# Patient Record
Sex: MALE | Race: WHITE | NOT HISPANIC OR LATINO | Employment: OTHER | URBAN - METROPOLITAN AREA
[De-identification: names, ages, dates, MRNs, and addresses within clinical notes are randomized per-mention and may not be internally consistent; named-entity substitution may affect disease eponyms.]

---

## 2017-01-02 ENCOUNTER — OFFICE VISIT (OUTPATIENT)
Dept: RETAIL CLINIC | Facility: CLINIC | Age: 72
End: 2017-01-02

## 2017-01-02 VITALS
BODY MASS INDEX: 41.75 KG/M2 | OXYGEN SATURATION: 96 % | TEMPERATURE: 98 F | HEART RATE: 70 BPM | DIASTOLIC BLOOD PRESSURE: 89 MMHG | SYSTOLIC BLOOD PRESSURE: 135 MMHG | WEIGHT: 315 LBS | HEIGHT: 73 IN

## 2017-01-02 DIAGNOSIS — J06.9 ACUTE URI: Primary | ICD-10-CM

## 2017-01-02 DIAGNOSIS — J40 BRONCHITIS: ICD-10-CM

## 2017-01-02 PROCEDURE — 99213 OFFICE O/P EST LOW 20 MIN: CPT | Performed by: NURSE PRACTITIONER

## 2017-01-02 RX ORDER — DOXYCYCLINE 50 MG/1
100 CAPSULE ORAL 2 TIMES DAILY
Qty: 28 CAPSULE | Refills: 0 | Status: SHIPPED | OUTPATIENT
Start: 2017-01-02 | End: 2017-01-09

## 2017-01-02 RX ORDER — ALBUTEROL SULFATE 90 UG/1
2 AEROSOL, METERED RESPIRATORY (INHALATION) EVERY 4 HOURS PRN
Qty: 1 INHALER | Refills: 0 | Status: SHIPPED | OUTPATIENT
Start: 2017-01-02 | End: 2019-10-29 | Stop reason: SDUPTHER

## 2017-01-02 RX ORDER — BROMPHENIRAMINE MALEATE, PSEUDOEPHEDRINE HYDROCHLORIDE, AND DEXTROMETHORPHAN HYDROBROMIDE 2; 30; 10 MG/5ML; MG/5ML; MG/5ML
5 SYRUP ORAL 4 TIMES DAILY PRN
Qty: 118 ML | Refills: 0 | Status: SHIPPED | OUTPATIENT
Start: 2017-01-02 | End: 2017-01-09

## 2017-01-02 NOTE — PROGRESS NOTES
Subjective   Luis Perez Jr. is a 71 y.o. male.     Cough   This is a chronic problem. The current episode started more than 1 month ago. The problem has been gradually worsening. The problem occurs every few minutes. The cough is productive of purulent sputum. Associated symptoms include ear congestion, a fever, nasal congestion, postnasal drip, rhinorrhea and wheezing. Pertinent negatives include no ear pain, headaches or sore throat. The symptoms are aggravated by lying down. He has tried ipratropium inhaler, a beta-agonist inhaler and OTC cough suppressant for the symptoms. The treatment provided mild relief. His past medical history is significant for bronchitis.     Patient recently treated on 12/15/2016 in MD's office asthmatic bronchitis. He was given Depo-medrol, and prescribed Zithromax. He reports that he has not improved with these treatments.    The following portions of the patient's history were reviewed and updated as appropriate: allergies, current medications, past family history, past medical history, past social history, past surgical history and problem list.    Review of Systems   Constitutional: Positive for fever.   HENT: Positive for congestion, postnasal drip and rhinorrhea. Negative for ear pain, sore throat and tinnitus. Facial swelling: facial pain with bending.    Respiratory: Positive for cough and wheezing.    Allergic/Immunologic: Negative.    Neurological: Negative for headaches.   Hematological: Negative.    Psychiatric/Behavioral: Negative.        Objective   Physical Exam   Constitutional: He is oriented to person, place, and time. He appears well-developed and well-nourished.   HENT:   Head: Normocephalic and atraumatic.   Eyes: Pupils are equal, round, and reactive to light.   Neck: Normal range of motion.   Cardiovascular: Normal rate, regular rhythm and normal heart sounds.    Pulmonary/Chest: Effort normal. He has wheezes in the right upper field and the left upper field.  He has rhonchi in the right upper field and the left upper field.   Musculoskeletal: Normal range of motion.   Neurological: He is alert and oriented to person, place, and time.   Skin: Skin is warm, dry and intact.   Psychiatric: He has a normal mood and affect. His behavior is normal. Judgment and thought content normal.     Vitals:    01/02/17 1025   BP: 135/89   Pulse: 70   Temp: 98 °F (36.7 °C)   SpO2: 96%       Chapin/Nichol Smith was seen today for cough and nasal congestion.    Diagnoses and all orders for this visit:    Acute URI    Bronchitis    Other orders  -     doxycycline (MONODOX) 50 MG capsule; Take 2 capsules by mouth 2 (Two) Times a Day for 7 days.  -     brompheniramine-pseudoephedrine-DM 30-2-10 MG/5ML syrup; Take 5 mL by mouth 4 (Four) Times a Day As Needed for allergies for up to 7 days.  -     albuterol (PROVENTIL HFA;VENTOLIN HFA) 108 (90 BASE) MCG/ACT inhaler; Inhale 2 puffs Every 4 (Four) Hours As Needed for wheezing.       Home care instructions discussed, and patient verbalized understanding. Patient instructed to follow up with PCP and/or ED for worsening or non-resolving symptoms.    ANGELIA Roger

## 2017-01-02 NOTE — MR AVS SNAPSHOT
Luis Perez JrWinifred   1/2/2017 10:15 AM   Office Visit    Dept Phone:  [phone]   Encounter #:  [id number]    Provider:  NUNU MICHAEL   Department:  [facility]                Your Full Care Plan              Today's Medication Changes          These changes are accurate as of: 1/2/17 10:40 AM.  If you have any questions, ask your nurse or doctor.               New Medication(s)Ordered:     albuterol 108 (90 BASE) MCG/ACT inhaler   Commonly known as:  PROVENTIL HFA;VENTOLIN HFA   Inhale 2 puffs Every 4 (Four) Hours As Needed for wheezing.       brompheniramine-pseudoephedrine-DM 30-2-10 MG/5ML syrup   Take 5 mL by mouth 4 (Four) Times a Day As Needed for allergies for up to 7 days.       doxycycline 50 MG capsule   Commonly known as:  MONODOX   Take 2 capsules by mouth 2 (Two) Times a Day for 7 days.         Stop taking medication(s)listed here:     azithromycin 250 MG tablet   Commonly known as:  ZITHROMAX                Where to Get Your Medications      You can get these medications from any pharmacy     Bring a paper prescription for each of these medications     albuterol 108 (90 BASE) MCG/ACT inhaler    brompheniramine-pseudoephedrine-DM 30-2-10 MG/5ML syrup    doxycycline 50 MG capsule                  Your Updated Medication List          This list is accurate as of: 1/2/17 10:40 AM.  Always use your most recent med list.                ADVAIR DISKUS 500-50 MCG/DOSE DISKUS   Generic drug:  fluticasone-salmeterol       albuterol 108 (90 BASE) MCG/ACT inhaler   Commonly known as:  PROVENTIL HFA;VENTOLIN HFA   Inhale 2 puffs Every 4 (Four) Hours As Needed for wheezing.       amLODIPine 10 MG tablet   Commonly known as:  NORVASC   Take 1 tablet by mouth Daily.       brompheniramine-pseudoephedrine-DM 30-2-10 MG/5ML syrup   Take 5 mL by mouth 4 (Four) Times a Day As Needed for allergies for up to 7 days.       doxycycline 50 MG capsule   Commonly known as:  MONODOX      Take 2 capsules by mouth 2 (Two) Times a Day for 7 days.       hydrochlorothiazide 50 MG tablet   Commonly known as:  HYDRODIURIL   Take 1 tablet by mouth Daily.       montelukast 10 MG tablet   Commonly known as:  SINGULAIR               Instructions    Acute Bronchitis  Bronchitis is inflammation of the airways that extend from the windpipe into the lungs (bronchi). The inflammation often causes mucus to develop. This leads to a cough, which is the most common symptom of bronchitis.   In acute bronchitis, the condition usually develops suddenly and goes away over time, usually in a couple weeks. Smoking, allergies, and asthma can make bronchitis worse. Repeated episodes of bronchitis may cause further lung problems.   CAUSES  Acute bronchitis is most often caused by the same virus that causes a cold. The virus can spread from person to person (contagious) through coughing, sneezing, and touching contaminated objects.  SIGNS AND SYMPTOMS   · Cough.    · Fever.    · Coughing up mucus.    · Body aches.    · Chest congestion.    · Chills.    · Shortness of breath.    · Sore throat.    DIAGNOSIS   Acute bronchitis is usually diagnosed through a physical exam. Your health care provider will also ask you questions about your medical history. Tests, such as chest X-rays, are sometimes done to rule out other conditions.   TREATMENT   Acute bronchitis usually goes away in a couple weeks. Oftentimes, no medical treatment is necessary. Medicines are sometimes given for relief of fever or cough. Antibiotic medicines are usually not needed but may be prescribed in certain situations. In some cases, an inhaler may be recommended to help reduce shortness of breath and control the cough. A cool mist vaporizer may also be used to help thin bronchial secretions and make it easier to clear the chest.   HOME CARE INSTRUCTIONS  · Get plenty of rest.    · Drink enough fluids to keep your urine clear or pale yellow (unless you have a  medical condition that requires fluid restriction). Increasing fluids may help thin your respiratory secretions (sputum) and reduce chest congestion, and it will prevent dehydration.    · Take medicines only as directed by your health care provider.  · If you were prescribed an antibiotic medicine, finish it all even if you start to feel better.  · Avoid smoking and secondhand smoke. Exposure to cigarette smoke or irritating chemicals will make bronchitis worse. If you are a smoker, consider using nicotine gum or skin patches to help control withdrawal symptoms. Quitting smoking will help your lungs heal faster.    · Reduce the chances of another bout of acute bronchitis by washing your hands frequently, avoiding people with cold symptoms, and trying not to touch your hands to your mouth, nose, or eyes.    · Keep all follow-up visits as directed by your health care provider.    SEEK MEDICAL CARE IF:  Your symptoms do not improve after 1 week of treatment.   SEEK IMMEDIATE MEDICAL CARE IF:  · You develop an increased fever or chills.    · You have chest pain.    · You have severe shortness of breath.  · You have bloody sputum.    · You develop dehydration.  · You faint or repeatedly feel like you are going to pass out.  · You develop repeated vomiting.  · You develop a severe headache.  MAKE SURE YOU:   · Understand these instructions.  · Will watch your condition.  · Will get help right away if you are not doing well or get worse.     This information is not intended to replace advice given to you by your health care provider. Make sure you discuss any questions you have with your health care provider.     Document Released: 01/25/2006 Document Revised: 01/08/2016 Document Reviewed: 06/10/2014  [employer] Interactive Patient Education ©2016 [employer]  Upper Respiratory Infection, Adult  Most upper respiratory infections (URIs) are a viral infection of the air passages leading to the lungs. A URI affects the nose,  "throat, and upper air passages. The most common type of URI is nasopharyngitis and is typically referred to as \"the common cold.\"  URIs run their course and usually go away on their own. Most of the time, a URI does not require medical attention, but sometimes a bacterial infection in the upper airways can follow a viral infection. This is called a secondary infection. Sinus and middle ear infections are common types of secondary upper respiratory infections.  Bacterial pneumonia can also complicate a URI. A URI can worsen asthma and chronic obstructive pulmonary disease (COPD). Sometimes, these complications can require emergency medical care and may be life threatening.   CAUSES  Almost all URIs are caused by viruses. A virus is a type of germ and can spread from one person to another.   RISKS FACTORS  You may be at risk for a URI if:   · You smoke.    · You have chronic heart or lung disease.  · You have a weakened defense (immune) system.    · You are very young or very old.    · You have nasal allergies or asthma.  · You work in crowded or poorly ventilated areas.  · You work in health care facilities or schools.  SIGNS AND SYMPTOMS   Symptoms typically develop 2-3 days after you come in contact with a cold virus. Most viral URIs last 7-10 days. However, viral URIs from the influenza virus (flu virus) can last 14-18 days and are typically more severe. Symptoms may include:   · Runny or stuffy (congested) nose.    · Sneezing.    · Cough.    · Sore throat.    · Headache.    · Fatigue.    · Fever.    · Loss of appetite.    · Pain in your forehead, behind your eyes, and over your cheekbones (sinus pain).  · Muscle aches.    DIAGNOSIS   Your health care provider may diagnose a URI by:  · Physical exam.  · Tests to check that your symptoms are not due to another condition such as:  ¨ Strep throat.  ¨ Sinusitis.  ¨ Pneumonia.  ¨ Asthma.  TREATMENT   A URI goes away on its own with time. It cannot be cured with " medicines, but medicines may be prescribed or recommended to relieve symptoms. Medicines may help:  · Reduce your fever.  · Reduce your cough.  · Relieve nasal congestion.  HOME CARE INSTRUCTIONS   · Take medicines only as directed by your health care provider.    · Gargle warm saltwater or take cough drops to comfort your throat as directed by your health care provider.  · Use a warm mist humidifier or inhale steam from a shower to increase air moisture. This may make it easier to breathe.  · Drink enough fluid to keep your urine clear or pale yellow.    · Eat soups and other clear broths and maintain good nutrition.    · Rest as needed.    · Return to work when your temperature has returned to normal or as your health care provider advises. You may need to stay home longer to avoid infecting others. You can also use a face mask and careful hand washing to prevent spread of the virus.  · Increase the usage of your inhaler if you have asthma.    · Do not use any tobacco products, including cigarettes, chewing tobacco, or electronic cigarettes. If you need help quitting, ask your health care provider.  PREVENTION   The best way to protect yourself from getting a cold is to practice good hygiene.   · Avoid oral or hand contact with people with cold symptoms.    · Wash your hands often if contact occurs.    There is no clear evidence that vitamin C, vitamin E, echinacea, or exercise reduces the chance of developing a cold. However, it is always recommended to get plenty of rest, exercise, and practice good nutrition.   SEEK MEDICAL CARE IF:   · You are getting worse rather than better.    · Your symptoms are not controlled by medicine.    · You have chills.  · You have worsening shortness of breath.  · You have brown or red mucus.  · You have yellow or brown nasal discharge.  · You have pain in your face, especially when you bend forward.  · You have a fever.  · You have swollen neck glands.  · You have pain while  swallowing.  · You have white areas in the back of your throat.  SEEK IMMEDIATE MEDICAL CARE IF:   · You have severe or persistent:    Headache.    Ear pain.    Sinus pain.    Chest pain.  · You have chronic lung disease and any of the following:    Wheezing.    Prolonged cough.    Coughing up blood.    A change in your usual mucus.  · You have a stiff neck.  · You have changes in your:    Vision.    Hearing.    Thinking.    Mood.  MAKE SURE YOU:   · Understand these instructions.  · Will watch your condition.  · Will get help right away if you are not doing well or get worse.     This information is not intended to replace advice given to you by your health care provider. Make sure you discuss any questions you have with your health care provider.     Document Released: 2002 Document Revised: 2016 Document Reviewed: 2015  [employer] Interactive Patient Education © [employer]       Patient Instructions History      Upcoming Appointments     Visit Type Date Time Department    OFFICE VISIT 2017 10:15 AM [facility]      [employer] Signup     [employer] allows you to send messages to your doctor, view your test results, renew your prescriptions, schedule appointments, and more. To sign up, go to [email or web address] and click on the Sign Up Now link in the New User? box. Enter your [employer] Activation Code exactly as it appears below along with the last four digits of your Social Security Number and your Date of Birth () to complete the sign-up process. If you do not sign up before the expiration date, you must request a new code.    [employer] Activation Code: [email or web address]  Expires: 2017 10:40 AM    If you have questions, you can email [email or web address] or call [phone] to talk to our [employer] staff. Remember, [employer] is NOT to be used for urgent needs. For medical emergencies, dial 911.               Other Info from Your Visit           Allergies  "    No Known Allergies      Vital Signs     Pulse Temperature Height Weight Oxygen Saturation Body Mass Index    70 98 °F (36.7 °C) 73\" (185.4 cm) 334 lb (152 kg) 96% 44.07 kg/m2    Smoking Status                   Former Smoker             "

## 2017-01-02 NOTE — PATIENT INSTRUCTIONS
Acute Bronchitis  Bronchitis is inflammation of the airways that extend from the windpipe into the lungs (bronchi). The inflammation often causes mucus to develop. This leads to a cough, which is the most common symptom of bronchitis.   In acute bronchitis, the condition usually develops suddenly and goes away over time, usually in a couple weeks. Smoking, allergies, and asthma can make bronchitis worse. Repeated episodes of bronchitis may cause further lung problems.   CAUSES  Acute bronchitis is most often caused by the same virus that causes a cold. The virus can spread from person to person (contagious) through coughing, sneezing, and touching contaminated objects.  SIGNS AND SYMPTOMS   · Cough.    · Fever.    · Coughing up mucus.    · Body aches.    · Chest congestion.    · Chills.    · Shortness of breath.    · Sore throat.    DIAGNOSIS   Acute bronchitis is usually diagnosed through a physical exam. Your health care provider will also ask you questions about your medical history. Tests, such as chest X-rays, are sometimes done to rule out other conditions.   TREATMENT   Acute bronchitis usually goes away in a couple weeks. Oftentimes, no medical treatment is necessary. Medicines are sometimes given for relief of fever or cough. Antibiotic medicines are usually not needed but may be prescribed in certain situations. In some cases, an inhaler may be recommended to help reduce shortness of breath and control the cough. A cool mist vaporizer may also be used to help thin bronchial secretions and make it easier to clear the chest.   HOME CARE INSTRUCTIONS  · Get plenty of rest.    · Drink enough fluids to keep your urine clear or pale yellow (unless you have a medical condition that requires fluid restriction). Increasing fluids may help thin your respiratory secretions (sputum) and reduce chest congestion, and it will prevent dehydration.    · Take medicines only as directed by your health care provider.  · If  "you were prescribed an antibiotic medicine, finish it all even if you start to feel better.  · Avoid smoking and secondhand smoke. Exposure to cigarette smoke or irritating chemicals will make bronchitis worse. If you are a smoker, consider using nicotine gum or skin patches to help control withdrawal symptoms. Quitting smoking will help your lungs heal faster.    · Reduce the chances of another bout of acute bronchitis by washing your hands frequently, avoiding people with cold symptoms, and trying not to touch your hands to your mouth, nose, or eyes.    · Keep all follow-up visits as directed by your health care provider.    SEEK MEDICAL CARE IF:  Your symptoms do not improve after 1 week of treatment.   SEEK IMMEDIATE MEDICAL CARE IF:  · You develop an increased fever or chills.    · You have chest pain.    · You have severe shortness of breath.  · You have bloody sputum.    · You develop dehydration.  · You faint or repeatedly feel like you are going to pass out.  · You develop repeated vomiting.  · You develop a severe headache.  MAKE SURE YOU:   · Understand these instructions.  · Will watch your condition.  · Will get help right away if you are not doing well or get worse.     This information is not intended to replace advice given to you by your health care provider. Make sure you discuss any questions you have with your health care provider.     Document Released: 01/25/2006 Document Revised: 01/08/2016 Document Reviewed: 06/10/2014  [employer] Interactive Patient Education ©2016 [employer]  Upper Respiratory Infection, Adult  Most upper respiratory infections (URIs) are a viral infection of the air passages leading to the lungs. A URI affects the nose, throat, and upper air passages. The most common type of URI is nasopharyngitis and is typically referred to as \"the common cold.\"  URIs run their course and usually go away on their own. Most of the time, a URI does not require medical attention, but " sometimes a bacterial infection in the upper airways can follow a viral infection. This is called a secondary infection. Sinus and middle ear infections are common types of secondary upper respiratory infections.  Bacterial pneumonia can also complicate a URI. A URI can worsen asthma and chronic obstructive pulmonary disease (COPD). Sometimes, these complications can require emergency medical care and may be life threatening.   CAUSES  Almost all URIs are caused by viruses. A virus is a type of germ and can spread from one person to another.   RISKS FACTORS  You may be at risk for a URI if:   · You smoke.    · You have chronic heart or lung disease.  · You have a weakened defense (immune) system.    · You are very young or very old.    · You have nasal allergies or asthma.  · You work in crowded or poorly ventilated areas.  · You work in health care facilities or schools.  SIGNS AND SYMPTOMS   Symptoms typically develop 2-3 days after you come in contact with a cold virus. Most viral URIs last 7-10 days. However, viral URIs from the influenza virus (flu virus) can last 14-18 days and are typically more severe. Symptoms may include:   · Runny or stuffy (congested) nose.    · Sneezing.    · Cough.    · Sore throat.    · Headache.    · Fatigue.    · Fever.    · Loss of appetite.    · Pain in your forehead, behind your eyes, and over your cheekbones (sinus pain).  · Muscle aches.    DIAGNOSIS   Your health care provider may diagnose a URI by:  · Physical exam.  · Tests to check that your symptoms are not due to another condition such as:  ¨ Strep throat.  ¨ Sinusitis.  ¨ Pneumonia.  ¨ Asthma.  TREATMENT   A URI goes away on its own with time. It cannot be cured with medicines, but medicines may be prescribed or recommended to relieve symptoms. Medicines may help:  · Reduce your fever.  · Reduce your cough.  · Relieve nasal congestion.  HOME CARE INSTRUCTIONS   · Take medicines only as directed by your health care  provider.    · Gargle warm saltwater or take cough drops to comfort your throat as directed by your health care provider.  · Use a warm mist humidifier or inhale steam from a shower to increase air moisture. This may make it easier to breathe.  · Drink enough fluid to keep your urine clear or pale yellow.    · Eat soups and other clear broths and maintain good nutrition.    · Rest as needed.    · Return to work when your temperature has returned to normal or as your health care provider advises. You may need to stay home longer to avoid infecting others. You can also use a face mask and careful hand washing to prevent spread of the virus.  · Increase the usage of your inhaler if you have asthma.    · Do not use any tobacco products, including cigarettes, chewing tobacco, or electronic cigarettes. If you need help quitting, ask your health care provider.  PREVENTION   The best way to protect yourself from getting a cold is to practice good hygiene.   · Avoid oral or hand contact with people with cold symptoms.    · Wash your hands often if contact occurs.    There is no clear evidence that vitamin C, vitamin E, echinacea, or exercise reduces the chance of developing a cold. However, it is always recommended to get plenty of rest, exercise, and practice good nutrition.   SEEK MEDICAL CARE IF:   · You are getting worse rather than better.    · Your symptoms are not controlled by medicine.    · You have chills.  · You have worsening shortness of breath.  · You have brown or red mucus.  · You have yellow or brown nasal discharge.  · You have pain in your face, especially when you bend forward.  · You have a fever.  · You have swollen neck glands.  · You have pain while swallowing.  · You have white areas in the back of your throat.  SEEK IMMEDIATE MEDICAL CARE IF:   · You have severe or persistent:    Headache.    Ear pain.    Sinus pain.    Chest pain.  · You have chronic lung disease and any of the following:     Wheezing.    Prolonged cough.    Coughing up blood.    A change in your usual mucus.  · You have a stiff neck.  · You have changes in your:    Vision.    Hearing.    Thinking.    Mood.  MAKE SURE YOU:   · Understand these instructions.  · Will watch your condition.  · Will get help right away if you are not doing well or get worse.     This information is not intended to replace advice given to you by your health care provider. Make sure you discuss any questions you have with your health care provider.     Document Released: 06/13/2002 Document Revised: 05/03/2016 Document Reviewed: 03/25/2015  [employer] Interactive Patient Education ©2016 [employer]

## 2017-02-07 ENCOUNTER — OFFICE VISIT (OUTPATIENT)
Dept: RETAIL CLINIC | Facility: CLINIC | Age: 72
End: 2017-02-07

## 2017-02-07 VITALS
WEIGHT: 315 LBS | OXYGEN SATURATION: 98 % | DIASTOLIC BLOOD PRESSURE: 62 MMHG | HEART RATE: 89 BPM | RESPIRATION RATE: 20 BRPM | TEMPERATURE: 98.5 F | SYSTOLIC BLOOD PRESSURE: 128 MMHG | BODY MASS INDEX: 41.75 KG/M2 | HEIGHT: 73 IN

## 2017-02-07 DIAGNOSIS — R05.9 COUGH: Primary | ICD-10-CM

## 2017-02-07 NOTE — PROGRESS NOTES
Patient presents for ongoing cough and chest congestion.   has been seen multiple times recently by Dr. Ye  and here a couple of weeks ago for same symptoms.   has been on several different antibiotics and symptoms remain.  States having productive cough, wheezing, shortness of breath at times and chest congestion. States that chest feels like it has a lot of stuff that won't come out.  Denies chest pain, difficulty breathing, fever or other complaints.   has finished all treatments prescribed and taking OTC medicines with  little relief.  Instructed patient to go to PCP or go to urgent care or ER from here for further evaluation and treatment. Patient verbalized understanding.  Patient stable and vitals stable.    ANGELIA Johnson

## 2017-02-08 ENCOUNTER — OFFICE VISIT (OUTPATIENT)
Dept: FAMILY MEDICINE CLINIC | Facility: CLINIC | Age: 72
End: 2017-02-08

## 2017-02-08 VITALS
DIASTOLIC BLOOD PRESSURE: 80 MMHG | RESPIRATION RATE: 16 BRPM | BODY MASS INDEX: 44.33 KG/M2 | HEART RATE: 76 BPM | SYSTOLIC BLOOD PRESSURE: 130 MMHG | WEIGHT: 315 LBS | TEMPERATURE: 98.1 F

## 2017-02-08 DIAGNOSIS — J45.20 ASTHMATIC BRONCHITIS, MILD INTERMITTENT, UNCOMPLICATED: Primary | ICD-10-CM

## 2017-02-08 PROCEDURE — 99213 OFFICE O/P EST LOW 20 MIN: CPT | Performed by: FAMILY MEDICINE

## 2017-02-08 RX ORDER — PROMETHAZINE HYDROCHLORIDE AND CODEINE PHOSPHATE 6.25; 1 MG/5ML; MG/5ML
5 SYRUP ORAL EVERY 6 HOURS PRN
Qty: 118 ML | Refills: 0 | Status: SHIPPED | OUTPATIENT
Start: 2017-02-08 | End: 2017-09-11

## 2017-02-08 RX ORDER — AMOXICILLIN AND CLAVULANATE POTASSIUM 875; 125 MG/1; MG/1
1 TABLET, FILM COATED ORAL 2 TIMES DAILY
Qty: 20 TABLET | Refills: 0 | Status: SHIPPED | OUTPATIENT
Start: 2017-02-08 | End: 2017-02-18

## 2017-02-08 NOTE — PROGRESS NOTES
Subjective   Luis Perez Jr is a 71 y.o. male.     History of Present Illness   Return of chest congestion, lingering past three weeks, improves taking antibiotic.  Night cough and sleeping poor. Wheeze.  No fever.  Appetite slow. No GI upset. Using Advair.  The following portions of the patient's history were reviewed and updated as appropriate: allergies, current medications, past family history, past medical history, past social history, past surgical history and problem list.    Review of Systems   Constitutional: Negative.    Eyes: Negative.    Respiratory: Positive for cough, shortness of breath and wheezing.    Cardiovascular: Negative.    Musculoskeletal: Negative.    Skin: Negative.        Objective   Physical Exam   Constitutional: He appears well-developed.   HENT:   Mouth/Throat: Oropharynx is clear and moist.   Neck: Neck supple.   Pulmonary/Chest: He has wheezes in the right middle field and the left middle field.   Lymphadenopathy:     He has no cervical adenopathy.   Vitals reviewed.      Assessment/Plan   Luis was seen today for uri.    Diagnoses and all orders for this visit:    Asthmatic bronchitis, mild intermittent, uncomplicated  -     amoxicillin-clavulanate (AUGMENTIN) 875-125 MG per tablet; Take 1 tablet by mouth 2 (Two) Times a Day for 10 days. Take one twice a day with food  -     promethazine-codeine (PHENERGAN with CODEINE) 6.25-10 MG/5ML syrup; Take 5 mL by mouth Every 6 (Six) Hours As Needed for cough.        To see pulmonary medicine  Sample of Anoro q day use to replace Advir next ten days.

## 2017-09-11 ENCOUNTER — OFFICE VISIT (OUTPATIENT)
Dept: FAMILY MEDICINE CLINIC | Facility: CLINIC | Age: 72
End: 2017-09-11

## 2017-09-11 VITALS
SYSTOLIC BLOOD PRESSURE: 130 MMHG | WEIGHT: 315 LBS | DIASTOLIC BLOOD PRESSURE: 80 MMHG | TEMPERATURE: 98.5 F | HEART RATE: 80 BPM | RESPIRATION RATE: 16 BRPM | BODY MASS INDEX: 45.12 KG/M2

## 2017-09-11 DIAGNOSIS — J45.20 ASTHMATIC BRONCHITIS, MILD INTERMITTENT, UNCOMPLICATED: Primary | ICD-10-CM

## 2017-09-11 PROCEDURE — 99213 OFFICE O/P EST LOW 20 MIN: CPT | Performed by: FAMILY MEDICINE

## 2017-09-11 RX ORDER — DOXYCYCLINE 100 MG/1
100 CAPSULE ORAL 2 TIMES DAILY
Qty: 20 CAPSULE | Refills: 0 | Status: SHIPPED | OUTPATIENT
Start: 2017-09-11 | End: 2018-02-12

## 2017-09-11 NOTE — PROGRESS NOTES
Subjective   Luis Perez Jr is a 71 y.o. male.     History of Present Illness   Two months cough, thick sputum.  No fever. No chest pain. No more cats in house and that has helped with the cough. No GI upset. Trouble sleeping wakes with cough. Uses Advair only. no difficulty walking.   The following portions of the patient's history were reviewed and updated as appropriate: allergies, current medications, past family history, past medical history, past social history, past surgical history and problem list.    Review of Systems   Constitutional: Negative.    HENT: Positive for congestion. Negative for ear pain and sinus pressure.    Eyes: Negative.    Respiratory: Positive for cough, shortness of breath and wheezing.    Cardiovascular: Negative.    Gastrointestinal: Negative.    Musculoskeletal: Negative.    Neurological: Positive for headaches.       Objective   Physical Exam   Constitutional: He appears well-developed and well-nourished. No distress.   Neck: Neck supple.   Cardiovascular: Regular rhythm and normal heart sounds.    Pulmonary/Chest: He has no wheezes.   Long inspiratory phase.   Musculoskeletal: He exhibits no edema.   Vitals reviewed.      Assessment/Plan   Luis was seen today for uri.    Diagnoses and all orders for this visit:    Asthmatic bronchitis, mild intermittent, uncomplicated  -     doxycycline (MONODOX) 100 MG capsule; Take 1 capsule by mouth 2 (Two) Times a Day.  -     Umeclidinium-Vilanterol (ANORO ELLIPTA) 62.5-25 MCG/INH aerosol powder ; Inhale 1 inhaler Daily.

## 2017-12-11 DIAGNOSIS — J45.30 MILD PERSISTENT ASTHMA WITHOUT COMPLICATION: ICD-10-CM

## 2017-12-11 DIAGNOSIS — I10 ESSENTIAL HYPERTENSION: ICD-10-CM

## 2017-12-11 RX ORDER — HYDROCHLOROTHIAZIDE 50 MG/1
TABLET ORAL
Qty: 90 TABLET | Refills: 2 | Status: SHIPPED | OUTPATIENT
Start: 2017-12-11 | End: 2018-11-29 | Stop reason: SDUPTHER

## 2017-12-11 RX ORDER — AMLODIPINE BESYLATE 10 MG/1
TABLET ORAL
Qty: 90 TABLET | Refills: 2 | Status: SHIPPED | OUTPATIENT
Start: 2017-12-11 | End: 2018-10-02 | Stop reason: SDUPTHER

## 2018-02-12 ENCOUNTER — OFFICE VISIT (OUTPATIENT)
Dept: FAMILY MEDICINE CLINIC | Facility: CLINIC | Age: 73
End: 2018-02-12

## 2018-02-12 VITALS
HEART RATE: 74 BPM | TEMPERATURE: 98.6 F | SYSTOLIC BLOOD PRESSURE: 142 MMHG | DIASTOLIC BLOOD PRESSURE: 86 MMHG | WEIGHT: 315 LBS | RESPIRATION RATE: 16 BRPM | BODY MASS INDEX: 44.46 KG/M2 | OXYGEN SATURATION: 95 %

## 2018-02-12 DIAGNOSIS — J06.9 ACUTE URI: Primary | ICD-10-CM

## 2018-02-12 PROCEDURE — 99213 OFFICE O/P EST LOW 20 MIN: CPT | Performed by: FAMILY MEDICINE

## 2018-02-12 RX ORDER — AMOXICILLIN AND CLAVULANATE POTASSIUM 875; 125 MG/1; MG/1
1 TABLET, FILM COATED ORAL EVERY 12 HOURS SCHEDULED
Qty: 20 TABLET | Refills: 0 | Status: SHIPPED | OUTPATIENT
Start: 2018-02-12 | End: 2018-02-22

## 2018-02-12 RX ORDER — PROMETHAZINE HYDROCHLORIDE AND CODEINE PHOSPHATE 6.25; 1 MG/5ML; MG/5ML
5 SYRUP ORAL EVERY 6 HOURS PRN
Qty: 118 ML | Refills: 0 | Status: SHIPPED | OUTPATIENT
Start: 2018-02-12 | End: 2018-08-09

## 2018-02-12 NOTE — PROGRESS NOTES
Subjective   Luis Perez Jr is a 72 y.o. male.     History of Present Illness   Cough waking from sleep with left upper chest discomfort, sweaty and feverish.  Some wheezing.  Took Delsym.  Loose stools. No sputum.   The following portions of the patient's history were reviewed and updated as appropriate: allergies, current medications, past family history, past medical history, past social history, past surgical history and problem list.    Review of Systems   HENT: Positive for congestion. Negative for sinus pressure and sore throat.    Eyes: Negative.    Respiratory: Positive for cough, shortness of breath and wheezing.    Cardiovascular: Negative.    Gastrointestinal: Positive for nausea.   Neurological: Positive for weakness and light-headedness. Negative for headaches.       Objective   Physical Exam   Constitutional: He appears well-developed and well-nourished.   HENT:   Right Ear: External ear normal.   Left Ear: External ear normal.   Mouth/Throat: Oropharynx is clear and moist.   Mild head congestion   Neck: Neck supple.   Pulmonary/Chest: He has wheezes in the left middle field.   Lymphadenopathy:     He has no cervical adenopathy.   Vitals reviewed.      Assessment/Plan   Luis was seen today for uri.    Diagnoses and all orders for this visit:    Acute URI  -     promethazine-codeine (PHENERGAN with CODEINE) 6.25-10 MG/5ML syrup; Take 5 mL by mouth Every 6 (Six) Hours As Needed for Cough.  -     amoxicillin-clavulanate (AUGMENTIN) 875-125 MG per tablet; Take 1 tablet by mouth Every 12 (Twelve) Hours for 10 days.

## 2018-05-30 ENCOUNTER — TRANSCRIBE ORDERS (OUTPATIENT)
Dept: LAB | Facility: HOSPITAL | Age: 73
End: 2018-05-30

## 2018-05-30 ENCOUNTER — LAB (OUTPATIENT)
Dept: LAB | Facility: HOSPITAL | Age: 73
End: 2018-05-30

## 2018-05-30 DIAGNOSIS — IMO0001 POSTOPERATIVE INTRA-ABDOMINAL ABSCESS, SEQUELA: ICD-10-CM

## 2018-05-30 DIAGNOSIS — IMO0001 POSTOPERATIVE INTRA-ABDOMINAL ABSCESS, SEQUELA: Primary | ICD-10-CM

## 2018-05-30 LAB
ALBUMIN SERPL-MCNC: 4.8 G/DL (ref 3.2–4.8)
ALBUMIN/GLOB SERPL: 1.6 G/DL (ref 1.5–2.5)
ALP SERPL-CCNC: 71 U/L (ref 25–100)
ALT SERPL W P-5'-P-CCNC: 23 U/L (ref 7–40)
ANION GAP SERPL CALCULATED.3IONS-SCNC: 9 MMOL/L (ref 3–11)
AST SERPL-CCNC: 20 U/L (ref 0–33)
BASOPHILS # BLD AUTO: 0.04 10*3/MM3 (ref 0–0.2)
BASOPHILS NFR BLD AUTO: 0.5 % (ref 0–1)
BILIRUB SERPL-MCNC: 0.5 MG/DL (ref 0.3–1.2)
BUN BLD-MCNC: 16 MG/DL (ref 9–23)
BUN/CREAT SERPL: 16 (ref 7–25)
CALCIUM SPEC-SCNC: 9.7 MG/DL (ref 8.7–10.4)
CHLORIDE SERPL-SCNC: 101 MMOL/L (ref 99–109)
CK SERPL-CCNC: 80 U/L (ref 26–174)
CO2 SERPL-SCNC: 32 MMOL/L (ref 20–31)
CREAT BLD-MCNC: 1 MG/DL (ref 0.6–1.3)
CRP SERPL-MCNC: 0.06 MG/DL (ref 0–1)
DEPRECATED RDW RBC AUTO: 44.1 FL (ref 37–54)
EOSINOPHIL # BLD AUTO: 0.16 10*3/MM3 (ref 0–0.3)
EOSINOPHIL NFR BLD AUTO: 1.9 % (ref 0–3)
ERYTHROCYTE [DISTWIDTH] IN BLOOD BY AUTOMATED COUNT: 14 % (ref 11.3–14.5)
ERYTHROCYTE [SEDIMENTATION RATE] IN BLOOD: 18 MM/HR (ref 0–20)
GFR SERPL CREATININE-BSD FRML MDRD: 73 ML/MIN/1.73
GLOBULIN UR ELPH-MCNC: 3 GM/DL
GLUCOSE BLD-MCNC: 94 MG/DL (ref 70–100)
HCT VFR BLD AUTO: 49.2 % (ref 38.9–50.9)
HGB BLD-MCNC: 16.7 G/DL (ref 13.1–17.5)
IMM GRANULOCYTES # BLD: 0.06 10*3/MM3 (ref 0–0.03)
IMM GRANULOCYTES NFR BLD: 0.7 % (ref 0–0.6)
LYMPHOCYTES # BLD AUTO: 2.97 10*3/MM3 (ref 0.6–4.8)
LYMPHOCYTES NFR BLD AUTO: 35.1 % (ref 24–44)
MCH RBC QN AUTO: 29.2 PG (ref 27–31)
MCHC RBC AUTO-ENTMCNC: 33.9 G/DL (ref 32–36)
MCV RBC AUTO: 86 FL (ref 80–99)
MONOCYTES # BLD AUTO: 0.84 10*3/MM3 (ref 0–1)
MONOCYTES NFR BLD AUTO: 9.9 % (ref 0–12)
NEUTROPHILS # BLD AUTO: 4.4 10*3/MM3 (ref 1.5–8.3)
NEUTROPHILS NFR BLD AUTO: 51.9 % (ref 41–71)
PLATELET # BLD AUTO: 222 10*3/MM3 (ref 150–450)
PMV BLD AUTO: 10.2 FL (ref 6–12)
POTASSIUM BLD-SCNC: 3.9 MMOL/L (ref 3.5–5.5)
PROT SERPL-MCNC: 7.8 G/DL (ref 5.7–8.2)
RBC # BLD AUTO: 5.72 10*6/MM3 (ref 4.2–5.76)
SODIUM BLD-SCNC: 142 MMOL/L (ref 132–146)
WBC NRBC COR # BLD: 8.47 10*3/MM3 (ref 3.5–10.8)

## 2018-05-30 PROCEDURE — 86140 C-REACTIVE PROTEIN: CPT

## 2018-05-30 PROCEDURE — 36415 COLL VENOUS BLD VENIPUNCTURE: CPT

## 2018-05-30 PROCEDURE — 82550 ASSAY OF CK (CPK): CPT

## 2018-05-30 PROCEDURE — 80053 COMPREHEN METABOLIC PANEL: CPT | Performed by: INTERNAL MEDICINE

## 2018-05-30 PROCEDURE — 85025 COMPLETE CBC W/AUTO DIFF WBC: CPT

## 2018-08-09 ENCOUNTER — OFFICE VISIT (OUTPATIENT)
Dept: FAMILY MEDICINE CLINIC | Facility: CLINIC | Age: 73
End: 2018-08-09

## 2018-08-09 VITALS
SYSTOLIC BLOOD PRESSURE: 140 MMHG | OXYGEN SATURATION: 98 % | HEART RATE: 78 BPM | DIASTOLIC BLOOD PRESSURE: 90 MMHG | HEIGHT: 73 IN | RESPIRATION RATE: 16 BRPM | TEMPERATURE: 97.9 F | WEIGHT: 315 LBS | BODY MASS INDEX: 41.75 KG/M2

## 2018-08-09 DIAGNOSIS — J06.9 ACUTE URI: Primary | ICD-10-CM

## 2018-08-09 PROCEDURE — 99213 OFFICE O/P EST LOW 20 MIN: CPT | Performed by: FAMILY MEDICINE

## 2018-08-09 RX ORDER — AZITHROMYCIN 250 MG/1
TABLET, FILM COATED ORAL
Qty: 6 TABLET | Refills: 0 | Status: SHIPPED | OUTPATIENT
Start: 2018-08-09 | End: 2018-08-23

## 2018-08-09 RX ORDER — DEXTROMETHORPHAN HYDROBROMIDE AND PROMETHAZINE HYDROCHLORIDE 15; 6.25 MG/5ML; MG/5ML
5 SYRUP ORAL 4 TIMES DAILY PRN
Qty: 118 ML | Refills: 0 | Status: SHIPPED | OUTPATIENT
Start: 2018-08-09 | End: 2018-08-23

## 2018-08-09 NOTE — PROGRESS NOTES
Subjective   Luis Perez Jr is a 72 y.o. male.     History of Present Illness   Cough phlem, no fever or chills.  Worse mornings.  Night congestion and sleeps in recliner.  Some wheezing.  Winded feeling often.  Took no medication.   Skin cancer right chest complicated by MRSA infection treated with IV antibiotics May.   The following portions of the patient's history were reviewed and updated as appropriate: allergies, current medications, past family history, past medical history, past social history, past surgical history and problem list.    Review of Systems   Constitutional: Negative for chills and fever.   HENT: Positive for congestion. Negative for sinus pain and sore throat.    Respiratory: Positive for cough and wheezing.        Objective   Physical Exam   Constitutional: He appears well-developed and well-nourished.   HENT:   Mouth/Throat: Oropharynx is clear and moist.   Neck: Neck supple.   Pulmonary/Chest: He has no wheezes.   Long inspiratory phase   Vitals reviewed.      Assessment/Plan   Luis was seen today for uri.    Diagnoses and all orders for this visit:    Acute URI  -     azithromycin (ZITHROMAX) 250 MG tablet; Take 2 tablets the first day, then 1 tablet daily on days 2 through 5.  -     promethazine-dextromethorphan (PROMETHAZINE-DM) 6.25-15 MG/5ML syrup; Take 5 mL by mouth 4 (Four) Times a Day As Needed for Cough.

## 2018-08-23 ENCOUNTER — OFFICE VISIT (OUTPATIENT)
Dept: FAMILY MEDICINE CLINIC | Facility: CLINIC | Age: 73
End: 2018-08-23

## 2018-08-23 VITALS
SYSTOLIC BLOOD PRESSURE: 130 MMHG | TEMPERATURE: 98.4 F | HEART RATE: 69 BPM | BODY MASS INDEX: 41.75 KG/M2 | WEIGHT: 315 LBS | DIASTOLIC BLOOD PRESSURE: 80 MMHG | OXYGEN SATURATION: 97 % | HEIGHT: 73 IN | RESPIRATION RATE: 16 BRPM

## 2018-08-23 DIAGNOSIS — J45.20 MILD INTERMITTENT ASTHMA WITHOUT COMPLICATION: Chronic | ICD-10-CM

## 2018-08-23 DIAGNOSIS — Z00.00 MEDICARE ANNUAL WELLNESS VISIT, SUBSEQUENT: Primary | ICD-10-CM

## 2018-08-23 DIAGNOSIS — I10 ESSENTIAL HYPERTENSION: ICD-10-CM

## 2018-08-23 LAB
ALBUMIN SERPL-MCNC: 4.72 G/DL (ref 3.2–4.8)
ALBUMIN/GLOB SERPL: 1.7 G/DL (ref 1.5–2.5)
ALP SERPL-CCNC: 62 U/L (ref 25–100)
ALT SERPL W P-5'-P-CCNC: 24 U/L (ref 7–40)
ANION GAP SERPL CALCULATED.3IONS-SCNC: 12 MMOL/L (ref 3–11)
ARTICHOKE IGE QN: 73 MG/DL (ref 0–130)
AST SERPL-CCNC: 26 U/L (ref 0–33)
BASOPHILS # BLD AUTO: 0.05 10*3/MM3 (ref 0–0.2)
BASOPHILS NFR BLD AUTO: 0.6 % (ref 0–1)
BILIRUB SERPL-MCNC: 0.6 MG/DL (ref 0.3–1.2)
BUN BLD-MCNC: 15 MG/DL (ref 9–23)
BUN/CREAT SERPL: 17 (ref 7–25)
CALCIUM SPEC-SCNC: 9.5 MG/DL (ref 8.7–10.4)
CHLORIDE SERPL-SCNC: 102 MMOL/L (ref 99–109)
CHOLEST SERPL-MCNC: 149 MG/DL (ref 0–200)
CO2 SERPL-SCNC: 27 MMOL/L (ref 20–31)
CREAT BLD-MCNC: 0.88 MG/DL (ref 0.6–1.3)
DEPRECATED RDW RBC AUTO: 46.9 FL (ref 37–54)
EOSINOPHIL # BLD AUTO: 0.18 10*3/MM3 (ref 0–0.3)
EOSINOPHIL NFR BLD AUTO: 2.2 % (ref 0–3)
ERYTHROCYTE [DISTWIDTH] IN BLOOD BY AUTOMATED COUNT: 14.8 % (ref 11.3–14.5)
GFR SERPL CREATININE-BSD FRML MDRD: 85 ML/MIN/1.73
GLOBULIN UR ELPH-MCNC: 2.8 GM/DL
GLUCOSE BLD-MCNC: 109 MG/DL (ref 70–100)
HCT VFR BLD AUTO: 48.6 % (ref 38.9–50.9)
HCV AB SER DONR QL: NORMAL
HDLC SERPL-MCNC: 32 MG/DL (ref 40–60)
HGB BLD-MCNC: 16.3 G/DL (ref 13.1–17.5)
IMM GRANULOCYTES # BLD: 0.05 10*3/MM3 (ref 0–0.03)
IMM GRANULOCYTES NFR BLD: 0.6 % (ref 0–0.6)
LYMPHOCYTES # BLD AUTO: 2.74 10*3/MM3 (ref 0.6–4.8)
LYMPHOCYTES NFR BLD AUTO: 33.8 % (ref 24–44)
MCH RBC QN AUTO: 29.3 PG (ref 27–31)
MCHC RBC AUTO-ENTMCNC: 33.5 G/DL (ref 32–36)
MCV RBC AUTO: 87.4 FL (ref 80–99)
MONOCYTES # BLD AUTO: 0.8 10*3/MM3 (ref 0–1)
MONOCYTES NFR BLD AUTO: 9.9 % (ref 0–12)
NEUTROPHILS # BLD AUTO: 4.33 10*3/MM3 (ref 1.5–8.3)
NEUTROPHILS NFR BLD AUTO: 53.5 % (ref 41–71)
PLATELET # BLD AUTO: 206 10*3/MM3 (ref 150–450)
PMV BLD AUTO: 11.8 FL (ref 6–12)
POTASSIUM BLD-SCNC: 3.7 MMOL/L (ref 3.5–5.5)
PROT SERPL-MCNC: 7.5 G/DL (ref 5.7–8.2)
RBC # BLD AUTO: 5.56 10*6/MM3 (ref 4.2–5.76)
SODIUM BLD-SCNC: 141 MMOL/L (ref 132–146)
TRIGL SERPL-MCNC: 351 MG/DL (ref 0–150)
WBC NRBC COR # BLD: 8.1 10*3/MM3 (ref 3.5–10.8)

## 2018-08-23 PROCEDURE — G0438 PPPS, INITIAL VISIT: HCPCS | Performed by: FAMILY MEDICINE

## 2018-08-23 PROCEDURE — 80061 LIPID PANEL: CPT | Performed by: FAMILY MEDICINE

## 2018-08-23 PROCEDURE — 80053 COMPREHEN METABOLIC PANEL: CPT | Performed by: FAMILY MEDICINE

## 2018-08-23 PROCEDURE — 85025 COMPLETE CBC W/AUTO DIFF WBC: CPT | Performed by: FAMILY MEDICINE

## 2018-08-23 PROCEDURE — 86803 HEPATITIS C AB TEST: CPT | Performed by: FAMILY MEDICINE

## 2018-08-23 PROCEDURE — 36415 COLL VENOUS BLD VENIPUNCTURE: CPT | Performed by: FAMILY MEDICINE

## 2018-08-23 NOTE — PROGRESS NOTES
QUICK REFERENCE INFORMATION:  The ABCs of the Annual Wellness Visit    Subsequent Medicare Wellness Visit    HEALTH RISK ASSESSMENT    1945    Recent Hospitalizations:  No hospitalization(s) within the last year..        Current Medical Providers:  Patient Care Team:  Mohan Ye MD as PCP - General  Mohan Ye MD as PCP - Claims Attributed  Dr Waite, [facility]  Pulmonary medicine      Smoking Status:  History   Smoking Status   • Never Smoker   Smokeless Tobacco   • Current User       Alcohol Consumption:  History   Alcohol Use No       Depression Screen:   PHQ-2/PHQ-9 Depression Screening 8/23/2018   Little interest or pleasure in doing things 0   Feeling down, depressed, or hopeless 0   Total Score 0       Health Habits and Functional and Cognitive Screening:  No flowsheet data found.        Does the patient have evidence of cognitive impairment? No    Aspirin use counseling: Does not need ASA (and currently is not on it)      Recent Lab Results:  CMP:  Lab Results   Component Value Date    BUN 16 05/30/2018    CREATININE 1.00 05/30/2018    EGFRIFNONA 73 05/30/2018    BCR 16.0 05/30/2018     05/30/2018    K 3.9 05/30/2018    CO2 32.0 (H) 05/30/2018    CALCIUM 9.7 05/30/2018    ALBUMIN 4.80 05/30/2018    BILITOT 0.5 05/30/2018    ALKPHOS 71 05/30/2018    AST 20 05/30/2018    ALT 23 05/30/2018     Lipid Panel:  Lab Results   Component Value Date    TRIG 354 (H) 08/13/2015    HDL 32 (L) 08/13/2015     HbA1c:  Lab Results   Component Value Date    HGBA1C 5.9 08/13/2015       Visual Acuity:  No exam data present    Age-appropriate Screening Schedule:  Refer to the list below for future screening recommendations based on patient's age, sex and/or medical conditions. Orders for these recommended tests are listed in the plan section. The patient has been provided with a written plan.    Health Maintenance   Topic Date Due   • TDAP/TD VACCINES (1 - Tdap) 09/20/1964   •  ZOSTER VACCINE (1 of 2) 09/20/1995   • PNEUMOCOCCAL VACCINES (65+ LOW/MEDIUM RISK) (2 of 2 - PPSV23) 01/15/2016   • INFLUENZA VACCINE  08/01/2018   • COLONOSCOPY  06/27/2026        Subjective   History of Present Illness    Luis Perez Jr is a 72 y.o. male who presents for an Subsequent Wellness Visit.    The following portions of the patient's history were reviewed and updated as appropriate: allergies, current medications, past family history, past medical history, past social history, past surgical history and problem list.    Outpatient Medications Prior to Visit   Medication Sig Dispense Refill   • ADVAIR DISKUS 500-50 MCG/DOSE DISKUS      • albuterol (PROVENTIL HFA;VENTOLIN HFA) 108 (90 BASE) MCG/ACT inhaler Inhale 2 puffs Every 4 (Four) Hours As Needed for wheezing. 1 inhaler 0   • amLODIPine (NORVASC) 10 MG tablet TAKE ONE TABLET BY MOUTH DAILY 90 tablet 2   • hydrochlorothiazide (HYDRODIURIL) 50 MG tablet TAKE ONE TABLET BY MOUTH DAILY 90 tablet 2   • INCRUSE ELLIPTA 62.5 MCG/INH aerosol powder       • montelukast (SINGULAIR) 10 MG tablet      • azithromycin (ZITHROMAX) 250 MG tablet Take 2 tablets the first day, then 1 tablet daily on days 2 through 5. 6 tablet 0   • promethazine-dextromethorphan (PROMETHAZINE-DM) 6.25-15 MG/5ML syrup Take 5 mL by mouth 4 (Four) Times a Day As Needed for Cough. 118 mL 0     No facility-administered medications prior to visit.        Patient Active Problem List   Diagnosis   • Hypertension   • Mild intermittent asthma without complication       Advance Care Planning:  has an advance directive - a copy HAS NOT been provided    Identification of Risk Factors:  Risk factors include: weight .    Review of Systems   Constitutional: Negative.  Negative for unexpected weight change.   HENT: Negative.    Eyes: Negative.    Respiratory: Positive for shortness of breath and wheezing.    Cardiovascular: Negative for chest pain.   Gastrointestinal: Negative.    Genitourinary:  "Negative.    Musculoskeletal: Negative.    Neurological: Negative.    Psychiatric/Behavioral: Negative.        Compared to one year ago, the patient feels his physical health is the same.  Compared to one year ago, the patient feels his mental health is the same.    Objective     Physical Exam   Constitutional: He appears well-developed and well-nourished.   HENT:   Right Ear: External ear normal.   Left Ear: External ear normal.   Mouth/Throat: Oropharynx is clear and moist.   Eyes: EOM are normal.   Neck: Neck supple.   Cardiovascular: Normal heart sounds.    Pulmonary/Chest: He has wheezes.   Abdominal: Soft. Bowel sounds are normal. He exhibits no distension, no abdominal bruit and no mass.   Musculoskeletal: Normal range of motion.   Lymphadenopathy:     He has no cervical adenopathy.   Neurological: He is alert.   Skin:   Chronic skin changes both lower legs with discoloration and thick skin.    Psychiatric: He has a normal mood and affect.   Vitals reviewed.      Vitals:    08/23/18 0823   BP: 130/80   Pulse: 69   Resp: 16   Temp: 98.4 °F (36.9 °C)   SpO2: 97%   Weight: (!) 153 kg (337 lb)   Height: 185.4 cm (73\")       Patient's Body mass index is 44.46 kg/m². BMI is above normal parameters. Recommendations include: exercise counseling.      Assessment/Plan   Patient Self-Management and Personalized Health Advice  The patient has been provided with information about: exercise and weight management and preventive services including:   · Fall Risk assessment done.    Visit Diagnoses:    ICD-10-CM ICD-9-CM   1. Medicare annual wellness visit, subsequent Z00.00 V70.0   2. Mild intermittent asthma without complication J45.20 493.90   3. Essential hypertension I10 401.9       Orders Placed This Encounter   Procedures   • Comprehensive Metabolic Panel   • Lipid Panel   • Hepatitis C Antibody   • CBC & Differential     Order Specific Question:   Manual Differential     Answer:   No       Outpatient Encounter " Prescriptions as of 8/23/2018   Medication Sig Dispense Refill   • ADVAIR DISKUS 500-50 MCG/DOSE DISKUS      • albuterol (PROVENTIL HFA;VENTOLIN HFA) 108 (90 BASE) MCG/ACT inhaler Inhale 2 puffs Every 4 (Four) Hours As Needed for wheezing. 1 inhaler 0   • amLODIPine (NORVASC) 10 MG tablet TAKE ONE TABLET BY MOUTH DAILY 90 tablet 2   • hydrochlorothiazide (HYDRODIURIL) 50 MG tablet TAKE ONE TABLET BY MOUTH DAILY 90 tablet 2   • INCRUSE ELLIPTA 62.5 MCG/INH aerosol powder       • montelukast (SINGULAIR) 10 MG tablet      • [DISCONTINUED] azithromycin (ZITHROMAX) 250 MG tablet Take 2 tablets the first day, then 1 tablet daily on days 2 through 5. 6 tablet 0   • [DISCONTINUED] promethazine-dextromethorphan (PROMETHAZINE-DM) 6.25-15 MG/5ML syrup Take 5 mL by mouth 4 (Four) Times a Day As Needed for Cough. 118 mL 0     No facility-administered encounter medications on file as of 8/23/2018.        Reviewed use of high risk medication in the elderly: yes  Reviewed for potential of harmful drug interactions in the elderly: yes    Follow Up:  Return for Annual.     An After Visit Summary and PPPS with all of these plans were given to the patient.

## 2018-10-02 DIAGNOSIS — J45.30 MILD PERSISTENT ASTHMA WITHOUT COMPLICATION: ICD-10-CM

## 2018-10-02 RX ORDER — AMLODIPINE BESYLATE 10 MG/1
10 TABLET ORAL DAILY
Qty: 90 TABLET | Refills: 2 | Status: SHIPPED | OUTPATIENT
Start: 2018-10-02 | End: 2019-07-11 | Stop reason: SDUPTHER

## 2018-10-15 ENCOUNTER — OFFICE VISIT (OUTPATIENT)
Dept: FAMILY MEDICINE CLINIC | Facility: CLINIC | Age: 73
End: 2018-10-15

## 2018-10-15 VITALS
RESPIRATION RATE: 16 BRPM | DIASTOLIC BLOOD PRESSURE: 76 MMHG | OXYGEN SATURATION: 95 % | HEART RATE: 73 BPM | TEMPERATURE: 98.8 F | SYSTOLIC BLOOD PRESSURE: 138 MMHG | WEIGHT: 315 LBS | BODY MASS INDEX: 44.2 KG/M2

## 2018-10-15 DIAGNOSIS — M72.2 PLANTAR FASCIITIS: Primary | ICD-10-CM

## 2018-10-15 PROCEDURE — 99213 OFFICE O/P EST LOW 20 MIN: CPT | Performed by: FAMILY MEDICINE

## 2018-10-15 NOTE — PROGRESS NOTES
Subjective   Luis Perez Jr is a 73 y.o. male.     History of Present Illness   [place] weeks left heel pain worse after travel to [place] with lots of walking.  Pain early morning or after sitting. No skin blemishes.  No swelling. Not wearing compression hose.  The following portions of the patient's history were reviewed and updated as appropriate: allergies, current medications, past family history, past medical history, past social history, past surgical history and problem list.    Review of Systems   Constitutional: Negative for activity change and unexpected weight change.   Respiratory: Negative.    Cardiovascular: Negative for chest pain.   Musculoskeletal: Positive for gait problem. Negative for back pain.       Objective   Physical Exam   Constitutional: He appears well-developed and well-nourished.   Pulmonary/Chest: Effort normal.   Musculoskeletal:   Tender left heel on plantar surface. No skin sore.   Vitals reviewed.      Assessment/Plan   Luis was seen today for foot pain.    Diagnoses and all orders for this visit:    Plantar fasciitis        Recommend stretching and ice massage twice a day.   Medications not shown to be very helpful.

## 2018-11-29 DIAGNOSIS — I10 ESSENTIAL HYPERTENSION: ICD-10-CM

## 2018-11-29 RX ORDER — HYDROCHLOROTHIAZIDE 50 MG/1
50 TABLET ORAL DAILY
Qty: 90 TABLET | Refills: 2 | Status: SHIPPED | OUTPATIENT
Start: 2018-11-29 | End: 2019-12-11 | Stop reason: SDUPTHER

## 2019-01-31 ENCOUNTER — OFFICE VISIT (OUTPATIENT)
Dept: FAMILY MEDICINE CLINIC | Facility: CLINIC | Age: 74
End: 2019-01-31

## 2019-01-31 VITALS
BODY MASS INDEX: 41.75 KG/M2 | DIASTOLIC BLOOD PRESSURE: 98 MMHG | RESPIRATION RATE: 16 BRPM | OXYGEN SATURATION: 94 % | WEIGHT: 315 LBS | TEMPERATURE: 97.4 F | SYSTOLIC BLOOD PRESSURE: 140 MMHG | HEIGHT: 73 IN | HEART RATE: 42 BPM

## 2019-01-31 DIAGNOSIS — Z23 NEED FOR VACCINATION AGAINST STREPTOCOCCUS PNEUMONIAE: ICD-10-CM

## 2019-01-31 DIAGNOSIS — M25.561 ACUTE PAIN OF RIGHT KNEE: Primary | ICD-10-CM

## 2019-01-31 DIAGNOSIS — E66.01 MORBIDLY OBESE (HCC): ICD-10-CM

## 2019-01-31 PROBLEM — R60.0 EDEMA LEG: Status: ACTIVE | Noted: 2019-01-31

## 2019-01-31 PROBLEM — L03.119 CELLULITIS OF LOWER EXTREMITY: Status: ACTIVE | Noted: 2019-01-31

## 2019-01-31 PROBLEM — E29.9 DISORDER OF ENDOCRINE TESTIS: Status: ACTIVE | Noted: 2019-01-31

## 2019-01-31 PROCEDURE — 90732 PPSV23 VACC 2 YRS+ SUBQ/IM: CPT | Performed by: NURSE PRACTITIONER

## 2019-01-31 PROCEDURE — G0009 ADMIN PNEUMOCOCCAL VACCINE: HCPCS | Performed by: NURSE PRACTITIONER

## 2019-01-31 PROCEDURE — 99213 OFFICE O/P EST LOW 20 MIN: CPT | Performed by: NURSE PRACTITIONER

## 2019-01-31 RX ORDER — IBUPROFEN 800 MG/1
800 TABLET ORAL EVERY 8 HOURS PRN
Qty: 90 TABLET | Refills: 1 | Status: SHIPPED | OUTPATIENT
Start: 2019-01-31 | End: 2019-05-29 | Stop reason: SDUPTHER

## 2019-01-31 RX ORDER — ZOSTER VACCINE RECOMBINANT, ADJUVANTED 50 MCG/0.5
KIT INTRAMUSCULAR
Refills: 0 | COMMUNITY
Start: 2018-11-20 | End: 2019-03-05

## 2019-02-05 ENCOUNTER — HOSPITAL ENCOUNTER (OUTPATIENT)
Dept: GENERAL RADIOLOGY | Facility: HOSPITAL | Age: 74
Discharge: HOME | End: 2019-02-05
Admitting: NURSE PRACTITIONER

## 2019-02-05 DIAGNOSIS — M25.561 ACUTE PAIN OF RIGHT KNEE: ICD-10-CM

## 2019-02-05 PROCEDURE — 73560 X-RAY EXAM OF KNEE 1 OR 2: CPT

## 2019-03-05 ENCOUNTER — OFFICE VISIT (OUTPATIENT)
Dept: FAMILY MEDICINE CLINIC | Facility: CLINIC | Age: 74
End: 2019-03-05

## 2019-03-05 VITALS
HEART RATE: 80 BPM | SYSTOLIC BLOOD PRESSURE: 140 MMHG | BODY MASS INDEX: 41.75 KG/M2 | TEMPERATURE: 98.5 F | DIASTOLIC BLOOD PRESSURE: 94 MMHG | WEIGHT: 315 LBS | HEIGHT: 73 IN

## 2019-03-05 DIAGNOSIS — R53.83 FATIGUE, UNSPECIFIED TYPE: Primary | ICD-10-CM

## 2019-03-05 DIAGNOSIS — Z13.220 LIPID SCREENING: ICD-10-CM

## 2019-03-05 DIAGNOSIS — R73.09 ELEVATED GLUCOSE: ICD-10-CM

## 2019-03-05 DIAGNOSIS — R79.1 ABNORMAL COAGULATION PROFILE: ICD-10-CM

## 2019-03-05 DIAGNOSIS — M25.561 CHRONIC PAIN OF RIGHT KNEE: ICD-10-CM

## 2019-03-05 DIAGNOSIS — G89.29 CHRONIC PAIN OF RIGHT KNEE: ICD-10-CM

## 2019-03-05 LAB
ALBUMIN SERPL-MCNC: 4.85 G/DL (ref 3.2–4.8)
ALBUMIN/GLOB SERPL: 1.9 G/DL (ref 1.5–2.5)
ALP SERPL-CCNC: 69 U/L (ref 25–100)
ALT SERPL W P-5'-P-CCNC: 28 U/L (ref 7–40)
ANION GAP SERPL CALCULATED.3IONS-SCNC: 9 MMOL/L (ref 3–11)
APTT PPP: 35.2 SECONDS (ref 24–37)
AST SERPL-CCNC: 22 U/L (ref 0–33)
BASOPHILS # BLD AUTO: 0.05 10*3/MM3 (ref 0–0.2)
BASOPHILS NFR BLD AUTO: 0.6 % (ref 0–1)
BILIRUB SERPL-MCNC: 0.6 MG/DL (ref 0.3–1.2)
BUN BLD-MCNC: 17 MG/DL (ref 9–23)
BUN/CREAT SERPL: 14.9 (ref 7–25)
CALCIUM SPEC-SCNC: 9.7 MG/DL (ref 8.7–10.4)
CHLORIDE SERPL-SCNC: 101 MMOL/L (ref 99–109)
CHOLEST SERPL-MCNC: 150 MG/DL (ref 0–200)
CO2 SERPL-SCNC: 30 MMOL/L (ref 20–31)
CREAT BLD-MCNC: 1.14 MG/DL (ref 0.6–1.3)
DEPRECATED RDW RBC AUTO: 47.2 FL (ref 37–54)
EOSINOPHIL # BLD AUTO: 0.16 10*3/MM3 (ref 0–0.3)
EOSINOPHIL NFR BLD AUTO: 2 % (ref 0–3)
ERYTHROCYTE [DISTWIDTH] IN BLOOD BY AUTOMATED COUNT: 14.5 % (ref 11.3–14.5)
FOLATE SERPL-MCNC: 8.18 NG/ML (ref 3.2–20)
GFR SERPL CREATININE-BSD FRML MDRD: 63 ML/MIN/1.73
GLOBULIN UR ELPH-MCNC: 2.6 GM/DL
GLUCOSE BLD-MCNC: 111 MG/DL (ref 70–100)
HBA1C MFR BLD: 5.9 % (ref 4.8–5.6)
HCT VFR BLD AUTO: 50.9 % (ref 38.9–50.9)
HDLC SERPL QL: 4.55
HDLC SERPL-MCNC: 33 MG/DL (ref 40–60)
HGB BLD-MCNC: 16.7 G/DL (ref 13.1–17.5)
IMM GRANULOCYTES # BLD AUTO: 0.02 10*3/MM3 (ref 0–0.05)
IMM GRANULOCYTES NFR BLD AUTO: 0.3 % (ref 0–0.6)
INR PPP: 1.13 (ref 0.85–1.16)
LDLC SERPL CALC-MCNC: 55 MG/DL (ref 0–100)
LYMPHOCYTES # BLD AUTO: 2.81 10*3/MM3 (ref 0.6–4.8)
LYMPHOCYTES NFR BLD AUTO: 35.8 % (ref 24–44)
MCH RBC QN AUTO: 29.3 PG (ref 27–31)
MCHC RBC AUTO-ENTMCNC: 32.8 G/DL (ref 32–36)
MCV RBC AUTO: 89.3 FL (ref 80–99)
MONOCYTES # BLD AUTO: 0.9 10*3/MM3 (ref 0–1)
MONOCYTES NFR BLD AUTO: 11.5 % (ref 0–12)
NEUTROPHILS # BLD AUTO: 3.94 10*3/MM3 (ref 1.5–8.3)
NEUTROPHILS NFR BLD AUTO: 50.1 % (ref 41–71)
PLATELET # BLD AUTO: 221 10*3/MM3 (ref 150–450)
PMV BLD AUTO: 11.5 FL (ref 6–12)
POTASSIUM BLD-SCNC: 4.2 MMOL/L (ref 3.5–5.5)
PROT SERPL-MCNC: 7.4 G/DL (ref 5.7–8.2)
PROTHROMBIN TIME: 13.9 SECONDS (ref 11.2–14.3)
RBC # BLD AUTO: 5.7 10*6/MM3 (ref 4.2–5.76)
SODIUM BLD-SCNC: 140 MMOL/L (ref 132–146)
TRIGL SERPL-MCNC: 310 MG/DL (ref 0–150)
TSH SERPL DL<=0.05 MIU/L-ACNC: 1.59 MIU/ML (ref 0.35–5.35)
VIT B12 BLD-MCNC: 338 PG/ML (ref 211–911)
VLDLC SERPL-MCNC: 62 MG/DL
WBC NRBC COR # BLD: 7.86 10*3/MM3 (ref 3.5–10.8)

## 2019-03-05 PROCEDURE — 80053 COMPREHEN METABOLIC PANEL: CPT | Performed by: NURSE PRACTITIONER

## 2019-03-05 PROCEDURE — 85025 COMPLETE CBC W/AUTO DIFF WBC: CPT | Performed by: NURSE PRACTITIONER

## 2019-03-05 PROCEDURE — 84443 ASSAY THYROID STIM HORMONE: CPT | Performed by: NURSE PRACTITIONER

## 2019-03-05 PROCEDURE — 85610 PROTHROMBIN TIME: CPT | Performed by: NURSE PRACTITIONER

## 2019-03-05 PROCEDURE — 82652 VIT D 1 25-DIHYDROXY: CPT | Performed by: NURSE PRACTITIONER

## 2019-03-05 PROCEDURE — 85730 THROMBOPLASTIN TIME PARTIAL: CPT | Performed by: NURSE PRACTITIONER

## 2019-03-05 PROCEDURE — 99213 OFFICE O/P EST LOW 20 MIN: CPT | Performed by: NURSE PRACTITIONER

## 2019-03-05 PROCEDURE — 82607 VITAMIN B-12: CPT | Performed by: NURSE PRACTITIONER

## 2019-03-05 PROCEDURE — 82746 ASSAY OF FOLIC ACID SERUM: CPT | Performed by: NURSE PRACTITIONER

## 2019-03-05 PROCEDURE — 83036 HEMOGLOBIN GLYCOSYLATED A1C: CPT | Performed by: NURSE PRACTITIONER

## 2019-03-05 PROCEDURE — 80061 LIPID PANEL: CPT | Performed by: NURSE PRACTITIONER

## 2019-03-05 RX ORDER — SULFAMETHOXAZOLE AND TRIMETHOPRIM 800; 160 MG/1; MG/1
TABLET ORAL
COMMUNITY
Start: 2019-02-21 | End: 2019-06-19

## 2019-03-05 NOTE — PROGRESS NOTES
Luis Perez Jr is a 73 y.o. male who presents for right knee pain and easy bleeding and bruising..    Chief Complaint   Patient presents with   • Bleeding/Bruising     easily bruising        Patient states that recently he has noticed that if anything touches his skin he bruises. He has also had problems when he scratches or cuts himself with getting the bleeding to stop. He recently has had several melanomas removed and he bled heavily and for much longer than normal. He is concerned about this. He would also like to discuss his options regarding his right knee pain. The pain is intermittent. He has days when he can't hardly walk on it and others when it is not as bad.           Past Medical History:   Diagnosis Date   • Asthma    • Bronchitis, chronic (CMS/HCC)    • Hypertension    • Nephrolithiasis        Past Surgical History:   Procedure Laterality Date   • NASAL SEPTUM SURGERY      Deviation Repair   • TONSILLECTOMY     • VASECTOMY         Family History   Problem Relation Age of Onset   • Cancer Mother    • Alzheimer's disease Father        Social History     Socioeconomic History   • Marital status:      Spouse name: Not on file   • Number of children: Not on file   • Years of education: Not on file   • Highest education level: Not on file   Social Needs   • Financial resource strain: Not on file   • Food insecurity - worry: Not on file   • Food insecurity - inability: Not on file   • Transportation needs - medical: Not on file   • Transportation needs - non-medical: Not on file   Occupational History   • Not on file   Tobacco Use   • Smoking status: Never Smoker   • Smokeless tobacco: Current User   Substance and Sexual Activity   • Alcohol use: No   • Drug use: No   • Sexual activity: No   Other Topics Concern   • Not on file   Social History Narrative   • Not on file       No Known Allergies    ROS    Review of Systems   Respiratory: Positive for shortness of breath.    Skin: Positive for color  "change, skin lesions and bruise.   Hematological: Bruises/bleeds easily.   All other systems reviewed and are negative.      Vitals:    03/05/19 0936   BP: 140/94   Pulse: 80   Temp: 98.5 °F (36.9 °C)   Weight: (!) 153 kg (337 lb)   Height: 185.4 cm (73\")         Current Outpatient Medications:   •  ADVAIR DISKUS 500-50 MCG/DOSE DISKUS, , Disp: , Rfl:   •  albuterol (PROVENTIL HFA;VENTOLIN HFA) 108 (90 BASE) MCG/ACT inhaler, Inhale 2 puffs Every 4 (Four) Hours As Needed for wheezing., Disp: 1 inhaler, Rfl: 0  •  amLODIPine (NORVASC) 10 MG tablet, Take 1 tablet by mouth Daily., Disp: 90 tablet, Rfl: 2  •  hydrochlorothiazide (HYDRODIURIL) 50 MG tablet, Take 1 tablet by mouth Daily., Disp: 90 tablet, Rfl: 2  •  ibuprofen (ADVIL,MOTRIN) 800 MG tablet, Take 1 tablet by mouth Every 8 (Eight) Hours As Needed for Mild Pain  or Moderate Pain ., Disp: 90 tablet, Rfl: 1  •  montelukast (SINGULAIR) 10 MG tablet, , Disp: , Rfl:   •  INCRUSE ELLIPTA 62.5 MCG/INH aerosol powder , , Disp: , Rfl:   •  mupirocin (BACTROBAN) 2 % ointment, , Disp: , Rfl:   •  sulfamethoxazole-trimethoprim (BACTRIM DS,SEPTRA DS) 800-160 MG per tablet, , Disp: , Rfl:     PE    Physical Exam   Constitutional: He appears well-developed. He is morbidly obese.  Neurological: He is alert.   Nursing note and vitals reviewed.       A/P    Luis was seen today for bleeding/bruising.    Diagnoses and all orders for this visit:    Fatigue, unspecified type  -     CBC Auto Differential  -     Comprehensive Metabolic Panel  -     TSH  -     Vitamin D 1,25 Dihydroxy  -     Vitamin B12 & Folate    Lipid screening  -     Lipid Panel With / Chol / HDL Ratio    Elevated glucose  -     Hemoglobin A1c    Chronic pain of right knee  -     Ambulatory Referral to Orthopedic Surgery    Abnormal coagulation profile  -     Protime-INR  -     APTT    Other orders  -     Cancel: PT & PTT ([facility])    Will call patient with lab results.    Plan of care reviewed with patient at the " conclusion of today's visit. Education was provided regarding diagnosis, management and any prescribed or recommended OTC medications.  Patient verbalizes understanding of and agreement with management plan.    Return in about 3 months (around 6/5/2019) for Recheck.     Viviane Colon, APRN

## 2019-03-07 LAB — 1,25(OH)2D3 SERPL-MCNC: 49.1 PG/ML (ref 19.9–79.3)

## 2019-04-03 ENCOUNTER — OFFICE VISIT (OUTPATIENT)
Dept: ORTHOPEDIC SURGERY | Facility: CLINIC | Age: 74
End: 2019-04-03

## 2019-04-03 VITALS — HEIGHT: 73 IN | WEIGHT: 315 LBS | OXYGEN SATURATION: 98 % | BODY MASS INDEX: 41.75 KG/M2 | HEART RATE: 76 BPM

## 2019-04-03 DIAGNOSIS — M17.11 PRIMARY OSTEOARTHRITIS OF RIGHT KNEE: Primary | ICD-10-CM

## 2019-04-03 PROCEDURE — 20610 DRAIN/INJ JOINT/BURSA W/O US: CPT | Performed by: ORTHOPAEDIC SURGERY

## 2019-04-03 PROCEDURE — 99203 OFFICE O/P NEW LOW 30 MIN: CPT | Performed by: ORTHOPAEDIC SURGERY

## 2019-04-03 RX ORDER — TRIAMCINOLONE ACETONIDE 40 MG/ML
40 INJECTION, SUSPENSION INTRA-ARTICULAR; INTRAMUSCULAR
Status: COMPLETED | OUTPATIENT
Start: 2019-04-03 | End: 2019-04-03

## 2019-04-03 RX ORDER — ROPIVACAINE HYDROCHLORIDE 5 MG/ML
4 INJECTION, SOLUTION EPIDURAL; INFILTRATION; PERINEURAL
Status: COMPLETED | OUTPATIENT
Start: 2019-04-03 | End: 2019-04-03

## 2019-04-03 RX ADMIN — ROPIVACAINE HYDROCHLORIDE 4 ML: 5 INJECTION, SOLUTION EPIDURAL; INFILTRATION; PERINEURAL at 09:12

## 2019-04-03 RX ADMIN — TRIAMCINOLONE ACETONIDE 40 MG: 40 INJECTION, SUSPENSION INTRA-ARTICULAR; INTRAMUSCULAR at 09:12

## 2019-04-03 NOTE — PROGRESS NOTES
Procedure   Large Joint Arthrocentesis: R knee  Date/Time: 4/3/2019 9:12 AM  Consent given by: patient  Site marked: site marked  Timeout: Immediately prior to procedure a time out was called to verify the correct patient, procedure, equipment, support staff and site/side marked as required   Supporting Documentation  Indications: pain   Procedure Details  Location: knee - R knee  Preparation: Patient was prepped and draped in the usual sterile fashion  Needle size: 22 G  Approach: anterolateral  Medications administered: 4 mL ropivacaine 0.5 %; 40 mg triamcinolone acetonide 40 MG/ML  Patient tolerance: patient tolerated the procedure well with no immediate complications

## 2019-04-03 NOTE — PROGRESS NOTES
[facility] [place] [facility] Note    Subjective     Chief Complaint   Patient presents with   • Right Knee - Pain        HPI    Luis Perez Jr is a 73 y.o. male.  He presents today for evaluation of right knee pain.  Pain is located primarily on the medial aspect of his knee.  Is been present for 5-6 months, following a particular injury.  The pain is worse with walking, standing and climbing stairs.  He is tried ibuprofen with minimal relief.  Pain is aching and stabbing.      Patient Active Problem List   Diagnosis   • Hypertension   • Mild intermittent asthma without complication   • Disorder of calcium metabolism   • Disorder of endocrine testis   • Moderate persistent asthma   • Cellulitis of lower extremity   • Morbidly obese (CMS/HCC)     Past Medical History:   Diagnosis Date   • Asthma    • Bronchitis, chronic (CMS/HCC)    • Hypertension    • Nephrolithiasis       Past Surgical History:   Procedure Laterality Date   • NASAL SEPTUM SURGERY      Deviation Repair   • SKIN CANCER EXCISION     • TONSILLECTOMY     • VASECTOMY        Family History   Problem Relation Age of Onset   • Cancer Mother    • Alzheimer's disease Father      Social History     Socioeconomic History   • Marital status:      Spouse name: Not on file   • Number of children: Not on file   • Years of education: Not on file   • Highest education level: Not on file   Tobacco Use   • Smoking status: Never Smoker   • Smokeless tobacco: Current User   Substance and Sexual Activity   • Alcohol use: No   • Drug use: No   • Sexual activity: No      Current Outpatient Medications on File Prior to Visit   Medication Sig Dispense Refill   • ADVAIR DISKUS 500-50 MCG/DOSE DISKUS      • albuterol (PROVENTIL HFA;VENTOLIN HFA) 108 (90 BASE) MCG/ACT inhaler Inhale 2 puffs Every 4 (Four) Hours As Needed for wheezing. 1 inhaler 0   • amLODIPine (NORVASC) 10 MG tablet Take 1 tablet by mouth Daily. 90 tablet 2   • hydrochlorothiazide (HYDRODIURIL)  "50 MG tablet Take 1 tablet by mouth Daily. 90 tablet 2   • ibuprofen (ADVIL,MOTRIN) 800 MG tablet Take 1 tablet by mouth Every 8 (Eight) Hours As Needed for Mild Pain  or Moderate Pain . 90 tablet 1   • INCRUSE ELLIPTA 62.5 MCG/INH aerosol powder       • montelukast (SINGULAIR) 10 MG tablet      • mupirocin (BACTROBAN) 2 % ointment      • sulfamethoxazole-trimethoprim (BACTRIM DS,SEPTRA DS) 800-160 MG per tablet        No current facility-administered medications on file prior to visit.       No Known Allergies     Review of Systems   Constitutional: Positive for activity change and fatigue.   HENT: Positive for dental problem.    Eyes: Negative.    Respiratory: Positive for shortness of breath.    Cardiovascular: Positive for leg swelling.   Gastrointestinal: Negative.    Endocrine: Negative.    Genitourinary: Negative.    Musculoskeletal: Positive for back pain.        Right knee pain     Skin: Negative.    Allergic/Immunologic: Negative.    Neurological: Negative.    Hematological: Bruises/bleeds easily.   Psychiatric/Behavioral: Negative.         Objective      Physical Exam  Pulse 76   Ht 185.4 cm (72.99\")   Wt (!) 151 kg (332 lb 7.3 oz)   SpO2 98%   BMI 43.87 kg/m²     Body mass index is 43.87 kg/m².    General:   Mental Status:  Alert   Appearance: Cooperative, in no acute distress   Build and Nutrition: Obese male   Orientation: Alert and oriented to person, place and time   Posture: Normal   Gait: Normal    Integument:   Right knee: No skin lesions, no rash, no ecchymosis    Neurologic:   Sensation:    Right foot: Intact to light touch on the dorsal and plantar aspect   Motor:  Right lower extremity: 5/5 quadriceps, hamstrings, ankle dorsiflexors, and ankle plantar flexors    Vascular:   Right lower extremity: 2+ dorsalis pedis pulse, prompt capillary refill    Lower Extremities:   Right Knee:    Tenderness:  Medial joint line tenderness    Effusion:  None    Swelling:  None    Crepitus: "  None    Atrophy:  None    Range of motion:  Extension: 0°       Flexion: 115°  Instability:  No varus laxity, no valgus laxity, negative anterior drawer  Deformities:  None      Imaging/Studies  EXAMINATION: XR KNEE 1 OR 2 VW RIGHT- 02/05/2019      INDICATION: M25.561-Pain in right knee      COMPARISON: NONE     FINDINGS: Benign-appearing 1 cm ossification is seen lateral of the  right femoral condyle, clearly not acute trauma. Bony structures appear  anatomic and intact. There is relatively mild tricompartmental DJD. No  effusion is identified.     IMPRESSION:  Tricompartmental knee joint DJD. No evidence of acute bony  trauma.     D:  02/05/2019  E:  02/06/2019     This report was finalized on 2/6/2019 10:11 PM by DR. Basil Su MD.      Assessment and Nichol Smith was seen today for pain.    Diagnoses and all orders for this visit:    Primary osteoarthritis of right knee  -     Large Joint Arthrocentesis: R knee  -     ropivacaine (NAROPIN) 0.5 % injection 4 mL  -     triamcinolone acetonide (KENALOG-40) injection 40 mg        1. Primary osteoarthritis of right knee        I reviewed my findings with the patient today.  He has right knee arthritis, and is a candidate for an intra-articular injection.  He would like to proceed.  Long-term, we may be looking at knee replacement surgery if his pain worsens, but we did discuss his body mass index, and that it would need to be below 40.  I will see him back in 6 weeks, but sooner for any problems.    I would like to get standing views on his knee on his next visit.    Of note, he had 75% relief just a few minutes following the injection.    At the end of our visit, he was discussing his left hip, which if it is still bothering him on his next visit, we may need to further evaluate.  He would prefer observation for now.    Return in about 6 weeks (around 5/15/2019).      Medical Decision Making  Management Options : prescription/IM medicine  Data/Risk: radiology  tests and independent visualization of imaging, lab tests, or EMG/NCV      Judd Wood MD  04/03/19  9:21 AM

## 2019-05-15 ENCOUNTER — OFFICE VISIT (OUTPATIENT)
Dept: ORTHOPEDIC SURGERY | Facility: CLINIC | Age: 74
End: 2019-05-15

## 2019-05-15 VITALS — HEART RATE: 127 BPM | BODY MASS INDEX: 41.75 KG/M2 | HEIGHT: 73 IN | WEIGHT: 315 LBS | OXYGEN SATURATION: 98 %

## 2019-05-15 DIAGNOSIS — M17.11 PRIMARY OSTEOARTHRITIS OF RIGHT KNEE: Primary | ICD-10-CM

## 2019-05-15 PROCEDURE — 99212 OFFICE O/P EST SF 10 MIN: CPT | Performed by: ORTHOPAEDIC SURGERY

## 2019-05-15 NOTE — PROGRESS NOTES
[facility] [place] [facility] Note    Subjective     Chief Complaint   Patient presents with   • Right Knee - Follow-up     6 week        HPI    Luis Perez Jr is a 73 y.o. male.  He follows up today for his right knee.  Knee responded well to the intra-articular injection, and it is less pain today.  He is walking better on it.      Patient Active Problem List   Diagnosis   • Hypertension   • Mild intermittent asthma without complication   • Disorder of calcium metabolism   • Disorder of endocrine testis   • Moderate persistent asthma   • Cellulitis of lower extremity   • Morbidly obese (CMS/HCC)     Past Medical History:   Diagnosis Date   • Asthma    • Bronchitis, chronic (CMS/HCC)    • Hypertension    • Nephrolithiasis       Past Surgical History:   Procedure Laterality Date   • NASAL SEPTUM SURGERY      Deviation Repair   • SKIN CANCER EXCISION     • TONSILLECTOMY     • VASECTOMY        Family History   Problem Relation Age of Onset   • Cancer Mother    • Alzheimer's disease Father      Social History     Socioeconomic History   • Marital status:      Spouse name: Not on file   • Number of children: Not on file   • Years of education: Not on file   • Highest education level: Not on file   Tobacco Use   • Smoking status: Never Smoker   • Smokeless tobacco: Current User   Substance and Sexual Activity   • Alcohol use: No   • Drug use: No   • Sexual activity: No      Current Outpatient Medications on File Prior to Visit   Medication Sig Dispense Refill   • ADVAIR DISKUS 500-50 MCG/DOSE DISKUS      • albuterol (PROVENTIL HFA;VENTOLIN HFA) 108 (90 BASE) MCG/ACT inhaler Inhale 2 puffs Every 4 (Four) Hours As Needed for wheezing. 1 inhaler 0   • amLODIPine (NORVASC) 10 MG tablet Take 1 tablet by mouth Daily. 90 tablet 2   • hydrochlorothiazide (HYDRODIURIL) 50 MG tablet Take 1 tablet by mouth Daily. 90 tablet 2   • ibuprofen (ADVIL,MOTRIN) 800 MG tablet Take 1 tablet by mouth Every 8 (Eight) Hours As  Needed for Mild Pain  or Moderate Pain . 90 tablet 1   • INCRUSE ELLIPTA 62.5 MCG/INH aerosol powder       • montelukast (SINGULAIR) 10 MG tablet      • mupirocin (BACTROBAN) 2 % ointment      • sulfamethoxazole-trimethoprim (BACTRIM DS,SEPTRA DS) 800-160 MG per tablet        No current facility-administered medications on file prior to visit.       No Known Allergies     Review of Systems   Constitutional: Negative.  Negative for activity change, appetite change, chills, diaphoresis, fatigue, fever and unexpected weight change.   HENT: Negative.  Negative for congestion, dental problem, drooling, ear discharge, ear pain, facial swelling, hearing loss, mouth sores, nosebleeds, postnasal drip, rhinorrhea, sinus pressure, sneezing, sore throat, tinnitus, trouble swallowing and voice change.    Eyes: Negative.  Negative for photophobia, pain, discharge, redness, itching and visual disturbance.   Respiratory: Positive for shortness of breath. Negative for apnea, cough, choking, chest tightness, wheezing and stridor.    Cardiovascular: Negative.  Negative for chest pain, palpitations and leg swelling.   Gastrointestinal: Negative.  Negative for abdominal distention, abdominal pain, anal bleeding, blood in stool, constipation, diarrhea, nausea, rectal pain and vomiting.   Endocrine: Negative.  Negative for cold intolerance, heat intolerance, polydipsia, polyphagia and polyuria.   Genitourinary: Negative.  Negative for decreased urine volume, difficulty urinating, dysuria, enuresis, flank pain, frequency, genital sores, hematuria and urgency.   Musculoskeletal: Positive for arthralgias. Negative for back pain, gait problem, joint swelling, myalgias, neck pain and neck stiffness.   Skin: Negative.  Negative for color change, pallor, rash and wound.   Allergic/Immunologic: Negative.  Negative for environmental allergies, food allergies and immunocompromised state.   Neurological: Negative.  Negative for dizziness, tremors,  "seizures, syncope, facial asymmetry, speech difficulty, weakness, light-headedness, numbness and headaches.   Hematological: Negative for adenopathy. Bruises/bleeds easily.   Psychiatric/Behavioral: Negative.  Negative for agitation, behavioral problems, confusion, decreased concentration, dysphoric mood, hallucinations, self-injury, sleep disturbance and suicidal ideas. The patient is not nervous/anxious and is not hyperactive.         Objective      Physical Exam  Pulse (!) 127   Ht 185.4 cm (72.99\")   Wt (!) 152 kg (336 lb 3.2 oz)   SpO2 98%   BMI 44.37 kg/m²     Body mass index is 44.37 kg/m².    General:   Mental Status:  Alert   Appearance: Cooperative, in no acute distress   Build and Nutrition: Obese male   Orientation: Alert and oriented to person, place and time   Posture: Normal   Gait: Normal    Integument:   Right knee: No skin lesions, no rash, no ecchymosis    Lower Extremities:   Right Knee:    Tenderness:  Mild medial joint line tenderness    Effusion:  None    Swelling: None    Crepitus:  Positive    Range of motion:  Extension: 0°       Flexion: 120°  Instability:  No varus laxity, no valgus laxity, negative anterior drawer  Deformities:  None      Imaging/Studies  Imaging Results (last 24 hours)     Procedure Component Value Units Date/Time    XR Knee 4+ View Right [[id number]] Resulted:  05/15/19 1031     Updated:  05/15/19 1032    Narrative:       Right Knee Radiographs  Indication: right knee pain  Views: Standing AP's and skiers of both knees, with lateral and sunrise   views of the right knee    Comparison: Nonweightbearing films from 2/5/2019    Findings:   Near bone-on-bone contact medial compartment, with mild varus alignment,   tricompartmental degeneration, with no acute bony abnormalities.    Impression: Right knee osteoarthritis            Assessment and Plan     Luis was seen today for follow-up.    Diagnoses and all orders for this visit:    Primary osteoarthritis of right " knee  -     XR Knee 4+ View Right        1. Primary osteoarthritis of right knee        I reviewed my findings with patient today.  His knee did respond to the articular injection, and at this point we will continue with conservative treatment.  I will see him back in 2 months, at which point a repeat injection may be considered if appropriate.  I will see him back sooner for any problems.    Return in about 2 months (around 7/15/2019).      Medical Decision Making  Management Options : prescription/IM medicine  Data/Risk: radiology tests and independent visualization of imaging, lab tests, or EMG/NCV      Judd Wood MD  05/15/19  10:32 AM

## 2019-05-29 DIAGNOSIS — M25.561 ACUTE PAIN OF RIGHT KNEE: ICD-10-CM

## 2019-05-29 RX ORDER — IBUPROFEN 800 MG/1
TABLET ORAL
Qty: 90 TABLET | Refills: 0 | Status: SHIPPED | OUTPATIENT
Start: 2019-05-29 | End: 2019-07-05

## 2019-06-19 ENCOUNTER — OFFICE VISIT (OUTPATIENT)
Dept: FAMILY MEDICINE CLINIC | Facility: CLINIC | Age: 74
End: 2019-06-19

## 2019-06-19 VITALS
SYSTOLIC BLOOD PRESSURE: 152 MMHG | DIASTOLIC BLOOD PRESSURE: 90 MMHG | HEIGHT: 73 IN | BODY MASS INDEX: 41.75 KG/M2 | WEIGHT: 315 LBS | OXYGEN SATURATION: 96 % | RESPIRATION RATE: 16 BRPM | HEART RATE: 52 BPM

## 2019-06-19 DIAGNOSIS — R53.83 FATIGUE, UNSPECIFIED TYPE: ICD-10-CM

## 2019-06-19 DIAGNOSIS — Z00.00 HEALTHCARE MAINTENANCE: ICD-10-CM

## 2019-06-19 DIAGNOSIS — R73.03 PRE-DIABETES: ICD-10-CM

## 2019-06-19 DIAGNOSIS — R79.1 ABNORMAL COAGULATION PROFILE: ICD-10-CM

## 2019-06-19 DIAGNOSIS — R60.0 BILATERAL LEG EDEMA: ICD-10-CM

## 2019-06-19 DIAGNOSIS — M79.604 BILATERAL LOWER EXTREMITY PAIN: ICD-10-CM

## 2019-06-19 DIAGNOSIS — M79.605 BILATERAL LOWER EXTREMITY PAIN: ICD-10-CM

## 2019-06-19 DIAGNOSIS — Z12.5 PROSTATE CANCER SCREENING: ICD-10-CM

## 2019-06-19 DIAGNOSIS — Z13.220 LIPID SCREENING: ICD-10-CM

## 2019-06-19 DIAGNOSIS — L03.115 CELLULITIS OF RIGHT LOWER EXTREMITY: ICD-10-CM

## 2019-06-19 DIAGNOSIS — R73.09 ELEVATED GLUCOSE: Primary | ICD-10-CM

## 2019-06-19 LAB
ALBUMIN SERPL-MCNC: 4.7 G/DL (ref 3.5–5.2)
ALBUMIN/GLOB SERPL: 1.6 G/DL
ALP SERPL-CCNC: 61 U/L (ref 39–117)
ALT SERPL W P-5'-P-CCNC: 19 U/L (ref 1–41)
ANION GAP SERPL CALCULATED.3IONS-SCNC: 14.2 MMOL/L
APTT PPP: 34.4 SECONDS (ref 24–37)
AST SERPL-CCNC: 17 U/L (ref 1–40)
BASOPHILS # BLD AUTO: 0.07 10*3/MM3 (ref 0–0.2)
BASOPHILS NFR BLD AUTO: 1.1 % (ref 0–1.5)
BILIRUB BLD-MCNC: NEGATIVE MG/DL
BILIRUB SERPL-MCNC: 0.4 MG/DL (ref 0.2–1.2)
BUN BLD-MCNC: 19 MG/DL (ref 8–23)
BUN/CREAT SERPL: 23.8 (ref 7–25)
CALCIUM SPEC-SCNC: 9.7 MG/DL (ref 8.6–10.5)
CHLORIDE SERPL-SCNC: 97 MMOL/L (ref 98–107)
CLARITY, POC: CLEAR
CO2 SERPL-SCNC: 26.8 MMOL/L (ref 22–29)
COLOR UR: YELLOW
CREAT BLD-MCNC: 0.8 MG/DL (ref 0.76–1.27)
DEPRECATED RDW RBC AUTO: 46.8 FL (ref 37–54)
EOSINOPHIL # BLD AUTO: 0.16 10*3/MM3 (ref 0–0.4)
EOSINOPHIL NFR BLD AUTO: 2.6 % (ref 0.3–6.2)
ERYTHROCYTE [DISTWIDTH] IN BLOOD BY AUTOMATED COUNT: 14.1 % (ref 12.3–15.4)
GFR SERPL CREATININE-BSD FRML MDRD: 95 ML/MIN/1.73
GLOBULIN UR ELPH-MCNC: 2.9 GM/DL
GLUCOSE BLD-MCNC: 109 MG/DL (ref 65–99)
GLUCOSE UR STRIP-MCNC: NEGATIVE MG/DL
HBA1C MFR BLD: 6 %
HCT VFR BLD AUTO: 53.6 % (ref 37.5–51)
HGB BLD-MCNC: 17.2 G/DL (ref 13–17.7)
IMM GRANULOCYTES # BLD AUTO: 0.02 10*3/MM3 (ref 0–0.05)
IMM GRANULOCYTES NFR BLD AUTO: 0.3 % (ref 0–0.5)
INR PPP: 1.1 (ref 0.9–1.1)
KETONES UR QL: ABNORMAL
LEUKOCYTE EST, POC: NEGATIVE
LYMPHOCYTES # BLD AUTO: 2.41 10*3/MM3 (ref 0.7–3.1)
LYMPHOCYTES NFR BLD AUTO: 38.7 % (ref 19.6–45.3)
Lab: NORMAL
MCH RBC QN AUTO: 28.9 PG (ref 26.6–33)
MCHC RBC AUTO-ENTMCNC: 32.1 G/DL (ref 31.5–35.7)
MCV RBC AUTO: 90.1 FL (ref 79–97)
MONOCYTES # BLD AUTO: 0.76 10*3/MM3 (ref 0.1–0.9)
MONOCYTES NFR BLD AUTO: 12.2 % (ref 5–12)
NEUTROPHILS # BLD AUTO: 2.81 10*3/MM3 (ref 1.7–7)
NEUTROPHILS NFR BLD AUTO: 45.1 % (ref 42.7–76)
NITRITE UR-MCNC: NEGATIVE MG/ML
NRBC BLD AUTO-RTO: 0 /100 WBC (ref 0–0.2)
PH UR: 6 [PH] (ref 5–8)
PLATELET # BLD AUTO: 200 10*3/MM3 (ref 140–450)
PMV BLD AUTO: 11.8 FL (ref 6–12)
POTASSIUM BLD-SCNC: 3.9 MMOL/L (ref 3.5–5.2)
PROT SERPL-MCNC: 7.6 G/DL (ref 6–8.5)
PROT UR STRIP-MCNC: NEGATIVE MG/DL
PSA SERPL-MCNC: 2.61 NG/ML (ref 0–4)
RBC # BLD AUTO: 5.95 10*6/MM3 (ref 4.14–5.8)
RBC # UR STRIP: NEGATIVE /UL
SODIUM BLD-SCNC: 138 MMOL/L (ref 136–145)
SP GR UR: 1.02 (ref 1–1.03)
TESTOST SERPL-MCNC: 458 NG/DL (ref 193–740)
UROBILINOGEN UR QL: NORMAL
WBC NRBC COR # BLD: 6.23 10*3/MM3 (ref 3.4–10.8)

## 2019-06-19 PROCEDURE — 81003 URINALYSIS AUTO W/O SCOPE: CPT | Performed by: NURSE PRACTITIONER

## 2019-06-19 PROCEDURE — 96372 THER/PROPH/DIAG INJ SC/IM: CPT | Performed by: NURSE PRACTITIONER

## 2019-06-19 PROCEDURE — G0103 PSA SCREENING: HCPCS | Performed by: NURSE PRACTITIONER

## 2019-06-19 PROCEDURE — 99214 OFFICE O/P EST MOD 30 MIN: CPT | Performed by: NURSE PRACTITIONER

## 2019-06-19 PROCEDURE — 84403 ASSAY OF TOTAL TESTOSTERONE: CPT | Performed by: NURSE PRACTITIONER

## 2019-06-19 PROCEDURE — 85730 THROMBOPLASTIN TIME PARTIAL: CPT | Performed by: NURSE PRACTITIONER

## 2019-06-19 PROCEDURE — 83036 HEMOGLOBIN GLYCOSYLATED A1C: CPT | Performed by: NURSE PRACTITIONER

## 2019-06-19 PROCEDURE — 80053 COMPREHEN METABOLIC PANEL: CPT | Performed by: NURSE PRACTITIONER

## 2019-06-19 PROCEDURE — 36415 COLL VENOUS BLD VENIPUNCTURE: CPT | Performed by: NURSE PRACTITIONER

## 2019-06-19 PROCEDURE — 85025 COMPLETE CBC W/AUTO DIFF WBC: CPT | Performed by: NURSE PRACTITIONER

## 2019-06-19 PROCEDURE — 85610 PROTHROMBIN TIME: CPT | Performed by: NURSE PRACTITIONER

## 2019-06-19 RX ORDER — CEFTRIAXONE 1 G/1
1 INJECTION, POWDER, FOR SOLUTION INTRAMUSCULAR; INTRAVENOUS ONCE
Status: COMPLETED | OUTPATIENT
Start: 2019-06-19 | End: 2019-06-19

## 2019-06-19 RX ORDER — IBUPROFEN 800 MG/1
TABLET ORAL
COMMUNITY
End: 2019-06-19 | Stop reason: SDUPTHER

## 2019-06-19 RX ORDER — HYDROCODONE BITARTRATE AND ACETAMINOPHEN 7.5; 325 MG/1; MG/1
TABLET ORAL
COMMUNITY
End: 2019-07-01

## 2019-06-19 RX ORDER — ALBUTEROL SULFATE 90 UG/1
AEROSOL, METERED RESPIRATORY (INHALATION)
COMMUNITY
End: 2019-07-01 | Stop reason: SDUPTHER

## 2019-06-19 RX ADMIN — CEFTRIAXONE 1 G: 1 INJECTION, POWDER, FOR SOLUTION INTRAMUSCULAR; INTRAVENOUS at 09:16

## 2019-06-19 NOTE — PROGRESS NOTES
"Luis Perez Jr is a 73 y.o. male who presents for follow up of bilateral lower extremity pain.    Chief Complaint   Patient presents with   • Hyperglycemia     3 month f/u    • Knee Pain   • Abnormal Coagulation   • Fatigue       Patient states that over the past 3-4 months his lower extremities below the knee have been swelling (right greater than left) and red. The past week the right one has been tender and hot to the touch. He denies any fever or injury to the leg. He did have a couple of lesions removed from dermatology a couple of months ago. Patient did have bilateral GAGANDEEP performed one year ago which was normal.           No Known Allergies    Health Maintenance   Topic Date Due   • TDAP/TD VACCINES (1 - Tdap) 09/20/1964   • AAA SCREEN (ONE-TIME)  11/03/2016   • INFLUENZA VACCINE  08/01/2019   • MEDICARE ANNUAL WELLNESS  08/23/2019   • COLONOSCOPY  06/27/2026   • HEPATITIS C SCREENING  Completed   • PNEUMOCOCCAL VACCINES (65+ LOW/MEDIUM RISK)  Completed   • ZOSTER VACCINE  Discontinued        ROS    Review of Systems   Constitutional: Positive for fatigue.   Respiratory: Positive for shortness of breath.    Cardiovascular: Positive for leg swelling.   Musculoskeletal: Positive for arthralgias, gait problem, joint swelling and myalgias.   Skin: Positive for color change.   All other systems reviewed and are negative.      Vitals:    06/19/19 0756   BP: 152/90   Pulse: 52   Resp: 16   SpO2: 96%   Weight: (!) 154 kg (340 lb)   Height: 185.4 cm (72.99\")         Current Outpatient Medications:   •  ADVAIR DISKUS 500-50 MCG/DOSE DISKUS, , Disp: , Rfl:   •  albuterol (PROVENTIL HFA;VENTOLIN HFA) 108 (90 BASE) MCG/ACT inhaler, Inhale 2 puffs Every 4 (Four) Hours As Needed for wheezing., Disp: 1 inhaler, Rfl: 0  •  albuterol sulfate HFA (PROAIR HFA) 108 (90 Base) MCG/ACT inhaler, ProAir HFA 90 mcg/actuation aerosol inhaler  Inhale 2 puffs as needed by inhalation route., Disp: , Rfl:   •  amLODIPine (NORVASC) 10 MG " tablet, Take 1 tablet by mouth Daily., Disp: 90 tablet, Rfl: 2  •  hydrochlorothiazide (HYDRODIURIL) 50 MG tablet, Take 1 tablet by mouth Daily., Disp: 90 tablet, Rfl: 2  •  HYDROcodone-acetaminophen (NORCO) 7.5-325 MG per tablet, hydrocodone 7.5 mg-acetaminophen 325 mg tablet, Disp: , Rfl:   •   MG tablet, TAKE ONE TABLET BY MOUTH EVERY 8 (EIGHT) HOURS AS NEEDED FOR MILD OR MODERATE PAIN, Disp: 90 tablet, Rfl: 0  •  INCRUSE ELLIPTA 62.5 MCG/INH aerosol powder , , Disp: , Rfl:   •  montelukast (SINGULAIR) 10 MG tablet, , Disp: , Rfl:   •  Multiple Vitamins-Minerals (VITEYES AREDS FORMULA PO), Take  by mouth 2 (Two) Times a Day., Disp: , Rfl:   •  mupirocin (BACTROBAN) 2 % ointment, , Disp: , Rfl:   No current facility-administered medications for this visit.     PE    Physical Exam   Constitutional: He is oriented to person, place, and time. He appears well-developed. He is morbidly obese.  HENT:   Head: Normocephalic and atraumatic.   Cardiovascular: Normal rate, regular rhythm, normal heart sounds and intact distal pulses. Exam reveals no gallop and no friction rub.   No murmur heard.  Pulmonary/Chest: Effort normal and breath sounds normal. No stridor. No respiratory distress. He has no wheezes. He has no rales.   Neurological: He is alert and oriented to person, place, and time.   Skin: Skin is warm and dry. Capillary refill takes more than 3 seconds.   Psychiatric: He has a normal mood and affect. His behavior is normal. Judgment and thought content normal.   Nursing note and vitals reviewed.       A/P    Problem List Items Addressed This Visit        Other    Cellulitis of lower extremity    Relevant Medications    cefTRIAXone (ROCEPHIN) injection 1 g (Completed)      Other Visit Diagnoses     Elevated glucose    -  Primary    Relevant Orders    POC Glycosylated Hemoglobin (Hb A1C) (Completed)    Abnormal coagulation profile        Relevant Orders    POCT INR (Completed)    Duplex Venous Lower Extremity  - Bilateral CAR    CBC Auto Differential (Completed)    aPTT (Completed)    Bilateral leg edema        Relevant Orders    Duplex Venous Lower Extremity - Bilateral CAR    Bilateral lower extremity pain        Relevant Orders    Duplex Venous Lower Extremity - Bilateral CAR    Pre-diabetes        Relevant Orders    CBC Auto Differential (Completed)    Comprehensive Metabolic Panel    POC Urinalysis Dipstick, Automated (Completed)    PSA Screen    Fatigue, unspecified type        Relevant Orders    CBC Auto Differential (Completed)    Comprehensive Metabolic Panel    POC Urinalysis Dipstick, Automated (Completed)    PSA Screen    Testosterone    Healthcare maintenance        Relevant Orders    CBC Auto Differential (Completed)    Comprehensive Metabolic Panel    POC Urinalysis Dipstick, Automated (Completed)    PSA Screen    Lipid screening        Prostate cancer screening        Relevant Orders    PSA Screen            Plan of care reviewed with patient at the conclusion of today's visit. Education was provided regarding diagnosis, management and any prescribed or recommended OTC medications.  Patient verbalizes understanding of and agreement with management plan.    Return in about 1 week (around 6/26/2019).     Viviane Colon APRN

## 2019-06-26 ENCOUNTER — HOSPITAL ENCOUNTER (OUTPATIENT)
Dept: CARDIOLOGY | Facility: HOSPITAL | Age: 74
Discharge: HOME | End: 2019-06-26
Admitting: NURSE PRACTITIONER

## 2019-06-26 VITALS — WEIGHT: 315 LBS | HEIGHT: 72 IN | BODY MASS INDEX: 42.66 KG/M2

## 2019-06-26 LAB
BH CV LOWER VASCULAR LEFT COMMON FEMORAL AUGMENT: NORMAL
BH CV LOWER VASCULAR LEFT COMMON FEMORAL COMPRESS: NORMAL
BH CV LOWER VASCULAR LEFT COMMON FEMORAL PHASIC: NORMAL
BH CV LOWER VASCULAR LEFT COMMON FEMORAL SPONT: NORMAL
BH CV LOWER VASCULAR LEFT DISTAL FEMORAL AUGMENT: NORMAL
BH CV LOWER VASCULAR LEFT DISTAL FEMORAL COMPRESS: NORMAL
BH CV LOWER VASCULAR LEFT DISTAL FEMORAL PHASIC: NORMAL
BH CV LOWER VASCULAR LEFT DISTAL FEMORAL SPONT: NORMAL
BH CV LOWER VASCULAR LEFT GASTRONEMIUS COMPRESS: NORMAL
BH CV LOWER VASCULAR LEFT GREATER SAPH AK COMPRESS: NORMAL
BH CV LOWER VASCULAR LEFT GREATER SAPH BK COMPRESS: NORMAL
BH CV LOWER VASCULAR LEFT LESSER SAPH COMPRESS: NORMAL
BH CV LOWER VASCULAR LEFT MID FEMORAL AUGMENT: NORMAL
BH CV LOWER VASCULAR LEFT MID FEMORAL COMPRESS: NORMAL
BH CV LOWER VASCULAR LEFT MID FEMORAL PHASIC: NORMAL
BH CV LOWER VASCULAR LEFT MID FEMORAL SPONT: NORMAL
BH CV LOWER VASCULAR LEFT PERONEAL COMPRESS: NORMAL
BH CV LOWER VASCULAR LEFT POPLITEAL AUGMENT: NORMAL
BH CV LOWER VASCULAR LEFT POPLITEAL COMPRESS: NORMAL
BH CV LOWER VASCULAR LEFT POPLITEAL PHASIC: NORMAL
BH CV LOWER VASCULAR LEFT POPLITEAL SPONT: NORMAL
BH CV LOWER VASCULAR LEFT POSTERIOR TIBIAL COMPRESS: NORMAL
BH CV LOWER VASCULAR LEFT PROFUNDA FEMORAL AUGMENT: NORMAL
BH CV LOWER VASCULAR LEFT PROFUNDA FEMORAL COMPRESS: NORMAL
BH CV LOWER VASCULAR LEFT PROFUNDA FEMORAL PHASIC: NORMAL
BH CV LOWER VASCULAR LEFT PROFUNDA FEMORAL SPONT: NORMAL
BH CV LOWER VASCULAR LEFT PROXIMAL FEMORAL AUGMENT: NORMAL
BH CV LOWER VASCULAR LEFT PROXIMAL FEMORAL COMPRESS: NORMAL
BH CV LOWER VASCULAR LEFT PROXIMAL FEMORAL PHASIC: NORMAL
BH CV LOWER VASCULAR LEFT PROXIMAL FEMORAL SPONT: NORMAL
BH CV LOWER VASCULAR LEFT SAPHENOFEMORAL JUNCTION AUGMENT: NORMAL
BH CV LOWER VASCULAR LEFT SAPHENOFEMORAL JUNCTION COMPRESS: NORMAL
BH CV LOWER VASCULAR LEFT SAPHENOFEMORAL JUNCTION PHASIC: NORMAL
BH CV LOWER VASCULAR LEFT SAPHENOFEMORAL JUNCTION SPONT: NORMAL
BH CV LOWER VASCULAR RIGHT COMMON FEMORAL AUGMENT: NORMAL
BH CV LOWER VASCULAR RIGHT COMMON FEMORAL COMPRESS: NORMAL
BH CV LOWER VASCULAR RIGHT COMMON FEMORAL PHASIC: NORMAL
BH CV LOWER VASCULAR RIGHT COMMON FEMORAL SPONT: NORMAL
BH CV LOWER VASCULAR RIGHT DISTAL FEMORAL AUGMENT: NORMAL
BH CV LOWER VASCULAR RIGHT DISTAL FEMORAL COMPRESS: NORMAL
BH CV LOWER VASCULAR RIGHT DISTAL FEMORAL PHASIC: NORMAL
BH CV LOWER VASCULAR RIGHT DISTAL FEMORAL SPONT: NORMAL
BH CV LOWER VASCULAR RIGHT GASTRONEMIUS COMPRESS: NORMAL
BH CV LOWER VASCULAR RIGHT GREATER SAPH AK COMPRESS: NORMAL
BH CV LOWER VASCULAR RIGHT GREATER SAPH BK COMPRESS: NORMAL
BH CV LOWER VASCULAR RIGHT LESSER SAPH COMPRESS: NORMAL
BH CV LOWER VASCULAR RIGHT MID FEMORAL AUGMENT: NORMAL
BH CV LOWER VASCULAR RIGHT MID FEMORAL COMPRESS: NORMAL
BH CV LOWER VASCULAR RIGHT MID FEMORAL PHASIC: NORMAL
BH CV LOWER VASCULAR RIGHT MID FEMORAL SPONT: NORMAL
BH CV LOWER VASCULAR RIGHT PERONEAL COMPRESS: NORMAL
BH CV LOWER VASCULAR RIGHT POPLITEAL AUGMENT: NORMAL
BH CV LOWER VASCULAR RIGHT POPLITEAL COMPRESS: NORMAL
BH CV LOWER VASCULAR RIGHT POPLITEAL PHASIC: NORMAL
BH CV LOWER VASCULAR RIGHT POPLITEAL SPONT: NORMAL
BH CV LOWER VASCULAR RIGHT POSTERIOR TIBIAL COMPRESS: NORMAL
BH CV LOWER VASCULAR RIGHT PROFUNDA FEMORAL AUGMENT: NORMAL
BH CV LOWER VASCULAR RIGHT PROFUNDA FEMORAL COMPRESS: NORMAL
BH CV LOWER VASCULAR RIGHT PROFUNDA FEMORAL PHASIC: NORMAL
BH CV LOWER VASCULAR RIGHT PROFUNDA FEMORAL SPONT: NORMAL
BH CV LOWER VASCULAR RIGHT PROXIMAL FEMORAL AUGMENT: NORMAL
BH CV LOWER VASCULAR RIGHT PROXIMAL FEMORAL COMPRESS: NORMAL
BH CV LOWER VASCULAR RIGHT PROXIMAL FEMORAL PHASIC: NORMAL
BH CV LOWER VASCULAR RIGHT PROXIMAL FEMORAL SPONT: NORMAL
BH CV LOWER VASCULAR RIGHT SAPHENOFEMORAL JUNCTION COMPRESS: NORMAL
BH CV LOWER VASCULAR RIGHT SAPHENOFEMORAL JUNCTION SPONT: NORMAL

## 2019-06-26 PROCEDURE — 93970 EXTREMITY STUDY: CPT

## 2019-06-26 PROCEDURE — 93970 EXTREMITY STUDY: CPT | Performed by: INTERNAL MEDICINE

## 2019-07-01 ENCOUNTER — HOSPITAL ENCOUNTER (OUTPATIENT)
Dept: CARDIOLOGY | Facility: HOSPITAL | Age: 74
Discharge: HOME | End: 2019-07-01
Admitting: NURSE PRACTITIONER

## 2019-07-01 ENCOUNTER — HOSPITAL ENCOUNTER (OUTPATIENT)
Dept: CARDIOLOGY | Facility: HOSPITAL | Age: 74
Discharge: HOME | End: 2019-07-01

## 2019-07-01 ENCOUNTER — OFFICE VISIT (OUTPATIENT)
Dept: CARDIOLOGY | Facility: HOSPITAL | Age: 74
End: 2019-07-01

## 2019-07-01 VITALS
TEMPERATURE: 97.7 F | SYSTOLIC BLOOD PRESSURE: 146 MMHG | RESPIRATION RATE: 16 BRPM | HEIGHT: 72 IN | HEART RATE: 94 BPM | WEIGHT: 315 LBS | OXYGEN SATURATION: 96 % | DIASTOLIC BLOOD PRESSURE: 89 MMHG | BODY MASS INDEX: 42.66 KG/M2

## 2019-07-01 VITALS — WEIGHT: 315 LBS | HEIGHT: 72 IN | BODY MASS INDEX: 42.66 KG/M2

## 2019-07-01 DIAGNOSIS — I10 ESSENTIAL HYPERTENSION: ICD-10-CM

## 2019-07-01 DIAGNOSIS — R06.83 SNORING: ICD-10-CM

## 2019-07-01 DIAGNOSIS — I48.0 PAROXYSMAL ATRIAL FIBRILLATION (HCC): ICD-10-CM

## 2019-07-01 DIAGNOSIS — I48.0 PAROXYSMAL ATRIAL FIBRILLATION (HCC): Primary | ICD-10-CM

## 2019-07-01 LAB
BH CV ECHO MEAS - AO MAX PG (FULL): 2.7 MMHG
BH CV ECHO MEAS - AO MAX PG: 6.1 MMHG
BH CV ECHO MEAS - AO MEAN PG (FULL): 1.4 MMHG
BH CV ECHO MEAS - AO MEAN PG: 3.1 MMHG
BH CV ECHO MEAS - AO V2 MAX: 123.7 CM/SEC
BH CV ECHO MEAS - AO V2 MEAN: 82.6 CM/SEC
BH CV ECHO MEAS - AO V2 VTI: 22 CM
BH CV ECHO MEAS - AVA(I,A): 2.8 CM^2
BH CV ECHO MEAS - AVA(I,D): 2.8 CM^2
BH CV ECHO MEAS - AVA(V,A): 2.7 CM^2
BH CV ECHO MEAS - AVA(V,D): 2.7 CM^2
BH CV ECHO MEAS - BSA(HAYCOCK): 2.8 M^2
BH CV ECHO MEAS - BSA: 2.6 M^2
BH CV ECHO MEAS - BZI_BMI: 48.1 KILOGRAMS/M^2
BH CV ECHO MEAS - BZI_METRIC_HEIGHT: 177.8 CM
BH CV ECHO MEAS - BZI_METRIC_WEIGHT: 152 KG
BH CV ECHO MEAS - EDV(CUBED): 176 ML
BH CV ECHO MEAS - EDV(MOD-SP2): 85 ML
BH CV ECHO MEAS - EDV(MOD-SP4): 96 ML
BH CV ECHO MEAS - EDV(TEICH): 153.9 ML
BH CV ECHO MEAS - EF(CUBED): 65.5 %
BH CV ECHO MEAS - EF(MOD-BP): 64 %
BH CV ECHO MEAS - EF(MOD-SP2): 63.5 %
BH CV ECHO MEAS - EF(MOD-SP4): 62.5 %
BH CV ECHO MEAS - EF(TEICH): 56.4 %
BH CV ECHO MEAS - ESV(CUBED): 60.7 ML
BH CV ECHO MEAS - ESV(MOD-SP2): 31 ML
BH CV ECHO MEAS - ESV(MOD-SP4): 36 ML
BH CV ECHO MEAS - ESV(TEICH): 67.1 ML
BH CV ECHO MEAS - FS: 29.9 %
BH CV ECHO MEAS - IVS/LVPW: 1.1
BH CV ECHO MEAS - IVSD: 1.2 CM
BH CV ECHO MEAS - LA DIMENSION: 5 CM
BH CV ECHO MEAS - LAD MAJOR: 6.6 CM
BH CV ECHO MEAS - LAT PEAK E' VEL: 10.7 CM/SEC
BH CV ECHO MEAS - LATERAL E/E' RATIO: 11.8
BH CV ECHO MEAS - LV DIASTOLIC VOL/BSA (35-75): 36.9 ML/M^2
BH CV ECHO MEAS - LV MASS(C)D: 267.2 GRAMS
BH CV ECHO MEAS - LV MASS(C)DI: 102.8 GRAMS/M^2
BH CV ECHO MEAS - LV MAX PG: 3.5 MMHG
BH CV ECHO MEAS - LV MEAN PG: 1.7 MMHG
BH CV ECHO MEAS - LV SYSTOLIC VOL/BSA (12-30): 13.9 ML/M^2
BH CV ECHO MEAS - LV V1 MAX: 92.9 CM/SEC
BH CV ECHO MEAS - LV V1 MEAN: 61 CM/SEC
BH CV ECHO MEAS - LV V1 VTI: 16.9 CM
BH CV ECHO MEAS - LVIDD: 5.6 CM
BH CV ECHO MEAS - LVIDS: 3.9 CM
BH CV ECHO MEAS - LVLD AP2: 8.3 CM
BH CV ECHO MEAS - LVLD AP4: 8.8 CM
BH CV ECHO MEAS - LVLS AP2: 7.3 CM
BH CV ECHO MEAS - LVLS AP4: 7.6 CM
BH CV ECHO MEAS - LVOT AREA (M): 3.8 CM^2
BH CV ECHO MEAS - LVOT AREA: 3.6 CM^2
BH CV ECHO MEAS - LVOT DIAM: 2.2 CM
BH CV ECHO MEAS - LVPWD: 1.2 CM
BH CV ECHO MEAS - MED PEAK E' VEL: 10.3 CM/SEC
BH CV ECHO MEAS - MEDIAL E/E' RATIO: 12.2
BH CV ECHO MEAS - MV DEC SLOPE: 625 CM/SEC^2
BH CV ECHO MEAS - MV DEC TIME: 0.19 SEC
BH CV ECHO MEAS - MV E MAX VEL: 127.2 CM/SEC
BH CV ECHO MEAS - PA ACC SLOPE: 640.3 CM/SEC^2
BH CV ECHO MEAS - PA ACC TIME: 0.12 SEC
BH CV ECHO MEAS - PA PR(ACCEL): 23.1 MMHG
BH CV ECHO MEAS - PULM DIAS VEL: 52.4 CM/SEC
BH CV ECHO MEAS - PULM S/D: 0.87
BH CV ECHO MEAS - PULM SYS VEL: 45.5 CM/SEC
BH CV ECHO MEAS - RAP SYSTOLE: 8 MMHG
BH CV ECHO MEAS - RVSP: 35 MMHG
BH CV ECHO MEAS - SI(CUBED): 44.4 ML/M^2
BH CV ECHO MEAS - SI(LVOT): 23.7 ML/M^2
BH CV ECHO MEAS - SI(MOD-SP2): 20.8 ML/M^2
BH CV ECHO MEAS - SI(MOD-SP4): 23.1 ML/M^2
BH CV ECHO MEAS - SI(TEICH): 33.4 ML/M^2
BH CV ECHO MEAS - SV(CUBED): 115.3 ML
BH CV ECHO MEAS - SV(LVOT): 61.6 ML
BH CV ECHO MEAS - SV(MOD-SP2): 54 ML
BH CV ECHO MEAS - SV(MOD-SP4): 60 ML
BH CV ECHO MEAS - SV(TEICH): 86.8 ML
BH CV ECHO MEAS - TAPSE (>1.6): 2.2 CM2
BH CV ECHO MEAS - TR MAX PG: 27 MMHG
BH CV ECHO MEAS - TR MAX VEL: 260.6 CM/SEC
BH CV ECHO MEASUREMENTS AVERAGE E/E' RATIO: 12.11
BH CV VAS BP LEFT ARM: NORMAL MMHG
BH CV XLRA - RV BASE: 4.3 CM
BH CV XLRA - RV LENGTH: 8.4 CM
BH CV XLRA - RV MID: 3.6 CM
BH CV XLRA - TDI S': 15 CM/SEC
LEFT ATRIUM VOLUME INDEX: 18.9 ML/M^2
LEFT ATRIUM VOLUME: 49 ML
LV EF 2D ECHO EST: 65 %
MAXIMAL PREDICTED HEART RATE: 147 BPM
STRESS TARGET HR: 125 BPM

## 2019-07-01 PROCEDURE — 99214 OFFICE O/P EST MOD 30 MIN: CPT | Performed by: NURSE PRACTITIONER

## 2019-07-01 PROCEDURE — 93306 TTE W/DOPPLER COMPLETE: CPT | Performed by: INTERNAL MEDICINE

## 2019-07-01 PROCEDURE — 93306 TTE W/DOPPLER COMPLETE: CPT

## 2019-07-01 PROCEDURE — 93010 ELECTROCARDIOGRAM REPORT: CPT | Performed by: INTERNAL MEDICINE

## 2019-07-01 PROCEDURE — 93005 ELECTROCARDIOGRAM TRACING: CPT | Performed by: NURSE PRACTITIONER

## 2019-07-01 RX ORDER — ASPIRIN 81 MG/1
81 TABLET ORAL DAILY
COMMUNITY
End: 2019-07-01

## 2019-07-01 NOTE — PROGRESS NOTES
Encounter Date:07/01/2019      Patient ID: Luis Perez is a 73 y.o. male.        Subjective:     Chief Complaint: Atrial Fibrillation     History of Present Illness presents the office today at the request of Dr. Murrieta for ongoing evaluation of his newly diagnosed atrial fibrillation.  Patient underwent a colonoscopy per Dr. Torres on Friday, 6/28/2019 and went into atrial fibrillation during the colonoscopy.  Heart rate at that time was 111.  Patient reports he does not feel palpitations or tachycardia.  He however reports ongoing fatigue, intermittent lightheadedness and dyspnea with exertion.  He has a history of hypertension and he takes amlodipine daily.  His wife reports snoring although she denies any apneic events.  He denies chest pain, pedal edema, near syncope, syncope, orthopnea.  He reports he was started on aspirin a few weeks ago by his primary care provider but unclear why aspirin was started.  He denies any cardiac events including PCI or MI.    Patient Active Problem List   Diagnosis   • Hypertension   • Mild intermittent asthma without complication   • Disorder of calcium metabolism   • Disorder of endocrine testis   • Moderate persistent asthma   • Cellulitis of lower extremity   • Morbidly obese (CMS/HCC)       Past Surgical History:   Procedure Laterality Date   • NASAL SEPTUM SURGERY      Deviation Repair   • SKIN CANCER EXCISION     • TONSILLECTOMY     • VASECTOMY         No Known Allergies      Current Outpatient Medications:   •  ADVAIR DISKUS 500-50 MCG/DOSE DISKUS, Inhale 1 puff 2 (Two) Times a Day., Disp: , Rfl:   •  albuterol (PROVENTIL HFA;VENTOLIN HFA) 108 (90 BASE) MCG/ACT inhaler, Inhale 2 puffs Every 4 (Four) Hours As Needed for wheezing., Disp: 1 inhaler, Rfl: 0  •  amLODIPine (NORVASC) 10 MG tablet, Take 1 tablet by mouth Daily., Disp: 90 tablet, Rfl: 2  •  hydrochlorothiazide (HYDRODIURIL) 50 MG tablet, Take 1 tablet by mouth Daily., Disp: 90 tablet, Rfl: 2  •   MG  tablet, TAKE ONE TABLET BY MOUTH EVERY 8 (EIGHT) HOURS AS NEEDED FOR MILD OR MODERATE PAIN, Disp: 90 tablet, Rfl: 0  •  montelukast (SINGULAIR) 10 MG tablet, Take 10 mg by mouth Daily., Disp: , Rfl:   •  Multiple Vitamins-Minerals (VITEYES AREDS FORMULA PO), Take 1 tablet by mouth 2 (Two) Times a Day., Disp: , Rfl:   •  ASA 81 mg daily     The following portions of the chart were reviewed today and updated as appropriate: Allergies, current medications, past family history, social history, past medical history.     Review of Systems   Constitution: Positive for malaise/fatigue. Negative for chills, decreased appetite, diaphoresis, fever, weakness, night sweats, weight gain and weight loss.   HENT: Negative for congestion, hearing loss, hoarse voice and nosebleeds.    Eyes: Negative for blurred vision, visual disturbance and visual halos.   Cardiovascular: Positive for dyspnea on exertion. Negative for chest pain, claudication, cyanosis, irregular heartbeat, leg swelling, near-syncope, orthopnea, palpitations, paroxysmal nocturnal dyspnea and syncope.   Respiratory: Negative for cough, hemoptysis, shortness of breath, sleep disturbances due to breathing, snoring, sputum production and wheezing.    Hematologic/Lymphatic: Negative for bleeding problem. Does not bruise/bleed easily.   Skin: Negative for dry skin, itching and rash.   Musculoskeletal: Negative for arthritis, falls, joint pain, joint swelling and myalgias.   Gastrointestinal: Negative for bloating, abdominal pain, constipation, diarrhea, flatus, heartburn, hematemesis, hematochezia, melena, nausea and vomiting.   Genitourinary: Negative for dysuria, frequency, hematuria, nocturia and urgency.   Neurological: Positive for light-headedness. Negative for excessive daytime sleepiness, dizziness, headaches and loss of balance.   Psychiatric/Behavioral: Negative for depression. The patient does not have insomnia and is not nervous/anxious.            Objective:  "    Vitals:    07/01/19 0943 07/01/19 0946 07/01/19 0950   BP: (!) 167/101 151/86 146/89   BP Location: Left arm Right arm Left arm   Patient Position: Sitting Sitting Standing   Cuff Size: Adult Adult Large Adult   Pulse: 98  94   Resp: 16     Temp: 97.7 °F (36.5 °C)     SpO2: 94%  96%   Weight: (!) 152 kg (335 lb)     Height: 182.9 cm (72.01\")           Physical Exam   Constitutional: He is oriented to person, place, and time. He appears well-developed and well-nourished. He is active and cooperative. No distress.   HENT:   Head: Normocephalic and atraumatic.   Mouth/Throat: Oropharynx is clear and moist.   Eyes: Conjunctivae and EOM are normal. Pupils are equal, round, and reactive to light.   Neck: Normal range of motion. Neck supple. No JVD present. No tracheal deviation present. No thyromegaly present.   Cardiovascular: Normal rate, normal heart sounds and intact distal pulses. An irregularly irregular rhythm present.   Pulmonary/Chest: Effort normal and breath sounds normal.   Abdominal: Soft. Bowel sounds are normal. He exhibits no distension. There is no tenderness.   Musculoskeletal: Normal range of motion.   Neurological: He is alert and oriented to person, place, and time.   Skin: Skin is warm, dry and intact.   Psychiatric: He has a normal mood and affect. His behavior is normal.   Nursing note and vitals reviewed.      Lab and Diagnostic Review:      Lab Results   Component Value Date    GLUCOSE 109 (H) 06/19/2019    CALCIUM 9.7 06/19/2019     06/19/2019    K 3.9 06/19/2019    CO2 26.8 06/19/2019    CL 97 (L) 06/19/2019    BUN 19 06/19/2019    CREATININE 0.80 06/19/2019    EGFRIFNONA 95 06/19/2019    BCR 23.8 06/19/2019    ANIONGAP 14.2 06/19/2019     EKG today atrial fibrillation with left axis deviation at 98 bpm, anterior septal infarct, age undetermined      Assessment and Plan:         1. Paroxysmal atrial fibrillation (CMS/HCC)  begin metoprolol tartrate 25 mg 1 p.o. twice daily  Begin " Eliquis 5 mg twice daily  - ECG 12 Lead; Future  - Adult Transthoracic Echo Complete W/ Cont if Necessary Per Protocol; Future  - ECG 12 Lead; Future  - Ambulatory Referral to Sleep Medicine  CHADS-VASc Risk Assessment            2       Total Score        1 Hypertension    1 Age 65-74      AFIB education provided today including: s/s, use of anticoagulation, treatment of atrial fibrillation, Chads Vasc and the role of the afib center. Discussed stroke prevention and causes of afib, triggers of afib and medication management.   Patient to have repeat EKG on Friday 7 5 to determine if he is still in atrial fibrillation.  Ongoing plan of care based on EKG.  2. Snoring    - Ambulatory Referral to Sleep Medicine    3. Essential hypertension  Under decent control on norvasc and HCTZ  HTN Education provided today including signs and symptoms, medication management, daily blood pressure monitoring. Patient encouraged to call the [facility] center with any abnormal readings.       AMB referral to Cardiology  It has been a pleasure to participate in the care of this patient.  Patient was instructed to call the [facility] with any questions, concerns, or worsening symptoms.  * Please note that portions of this note were completed with a voice recognition program. Efforts were made to edit the dictation but occasionally words are transcribed.

## 2019-07-01 NOTE — PATIENT INSTRUCTIONS
Start eliquis and metoprolol twice a day today  Stop ASPIRIN  Have ekg on Friday on the 3 rd floor

## 2019-07-02 ENCOUNTER — DOCUMENTATION (OUTPATIENT)
Dept: CARDIOLOGY | Facility: HOSPITAL | Age: 74
End: 2019-07-02

## 2019-07-02 ENCOUNTER — TELEPHONE (OUTPATIENT)
Dept: CARDIOLOGY | Facility: HOSPITAL | Age: 74
End: 2019-07-02

## 2019-07-02 DIAGNOSIS — I51.89 CARDIAC MASS: Primary | ICD-10-CM

## 2019-07-02 DIAGNOSIS — I48.0 PAF (PAROXYSMAL ATRIAL FIBRILLATION) (HCC): ICD-10-CM

## 2019-07-02 NOTE — PROGRESS NOTES
TTE from yesterday read by Dr Perez showed   · Left ventricular systolic function is normal.  · Mild to moderate tricuspid valve regurgitation is present.  · Calculated right ventricular systolic pressure from tricuspid regurgitation is 35 mmHg.  · A right atrial mass is present measuring approximately 2.6 cm x 1.6 cm; differential includes thrombus versus atypical right atrial myxoma versus atrial interseptal tumor with clinical correlation and consideration of transesophageal echocardiographic study recommended  · The left ventricular cavity is borderline dilated.  · Estimated EF = 65%.  · Left atrial cavity size is moderately dilated.  · Normal right ventricular wall thickness, systolic function and septal motion noted with right ventricular cavity mildly dilated.  · The aortic valve exhibits mild sclerosis.  · Mild pulmonary hypertension is present.  · There is no evidence of pericardial effusion.     Dr Perez to perform PIETER for further evaluation .

## 2019-07-02 NOTE — TELEPHONE ENCOUNTER
Reviewed echo with patient. Patient to have PIETER with Dr Perez in the near future. )Patient verbalized understanding.

## 2019-07-04 ENCOUNTER — PREP FOR SURGERY (OUTPATIENT)
Dept: OTHER | Facility: HOSPITAL | Age: 74
End: 2019-07-04

## 2019-07-04 DIAGNOSIS — I51.89 CARDIAC MASS: Primary | ICD-10-CM

## 2019-07-04 RX ORDER — LIDOCAINE HYDROCHLORIDE 10 MG/ML
0.1 INJECTION, SOLUTION EPIDURAL; INFILTRATION; INTRACAUDAL; PERINEURAL ONCE AS NEEDED
Status: CANCELLED | OUTPATIENT
Start: 2019-07-04

## 2019-07-04 RX ORDER — SODIUM CHLORIDE 0.9 % (FLUSH) 0.9 %
3-10 SYRINGE (ML) INJECTION AS NEEDED
Status: CANCELLED | OUTPATIENT
Start: 2019-07-04

## 2019-07-04 RX ORDER — SODIUM CHLORIDE 0.9 % (FLUSH) 0.9 %
3 SYRINGE (ML) INJECTION EVERY 12 HOURS SCHEDULED
Status: CANCELLED | OUTPATIENT
Start: 2019-07-04

## 2019-07-05 ENCOUNTER — OFFICE VISIT (OUTPATIENT)
Dept: FAMILY MEDICINE CLINIC | Facility: CLINIC | Age: 74
End: 2019-07-05

## 2019-07-05 VITALS
DIASTOLIC BLOOD PRESSURE: 90 MMHG | WEIGHT: 315 LBS | SYSTOLIC BLOOD PRESSURE: 128 MMHG | HEIGHT: 72 IN | BODY MASS INDEX: 42.66 KG/M2 | HEART RATE: 87 BPM | OXYGEN SATURATION: 95 % | RESPIRATION RATE: 21 BRPM | TEMPERATURE: 97.6 F

## 2019-07-05 DIAGNOSIS — E66.01 MORBIDLY OBESE (HCC): ICD-10-CM

## 2019-07-05 DIAGNOSIS — I10 ESSENTIAL HYPERTENSION: ICD-10-CM

## 2019-07-05 DIAGNOSIS — R60.0 BILATERAL LOWER EXTREMITY EDEMA: Primary | ICD-10-CM

## 2019-07-05 PROCEDURE — 99214 OFFICE O/P EST MOD 30 MIN: CPT | Performed by: NURSE PRACTITIONER

## 2019-07-05 NOTE — PROGRESS NOTES
"Luis Perez is a 73 y.o. male who presents for follow up of lower extremity edema.    Chief Complaint   Patient presents with   • Leg Swelling       Patient is still having lower extremity edema. He notices more when he has been up on his feet but states that there is some there most all of the time. He states it is much better when he wakes up in the morning. He has a history of persistent atrial fibrillation, hypertension, and is morbidly obese. He does have some dyspnea on exertion but feels it is due to his weight.          No Known Allergies    Health Maintenance   Topic Date Due   • TDAP/TD VACCINES (1 - Tdap) 09/20/1964   • AAA SCREEN (ONE-TIME)  11/03/2016   • INFLUENZA VACCINE  08/01/2019   • MEDICARE ANNUAL WELLNESS  08/23/2019   • COLONOSCOPY  06/27/2026   • HEPATITIS C SCREENING  Completed   • PNEUMOCOCCAL VACCINES (65+ LOW/MEDIUM RISK)  Completed   • ZOSTER VACCINE  Discontinued        ROS    Review of Systems   Constitutional: Positive for fatigue.   Respiratory: Positive for apnea and shortness of breath.    Cardiovascular: Positive for leg swelling.   Musculoskeletal: Positive for arthralgias, gait problem, joint swelling and myalgias.   Psychiatric/Behavioral: Positive for sleep disturbance.   All other systems reviewed and are negative.      Vitals:    07/05/19 1412   BP: 128/90   Pulse: 87   Resp: 21   Temp: 97.6 °F (36.4 °C)   SpO2: 95%   Weight: (!) 152 kg (334 lb 6.4 oz)   Height: 182.9 cm (72\")   PainSc: 0-No pain         Current Outpatient Medications:   •  ADVAIR DISKUS 500-50 MCG/DOSE DISKUS, Inhale 1 puff 2 (Two) Times a Day., Disp: , Rfl:   •  albuterol (PROVENTIL HFA;VENTOLIN HFA) 108 (90 BASE) MCG/ACT inhaler, Inhale 2 puffs Every 4 (Four) Hours As Needed for wheezing., Disp: 1 inhaler, Rfl: 0  •  apixaban (ELIQUIS) 5 MG tablet tablet, Take 1 tablet by mouth 2 (Two) Times a Day., Disp: 60 tablet, Rfl: 0  •  hydrochlorothiazide (HYDRODIURIL) 50 MG tablet, Take 1 tablet by mouth " Daily., Disp: 90 tablet, Rfl: 2  •  montelukast (SINGULAIR) 10 MG tablet, Take 10 mg by mouth Daily., Disp: , Rfl:   •  Multiple Vitamins-Minerals (VITEYES AREDS FORMULA PO), Take 1 tablet by mouth 2 (Two) Times a Day., Disp: , Rfl:   •  amLODIPine (NORVASC) 10 MG tablet, Take 1 tablet by mouth Daily., Disp: 90 tablet, Rfl: 2  •  Sotalol HCl AF 80 MG tablet, Take 1 tablet by mouth 2 (Two) Times a Day., Disp: 60 tablet, Rfl: 3    PE    Physical Exam   Constitutional: He is oriented to person, place, and time. He appears well-developed. He is morbidly obese.  HENT:   Head: Normocephalic and atraumatic.   Cardiovascular: Normal rate, normal heart sounds and intact distal pulses. An irregular rhythm present. Exam reveals no gallop and no friction rub.   No murmur heard.  Pulmonary/Chest: Effort normal and breath sounds normal. No stridor. No respiratory distress. He has no wheezes. He has no rales.   Neurological: He is alert and oriented to person, place, and time.   Skin: Skin is warm and dry.   Psychiatric: He has a normal mood and affect. His behavior is normal. Judgment and thought content normal.   Nursing note and vitals reviewed.       A/P    Problem List Items Addressed This Visit        Cardiovascular and Mediastinum    Hypertension    Relevant Medications    hydrochlorothiazide (HYDRODIURIL) 50 MG tablet    Sotalol HCl AF 80 MG tablet    amLODIPine (NORVASC) 10 MG tablet       Digestive    Morbidly obese (CMS/[facility])      Other Visit Diagnoses     Bilateral lower extremity edema    -  Primary        Patient has follow up appointment with cardiology for echocardiogram 7/8/19. He will see pulmonology in August for his asthma.    Plan of care reviewed with patient at the conclusion of today's visit. Education was provided regarding diagnosis, management and any prescribed or recommended OTC medications.  Patient verbalizes understanding of and agreement with management plan.    Return in about 3 months (around  10/5/2019) for Recheck.     Viviane Colon, APRN

## 2019-07-08 ENCOUNTER — HOSPITAL ENCOUNTER (OUTPATIENT)
Dept: CARDIOLOGY | Facility: HOSPITAL | Age: 74
Discharge: HOME | End: 2019-07-08
Admitting: NURSE PRACTITIONER

## 2019-07-08 VITALS — OXYGEN SATURATION: 93 % | HEART RATE: 81 BPM | SYSTOLIC BLOOD PRESSURE: 139 MMHG | DIASTOLIC BLOOD PRESSURE: 110 MMHG

## 2019-07-08 DIAGNOSIS — I48.0 PAF (PAROXYSMAL ATRIAL FIBRILLATION) (HCC): ICD-10-CM

## 2019-07-08 DIAGNOSIS — I51.89 CARDIAC MASS: ICD-10-CM

## 2019-07-08 LAB — LV EF 2D ECHO EST: 60 %

## 2019-07-08 PROCEDURE — 25010000002 FENTANYL CITRATE (PF) 100 MCG/2ML SOLUTION: Performed by: INTERNAL MEDICINE

## 2019-07-08 PROCEDURE — 93312 ECHO TRANSESOPHAGEAL: CPT

## 2019-07-08 PROCEDURE — 99213 OFFICE O/P EST LOW 20 MIN: CPT | Performed by: INTERNAL MEDICINE

## 2019-07-08 PROCEDURE — 93312 ECHO TRANSESOPHAGEAL: CPT | Performed by: INTERNAL MEDICINE

## 2019-07-08 PROCEDURE — 93321 DOPPLER ECHO F-UP/LMTD STD: CPT

## 2019-07-08 PROCEDURE — 93325 DOPPLER ECHO COLOR FLOW MAPG: CPT | Performed by: INTERNAL MEDICINE

## 2019-07-08 PROCEDURE — 25010000002 MIDAZOLAM PER 1 MG: Performed by: INTERNAL MEDICINE

## 2019-07-08 PROCEDURE — 93321 DOPPLER ECHO F-UP/LMTD STD: CPT | Performed by: INTERNAL MEDICINE

## 2019-07-08 PROCEDURE — 99152 MOD SED SAME PHYS/QHP 5/>YRS: CPT

## 2019-07-08 PROCEDURE — 93325 DOPPLER ECHO COLOR FLOW MAPG: CPT

## 2019-07-08 RX ORDER — FENTANYL CITRATE 50 UG/ML
INJECTION, SOLUTION INTRAMUSCULAR; INTRAVENOUS
Status: COMPLETED | OUTPATIENT
Start: 2019-07-08 | End: 2019-07-08

## 2019-07-08 RX ORDER — MIDAZOLAM HYDROCHLORIDE 1 MG/ML
INJECTION INTRAMUSCULAR; INTRAVENOUS
Status: COMPLETED | OUTPATIENT
Start: 2019-07-08 | End: 2019-07-08

## 2019-07-08 RX ADMIN — MIDAZOLAM HYDROCHLORIDE 1 MG: 1 INJECTION, SOLUTION INTRAMUSCULAR; INTRAVENOUS at 10:12

## 2019-07-08 RX ADMIN — MIDAZOLAM HYDROCHLORIDE 2 MG: 1 INJECTION, SOLUTION INTRAMUSCULAR; INTRAVENOUS at 10:09

## 2019-07-08 RX ADMIN — FENTANYL CITRATE 50 MCG: 50 INJECTION, SOLUTION INTRAMUSCULAR; INTRAVENOUS at 10:09

## 2019-07-08 RX ADMIN — FENTANYL CITRATE 50 MCG: 50 INJECTION, SOLUTION INTRAMUSCULAR; INTRAVENOUS at 10:10

## 2019-07-08 NOTE — PROGRESS NOTES
Pre-procedure Report  Cardiovascular Laboratory  [facility]    Patient:  Luis Perez  :  1945  PCP:  Viviane Colon APRN  PHONE:  [phone]    DATE: 2019    Chief Complaint: right atrial mass, atrial fibrillation      Stress test within last 6 months:   No    Previous cardiac catheterization:  No    Echocardiogram 19:  · Left ventricular systolic function is normal.  · Mild to moderate tricuspid valve regurgitation is present.  · Calculated right ventricular systolic pressure from tricuspid regurgitation is 35 mmHg.  · A right atrial mass is present measuring approximately 2.6 cm x 1.6 cm; differential includes thrombus versus atypical right atrial myxoma versus atrial interseptal tumor with clinical correlation and consideration of transesophageal echocardiographic study recommended  · The left ventricular cavity is borderline dilated.  · Estimated EF = 65%.  · Left atrial cavity size is moderately dilated.  · Normal right ventricular wall thickness, systolic function and septal motion noted with right ventricular cavity mildly dilated.  · The aortic valve exhibits mild sclerosis.  · Mild pulmonary hypertension is present.  · There is no evidence of pericardial effusion.  ·   Allergies:       No Known Allergies    MEDICATIONS:  Prior to Admission medications    Medication Sig Start Date End Date Taking? Authorizing Provider   ADVAIR DISKUS 500-50 MCG/DOSE DISKUS Inhale 1 puff 2 (Two) Times a Day. 16  Yes Provider, MD Regina   amLODIPine (NORVASC) 10 MG tablet Take 1 tablet by mouth Daily. 10/2/18  Yes Mohan Ye MD   apixaban (ELIQUIS) 5 MG tablet tablet Take 1 tablet by mouth 2 (Two) Times a Day. 19  Yes Juany Ospina APRN   hydrochlorothiazide (HYDRODIURIL) 50 MG tablet Take 1 tablet by mouth Daily. 18  Yes Mohan Ye MD   metoprolol tartrate (LOPRESSOR) 25 MG tablet Take 1 tablet by mouth 2 (Two) Times a Day. 19   Yes Juany Ospina APRN   montelukast (SINGULAIR) 10 MG tablet Take 10 mg by mouth Daily. 8/10/16  Yes Provider, MD Regina   Multiple Vitamins-Minerals (VITEYES AREDS FORMULA PO) Take 1 tablet by mouth 2 (Two) Times a Day.   Yes Provider, MD Regina   albuterol (PROVENTIL HFA;VENTOLIN HFA) 108 (90 BASE) MCG/ACT inhaler Inhale 2 puffs Every 4 (Four) Hours As Needed for wheezing. 1/2/17   January Pelletier APRN       Past medical & surgical history, social and family history reviewed in the electronic medical record.    Physical Exam:    Vitals:   Vitals:    07/08/19 0920   BP: (!) 152/105   Pulse: 79   SpO2: 95%    There were no vitals filed for this visit.There is no height or weight on file to calculate BMI.    GENERAL: No apparent distress.  No significant changes since last exam.  CHEST: Clear to auscultation bilaterally no stridor no wheeze.  CV: S1, S2, Irregular without Murmurs, Rubs or Gallops  EXTREMITIES: 1-2+ RLE edema.              Lab Results   Component Value Date    CHLPL 150 08/13/2015    TRIG 310 (H) 03/05/2019    HDL 33 (L) 03/05/2019    AST 17 06/19/2019    ALT 19 06/19/2019     IMPRESSION:  Patient with recent new onset atrial fibrillation during routine colonoscopy 6/28/19 and now anticoagulated with Eliquis with no missed doses. He is asymptomatic with his atrial fibrillation other than mild SOB on exertion and lightheadedness intermittently. He denies any chest discomfort, presyncope, syncope, or palpitations. He feels that the metoprolol has helped with his episodes of lightheadedness. He had an echocardiogram after developing atrial fib and was found to have a RA cardiac mass 2.6 x 1.6cm and was referred for a PIETER today. Procedure, risks, complications discussed with patient and his wife and he is agreeable to proceed. The patient has not ever had a cardiologist in the past and will need to follow up with us.    PLAN:  · Procedure to perform: PIETER  · Patient to continue Eliquis post  procedure in view of CHADsVasc 2      Scribed for Norberto Perez MD by Jaja Mansfield, APRN. 7/8/2019  9:35 AM     I have seen and examined the patient, case was discussed with the physician extender, reviewed the above note, necessary changes were made and I agree with the final note.     Norberto Perez MD FACC

## 2019-07-10 ENCOUNTER — TELEPHONE (OUTPATIENT)
Dept: CARDIOLOGY | Facility: HOSPITAL | Age: 74
End: 2019-07-10

## 2019-07-10 NOTE — TELEPHONE ENCOUNTER
Per Dr Perez, patient to begin sotalol 80 mg bid in place of metoprolol and plan for ECV in the next few weeks. Patient to begin sotalol 80 mg bid on Saturday morning 7/13/19 and have repeat ekg on Monday 7/15/19 in [facility] center. Patient verbalized understanding.

## 2019-07-11 DIAGNOSIS — J45.30 MILD PERSISTENT ASTHMA WITHOUT COMPLICATION: ICD-10-CM

## 2019-07-11 RX ORDER — AMLODIPINE BESYLATE 10 MG/1
10 TABLET ORAL DAILY
Qty: 90 TABLET | Refills: 2 | Status: SHIPPED | OUTPATIENT
Start: 2019-07-11 | End: 2020-04-13 | Stop reason: SDUPTHER

## 2019-07-15 ENCOUNTER — OFFICE VISIT (OUTPATIENT)
Dept: CARDIOLOGY | Facility: HOSPITAL | Age: 74
End: 2019-07-15

## 2019-07-15 ENCOUNTER — HOSPITAL ENCOUNTER (OUTPATIENT)
Dept: CARDIOLOGY | Facility: HOSPITAL | Age: 74
Discharge: HOME | End: 2019-07-15
Admitting: NURSE PRACTITIONER

## 2019-07-15 VITALS
RESPIRATION RATE: 20 BRPM | HEIGHT: 72 IN | WEIGHT: 315 LBS | BODY MASS INDEX: 42.66 KG/M2 | SYSTOLIC BLOOD PRESSURE: 138 MMHG | TEMPERATURE: 97 F | HEART RATE: 64 BPM | OXYGEN SATURATION: 94 % | DIASTOLIC BLOOD PRESSURE: 97 MMHG

## 2019-07-15 DIAGNOSIS — I48.19 PERSISTENT ATRIAL FIBRILLATION (HCC): Primary | ICD-10-CM

## 2019-07-15 DIAGNOSIS — R06.83 SNORING: ICD-10-CM

## 2019-07-15 DIAGNOSIS — I10 ESSENTIAL HYPERTENSION: ICD-10-CM

## 2019-07-15 DIAGNOSIS — I48.19 PERSISTENT ATRIAL FIBRILLATION (HCC): ICD-10-CM

## 2019-07-15 PROCEDURE — 99214 OFFICE O/P EST MOD 30 MIN: CPT | Performed by: NURSE PRACTITIONER

## 2019-07-15 PROCEDURE — 93005 ELECTROCARDIOGRAM TRACING: CPT | Performed by: NURSE PRACTITIONER

## 2019-07-15 PROCEDURE — 93010 ELECTROCARDIOGRAM REPORT: CPT | Performed by: INTERNAL MEDICINE

## 2019-07-15 NOTE — PROGRESS NOTES
Encounter Date:07/15/2019      Patient ID: Luis Perez is a 73 y.o. male.        Subjective:     Chief Complaint: Follow-up (EKG after Sotolol)   persistent atrial fib  History of Present Illness patient presents the office today for ongoing evaluation of his persistent atrial fibrillation.  He was started sotalol 80 mg twice daily on Saturday 7/13/2019 and presents today for his repeat EKG.  He is anticoagulated with Eliquis and denies any signs and symptoms of bleeding. At initial diagnosis patient underwent a TTE which showed a right atrial mass measuring 2.6 cm x 1.6 cm.  Patient underwent a PIETER on 7/8/2019 per Dr. 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  Patient does note ongoing dyspnea with exertion mild pedal edema and intermittent lightheadedness.  He does deny chest pain as well as palpitations or tachycardia.  He is been checking his heart rate at home heart rates have been 60s to 70s.    Patient Active Problem List   Diagnosis   • Hypertension   • Mild intermittent asthma without complication   • Disorder of calcium metabolism   • Disorder of endocrine testis   • Moderate persistent asthma   • Cellulitis of lower extremity   • Morbidly obese (CMS/HCC)   • Persistent atrial fibrillation (CMS/HCC)         Past Surgical History:   Procedure Laterality Date   • NASAL SEPTUM SURGERY      Deviation Repair   • SKIN CANCER EXCISION     • TONSILLECTOMY     • VASECTOMY         No Known Allergies      Current Outpatient Medications:   •  ADVAIR DISKUS 500-50 MCG/DOSE DISKUS, Inhale 1 puff 2 (Two) Times a Day., Disp: , Rfl:   •  albuterol (PROVENTIL HFA;VENTOLIN HFA) 108 (90 BASE) MCG/ACT inhaler, Inhale 2 puffs Every 4 (Four) Hours As Needed for wheezing., Disp: 1 inhaler, Rfl: 0  •  amLODIPine (NORVASC) 10 MG tablet, Take 1 tablet by mouth Daily., Disp: 90 tablet, Rfl: 2  •  apixaban (ELIQUIS) 5 MG tablet tablet, Take 1 tablet by mouth 2 (Two) Times  a Day., Disp: 60 tablet, Rfl: 0  •  hydrochlorothiazide (HYDRODIURIL) 50 MG tablet, Take 1 tablet by mouth Daily., Disp: 90 tablet, Rfl: 2  •  montelukast (SINGULAIR) 10 MG tablet, Take 10 mg by mouth Daily., Disp: , Rfl:   •  Multiple Vitamins-Minerals (VITEYES AREDS FORMULA PO), Take 1 tablet by mouth 2 (Two) Times a Day., Disp: , Rfl:   •  Sotalol HCl AF 80 MG tablet, Take 1 tablet by mouth 2 (Two) Times a Day., Disp: 60 tablet, Rfl: 3    The following portions of the chart were reviewed today and updated as appropriate: Allergies, current medications, past family history, social history, past medical history.     Review of Systems   Constitution: Negative for chills, decreased appetite, diaphoresis, fever, weakness, malaise/fatigue, night sweats, weight gain and weight loss.   HENT: Negative for congestion, hearing loss, hoarse voice and nosebleeds.    Eyes: Negative for blurred vision, visual disturbance and visual halos.   Cardiovascular: Positive for dyspnea on exertion and leg swelling. Negative for chest pain, claudication, cyanosis, irregular heartbeat, near-syncope, orthopnea, palpitations, paroxysmal nocturnal dyspnea and syncope.   Respiratory: Negative for cough, hemoptysis, shortness of breath, sleep disturbances due to breathing, snoring, sputum production and wheezing.    Hematologic/Lymphatic: Negative for bleeding problem. Bruises/bleeds easily.   Skin: Negative for dry skin, itching and rash.   Musculoskeletal: Negative for arthritis, falls, joint pain, joint swelling and myalgias.   Gastrointestinal: Negative for bloating, abdominal pain, constipation, diarrhea, flatus, heartburn, hematemesis, hematochezia, melena, nausea and vomiting.   Genitourinary: Negative for dysuria, frequency, hematuria, nocturia and urgency.   Neurological: Positive for light-headedness. Negative for excessive daytime sleepiness, dizziness, headaches and loss of balance.   Psychiatric/Behavioral: Negative for  "depression. The patient does not have insomnia and is not nervous/anxious.            Objective:     Vitals:    07/15/19 0911   BP: 138/97   BP Location: Left arm   Patient Position: Sitting   Cuff Size: Large Adult   Pulse: 64   Resp: 20   Temp: 97 °F (36.1 °C)   TempSrc: Temporal   SpO2: 94%   Weight: (!) 153 kg (336 lb 8 oz)   Height: 182.9 cm (72\")         Physical Exam   Constitutional: He is oriented to person, place, and time. He appears well-developed and well-nourished. He is active and cooperative. No distress.   HENT:   Head: Normocephalic and atraumatic.   Mouth/Throat: Oropharynx is clear and moist.   Eyes: Conjunctivae and EOM are normal. Pupils are equal, round, and reactive to light.   Neck: Normal range of motion. Neck supple. No JVD present. No tracheal deviation present. No thyromegaly present.   Cardiovascular: Normal rate, normal heart sounds and intact distal pulses. An irregularly irregular rhythm present.   Pulmonary/Chest: Effort normal and breath sounds normal.   Abdominal: Soft. Bowel sounds are normal. He exhibits no distension. There is no tenderness.   Musculoskeletal: Normal range of motion.   Neurological: He is alert and oriented to person, place, and time.   Skin: Skin is warm, dry and intact.   Psychiatric: He has a normal mood and affect. His behavior is normal.   Nursing note and vitals reviewed.      Lab and Diagnostic Review:    EKG: atrial fib at 73 bpm, left axis deviation, Incomplete RBBB   /      Assessment and Plan:         1. Persistent atrial fibrillation (CMS/HCC)  Now on sotalol 80 mg bid   Will see Dr Perez in near future in anticipation of upcoming ECV  - ECG 12 Lead; Future  CHADS-VASc Risk Assessment            2       Total Score        1 Hypertension    1 Age 65-74      Anticoagulated with eliqius and denies any s/s of bleeding   2. Essential hypertension  Well controlled on norvasc, HCTZ    3. Snoring  Upcoming sleep center appointment " scheduled    It has been a pleasure to participate in the care of this patient.  Patient was instructed to call the [facility] with any questions, concerns, or worsening symptoms.        * Please note that portions of this note were completed with a voice recognition program. Efforts were made to edit the dictation but occasionally words are transcribed.

## 2019-07-17 ENCOUNTER — OFFICE VISIT (OUTPATIENT)
Dept: ORTHOPEDIC SURGERY | Facility: CLINIC | Age: 74
End: 2019-07-17

## 2019-07-17 VITALS — HEIGHT: 72 IN | WEIGHT: 315 LBS | OXYGEN SATURATION: 96 % | BODY MASS INDEX: 42.66 KG/M2 | HEART RATE: 73 BPM

## 2019-07-17 DIAGNOSIS — M17.11 PRIMARY OSTEOARTHRITIS OF RIGHT KNEE: Primary | ICD-10-CM

## 2019-07-17 PROCEDURE — 20610 DRAIN/INJ JOINT/BURSA W/O US: CPT | Performed by: ORTHOPAEDIC SURGERY

## 2019-07-17 RX ORDER — TRIAMCINOLONE ACETONIDE 40 MG/ML
40 INJECTION, SUSPENSION INTRA-ARTICULAR; INTRAMUSCULAR
Status: COMPLETED | OUTPATIENT
Start: 2019-07-17 | End: 2019-07-17

## 2019-07-17 RX ORDER — ROPIVACAINE HYDROCHLORIDE 5 MG/ML
4 INJECTION, SOLUTION EPIDURAL; INFILTRATION; PERINEURAL
Status: COMPLETED | OUTPATIENT
Start: 2019-07-17 | End: 2019-07-17

## 2019-07-17 RX ADMIN — TRIAMCINOLONE ACETONIDE 40 MG: 40 INJECTION, SUSPENSION INTRA-ARTICULAR; INTRAMUSCULAR at 14:00

## 2019-07-17 RX ADMIN — ROPIVACAINE HYDROCHLORIDE 4 ML: 5 INJECTION, SOLUTION EPIDURAL; INFILTRATION; PERINEURAL at 14:00

## 2019-07-17 NOTE — PROGRESS NOTES
Procedure   Large Joint Arthrocentesis: R knee  Date/Time: 7/17/2019 2:00 PM  Consent given by: patient  Site marked: site marked  Timeout: Immediately prior to procedure a time out was called to verify the correct patient, procedure, equipment, support staff and site/side marked as required   Supporting Documentation  Indications: pain   Procedure Details  Location: knee - R knee  Preparation: Patient was prepped and draped in the usual sterile fashion  Needle size: 22 G  Approach: anterolateral  Medications administered: 4 mL ropivacaine 0.5 %; 40 mg triamcinolone acetonide 40 MG/ML  Patient tolerance: patient tolerated the procedure well with no immediate complications

## 2019-07-17 NOTE — PROGRESS NOTES
[facility] Note    Subjective     Chief Complaint   Patient presents with   • Follow-up     2 months- Primary osteoarthritis of right knee         HPI    Luis Perez is a 73 y.o. male.  He follows up today for his right knee.  Knee continues to bother him, with 8 out of 10 pain, which is aching and shooting.  It worsens with climbing stairs.  He would like to have an injection today.      Patient Active Problem List   Diagnosis   • Hypertension   • Mild intermittent asthma without complication   • Disorder of calcium metabolism   • Disorder of endocrine testis   • Moderate persistent asthma   • Cellulitis of lower extremity   • Morbidly obese (CMS/HCC)   • Persistent atrial fibrillation (CMS/HCC)     Past Medical History:   Diagnosis Date   • Asthma    • Bronchitis, chronic (CMS/HCC)    • Hypertension    • Nephrolithiasis       Past Surgical History:   Procedure Laterality Date   • NASAL SEPTUM SURGERY      Deviation Repair   • SKIN CANCER EXCISION     • TONSILLECTOMY     • VASECTOMY        Family History   Problem Relation Age of Onset   • Cancer Mother    • Alzheimer's disease Father    • No Known Problems Maternal Grandmother    • No Known Problems Maternal Grandfather    • No Known Problems Paternal Grandmother    • No Known Problems Paternal Grandfather      Social History     Socioeconomic History   • Marital status:      Spouse name: Not on file   • Number of children: Not on file   • Years of education: Not on file   • Highest education level: Not on file   Tobacco Use   • Smoking status: Never Smoker   • Smokeless tobacco: Current User     Types: Chew   • Tobacco comment: states been over a month since use   Substance and Sexual Activity   • Alcohol use: Yes     Frequency: Monthly or less     Drinks per session: 1 or 2   • Drug use: No   • Sexual activity: Defer   Social History Narrative    caffeine use average I or 2 servings weekly      Current Outpatient Medications on  File Prior to Visit   Medication Sig Dispense Refill   • ADVAIR DISKUS 500-50 MCG/DOSE DISKUS Inhale 1 puff 2 (Two) Times a Day.     • albuterol (PROVENTIL HFA;VENTOLIN HFA) 108 (90 BASE) MCG/ACT inhaler Inhale 2 puffs Every 4 (Four) Hours As Needed for wheezing. 1 inhaler 0   • amLODIPine (NORVASC) 10 MG tablet Take 1 tablet by mouth Daily. 90 tablet 2   • apixaban (ELIQUIS) 5 MG tablet tablet Take 1 tablet by mouth 2 (Two) Times a Day. 60 tablet 0   • hydrochlorothiazide (HYDRODIURIL) 50 MG tablet Take 1 tablet by mouth Daily. 90 tablet 2   • montelukast (SINGULAIR) 10 MG tablet Take 10 mg by mouth Daily.     • Multiple Vitamins-Minerals (VITEYES AREDS FORMULA PO) Take 1 tablet by mouth 2 (Two) Times a Day.     • Sotalol HCl AF 80 MG tablet Take 1 tablet by mouth 2 (Two) Times a Day. 60 tablet 3     No current facility-administered medications on file prior to visit.       No Known Allergies     Review of Systems   Constitutional: Negative.  Negative for activity change, appetite change, chills, diaphoresis, fatigue, fever and unexpected weight change.   HENT: Negative.  Negative for congestion, dental problem, drooling, ear discharge, ear pain, facial swelling, hearing loss, mouth sores, nosebleeds, postnasal drip, rhinorrhea, sinus pressure, sneezing, sore throat, tinnitus, trouble swallowing and voice change.    Eyes: Negative.  Negative for photophobia, pain, discharge, redness, itching and visual disturbance.   Respiratory: Negative.  Negative for apnea, cough, choking, chest tightness, shortness of breath, wheezing and stridor.    Cardiovascular: Negative.  Negative for chest pain, palpitations and leg swelling.   Gastrointestinal: Negative.  Negative for abdominal distention, abdominal pain, anal bleeding, blood in stool, constipation, diarrhea, nausea, rectal pain and vomiting.   Endocrine: Negative.  Negative for cold intolerance, heat intolerance, polydipsia, polyphagia and polyuria.   Genitourinary:  "Negative.  Negative for decreased urine volume, difficulty urinating, dysuria, enuresis, flank pain, frequency, genital sores, hematuria and urgency.   Musculoskeletal: Positive for arthralgias. Negative for back pain, gait problem, joint swelling, myalgias, neck pain and neck stiffness.   Skin: Negative.  Negative for color change, pallor, rash and wound.   Allergic/Immunologic: Negative.  Negative for environmental allergies, food allergies and immunocompromised state.   Neurological: Negative.  Negative for dizziness, tremors, seizures, syncope, facial asymmetry, speech difficulty, weakness, light-headedness, numbness and headaches.   Hematological: Negative.  Negative for adenopathy. Does not bruise/bleed easily.   Psychiatric/Behavioral: Negative.  Negative for agitation, behavioral problems, confusion, decreased concentration, dysphoric mood, hallucinations, self-injury, sleep disturbance and suicidal ideas. The patient is not nervous/anxious and is not hyperactive.         Objective      Physical Exam  Pulse 73   Ht 182.9 cm (72.01\")   Wt (!) 153 kg (337 lb 4.9 oz)   SpO2 96%   BMI 45.74 kg/m²     Body mass index is 45.74 kg/m².    General:   Mental Status:  Alert   Appearance: Cooperative, in no acute distress   Build and Nutrition: Obese male   Orientation: Alert and oriented to person, place and time   Posture: Normal   Gait: Limping on the right    Integument:   Right knee: No skin lesions, no rash, no ecchymosis    Lower Extremities:   Right Knee:    Tenderness:  Medial joint line tenderness    Effusion:  None    Swelling: None    Crepitus:  Positive    Range of motion:  Extension: 0°       Flexion: 120°  Instability:  No varus laxity, no valgus laxity, negative anterior drawer  Deformities:  None        Assessment and Plan     Luis was seen today for follow-up.    Diagnoses and all orders for this visit:    Primary osteoarthritis of right knee  -     Large Joint Arthrocentesis: R knee        1. " Primary osteoarthritis of right knee        I reviewed my findings with patient today.  He would like to have an injection for his knee today, and this is provided.  Body mass index is 45.  For consideration of knee replacement surgery would like that to be below 40.    Of note, he had 80% improvement just a few minutes following the injection today.    Return in about 4 months (around 11/17/2019).      Medical Decision Making  Management Options : prescription/IM medicine      Judd Wood MD  07/17/19  2:21 PM

## 2019-07-26 ENCOUNTER — CONSULT (OUTPATIENT)
Dept: CARDIOLOGY | Facility: CLINIC | Age: 74
End: 2019-07-26

## 2019-07-26 VITALS
DIASTOLIC BLOOD PRESSURE: 85 MMHG | SYSTOLIC BLOOD PRESSURE: 129 MMHG | BODY MASS INDEX: 41.75 KG/M2 | WEIGHT: 315 LBS | HEIGHT: 73 IN | HEART RATE: 82 BPM

## 2019-07-26 DIAGNOSIS — I10 ESSENTIAL HYPERTENSION: ICD-10-CM

## 2019-07-26 DIAGNOSIS — E66.01 MORBIDLY OBESE (HCC): ICD-10-CM

## 2019-07-26 DIAGNOSIS — I48.19 PERSISTENT ATRIAL FIBRILLATION (HCC): Primary | ICD-10-CM

## 2019-07-26 PROCEDURE — 99214 OFFICE O/P EST MOD 30 MIN: CPT | Performed by: INTERNAL MEDICINE

## 2019-07-26 PROCEDURE — 93000 ELECTROCARDIOGRAM COMPLETE: CPT | Performed by: INTERNAL MEDICINE

## 2019-07-26 NOTE — PROGRESS NOTES
"    Subjective:     Encounter Date:07/26/2019    Patient ID: Luis Perez is a 73 y.o.  white male from [place], [place], retired from [employer].    REFERRING PROVIDER: ANGELIA Rios  HEALTHCARE PROVIDER:ANGELIA Matias  SLEEP PHYSICIAN: Nicolas Lopez MD, [facility]  GASTROENTEROLOGIST: Tay Torres MD  INFECTIOUS DISEASE: Eriberto Weiner MD    Chief Complaint:   Chief Complaint   Patient presents with   • Hypertension     Consult     Problem List:  1. Persistent atrial fibrillation:  a. CHADsVasc 2, anticoagulated with Eliquis  b. Echocardiogram 7/1/2019: LV EF 0.65, mild to moderate TR, RVSP 35 mmHg, right atrial mass is present measuring 2.6 x 1.6 cm; differential includes thrombus versus atypical right atrial myxoma versus atrial intraseptal tumor with clinical correlation and consideration of transesophageal echocardiogram recommended, LA moderately dilated, normal RV wall thickness, systolic function and motion noted with RV cavity mildly dilated, aortic valve exhibits mild sclerosis, mild pulmonary hypertension, no pericardial effusion  c. PIETER 7/8/2019: Mild MR, LVEF 0.60, LV wall thickness consistent with moderate concentric hypertrophy, LA mild to moderately dilated, 3D echo LVEF was 0.51, normal RV cavity size, wall thickness, systolic function septal motion noted, marked lipomatous hypertrophy of the interatrial septum present.  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 Initiation of sotalol 7/13/2019, persistent atrial fibrillation on ECG 7/26/2019  2. 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 Mild dyspnea on exertion  a. Remote stress test 30-40 years ago; " negative per patient-data deficit, patient reports that this was performed for a baseline and not because of any symptoms  b. CCS class 0 chest discomfort/NYHA class I-II PALM  4. At risk for sleep apnea with upcoming sleep study scheduled 8/21/2019  5. Chronic lower extremity edema  6. Hypertension  7. Mild intermittent asthma  8. History of lower extremity cellulitis with MRSA  9. History of melanoma  10. Morbid obesity: BMI 43.3  11. Surgical history  a. Vasectomy  b. Tonsillectomy  c. Deviated septum surgery  d. Multiple skin cancer excisions    No Known Allergies      Current Outpatient Medications:   •  ADVAIR DISKUS 500-50 MCG/DOSE DISKUS, Inhale 1 puff 2 (Two) Times a Day., Disp: , Rfl:   •  albuterol (PROVENTIL HFA;VENTOLIN HFA) 108 (90 BASE) MCG/ACT inhaler, Inhale 2 puffs Every 4 (Four) Hours As Needed for wheezing., Disp: 1 inhaler, Rfl: 0  •  amLODIPine (NORVASC) 10 MG tablet, Take 1 tablet by mouth Daily., Disp: 90 tablet, Rfl: 2  •  apixaban (ELIQUIS) 5 MG tablet tablet, Take 1 tablet by mouth 2 (Two) Times a Day., Disp: 60 tablet, Rfl: 0  •  hydrochlorothiazide (HYDRODIURIL) 50 MG tablet, Take 1 tablet by mouth Daily., Disp: 90 tablet, Rfl: 2  •  montelukast (SINGULAIR) 10 MG tablet, Take 10 mg by mouth Daily., Disp: , Rfl:   •  Multiple Vitamins-Minerals (VITEYES AREDS FORMULA PO), Take 1 tablet by mouth 2 (Two) Times a Day., Disp: , Rfl:   •  Sotalol HCl AF 80 MG tablet, Take 1 tablet by mouth 2 (Two) Times a Day., Disp: 60 tablet, Rfl: 3    History of Present Illness  This is a 73-year-old white male who presents to establish cardiology care after discovering that he had atrial fibrillation when he went in for his colonoscopy on 6/28/2019.  The patient was unaware that he was having any palpitations and denied any chest pain, shortness of breath, dizziness, presyncope, syncope, or fatigue.  The patient was started on metoprolol but then was switched to sotalol on 7/13/2019 after he continued to  have persistent atrial fibrillation. His ECG today still demonstrates atrial fibrillation.  He has never had a cardioversion in the past but did have a recent echocardiogram and PIETER.  On his echocardiogram, he was found to have a right atrial mass, but then on PIETER, it was a prominent tao terminalis (normal variant).  He has been compliant with his Eliquis and sotalol.  He has an upcoming sleep study scheduled for 2019 for probable obstructive sleep apnea.  He has snoring and daytime sleepiness, but his wife denies him having any apneic spells.  He has chronic lower extremity edema and wears compression stockings.  He has had right lower extremity cellulitis with MRSA remotely.  He has never been a smoker and has no family history of early CAD.  His mother had a pacemaker but  at 91 years of age.  He has intermittent asthma, but no COPD, MIs, diabetes mellitus, hyperlipidemia, rheumatic fever, heart catheterizations, CVAs, TIAs, seizures, DVTs, PEs, orthopnea, or claudication.  He is not overly active due to his right knee pain due to arthritis.  He is able to do some yard work and run errands without any difficulties.  He had a stress test 30-40 years ago to have as baseline.  He states that he did not have any symptoms at that time.  The patient is scheduled to have a root canal on Monday, 2019, and wonders about stopping his blood thinners.  The patient states that he was given an atrial fibrillation booklet in the atrial fib clinic.  Patient otherwise denies chest pain, shortness of breath, PND, edema, palpitations, syncope or presyncope at this time on limited activity.    Cardiovascular Disease Risk Factors  hyptertension, obesity, increased age, male gender    Social History     Socioeconomic History   • Marital status:      Spouse name: Not on file   • Number of children: 1,  at 3 days from [employer]   • Years of education: Not on file   • Highest education level: Not on file    Tobacco Use   • Smoking status: Never Smoker   • Smokeless tobacco: Former User     Types: Chew   • Tobacco comment: states been over a month since use   Substance and Sexual Activity   • Alcohol use: Yes     Frequency: Monthly or less     Drinks per session: 1 or 2   • Drug use: No   • Sexual activity: Defer   Social History Narrative    caffeine use average I or 2 servings weekly       Family History   Problem Relation Age of Onset   • Cancer Mother    • Alzheimer's disease Father    • No Known Problems Maternal Grandmother    • No Known Problems Maternal Grandfather    • No Known Problems Paternal Grandmother    • No Known Problems Paternal Grandfather    · Wife: [employer]  · Son:  at 3 days old from [facility]  · Mother:  at 91, had a pacemaker  · Father:  at 92 from Alzheimer's  · 2 sisters: Healthy    Review of Systems   Constitution: Positive for malaise/fatigue.   HENT: Positive for hearing loss and tinnitus.    Eyes: Negative.    Cardiovascular: Positive for irregular heartbeat, leg swelling and near-syncope. Negative for chest pain, dyspnea on exertion, orthopnea, palpitations, paroxysmal nocturnal dyspnea and syncope.   Respiratory: Positive for shortness of breath and snoring.    Endocrine: Negative.    Hematologic/Lymphatic: Bruises/bleeds easily.   Skin: Positive for skin cancer.   Musculoskeletal: Positive for arthritis.   Gastrointestinal: Negative.    Genitourinary: Negative.    Neurological: Positive for excessive daytime sleepiness. Negative for dizziness, focal weakness, light-headedness and seizures.   Psychiatric/Behavioral: Negative.    Allergic/Immunologic: Negative.       Obtained and negative except as outlined in problem list and HPI.      ECG 12 Lead  Date/Time: 2019 12:09 PM  Performed by: Norberto Perez MD  Authorized by: Norberto Perez MD   Rhythm comments: Atrial fibrillation, incomplete RBBB, left anterior fascicular block, cannot rule out anterior infarct, age  "undetermined, abnormal ECG, 76 bpm, QRS 92 ms,  ms, no significant changes from last ECG                 Objective:       Vitals:    07/26/19 0918 07/26/19 0920 07/26/19 0922 07/26/19 0923   BP: 148/91 133/89 142/92 129/85   BP Location: Left arm Left arm Right arm Right arm   Patient Position: Sitting Standing Sitting Standing   Pulse: 71 78 75 82   Weight: (!) 149 kg (328 lb 6.4 oz)      Height: 185.4 cm (73\")      Recheck blood pressure right arm sitting was 126/78  Body mass index is 43.33 kg/m².     Physical Exam   Constitutional: He is oriented to person, place, and time. He appears well-developed and well-nourished.   HENT:   Mouth/Throat: Uvula is midline, oropharynx is clear and moist and mucous membranes are normal. He does not have dentures. No oral lesions. Normal dentition. No dental abscesses, uvula swelling, lacerations or dental caries.   Eyes:   Fundoscopic exam:       The right eye shows AV nicking. The right eye shows no exudate and no hemorrhage.        The left eye shows AV nicking. The left eye shows no exudate and no hemorrhage.   Neck: No JVD present. Carotid bruit is not present. No thyromegaly present.   Cardiovascular: S1 normal and S2 normal. An irregularly irregular rhythm present. Exam reveals no gallop, no S3 and no friction rub.   Murmur heard.   Medium-pitched early systolic murmur is present with a grade of 1/6 at the lower left sternal border.  Pulses:       Dorsalis pedis pulses are 2+ on the right side, and 2+ on the left side.        Posterior tibial pulses are 2+ on the right side, and 2+ on the left side.   Pulmonary/Chest: Effort normal. He has decreased breath sounds. He has no wheezes. He has no rhonchi. He has no rales.   Abdominal: Soft. He exhibits no mass. There is no hepatosplenomegaly. There is no tenderness. There is no guarding.   Bowel sounds audible x4   Musculoskeletal: Normal range of motion. He exhibits edema (1+).   Lymphadenopathy:     He has no " cervical adenopathy.   Neurological: He is alert and oriented to person, place, and time.   Skin: Skin is warm, dry and intact. No rash noted.   Vitals reviewed.      Lab Review:   Results for orders placed or performed during the hospital encounter of 07/08/19   Adult Transesophageal Echo (PIETER) W/ Cont if Necessary Per Protocol   Result Value Ref Range    Echo EF Estimated 60 %     · Echocardiogram 7/1/19:  · Left ventricular systolic function is normal.  · Mild to moderate tricuspid valve regurgitation is present.  · Calculated right ventricular systolic pressure from tricuspid regurgitation is 35 mmHg.  · A right atrial mass is present measuring approximately 2.6 cm x 1.6 cm; differential includes thrombus versus atypical right atrial myxoma versus atrial interseptal tumor with clinical correlation and consideration of transesophageal echocardiographic study recommended  · The left ventricular cavity is borderline dilated.  · Estimated EF = 65%.  · Left atrial cavity size is moderately dilated.  · Normal right ventricular wall thickness, systolic function and septal motion noted with right ventricular cavity mildly dilated.  · The aortic valve exhibits mild sclerosis.  · Mild pulmonary hypertension is present.  · There is no evidence of pericardial effusion.        · PIETER 7/8/2019:  · Mild mitral valve regurgitation is present.  · Left ventricular wall thickness is consistent with moderate concentric hypertrophy.  · Estimated EF = 60%.  · Left ventricular systolic function is normal.  · Left atrial cavity size is mild-to-moderately dilated.  · 3D ECHO LVEF = 0.51.  · Normal right ventricular cavity size, wall thickness, systolic function and septal motion noted.  · Marked lipomatous hypertrophy of the interatrial septum present.  · No evidence of a patent foramen ovale.  · No evidence of a left atrial appendage thrombus was present.  · No evidence of pulmonary hypertension is present.  · There is no evidence of  "pericardial effusion.  · No significant structural valvular abnormality demonstrated.  · The previously noted \"right atrial mass\" on transthoracic echocardiographic study on 1 July 2019 is felt to represent a prominent tao terminalis (normal variant).        Assessment:   Patient with persistent atrial fibrillation despite sotalol therapy.  I suspect that the patient has had atrial fibrillation for quite some time even though he was only initially diagnosed incidentally during his colonoscopy in June 2019.  He is anticoagulated with Eliquis.  We will schedule him for an external cardioversion within the next couple of weeks.  His QTC was within normal limits on his ECG today in office on sotalol therapy.  Cardioversion procedure, risks, and complications are discussed with the patient and his wife, and he is agreeable to proceed.  His hypertension is controlled.     Diagnosis Plan   1. Persistent atrial fibrillation (CMS/HCC)  Cardioversion External in Cardiology Department   2. Essential hypertension   Controlled, continue amlodipine, hydrochlorothiazide, sotalol   3. Morbidly obese (CMS/HCC)   Encouraged the patient to continue physical activity as tolerated          Plan:   1. Patient to continue current medications and close follow up with the above providers.  2. Tentative cardiology follow up in 6 weeks or patient may return sooner PRN.  3. ECV ordered  4. 1 800 card provided    Scribed for Norberto Perez MD by Jaja Mansfield, APRN. 7/26/2019  12:16 PM    INorberto MD, [facility], personally performed the services described in this documentation as scribed by the above named individual in my presence, and it is both accurate and complete. At 1:07 PM on 07/26/2019           "

## 2019-07-26 NOTE — PROGRESS NOTES
Subjective:     Encounter Date:07/26/2019      Patient ID: Luis Perez is a 73 y.o. male.    Chief Complaint: No chief complaint on file.    Problem List:    No Known Allergies      Current Outpatient Medications:   •  ADVAIR DISKUS 500-50 MCG/DOSE DISKUS, Inhale 1 puff 2 (Two) Times a Day., Disp: , Rfl:   •  albuterol (PROVENTIL HFA;VENTOLIN HFA) 108 (90 BASE) MCG/ACT inhaler, Inhale 2 puffs Every 4 (Four) Hours As Needed for wheezing., Disp: 1 inhaler, Rfl: 0  •  amLODIPine (NORVASC) 10 MG tablet, Take 1 tablet by mouth Daily., Disp: 90 tablet, Rfl: 2  •  apixaban (ELIQUIS) 5 MG tablet tablet, Take 1 tablet by mouth 2 (Two) Times a Day., Disp: 60 tablet, Rfl: 0  •  hydrochlorothiazide (HYDRODIURIL) 50 MG tablet, Take 1 tablet by mouth Daily., Disp: 90 tablet, Rfl: 2  •  montelukast (SINGULAIR) 10 MG tablet, Take 10 mg by mouth Daily., Disp: , Rfl:   •  Multiple Vitamins-Minerals (VITEYES AREDS FORMULA PO), Take 1 tablet by mouth 2 (Two) Times a Day., Disp: , Rfl:   •  Sotalol HCl AF 80 MG tablet, Take 1 tablet by mouth 2 (Two) Times a Day., Disp: 60 tablet, Rfl: 3    HISTORY OF PRESENT ILLNESS:        ROS   All other systems reviewed and otherwise negative.    Procedures       Objective:       There were no vitals filed for this visit.  There is no height or weight on file to calculate BMI.    Physical Exam   Constitutional: He is oriented to person, place, and time. He appears well-developed and well-nourished.   Neck: No JVD present. Carotid bruit is not present. No thyromegaly present.   Cardiovascular: Regular rhythm, S1 normal, S2 normal and normal heart sounds. Exam reveals no gallop, no S3 and no friction rub.   No murmur heard.  Pulses:       Dorsalis pedis pulses are 2+ on the right side, and 2+ on the left side.        Posterior tibial pulses are 2+ on the right side, and 2+ on the left side.   Pulmonary/Chest: Effort normal and breath sounds normal. He has no wheezes. He has no rhonchi. He has no  rales.   Abdominal: Soft. He exhibits no mass. There is no hepatosplenomegaly. There is no tenderness. There is no guarding.   Bowel sounds audible x4   Musculoskeletal: Normal range of motion. He exhibits no edema.   Lymphadenopathy:     He has no cervical adenopathy.   Neurological: He is alert and oriented to person, place, and time.   Skin: Skin is warm, dry and intact. No rash noted.   Vitals reviewed.        Lab Review:   Lab Results   Component Value Date    GLUCOSE 109 (H) 06/19/2019    BUN 19 06/19/2019    CREATININE 0.80 06/19/2019    EGFRIFNONA 95 06/19/2019    BCR 23.8 06/19/2019    CO2 26.8 06/19/2019    CALCIUM 9.7 06/19/2019    ALBUMIN 4.70 06/19/2019    AST 17 06/19/2019    ALT 19 06/19/2019       Lab Results   Component Value Date    WBC 6.23 06/19/2019    HGB 17.2 06/19/2019    HCT 53.6 (H) 06/19/2019    MCV 90.1 06/19/2019     06/19/2019       Lab Results   Component Value Date    HGBA1C 6.0 06/19/2019       Lab Results   Component Value Date    TSH 1.590 03/05/2019       Lab Results   Component Value Date    CHOL 150 03/05/2019    CHOL 149 08/23/2018     Lab Results   Component Value Date    TRIG 310 (H) 03/05/2019    TRIG 351 (H) 08/23/2018     Lab Results   Component Value Date    HDL 33 (L) 03/05/2019    HDL 32 (L) 08/23/2018     Lab Results   Component Value Date    LDL 55 03/05/2019    LDL 73 08/23/2018           Assessment:   Overall continued acceptable course with no interim cardiopulmonary complaints with good functional status. We will defer additional diagnostic or therapeutic intervention from a cardiac perspective at this time.      No diagnosis found.       Plan:         1. Patient to continue current medications and close follow up with the above providers.  2. Tentative cardiology follow up in *** or patient may return sooner PRN.

## 2019-07-30 ENCOUNTER — PREP FOR SURGERY (OUTPATIENT)
Dept: OTHER | Facility: HOSPITAL | Age: 74
End: 2019-07-30

## 2019-07-30 DIAGNOSIS — I48.0 PAF (PAROXYSMAL ATRIAL FIBRILLATION) (HCC): Primary | ICD-10-CM

## 2019-07-30 RX ORDER — SODIUM CHLORIDE 0.9 % (FLUSH) 0.9 %
3 SYRINGE (ML) INJECTION EVERY 12 HOURS SCHEDULED
Status: CANCELLED | OUTPATIENT
Start: 2019-07-30

## 2019-07-30 RX ORDER — ONDANSETRON 2 MG/ML
4 INJECTION INTRAMUSCULAR; INTRAVENOUS EVERY 6 HOURS PRN
Status: CANCELLED | OUTPATIENT
Start: 2019-07-30

## 2019-07-30 RX ORDER — LIDOCAINE HYDROCHLORIDE 10 MG/ML
0.1 INJECTION, SOLUTION EPIDURAL; INFILTRATION; INTRACAUDAL; PERINEURAL ONCE AS NEEDED
Status: CANCELLED | OUTPATIENT
Start: 2019-07-30

## 2019-07-30 RX ORDER — APIXABAN 5 MG/1
TABLET, FILM COATED ORAL
Qty: 60 TABLET | Refills: 3 | Status: SHIPPED | OUTPATIENT
Start: 2019-07-30 | End: 2020-01-17

## 2019-07-30 RX ORDER — SODIUM CHLORIDE 0.9 % (FLUSH) 0.9 %
3-10 SYRINGE (ML) INJECTION AS NEEDED
Status: CANCELLED | OUTPATIENT
Start: 2019-07-30

## 2019-08-05 ENCOUNTER — HOSPITAL ENCOUNTER (OUTPATIENT)
Dept: CARDIOLOGY | Facility: HOSPITAL | Age: 74
Discharge: HOME | End: 2019-08-05
Attending: INTERNAL MEDICINE | Admitting: INTERNAL MEDICINE

## 2019-08-05 VITALS
TEMPERATURE: 97.4 F | HEIGHT: 73 IN | DIASTOLIC BLOOD PRESSURE: 109 MMHG | OXYGEN SATURATION: 95 % | RESPIRATION RATE: 16 BRPM | BODY MASS INDEX: 41.75 KG/M2 | SYSTOLIC BLOOD PRESSURE: 174 MMHG | WEIGHT: 315 LBS | HEART RATE: 55 BPM

## 2019-08-05 DIAGNOSIS — I48.0 PAF (PAROXYSMAL ATRIAL FIBRILLATION) (HCC): ICD-10-CM

## 2019-08-05 DIAGNOSIS — I48.19 PERSISTENT ATRIAL FIBRILLATION (HCC): ICD-10-CM

## 2019-08-05 LAB
ANION GAP SERPL CALCULATED.3IONS-SCNC: 11 MMOL/L (ref 5–15)
BUN BLD-MCNC: 14 MG/DL (ref 8–23)
BUN/CREAT SERPL: 19.2 (ref 7–25)
CALCIUM SPEC-SCNC: 9.5 MG/DL (ref 8.6–10.5)
CHLORIDE SERPL-SCNC: 103 MMOL/L (ref 98–107)
CO2 SERPL-SCNC: 28 MMOL/L (ref 22–29)
CREAT BLD-MCNC: 0.73 MG/DL (ref 0.76–1.27)
DEPRECATED RDW RBC AUTO: 45.1 FL (ref 37–54)
ERYTHROCYTE [DISTWIDTH] IN BLOOD BY AUTOMATED COUNT: 14 % (ref 12.3–15.4)
GFR SERPL CREATININE-BSD FRML MDRD: 105 ML/MIN/1.73
GLUCOSE BLD-MCNC: 117 MG/DL (ref 65–99)
HCT VFR BLD AUTO: 50.3 % (ref 37.5–51)
HGB BLD-MCNC: 16.2 G/DL (ref 13–17.7)
MCH RBC QN AUTO: 28.3 PG (ref 26.6–33)
MCHC RBC AUTO-ENTMCNC: 32.2 G/DL (ref 31.5–35.7)
MCV RBC AUTO: 87.9 FL (ref 79–97)
PLATELET # BLD AUTO: 200 10*3/MM3 (ref 140–450)
PMV BLD AUTO: 10.9 FL (ref 6–12)
POTASSIUM BLD-SCNC: 4 MMOL/L (ref 3.5–5.2)
RBC # BLD AUTO: 5.72 10*6/MM3 (ref 4.14–5.8)
SODIUM BLD-SCNC: 142 MMOL/L (ref 136–145)
WBC NRBC COR # BLD: 7.55 10*3/MM3 (ref 3.4–10.8)

## 2019-08-05 PROCEDURE — 85027 COMPLETE CBC AUTOMATED: CPT | Performed by: NURSE PRACTITIONER

## 2019-08-05 PROCEDURE — 25010000002 MIDAZOLAM PER 1 MG: Performed by: INTERNAL MEDICINE

## 2019-08-05 PROCEDURE — 92960 CARDIOVERSION ELECTRIC EXT: CPT | Performed by: INTERNAL MEDICINE

## 2019-08-05 PROCEDURE — 80048 BASIC METABOLIC PNL TOTAL CA: CPT | Performed by: NURSE PRACTITIONER

## 2019-08-05 PROCEDURE — 36415 COLL VENOUS BLD VENIPUNCTURE: CPT

## 2019-08-05 PROCEDURE — 93005 ELECTROCARDIOGRAM TRACING: CPT | Performed by: NURSE PRACTITIONER

## 2019-08-05 PROCEDURE — 92960 CARDIOVERSION ELECTRIC EXT: CPT

## 2019-08-05 PROCEDURE — 93005 ELECTROCARDIOGRAM TRACING: CPT | Performed by: INTERNAL MEDICINE

## 2019-08-05 RX ORDER — LIDOCAINE HYDROCHLORIDE 10 MG/ML
0.1 INJECTION, SOLUTION EPIDURAL; INFILTRATION; INTRACAUDAL; PERINEURAL ONCE AS NEEDED
Status: DISCONTINUED | OUTPATIENT
Start: 2019-08-05 | End: 2019-08-05 | Stop reason: HOSPADM

## 2019-08-05 RX ORDER — MIDAZOLAM HYDROCHLORIDE 1 MG/ML
INJECTION INTRAMUSCULAR; INTRAVENOUS
Status: DISCONTINUED
Start: 2019-08-05 | End: 2019-08-05 | Stop reason: HOSPADM

## 2019-08-05 RX ORDER — FLUMAZENIL 0.1 MG/ML
INJECTION INTRAVENOUS
Status: DISCONTINUED
Start: 2019-08-05 | End: 2019-08-05 | Stop reason: WASHOUT

## 2019-08-05 RX ORDER — NALOXONE HYDROCHLORIDE 0.4 MG/ML
INJECTION, SOLUTION INTRAMUSCULAR; INTRAVENOUS; SUBCUTANEOUS
Status: DISCONTINUED
Start: 2019-08-05 | End: 2019-08-05 | Stop reason: WASHOUT

## 2019-08-05 RX ORDER — SODIUM CHLORIDE 0.9 % (FLUSH) 0.9 %
3-10 SYRINGE (ML) INJECTION AS NEEDED
Status: DISCONTINUED | OUTPATIENT
Start: 2019-08-05 | End: 2019-08-05 | Stop reason: HOSPADM

## 2019-08-05 RX ORDER — ONDANSETRON 2 MG/ML
4 INJECTION INTRAMUSCULAR; INTRAVENOUS EVERY 6 HOURS PRN
Status: DISCONTINUED | OUTPATIENT
Start: 2019-08-05 | End: 2019-08-05 | Stop reason: HOSPADM

## 2019-08-05 RX ORDER — SODIUM CHLORIDE 0.9 % (FLUSH) 0.9 %
3 SYRINGE (ML) INJECTION EVERY 12 HOURS SCHEDULED
Status: DISCONTINUED | OUTPATIENT
Start: 2019-08-05 | End: 2019-08-05 | Stop reason: HOSPADM

## 2019-08-05 RX ORDER — ACETAMINOPHEN 500 MG
500 TABLET ORAL EVERY 6 HOURS PRN
COMMUNITY
End: 2020-05-28

## 2019-08-05 RX ORDER — MIDAZOLAM HYDROCHLORIDE 1 MG/ML
INJECTION INTRAMUSCULAR; INTRAVENOUS
Status: COMPLETED | OUTPATIENT
Start: 2019-08-05 | End: 2019-08-05

## 2019-08-05 RX ADMIN — MIDAZOLAM HYDROCHLORIDE 2 MG: 1 INJECTION, SOLUTION INTRAMUSCULAR; INTRAVENOUS at 10:57

## 2019-08-05 RX ADMIN — METHOHEXITAL SODIUM 10 MG: 500 INJECTION, POWDER, LYOPHILIZED, FOR SOLUTION INTRAMUSCULAR; INTRAVENOUS; RECTAL at 10:59

## 2019-08-05 RX ADMIN — METHOHEXITAL SODIUM 10 MG: 500 INJECTION, POWDER, LYOPHILIZED, FOR SOLUTION INTRAMUSCULAR; INTRAVENOUS; RECTAL at 10:57

## 2019-08-05 NOTE — INTERVAL H&P NOTE
"  Pre-procedure Report  Cardiovascular Laboratory  [facility]    Patient:  Luis ePrez  :  1945  PCP:  Viviane Colon APRN  PHONE:  [phone]    DATE: 2019    Chief Complaint: Persistent atrial fib      Stress test within last 6 months:   no       Previous cardiac catheterization:  no    PIETER 2019: Mild MR, LVEF 0.60, LV wall thickness consistent with moderate concentric hypertrophy, LA mild to moderately dilated, 3D echo LVEF was 0.51, normal RV cavity size, wall thickness, systolic function septal motion noted, marked lipomatous hypertrophy of the interatrial septum present.  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 Authorizing Provider   ADVAIR DISKUS 500-50 MCG/DOSE DISKUS Inhale 1 puff 2 (Two) Times a Day. 16   Regina Cid MD   albuterol (PROVENTIL HFA;VENTOLIN HFA) 108 (90 BASE) MCG/ACT inhaler Inhale 2 puffs Every 4 (Four) Hours As Needed for wheezing. 17   January Pelletier APRN   amLODIPine (NORVASC) 10 MG tablet Take 1 tablet by mouth Daily. 19   Viviane Colon APRN   ELIQUIS 5 MG tablet tablet TAKE ONE TABLET BY MOUTH TWICE DAILY  19   Juany Ospina APRN   hydrochlorothiazide (HYDRODIURIL) 50 MG tablet Take 1 tablet by mouth Daily. 18   Mohan Ye MD   montelukast (SINGULAIR) 10 MG tablet Take 10 mg by mouth Daily. 8/10/16   Regina Cid MD   Multiple Vitamins-Minerals (VITEYES AREDS FORMULA PO) Take 1 tablet by mouth 2 (Two) Times a Day.    Regina Cid MD   Sotalol HCl AF 80 MG tablet Take 1 tablet by mouth 2 (Two) Times a Day. 7/10/19   Juany Ospina" ANGELIA REDDING       Past medical & surgical history, social and family history reviewed in the electronic medical record.    Physical Exam:    Vitals:   Vitals:    08/05/19 1000   BP: (!) 151/109   Pulse: 70   Resp: 14   Temp: 97.4 °F (36.3 °C)   SpO2: 94%    on room air      08/05/19  1000   Weight: (!) 149 kg (329 lb 8 oz)   Body mass index is 43.47 kg/m².    GENERAL: No apparent distress.  No significant changes since last exam.  CHEST: Clear to auscultation bilaterally no stridor no wheeze.  CV: S1, S2, Irregular with grade 1/6 Murmurs, no Rubs or Gallops  EXTREMITIES: No edema.    Lab Results   Component Value Date    CHLPL 150 08/13/2015    TRIG 310 (H) 03/05/2019    HDL 33 (L) 03/05/2019    AST 17 06/19/2019    ALT 19 06/19/2019     IMPRESSION:  Patient with persistent atrial fibrillation despite sotalol therapy. He has no missed doses of Eliquis in the past 30 days and will undergo a cardioversion; procedure, risks, complications discussed with patient and he is agreeable to proceed. He has nausea and fatigue occasionally with his atrial fib but denies chest pain, SOB, palpitations, presyncope, or dizziness. No new labs yet to review.  The procedure, risk, and potential complications discussed with patient and wife in room and he is in agreement to proceed.    PLAN:  · Procedure to perform:ECV  · Follow up with Dr. Perez in  6 weeks  · Continue sotalol 80mg bid after ECV as well as Eliquis 5 mg bid    Scribed for Norberto Perez MD by ANGELIA Browning. 8/5/2019  10:20 AM     I have seen and examined the patient, case was discussed with the physician extender, reviewed the above note, necessary changes were made and I agree with the final note.     Norberto Perez MD [facility]

## 2019-08-05 NOTE — PLAN OF CARE
Problem: Patient Care Overview  Goal: Plan of Care Review   08/05/19 1240   Coping/Psychosocial   Patient Agreement with Plan of Care agrees   Plan of Care Review   Progress improving   OTHER   Outcome Summary Pt VS intially stable post ECV. Post ambulation, BP's increased from 130/90 to 182/108. Dr. Perez contacted. Pt to take HCTZ once home then daily after. Pt to take and record BP BID and bring results on 8/9/19 to office when obtaining the ordered EKG.  Pt and spouse verbalize understanding of d/c instructions.      Coping/Psychosocial   Plan of Care Reviewed With patient;spouse  (verbalize understanding of d/c instructions )

## 2019-08-09 ENCOUNTER — CLINICAL SUPPORT (OUTPATIENT)
Dept: CARDIOLOGY | Facility: CLINIC | Age: 74
End: 2019-08-09

## 2019-08-09 DIAGNOSIS — I48.19 PERSISTENT ATRIAL FIBRILLATION (HCC): Primary | ICD-10-CM

## 2019-08-09 PROCEDURE — 93000 ELECTROCARDIOGRAM COMPLETE: CPT | Performed by: INTERNAL MEDICINE

## 2019-08-21 ENCOUNTER — CONSULT (OUTPATIENT)
Dept: SLEEP MEDICINE | Facility: HOSPITAL | Age: 74
End: 2019-08-21

## 2019-08-21 VITALS
HEIGHT: 73 IN | WEIGHT: 315 LBS | DIASTOLIC BLOOD PRESSURE: 67 MMHG | OXYGEN SATURATION: 95 % | SYSTOLIC BLOOD PRESSURE: 127 MMHG | BODY MASS INDEX: 41.75 KG/M2 | HEART RATE: 67 BPM

## 2019-08-21 DIAGNOSIS — E66.01 MORBIDLY OBESE (HCC): ICD-10-CM

## 2019-08-21 DIAGNOSIS — G47.33 OBSTRUCTIVE SLEEP APNEA, ADULT: ICD-10-CM

## 2019-08-21 DIAGNOSIS — I48.19 PERSISTENT ATRIAL FIBRILLATION (HCC): ICD-10-CM

## 2019-08-21 DIAGNOSIS — R06.83 SNORING: Primary | ICD-10-CM

## 2019-08-21 PROCEDURE — 99203 OFFICE O/P NEW LOW 30 MIN: CPT | Performed by: INTERNAL MEDICINE

## 2019-08-21 NOTE — PROGRESS NOTES
Subjective   Luis Perez is a 73 y.o. male is being seen for consultation today at the request of ANGELIA Martini for the evaluation of snoring and possible sleep disordered breathing.  He is also seen by Viviane GALAN    History of Present Illness  Patient is developed atrial fibrillation noted for about a month.  He had a cardioversion.  He is referred for further evaluation possible sleep disordered breathing is contributing factor.  He has had snoring noted for roughly 20 years.  He snores so loudly his wife is been sleeping in another room for that time.  He has not been noted to have apneas.  He denies awakening gasping for breath.  He says he sometimes is rested in the morning he denies having any morning headaches.  He will fall asleep if sitting to during the day.  He denies any problems while driving.    He does have a history of loud snoring he says his snoring is worse when he is on his back.  He has awaken with a dry mouth.  He has trouble breathing through his nose at night he has broken his nose in the past and had surgery for deviated septum.  He denies any reflux symptoms.  He denies hypnagogic hallucinations or sleep paralysis.He denies kicking or jerking his legs at night.  He does have some chronic back pain.  He denies any recent weight change.    He is bed between 1130 and midnight.  He will fall asleep in 10 to 15 minutes.  He awakens couple times during the night.  He thinks he gets 5 to 6 hours of sleep arising at 6 AM.  He says he sometimes rested although occasionally not.  He has had hypertension on for at least 10 years.  He denies any history of diabetes coronary artery disease.  He has been diagnosed with  A. fib recently.        He has environmental allergies to cats trees and grass      Current Outpatient Medications:   •  ADVAIR DISKUS 500-50 MCG/DOSE DISKUS, Inhale 1 puff 2 (Two) Times a Day., Disp: , Rfl:   •  albuterol (PROVENTIL HFA;VENTOLIN HFA) 108 (90 BASE)  MCG/ACT inhaler, Inhale 2 puffs Every 4 (Four) Hours As Needed for wheezing., Disp: 1 inhaler, Rfl: 0  •  amLODIPine (NORVASC) 10 MG tablet, Take 1 tablet by mouth Daily., Disp: 90 tablet, Rfl: 2  •  ELIQUIS 5 MG tablet tablet, TAKE ONE TABLET BY MOUTH TWICE DAILY , Disp: 60 tablet, Rfl: 3  •  hydrochlorothiazide (HYDRODIURIL) 50 MG tablet, Take 1 tablet by mouth Daily., Disp: 90 tablet, Rfl: 2  •  montelukast (SINGULAIR) 10 MG tablet, Take 10 mg by mouth Daily., Disp: , Rfl:   •  Multiple Vitamins-Minerals (VITEYES AREDS FORMULA PO), Take 1 tablet by mouth 2 (Two) Times a Day., Disp: , Rfl:   •  Sotalol HCl AF 80 MG tablet, Take 1 tablet by mouth 2 (Two) Times a Day., Disp: 60 tablet, Rfl: 3  •  acetaminophen (TYLENOL) 500 MG tablet, Take 500 mg by mouth Every 6 (Six) Hours As Needed for Mild Pain ., Disp: , Rfl:     Social History    Tobacco Use      Smoking status: Never Smoker      Smokeless tobacco: Former User        Types: Chew      Tobacco comment: states been over a month since use       Social History     Substance and Sexual Activity   Alcohol Use Yes   • Frequency: Monthly or less   • Drinks per session: 1 or 2    Comment: 1-2 month        Caffeine: He has one cola every 2 weeks    Past Medical History:   Diagnosis Date   • Arrhythmia     afib   • Asthma    • Bronchitis, chronic (CMS/HCC)    • Cancer (CMS/HCC)     Skin    • Hyperlipidemia    • Hypertension    • Irregular heart beat    • Nephrolithiasis        Past Surgical History:   Procedure Laterality Date   • COLONOSCOPY     • NASAL SEPTUM SURGERY      Deviation Repair   • SKIN CANCER EXCISION     • TONSILLECTOMY     • VASECTOMY         Family History   Problem Relation Age of Onset   • Cancer Mother         colon   • Diabetes Mother    • Alzheimer's disease Father    • No Known Problems Maternal Grandmother    • No Known Problems Maternal Grandfather    • No Known Problems Paternal Grandmother    • No Known Problems Paternal Grandfather        The  "following portions of the patient's history were reviewed and updated as appropriate: allergies, current medications, past family history, past medical history, past social history, past surgical history and problem list.    Review of Systems   Constitutional: Positive for fatigue.   HENT: Positive for hearing loss and tinnitus.    Eyes: Positive for visual disturbance.   Respiratory: Positive for shortness of breath and wheezing.    Cardiovascular: Positive for palpitations and leg swelling.   Gastrointestinal: Negative.    Endocrine: Negative.    Genitourinary: Negative.    Musculoskeletal: Positive for arthralgias and gait problem.   Skin: Negative.    Allergic/Immunologic: Positive for environmental allergies.   Hematological: Negative.    Psychiatric/Behavioral: Negative.    [place] score is 3/24    Objective     /67   Pulse 67   Ht 185.4 cm (73\")   Wt (!) 150 kg (330 lb)   SpO2 95%   BMI 43.54 kg/m²       Physical Exam   Constitutional: He is oriented to person, place, and time. He appears well-developed and well-nourished.   He is morbidly obese.   HENT:   Head: Normocephalic and atraumatic.   He has Mallampati class IV anatomy.   Eyes: EOM are normal. Pupils are equal, round, and reactive to light.   Neck: Normal range of motion. Neck supple.   Cardiovascular: Normal rate and normal heart sounds.   he has frequent ectopic beats   Pulmonary/Chest: Effort normal and breath sounds normal.   Abdominal: Soft. Bowel sounds are normal.   Musculoskeletal: Normal range of motion. He exhibits no edema.   Neurological: He is alert and oriented to person, place, and time.   Skin: Skin is warm and dry.   Psychiatric: He has a normal mood and affect. His behavior is normal.         Assessment/Plan   Luis was seen today for sleeping problem.    Diagnoses and all orders for this visit:    Snoring  -     Polysomnography 4 or More Parameters; Future    Obstructive sleep apnea, adult  -     Polysomnography 4 or " More Parameters; Future    Morbidly obese (CMS/HCC)    Persistent atrial fibrillation (CMS/HCC)    Patient has a history of snoring and sometimes nonrestorative sleep.  He has atrial fibrillation noted.  I think he gives an excellent story for obstructive sleep apnea.  We will plan to proceed to polysomnogram.  I discussed possible therapies including CPAP, weight control, oral appliance, and surgery.  We have also discussed the long-term consequences of untreated obstructive sleep apnea.  He is encouraged to lose weight.  He is encouraged to avoid alcohol and sedatives close to bedtime.  He is encouraged to practice lateral position sleep.  He apparently is already working with his primary care provider to lose weight.         Nicolas Lopez MD [facility]  Sleep Medicine  Pulmonary and Critical Care Medicine

## 2019-10-03 ENCOUNTER — OFFICE VISIT (OUTPATIENT)
Dept: FAMILY MEDICINE CLINIC | Facility: CLINIC | Age: 74
End: 2019-10-03

## 2019-10-03 VITALS
HEIGHT: 73 IN | TEMPERATURE: 96.3 F | SYSTOLIC BLOOD PRESSURE: 118 MMHG | BODY MASS INDEX: 41.75 KG/M2 | RESPIRATION RATE: 16 BRPM | WEIGHT: 315 LBS | DIASTOLIC BLOOD PRESSURE: 80 MMHG | OXYGEN SATURATION: 95 % | HEART RATE: 55 BPM

## 2019-10-03 DIAGNOSIS — H65.193 ACUTE EFFUSION OF BOTH MIDDLE EARS: ICD-10-CM

## 2019-10-03 DIAGNOSIS — J30.89 ENVIRONMENTAL AND SEASONAL ALLERGIES: Primary | ICD-10-CM

## 2019-10-03 DIAGNOSIS — J06.9 ACUTE URI: ICD-10-CM

## 2019-10-03 DIAGNOSIS — Z01.818 PREOP EXAMINATION: ICD-10-CM

## 2019-10-03 PROCEDURE — 99213 OFFICE O/P EST LOW 20 MIN: CPT | Performed by: NURSE PRACTITIONER

## 2019-10-03 RX ORDER — FEXOFENADINE HCL 180 MG/1
180 TABLET ORAL DAILY
Qty: 30 TABLET | Refills: 2 | Status: SHIPPED | OUTPATIENT
Start: 2019-10-03 | End: 2019-10-22 | Stop reason: SDUPTHER

## 2019-10-03 RX ORDER — DEXAMETHASONE SODIUM PHOSPHATE 10 MG/ML
10 INJECTION INTRAMUSCULAR; INTRAVENOUS ONCE
Status: DISCONTINUED | OUTPATIENT
Start: 2019-10-03 | End: 2019-10-29

## 2019-10-03 RX ORDER — DEXTROMETHORPHAN HYDROBROMIDE AND PROMETHAZINE HYDROCHLORIDE 15; 6.25 MG/5ML; MG/5ML
5 SYRUP ORAL 4 TIMES DAILY PRN
Qty: 180 ML | Refills: 0 | Status: SHIPPED | OUTPATIENT
Start: 2019-10-03 | End: 2019-10-22

## 2019-10-14 ENCOUNTER — HOSPITAL ENCOUNTER (OUTPATIENT)
Dept: GENERAL RADIOLOGY | Facility: HOSPITAL | Age: 74
Discharge: HOME | End: 2019-10-14
Admitting: NURSE PRACTITIONER

## 2019-10-14 DIAGNOSIS — Z01.818 PREOP EXAMINATION: ICD-10-CM

## 2019-10-14 PROCEDURE — 71046 X-RAY EXAM CHEST 2 VIEWS: CPT

## 2019-10-16 ENCOUNTER — OFFICE VISIT (OUTPATIENT)
Dept: CARDIOLOGY | Facility: CLINIC | Age: 74
End: 2019-10-16

## 2019-10-16 ENCOUNTER — DOCUMENTATION (OUTPATIENT)
Dept: CARDIOLOGY | Facility: CLINIC | Age: 74
End: 2019-10-16

## 2019-10-16 VITALS
DIASTOLIC BLOOD PRESSURE: 84 MMHG | HEIGHT: 73 IN | HEART RATE: 68 BPM | WEIGHT: 315 LBS | SYSTOLIC BLOOD PRESSURE: 130 MMHG | BODY MASS INDEX: 41.75 KG/M2

## 2019-10-16 DIAGNOSIS — Z91.89 AT RISK FOR SLEEP APNEA: ICD-10-CM

## 2019-10-16 DIAGNOSIS — E66.01 MORBIDLY OBESE (HCC): ICD-10-CM

## 2019-10-16 DIAGNOSIS — I10 ESSENTIAL HYPERTENSION: Primary | ICD-10-CM

## 2019-10-16 DIAGNOSIS — I48.0 PAROXYSMAL ATRIAL FIBRILLATION (HCC): ICD-10-CM

## 2019-10-16 PROCEDURE — 99214 OFFICE O/P EST MOD 30 MIN: CPT | Performed by: INTERNAL MEDICINE

## 2019-10-16 PROCEDURE — 93000 ELECTROCARDIOGRAM COMPLETE: CPT | Performed by: INTERNAL MEDICINE

## 2019-10-16 NOTE — PROGRESS NOTES
"    Subjective:     Encounter Date:10/16/2019    Patient ID: Luis Perez is a 74 y.o.  white male from [place], [place], retired from [employer].     REFERRING PROVIDER: ANGELIA Rios  HEALTHCARE PROVIDER:ANGELIA Matias  SLEEP PHYSICIAN: Nicolas Lopez MD, [facility]  GASTROENTEROLOGIST: Tay Torres MD  INFECTIOUS DISEASE: Eriberto Weiner MD  ORTHOPEDIC SURGEON:  Abhijit Ambriz MD    Chief Complaint:   Chief Complaint   Patient presents with   • Atrial Fibrillation   • Surgery clearance     Right knee      Problem List:  1. Persistent atrial fibrillation:  a. CHADsVasc 2, anticoagulated with Eliquis  b. Echocardiogram, 7/1/2019: LVEF 0.65, mild to moderate TR, RVSP 35 mmHg, right atrial mass is present measuring 2.6 x 1.6 cm; differential includes thrombus versus atypical right atrial myxoma versus atrial intraseptal tumor with clinical correlation and consideration of transesophageal echocardiogram recommended, LA moderately dilated, normal RV wall thickness, systolic function and motion noted with RV cavity mildly dilated, aortic valve exhibits mild sclerosis, mild pulmonary hypertension, no pericardial effusion  c. PIETER, 7/8/2019: Mild MR, LVEF 0.60, LV wall thickness consistent with moderate concentric hypertrophy, LA mild to moderately dilated, 3D echo LVEF was 0.51, normal RV cavity size, wall thickness, systolic function septal motion noted, marked lipomatous hypertrophy of the interatrial septum present.  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 Initiation of sotalol, 7/13/2019, with persistent atrial fibrillation on EKG, 7/26/2019  e. Successful external cardioversion, 08/05/2019, with frequent atrial ectopy on EKG, 08/09/2019, now in normal sinus rhythm on EKG, " 10/16/2019  2. 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 Mild dyspnea on exertion  a. Remote stress test 30-40 years ago; negative per patient-data deficit, patient reports that this was performed for a baseline and not because of any symptoms  b. CCS class 0 chest discomfort/NYHA class I-II PALM  4. At risk for sleep apnea with sleep consult, 8/21/2019, with planned polysomnogram  5. Chronic lower extremity edema  6. Hypertension  7. Mild intermittent asthma  8. History of lower extremity cellulitis with MRSA  9. History of melanoma  10. Morbid obesity: BMI 42.9  11. Surgical history  a. Vasectomy  b. Tonsillectomy  c. Deviated septum surgery  d. Multiple skin cancer excisions  e. Scheduled for partial right knee replacement, November 2019     No Known Allergies    Current Outpatient Medications:   •  acetaminophen (TYLENOL) 500 MG tablet, Take 500 mg by mouth Every 6 (Six) Hours As Needed for Mild Pain ., Disp: , Rfl:   •  ADVAIR DISKUS 500-50 MCG/DOSE DISKUS, Inhale 1 puff 2 (Two) Times a Day., Disp: , Rfl:   •  albuterol (PROVENTIL HFA;VENTOLIN HFA) 108 (90 BASE) MCG/ACT inhaler, Inhale 2 puffs Every 4 (Four) Hours As Needed for wheezing., Disp: 1 inhaler, Rfl: 0  •  amLODIPine (NORVASC) 10 MG tablet, Take 1 tablet by mouth Daily., Disp: 90 tablet, Rfl: 2  •  ELIQUIS 5 MG tablet tablet, TAKE ONE TABLET BY MOUTH TWICE DAILY , Disp: 60 tablet, Rfl: 3  •  fexofenadine (ALLEGRA ALLERGY) 180 MG tablet, Take 1 tablet by mouth Daily., Disp: 30 tablet, Rfl: 2  •  hydrochlorothiazide (HYDRODIURIL) 50 MG tablet, Take 1 tablet by mouth Daily., Disp: 90 tablet, Rfl: 2  •  montelukast (SINGULAIR) 10 MG tablet, Take 10 mg by mouth Daily., Disp: , Rfl:   •  Multiple Vitamins-Minerals (VITEYES AREDS FORMULA PO), Take 1 tablet by mouth 2 (Two) Times a Day., Disp: , Rfl:   •  promethazine-dextromethorphan  "(PROMETHAZINE-DM) 6.25-15 MG/5ML syrup, Take 5 mL by mouth 4 (Four) Times a Day As Needed for Cough., Disp: 180 mL, Rfl: 0  •  Sotalol HCl AF 80 MG tablet, Take 1 tablet by mouth 2 (Two) Times a Day., Disp: 60 tablet, Rfl: 3    Current Facility-Administered Medications:   •  dexamethasone (DECADRON) injection 10 mg, 10 mg, Intramuscular, Once, Viviane Colon APRN    History of Present Illness: Patient returns for followup after a nearly 3-month hiatus; he was a consult at his last office visit on 07/26/2019.  He underwent successful external cardioversion on 08/05/2019 with subsequent abnormal acceptable EKG; however, an EKG 4 days later, on 08/09/2019 indicated sinus rhythm with frequent atrial ectopy.  He is back in sinus  rhythm on EKG in the office today; he says he is never aware of his heart beating fast or out of rhythm.  He has been to see  Dr. Nicolas Lopez in regard to possible RUY, and he says he was supposed to have an overnight polysomnogram at the end of this month, but he needs to have knee surgery, so he wonders which should be done first.  This is discussed with him.  He says that Dr. Ambriz will do his surgery on an outpatient basis, and then home health will come to see him at home.  The patient has been doing exercises in preparation for his knee surgery, and he has no complaints of any cardiac symptoms with his daily activities.  He has not yet had a flu shot this year but says \"it's on the calendar for this week.\"  Patient otherwise denies chest pain, shortness of breath, PND, edema, palpitations, syncope or presyncope at this time.        Review of Systems   Cardiovascular: Positive for dyspnea on exertion and leg swelling.   Respiratory: Positive for cough, snoring and sputum production.    Hematologic/Lymphatic: Bruises/bleeds easily.   Skin: Positive for dry skin.   Allergic/Immunologic: Positive for environmental allergies.      Obtained and negative except as outlined in problem " "list and HPI.      ECG 12 Lead  Date/Time: 10/16/2019 9:33 AM  Performed by: Norberto Perez MD  Authorized by: Norberto Perez MD   Comparison: compared with previous ECG from 8/9/2019  Similar to previous ECG  Rhythm: sinus rhythm  Ectopy: atrial premature contractions  BPM: 76  Conduction: incomplete right bundle branch block    Clinical impression: abnormal EKG  Comments: QRS 94 ms  QTc 470 ms   ms               Objective:       Vitals:    10/16/19 0850 10/16/19 0854   BP: 144/81 130/84   BP Location: Left arm Left arm   Patient Position: Sitting Standing   Pulse: 72 68   Weight: (!) 147 kg (325 lb)    Height: 185.4 cm (73\")      Body mass index is 42.88 kg/m².   Last weight:  328 lbs.    Physical Exam   Constitutional: He is oriented to person, place, and time. He appears well-developed and well-nourished.   Neck: No JVD present. Carotid bruit is not present. No thyromegaly present.   Cardiovascular: S1 normal and S2 normal. An irregular rhythm present. Exam reveals no gallop, no S3 and no friction rub.   Murmur heard.   Medium-pitched early systolic murmur is present with a grade of 2/6 at the lower left sternal border.  Pulses:       Carotid pulses are 1+ on the right side, and 1+ on the left side.       Radial pulses are 1+ on the right side, and 1+ on the left side.        Femoral pulses are 1+ on the right side, and 1+ on the left side.       Popliteal pulses are 1+ on the right side, and 1+ on the left side.        Dorsalis pedis pulses are 1+ on the right side, and 1+ on the left side.        Posterior tibial pulses are 1+ on the right side, and 1+ on the left side.   Pulmonary/Chest: Effort normal and breath sounds normal. He has no wheezes. He has no rhonchi. He has no rales.   Abdominal: Soft. He exhibits no mass. There is no hepatosplenomegaly. There is no tenderness. There is no guarding.   Bowel sounds audible x4   Musculoskeletal: Normal range of motion. He exhibits edema (1+ right ankle). "   Lymphadenopathy:     He has no cervical adenopathy.   Neurological: He is alert and oriented to person, place, and time.   Skin: Skin is warm, dry and intact. No rash noted.   Vitals reviewed.        Lab Review:   Lab Results   Component Value Date    GLUCOSE 117 (H) 08/05/2019    BUN 14 08/05/2019    CREATININE 0.73 (L) 08/05/2019    EGFRIFNONA 105 08/05/2019    BCR 19.2 08/05/2019    CO2 28.0 08/05/2019    CALCIUM 9.5 08/05/2019    ALBUMIN 4.70 06/19/2019    AST 17 06/19/2019    ALT 19 06/19/2019 08/05/2019:  · Sodium - 142  · Potassium - 4.0  · Chloride - 103    Lab Results   Component Value Date    WBC 7.55 08/05/2019    HGB 16.2 08/05/2019    HCT 50.3 08/05/2019    MCV 87.9 08/05/2019     08/05/2019       Lab Results   Component Value Date    HGBA1C 6.0 06/19/2019       Lab Results   Component Value Date    TSH 1.590 03/05/2019       Lab Results   Component Value Date    CHOL 150 03/05/2019    CHOL 149 08/23/2018     Lab Results   Component Value Date    TRIG 310 (H) 03/05/2019    TRIG 351 (H) 08/23/2018    TRIG 354 (H) 08/13/2015     Lab Results   Component Value Date    HDL 33 (L) 03/05/2019    HDL 32 (L) 08/23/2018    HDL 32 (L) 08/13/2015 08/05/2019, external cardioversion:  · Post cardioversion the patient displayed a sinus rhythm.  · The cardioversion was successful.    10/14/2019, chest x-ray:  FINDINGS: PA and lateral view of the chest reveal cardiac and mediastinal silhouettes within normal limits. The lung fields are grossly clear. No focal parenchymal opacification is present. No pleural effusion or pneumothorax. Degenerative change is seen within the spine. Pulmonary vascularity is within normal limits.         IMPRESSION: Mild chronic changes are seen within the lung fields with no evidence of acute parenchymal disease.      Assessment:       Overall continued acceptable course with no new interim cardiopulmonary complaints with acceptable functional status. We will defer additional  diagnostic or therapeutic intervention from a cardiac perspective at this time.  He is strongly encouraged to undergo polysomnogram scheduled for later this month.     Diagnosis Plan   1. Essential hypertension  Acceptable control overall; continue current dose of HCTZ and amlodipine   2. Paroxysmal atrial fibrillation (CMS/HCC)  Frequent atrial ectopy but overall persistent sinus rhythm   3. Morbidly obese (CMS/HCC)  Caloric restriction and activity encouraged   4. At risk for sleep apnea  Follow through with PSG and with Dr. Lopez          Plan:         1. Patient to continue current medications and close follow up with the above providers.  2. Patient is cleared from a cardiac perspective for partial knee replacement on 11/01/2019.  3. Influenza immunization is strongly recommended.  4. Tentative cardiology follow up in March 2020, or patient may return sooner PRN.    Transcribed by Saskia Jaeger for Dr. Norberto Perez at 9:07 AM on 10/16/2019    INorberto MD, [facility], personally performed the services described in this documentation as scribed by the above named individual in my presence, and it is both accurate and complete. At 9:14 AM on 10/16/2019

## 2019-10-22 ENCOUNTER — TELEPHONE (OUTPATIENT)
Dept: FAMILY MEDICINE CLINIC | Facility: CLINIC | Age: 74
End: 2019-10-22

## 2019-10-22 ENCOUNTER — OFFICE VISIT (OUTPATIENT)
Dept: FAMILY MEDICINE CLINIC | Facility: CLINIC | Age: 74
End: 2019-10-22

## 2019-10-22 VITALS
OXYGEN SATURATION: 94 % | HEART RATE: 63 BPM | HEIGHT: 73 IN | RESPIRATION RATE: 17 BRPM | SYSTOLIC BLOOD PRESSURE: 130 MMHG | WEIGHT: 315 LBS | BODY MASS INDEX: 41.75 KG/M2 | DIASTOLIC BLOOD PRESSURE: 82 MMHG | TEMPERATURE: 97.1 F

## 2019-10-22 DIAGNOSIS — J30.89 ENVIRONMENTAL AND SEASONAL ALLERGIES: ICD-10-CM

## 2019-10-22 DIAGNOSIS — D68.318 COAGULATION DISORDER DUE TO CIRCULATING ANTICOAGULANTS (HCC): ICD-10-CM

## 2019-10-22 DIAGNOSIS — J01.00 ACUTE NON-RECURRENT MAXILLARY SINUSITIS: ICD-10-CM

## 2019-10-22 DIAGNOSIS — J06.9 ACUTE URI: ICD-10-CM

## 2019-10-22 DIAGNOSIS — R73.09 ELEVATED GLUCOSE: ICD-10-CM

## 2019-10-22 DIAGNOSIS — Z01.818 PREOP EXAMINATION: Primary | ICD-10-CM

## 2019-10-22 LAB
APTT PPP: 50 SECONDS (ref 24–37)
BILIRUB BLD-MCNC: NEGATIVE MG/DL
CLARITY, POC: CLEAR
COLOR UR: YELLOW
GLUCOSE UR STRIP-MCNC: NEGATIVE MG/DL
INR PPP: 1.42 (ref 0.85–1.16)
KETONES UR QL: NEGATIVE
LEUKOCYTE EST, POC: NEGATIVE
NITRITE UR-MCNC: NEGATIVE MG/ML
PH UR: 7 [PH] (ref 5–8)
PROT UR STRIP-MCNC: ABNORMAL MG/DL
PROTHROMBIN TIME: 16.7 SECONDS (ref 11.2–14.3)
RBC # UR STRIP: ABNORMAL /UL
SP GR UR: 1.02 (ref 1–1.03)
UROBILINOGEN UR QL: NORMAL

## 2019-10-22 PROCEDURE — 80053 COMPREHEN METABOLIC PANEL: CPT | Performed by: NURSE PRACTITIONER

## 2019-10-22 PROCEDURE — 99214 OFFICE O/P EST MOD 30 MIN: CPT | Performed by: NURSE PRACTITIONER

## 2019-10-22 PROCEDURE — 81003 URINALYSIS AUTO W/O SCOPE: CPT | Performed by: NURSE PRACTITIONER

## 2019-10-22 PROCEDURE — 85610 PROTHROMBIN TIME: CPT | Performed by: NURSE PRACTITIONER

## 2019-10-22 PROCEDURE — 85730 THROMBOPLASTIN TIME PARTIAL: CPT | Performed by: NURSE PRACTITIONER

## 2019-10-22 PROCEDURE — 83036 HEMOGLOBIN GLYCOSYLATED A1C: CPT | Performed by: NURSE PRACTITIONER

## 2019-10-22 PROCEDURE — 84134 ASSAY OF PREALBUMIN: CPT | Performed by: NURSE PRACTITIONER

## 2019-10-22 PROCEDURE — 85025 COMPLETE CBC W/AUTO DIFF WBC: CPT | Performed by: NURSE PRACTITIONER

## 2019-10-22 RX ORDER — CEFDINIR 300 MG/1
300 CAPSULE ORAL 2 TIMES DAILY
Qty: 20 CAPSULE | Refills: 0 | Status: SHIPPED | OUTPATIENT
Start: 2019-10-22 | End: 2019-10-29 | Stop reason: SINTOL

## 2019-10-22 RX ORDER — DEXTROMETHORPHAN HYDROBROMIDE AND PROMETHAZINE HYDROCHLORIDE 15; 6.25 MG/5ML; MG/5ML
5 SYRUP ORAL 4 TIMES DAILY PRN
Qty: 180 ML | Refills: 0 | Status: SHIPPED | OUTPATIENT
Start: 2019-10-22 | End: 2019-11-08 | Stop reason: SDUPTHER

## 2019-10-22 RX ORDER — INFLUENZA A VIRUS A/MICHIGAN/45/2015 X-275 (H1N1) ANTIGEN (FORMALDEHYDE INACTIVATED), INFLUENZA A VIRUS A/SINGAPORE/INFIMH-16-0019/2016 IVR-186 (H3N2) ANTIGEN (FORMALDEHYDE INACTIVATED), AND INFLUENZA B VIRUS B/MARYLAND/15/2016 BX-69A (A B/COLORADO/6/2017-LIKE VIRUS) ANTIGEN (FORMALDEHYDE INACTIVATED) 60; 60; 60 UG/.5ML; UG/.5ML; UG/.5ML
INJECTION, SUSPENSION INTRAMUSCULAR
Refills: 0 | COMMUNITY
Start: 2019-10-19 | End: 2019-12-13

## 2019-10-22 RX ORDER — FEXOFENADINE HCL 180 MG/1
180 TABLET ORAL DAILY
Qty: 30 TABLET | Refills: 2 | Status: SHIPPED | OUTPATIENT
Start: 2019-10-22

## 2019-10-22 NOTE — TELEPHONE ENCOUNTER
Carmelina with Dr. Abhijit Ambriz's left voicemail at 8:39am stating that she is needing to know whether the patient was seen for his pre op clearance and is requesting those office notes, labs, ekg, xrays, be faxed over to [phone]. States that she can be reached directly at [phone] [phone]

## 2019-10-22 NOTE — PROGRESS NOTES
Luis Perez is a 74 y.o. male who presents for cough and sinus congestion.    Chief Complaint   Patient presents with   • Allergies   • Cough       Patient states that he has had some sinus congestion, post nasal drip and cough. He has had this now for over a week. The cough keeps him awake at night. He has some pain in the maxillary region. He denies fever or ear pain. Patient also needs pre-operative labs done today for his upcoming knee replacement on 11/1/19 with Dr. Ambriz.           Past Medical History:   Diagnosis Date   • Arrhythmia     afib   • Asthma    • Bronchitis, chronic (CMS/HCC)    • Cancer (CMS/HCC)     Skin    • Hyperlipidemia    • Hypertension    • Irregular heart beat    • Nephrolithiasis        Past Surgical History:   Procedure Laterality Date   • CARDIOVERSION  08/05/2019   • COLONOSCOPY     • NASAL SEPTUM SURGERY      Deviation Repair   • SKIN CANCER EXCISION     • TONSILLECTOMY     • VASECTOMY         Family History   Problem Relation Age of Onset   • Cancer Mother         colon   • Diabetes Mother    • Alzheimer's disease Father    • No Known Problems Maternal Grandmother    • No Known Problems Maternal Grandfather    • No Known Problems Paternal Grandmother    • No Known Problems Paternal Grandfather        Social History     Socioeconomic History   • Marital status:      Spouse name: Not on file   • Number of children: Not on file   • Years of education: Not on file   • Highest education level: Not on file   Tobacco Use   • Smoking status: Never Smoker   • Smokeless tobacco: Former User     Types: Chew   • Tobacco comment: states been over a month since use   Substance and Sexual Activity   • Alcohol use: Yes     Frequency: Monthly or less     Drinks per session: 1 or 2     Comment: 1-2 month    • Drug use: No   • Sexual activity: Defer   Social History Narrative    caffeine use average I or 2 servings weekly       No Known Allergies    ROS    Review of Systems  "  Constitutional: Positive for fatigue.   HENT: Positive for congestion, postnasal drip, rhinorrhea and swollen glands.    Respiratory: Positive for cough.    Musculoskeletal: Positive for arthralgias.   All other systems reviewed and are negative.      Vitals:    10/22/19 1148   BP: 130/82   Pulse: 63   Resp: 17   Temp: 97.1 °F (36.2 °C)   TempSrc: Temporal   SpO2: 94%   Weight: (!) 148 kg (325 lb 3.2 oz)   Height: 185.4 cm (73\")   PainSc:   2         Current Outpatient Medications:   •  acetaminophen (TYLENOL) 500 MG tablet, Take 500 mg by mouth Every 6 (Six) Hours As Needed for Mild Pain ., Disp: , Rfl:   •  ADVAIR DISKUS 500-50 MCG/DOSE DISKUS, Inhale 1 puff 2 (Two) Times a Day., Disp: , Rfl:   •  amLODIPine (NORVASC) 10 MG tablet, Take 1 tablet by mouth Daily., Disp: 90 tablet, Rfl: 2  •  ELIQUIS 5 MG tablet tablet, TAKE ONE TABLET BY MOUTH TWICE DAILY , Disp: 60 tablet, Rfl: 3  •  fexofenadine (ALLEGRA ALLERGY) 180 MG tablet, Take 1 tablet by mouth Daily., Disp: 30 tablet, Rfl: 2  •  hydrochlorothiazide (HYDRODIURIL) 50 MG tablet, Take 1 tablet by mouth Daily., Disp: 90 tablet, Rfl: 2  •  montelukast (SINGULAIR) 10 MG tablet, Take 10 mg by mouth Daily., Disp: , Rfl:   •  Multiple Vitamins-Minerals (VITEYES AREDS FORMULA PO), Take 1 tablet by mouth 2 (Two) Times a Day., Disp: , Rfl:   •  Sotalol HCl AF 80 MG tablet, Take 1 tablet by mouth 2 (Two) Times a Day., Disp: 60 tablet, Rfl: 3  •  albuterol (PROVENTIL HFA;VENTOLIN HFA) 108 (90 BASE) MCG/ACT inhaler, Inhale 2 puffs Every 4 (Four) Hours As Needed for wheezing., Disp: 1 inhaler, Rfl: 0  •  cefdinir (OMNICEF) 300 MG capsule, Take 1 capsule by mouth 2 (Two) Times a Day for 10 days., Disp: 20 capsule, Rfl: 0  •  FLUZONE HIGH-DOSE 0.5 ML suspension prefilled syringe injection, ADM 0.5ML IM UTD, Disp: , Rfl: 0  •  promethazine-dextromethorphan (PROMETHAZINE-DM) 6.25-15 MG/5ML syrup, Take 5 mL by mouth 4 (Four) Times a Day As Needed for Cough., Disp: 180 mL, Rfl: " 0    Current Facility-Administered Medications:   •  dexamethasone (DECADRON) injection 10 mg, 10 mg, Intramuscular, Once, MartaJaycob hinsonnda ANGELIA Hampton PE    Physical Exam   Constitutional: He is oriented to person, place, and time. Vital signs are normal. He appears well-developed. He is cooperative. He is morbidly obese.  HENT:   Head: Normocephalic and atraumatic.   Right Ear: Tympanic membrane, external ear and ear canal normal.   Left Ear: Tympanic membrane, external ear and ear canal normal.   Nose: Mucosal edema and congestion present. Right sinus exhibits maxillary sinus tenderness. Left sinus exhibits maxillary sinus tenderness.   Mouth/Throat: Oropharynx is clear and moist and mucous membranes are normal.   Eyes: Conjunctivae and EOM are normal. Pupils are equal, round, and reactive to light.   Neck: Normal range of motion. Neck supple. No JVD present. No tracheal deviation present. No thyromegaly present.   Cardiovascular: Normal rate, regular rhythm, normal heart sounds and intact distal pulses. Exam reveals no gallop and no friction rub.   No murmur heard.  Pulmonary/Chest: Effort normal and breath sounds normal. No stridor. No respiratory distress. He has no wheezes. He has no rales.   Abdominal: Soft. Bowel sounds are normal. He exhibits no distension and no mass. There is no tenderness. There is no rebound and no guarding. No hernia.   Musculoskeletal:        Right knee: He exhibits decreased range of motion and effusion. Tenderness found. Medial joint line tenderness noted.   Lymphadenopathy:     He has no cervical adenopathy.   Neurological: He is alert and oriented to person, place, and time.   Skin: Skin is warm and dry. Capillary refill takes less than 2 seconds.   Psychiatric: He has a normal mood and affect. His behavior is normal. Judgment and thought content normal.   Nursing note and vitals reviewed.       A/P    Problem List Items Addressed This Visit     None      Visit Diagnoses     Preop  examination    -  Primary    Relevant Orders    CBC Auto Differential (Completed)    Comprehensive Metabolic Panel (Completed)    Hemoglobin A1c (Completed)    POC Urinalysis Dipstick, Automated (Completed)    aPTT (Completed)    Protime-INR (Completed)    Prealbumin (Completed)    Acute non-recurrent maxillary sinusitis        Relevant Medications    cefdinir (OMNICEF) 300 MG capsule    Acute URI        Relevant Medications    cefdinir (OMNICEF) 300 MG capsule    promethazine-dextromethorphan (PROMETHAZINE-DM) 6.25-15 MG/5ML syrup    fexofenadine (ALLEGRA ALLERGY) 180 MG tablet    Environmental and seasonal allergies        Relevant Medications    fexofenadine (ALLEGRA ALLERGY) 180 MG tablet    Elevated glucose        Relevant Orders    Hemoglobin A1c (Completed)    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 P    Plan of care reviewed with patient at the conclusion of today's visit. Education was provided regarding diagnosis, management and any prescribed or recommended OTC medications.  Patient verbalizes understanding of and agreement with management plan.    Return in about 3 months (around 1/22/2020) for Recheck.     Viviane Colon APRN

## 2019-10-23 LAB
ALBUMIN SERPL-MCNC: 4.6 G/DL (ref 3.5–5.2)
ALBUMIN/GLOB SERPL: 1.4 G/DL
ALP SERPL-CCNC: 64 U/L (ref 39–117)
ALT SERPL W P-5'-P-CCNC: 16 U/L (ref 1–41)
ANION GAP SERPL CALCULATED.3IONS-SCNC: 14.6 MMOL/L (ref 5–15)
AST SERPL-CCNC: 20 U/L (ref 1–40)
BASOPHILS # BLD AUTO: 0.07 10*3/MM3 (ref 0–0.2)
BASOPHILS NFR BLD AUTO: 1 % (ref 0–1.5)
BILIRUB SERPL-MCNC: 0.4 MG/DL (ref 0.2–1.2)
BUN BLD-MCNC: 14 MG/DL (ref 8–23)
BUN/CREAT SERPL: 21.2 (ref 7–25)
CALCIUM SPEC-SCNC: 9.7 MG/DL (ref 8.6–10.5)
CHLORIDE SERPL-SCNC: 99 MMOL/L (ref 98–107)
CO2 SERPL-SCNC: 28.4 MMOL/L (ref 22–29)
CREAT BLD-MCNC: 0.66 MG/DL (ref 0.76–1.27)
DEPRECATED RDW RBC AUTO: 45.6 FL (ref 37–54)
EOSINOPHIL # BLD AUTO: 0.16 10*3/MM3 (ref 0–0.4)
EOSINOPHIL NFR BLD AUTO: 2.4 % (ref 0.3–6.2)
ERYTHROCYTE [DISTWIDTH] IN BLOOD BY AUTOMATED COUNT: 14.1 % (ref 12.3–15.4)
GFR SERPL CREATININE-BSD FRML MDRD: 118 ML/MIN/1.73
GLOBULIN UR ELPH-MCNC: 3.4 GM/DL
GLUCOSE BLD-MCNC: 101 MG/DL (ref 65–99)
HBA1C MFR BLD: 6.39 % (ref 4.8–5.6)
HCT VFR BLD AUTO: 49.5 % (ref 37.5–51)
HGB BLD-MCNC: 16.4 G/DL (ref 13–17.7)
IMM GRANULOCYTES # BLD AUTO: 0.02 10*3/MM3 (ref 0–0.05)
IMM GRANULOCYTES NFR BLD AUTO: 0.3 % (ref 0–0.5)
LYMPHOCYTES # BLD AUTO: 2.25 10*3/MM3 (ref 0.7–3.1)
LYMPHOCYTES NFR BLD AUTO: 33.3 % (ref 19.6–45.3)
MCH RBC QN AUTO: 28.9 PG (ref 26.6–33)
MCHC RBC AUTO-ENTMCNC: 33.1 G/DL (ref 31.5–35.7)
MCV RBC AUTO: 87.3 FL (ref 79–97)
MONOCYTES # BLD AUTO: 0.84 10*3/MM3 (ref 0.1–0.9)
MONOCYTES NFR BLD AUTO: 12.4 % (ref 5–12)
NEUTROPHILS # BLD AUTO: 3.41 10*3/MM3 (ref 1.7–7)
NEUTROPHILS NFR BLD AUTO: 50.6 % (ref 42.7–76)
NRBC BLD AUTO-RTO: 0 /100 WBC (ref 0–0.2)
PLATELET # BLD AUTO: 189 10*3/MM3 (ref 140–450)
PMV BLD AUTO: 11.2 FL (ref 6–12)
POTASSIUM BLD-SCNC: 3.7 MMOL/L (ref 3.5–5.2)
PREALB SERPL-MCNC: 34.4 MG/DL (ref 20–40)
PROT SERPL-MCNC: 8 G/DL (ref 6–8.5)
RBC # BLD AUTO: 5.67 10*6/MM3 (ref 4.14–5.8)
SODIUM BLD-SCNC: 142 MMOL/L (ref 136–145)
WBC NRBC COR # BLD: 6.75 10*3/MM3 (ref 3.4–10.8)

## 2019-10-28 ENCOUNTER — TELEPHONE (OUTPATIENT)
Dept: FAMILY MEDICINE CLINIC | Facility: CLINIC | Age: 74
End: 2019-10-28

## 2019-10-29 ENCOUNTER — OFFICE VISIT (OUTPATIENT)
Dept: FAMILY MEDICINE CLINIC | Facility: CLINIC | Age: 74
End: 2019-10-29

## 2019-10-29 VITALS
RESPIRATION RATE: 19 BRPM | HEIGHT: 73 IN | DIASTOLIC BLOOD PRESSURE: 80 MMHG | HEART RATE: 55 BPM | WEIGHT: 315 LBS | TEMPERATURE: 97.3 F | SYSTOLIC BLOOD PRESSURE: 128 MMHG | BODY MASS INDEX: 41.75 KG/M2 | OXYGEN SATURATION: 55 %

## 2019-10-29 DIAGNOSIS — J20.9 ACUTE BRONCHITIS, UNSPECIFIED ORGANISM: Primary | ICD-10-CM

## 2019-10-29 DIAGNOSIS — R19.7 DIARRHEA, UNSPECIFIED TYPE: ICD-10-CM

## 2019-10-29 PROCEDURE — 99213 OFFICE O/P EST LOW 20 MIN: CPT | Performed by: NURSE PRACTITIONER

## 2019-10-29 PROCEDURE — 96372 THER/PROPH/DIAG INJ SC/IM: CPT | Performed by: NURSE PRACTITIONER

## 2019-10-29 PROCEDURE — 94640 AIRWAY INHALATION TREATMENT: CPT | Performed by: NURSE PRACTITIONER

## 2019-10-29 RX ORDER — IPRATROPIUM BROMIDE AND ALBUTEROL SULFATE 2.5; .5 MG/3ML; MG/3ML
3 SOLUTION RESPIRATORY (INHALATION)
Status: DISCONTINUED | OUTPATIENT
Start: 2019-10-29 | End: 2019-12-13

## 2019-10-29 RX ORDER — DEXAMETHASONE SODIUM PHOSPHATE 10 MG/ML
10 INJECTION INTRAMUSCULAR; INTRAVENOUS ONCE
Status: COMPLETED | OUTPATIENT
Start: 2019-10-29 | End: 2019-10-29

## 2019-10-29 RX ORDER — ALBUTEROL SULFATE 90 UG/1
2 AEROSOL, METERED RESPIRATORY (INHALATION) EVERY 4 HOURS PRN
Qty: 1 INHALER | Refills: 0 | Status: SHIPPED | OUTPATIENT
Start: 2019-10-29 | End: 2019-10-30 | Stop reason: SDUPTHER

## 2019-10-29 RX ADMIN — IPRATROPIUM BROMIDE AND ALBUTEROL SULFATE 3 ML: 2.5; .5 SOLUTION RESPIRATORY (INHALATION) at 18:35

## 2019-10-29 RX ADMIN — DEXAMETHASONE SODIUM PHOSPHATE 10 MG: 10 INJECTION INTRAMUSCULAR; INTRAVENOUS at 18:53

## 2019-10-30 ENCOUNTER — TELEPHONE (OUTPATIENT)
Dept: FAMILY MEDICINE CLINIC | Facility: CLINIC | Age: 74
End: 2019-10-30

## 2019-10-30 DIAGNOSIS — J20.9 ACUTE BRONCHITIS, UNSPECIFIED ORGANISM: ICD-10-CM

## 2019-10-30 RX ORDER — ALBUTEROL SULFATE 90 UG/1
2 AEROSOL, METERED RESPIRATORY (INHALATION) EVERY 4 HOURS PRN
Qty: 1 INHALER | Refills: 0 | Status: SHIPPED | OUTPATIENT
Start: 2019-10-30

## 2019-10-30 NOTE — PROGRESS NOTES
Luis Perez is a 74 y.o. male who presents for cough and diarrhea.    Chief Complaint   Patient presents with   • Cough   • Shortness of Breath       Cough   This is a new problem. The current episode started 1 to 4 weeks ago. The problem has been waxing and waning. The problem occurs every few minutes. The cough is non-productive. Associated symptoms include myalgias, postnasal drip, shortness of breath and wheezing. The symptoms are aggravated by lying down and pollens. He has tried ipratropium inhaler, prescription cough suppressant and rest for the symptoms. The treatment provided mild relief. His past medical history is significant for asthma, bronchitis and environmental allergies.   Diarrhea    This is a new problem. The current episode started in the past 7 days. The problem occurs 2 to 4 times per day. The problem has been gradually improving. The stool consistency is described as watery. The patient states that diarrhea does not awaken him from sleep. Associated symptoms include arthralgias, coughing and myalgias. Risk factors include recent antibiotic use. He has tried increased fluids and change of diet for the symptoms. The treatment provided mild relief.         Past Medical History:   Diagnosis Date   • Arrhythmia     afib   • Asthma    • Bronchitis, chronic (CMS/HCC)    • Cancer (CMS/HCC)     Skin    • Hyperlipidemia    • Hypertension    • Irregular heart beat    • Nephrolithiasis        Past Surgical History:   Procedure Laterality Date   • CARDIOVERSION  08/05/2019   • COLONOSCOPY     • NASAL SEPTUM SURGERY      Deviation Repair   • SKIN CANCER EXCISION     • TONSILLECTOMY     • VASECTOMY         Family History   Problem Relation Age of Onset   • Cancer Mother         colon   • Diabetes Mother    • Alzheimer's disease Father    • No Known Problems Maternal Grandmother    • No Known Problems Maternal Grandfather    • No Known Problems Paternal Grandmother    • No Known Problems Paternal  "Grandfather        Social History     Socioeconomic History   • Marital status:      Spouse name: Not on file   • Number of children: Not on file   • Years of education: Not on file   • Highest education level: Not on file   Tobacco Use   • Smoking status: Never Smoker   • Smokeless tobacco: Former User     Types: Chew   • Tobacco comment: states been over a month since use   Substance and Sexual Activity   • Alcohol use: Yes     Frequency: Monthly or less     Drinks per session: 1 or 2     Comment: 1-2 month    • Drug use: No   • Sexual activity: Defer   Social History Narrative    caffeine use average I or 2 servings weekly       No Known Allergies    ROS    Review of Systems   Constitutional: Positive for fatigue.   HENT: Positive for postnasal drip.    Respiratory: Positive for cough, shortness of breath and wheezing.    Gastrointestinal: Positive for diarrhea.   Musculoskeletal: Positive for arthralgias, gait problem, joint swelling and myalgias.   Allergic/Immunologic: Positive for environmental allergies.   All other systems reviewed and are negative.      Vitals:    10/29/19 1708   BP: 128/80   Pulse: 55   Resp: 19   Temp: 97.3 °F (36.3 °C)   TempSrc: Temporal   SpO2: (!) 55%   Weight: (!) 148 kg (326 lb 6.4 oz)   Height: 185.4 cm (73\")   PainSc:   4         Current Outpatient Medications:   •  acetaminophen (TYLENOL) 500 MG tablet, Take 500 mg by mouth Every 6 (Six) Hours As Needed for Mild Pain ., Disp: , Rfl:   •  ADVAIR DISKUS 500-50 MCG/DOSE DISKUS, Inhale 1 puff 2 (Two) Times a Day., Disp: , Rfl:   •  albuterol sulfate  (90 Base) MCG/ACT inhaler, Inhale 2 puffs Every 4 (Four) Hours As Needed for Wheezing., Disp: 1 inhaler, Rfl: 0  •  amLODIPine (NORVASC) 10 MG tablet, Take 1 tablet by mouth Daily., Disp: 90 tablet, Rfl: 2  •  ELIQUIS 5 MG tablet tablet, TAKE ONE TABLET BY MOUTH TWICE DAILY , Disp: 60 tablet, Rfl: 3  •  fexofenadine (ALLEGRA ALLERGY) 180 MG tablet, Take 1 tablet by mouth " Daily., Disp: 30 tablet, Rfl: 2  •  FLUZONE HIGH-DOSE 0.5 ML suspension prefilled syringe injection, ADM 0.5ML IM UTD, Disp: , Rfl: 0  •  hydrochlorothiazide (HYDRODIURIL) 50 MG tablet, Take 1 tablet by mouth Daily., Disp: 90 tablet, Rfl: 2  •  montelukast (SINGULAIR) 10 MG tablet, Take 10 mg by mouth Daily., Disp: , Rfl:   •  Multiple Vitamins-Minerals (VITEYES AREDS FORMULA PO), Take 1 tablet by mouth 2 (Two) Times a Day., Disp: , Rfl:   •  promethazine-dextromethorphan (PROMETHAZINE-DM) 6.25-15 MG/5ML syrup, Take 5 mL by mouth 4 (Four) Times a Day As Needed for Cough., Disp: 180 mL, Rfl: 0  •  Sotalol HCl AF 80 MG tablet, Take 1 tablet by mouth 2 (Two) Times a Day., Disp: 60 tablet, Rfl: 3    Current Facility-Administered Medications:   •  ipratropium-albuterol (DUO-NEB) nebulizer solution 3 mL, 3 mL, Nebulization, 4x Daily - RT, Viviane Colon, ANGELIA, 3 mL at 10/29/19 1835    PE    Physical Exam   Constitutional: He is oriented to person, place, and time. He appears well-developed.   HENT:   Head: Normocephalic and atraumatic.   Mouth/Throat: Oropharynx is clear and moist.   Cardiovascular: Normal rate, regular rhythm and normal heart sounds. Exam reveals no gallop and no friction rub.   No murmur heard.  Pulmonary/Chest: Effort normal. He has wheezes. He has rhonchi.   Abdominal: Soft. Bowel sounds are normal. He exhibits no distension and no mass. There is no tenderness. There is no rebound and no guarding. No hernia.   Neurological: He is alert and oriented to person, place, and time.   Skin: Skin is warm.   Psychiatric: He has a normal mood and affect. His behavior is normal. Judgment and thought content normal.   Nursing note and vitals reviewed.       A/P    Problem List Items Addressed This Visit     None      Visit Diagnoses     Acute bronchitis, unspecified organism    -  Primary    Relevant Medications    ipratropium-albuterol (DUO-NEB) nebulizer solution 3 mL    dexamethasone (DECADRON) injection 10  mg (Completed)    albuterol sulfate  (90 Base) MCG/ACT inhaler    Diarrhea, unspecified type        Relevant Orders    Stool Culture (Reference Lab) - Stool, Per Rectum    Giardia / Cryptosporidium Screen - Stool, Per Rectum    Clostridium Difficile Toxin, PCR - Stool, Per Rectum    H. Pylori Antigen, Stool - Stool, Per Rectum      Nebulizer treatment given with good results.  Will contact patient with lab results.    Plan of care reviewed with patient at the conclusion of today's visit. Education was provided regarding diagnosis, management and any prescribed or recommended OTC medications.  Patient verbalizes understanding of and agreement with management plan.    Return for Next scheduled follow up.     ANGELIA Christopher

## 2019-10-30 NOTE — TELEPHONE ENCOUNTER
PATIENT STATES THE PHARMACY DID NOT RECEIVE THE INHALER, PLEASE RESEND TO [facility].    PATIENT CALL BACK NUMBER [phone]     THANK YOU.

## 2019-11-01 ENCOUNTER — LAB (OUTPATIENT)
Dept: FAMILY MEDICINE CLINIC | Facility: CLINIC | Age: 74
End: 2019-11-01

## 2019-11-01 DIAGNOSIS — R19.7 DIARRHEA, UNSPECIFIED TYPE: ICD-10-CM

## 2019-11-01 PROCEDURE — 87493 C DIFF AMPLIFIED PROBE: CPT | Performed by: NURSE PRACTITIONER

## 2019-11-01 PROCEDURE — 87045 FECES CULTURE AEROBIC BACT: CPT | Performed by: NURSE PRACTITIONER

## 2019-11-01 PROCEDURE — 87338 HPYLORI STOOL AG IA: CPT | Performed by: NURSE PRACTITIONER

## 2019-11-01 PROCEDURE — 87427 SHIGA-LIKE TOXIN AG IA: CPT | Performed by: NURSE PRACTITIONER

## 2019-11-01 PROCEDURE — 87329 GIARDIA AG IA: CPT | Performed by: NURSE PRACTITIONER

## 2019-11-01 PROCEDURE — 87046 STOOL CULTR AEROBIC BACT EA: CPT | Performed by: NURSE PRACTITIONER

## 2019-11-01 PROCEDURE — 87328 CRYPTOSPORIDIUM AG IA: CPT | Performed by: NURSE PRACTITIONER

## 2019-11-02 LAB — C DIFF TOX GENS STL QL NAA+PROBE: NOT DETECTED

## 2019-11-04 LAB
CRYPTOSP AG STL QL IA: NEGATIVE
G LAMBLIA AG STL QL IA: NEGATIVE

## 2019-11-05 LAB — H PYLORI AG STL QL IA: NEGATIVE

## 2019-11-06 LAB
CAMPYLOBACTER STL CULT: NORMAL
E COLI SXT STL QL IA: NEGATIVE
Lab: NORMAL
Lab: NORMAL
SALM + SHIG STL CULT: NORMAL

## 2019-11-07 ENCOUNTER — OFFICE VISIT (OUTPATIENT)
Dept: FAMILY MEDICINE CLINIC | Facility: CLINIC | Age: 74
End: 2019-11-07

## 2019-11-07 ENCOUNTER — TELEPHONE (OUTPATIENT)
Dept: FAMILY MEDICINE CLINIC | Facility: CLINIC | Age: 74
End: 2019-11-07

## 2019-11-07 VITALS
HEART RATE: 60 BPM | DIASTOLIC BLOOD PRESSURE: 80 MMHG | SYSTOLIC BLOOD PRESSURE: 132 MMHG | BODY MASS INDEX: 41.75 KG/M2 | OXYGEN SATURATION: 93 % | WEIGHT: 315 LBS | RESPIRATION RATE: 17 BRPM | HEIGHT: 73 IN | TEMPERATURE: 96.9 F

## 2019-11-07 DIAGNOSIS — J45.21 MILD INTERMITTENT ASTHMA WITH ACUTE EXACERBATION: ICD-10-CM

## 2019-11-07 DIAGNOSIS — J20.9 ACUTE BRONCHITIS, UNSPECIFIED ORGANISM: Primary | ICD-10-CM

## 2019-11-07 PROCEDURE — 94640 AIRWAY INHALATION TREATMENT: CPT | Performed by: NURSE PRACTITIONER

## 2019-11-07 PROCEDURE — 99213 OFFICE O/P EST LOW 20 MIN: CPT | Performed by: NURSE PRACTITIONER

## 2019-11-07 RX ORDER — IPRATROPIUM BROMIDE AND ALBUTEROL SULFATE 2.5; .5 MG/3ML; MG/3ML
3 SOLUTION RESPIRATORY (INHALATION)
Status: DISCONTINUED | OUTPATIENT
Start: 2019-11-07 | End: 2019-12-13

## 2019-11-07 RX ORDER — DOXYCYCLINE HYCLATE 100 MG
100 TABLET ORAL 2 TIMES DAILY
Qty: 20 TABLET | Refills: 0 | Status: SHIPPED | OUTPATIENT
Start: 2019-11-07 | End: 2019-12-13

## 2019-11-07 RX ADMIN — IPRATROPIUM BROMIDE AND ALBUTEROL SULFATE 3 ML: 2.5; .5 SOLUTION RESPIRATORY (INHALATION) at 19:06

## 2019-11-07 NOTE — TELEPHONE ENCOUNTER
----- Message from ANGELIA Martin sent at 11/6/2019  8:54 PM EST -----  Please let him know that all of his stool tests were negative.  Thanks  ----- Message -----  From: Lab, Background User  Sent: 11/2/2019   7:21 AM  To: ANGELIA Martin

## 2019-11-08 DIAGNOSIS — J06.9 ACUTE URI: ICD-10-CM

## 2019-11-08 DIAGNOSIS — J45.20 MILD INTERMITTENT ASTHMA WITHOUT COMPLICATION: Primary | Chronic | ICD-10-CM

## 2019-11-08 RX ORDER — DEXTROMETHORPHAN HYDROBROMIDE AND PROMETHAZINE HYDROCHLORIDE 15; 6.25 MG/5ML; MG/5ML
5 SYRUP ORAL 4 TIMES DAILY PRN
Qty: 180 ML | Refills: 0 | Status: SHIPPED | OUTPATIENT
Start: 2019-11-08 | End: 2019-12-11 | Stop reason: SDUPTHER

## 2019-11-08 RX ORDER — ALBUTEROL SULFATE 2.5 MG/3ML
2.5 SOLUTION RESPIRATORY (INHALATION) EVERY 4 HOURS PRN
Qty: 25 VIAL | Refills: 12 | Status: SHIPPED | OUTPATIENT
Start: 2019-11-08

## 2019-11-11 RX ORDER — SOTALOL HYDROCHLORIDE 80 MG/1
TABLET ORAL
Qty: 60 TABLET | Refills: 2 | OUTPATIENT
Start: 2019-11-11

## 2019-11-13 RX ORDER — SOTALOL HYDROCHLORIDE 80 MG/1
TABLET ORAL
Qty: 60 TABLET | Refills: 2 | Status: CANCELLED | OUTPATIENT
Start: 2019-11-13

## 2019-11-21 NOTE — PROGRESS NOTES
Luis Perez is a 74 y.o. male who presents for cough.    Chief Complaint   Patient presents with   • Cough       Cough   This is a new problem. The current episode started 1 to 4 weeks ago. The problem has been gradually worsening. The problem occurs every few minutes. The cough is productive of sputum. Associated symptoms include nasal congestion, postnasal drip, rhinorrhea, shortness of breath and wheezing. The symptoms are aggravated by cold air, pollens and lying down. He has tried ipratropium inhaler, OTC cough suppressant, leukotriene antagonists and body position changes for the symptoms. The treatment provided mild relief. His past medical history is significant for asthma, bronchitis and environmental allergies.         Past Medical History:   Diagnosis Date   • Arrhythmia     afib   • Asthma    • Bronchitis, chronic (CMS/HCC)    • Cancer (CMS/HCC)     Skin    • Hyperlipidemia    • Hypertension    • Irregular heart beat    • Nephrolithiasis        Past Surgical History:   Procedure Laterality Date   • CARDIOVERSION  08/05/2019   • COLONOSCOPY     • NASAL SEPTUM SURGERY      Deviation Repair   • SKIN CANCER EXCISION     • TONSILLECTOMY     • VASECTOMY         Family History   Problem Relation Age of Onset   • Cancer Mother         colon   • Diabetes Mother    • Alzheimer's disease Father    • No Known Problems Maternal Grandmother    • No Known Problems Maternal Grandfather    • No Known Problems Paternal Grandmother    • No Known Problems Paternal Grandfather        Social History     Socioeconomic History   • Marital status:      Spouse name: Not on file   • Number of children: Not on file   • Years of education: Not on file   • Highest education level: Not on file   Tobacco Use   • Smoking status: Never Smoker   • Smokeless tobacco: Former User     Types: Chew   • Tobacco comment: states been over a month since use   Substance and Sexual Activity   • Alcohol use: Yes     Frequency: Monthly or  "less     Drinks per session: 1 or 2     Comment: 1-2 month    • Drug use: No   • Sexual activity: Defer   Social History Narrative    caffeine use average I or 2 servings weekly       No Known Allergies    ROS    Review of Systems   Constitutional: Positive for fatigue.   HENT: Positive for postnasal drip and rhinorrhea.    Respiratory: Positive for cough, chest tightness, shortness of breath and wheezing.    Musculoskeletal: Positive for arthralgias, gait problem and joint swelling.   Allergic/Immunologic: Positive for environmental allergies.   Neurological: Positive for headache.   All other systems reviewed and are negative.      Vitals:    10/03/19 1620   BP: 118/80   Pulse: 55   Resp: 16   Temp: 96.3 °F (35.7 °C)   TempSrc: Temporal   SpO2: 95%   Weight: (!) 148 kg (326 lb 12.8 oz)   Height: 185.4 cm (73\")   PainSc:   2         Current Outpatient Medications:   •  ADVAIR DISKUS 500-50 MCG/DOSE DISKUS, Inhale 1 puff 2 (Two) Times a Day., Disp: , Rfl:   •  amLODIPine (NORVASC) 10 MG tablet, Take 1 tablet by mouth Daily., Disp: 90 tablet, Rfl: 2  •  ELIQUIS 5 MG tablet tablet, TAKE ONE TABLET BY MOUTH TWICE DAILY , Disp: 60 tablet, Rfl: 3  •  hydrochlorothiazide (HYDRODIURIL) 50 MG tablet, Take 1 tablet by mouth Daily., Disp: 90 tablet, Rfl: 2  •  montelukast (SINGULAIR) 10 MG tablet, Take 10 mg by mouth Daily., Disp: , Rfl:   •  Multiple Vitamins-Minerals (VITEYES AREDS FORMULA PO), Take 1 tablet by mouth 2 (Two) Times a Day., Disp: , Rfl:   •  acetaminophen (TYLENOL) 500 MG tablet, Take 500 mg by mouth Every 6 (Six) Hours As Needed for Mild Pain ., Disp: , Rfl:   •  albuterol (PROVENTIL) (2.5 MG/3ML) 0.083% nebulizer solution, Take 2.5 mg by nebulization Every 4 (Four) Hours As Needed for Wheezing., Disp: 25 vial, Rfl: 12  •  albuterol sulfate  (90 Base) MCG/ACT inhaler, Inhale 2 puffs Every 4 (Four) Hours As Needed for Wheezing., Disp: 1 inhaler, Rfl: 0  •  doxycycline (VIBRAMYICN) 100 MG tablet, Take 1 " tablet by mouth 2 (Two) Times a Day., Disp: 20 tablet, Rfl: 0  •  fexofenadine (ALLEGRA ALLERGY) 180 MG tablet, Take 1 tablet by mouth Daily., Disp: 30 tablet, Rfl: 2  •  FLUZONE HIGH-DOSE 0.5 ML suspension prefilled syringe injection, ADM 0.5ML IM UTD, Disp: , Rfl: 0  •  promethazine-dextromethorphan (PROMETHAZINE-DM) 6.25-15 MG/5ML syrup, Take 5 mL by mouth 4 (Four) Times a Day As Needed for Cough., Disp: 180 mL, Rfl: 0  •  Sotalol HCl AF 80 MG tablet, Take 1 tablet by mouth 2 (Two) Times a Day., Disp: 60 tablet, Rfl: 5    Current Facility-Administered Medications:   •  ipratropium-albuterol (DUO-NEB) nebulizer solution 3 mL, 3 mL, Nebulization, 4x Daily - RT, Viviane Colon APRN, 3 mL at 10/29/19 1835  •  ipratropium-albuterol (DUO-NEB) nebulizer solution 3 mL, 3 mL, Nebulization, 4x Daily - RT, Viviane Colon APRN, 3 mL at 11/07/19 1906    PE    Physical Exam   Constitutional: He is oriented to person, place, and time. Vital signs are normal. He appears well-developed. He is morbidly obese.  HENT:   Head: Normocephalic and atraumatic.   Right Ear: A middle ear effusion is present.   Left Ear: A middle ear effusion is present.   Nose: Mucosal edema, rhinorrhea and congestion present.   Mouth/Throat: Posterior oropharyngeal erythema present.   Cardiovascular: Normal rate, normal heart sounds and intact distal pulses. An irregular rhythm present. Exam reveals no gallop and no friction rub.   No murmur heard.  Pulmonary/Chest: He has decreased breath sounds. He has wheezes.   Neurological: He is alert and oriented to person, place, and time.   Skin: Skin is warm and dry.   Psychiatric: He has a normal mood and affect. His behavior is normal. Judgment and thought content normal.   Nursing note and vitals reviewed.       A/P    Problem List Items Addressed This Visit     None      Visit Diagnoses     Environmental and seasonal allergies    -  Primary    Acute effusion of both middle ears        Acute URI         Preop examination        Relevant Orders    XR Chest PA & Lateral (Completed)      Nebulizer treatment given with slight improvement.     Plan of care reviewed with patient at the conclusion of today's visit. Education was provided regarding diagnosis, management and any prescribed or recommended OTC medications.  Patient verbalizes understanding of and agreement with management plan.    Return if symptoms worsen or fail to improve.     Viviane Colon, APRN

## 2019-12-05 DIAGNOSIS — J06.9 ACUTE URI: ICD-10-CM

## 2019-12-05 RX ORDER — DEXTROMETHORPHAN HYDROBROMIDE AND PROMETHAZINE HYDROCHLORIDE 15; 6.25 MG/5ML; MG/5ML
5 SYRUP ORAL 4 TIMES DAILY PRN
Qty: 180 ML | Refills: 0 | OUTPATIENT
Start: 2019-12-05

## 2019-12-05 NOTE — TELEPHONE ENCOUNTER
Patient requesting a refill on the following:    promethazine-dextromethorphan (PROMETHAZINE-DM) 6.25-15 MG/5ML syrup 180 mL 0 11/8/2019     Sig - Route: Take 5 mL by mouth 4 (Four) Times a Day As Needed for Cough. - Oral            Associated Diagnoses     Acute URI       Pharmacy     [id number] - [place], [place] - [address] - [phone] [facility] [phone] FX

## 2019-12-11 ENCOUNTER — TELEPHONE (OUTPATIENT)
Dept: FAMILY MEDICINE CLINIC | Facility: CLINIC | Age: 74
End: 2019-12-11

## 2019-12-11 DIAGNOSIS — I10 ESSENTIAL HYPERTENSION: ICD-10-CM

## 2019-12-11 DIAGNOSIS — J06.9 ACUTE URI: ICD-10-CM

## 2019-12-11 RX ORDER — DEXTROMETHORPHAN HYDROBROMIDE AND PROMETHAZINE HYDROCHLORIDE 15; 6.25 MG/5ML; MG/5ML
5 SYRUP ORAL 4 TIMES DAILY PRN
Qty: 180 ML | Refills: 0 | Status: SHIPPED | OUTPATIENT
Start: 2019-12-11 | End: 2020-05-08 | Stop reason: SDUPTHER

## 2019-12-11 RX ORDER — DEXTROMETHORPHAN HYDROBROMIDE AND PROMETHAZINE HYDROCHLORIDE 15; 6.25 MG/5ML; MG/5ML
5 SYRUP ORAL 4 TIMES DAILY PRN
Qty: 180 ML | Refills: 0 | Status: CANCELLED | OUTPATIENT
Start: 2019-12-11

## 2019-12-11 RX ORDER — HYDROCHLOROTHIAZIDE 50 MG/1
50 TABLET ORAL DAILY
Qty: 90 TABLET | Refills: 2 | Status: CANCELLED | OUTPATIENT
Start: 2019-12-11

## 2019-12-11 RX ORDER — HYDROCHLOROTHIAZIDE 50 MG/1
50 TABLET ORAL DAILY
Qty: 90 TABLET | Refills: 0 | Status: SHIPPED | OUTPATIENT
Start: 2019-12-11 | End: 2020-03-19 | Stop reason: SDUPTHER

## 2019-12-11 NOTE — TELEPHONE ENCOUNTER
Pt request hydrochlorothiazide 50 mg and   Promethazine syrup. Sent to [facility] pt last litzy twas 11/7

## 2019-12-13 ENCOUNTER — APPOINTMENT (OUTPATIENT)
Dept: GENERAL RADIOLOGY | Facility: HOSPITAL | Age: 74
End: 2019-12-13

## 2019-12-13 ENCOUNTER — HOSPITAL ENCOUNTER (INPATIENT)
Facility: HOSPITAL | Age: 74
LOS: 1 days | Discharge: HOME | End: 2019-12-14
Attending: EMERGENCY MEDICINE | Admitting: FAMILY MEDICINE

## 2019-12-13 ENCOUNTER — OFFICE VISIT (OUTPATIENT)
Dept: FAMILY MEDICINE CLINIC | Facility: CLINIC | Age: 74
End: 2019-12-13

## 2019-12-13 VITALS
RESPIRATION RATE: 27 BRPM | HEIGHT: 73 IN | TEMPERATURE: 97.7 F | DIASTOLIC BLOOD PRESSURE: 76 MMHG | SYSTOLIC BLOOD PRESSURE: 140 MMHG | OXYGEN SATURATION: 92 % | BODY MASS INDEX: 41.75 KG/M2 | HEART RATE: 60 BPM | WEIGHT: 315 LBS

## 2019-12-13 DIAGNOSIS — I51.89 CARDIAC MASS: ICD-10-CM

## 2019-12-13 DIAGNOSIS — D72.829 LEUKOCYTOSIS, UNSPECIFIED TYPE: ICD-10-CM

## 2019-12-13 DIAGNOSIS — R06.02 SHORTNESS OF BREATH: ICD-10-CM

## 2019-12-13 DIAGNOSIS — R07.89 CHEST TIGHTNESS: ICD-10-CM

## 2019-12-13 DIAGNOSIS — R94.31 ABNORMAL ECG: ICD-10-CM

## 2019-12-13 DIAGNOSIS — I48.0 PAROXYSMAL ATRIAL FIBRILLATION (HCC): ICD-10-CM

## 2019-12-13 DIAGNOSIS — J18.9 PNEUMONIA OF RIGHT MIDDLE LOBE DUE TO INFECTIOUS ORGANISM: Primary | ICD-10-CM

## 2019-12-13 DIAGNOSIS — R09.02 HYPOXIA: ICD-10-CM

## 2019-12-13 DIAGNOSIS — R06.02 SHORTNESS OF BREATH: Primary | ICD-10-CM

## 2019-12-13 DIAGNOSIS — I10 ESSENTIAL HYPERTENSION: ICD-10-CM

## 2019-12-13 PROBLEM — J45.909 ASTHMA: Status: ACTIVE | Noted: 2019-12-13

## 2019-12-13 PROBLEM — J45.31 MILD PERSISTENT ASTHMA WITH EXACERBATION: Status: ACTIVE | Noted: 2019-12-13

## 2019-12-13 PROBLEM — A41.9 SEPSIS, UNSPECIFIED ORGANISM (HCC): Status: ACTIVE | Noted: 2019-12-13

## 2019-12-13 PROBLEM — J45.901 ASTHMA EXACERBATION, MILD: Status: ACTIVE | Noted: 2019-12-13

## 2019-12-13 LAB
ALBUMIN SERPL-MCNC: 4 G/DL (ref 3.5–5.2)
ALBUMIN/GLOB SERPL: 1.2 G/DL
ALP SERPL-CCNC: 65 U/L (ref 39–117)
ALT SERPL W P-5'-P-CCNC: 10 U/L (ref 1–41)
ANION GAP SERPL CALCULATED.3IONS-SCNC: 13 MMOL/L (ref 5–15)
ANION GAP SERPL CALCULATED.3IONS-SCNC: 15 MMOL/L (ref 5–15)
AST SERPL-CCNC: 12 U/L (ref 1–40)
BASOPHILS # BLD MANUAL: 0 10*3/MM3 (ref 0–0.2)
BASOPHILS NFR BLD AUTO: 0 % (ref 0–1.5)
BILIRUB SERPL-MCNC: 1 MG/DL (ref 0.2–1.2)
BUN BLD-MCNC: 14 MG/DL (ref 8–23)
BUN BLD-MCNC: 15 MG/DL (ref 8–23)
BUN/CREAT SERPL: 18.3 (ref 7–25)
BUN/CREAT SERPL: 20.9 (ref 7–25)
CALCIUM SPEC-SCNC: 9.1 MG/DL (ref 8.6–10.5)
CALCIUM SPEC-SCNC: 9.3 MG/DL (ref 8.6–10.5)
CHLORIDE SERPL-SCNC: 100 MMOL/L (ref 98–107)
CHLORIDE SERPL-SCNC: 100 MMOL/L (ref 98–107)
CO2 SERPL-SCNC: 26 MMOL/L (ref 22–29)
CO2 SERPL-SCNC: 26 MMOL/L (ref 22–29)
CREAT BLD-MCNC: 0.67 MG/DL (ref 0.76–1.27)
CREAT BLD-MCNC: 0.82 MG/DL (ref 0.76–1.27)
D-LACTATE SERPL-SCNC: 2.7 MMOL/L (ref 0.5–2)
D-LACTATE SERPL-SCNC: 3.7 MMOL/L (ref 0.5–2)
DEPRECATED RDW RBC AUTO: 44.5 FL (ref 37–54)
DEPRECATED RDW RBC AUTO: 44.6 FL (ref 37–54)
EOSINOPHIL # BLD MANUAL: 0 10*3/MM3 (ref 0–0.4)
EOSINOPHIL NFR BLD MANUAL: 0 % (ref 0.3–6.2)
ERYTHROCYTE [DISTWIDTH] IN BLOOD BY AUTOMATED COUNT: 13.8 % (ref 12.3–15.4)
ERYTHROCYTE [DISTWIDTH] IN BLOOD BY AUTOMATED COUNT: 13.8 % (ref 12.3–15.4)
FLUAV SUBTYP SPEC NAA+PROBE: NOT DETECTED
FLUBV RNA ISLT QL NAA+PROBE: NOT DETECTED
GFR SERPL CREATININE-BSD FRML MDRD: 116 ML/MIN/1.73
GFR SERPL CREATININE-BSD FRML MDRD: 92 ML/MIN/1.73
GLOBULIN UR ELPH-MCNC: 3.4 GM/DL
GLUCOSE BLD-MCNC: 152 MG/DL (ref 65–99)
GLUCOSE BLD-MCNC: 184 MG/DL (ref 65–99)
HCT VFR BLD AUTO: 44.6 % (ref 37.5–51)
HCT VFR BLD AUTO: 48.4 % (ref 37.5–51)
HGB BLD-MCNC: 14.8 G/DL (ref 13–17.7)
HGB BLD-MCNC: 15.8 G/DL (ref 13–17.7)
HOLD SPECIMEN: NORMAL
LIPASE SERPL-CCNC: 15 U/L (ref 13–60)
LYMPHOCYTES # BLD MANUAL: 1.27 10*3/MM3 (ref 0.7–3.1)
LYMPHOCYTES NFR BLD MANUAL: 10 % (ref 5–12)
LYMPHOCYTES NFR BLD MANUAL: 4 % (ref 19.6–45.3)
MCH RBC QN AUTO: 28.7 PG (ref 26.6–33)
MCH RBC QN AUTO: 29.1 PG (ref 26.6–33)
MCHC RBC AUTO-ENTMCNC: 32.6 G/DL (ref 31.5–35.7)
MCHC RBC AUTO-ENTMCNC: 33.2 G/DL (ref 31.5–35.7)
MCV RBC AUTO: 87.6 FL (ref 79–97)
MCV RBC AUTO: 88 FL (ref 79–97)
MONOCYTES # BLD AUTO: 3.18 10*3/MM3 (ref 0.1–0.9)
NEUTROPHILS # BLD AUTO: 26.68 10*3/MM3 (ref 1.7–7)
NEUTROPHILS NFR BLD MANUAL: 75 % (ref 42.7–76)
NEUTS BAND NFR BLD MANUAL: 9 % (ref 0–5)
NT-PROBNP SERPL-MCNC: 523 PG/ML (ref 5–900)
PLAT MORPH BLD: NORMAL
PLATELET # BLD AUTO: 212 10*3/MM3 (ref 140–450)
PLATELET # BLD AUTO: 241 10*3/MM3 (ref 140–450)
PMV BLD AUTO: 10.5 FL (ref 6–12)
PMV BLD AUTO: 10.9 FL (ref 6–12)
POLYCHROMASIA BLD QL SMEAR: ABNORMAL
POTASSIUM BLD-SCNC: 3.5 MMOL/L (ref 3.5–5.2)
POTASSIUM BLD-SCNC: 3.6 MMOL/L (ref 3.5–5.2)
PROCALCITONIN SERPL-MCNC: 0.34 NG/ML (ref 0.1–0.25)
PROT SERPL-MCNC: 7.4 G/DL (ref 6–8.5)
RBC # BLD AUTO: 5.09 10*6/MM3 (ref 4.14–5.8)
RBC # BLD AUTO: 5.5 10*6/MM3 (ref 4.14–5.8)
SODIUM BLD-SCNC: 139 MMOL/L (ref 136–145)
SODIUM BLD-SCNC: 141 MMOL/L (ref 136–145)
TROPONIN T SERPL-MCNC: <0.01 NG/ML (ref 0–0.03)
TROPONIN T SERPL-MCNC: <0.01 NG/ML (ref 0–0.03)
VARIANT LYMPHS NFR BLD MANUAL: 2 % (ref 0–5)
WBC MORPH BLD: NORMAL
WBC NRBC COR # BLD: 25.84 10*3/MM3 (ref 3.4–10.8)
WBC NRBC COR # BLD: 31.76 10*3/MM3 (ref 3.4–10.8)
WHOLE BLOOD HOLD SPECIMEN: NORMAL
WHOLE BLOOD HOLD SPECIMEN: NORMAL

## 2019-12-13 PROCEDURE — 63710000001 PREDNISONE PER 1 MG: Performed by: FAMILY MEDICINE

## 2019-12-13 PROCEDURE — 83690 ASSAY OF LIPASE: CPT | Performed by: EMERGENCY MEDICINE

## 2019-12-13 PROCEDURE — 84145 PROCALCITONIN (PCT): CPT | Performed by: EMERGENCY MEDICINE

## 2019-12-13 PROCEDURE — 85007 BL SMEAR W/DIFF WBC COUNT: CPT | Performed by: EMERGENCY MEDICINE

## 2019-12-13 PROCEDURE — 71046 X-RAY EXAM CHEST 2 VIEWS: CPT

## 2019-12-13 PROCEDURE — 85025 COMPLETE CBC W/AUTO DIFF WBC: CPT | Performed by: EMERGENCY MEDICINE

## 2019-12-13 PROCEDURE — 87040 BLOOD CULTURE FOR BACTERIA: CPT | Performed by: EMERGENCY MEDICINE

## 2019-12-13 PROCEDURE — 84484 ASSAY OF TROPONIN QUANT: CPT | Performed by: EMERGENCY MEDICINE

## 2019-12-13 PROCEDURE — 93000 ELECTROCARDIOGRAM COMPLETE: CPT | Performed by: NURSE PRACTITIONER

## 2019-12-13 PROCEDURE — 83880 ASSAY OF NATRIURETIC PEPTIDE: CPT | Performed by: EMERGENCY MEDICINE

## 2019-12-13 PROCEDURE — 25810000003 SODIUM CHLORIDE 0.9 % WITH KCL 20 MEQ 20-0.9 MEQ/L-% SOLUTION: Performed by: FAMILY MEDICINE

## 2019-12-13 PROCEDURE — 25010000002 PIPERACILLIN SOD-TAZOBACTAM PER 1 G: Performed by: FAMILY MEDICINE

## 2019-12-13 PROCEDURE — 80053 COMPREHEN METABOLIC PANEL: CPT | Performed by: EMERGENCY MEDICINE

## 2019-12-13 PROCEDURE — 83605 ASSAY OF LACTIC ACID: CPT | Performed by: EMERGENCY MEDICINE

## 2019-12-13 PROCEDURE — 99213 OFFICE O/P EST LOW 20 MIN: CPT | Performed by: NURSE PRACTITIONER

## 2019-12-13 PROCEDURE — 94640 AIRWAY INHALATION TREATMENT: CPT

## 2019-12-13 PROCEDURE — 99284 EMERGENCY DEPT VISIT MOD MDM: CPT

## 2019-12-13 PROCEDURE — 87502 INFLUENZA DNA AMP PROBE: CPT | Performed by: EMERGENCY MEDICINE

## 2019-12-13 PROCEDURE — 25010000002 CEFTRIAXONE PER 250 MG: Performed by: EMERGENCY MEDICINE

## 2019-12-13 PROCEDURE — 93005 ELECTROCARDIOGRAM TRACING: CPT | Performed by: EMERGENCY MEDICINE

## 2019-12-13 PROCEDURE — 99223 1ST HOSP IP/OBS HIGH 75: CPT | Performed by: FAMILY MEDICINE

## 2019-12-13 PROCEDURE — 94799 UNLISTED PULMONARY SVC/PX: CPT

## 2019-12-13 PROCEDURE — 85027 COMPLETE CBC AUTOMATED: CPT | Performed by: NURSE PRACTITIONER

## 2019-12-13 RX ORDER — IPRATROPIUM BROMIDE AND ALBUTEROL SULFATE 2.5; .5 MG/3ML; MG/3ML
3 SOLUTION RESPIRATORY (INHALATION) ONCE
Status: COMPLETED | OUTPATIENT
Start: 2019-12-13 | End: 2019-12-13

## 2019-12-13 RX ORDER — AMOXICILLIN AND CLAVULANATE POTASSIUM 875; 125 MG/1; MG/1
1 TABLET, FILM COATED ORAL EVERY 12 HOURS SCHEDULED
Status: DISCONTINUED | OUTPATIENT
Start: 2019-12-14 | End: 2019-12-14 | Stop reason: HOSPADM

## 2019-12-13 RX ORDER — POTASSIUM CHLORIDE 1.5 G/1.77G
40 POWDER, FOR SOLUTION ORAL AS NEEDED
Status: DISCONTINUED | OUTPATIENT
Start: 2019-12-13 | End: 2019-12-14 | Stop reason: HOSPADM

## 2019-12-13 RX ORDER — MULTIPLE VITAMINS W/ MINERALS TAB 9MG-400MCG
1 TAB ORAL DAILY
Status: DISCONTINUED | OUTPATIENT
Start: 2019-12-13 | End: 2019-12-14 | Stop reason: HOSPADM

## 2019-12-13 RX ORDER — ONDANSETRON 2 MG/ML
4 INJECTION INTRAMUSCULAR; INTRAVENOUS EVERY 6 HOURS PRN
Status: DISCONTINUED | OUTPATIENT
Start: 2019-12-13 | End: 2019-12-14 | Stop reason: HOSPADM

## 2019-12-13 RX ORDER — DOXYCYCLINE 100 MG/1
100 CAPSULE ORAL EVERY 12 HOURS SCHEDULED
Status: DISCONTINUED | OUTPATIENT
Start: 2019-12-14 | End: 2019-12-14 | Stop reason: HOSPADM

## 2019-12-13 RX ORDER — SODIUM CHLORIDE 0.9 % (FLUSH) 0.9 %
10 SYRINGE (ML) INJECTION AS NEEDED
Status: DISCONTINUED | OUTPATIENT
Start: 2019-12-13 | End: 2019-12-14 | Stop reason: HOSPADM

## 2019-12-13 RX ORDER — PREDNISONE 20 MG/1
20 TABLET ORAL DAILY
Status: DISCONTINUED | OUTPATIENT
Start: 2019-12-13 | End: 2019-12-14 | Stop reason: HOSPADM

## 2019-12-13 RX ORDER — CALCIUM CARBONATE 200(500)MG
2 TABLET,CHEWABLE ORAL 2 TIMES DAILY PRN
Status: DISCONTINUED | OUTPATIENT
Start: 2019-12-13 | End: 2019-12-14 | Stop reason: HOSPADM

## 2019-12-13 RX ORDER — POTASSIUM CHLORIDE 7.45 MG/ML
10 INJECTION INTRAVENOUS
Status: DISCONTINUED | OUTPATIENT
Start: 2019-12-13 | End: 2019-12-14 | Stop reason: HOSPADM

## 2019-12-13 RX ORDER — MAGNESIUM SULFATE HEPTAHYDRATE 40 MG/ML
4 INJECTION, SOLUTION INTRAVENOUS AS NEEDED
Status: DISCONTINUED | OUTPATIENT
Start: 2019-12-13 | End: 2019-12-14 | Stop reason: HOSPADM

## 2019-12-13 RX ORDER — MONTELUKAST SODIUM 10 MG/1
10 TABLET ORAL DAILY
Status: DISCONTINUED | OUTPATIENT
Start: 2019-12-13 | End: 2019-12-14 | Stop reason: HOSPADM

## 2019-12-13 RX ORDER — MAGNESIUM SULFATE HEPTAHYDRATE 40 MG/ML
2 INJECTION, SOLUTION INTRAVENOUS AS NEEDED
Status: DISCONTINUED | OUTPATIENT
Start: 2019-12-13 | End: 2019-12-14 | Stop reason: HOSPADM

## 2019-12-13 RX ORDER — SOTALOL HYDROCHLORIDE 80 MG/1
80 TABLET ORAL EVERY 12 HOURS SCHEDULED
Status: DISCONTINUED | OUTPATIENT
Start: 2019-12-13 | End: 2019-12-14 | Stop reason: HOSPADM

## 2019-12-13 RX ORDER — POTASSIUM CHLORIDE 750 MG/1
40 CAPSULE, EXTENDED RELEASE ORAL AS NEEDED
Status: DISCONTINUED | OUTPATIENT
Start: 2019-12-13 | End: 2019-12-14 | Stop reason: HOSPADM

## 2019-12-13 RX ORDER — HYDROCHLOROTHIAZIDE 25 MG/1
50 TABLET ORAL DAILY
Status: DISCONTINUED | OUTPATIENT
Start: 2019-12-14 | End: 2019-12-14 | Stop reason: HOSPADM

## 2019-12-13 RX ORDER — ALBUTEROL SULFATE 2.5 MG/3ML
2.5 SOLUTION RESPIRATORY (INHALATION)
Status: DISCONTINUED | OUTPATIENT
Start: 2019-12-13 | End: 2019-12-14 | Stop reason: HOSPADM

## 2019-12-13 RX ORDER — ACETAMINOPHEN 325 MG/1
650 TABLET ORAL EVERY 4 HOURS PRN
Status: DISCONTINUED | OUTPATIENT
Start: 2019-12-13 | End: 2019-12-14 | Stop reason: HOSPADM

## 2019-12-13 RX ORDER — AMLODIPINE BESYLATE 5 MG/1
10 TABLET ORAL DAILY
Status: DISCONTINUED | OUTPATIENT
Start: 2019-12-14 | End: 2019-12-14 | Stop reason: HOSPADM

## 2019-12-13 RX ORDER — LIDOCAINE HYDROCHLORIDE 10 MG/ML
0.1 INJECTION, SOLUTION EPIDURAL; INFILTRATION; INTRACAUDAL; PERINEURAL ONCE AS NEEDED
Status: DISCONTINUED | OUTPATIENT
Start: 2019-12-13 | End: 2019-12-14 | Stop reason: HOSPADM

## 2019-12-13 RX ORDER — SODIUM CHLORIDE 0.9 % (FLUSH) 0.9 %
3-10 SYRINGE (ML) INJECTION AS NEEDED
Status: DISCONTINUED | OUTPATIENT
Start: 2019-12-13 | End: 2019-12-13

## 2019-12-13 RX ORDER — BUDESONIDE 0.5 MG/2ML
0.5 INHALANT ORAL
Status: DISCONTINUED | OUTPATIENT
Start: 2019-12-13 | End: 2019-12-14 | Stop reason: HOSPADM

## 2019-12-13 RX ORDER — CETIRIZINE HYDROCHLORIDE 10 MG/1
10 TABLET ORAL DAILY
Status: DISCONTINUED | OUTPATIENT
Start: 2019-12-13 | End: 2019-12-14 | Stop reason: HOSPADM

## 2019-12-13 RX ORDER — ONDANSETRON 4 MG/1
4 TABLET, FILM COATED ORAL EVERY 6 HOURS PRN
Status: DISCONTINUED | OUTPATIENT
Start: 2019-12-13 | End: 2019-12-14 | Stop reason: HOSPADM

## 2019-12-13 RX ORDER — ACETAMINOPHEN 160 MG/5ML
650 SOLUTION ORAL EVERY 4 HOURS PRN
Status: DISCONTINUED | OUTPATIENT
Start: 2019-12-13 | End: 2019-12-14 | Stop reason: HOSPADM

## 2019-12-13 RX ORDER — SODIUM CHLORIDE 0.9 % (FLUSH) 0.9 %
10 SYRINGE (ML) INJECTION EVERY 12 HOURS SCHEDULED
Status: DISCONTINUED | OUTPATIENT
Start: 2019-12-13 | End: 2019-12-14 | Stop reason: HOSPADM

## 2019-12-13 RX ORDER — ASPIRIN 81 MG/1
324 TABLET, CHEWABLE ORAL ONCE
Status: COMPLETED | OUTPATIENT
Start: 2019-12-13 | End: 2019-12-13

## 2019-12-13 RX ORDER — BISACODYL 5 MG/1
5 TABLET, DELAYED RELEASE ORAL DAILY PRN
Status: DISCONTINUED | OUTPATIENT
Start: 2019-12-13 | End: 2019-12-14 | Stop reason: HOSPADM

## 2019-12-13 RX ORDER — SODIUM CHLORIDE AND POTASSIUM CHLORIDE 150; 900 MG/100ML; MG/100ML
125 INJECTION, SOLUTION INTRAVENOUS CONTINUOUS
Status: ACTIVE | OUTPATIENT
Start: 2019-12-13 | End: 2019-12-14

## 2019-12-13 RX ORDER — SODIUM CHLORIDE 0.9 % (FLUSH) 0.9 %
3 SYRINGE (ML) INJECTION EVERY 12 HOURS SCHEDULED
Status: DISCONTINUED | OUTPATIENT
Start: 2019-12-13 | End: 2019-12-14 | Stop reason: HOSPADM

## 2019-12-13 RX ORDER — ACETAMINOPHEN 650 MG/1
650 SUPPOSITORY RECTAL EVERY 4 HOURS PRN
Status: DISCONTINUED | OUTPATIENT
Start: 2019-12-13 | End: 2019-12-14 | Stop reason: HOSPADM

## 2019-12-13 RX ORDER — ASPIRIN 81 MG/1
324 TABLET, CHEWABLE ORAL ONCE
Status: DISCONTINUED | OUTPATIENT
Start: 2019-12-13 | End: 2019-12-13 | Stop reason: SDUPTHER

## 2019-12-13 RX ADMIN — CETIRIZINE HYDROCHLORIDE 10 MG: 10 TABLET, FILM COATED ORAL at 15:07

## 2019-12-13 RX ADMIN — SOTALOL HYDROCHLORIDE 80 MG: 80 TABLET ORAL at 20:11

## 2019-12-13 RX ADMIN — BUDESONIDE 0.5 MG: 0.5 INHALANT RESPIRATORY (INHALATION) at 19:24

## 2019-12-13 RX ADMIN — TAZOBACTAM SODIUM AND PIPERACILLIN SODIUM 3.38 G: 375; 3 INJECTION, SOLUTION INTRAVENOUS at 21:41

## 2019-12-13 RX ADMIN — POTASSIUM CHLORIDE AND SODIUM CHLORIDE 100 ML/HR: 900; 150 INJECTION, SOLUTION INTRAVENOUS at 15:08

## 2019-12-13 RX ADMIN — PREDNISONE 20 MG: 20 TABLET ORAL at 15:07

## 2019-12-13 RX ADMIN — ALBUTEROL SULFATE 2.5 MG: 2.5 SOLUTION RESPIRATORY (INHALATION) at 19:24

## 2019-12-13 RX ADMIN — MULTIPLE VITAMINS W/ MINERALS TAB 1 TABLET: TAB ORAL at 15:07

## 2019-12-13 RX ADMIN — CEFTRIAXONE 2 G: 2 INJECTION, POWDER, FOR SOLUTION INTRAMUSCULAR; INTRAVENOUS at 11:26

## 2019-12-13 RX ADMIN — SODIUM CHLORIDE, PRESERVATIVE FREE 10 ML: 5 INJECTION INTRAVENOUS at 15:07

## 2019-12-13 RX ADMIN — ALBUTEROL SULFATE 2.5 MG: 2.5 SOLUTION RESPIRATORY (INHALATION) at 15:38

## 2019-12-13 RX ADMIN — TAZOBACTAM SODIUM AND PIPERACILLIN SODIUM 3.38 G: 375; 3 INJECTION, SOLUTION INTRAVENOUS at 16:57

## 2019-12-13 RX ADMIN — SODIUM CHLORIDE, PRESERVATIVE FREE 10 ML: 5 INJECTION INTRAVENOUS at 20:13

## 2019-12-13 RX ADMIN — MONTELUKAST SODIUM 10 MG: 10 TABLET, COATED ORAL at 15:07

## 2019-12-13 RX ADMIN — APIXABAN 5 MG: 5 TABLET, FILM COATED ORAL at 20:11

## 2019-12-13 RX ADMIN — DOXYCYCLINE 100 MG: 100 INJECTION, POWDER, LYOPHILIZED, FOR SOLUTION INTRAVENOUS at 15:08

## 2019-12-13 RX ADMIN — IPRATROPIUM BROMIDE AND ALBUTEROL SULFATE 3 ML: 2.5; .5 SOLUTION RESPIRATORY (INHALATION) at 09:07

## 2019-12-13 RX ADMIN — ALBUTEROL SULFATE 2.5 MG: 2.5 SOLUTION RESPIRATORY (INHALATION) at 22:47

## 2019-12-13 RX ADMIN — ASPIRIN 324 MG: 81 TABLET, CHEWABLE ORAL at 08:15

## 2019-12-13 NOTE — ED PROVIDER NOTES
Subjective   Luis Perze is a 74 y.o. male who presents to the ED with complaints of an abnormal EKG. He reports that he has been experiencing a cough, congestion, and wheezing for the past few days. He had a bad spell of these symptoms last night and he reports that he woke up in sweats prompting him to visit his PCP's office today. His PCP was not there so he saw one of his partners. At the office he had an EKG taken which alarmed the physician prompting them to refer Mr. Perez to the ED to rule out a myocardial infarction. He specifically denies experiencing chest pain. He has also experienced chills and a sore throat. He denies experiencing dysuria, hematuria, hematochezia, runny nose, or new leg pain, redness, or swelling. Dr. Alvarez is his cardiologist and he reports that he was diagnosed with atrial fibrillation a few months ago. He denies CHF, past heart attacks, stent placement, or bypass surgeries. He takes Eliquis and Sotalol daily. There are no other acute complaints at this time.      History provided by:  Patient  Illness   Location:  EKG  Quality:  Abnormal  Severity:  Mild  Onset quality:  Sudden  Timing:  Constant  Progression:  Unchanged  Chronicity:  New  Relieved by:  None tried  Ineffective treatments:  None tried  Associated symptoms: congestion, cough, sore throat and wheezing    Associated symptoms: no chest pain, no fever and no rhinorrhea        Review of Systems   Constitutional: Positive for chills. Negative for fever.   HENT: Positive for congestion and sore throat. Negative for rhinorrhea.    Respiratory: Positive for cough and wheezing.    Cardiovascular: Negative for chest pain and leg swelling.   Gastrointestinal: Negative for blood in stool.   Genitourinary: Negative for difficulty urinating and hematuria.   All other systems reviewed and are negative.      Past Medical History:   Diagnosis Date   • Arrhythmia     afib   • Asthma    • Bronchitis, chronic (CMS/HCC)    • Cancer  (CMS/[facility])     Skin    • Hyperlipidemia    • Hypertension    • Irregular heart beat    • Nephrolithiasis        No Known Allergies    Past Surgical History:   Procedure Laterality Date   • CARDIOVERSION  08/05/2019   • COLONOSCOPY     • NASAL SEPTUM SURGERY      Deviation Repair   • SKIN CANCER EXCISION     • TONSILLECTOMY     • VASECTOMY         Family History   Problem Relation Age of Onset   • Cancer Mother         colon   • Diabetes Mother    • Alzheimer's disease Father    • No Known Problems Maternal Grandmother    • No Known Problems Maternal Grandfather    • No Known Problems Paternal Grandmother    • No Known Problems Paternal Grandfather        Social History     Socioeconomic History   • Marital status:      Spouse name: Not on file   • Number of children: Not on file   • Years of education: Not on file   • Highest education level: Not on file   Tobacco Use   • Smoking status: Never Smoker   • Smokeless tobacco: Former User     Types: Chew   • Tobacco comment: states been over a month since use   Substance and Sexual Activity   • Alcohol use: Yes     Frequency: Monthly or less     Drinks per session: 1 or 2     Comment: 1-2 month    • Drug use: No   • Sexual activity: Defer   Social History Narrative    caffeine use average I or 2 servings weekly         Objective   Physical Exam   Constitutional: He is oriented to person, place, and time. He appears well-developed and well-nourished. No distress.   HENT:   Head: Normocephalic and atraumatic.   Nose: Nose normal.   Eyes: Conjunctivae are normal. No scleral icterus.   Neck: Normal range of motion. Neck supple.   Cardiovascular: Normal rate, regular rhythm, normal heart sounds and intact distal pulses.   No murmur heard.  Pulmonary/Chest: Effort normal. No respiratory distress. He has wheezes in the right upper field and the left upper field. He has rhonchi.   Mild wheezing in the upper lobes. Rhonchi in the right base.   Abdominal: Soft. There is no  tenderness. There is no rebound and no guarding.   Musculoskeletal: Normal range of motion. He exhibits no edema.   Neurological: He is alert and oriented to person, place, and time.   Skin: Skin is warm and dry.   Psychiatric: He has a normal mood and affect. His behavior is normal.   Nursing note and vitals reviewed.      Procedures         ED Course  ED Course as of Dec 13 1854   Fri Dec 13, 2019   1028 XR Chest 2 View []   1057 Patient is serially reevaluated for last reevaluation now.  Sats are 89% now.  With sitting and laying down in the bed sats decreased to 85%.  I discussed the findings at the bedside with the patient and spouse.  Spouse for reports that the patient is look poorly for the last 3 days.I discussed the case with our hospitalist will admit for definitive inpatient care.  Patient again denies any chest pain either prior to or during the ED course.  I reviewed the radiograph personally with our radiologist    []      ED Course User Index  [] Oleg Fournier MD      Recent Results (from the past 24 hour(s))   Troponin    Collection Time: 12/13/19  8:53 AM   Result Value Ref Range    Troponin T <0.010 0.000 - 0.030 ng/mL   Comprehensive Metabolic Panel    Collection Time: 12/13/19  8:53 AM   Result Value Ref Range    Glucose 152 (H) 65 - 99 mg/dL    BUN 15 8 - 23 mg/dL    Creatinine 0.82 0.76 - 1.27 mg/dL    Sodium 141 136 - 145 mmol/L    Potassium 3.6 3.5 - 5.2 mmol/L    Chloride 100 98 - 107 mmol/L    CO2 26.0 22.0 - 29.0 mmol/L    Calcium 9.3 8.6 - 10.5 mg/dL    Total Protein 7.4 6.0 - 8.5 g/dL    Albumin 4.00 3.50 - 5.20 g/dL    ALT (SGPT) 10 1 - 41 U/L    AST (SGOT) 12 1 - 40 U/L    Alkaline Phosphatase 65 39 - 117 U/L    Total Bilirubin 1.0 0.2 - 1.2 mg/dL    eGFR Non African Amer 92 >60 mL/min/1.73    Globulin 3.4 gm/dL    A/G Ratio 1.2 g/dL    BUN/Creatinine Ratio 18.3 7.0 - 25.0    Anion Gap 15.0 5.0 - 15.0 mmol/L   Lipase    Collection Time: 12/13/19  8:53 AM   Result Value Ref  Range    Lipase 15 13 - 60 U/L   BNP    Collection Time: 12/13/19  8:53 AM   Result Value Ref Range    proBNP 523.0 5.0 - 900.0 pg/mL   Light Blue Top    Collection Time: 12/13/19  8:53 AM   Result Value Ref Range    Extra Tube hold for add-on    Green Top (Gel)    Collection Time: 12/13/19  8:53 AM   Result Value Ref Range    Extra Tube Hold for add-ons.    Lavender Top    Collection Time: 12/13/19  8:53 AM   Result Value Ref Range    Extra Tube hold for add-on    Gold Top - SST    Collection Time: 12/13/19  8:53 AM   Result Value Ref Range    Extra Tube Hold for add-ons.    CBC Auto Differential    Collection Time: 12/13/19  8:53 AM   Result Value Ref Range    WBC 31.76 (C) 3.40 - 10.80 10*3/mm3    RBC 5.50 4.14 - 5.80 10*6/mm3    Hemoglobin 15.8 13.0 - 17.7 g/dL    Hematocrit 48.4 37.5 - 51.0 %    MCV 88.0 79.0 - 97.0 fL    MCH 28.7 26.6 - 33.0 pg    MCHC 32.6 31.5 - 35.7 g/dL    RDW 13.8 12.3 - 15.4 %    RDW-SD 44.5 37.0 - 54.0 fl    MPV 10.9 6.0 - 12.0 fL    Platelets 241 140 - 450 10*3/mm3   Manual Differential    Collection Time: 12/13/19  8:53 AM   Result Value Ref Range    Neutrophil % 75.0 42.7 - 76.0 %    Lymphocyte % 4.0 (L) 19.6 - 45.3 %    Monocyte % 10.0 5.0 - 12.0 %    Eosinophil % 0.0 (L) 0.3 - 6.2 %    Basophil % 0.0 0.0 - 1.5 %    Bands %  9.0 (H) 0.0 - 5.0 %    Atypical Lymphocyte % 2.0 0.0 - 5.0 %    Neutrophils Absolute 26.68 (H) 1.70 - 7.00 10*3/mm3    Lymphocytes Absolute 1.27 0.70 - 3.10 10*3/mm3    Monocytes Absolute 3.18 (H) 0.10 - 0.90 10*3/mm3    Eosinophils Absolute 0.00 0.00 - 0.40 10*3/mm3    Basophils Absolute 0.00 0.00 - 0.20 10*3/mm3    Polychromasia Slight/1+ None Seen    WBC Morphology Normal Normal    Platelet Morphology Normal Normal   Procalcitonin    Collection Time: 12/13/19  8:53 AM   Result Value Ref Range    Procalcitonin 0.34 (H) 0.10 - 0.25 ng/mL   Influenza A & B PCR - Swab, Nasopharynx    Collection Time: 12/13/19  9:00 AM   Result Value Ref Range    Influenza A PCR  Not Detected Not Detected    Influenza B PCR Not Detected Not Detected   Troponin    Collection Time: 12/13/19 11:26 AM   Result Value Ref Range    Troponin T <0.010 0.000 - 0.030 ng/mL   Lactic Acid, Plasma    Collection Time: 12/13/19 11:26 AM   Result Value Ref Range    Lactate 2.7 (C) 0.5 - 2.0 mmol/L   Lactic Acid, Reflex Timer (This will reflex a repeat order 3-3:15 hours after ordered.)    Collection Time: 12/13/19 11:26 AM   Result Value Ref Range    Extra Tube Hold for add-ons.    Basic Metabolic Panel    Collection Time: 12/13/19  3:09 PM   Result Value Ref Range    Glucose 184 (H) 65 - 99 mg/dL    BUN 14 8 - 23 mg/dL    Creatinine 0.67 (L) 0.76 - 1.27 mg/dL    Sodium 139 136 - 145 mmol/L    Potassium 3.5 3.5 - 5.2 mmol/L    Chloride 100 98 - 107 mmol/L    CO2 26.0 22.0 - 29.0 mmol/L    Calcium 9.1 8.6 - 10.5 mg/dL    eGFR Non African Amer 116 >60 mL/min/1.73    BUN/Creatinine Ratio 20.9 7.0 - 25.0    Anion Gap 13.0 5.0 - 15.0 mmol/L   CBC (No Diff)    Collection Time: 12/13/19  3:09 PM   Result Value Ref Range    WBC 25.84 (H) 3.40 - 10.80 10*3/mm3    RBC 5.09 4.14 - 5.80 10*6/mm3    Hemoglobin 14.8 13.0 - 17.7 g/dL    Hematocrit 44.6 37.5 - 51.0 %    MCV 87.6 79.0 - 97.0 fL    MCH 29.1 26.6 - 33.0 pg    MCHC 33.2 31.5 - 35.7 g/dL    RDW 13.8 12.3 - 15.4 %    RDW-SD 44.6 37.0 - 54.0 fl    MPV 10.5 6.0 - 12.0 fL    Platelets 212 140 - 450 10*3/mm3   Lactic Acid, Reflex    Collection Time: 12/13/19  4:10 PM   Result Value Ref Range    Lactate 3.7 (C) 0.5 - 2.0 mmol/L     Note: In addition to lab results from this visit, the labs listed above may include labs taken at another facility or during a different encounter within the last 24 hours. Please correlate lab times with ED admission and discharge times for further clarification of the services performed during this visit.    XR Chest 2 View   Final Result   Findings concerning for right lower lobe pneumonia.   Additionally there is a 2.1 cm nodular  opacity seen on the lateral film   of uncertain etiology. Follow-up CT imaging of the chest could further   evaluate when clinically appropriate.       D:  12/13/2019   E:  12/13/2019       This report was finalized on 12/13/2019 3:03 PM by Dr. Sincere Roblero MD.            Vitals:    12/13/19 1154 12/13/19 1300 12/13/19 1541 12/13/19 1740   BP:  144/93  162/81   BP Location:       Patient Position:       Pulse: 73 73 95 87   Resp:  18 18 18   Temp:  98 °F (36.7 °C)  98 °F (36.7 °C)   TempSrc:  Oral  Oral   SpO2: 92% 93% 92% 93%   Weight:  (!) 153 kg (338 lb)     Height:         Medications   sodium chloride 0.9 % flush 10 mL (has no administration in time range)   lidocaine PF 1% (XYLOCAINE) injection 0.1 mL (has no administration in time range)   sodium chloride 0.9 % flush 3 mL (3 mL Intravenous Not Given 12/13/19 1536)   amLODIPine (NORVASC) tablet 10 mg (has no administration in time range)   apixaban (ELIQUIS) tablet 5 mg (has no administration in time range)   cetirizine (zyrTEC) tablet 10 mg (10 mg Oral Given 12/13/19 1507)   hydroCHLOROthiazide (HYDRODIURIL) tablet 50 mg (has no administration in time range)   montelukast (SINGULAIR) tablet 10 mg (10 mg Oral Given 12/13/19 1507)   multivitamin with minerals 1 tablet (1 tablet Oral Given 12/13/19 1507)   sotalol (BETAPACE) tablet 80 mg (has no administration in time range)   sodium chloride 0.9 % flush 10 mL (10 mL Intravenous Given 12/13/19 1507)   sodium chloride 0.9 % flush 10 mL (has no administration in time range)   sodium chloride 0.9 % with KCl 20 mEq/L infusion (125 mL/hr Intravenous Rate/Dose Change 12/13/19 4818)   doxycycline (VIBRAMYCIN) 100 mg/100 mL 0.9% NS MBP (100 mg Intravenous New Bag 12/13/19 1508)   acetaminophen (TYLENOL) tablet 650 mg (has no administration in time range)     Or   acetaminophen (TYLENOL) 160 MG/5ML solution 650 mg (has no administration in time range)     Or   acetaminophen (TYLENOL) suppository 650 mg (has no  administration in time range)   potassium chloride (MICRO-K) CR capsule 40 mEq (has no administration in time range)     Or   potassium chloride (KLOR-CON) packet 40 mEq (has no administration in time range)     Or   potassium chloride 10 mEq in 100 mL IVPB (has no administration in time range)   Magnesium Sulfate 2 gram Bolus, followed by 8 gram infusion (total Mg dose 10 grams)- Mg less than or equal to 1mg/dL (has no administration in time range)     Or   Magnesium Sulfate 2 gram / 50mL Infusion (GIVE X 3 BAGS TO EQUAL 6GM TOTAL DOSE) - Mg 1.1 - 1.5 mg/dl (has no administration in time range)     Or   Magnesium Sulfate 4 gram infusion- Mg 1.6-1.9 mg/dL (has no administration in time range)   albuterol (PROVENTIL) nebulizer solution 0.083% 2.5 mg/3mL (2.5 mg Nebulization Given 12/13/19 1538)   budesonide (PULMICORT) nebulizer solution 0.5 mg ( Nebulization Canceled Entry 12/13/19 1346)   bisacodyl (DULCOLAX) EC tablet 5 mg (has no administration in time range)   magnesium hydroxide (MILK OF MAGNESIA) suspension 2400 mg/10mL 10 mL (has no administration in time range)   ondansetron (ZOFRAN) tablet 4 mg (has no administration in time range)     Or   ondansetron (ZOFRAN) injection 4 mg (has no administration in time range)   calcium carbonate (TUMS) chewable tablet 500 mg (200 mg elemental) (has no administration in time range)   predniSONE (DELTASONE) tablet 20 mg (20 mg Oral Given 12/13/19 1507)   piperacillin-tazobactam (ZOSYN) 3.375 g in iso-osmotic dextrose 50 ml (premix) (has no administration in time range)   doxycycline (MONODOX) capsule 100 mg (has no administration in time range)   amoxicillin-clavulanate (AUGMENTIN) 875-125 MG per tablet 1 tablet (has no administration in time range)   ipratropium-albuterol (DUO-NEB) nebulizer solution 3 mL (3 mL Nebulization Given 12/13/19 1933)   cefTRIAXone (ROCEPHIN) 2 g/100 mL 0.9% NS VTB (RIKY) (2 g Intravenous New Bag 12/13/19 1646)   piperacillin-tazobactam (ZOSYN)  3.375 g in iso-osmotic dextrose 50 ml (premix) (3.375 g Intravenous New Bag 12/13/19 7548)     ECG/EMG Results (last 24 hours)     ** No results found for the last 24 hours. **        ECG 12 Lead   Final Result   Test Reason : 2nd set   Blood Pressure : **/** mmHG   Vent. Rate : 083 BPM     Atrial Rate : 067 BPM      P-R Int : 184 ms          QRS Dur : 096 ms       QT Int : 396 ms       P-R-T Axes : 000 -18 037 degrees      QTc Int : 465 ms      Sinus rhythm with marked sinus arrhythmia   Otherwise normal ECG   When compared with ECG of 13-DEC-2019 08:52,   premature atrial complexes are no longer present   Nonspecific T wave abnormality, worse in Lateral leads   Confirmed by TRAY FOURNIER MD ([id number]) on 12/13/2019 12:38:21 PM      Referred By:  JEMMA           Confirmed By:TRAY FOURNIER MD      ECG 12 Lead   Final Result   Test Reason : chest pain   Blood Pressure : **/** mmHG   Vent. Rate : 080 BPM     Atrial Rate : 326 BPM      P-R Int : 000 ms          QRS Dur : 092 ms       QT Int : 370 ms       P-R-T Axes : 000 -15 047 degrees      QTc Int : 426 ms      Normal sinus rhythm premature atrial complexes   Abnormal ECG   When compared with ECG of 05-AUG-2019 11:04,   Vent. rate has increased BY  26 BPM   Incomplete right bundle branch block is no longer present   Criteria for Septal infarct are no longer present   Confirmed by TRAY FOURNIER MD ([id number]) on 12/13/2019 9:35:18 AM      Referred By:  JEMMA           Confirmed By:TRAY FOURNIER MD                            [facility]    Final diagnoses:   Pneumonia of right middle lobe due to infectious organism (CMS/HCC)   Leukocytosis, unspecified type   Hypoxia       Documentation assistance provided by mehran Dutta.  Information recorded by the scribe was done at my direction and has been verified and validated by me.     Gerardo Dutta  12/13/19 3003       Tray Fournier MD  12/13/19 0869

## 2019-12-13 NOTE — PROGRESS NOTES
Subjective   Luis Perez is a 74 y.o. male.     Shortness of Breath   This is a new problem. The current episode started yesterday. The problem occurs constantly. The problem has been gradually worsening. Associated symptoms include leg swelling. Pertinent negatives include no abdominal pain, chest pain, fever, hemoptysis, neck pain, vomiting or wheezing. The symptoms are aggravated by any activity. Risk factors: Atrial fibrillation. He has tried steroid inhalers and beta agonist inhalers for the symptoms. The treatment provided no relief. His past medical history is significant for asthma.       The following portions of the patient's history were reviewed and updated as appropriate: allergies, current medications, past family history, past medical history, past social history, past surgical history and problem list.    Allergies as of 12/13/2019   • (No Known Allergies)       Current Outpatient Medications on File Prior to Visit   Medication Sig   • acetaminophen (TYLENOL) 500 MG tablet Take 500 mg by mouth Every 6 (Six) Hours As Needed for Mild Pain .   • ADVAIR DISKUS 500-50 MCG/DOSE DISKUS Inhale 1 puff 2 (Two) Times a Day.   • albuterol (PROVENTIL) (2.5 MG/3ML) 0.083% nebulizer solution Take 2.5 mg by nebulization Every 4 (Four) Hours As Needed for Wheezing.   • albuterol sulfate  (90 Base) MCG/ACT inhaler Inhale 2 puffs Every 4 (Four) Hours As Needed for Wheezing.   • amLODIPine (NORVASC) 10 MG tablet Take 1 tablet by mouth Daily.   • doxycycline (VIBRAMYICN) 100 MG tablet Take 1 tablet by mouth 2 (Two) Times a Day.   • ELIQUIS 5 MG tablet tablet TAKE ONE TABLET BY MOUTH TWICE DAILY    • fexofenadine (ALLEGRA ALLERGY) 180 MG tablet Take 1 tablet by mouth Daily.   • FLUZONE HIGH-DOSE 0.5 ML suspension prefilled syringe injection ADM 0.5ML IM UTD   • hepatitis A (HAVRIX) 1440 EL U/ML vaccine Havrix (PF) 1,440 MESERET unit/mL intramuscular syringe   • hydroCHLOROthiazide (HYDRODIURIL) 50 MG tablet Take 1  tablet by mouth Daily.   • montelukast (SINGULAIR) 10 MG tablet Take 10 mg by mouth Daily.   • Multiple Vitamins-Minerals (VITEYES AREDS FORMULA PO) Take 1 tablet by mouth 2 (Two) Times a Day.   • promethazine-dextromethorphan (PROMETHAZINE-DM) 6.25-15 MG/5ML syrup Take 5 mL by mouth 4 (Four) Times a Day As Needed for Cough.   • Sotalol HCl AF 80 MG tablet Take 1 tablet by mouth 2 (Two) Times a Day.     Current Facility-Administered Medications on File Prior to Visit   Medication   • ipratropium-albuterol (DUO-NEB) nebulizer solution 3 mL   • ipratropium-albuterol (DUO-NEB) nebulizer solution 3 mL       Review of Systems   Constitutional: Positive for activity change, chills and fatigue. Negative for diaphoresis and fever.   Respiratory: Positive for cough, chest tightness and shortness of breath. Negative for hemoptysis, wheezing and stridor.    Cardiovascular: Positive for leg swelling. Negative for chest pain and palpitations.   Gastrointestinal: Negative for abdominal pain, diarrhea, nausea and vomiting.   Musculoskeletal: Negative for arthralgias, myalgias and neck pain.   Neurological: Negative.        Objective   Physical Exam   Constitutional: He is oriented to person, place, and time. Vital signs are normal. He appears well-developed and well-nourished. He is cooperative. He does not appear ill. No distress.   HENT:   Right Ear: Tympanic membrane normal.   Left Ear: Tympanic membrane normal.   Mouth/Throat: Uvula is midline and mucous membranes are normal.   Cardiovascular: Normal rate, regular rhythm, S1 normal, S2 normal and normal heart sounds.   No murmur heard.  Pulmonary/Chest: Effort normal and breath sounds normal. No stridor. No respiratory distress.   Musculoskeletal:        Right lower leg: He exhibits edema (mild).        Left lower leg: He exhibits edema (mild).   Neurological: He is alert and oriented to person, place, and time.   Skin: Skin is warm, dry and intact. No rash noted. He is not  diaphoretic.   Psychiatric: He has a normal mood and affect. His behavior is normal. Judgment and thought content normal.   Vitals reviewed.    Vitals:    12/13/19 0757   BP: 140/76   Pulse: 60   Resp: 27   Temp: 97.7 °F (36.5 °C)   SpO2: 92%   *Patient placed on oxygen at 2 L/min, sat increased to 94%.    Body mass index is 43.54 kg/m².    Assessment/Plan   Diagnoses and all orders for this visit:    Shortness of breath  -     aspirin chewable tablet 324 mg    Essential hypertension    Paroxysmal atrial fibrillation (CMS/HCC)    Chest tightness  -     aspirin chewable tablet 324 mg    Abnormal ECG  -     aspirin chewable tablet 324 mg    Other orders  -     ECG 12 Lead       ECG 12 Lead  Date/Time: 12/13/2019 8:33 AM  Performed by: Nicci Gardiner APRN  Authorized by: Nicci Gardiner APRN   Comparison: compared with previous ECG from 10/16/2019  Comparison to previous ECG: Possible anterior MI  Rhythm: atrial fibrillation  Rate: normal  BPM: 85  T elevation: V4 and V5  QRS axis: left    Clinical impression: abnormal EKG  Comments: Possible anterior MI          Discussed ECG findings with patient, advised patient to go to ER for further evaluation. Patient agreeable, family member with patient, declines to go by ambulance, will go now by private vehicle to [facility] ER. Report called to [facility] ER.

## 2019-12-13 NOTE — H&P
[facility] Medicine Services  Clinical Decision Unit  HISTORY & PHYSICAL    Patient Name: Luis Perez  : 1945  MRN: [id number]  Primary Care Physician: Viviane Colon APRN  Date of admission: 2019  8:44 AM      Subjective   Subjective     Chief Complaint:  Cough, fatigue    HPI:  Luis Perez is a 74 y.o. male with a past medical history of PAF on chronic Eliquis, asthma, HTN, HL, morbid obesity who presented to [facility] ED after being sent from PCP office today.  Patient was seen at PCP office for complaints of non-resolving cough/congestion, fatigue, and wheezing.  In PCP office there was some concern for new EKG changes however on eval here EKG consistent with priors.  Patient has been struggling with intermittent mild asthma all fall and increased congestion and allergic symptoms.  Wife endorses patient with postnasal drip at night and nocturnal coughing episodes.  Patient denies fevers, hemoptysis, ill contacts, N/V/D.  Found to meet sepsis criteria due to RLL pneumonia.    Review of Systems   Gen- No fevers, chills  CV- No chest pain, palpitations  Resp- see above  GI- No N/V/D, abd pain  Skin - No rash  All other systems reviewed and negative    Personal History     Past Medical History:   Diagnosis Date   • Arrhythmia     afib   • Asthma    • Bronchitis, chronic (CMS/HCC)    • Cancer (CMS/HCC)     Skin    • Hyperlipidemia    • Hypertension    • Irregular heart beat    • Nephrolithiasis        Past Surgical History:   Procedure Laterality Date   • CARDIOVERSION  2019   • COLONOSCOPY     • NASAL SEPTUM SURGERY      Deviation Repair   • SKIN CANCER EXCISION     • TONSILLECTOMY     • VASECTOMY         Family History: family history includes Alzheimer's disease in his father; Cancer in his mother; Diabetes in his mother; No Known Problems in his maternal grandfather, maternal grandmother, paternal grandfather, and paternal grandmother. Otherwise pertinent FHx was  reviewed and unremarkable.     Social History:  reports that he has never smoked. He quit smokeless tobacco use about 7 months ago.  His smokeless tobacco use included chew. He reports that he drinks alcohol. He reports that he does not use drugs.  Social History     Social History Narrative    caffeine use average I or 2 servings weekly       Medications:  Available home medication information reviewed.  Facility-Administered Medications Prior to Admission   Medication Dose Route Frequency Provider Last Rate Last Dose   • [COMPLETED] aspirin chewable tablet 324 mg  324 mg Oral Once Nicci Gardiner APRN   324 mg at 12/13/19 0815   • [DISCONTINUED] ipratropium-albuterol (DUO-NEB) nebulizer solution 3 mL  3 mL Nebulization 4x Daily - RT Viviane Colon APRN   3 mL at 10/29/19 1835   • [DISCONTINUED] ipratropium-albuterol (DUO-NEB) nebulizer solution 3 mL  3 mL Nebulization 4x Daily - RT Viviane Colon APRN   3 mL at 11/07/19 1906     Medications Prior to Admission   Medication Sig Dispense Refill Last Dose   • acetaminophen (TYLENOL) 500 MG tablet Take 500 mg by mouth Every 6 (Six) Hours As Needed for Mild Pain .   Past Week at Unknown time   • ADVAIR DISKUS 500-50 MCG/DOSE DISKUS Inhale 1 puff 2 (Two) Times a Day.   12/13/2019 at Unknown time   • albuterol (PROVENTIL) (2.5 MG/3ML) 0.083% nebulizer solution Take 2.5 mg by nebulization Every 4 (Four) Hours As Needed for Wheezing. 25 vial 12 12/12/2019 at Unknown time   • albuterol sulfate  (90 Base) MCG/ACT inhaler Inhale 2 puffs Every 4 (Four) Hours As Needed for Wheezing. 1 inhaler 0 12/13/2019 at Unknown time   • amLODIPine (NORVASC) 10 MG tablet Take 1 tablet by mouth Daily. 90 tablet 2 12/13/2019 at Unknown time   • ELIQUIS 5 MG tablet tablet TAKE ONE TABLET BY MOUTH TWICE DAILY  60 tablet 3 12/13/2019 at Unknown time   • fexofenadine (ALLEGRA ALLERGY) 180 MG tablet Take 1 tablet by mouth Daily. 30 tablet 2 Past Week at Unknown time   •  hydroCHLOROthiazide (HYDRODIURIL) 50 MG tablet Take 1 tablet by mouth Daily. 90 tablet 0 12/13/2019 at Unknown time   • montelukast (SINGULAIR) 10 MG tablet Take 10 mg by mouth Daily.   12/13/2019 at Unknown time   • Multiple Vitamins-Minerals (VITEYES AREDS FORMULA PO) Take 1 tablet by mouth 2 (Two) Times a Day.   Past Week at Unknown time   • promethazine-dextromethorphan (PROMETHAZINE-DM) 6.25-15 MG/5ML syrup Take 5 mL by mouth 4 (Four) Times a Day As Needed for Cough. 180 mL 0 12/13/2019 at Unknown time   • Sotalol HCl AF 80 MG tablet Take 1 tablet by mouth 2 (Two) Times a Day. 60 tablet 5 12/13/2019 at Unknown time       No Known Allergies    Objective   Objective     Vital Signs:   Temp:  [97.7 °F (36.5 °C)-98.5 °F (36.9 °C)] 98.5 °F (36.9 °C)  Heart Rate:  [60-84] 73  Resp:  [24-28] 24  BP: (131-155)/(73-93) 131/87        Physical Exam   Constitutional: No acute distress, awake, alert, nontoxic, centrally obese body habitus  Respiratory: Dull RLL, good effort, nonlabored respirations   Cardiovascular: RRR rate controlled, few PVCs on tele, no murmur  Musculoskeletal: Trace peripheral edema, normal muscle tone for age  Psychiatric: Appropriate affect, good insight and judgement, cooperative      Results Reviewed:  ED evaluation significant for marketed leukocytosis, positive procalcitonin, lactic acidosis, and CXR consistent with RLL pneumonia.  CXR also made mention of 2.1 cm right lateral nodule of undetermined significance that would best be evaluated with CT imaging when appropriate.      Assessment/Plan   Assessment / Plan     Active Hospital Problems    Diagnosis POA   • **Pneumonia of right middle lobe due to infectious organism (CMS/HCC) [J18.1] Yes   • Sepsis, unspecified organism (CMS/HCC) [A41.9] Yes   • Asthma [J45.909] Yes   • Asthma exacerbation, mild [J45.901] Yes   • Paroxysmal atrial fibrillation (CMS/HCC) [I48.0] Yes   • Morbidly obese (CMS/HCC) [E66.01] Yes   • Hypertension [I10] Yes        Sepsis  RLL pneumonia, CAP versus aspiration  Mild asthma exacerbation  -Given Rocephin in ED --> change to Zosyn and doxycycline to cover for aspiration pathogens and CAP  -We will give IV antibiotics today, and have ordered to transition to oral antibiotics tomorrow (Augmentin and Doxy) presuming patient will show improvement but can be reevaluated clinically in the morning to ensure appropriate POC  -3-day course of low-dose prednisone to help with mild asthma exacerbation related to inflammation of pneumonia  -Scheduled albuterol and Pulmicort nebs, aggressive use of IS    Discharge Blueprint:  1. Weaned from O2  2. WBC trending down  3. Converted to PO abx and tolerating (will receive IV overnight then change to PO tomorrow)      Admission Status:   I believe this patient meets INPATIENT status due to sepsis criteria met and pneumonia age>64yo.  I feel patient’s risk for adverse outcomes and need for care warrant INPATIENT evaluation and I predict the patient’s care encounter to likely last beyond 2 midnights.    Electronically signed by Mena Johnson MD, 12/13/19, 1:19 PM.

## 2019-12-13 NOTE — PLAN OF CARE
Problem: Patient Care Overview  Goal: Plan of Care Review  Outcome: Ongoing (interventions implemented as appropriate)  Flowsheets  Taken 12/13/2019 1621  Progress: no change  Taken 12/13/2019 1230  Plan of Care Reviewed With: patient;spouse  Note:   VSS. Afib on monitor. Pt arrived on unit with diagnosis of pneumonia. Afebrile. Incentive spirometer given to pt. Will continue to monitor.

## 2019-12-13 NOTE — PATIENT INSTRUCTIONS
Shortness of Breath, Adult  Shortness of breath is when a person has trouble breathing enough air or when a person feels like she or he is having trouble breathing in enough air. Shortness of breath could be a sign of a medical problem.  Follow these instructions at home:    · Pay attention to any changes in your symptoms.  · Do not use any products that contain nicotine or tobacco, such as cigarettes, e-cigarettes, and chewing tobacco.  · Do not smoke. Smoking is a common cause of shortness of breath. If you need help quitting, ask your health care provider.  · Avoid things that can irritate your airways, such as:  ? Mold.  ? Dust.  ? Air pollution.  ? Chemical fumes.  ? Things that can cause allergy symptoms (allergens), if you have allergies.  · Keep your living space clean and free of mold and dust.  · Rest as needed. Slowly return to your usual activities.  · Take over-the-counter and prescription medicines only as told by your health care provider. This includes oxygen therapy and inhaled medicines.  · Keep all follow-up visits as told by your health care provider. This is important.  Contact a health care provider if:  · Your condition does not improve as soon as expected.  · You have a hard time doing your normal activities, even after you rest.  · You have new symptoms.  Get help right away if:  · Your shortness of breath gets worse.  · You have shortness of breath when you are resting.  · You feel light-headed or you faint.  · You have a cough that is not controlled with medicines.  · You cough up blood.  · You have pain with breathing.  · You have pain in your chest, arms, shoulders, or abdomen.  · You have a fever.  · You cannot walk up stairs or exercise the way that you normally do.  These symptoms may represent a serious problem that is an emergency. Do not wait to see if the symptoms will go away. Get medical help right away. Call your local emergency services (911 in the [place] Do not drive yourself  to the hospital.  Summary  · Shortness of breath is when a person has trouble breathing enough air. It can be a sign of a medical problem.  · Avoid things that irritate your lungs, such as smoking, pollution, mold, and dust.  · Pay attention to changes in your symptoms and contact your health care provider if you have a hard time completing daily activities because of shortness of breath.  This information is not intended to replace advice given to you by your health care provider. Make sure you discuss any questions you have with your health care provider.  Document Released: 09/12/2002 Document Revised: 05/20/2019 Document Reviewed: 05/20/2019  [employer] Interactive Patient Education © 2019 [employer]

## 2019-12-14 VITALS
DIASTOLIC BLOOD PRESSURE: 87 MMHG | OXYGEN SATURATION: 93 % | HEART RATE: 77 BPM | WEIGHT: 315 LBS | TEMPERATURE: 97.9 F | RESPIRATION RATE: 18 BRPM | BODY MASS INDEX: 41.75 KG/M2 | HEIGHT: 73 IN | SYSTOLIC BLOOD PRESSURE: 157 MMHG

## 2019-12-14 PROBLEM — A41.9 SEPSIS, UNSPECIFIED ORGANISM (HCC): Status: RESOLVED | Noted: 2019-12-13 | Resolved: 2019-12-14

## 2019-12-14 LAB
ANION GAP SERPL CALCULATED.3IONS-SCNC: 11 MMOL/L (ref 5–15)
BUN BLD-MCNC: 13 MG/DL (ref 8–23)
BUN/CREAT SERPL: 20.6 (ref 7–25)
CALCIUM SPEC-SCNC: 9 MG/DL (ref 8.6–10.5)
CHLORIDE SERPL-SCNC: 103 MMOL/L (ref 98–107)
CO2 SERPL-SCNC: 26 MMOL/L (ref 22–29)
CREAT BLD-MCNC: 0.63 MG/DL (ref 0.76–1.27)
D-LACTATE SERPL-SCNC: 1.5 MMOL/L (ref 0.5–2)
DEPRECATED RDW RBC AUTO: 44.8 FL (ref 37–54)
ERYTHROCYTE [DISTWIDTH] IN BLOOD BY AUTOMATED COUNT: 13.8 % (ref 12.3–15.4)
GFR SERPL CREATININE-BSD FRML MDRD: 124 ML/MIN/1.73
GLUCOSE BLD-MCNC: 136 MG/DL (ref 65–99)
HCT VFR BLD AUTO: 41.9 % (ref 37.5–51)
HGB BLD-MCNC: 13.9 G/DL (ref 13–17.7)
MCH RBC QN AUTO: 29.3 PG (ref 26.6–33)
MCHC RBC AUTO-ENTMCNC: 33.2 G/DL (ref 31.5–35.7)
MCV RBC AUTO: 88.4 FL (ref 79–97)
PLATELET # BLD AUTO: 213 10*3/MM3 (ref 140–450)
PMV BLD AUTO: 10.6 FL (ref 6–12)
POTASSIUM BLD-SCNC: 3.6 MMOL/L (ref 3.5–5.2)
PROCALCITONIN SERPL-MCNC: 0.24 NG/ML (ref 0.1–0.25)
RBC # BLD AUTO: 4.74 10*6/MM3 (ref 4.14–5.8)
SODIUM BLD-SCNC: 140 MMOL/L (ref 136–145)
WBC NRBC COR # BLD: 20.62 10*3/MM3 (ref 3.4–10.8)

## 2019-12-14 PROCEDURE — 99239 HOSP IP/OBS DSCHRG MGMT >30: CPT | Performed by: NURSE PRACTITIONER

## 2019-12-14 PROCEDURE — 85027 COMPLETE CBC AUTOMATED: CPT | Performed by: FAMILY MEDICINE

## 2019-12-14 PROCEDURE — 84145 PROCALCITONIN (PCT): CPT

## 2019-12-14 PROCEDURE — 83605 ASSAY OF LACTIC ACID: CPT | Performed by: FAMILY MEDICINE

## 2019-12-14 PROCEDURE — 80048 BASIC METABOLIC PNL TOTAL CA: CPT | Performed by: FAMILY MEDICINE

## 2019-12-14 PROCEDURE — 94799 UNLISTED PULMONARY SVC/PX: CPT

## 2019-12-14 PROCEDURE — 63710000001 PREDNISONE PER 1 MG: Performed by: FAMILY MEDICINE

## 2019-12-14 RX ORDER — ASPIRIN 81 MG/1
324 TABLET, CHEWABLE ORAL ONCE
Status: SHIPPED | OUTPATIENT
Start: 2019-12-15

## 2019-12-14 RX ORDER — PREDNISONE 20 MG/1
20 TABLET ORAL DAILY
Qty: 1 TABLET | Refills: 0 | Status: SHIPPED | OUTPATIENT
Start: 2019-12-15 | End: 2019-12-16

## 2019-12-14 RX ORDER — AMOXICILLIN AND CLAVULANATE POTASSIUM 875; 125 MG/1; MG/1
1 TABLET, FILM COATED ORAL EVERY 12 HOURS SCHEDULED
Qty: 11 TABLET | Refills: 0 | Status: SHIPPED | OUTPATIENT
Start: 2019-12-14 | End: 2019-12-20

## 2019-12-14 RX ORDER — DOXYCYCLINE 100 MG/1
100 CAPSULE ORAL EVERY 12 HOURS SCHEDULED
Qty: 11 CAPSULE | Refills: 0 | Status: SHIPPED | OUTPATIENT
Start: 2019-12-14 | End: 2019-12-20

## 2019-12-14 RX ADMIN — AMOXICILLIN AND CLAVULANATE POTASSIUM 1 TABLET: 875; 125 TABLET, FILM COATED ORAL at 09:23

## 2019-12-14 RX ADMIN — MULTIPLE VITAMINS W/ MINERALS TAB 1 TABLET: TAB ORAL at 09:23

## 2019-12-14 RX ADMIN — MONTELUKAST SODIUM 10 MG: 10 TABLET, COATED ORAL at 09:24

## 2019-12-14 RX ADMIN — AMLODIPINE BESYLATE 10 MG: 5 TABLET ORAL at 09:24

## 2019-12-14 RX ADMIN — CETIRIZINE HYDROCHLORIDE 10 MG: 10 TABLET, FILM COATED ORAL at 09:23

## 2019-12-14 RX ADMIN — BUDESONIDE 0.5 MG: 0.5 INHALANT RESPIRATORY (INHALATION) at 08:15

## 2019-12-14 RX ADMIN — ALBUTEROL SULFATE 2.5 MG: 2.5 SOLUTION RESPIRATORY (INHALATION) at 15:29

## 2019-12-14 RX ADMIN — ALBUTEROL SULFATE 2.5 MG: 2.5 SOLUTION RESPIRATORY (INHALATION) at 03:31

## 2019-12-14 RX ADMIN — DOXYCYCLINE 100 MG: 100 CAPSULE ORAL at 09:24

## 2019-12-14 RX ADMIN — ALBUTEROL SULFATE 2.5 MG: 2.5 SOLUTION RESPIRATORY (INHALATION) at 12:06

## 2019-12-14 RX ADMIN — APIXABAN 5 MG: 5 TABLET, FILM COATED ORAL at 09:24

## 2019-12-14 RX ADMIN — HYDROCHLOROTHIAZIDE 50 MG: 25 TABLET ORAL at 09:23

## 2019-12-14 RX ADMIN — SODIUM CHLORIDE, PRESERVATIVE FREE 10 ML: 5 INJECTION INTRAVENOUS at 09:24

## 2019-12-14 RX ADMIN — PREDNISONE 20 MG: 20 TABLET ORAL at 09:23

## 2019-12-14 RX ADMIN — SOTALOL HYDROCHLORIDE 80 MG: 80 TABLET ORAL at 09:24

## 2019-12-14 RX ADMIN — ALBUTEROL SULFATE 2.5 MG: 2.5 SOLUTION RESPIRATORY (INHALATION) at 08:15

## 2019-12-14 RX ADMIN — DOXYCYCLINE 100 MG: 100 INJECTION, POWDER, LYOPHILIZED, FOR SOLUTION INTRAVENOUS at 04:56

## 2019-12-14 NOTE — DISCHARGE SUMMARY
[facility] Medicine Services  Clinical Decision Unit  DISCHARGE SUMMARY    Patient Name: Luis Perez  : 1945  MRN: [id number]    Admission Date and Time: 2019  8:44 AM  Discharge Date and Time:  19    Primary Care Physician: Viviane Colon APRN    Hospital Course     Active Hospital Problems    Diagnosis  POA   • **Pneumonia of right middle lobe due to infectious organism (CMS/HCC) [J18.1]  Yes   • Asthma [J45.909]  Yes   • Asthma exacerbation, mild [J45.901]  Yes   • Paroxysmal atrial fibrillation (CMS/HCC) [I48.0]  Yes   • Morbidly obese (CMS/HCC) [E66.01]  Yes   • Hypertension [I10]  Yes      Resolved Hospital Problems    Diagnosis Date Resolved POA   • Sepsis, unspecified organism (CMS/HCC) [A41.9] 2019 Yes          Brief CDU Course:  Luis Perez is a 74 y.o. male with a past medical history of PAF on chronic Eliquis, asthma, HTN, HL, morbid obesity who presented to [facility] ED after being sent from PCP office.  Patient was seen at PCP office for complaints of non-resolving cough/congestion, fatigue, and wheezing.  In PCP office there was some concern for new EKG changes however on eval here EKG consistent with priors.  Patient has been struggling with intermittent mild asthma all fall and increased congestion and allergic symptoms.  Found to meet sepsis criteria due to RLL pneumonia.     Sepsis--Resolved  RLL pneumonia, CAP versus aspiration  Mild asthma exacerbation--improved  Nodular opacity per chest x-ray  -Chest x-ray on admission showed findings concerning for right lower lobe pneumonia  -Given Rocephin in ED --> change to Zosyn and doxycycline to cover for aspiration pathogens and CAP given overnight.  -Switch to p.o. Augmentin and Doxy today  -3-day course of low-dose prednisone to help with mild asthma exacerbation related to inflammation of pneumonia  -continue home neb treatments  -We will need CT of the chest done outpatient to assess nodular  opacity seen on chest x-ray, will defer to PCP     Discharge Blueprint:  1. Weaned from O2--93% on RA  2. WBC trending down--Met  3. Converted to PO abx and tolerating--Met      Key Discharge Recommendations:  -Follow-up with PCP in 1 week    Discharge Evaluation     Vital Signs:   Temp:  [97.8 °F (36.6 °C)-98.5 °F (36.9 °C)] 97.9 °F (36.6 °C)  Heart Rate:  [67-95] 70  Resp:  [16-18] 16  BP: (142-166)/(81-96) 157/87     Physical Exam:  Constitutional: Awake, alert, sitting up in bed, no family present  Eyes: PERRLA, sclerae anicteric, no conjunctival injection  HENT: NCAT, mucous membranes moist  Neck: Supple, no thyromegaly, no lymphadenopathy, trachea midline  Respiratory: Clear to auscultation bilaterally, nonlabored respirations   Cardiovascular: RRR, no murmurs, rubs, or gallops, palpable pedal pulses bilaterally  Gastrointestinal: Positive bowel sounds, soft, nontender, nondistended  Musculoskeletal: No bilateral ankle edema, no clubbing or cyanosis to extremities, moves all extremities  Psychiatric: Appropriate affect, cooperative  Neurologic: Oriented x 3, strength symmetric in all extremities, Cranial Nerves grossly intact to confrontation, speech clear  Skin: No rashes       Discharge Medications and Follow-Up        Discharge Medications      New Medications      Instructions Start Date   amoxicillin-clavulanate 875-125 MG per tablet  Commonly known as:  AUGMENTIN   1 tablet, Oral, Every 12 Hours Scheduled      doxycycline 100 MG capsule  Commonly known as:  MONODOX   100 mg, Oral, Every 12 Hours Scheduled      predniSONE 20 MG tablet  Commonly known as:  DELTASONE   20 mg, Oral, Daily   Start Date:  December 15, 2019        Continue These Medications      Instructions Start Date   acetaminophen 500 MG tablet  Commonly known as:  TYLENOL   500 mg, Oral, Every 6 Hours PRN      ADVAIR DISKUS 500-50 MCG/DOSE DISKUS  Generic drug:  fluticasone-salmeterol   1 puff, Inhalation, 2 Times Daily      albuterol  sulfate  (90 Base) MCG/ACT inhaler  Commonly known as:  PROVENTIL HFA;VENTOLIN HFA;PROAIR HFA   2 puffs, Inhalation, Every 4 Hours PRN      albuterol (2.5 MG/3ML) 0.083% nebulizer solution  Commonly known as:  PROVENTIL   2.5 mg, Nebulization, Every 4 Hours PRN      amLODIPine 10 MG tablet  Commonly known as:  NORVASC   10 mg, Oral, Daily      ELIQUIS 5 MG tablet tablet  Generic drug:  apixaban   TAKE ONE TABLET BY MOUTH TWICE DAILY       fexofenadine 180 MG tablet  Commonly known as:  ALLEGRA ALLERGY   180 mg, Oral, Daily      hydroCHLOROthiazide 50 MG tablet  Commonly known as:  HYDRODIURIL   50 mg, Oral, Daily      montelukast 10 MG tablet  Commonly known as:  SINGULAIR   10 mg, Oral, Daily      promethazine-dextromethorphan 6.25-15 MG/5ML syrup  Commonly known as:  PROMETHAZINE-DM   5 mL, Oral, 4 Times Daily PRN      Sotalol HCl AF 80 MG tablet   80 mg, Oral, 2 Times Daily      VITEYES AREDS FORMULA PO   1 tablet, Oral, 2 Times Daily               Future Appointments   Date Time Provider Department Center   1/3/2020  8:00 PM  [facility]  [facility]   1/10/2020 10:30 AM Arpita Terry APRN [facility] None   3/18/2020  3:30 PM Norberto Perez MD [facility] None           Time Spent on Discharge:  45 minutes    Electronically signed by ANGELIA Kemp, 12/14/19, 11:18 AM.

## 2019-12-14 NOTE — PLAN OF CARE
No c/o pain on this shift. Patient on 2L NC. O2 sat dropped into high 80's when ambulating to bathroom. Was able to maintain high 90's once back in the bed and on O2.Patient tolerated meds PO. Patient due to be transferred to inpatient status. Will continue to monitor.

## 2019-12-16 ENCOUNTER — TRANSITIONAL CARE MANAGEMENT TELEPHONE ENCOUNTER (OUTPATIENT)
Dept: CALL CENTER | Facility: HOSPITAL | Age: 74
End: 2019-12-16

## 2019-12-18 LAB
BACTERIA SPEC AEROBE CULT: NORMAL
BACTERIA SPEC AEROBE CULT: NORMAL

## 2019-12-31 ENCOUNTER — HOSPITAL ENCOUNTER (OUTPATIENT)
Dept: GENERAL RADIOLOGY | Facility: HOSPITAL | Age: 74
Discharge: HOME | End: 2019-12-31
Admitting: NURSE PRACTITIONER

## 2019-12-31 ENCOUNTER — OFFICE VISIT (OUTPATIENT)
Dept: FAMILY MEDICINE CLINIC | Facility: CLINIC | Age: 74
End: 2019-12-31

## 2019-12-31 VITALS
OXYGEN SATURATION: 93 % | HEIGHT: 73 IN | BODY MASS INDEX: 41.75 KG/M2 | TEMPERATURE: 97.6 F | DIASTOLIC BLOOD PRESSURE: 84 MMHG | HEART RATE: 62 BPM | SYSTOLIC BLOOD PRESSURE: 142 MMHG | WEIGHT: 315 LBS | RESPIRATION RATE: 19 BRPM

## 2019-12-31 DIAGNOSIS — I48.0 PAROXYSMAL ATRIAL FIBRILLATION (HCC): ICD-10-CM

## 2019-12-31 DIAGNOSIS — J18.9 PNEUMONIA OF RIGHT LOWER LOBE DUE TO INFECTIOUS ORGANISM: ICD-10-CM

## 2019-12-31 DIAGNOSIS — J45.20 MILD INTERMITTENT ASTHMA WITHOUT COMPLICATION: Primary | Chronic | ICD-10-CM

## 2019-12-31 PROCEDURE — 71046 X-RAY EXAM CHEST 2 VIEWS: CPT

## 2019-12-31 PROCEDURE — 99214 OFFICE O/P EST MOD 30 MIN: CPT | Performed by: NURSE PRACTITIONER

## 2019-12-31 NOTE — PROGRESS NOTES
"Luis Perez is a 74 y.o. male who presents for follow up of pneumonia.    Chief Complaint   Patient presents with   • Follow-up     SOA pt states that he just had some questions.        Pneumonia   He complains of cough, difficulty breathing, shortness of breath and wheezing. This is a new problem. The current episode started more than 1 month ago. The problem occurs daily. The problem has been rapidly improving. The cough is non-productive and dry. Associated symptoms include chest pain, dyspnea on exertion, headaches, malaise/fatigue, nasal congestion and rhinorrhea. His symptoms are aggravated by any activity, change in weather and climbing stairs. His symptoms are alleviated by prescription cough suppressant, leukotriene antagonist, rest, steroid inhaler and oral steroids. He reports significant improvement on treatment. His past medical history is significant for asthma and bronchitis.         No Known Allergies    Health Maintenance   Topic Date Due   • URINE MICROALBUMIN  1945   • TDAP/TD VACCINES (1 - Tdap) 09/20/1956   • DIABETIC FOOT EXAM  08/23/2016   • AAA SCREEN (ONE-TIME)  11/03/2016   • MEDICARE ANNUAL WELLNESS  08/23/2019   • LIPID PANEL  03/05/2020   • HEMOGLOBIN A1C  04/22/2020   • DIABETIC EYE EXAM  05/13/2020   • COLONOSCOPY  06/28/2029   • HEPATITIS C SCREENING  Completed   • Pneumococcal Vaccine Once at 65 Years Old  Completed   • INFLUENZA VACCINE  Completed   • ZOSTER VACCINE  Discontinued        ROS    Review of Systems   Constitutional: Positive for malaise/fatigue.   HENT: Positive for rhinorrhea.    Respiratory: Positive for cough, shortness of breath and wheezing.    Cardiovascular: Positive for chest pain and dyspnea on exertion.       Vitals:    12/31/19 0810   BP: 142/84   Pulse: 62   Resp: 19   Temp: 97.6 °F (36.4 °C)   SpO2: 93%   Weight: (!) 148 kg (326 lb)   Height: 185.4 cm (73\")         Current Outpatient Medications:   •  acetaminophen (TYLENOL) 500 MG tablet, Take 500 " mg by mouth Every 6 (Six) Hours As Needed for Mild Pain ., Disp: , Rfl:   •  albuterol (PROVENTIL) (2.5 MG/3ML) 0.083% nebulizer solution, Take 2.5 mg by nebulization Every 4 (Four) Hours As Needed for Wheezing., Disp: 25 vial, Rfl: 12  •  albuterol sulfate  (90 Base) MCG/ACT inhaler, Inhale 2 puffs Every 4 (Four) Hours As Needed for Wheezing., Disp: 1 inhaler, Rfl: 0  •  amLODIPine (NORVASC) 10 MG tablet, Take 1 tablet by mouth Daily., Disp: 90 tablet, Rfl: 2  •  ELIQUIS 5 MG tablet tablet, TAKE ONE TABLET BY MOUTH TWICE DAILY , Disp: 60 tablet, Rfl: 3  •  fexofenadine (ALLEGRA ALLERGY) 180 MG tablet, Take 1 tablet by mouth Daily., Disp: 30 tablet, Rfl: 2  •  hydroCHLOROthiazide (HYDRODIURIL) 50 MG tablet, Take 1 tablet by mouth Daily., Disp: 90 tablet, Rfl: 0  •  montelukast (SINGULAIR) 10 MG tablet, Take 10 mg by mouth Daily., Disp: , Rfl:   •  Multiple Vitamins-Minerals (VITEYES AREDS FORMULA PO), Take 1 tablet by mouth 2 (Two) Times a Day., Disp: , Rfl:   •  promethazine-dextromethorphan (PROMETHAZINE-DM) 6.25-15 MG/5ML syrup, Take 5 mL by mouth 4 (Four) Times a Day As Needed for Cough., Disp: 180 mL, Rfl: 0  •  Sotalol HCl AF 80 MG tablet, Take 1 tablet by mouth 2 (Two) Times a Day., Disp: 60 tablet, Rfl: 5    Current Facility-Administered Medications:   •  aspirin chewable tablet 324 mg, 324 mg, Oral, Once, Arpita Mcdowell, ANGELIA    PE    Physical Exam   Constitutional: He appears well-developed and well-nourished.   Cardiovascular: Normal rate. An irregular rhythm present.   Pulmonary/Chest: Effort normal. He has wheezes.   Nursing note and vitals reviewed.       A/P    Problem List Items Addressed This Visit        Cardiovascular and Mediastinum    Paroxysmal atrial fibrillation (CMS/HCC)    Relevant Medications    amLODIPine (NORVASC) 10 MG tablet    Sotalol HCl AF 80 MG tablet       Respiratory    Mild intermittent asthma without complication - Primary (Chronic)    Relevant Medications     montelukast (SINGULAIR) 10 MG tablet    albuterol sulfate  (90 Base) MCG/ACT inhaler    albuterol (PROVENTIL) (2.5 MG/3ML) 0.083% nebulizer solution    Other Relevant Orders    XR Chest PA & Lateral (Completed)      Other Visit Diagnoses     Pneumonia of right lower lobe due to infectious organism (CMS/[facility])        Relevant Orders    XR Chest PA & Lateral (Completed)          Plan of care reviewed with patient at the conclusion of today's visit. Education was provided regarding diagnosis, management and any prescribed or recommended OTC medications.  Patient verbalizes understanding of and agreement with management plan.    Return in about 4 weeks (around 1/28/2020) for Recheck.     ANGELIA Christopher

## 2020-01-03 ENCOUNTER — HOSPITAL ENCOUNTER (OUTPATIENT)
Dept: SLEEP MEDICINE | Facility: HOSPITAL | Age: 75
Discharge: HOME | End: 2020-01-03
Admitting: INTERNAL MEDICINE

## 2020-01-03 VITALS
OXYGEN SATURATION: 94 % | BODY MASS INDEX: 41.75 KG/M2 | HEART RATE: 68 BPM | SYSTOLIC BLOOD PRESSURE: 160 MMHG | DIASTOLIC BLOOD PRESSURE: 94 MMHG | HEIGHT: 73 IN | WEIGHT: 315 LBS

## 2020-01-03 DIAGNOSIS — G47.33 OBSTRUCTIVE SLEEP APNEA, ADULT: ICD-10-CM

## 2020-01-03 DIAGNOSIS — R06.83 SNORING: ICD-10-CM

## 2020-01-03 PROCEDURE — 95810 POLYSOM 6/> YRS 4/> PARAM: CPT

## 2020-01-03 PROCEDURE — 95810 POLYSOM 6/> YRS 4/> PARAM: CPT | Performed by: INTERNAL MEDICINE

## 2020-01-04 DIAGNOSIS — G47.33 OBSTRUCTIVE SLEEP APNEA, ADULT: Primary | ICD-10-CM

## 2020-01-04 DIAGNOSIS — R06.83 SNORING: ICD-10-CM

## 2020-01-13 ENCOUNTER — OFFICE VISIT (OUTPATIENT)
Dept: SLEEP MEDICINE | Facility: HOSPITAL | Age: 75
End: 2020-01-13

## 2020-01-13 VITALS
DIASTOLIC BLOOD PRESSURE: 83 MMHG | HEIGHT: 73 IN | HEART RATE: 65 BPM | WEIGHT: 315 LBS | SYSTOLIC BLOOD PRESSURE: 146 MMHG | OXYGEN SATURATION: 95 % | BODY MASS INDEX: 41.75 KG/M2

## 2020-01-13 DIAGNOSIS — G47.34 NOCTURNAL HYPOXEMIA: ICD-10-CM

## 2020-01-13 DIAGNOSIS — G47.33 OBSTRUCTIVE SLEEP APNEA, ADULT: Primary | ICD-10-CM

## 2020-01-13 PROCEDURE — 99213 OFFICE O/P EST LOW 20 MIN: CPT | Performed by: NURSE PRACTITIONER

## 2020-01-13 NOTE — PATIENT INSTRUCTIONS
Hypoxemia  Hypoxemia occurs when the blood does not contain enough oxygen. The body cannot work well when it does not have enough oxygen because every part of the body needs oxygen. Oxygen enters the lungs when we breathe in, then it travels to all parts of the body through the blood. Hypoxemia can develop suddenly or slowly.  What are the causes?  Common causes of this condition include:  · Long-term (chronic) lung diseases, such as chronic obstructive pulmonary disease (COPD) or interstitial lung disease.  · Disorders that affect breathing at night, such as sleep apnea.  · Fluid buildup in the lungs (pulmonary edema).  · Lung infection (pneumonia).  · Lung or throat cancer.  · Abnormal blood flow that bypasses the lungs (having a shunt).  · Certain diseases that affect nerves or muscles.  · A collapsed lung (pneumothorax).  · A blood clot in the lungs (pulmonary embolus).  · Certain types of heart disease.  · Slow or shallow breathing (hypoventilation).  · Certain medicines.  · High altitudes.  · Toxic chemicals, smoke, and gases.  What are the signs or symptoms?  In some cases, there may be no symptoms of this condition. If you do have symptoms, they may include:  · Shortness of breath (dyspnea).  · Bluish color of the skin, lips, or nail beds.  · Breathing that is fast, noisy, or shallow.  · A fast heartbeat.  · Feeling tired or sleepy.  · Feeling confused or worried.  If hypoxemia develops quickly, you will likely have dyspnea. If hypoxemia develops slowly over months or years, you may not notice any symptoms.  How is this diagnosed?  This condition is diagnosed by:  · A physical exam.  · Blood tests.  · A test that measures the percentage of oxygen in your blood (pulse oximetry). This is done with a sensor that is placed on your finger, toe, or earlobe.  How is this treated?    Treatment for this condition depends on the underlying cause of your hypoxemia. You will likely be treated with oxygen therapy to  restore your blood oxygen level. Depending on the cause of your hypoxemia, you may need oxygen therapy for a short time (weeks or months), or you may need it for the rest of your life.  Your health care provider may also recommend other therapies to treat the underlying cause of your hypoxemia.  Follow these instructions at home:    · Take over-the-counter and prescription medicines only as told by your health care provider.  · If you are on oxygen therapy, follow oxygen safety precautions as directed by your health care provider. These may include:  ? Always having a backup supply of oxygen.  ? Not allowing anyone to smoke or have a fire around oxygen.  ? Handling oxygen tanks carefully and as instructed.  · Do not use any products that contain nicotine or tobacco, such as cigarettes and e-cigarettes. If you need help quitting, ask your health care provider. Stay away from people who smoke.  · Keep all follow-up visits as told by your health care provider. This is important.  Contact a health care provider if:  · You have any concerns about your oxygen therapy.  · You have trouble breathing, even during or after treatment.  · You become short of breath when you exercise.  · You are tired when you wake up.  · You have a headache when you wake up.  Get help right away if:  · Your shortness of breath gets worse, especially with normal or minimal activity.  · You have a bluish color of the skin, lips, or nail beds.  · You become confused or you cannot think properly.  · You cough up dark mucus or blood.  · You have chest pain.  · You have a fever.  Summary  · Hypoxemia occurs when the blood does not contain enough oxygen.  · Hypoxemia may or may not cause symptoms. Often, the main symptom is shortness of breath (dyspnea).  · Depending on the cause of your hypoxemia, you may need oxygen therapy for a short time (weeks or months), or you may need it for the rest of your life.  · If you are on oxygen therapy, follow  oxygen safety precautions as directed by your health care provider.  This information is not intended to replace advice given to you by your health care provider. Make sure you discuss any questions you have with your health care provider.  Document Released: 07/02/2012 Document Revised: 11/21/2017 Document Reviewed: 11/21/2017  [employer] Interactive Patient Education © 2019 [employer]  Sleep Apnea  Sleep apnea affects breathing during sleep. It causes breathing to stop for a short time or to become shallow. It can also increase the risk of:  · Heart attack.  · Stroke.  · Being very overweight (obese).  · Diabetes.  · Heart failure.  · Irregular heartbeat.  The goal of treatment is to help you breathe normally again.  What are the causes?  There are three kinds of sleep apnea:  · Obstructive sleep apnea. This is caused by a blocked or collapsed airway.  · Central sleep apnea. This happens when the brain does not send the right signals to the muscles that control breathing.  · Mixed sleep apnea. This is a combination of obstructive and central sleep apnea.  The most common cause of this condition is a collapsed or blocked airway. This can happen if:  · Your throat muscles are too relaxed.  · Your tongue and tonsils are too large.  · You are overweight.  · Your airway is too small.  What increases the risk?  · Being overweight.  · Smoking.  · Having a small airway.  · Being older.  · Being male.  · Drinking alcohol.  · Taking medicines to calm yourself (sedatives or tranquilizers).  · Having family members with the condition.  What are the signs or symptoms?  · Trouble staying asleep.  · Being sleepy or tired during the day.  · Getting angry a lot.  · Loud snoring.  · Headaches in the morning.  · Not being able to focus your mind (concentrate).  · Forgetting things.  · Less interest in sex.  · Mood swings.  · Personality changes.  · Feelings of sadness (depression).  · Waking up a lot during the night to pee  (urinate).  · Dry mouth.  · Sore throat.  How is this diagnosed?  · Your medical history.  · A physical exam.  · A test that is done when you are sleeping (sleep study). The test is most often done in a sleep lab but may also be done at home.  How is this treated?    · Sleeping on your side.  · Using a medicine to get rid of mucus in your nose (decongestant).  · Avoiding the use of alcohol, medicines to help you relax, or certain pain medicines (narcotics).  · Losing weight, if needed.  · Changing your diet.  · Not smoking.  · Using a machine to open your airway while you sleep, such as:  ? An oral appliance. This is a mouthpiece that shifts your lower jaw forward.  ? A CPAP device. This device blows air through a mask when you breathe out (exhale).  ? An EPAP device. This has valves that you put in each nostril.  ? A BPAP device. This device blows air through a mask when you breathe in (inhale) and breathe out.  · Having surgery if other treatments do not work.  It is important to get treatment for sleep apnea. Without treatment, it can lead to:  · High blood pressure.  · Coronary artery disease.  · In men, not being able to have an erection (impotence).  · Reduced thinking ability.  Follow these instructions at home:  Lifestyle  · Make changes that your doctor recommends.  · Eat a healthy diet.  · Lose weight if needed.  · Avoid alcohol, medicines to help you relax, and some pain medicines.  · Do not use any products that contain nicotine or tobacco, such as cigarettes, e-cigarettes, and chewing tobacco. If you need help quitting, ask your doctor.  General instructions  · Take over-the-counter and prescription medicines only as told by your doctor.  · If you were given a machine to use while you sleep, use it only as told by your doctor.  · If you are having surgery, make sure to tell your doctor you have sleep apnea. You may need to bring your device with you.  · Keep all follow-up visits as told by your doctor.  This is important.  Contact a doctor if:  · The machine that you were given to use during sleep bothers you or does not seem to be working.  · You do not get better.  · You get worse.  Get help right away if:  · Your chest hurts.  · You have trouble breathing in enough air.  · You have an uncomfortable feeling in your back, arms, or stomach.  · You have trouble talking.  · One side of your body feels weak.  · A part of your face is hanging down.  These symptoms may be an emergency. Do not wait to see if the symptoms will go away. Get medical help right away. Call your local emergency services (911 in the [place] Do not drive yourself to the hospital.  Summary  · This condition affects breathing during sleep.  · The most common cause is a collapsed or blocked airway.  · The goal of treatment is to help you breathe normally while you sleep.  This information is not intended to replace advice given to you by your health care provider. Make sure you discuss any questions you have with your health care provider.  Document Released: 09/26/2009 Document Revised: 10/04/2019 Document Reviewed: 08/13/2019  [employer] Interactive Patient Education © 2019 [employer]

## 2020-01-13 NOTE — PROGRESS NOTES
Chief Complaint:   Chief Complaint   Patient presents with   • Follow-up       HPI:    Luis Perez is a 74 y.o. male here for follow-up of sleep study results.  Patient was seen here in consult 8/21/2019 for history of HPI up, snoring, and intermittent nonrestorative sleep.  Patient is sleeping 5 to 6 hours nightly and does feel refreshed intermittently upon awakening.  Patient has an [place] score of 3/24.  Of note, patient is in normal sinus rhythm.  Patient had a sleep study 1/3/2020 that did show mild obstructive sleep apnea as well as nocturnal hypoxemia.  We have discussed the consequences of untreated sleep apnea as well as different therapies available to him.  Patient does wish to initiate CPAP therapy.        Current medications are:   Current Outpatient Medications:   •  acetaminophen (TYLENOL) 500 MG tablet, Take 500 mg by mouth Every 6 (Six) Hours As Needed for Mild Pain ., Disp: , Rfl:   •  albuterol (PROVENTIL) (2.5 MG/3ML) 0.083% nebulizer solution, Take 2.5 mg by nebulization Every 4 (Four) Hours As Needed for Wheezing., Disp: 25 vial, Rfl: 12  •  albuterol sulfate  (90 Base) MCG/ACT inhaler, Inhale 2 puffs Every 4 (Four) Hours As Needed for Wheezing., Disp: 1 inhaler, Rfl: 0  •  amLODIPine (NORVASC) 10 MG tablet, Take 1 tablet by mouth Daily., Disp: 90 tablet, Rfl: 2  •  ELIQUIS 5 MG tablet tablet, TAKE ONE TABLET BY MOUTH TWICE DAILY , Disp: 60 tablet, Rfl: 3  •  fexofenadine (ALLEGRA ALLERGY) 180 MG tablet, Take 1 tablet by mouth Daily., Disp: 30 tablet, Rfl: 2  •  Fluticasone Furoate-Vilanterol (BREO ELLIPTA) 100-25 MCG/INH inhaler, Breo Ellipta 100 mcg-25 mcg/dose powder for inhalation  Inhale 1 puff every day by inhalation route., Disp: , Rfl:   •  hydroCHLOROthiazide (HYDRODIURIL) 50 MG tablet, Take 1 tablet by mouth Daily., Disp: 90 tablet, Rfl: 0  •  montelukast (SINGULAIR) 10 MG tablet, Take 10 mg by mouth Daily., Disp: , Rfl:   •  Multiple Vitamins-Minerals (VITEYES AREDS  FORMULA PO), Take 1 tablet by mouth 2 (Two) Times a Day., Disp: , Rfl:   •  promethazine-dextromethorphan (PROMETHAZINE-DM) 6.25-15 MG/5ML syrup, Take 5 mL by mouth 4 (Four) Times a Day As Needed for Cough., Disp: 180 mL, Rfl: 0  •  Sotalol HCl AF 80 MG tablet, Take 1 tablet by mouth 2 (Two) Times a Day., Disp: 60 tablet, Rfl: 5    Current Facility-Administered Medications:   •  aspirin chewable tablet 324 mg, 324 mg, Oral, Once, Arpita Mcdowell APRN.      The patient's relevant past medical, surgical, family and social history were reviewed and updated in Epic as appropriate.       Review of Systems   Constitutional: Positive for fatigue.   HENT: Positive for hearing loss.    Eyes: Positive for visual disturbance.   Respiratory: Positive for apnea, shortness of breath and wheezing.    Cardiovascular: Positive for palpitations and leg swelling.   Musculoskeletal: Positive for arthralgias and gait problem.   Allergic/Immunologic: Positive for environmental allergies.   Psychiatric/Behavioral: Positive for sleep disturbance.   All other systems reviewed and are negative.        Objective:    Physical Exam   Constitutional: He is oriented to person, place, and time. He appears well-developed and well-nourished.   HENT:   Head: Normocephalic and atraumatic.   Mouth/Throat: Oropharynx is clear and moist.   Class 4 airway   Eyes: Conjunctivae are normal.   Neck: Neck supple. No thyromegaly present.   Cardiovascular: Normal rate and regular rhythm.   Pulmonary/Chest: Effort normal and breath sounds normal.   Lymphadenopathy:     He has no cervical adenopathy.   Neurological: He is alert and oriented to person, place, and time.   Psychiatric: He has a normal mood and affect. His behavior is normal. Judgment and thought content normal.   Nursing note and vitals reviewed.        ASSESSMENT/PLAN    Luis was seen today for follow-up.    Diagnoses and all orders for this visit:    Obstructive sleep apnea,  adult    Nocturnal hypoxemia            1. Counseled patient regarding multimodal approach with healthy nutrition, healthy sleep, regular physical activity, social activities, counseling, and medications. Encouraged to practice lateral sleep position. Avoid alcohol and sedatives close to bedtime.  2.   Order to initiate CPAP therapy faxed to DME of patient's choosing.  I will see patient back in 31 to 90 days.  On next appointment we will order overnight oximetry while wearing CPAP to reassess nocturnal hypoxemia.  I have reviewed the results of my evaluation and impression and discussed my recommendations in detail with the patient.      Signed by  ANGELIA Suarez    January 13, 2020      CC: Viviane Colon APRN          No ref. provider found

## 2020-01-17 RX ORDER — APIXABAN 5 MG/1
TABLET, FILM COATED ORAL
Qty: 60 TABLET | Refills: 2 | Status: SHIPPED | OUTPATIENT
Start: 2020-01-17 | End: 2020-04-20 | Stop reason: SDUPTHER

## 2020-02-25 ENCOUNTER — OFFICE VISIT (OUTPATIENT)
Dept: PULMONOLOGY | Facility: CLINIC | Age: 75
End: 2020-02-25

## 2020-02-25 VITALS
DIASTOLIC BLOOD PRESSURE: 86 MMHG | WEIGHT: 315 LBS | SYSTOLIC BLOOD PRESSURE: 138 MMHG | TEMPERATURE: 97.8 F | BODY MASS INDEX: 42.66 KG/M2 | HEART RATE: 74 BPM | OXYGEN SATURATION: 95 % | HEIGHT: 72 IN

## 2020-02-25 DIAGNOSIS — J45.909 IDIOPATHIC ASTHMA: ICD-10-CM

## 2020-02-25 DIAGNOSIS — G47.33 OBSTRUCTIVE SLEEP APNEA, ADULT: ICD-10-CM

## 2020-02-25 DIAGNOSIS — Z78.9 NON-SMOKER: ICD-10-CM

## 2020-02-25 DIAGNOSIS — J18.9 PNEUMONIA OF RIGHT MIDDLE LOBE DUE TO INFECTIOUS ORGANISM: ICD-10-CM

## 2020-02-25 DIAGNOSIS — E66.01 OBESITY, CLASS III, BMI 40-49.9 (MORBID OBESITY) (HCC): ICD-10-CM

## 2020-02-25 DIAGNOSIS — R06.02 SHORTNESS OF BREATH: Primary | ICD-10-CM

## 2020-02-25 PROBLEM — E66.813 OBESITY, CLASS III, BMI 40-49.9 (MORBID OBESITY): Status: ACTIVE | Noted: 2020-02-25

## 2020-02-25 PROCEDURE — 99204 OFFICE O/P NEW MOD 45 MIN: CPT | Performed by: INTERNAL MEDICINE

## 2020-02-25 PROCEDURE — 94729 DIFFUSING CAPACITY: CPT | Performed by: INTERNAL MEDICINE

## 2020-02-25 PROCEDURE — 94060 EVALUATION OF WHEEZING: CPT | Performed by: INTERNAL MEDICINE

## 2020-02-25 PROCEDURE — 94726 PLETHYSMOGRAPHY LUNG VOLUMES: CPT | Performed by: INTERNAL MEDICINE

## 2020-02-25 RX ORDER — ALBUTEROL SULFATE 90 UG/1
4 AEROSOL, METERED RESPIRATORY (INHALATION) ONCE
Status: COMPLETED | OUTPATIENT
Start: 2020-02-25 | End: 2020-02-25

## 2020-02-25 RX ADMIN — ALBUTEROL SULFATE 4 PUFF: 90 AEROSOL, METERED RESPIRATORY (INHALATION) at 10:33

## 2020-02-25 NOTE — PROGRESS NOTES
PULMONARY  NOTE    Chief Complaint     History of asthma, sleep apnea, recent pneumonia, abnormal CT scan, class III obesity    History of Present Illness     74-year-old white male referred for evaluation of asthma and abnormal CT scan of the chest    Patient is a lifelong non-smoker.    He previously has been seen in the office by Dr. Childress for asthma.  He was last seen around 2009.  He also has been seen by Dr. Waite at the [facility].    He has a history of chronic persistent asthma.  He was started on Brio 100 last summer.    He was in his usual state of health until December 2019 when he developed symptoms of an acute respiratory tract infection.  He was eventually admitted and treated for community-acquired pneumonia.  He had chest x-ray imaging at [facility] as well as CT scans of the chest at the [facility] which revealed bibasilar consolidation.  Follow-up chest x-ray as well as a follow-up CT scan of the chest at the [facility] have documented significant interval radiographic improvement.    The patient feels that he is overall doing better.  He has occasional cough with little sputum production.  He has never had hemoptysis.    He really does not know if he has lifestyle limiting dyspnea as joint pain is his most significant limiting factor currently.  He has been considered for knee replacement surgery but that has been delayed due to his other medical issues.    He is been diagnosed with sleep apnea and is being followed in the [facility] sleep clinic.  He has had CPAP now for about 2 weeks and is having trouble tolerating it.    He has paroxysmal atrial fibrillation as noted and is chronically anticoagulated.  He has not had any bleeding issues.    He has no regular reflux symptoms.    Patient Active Problem List   Diagnosis   • Hypertension   • Disorder of calcium metabolism   • Disorder of endocrine testis   • Cellulitis of lower extremity   • Paroxysmal atrial  fibrillation (CMS/[facility])   • Snoring   • Obstructive sleep apnea, adult   • Pneumonia of right middle lobe (Resolved)   • Chronic persistent asthma   • Non-smoker   • Obesity, Class III, BMI 40-49.9 (morbid obesity) (CMS/[facility])     No Known Allergies    Current Outpatient Medications:   •  acetaminophen (TYLENOL) 500 MG tablet, Take 500 mg by mouth Every 6 (Six) Hours As Needed for Mild Pain ., Disp: , Rfl:   •  albuterol (PROVENTIL) (2.5 MG/3ML) 0.083% nebulizer solution, Take 2.5 mg by nebulization Every 4 (Four) Hours As Needed for Wheezing., Disp: 25 vial, Rfl: 12  •  albuterol sulfate  (90 Base) MCG/ACT inhaler, Inhale 2 puffs Every 4 (Four) Hours As Needed for Wheezing., Disp: 1 inhaler, Rfl: 0  •  amLODIPine (NORVASC) 10 MG tablet, Take 1 tablet by mouth Daily., Disp: 90 tablet, Rfl: 2  •  ELIQUIS 5 MG tablet tablet, TAKE ONE TABLET BY MOUTH TWICE DAILY , Disp: 60 tablet, Rfl: 2  •  fexofenadine (ALLEGRA ALLERGY) 180 MG tablet, Take 1 tablet by mouth Daily., Disp: 30 tablet, Rfl: 2  •  Fluticasone Furoate-Vilanterol (BREO ELLIPTA) 100-25 MCG/INH inhaler, Breo Ellipta 100 mcg-25 mcg/dose powder for inhalation  Inhale 1 puff every day by inhalation route., Disp: , Rfl:   •  hydroCHLOROthiazide (HYDRODIURIL) 50 MG tablet, Take 1 tablet by mouth Daily., Disp: 90 tablet, Rfl: 0  •  montelukast (SINGULAIR) 10 MG tablet, Take 10 mg by mouth Daily., Disp: , Rfl:   •  Multiple Vitamins-Minerals (VITEYES AREDS FORMULA PO), Take 1 tablet by mouth 2 (Two) Times a Day., Disp: , Rfl:   •  promethazine-dextromethorphan (PROMETHAZINE-DM) 6.25-15 MG/5ML syrup, Take 5 mL by mouth 4 (Four) Times a Day As Needed for Cough., Disp: 180 mL, Rfl: 0  •  Sotalol HCl AF 80 MG tablet, Take 1 tablet by mouth 2 (Two) Times a Day., Disp: 60 tablet, Rfl: 5    Current Facility-Administered Medications:   •  aspirin chewable tablet 324 mg, 324 mg, Oral, Once, Arpita Mcdowell APRN  MEDICATION LIST AND ALLERGIES REVIEWED.    Family  "History   Problem Relation Age of Onset   • Cancer Mother         colon   • Diabetes Mother    • Alzheimer's disease Father    • No Known Problems Maternal Grandmother    • No Known Problems Maternal Grandfather    • No Known Problems Paternal Grandmother    • No Known Problems Paternal Grandfather      Social History     Tobacco Use   • Smoking status: Never Smoker   • Smokeless tobacco: Former User     Types: Chew   • Tobacco comment: states been over a month since use   Substance Use Topics   • Alcohol use: Yes     Frequency: Monthly or less     Drinks per session: 1 or 2     Comment: 1-2 month    • Drug use: No     Social History     Social History Narrative    caffeine use average I or 2 servings weekly        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    Review of Systems  ALSO REFER TO SCANNED ROS SHEET FROM SAME DATE.    /86   Pulse 74   Temp 97.8 °F (36.6 °C)   Ht 182.9 cm (72\")   Wt (!) 154 kg (339 lb)   SpO2 95% Comment: resting, room air  BMI 45.98 kg/m²   Physical Exam   Constitutional: He is oriented to person, place, and time. He appears well-developed. No distress.   HENT:   Head: Normocephalic and atraumatic.   Neck: No thyromegaly present.   Cardiovascular: Normal rate, regular rhythm and normal heart sounds.   No murmur heard.  Pulmonary/Chest: Effort normal and breath sounds normal. No stridor.   Abdominal:   Obese, soft, nontender   Musculoskeletal: Normal range of motion.   Pitting ankle edema   Lymphadenopathy:     He has no cervical adenopathy.        Right: No supraclavicular and no epitrochlear adenopathy present.        Left: No supraclavicular and no epitrochlear adenopathy present.   Neurological: He is alert and oriented to person, place, and time.   Skin: Skin is warm and dry. He is not diaphoretic.   Psychiatric: He has a normal mood and affect. His " behavior is normal.   Nursing note and vitals reviewed.      Results     CT scans from the [facility] reviewed.  Most recent on 1/21/2020 and in comparison to 12/19/2019  Previous noted bibasilar infiltrates with significant interval improvement in some nodular infiltrate persistent in the right middle lobe.  3.3 cm left kidney lesion    PA and lateral chest x-ray today reveals cardiomegaly but no infiltrates or effusions    PFTs reveal moderate airway obstruction, improvement in FEV1 after bronchodilator.  No restriction but air trapping is present.  Normal diffusion capacity    Problem List       ICD-10-CM ICD-9-CM   1. Shortness of breath R06.02 786.05   2. Chronic persistent asthma J45.909 493.90   3. Pneumonia of right middle lobe (Resolved) J18.1 486   4. Obstructive sleep apnea, adult G47.33 327.23   5. Obesity, Class III, BMI 40-49.9 (morbid obesity) (CMS/[facility]) E66.01 278.01   6. Non-smoker Z78.9 V49.89       Discussion     Reviewed his test results.  It appears that he had a community-acquired respiratory tract infection in December 2019.  There has been marked radiographic improvement and as well as symptomatic improvement with empiric therapy.  He hasd already had multiple chest x-rays and 2 CT scans of the chest over a short interval that has shown radiographic improvement.  He is low risk for malignancy.  I would not suggest any further chest imaging until the fall 2020.  Although, imaging the renal lesion is apparently being scheduled.    He has a significant airway obstruction, worse than in 2009.  I believe that there has been an exacerbation of his asthma probably related to his CAP.  He needs to be on enhanced asthma therapy.  Ultimately, he may benefit from a biologic agent.    We will check an alpha-1 antitrypsin screen today.    I am going to increase his Breo to Breo 200 and add a LAMA form of Incruse.  Continue albuterol on an as-needed basis.  They have him samples, written instructions,  prescription, and a discussion of potential side effects  We will follow-up in a month with repeat spirometry.  If he continues to have significant airway obstruction, will obtain labs and consider initiating a biologic agent.    I recommended regular exercise and efforts at weight loss.  However, this is being limited by his joint issues.    I have encouraged compliance with his CPAP and close follow-up with the sleep center.    From my pulmonary standpoint there is no contraindication to him undergoing knee replacement surgery.  I would recommend bronchodilators in the perioperative period as well as early mobilization.    I will see him back in about a month with a repeat spirometry    Nicolas Bradley MD  Note electronically signed    CC: Viviane Colon APRN

## 2020-03-19 DIAGNOSIS — I10 ESSENTIAL HYPERTENSION: ICD-10-CM

## 2020-03-19 RX ORDER — HYDROCHLOROTHIAZIDE 50 MG/1
50 TABLET ORAL DAILY
Qty: 90 TABLET | Refills: 0 | Status: SHIPPED | OUTPATIENT
Start: 2020-03-19 | End: 2020-09-11 | Stop reason: SDUPTHER

## 2020-03-19 NOTE — TELEPHONE ENCOUNTER
REFILL ON   hydroCHLOROthiazide (HYDRODIURIL) 50 MG tablet      PHARMACY CONFIRMED    PLEASE ADVISE

## 2020-03-25 ENCOUNTER — TELEPHONE (OUTPATIENT)
Dept: FAMILY MEDICINE CLINIC | Facility: CLINIC | Age: 75
End: 2020-03-25

## 2020-03-25 NOTE — TELEPHONE ENCOUNTER
Pt is calling today he is having a cough for 4-5 days wanted to know if he can get a cough med   No fever is having some shortness of breath     Confirmed benja       Please advise and give call [phone] (H)

## 2020-03-25 NOTE — TELEPHONE ENCOUNTER
Contacted pt and advised to go to [facility] for appropriate testing. Pt voiced understanding.

## 2020-04-10 ENCOUNTER — TELEMEDICINE (OUTPATIENT)
Dept: SLEEP MEDICINE | Facility: HOSPITAL | Age: 75
End: 2020-04-10

## 2020-04-10 DIAGNOSIS — E66.01 OBESITY, CLASS III, BMI 40-49.9 (MORBID OBESITY) (HCC): ICD-10-CM

## 2020-04-10 DIAGNOSIS — G47.33 OBSTRUCTIVE SLEEP APNEA, ADULT: Primary | ICD-10-CM

## 2020-04-10 DIAGNOSIS — J41.0 SIMPLE CHRONIC BRONCHITIS (HCC): ICD-10-CM

## 2020-04-10 PROCEDURE — 99214 OFFICE O/P EST MOD 30 MIN: CPT | Performed by: INTERNAL MEDICINE

## 2020-04-10 RX ORDER — GUAIFENESIN 600 MG/1
1200 TABLET, EXTENDED RELEASE ORAL 2 TIMES DAILY
Qty: 120 TABLET | Refills: 2 | Status: SHIPPED | OUTPATIENT
Start: 2020-04-10 | End: 2020-05-28

## 2020-04-10 NOTE — PATIENT INSTRUCTIONS
Chronic Obstructive Pulmonary Disease    Chronic obstructive pulmonary disease (COPD) is a long-term (chronic) condition that affects the lungs. COPD is a general term that can be used to describe many different lung problems that cause lung swelling (inflammation) and limit airflow, including chronic bronchitis and emphysema. If you have COPD, your lung function will probably never return to normal. In most cases, it gets worse over time. However, there are steps you can take to slow the progression of the disease and improve your quality of life.  What are the causes?  This condition may be caused by:  · Smoking. This is the most common cause.  · Certain genes passed down through families.  What increases the risk?  The following factors may make you more likely to develop this condition:  · Secondhand smoke from cigarettes, pipes, or cigars.  · Exposure to chemicals and other irritants such as fumes and dust in the work environment.  · Chronic lung conditions or infections.  What are the signs or symptoms?  Symptoms of this condition include:  · Shortness of breath, especially during physical activity.  · Chronic cough with a large amount of thick mucus. Sometimes the cough may not have any mucus (dry cough).  · Wheezing.  · Rapid breaths.  · Gray or bluish discoloration (cyanosis) of the skin, especially in your fingers, toes, or lips.  · Feeling tired (fatigue).  · Weight loss.  · Chest tightness.  · Frequent infections.  · Episodes when breathing symptoms become much worse (exacerbations).  · Swelling in the ankles, feet, or legs. This may occur in later stages of the disease.  How is this diagnosed?  This condition is diagnosed based on:  · Your medical history.  · A physical exam.  You may also have tests, including:  · Lung (pulmonary) function tests. This may include a spirometry test, which measures your ability to exhale properly.  · Chest X-ray.  · CT scan.  · Blood tests.  How is this treated?  This  condition may be treated with:  · Medicines. These may include inhaled rescue medicines to treat acute exacerbations as well as long-term, or maintenance, medicines to prevent flare-ups of COPD.  ? Bronchodilators help treat COPD by dilating the airways to allow increased airflow and make your breathing more comfortable.  ? Steroids can reduce airway inflammation and help prevent exacerbations.  · Smoking cessation. If you smoke, your health care provider may ask you to quit, and may also recommend therapy or replacement products to help you quit.  · Pulmonary rehabilitation. This may involve working with a team of health care providers and specialists, such as respiratory, occupational, and physical therapists.  · Exercise and physical activity. These are beneficial for nearly all people with COPD.  · Nutrition therapy to gain weight, if you are underweight.  · Oxygen. Supplemental oxygen therapy is only helpful if you have a low oxygen level in your blood (hypoxemia).  · Lung surgery or transplant.  · Palliative care. This is to help people with COPD feel comfortable when treatment is no longer working.  Follow these instructions at home:  Medicines  · Take over-the-counter and prescription medicines (inhaled or pills) only as told by your health care provider.  · Talk to your health care provider before taking any cough or allergy medicines. You may need to avoid certain medicines that dry out your airways.  Lifestyle  · If you are a smoker, the most important thing that you can do is to stop smoking. Do not use any products that contain nicotine or tobacco, such as cigarettes and e-cigarettes. If you need help quitting, ask your health care provider. Continuing to smoke will cause the disease to progress faster.  · Avoid exposure to things that irritate your lungs, such as smoke, chemicals, and fumes.  · Stay active, but balance activity with periods of rest. Exercise and physical activity will help you maintain  your ability to do things you want to do.  · Learn and use relaxation techniques to manage stress and to control your breathing.  · Get the right amount of sleep and get quality sleep. Most adults need 7 or more hours per night.  · Eat healthy foods. Eating smaller, more frequent meals and resting before meals may help you maintain your strength.  Controlled breathing  Learn and use controlled breathing techniques as directed by your health care provider. Controlled breathing techniques include:  · Pursed lip breathing. Start by breathing in (inhaling) through your nose for 1 second. Then, purse your lips as if you were going to whistle and breathe out (exhale) through the pursed lips for 2 seconds.  · Diaphragmatic breathing. Start by putting one hand on your abdomen just above your waist. Inhale slowly through your nose. The hand on your abdomen should move out. Then purse your lips and exhale slowly. You should be able to feel the hand on your abdomen moving in as you exhale.  Controlled coughing  Learn and use controlled coughing to clear mucus from your lungs. Controlled coughing is a series of short, progressive coughs. The steps of controlled coughing are:  1. Lean your head slightly forward.  2. Breathe in deeply using diaphragmatic breathing.  3. Try to hold your breath for 3 seconds.  4. Keep your mouth slightly open while coughing twice.  5. Spit any mucus out into a tissue.  6. Rest and repeat the steps once or twice as needed.  General instructions  · Make sure you receive all the vaccines that your health care provider recommends, especially the pneumococcal and influenza vaccines. Preventing infection and hospitalization is very important when you have COPD.  · Use oxygen therapy and pulmonary rehabilitation if directed to by your health care provider. If you require home oxygen therapy, ask your health care provider whether you should purchase a pulse oximeter to measure your oxygen level at  home.  · Work with your health care provider to develop a COPD action plan. This will help you know what steps to take if your condition gets worse.  · Keep other chronic health conditions under control as told by your health care provider.  · Avoid extreme temperature and humidity changes.  · Avoid contact with people who have an illness that spreads from person to person (is contagious), such as viral infections or pneumonia.  · Keep all follow-up visits as told by your health care provider. This is important.  Contact a health care provider if:  · You are coughing up more mucus than usual.  · There is a change in the color or thickness of your mucus.  · Your breathing is more labored than usual.  · Your breathing is faster than usual.  · You have difficulty sleeping.  · You need to use your rescue medicines or inhalers more often than expected.  · You have trouble doing routine activities such as getting dressed or walking around the house.  Get help right away if:  · You have shortness of breath while you are resting.  · You have shortness of breath that prevents you from:  ? Being able to talk.  ? Performing your usual physical activities.  · You have chest pain lasting longer than 5 minutes.  · Your skin color is more blue (cyanotic) than usual.  · You measure low oxygen saturations for longer than 5 minutes with a pulse oximeter.  · You have a fever.  · You feel too tired to breathe normally.  Summary  · Chronic obstructive pulmonary disease (COPD) is a long-term (chronic) condition that affects the lungs.  · Your lung function will probably never return to normal. In most cases, it gets worse over time. However, there are steps you can take to slow the progression of the disease and improve your quality of life.  · Treatment for COPD may include taking medicines, quitting smoking, pulmonary rehabilitation, and changes to diet and exercise. As the disease progresses, you may need oxygen therapy, a lung  transplant, or palliative care.  · To help manage your condition, do not smoke, avoid exposure to things that irritate your lungs, stay up to date on all vaccines, and follow your health care provider's instructions for taking medicines.  This information is not intended to replace advice given to you by your health care provider. Make sure you discuss any questions you have with your health care provider.  Document Released: 09/27/2006 Document Revised: 06/13/2018 Document Reviewed: 01/22/2018  [employer] Interactive Patient Education © 2020 [employer]  Sleep Apnea  Sleep apnea is a condition in which breathing pauses or becomes shallow during sleep. Episodes of sleep apnea usually last 10 seconds or longer, and they may occur as many as 20 times an hour. Sleep apnea disrupts your sleep and keeps your body from getting the rest that it needs. This condition can increase your risk of certain health problems, including:  · Heart attack.  · Stroke.  · Obesity.  · Diabetes.  · Heart failure.  · Irregular heartbeat.  What are the causes?  There are three kinds of sleep apnea:  · Obstructive sleep apnea. This kind is caused by a blocked or collapsed airway.  · Central sleep apnea. This kind happens when the part of the brain that controls breathing does not send the correct signals to the muscles that control breathing.  · Mixed sleep apnea. This is a combination of obstructive and central sleep apnea.  The most common cause of this condition is a collapsed or blocked airway. An airway can collapse or become blocked if:  · Your throat muscles are abnormally relaxed.  · Your tongue and tonsils are larger than normal.  · You are overweight.  · Your airway is smaller than normal.  What increases the risk?  You are more likely to develop this condition if you:  · Are overweight.  · Smoke.  · Have a smaller than normal airway.  · Are elderly.  · Are male.  · Drink alcohol.  · Take sedatives or tranquilizers.  · Have a family  history of sleep apnea.  What are the signs or symptoms?  Symptoms of this condition include:  · Trouble staying asleep.  · Daytime sleepiness and tiredness.  · Irritability.  · Loud snoring.  · Morning headaches.  · Trouble concentrating.  · Forgetfulness.  · Decreased interest in sex.  · Unexplained sleepiness.  · Mood swings.  · Personality changes.  · Feelings of depression.  · Waking up often during the night to urinate.  · Dry mouth.  · Sore throat.  How is this diagnosed?  This condition may be diagnosed with:  · A medical history.  · A physical exam.  · A series of tests that are done while you are sleeping (sleep study). These tests are usually done in a sleep lab, but they may also be done at home.  How is this treated?  Treatment for this condition aims to restore normal breathing and to ease symptoms during sleep. It may involve managing health issues that can affect breathing, such as high blood pressure or obesity. Treatment may include:  · Sleeping on your side.  · Using a decongestant if you have nasal congestion.  · Avoiding the use of depressants, including alcohol, sedatives, and narcotics.  · Losing weight if you are overweight.  · Making changes to your diet.  · Quitting smoking.  · Using a device to open your airway while you sleep, such as:  ? An oral appliance. This is a custom-made mouthpiece that shifts your lower jaw forward.  ? A continuous positive airway pressure (CPAP) device. This device blows air through a mask when you breathe out (exhale).  ? A nasal expiratory positive airway pressure (EPAP) device. This device has valves that you put into each nostril.  ? A bi-level positive airway pressure (BPAP) device. This device blows air through a mask when you breathe in (inhale) and breathe out (exhale).  · Having surgery if other treatments do not work. During surgery, excess tissue is removed to create a wider airway.  It is important to get treatment for sleep apnea. Without treatment,  this condition can lead to:  · High blood pressure.  · Coronary artery disease.  · In men, an inability to achieve or maintain an erection (impotence).  · Reduced thinking abilities.  Follow these instructions at home:  Lifestyle  · Make any lifestyle changes that your health care provider recommends.  · Eat a healthy, well-balanced diet.  · Take steps to lose weight if you are overweight.  · Avoid using depressants, including alcohol, sedatives, and narcotics.  · Do not use any products that contain nicotine or tobacco, such as cigarettes, e-cigarettes, and chewing tobacco. If you need help quitting, ask your health care provider.  General instructions  · Take over-the-counter and prescription medicines only as told by your health care provider.  · If you were given a device to open your airway while you sleep, use it only as told by your health care provider.  · If you are having surgery, make sure to tell your health care provider you have sleep apnea. You may need to bring your device with you.  · Keep all follow-up visits as told by your health care provider. This is important.  Contact a health care provider if:  · The device that you received to open your airway during sleep is uncomfortable or does not seem to be working.  · Your symptoms do not improve.  · Your symptoms get worse.  Get help right away if:  · You develop:  ? Chest pain.  ? Shortness of breath.  ? Discomfort in your back, arms, or stomach.  · You have:  ? Trouble speaking.  ? Weakness on one side of your body.  ? Drooping in your face.  These symptoms may represent a serious problem that is an emergency. Do not wait to see if the symptoms will go away. Get medical help right away. Call your local emergency services (911 in the [place] Do not drive yourself to the hospital.  Summary  · Sleep apnea is a condition in which breathing pauses or becomes shallow during sleep.  · The most common cause is a collapsed or blocked airway.  · The goal of  treatment is to restore normal breathing and to ease symptoms during sleep.  This information is not intended to replace advice given to you by your health care provider. Make sure you discuss any questions you have with your health care provider.  Document Released: 12/08/2003 Document Revised: 10/04/2019 Document Reviewed: 08/13/2019  [employer] Interactive Patient Education © 2020 [employer]

## 2020-04-10 NOTE — PROGRESS NOTES
Subjective   Luis Perez is a 74 y.o. male is here today for follow-up.  This is a video visit.  Patient is followed here with obstructive sleep apnea.  His primary care provider is Viviane GALAN.  He is also seen by Dr. Isreal Bradley.    History of Present Illness    Seen in clinic January 13, 2020.  He was found to have mild obstructive sleep apnea on his previous sleep study.  He also had some nocturnal hypoxemia.  Morbid obesity.  He has been placed on CPAP.  He had significant prodding comfortable with the machine could only use it for couple hours for the first few weeks.  He says he is doing some but in recent weeks and feels at times though it is giving him too much air.  He wakes up in hips the ramp button and then goes back to sleep.  He is experimented using Breathe Right strips and thinks that this helps him some.  In the past week or so he has had cough and is been using the Breathe Right strips.  He does not yet have dentures.  He denies any skin irritation from the mask or headaches.  He claims having a dry no has been using a humidifier.  He says he usually sleeps on his side with his head elevated.  Past Medical History:   Diagnosis Date   • Arrhythmia     afib   • Asthma    • Bronchitis, chronic (CMS/HCC)    • Cancer (CMS/HCC)     Skin    • Hyperlipidemia    • Hypertension    • Irregular heart beat    • Nephrolithiasis        Past Surgical History:   Procedure Laterality Date   • CARDIOVERSION  08/05/2019   • COLONOSCOPY     • NASAL SEPTUM SURGERY      Deviation Repair   • SKIN CANCER EXCISION     • TONSILLECTOMY     • VASECTOMY             Current Outpatient Medications:   •  acetaminophen (TYLENOL) 500 MG tablet, Take 500 mg by mouth Every 6 (Six) Hours As Needed for Mild Pain ., Disp: , Rfl:   •  albuterol (PROVENTIL) (2.5 MG/3ML) 0.083% nebulizer solution, Take 2.5 mg by nebulization Every 4 (Four) Hours As Needed for Wheezing., Disp: 25 vial, Rfl: 12  •  albuterol sulfate  (90  Base) MCG/ACT inhaler, Inhale 2 puffs Every 4 (Four) Hours As Needed for Wheezing., Disp: 1 inhaler, Rfl: 0  •  amLODIPine (NORVASC) 10 MG tablet, Take 1 tablet by mouth Daily., Disp: 90 tablet, Rfl: 2  •  ELIQUIS 5 MG tablet tablet, TAKE ONE TABLET BY MOUTH TWICE DAILY , Disp: 60 tablet, Rfl: 2  •  fexofenadine (ALLEGRA ALLERGY) 180 MG tablet, Take 1 tablet by mouth Daily., Disp: 30 tablet, Rfl: 2  •  Fluticasone Furoate-Vilanterol (BREO ELLIPTA) 100-25 MCG/INH inhaler, Breo Ellipta 100 mcg-25 mcg/dose powder for inhalation  Inhale 1 puff every day by inhalation route., Disp: , Rfl:   •  guaiFENesin (Mucinex) 600 MG 12 hr tablet, Take 2 tablets by mouth 2 (Two) Times a Day., Disp: 120 tablet, Rfl: 2  •  hydroCHLOROthiazide (HYDRODIURIL) 50 MG tablet, Take 1 tablet by mouth Daily., Disp: 90 tablet, Rfl: 0  •  montelukast (SINGULAIR) 10 MG tablet, Take 10 mg by mouth Daily., Disp: , Rfl:   •  Multiple Vitamins-Minerals (VITEYES AREDS FORMULA PO), Take 1 tablet by mouth 2 (Two) Times a Day., Disp: , Rfl:   •  promethazine-dextromethorphan (PROMETHAZINE-DM) 6.25-15 MG/5ML syrup, Take 5 mL by mouth 4 (Four) Times a Day As Needed for Cough., Disp: 180 mL, Rfl: 0  •  Sotalol HCl AF 80 MG tablet, Take 1 tablet by mouth 2 (Two) Times a Day., Disp: 60 tablet, Rfl: 5    Current Facility-Administered Medications:   •  aspirin chewable tablet 324 mg, 324 mg, Oral, Once, Arpita Mcdowell, ANGELIA    No Known Allergies    The following portions of the patient's history were reviewed and updated as appropriate: allergies, current medications and problem list.    Review of Systems   Constitutional: Negative.    HENT: Positive for congestion, postnasal drip and sinus pressure.    Eyes: Negative.    Respiratory: Positive for cough and shortness of breath.    Cardiovascular: Negative.    Gastrointestinal: Negative.    Endocrine: Negative.    Musculoskeletal: Positive for arthralgias.   Skin: Negative.    Allergic/Immunologic:  Positive for environmental allergies.   Neurological: Positive for headaches.   Hematological: Negative.    Psychiatric/Behavioral: Negative.    [place] score is 6/24    Objective     There were no vitals taken for this visit.    Physical Exam  Patient appears awake and alert.  He does not appear to be having any significant respiratory distress.    Download from his machine shows he has had it 78 days and used it 91% of the days.  He is using it 3 hours 57 minutes per night.  His 90th percentile pressure is 10.4.  His AHI is 9.9.    Assessment/Plan   Diagnoses and all orders for this visit:    Obstructive sleep apnea, adult  -     Generic Auto PAP Order    Obesity, Class III, BMI 40-49.9 (morbid obesity) (CMS/[facility])    Simple chronic bronchitis (CMS/[facility])  -     guaiFENesin (Mucinex) 600 MG 12 hr tablet; Take 2 tablets by mouth 2 (Two) Times a Day.    Patient does have mild obstructive sleep apnea.  His index during his study was moderate when he was in the supine position.  We have reviewed possible therapies including CPAP, weight control, oral appliance, and surgery.  He wishes to continue with CPAP for now.  We will renew his supplies.  He is encouraged to try to use it more hours per night.  He is try to make sure his mask is comfortable and not leaking.  He may need to get a new mask.  He is encouraged to lose weight.  He is encouraged to avoid alcohol and sedatives close to bedtime.  He is encouraged to practice lateral position sleep.  We will plan to see him back in 2 months and reassess situation.  If he is doing a better job using his machine at that time we may need to consider an overnight oximetry.    Patient is complaining some cough with clear sputum production.  We will try adding guaifenesin 100 mg by mouth twice a day.  he is to continue his other respiratory medications.             Nicolas Lopez MD [facility]  Sleep Medicine  Pulmonary and Critical Care Medicine      04/10/20  5:51 PM

## 2020-04-13 DIAGNOSIS — J45.30 MILD PERSISTENT ASTHMA WITHOUT COMPLICATION: ICD-10-CM

## 2020-04-13 RX ORDER — AMLODIPINE BESYLATE 10 MG/1
10 TABLET ORAL DAILY
Qty: 90 TABLET | Refills: 2 | Status: SHIPPED | OUTPATIENT
Start: 2020-04-13 | End: 2020-09-11 | Stop reason: SDUPTHER

## 2020-04-13 NOTE — TELEPHONE ENCOUNTER
[facility] FOR RX amLODIPine (NORVASC) 10 MG tablet.    PLEASE ADVISE.  CALL BACK:[phone]       [facility] [address] - [address] [facility]

## 2020-04-20 RX ORDER — MONTELUKAST SODIUM 10 MG/1
10 TABLET ORAL DAILY
Qty: 90 TABLET | Refills: 1 | Status: SHIPPED | OUTPATIENT
Start: 2020-04-20 | End: 2020-10-09

## 2020-04-20 NOTE — TELEPHONE ENCOUNTER
Fax refill request approved and faxed per chart to [facility] for Montelukast 10mg, Incruse 62.5mcg, and Breo 100. Pt notified and verbalized understanding.

## 2020-05-04 NOTE — TELEPHONE ENCOUNTER
Ordered Rx Breo 200 per chart via fax to [facility] per pt's request due to a recent increase dosage per Dr Nicolas Bradley's office notes on 2/25. Pt notified and verbalized understanding and appreciation.

## 2020-05-07 ENCOUNTER — TELEMEDICINE (OUTPATIENT)
Dept: PULMONOLOGY | Facility: CLINIC | Age: 75
End: 2020-05-07

## 2020-05-07 DIAGNOSIS — J45.909 IDIOPATHIC ASTHMA: ICD-10-CM

## 2020-05-07 DIAGNOSIS — Z78.9 NON-SMOKER: ICD-10-CM

## 2020-05-07 DIAGNOSIS — G47.33 OBSTRUCTIVE SLEEP APNEA, ADULT: ICD-10-CM

## 2020-05-07 DIAGNOSIS — E66.01 OBESITY, CLASS III, BMI 40-49.9 (MORBID OBESITY) (HCC): ICD-10-CM

## 2020-05-07 DIAGNOSIS — R06.02 SHORTNESS OF BREATH: Primary | ICD-10-CM

## 2020-05-07 DIAGNOSIS — J18.9 PNEUMONIA OF RIGHT MIDDLE LOBE DUE TO INFECTIOUS ORGANISM: ICD-10-CM

## 2020-05-07 PROCEDURE — 99214 OFFICE O/P EST MOD 30 MIN: CPT | Performed by: INTERNAL MEDICINE

## 2020-05-08 ENCOUNTER — DOCUMENTATION (OUTPATIENT)
Dept: PULMONOLOGY | Facility: CLINIC | Age: 75
End: 2020-05-08

## 2020-05-08 DIAGNOSIS — J06.9 ACUTE URI: ICD-10-CM

## 2020-05-08 RX ORDER — DEXTROMETHORPHAN HYDROBROMIDE AND PROMETHAZINE HYDROCHLORIDE 15; 6.25 MG/5ML; MG/5ML
5 SYRUP ORAL 4 TIMES DAILY PRN
Qty: 180 ML | Refills: 2 | Status: SHIPPED | OUTPATIENT
Start: 2020-05-08 | End: 2020-12-11

## 2020-05-08 NOTE — PROGRESS NOTES
"[facility] PULMONARY  VIDEO VISIT NOTE    Chief Complaint     Chronic persistent asthma, obstructive sleep apnea, recent pneumonia, class III obesity  Consent for Video Visit was obtained via [employer].  History of Present Illness     74-year-old white male who I last saw in the office on 2/25/2020.    He has chronic persistent asthma.  Previously followed by Dr. Waite at the [facility] and Dr. Childress in our office.    He has been on Brio with Incruse.  He feels that his symptoms are somewhat better but he continues to have cough with intermittent dyspnea and wheezing.    He has been having some sinus drainage and postnasal drip.  He has been using OTC Mucinex and cough suppressant.    An alpha-1 antitrypsin screen done our last visit was normal.    He has obstructive sleep apnea and is followed by Dr. Lopez.  He is tolerating his CPAP \"okay\" although does not feel that he is doing particularly well with that, yet.    As noted previously he has PAF and is chronically anticoagulated.  He has not had any bleeding problems.    Patient Active Problem List   Diagnosis   • Hypertension   • Disorder of calcium metabolism   • Disorder of endocrine testis   • Cellulitis of lower extremity   • Paroxysmal atrial fibrillation (CMS/HCC)   • Snoring   • Obstructive sleep apnea, adult   • Pneumonia of right middle lobe (Resolved)   • Chronic persistent asthma   • Non-smoker   • Obesity, Class III, BMI 40-49.9 (morbid obesity) (CMS/HCC)     No Known Allergies    Current Outpatient Medications:   •  acetaminophen (TYLENOL) 500 MG tablet, Take 500 mg by mouth Every 6 (Six) Hours As Needed for Mild Pain ., Disp: , Rfl:   •  albuterol (PROVENTIL) (2.5 MG/3ML) 0.083% nebulizer solution, Take 2.5 mg by nebulization Every 4 (Four) Hours As Needed for Wheezing., Disp: 25 vial, Rfl: 12  •  albuterol sulfate  (90 Base) MCG/ACT inhaler, Inhale 2 puffs Every 4 (Four) Hours As Needed for Wheezing., Disp: 1 inhaler, Rfl: 0  •  " amLODIPine (NORVASC) 10 MG tablet, Take 1 tablet by mouth Daily., Disp: 90 tablet, Rfl: 2  •  apixaban (Eliquis) 5 MG tablet tablet, Take 1 tablet by mouth 2 (Two) Times a Day., Disp: 180 tablet, Rfl: 3  •  fexofenadine (ALLEGRA ALLERGY) 180 MG tablet, Take 1 tablet by mouth Daily., Disp: 30 tablet, Rfl: 2  •  Fluticasone Furoate-Vilanterol (BREO ELLIPTA) 200-25 MCG/INH inhaler, Inhale 1 puff Daily. 1 inhalation once a day, Disp: 180 each, Rfl: 3  •  guaiFENesin (Mucinex) 600 MG 12 hr tablet, Take 2 tablets by mouth 2 (Two) Times a Day., Disp: 120 tablet, Rfl: 2  •  hydroCHLOROthiazide (HYDRODIURIL) 50 MG tablet, Take 1 tablet by mouth Daily., Disp: 90 tablet, Rfl: 0  •  montelukast (SINGULAIR) 10 MG tablet, Take 1 tablet by mouth Daily., Disp: 90 tablet, Rfl: 1  •  Multiple Vitamins-Minerals (VITEYES AREDS FORMULA PO), Take 1 tablet by mouth 2 (Two) Times a Day., Disp: , Rfl:   •  promethazine-dextromethorphan (PROMETHAZINE-DM) 6.25-15 MG/5ML syrup, Take 5 mL by mouth 4 (Four) Times a Day As Needed for Cough., Disp: 180 mL, Rfl: 0  •  Sotalol HCl AF 80 MG tablet, Take 1 tablet by mouth 2 (Two) Times a Day., Disp: 90 tablet, Rfl: 1  •  Umeclidinium Bromide (INCRUSE ELLIPTA) 62.5 MCG/INH aerosol powder , Inhale 1 puff Daily. 1 inhalation once a day, Disp: 90 each, Rfl: 3    Current Facility-Administered Medications:   •  aspirin chewable tablet 324 mg, 324 mg, Oral, Once, Arpita Mcdowell APRN  MEDICATION LIST AND ALLERGIES REVIEWED.    Family History   Problem Relation Age of Onset   • Cancer Mother         colon   • Diabetes Mother    • Alzheimer's disease Father    • No Known Problems Maternal Grandmother    • No Known Problems Maternal Grandfather    • No Known Problems Paternal Grandmother    • No Known Problems Paternal Grandfather      Social History     Tobacco Use   • Smoking status: Never Smoker   • Smokeless tobacco: Former User     Types: Chew   • Tobacco comment: states been over a month since use    Substance Use Topics   • Alcohol use: Yes     Alcohol/week: 0.0 - 2.0 standard drinks     Frequency: Monthly or less     Drinks per session: 1 or 2     Comment: 1-2 month    • Drug use: No     Social History     Social History Narrative    caffeine use average I or 2 servings weekly        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    Review of Systems   Constitutional: Negative.  Negative for fatigue and unexpected weight change.   HENT: Positive for postnasal drip and rhinorrhea.    Respiratory: Positive for cough, shortness of breath and wheezing.    Cardiovascular: Negative.  Negative for chest pain, palpitations and leg swelling.   Gastrointestinal: Positive for abdominal distention. Negative for nausea.   Musculoskeletal: Negative.  Negative for arthralgias and joint swelling.   Skin: Negative.  Negative for rash and wound.   Allergic/Immunologic: Negative.  Negative for immunocompromised state.   Hematological: Negative.  Negative for adenopathy.   Psychiatric/Behavioral: Negative.  Negative for sleep disturbance.     ALSO REFER TO SCANNED ROS SHEET FROM SAME DATE.    There were no vitals taken for this visit.  Physical Exam   Constitutional: He is oriented to person, place, and time. He appears well-developed and well-nourished. No distress.   HENT:   Head: Normocephalic and atraumatic.   Eyes: Conjunctivae are normal. Right eye exhibits no discharge. Left eye exhibits no discharge.   Pulmonary/Chest: Effort normal.   Musculoskeletal: He exhibits no edema.   Neurological: He is alert and oriented to person, place, and time.   Skin: No rash noted. He is not diaphoretic. No erythema.   Psychiatric: He has a normal mood and affect. His behavior is normal. Judgment and thought content normal.       Results     Alpha-1 antitrypsin screen was normal (MM)    Problem List       ICD-10-CM  ICD-9-CM   1. Shortness of breath R06.02 786.05   2. Chronic persistent asthma J45.909 493.90   3. Pneumonia of right middle lobe (Resolved) J18.1 486   4. Obstructive sleep apnea, adult G47.33 327.23   5. Obesity, Class III, BMI 40-49.9 (morbid obesity) (CMS/[facility]) E66.01 278.01   6. Non-smoker Z78.9 V49.89       Discussion     He continues to have persistent symptoms attributable to asthma although somewhat improved on his current regimen of Breo and Incruse  He may benefit from a biologic agent given his frequent exacerbations.    We will try to get approval for Dupixent.    Go to renew his cough syrup.    He is can remain on Brio and Incruse.    I have encouraged continued compliance with his CPAP for his RUY.    I will plan to see him back in 2 to 3 months    Nicolas Bradley MD  Note electronically signed    CC: Viviane Colon APRN

## 2020-05-12 RX ORDER — SOTALOL HYDROCHLORIDE 80 MG/1
TABLET ORAL
Qty: 180 TABLET | Refills: 0 | Status: SHIPPED | OUTPATIENT
Start: 2020-05-12 | End: 2020-09-09

## 2020-05-14 ENCOUNTER — TELEPHONE (OUTPATIENT)
Dept: PULMONOLOGY | Facility: CLINIC | Age: 75
End: 2020-05-14

## 2020-05-14 DIAGNOSIS — J45.909 IDIOPATHIC ASTHMA: Primary | ICD-10-CM

## 2020-05-14 NOTE — TELEPHONE ENCOUNTER
[place] approval for Dupixent medicare Part D  Till [id number] will sentd initial dose and maintenance dose to [facility]  Called pt let him know they will be calling him to set up delivery then let us know when he gets it

## 2020-05-22 NOTE — PROGRESS NOTES
"    Subjective:     Encounter Date:05/28/2020      Patient ID: Luis Perez is a 74 y.o.  white male from [place], [place], retired from [employer].     REFERRING PROVIDER: ANGELIA Rios  HEALTHCARE PROVIDER:ANGELIA Matias  SLEEP PHYSICIAN: Nicolas Lopez MD, [facility]  GASTROENTEROLOGIST: Tay Torres MD  INFECTIOUS DISEASE: Eriberto Weiner MD  ORTHOPEDIC SURGEON:  Abhijit Ambriz MD.    Chief Complaint:   Chief Complaint   Patient presents with   • Essential hypertension     Problem List:  1. Persistent atrial fibrillation:  a. CHADsVasc 2, anticoagulated with Eliquis  b. Echocardiogram, 7/1/2019: LVEF 0.65, mild to moderate TR, RVSP 35 mmHg, right atrial mass is present measuring 2.6 x 1.6 cm; differential includes thrombus versus atypical right atrial myxoma versus atrial intraseptal tumor with clinical correlation and consideration of transesophageal echocardiogram recommended, LA moderately dilated, normal RV wall thickness, systolic function and motion noted with RV cavity mildly dilated, aortic valve exhibits mild sclerosis, mild pulmonary hypertension, no pericardial effusion  c. PIETER, 7/8/2019: Mild MR, LVEF 0.60, LV wall thickness consistent with moderate concentric hypertrophy, LA mild to moderately dilated, 3D echo LVEF was 0.51, normal RV cavity size, wall thickness, systolic function septal motion noted, marked lipomatous hypertrophy of the interatrial septum present.  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 Initiation of sotalol, 7/13/2019, with persistent atrial fibrillation on EKG, 7/26/2019  e. Successful external cardioversion, 08/05/2019, with frequent atrial ectopy on EKG, 08/09/2019, now in normal sinus rhythm on EKG, 10/16/2019  f. Normal sinus rhythm on " ECG in office May 2020 with acceptable QTC on sotalol therapy  2. 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 Mild dyspnea on exertion  a. Remote stress test 30-40 years ago; negative per patient-data deficit, patient reports that this was performed for a baseline and not because of any symptoms  b. PFTs 2/25/2020: Moderate airway obstruction, improvement in FEV1 with bronchodilator, no restriction, air-trapping present, no diffusion  c. CCS class 0 chest discomfort/NYHA class I-II PALM  4. At risk for sleep apnea with sleep consult, 8/21/2019, with polysomnogram January 2020 that showed mild obstructive sleep apnea and nocturnal hypoxemia with initiation of CPAP therapy  5. Chronic lower extremity edema  6. Hypertension  7. Dyslipidemia; ASCVD 10 year risk is 45.8%, 17.4% if treated, initiate rosuvastatin 10 mg nightly May 2020 with consideration of vascepa 2g bid after repeat FLP  8. Type 2 diabetes mellitus;HgbA1C 6.39% October 2019  9. Mild intermittent asthma  10. History of lower extremity cellulitis with MRSA  11. History of melanoma  12. Morbid obesity: BMI 44.6  13. Surgical history  a. Vasectomy  b. Tonsillectomy  c. Deviated septum surgery  d. Multiple skin cancer excisions  e. Scheduled for partial right knee replacement, November 2019    No Known Allergies      Current Outpatient Medications:   •  albuterol (PROVENTIL) (2.5 MG/3ML) 0.083% nebulizer solution, Take 2.5 mg by nebulization Every 4 (Four) Hours As Needed for Wheezing., Disp: 25 vial, Rfl: 12  •  albuterol sulfate  (90 Base) MCG/ACT inhaler, Inhale 2 puffs Every 4 (Four) Hours As Needed for Wheezing., Disp: 1 inhaler, Rfl: 0  •  amLODIPine (NORVASC) 10 MG tablet, Take 1 tablet by mouth Daily., Disp: 90 tablet, Rfl: 2  •  apixaban (Eliquis) 5 MG tablet tablet, Take 1 tablet by mouth 2 (Two) Times a Day., Disp: 180 tablet, Rfl:  3  •  Dupilumab (Dupixent) 300 MG/2ML solution prefilled syringe, Inject 300 mg under the skin into the appropriate area as directed Every 14 (Fourteen) Days., Disp: 2 syringe, Rfl: 11  •  fexofenadine (ALLEGRA ALLERGY) 180 MG tablet, Take 1 tablet by mouth Daily., Disp: 30 tablet, Rfl: 2  •  Fluticasone Furoate-Vilanterol (BREO ELLIPTA) 200-25 MCG/INH inhaler, Inhale 1 puff Daily. 1 inhalation once a day, Disp: 180 each, Rfl: 3  •  hydroCHLOROthiazide (HYDRODIURIL) 50 MG tablet, Take 1 tablet by mouth Daily., Disp: 90 tablet, Rfl: 0  •  montelukast (SINGULAIR) 10 MG tablet, Take 1 tablet by mouth Daily., Disp: 90 tablet, Rfl: 1  •  Multiple Vitamins-Minerals (VITEYES AREDS FORMULA PO), Take 1 tablet by mouth 2 (Two) Times a Day., Disp: , Rfl:   •  promethazine-dextromethorphan (PROMETHAZINE-DM) 6.25-15 MG/5ML syrup, Take 5 mL by mouth 4 (Four) Times a Day As Needed for Cough., Disp: 180 mL, Rfl: 2  •  sotalol (BETAPACE AF) 80 MG tablet tablet, TAKE 1 TABLET BY MOUTH  TWICE DAILY, Disp: 180 tablet, Rfl: 0  •  Umeclidinium Bromide (INCRUSE ELLIPTA) 62.5 MCG/INH aerosol powder , Inhale 1 puff Daily. 1 inhalation once a day, Disp: 90 each, Rfl: 3  •  Dupilumab (Dupixent) 300 MG/2ML solution prefilled syringe, Inject 300 mg under the skin into the appropriate area as directed Every 14 (Fourteen) Days., Disp: 2 syringe, Rfl: 11    Current Facility-Administered Medications:   •  aspirin chewable tablet 324 mg, 324 mg, Oral, Once, Arpita Mcdowell, ANGELIA    HISTORY OF PRESENT ILLNESS:  Patient is here for a 7-month follow-up.  In the interim the patient had an overnight [facility] hospitalization for pneumonia of the RML.  The patient had a sleep study in January 2020 that showed mild obstructive sleep apnea and nocturnal hypoxemia with initiation of CPAP therapy. He also had PFTs 2/25/2020 demonstrating moderate airway obstruction.  The patient has not yet started his Dupixent but plans to soon after he receives it in the  "mail.  He denies any chest pain, palpitations, dizziness, presyncope, syncope, or edema.  The patient has some sputum production in the morning.  He also has some wheezing and shortness of breath on exertion.  He is not fasting this morning and has not had any recent laboratory testing, particularly for a lipid status.  The patient has tried to be compliant with his CPAP but has difficulties tolerating it at times.  He believes that he gets between 5-7 hours of sleep at night with the CPAP.      Review of Systems   Respiratory: Positive for cough, shortness of breath, sleep disturbances due to breathing, sputum production and wheezing.       All other systems reviewed and otherwise negative.      ECG 12 Lead  Date/Time: 5/28/2020 10:37 AM  Performed by: Jaja Mansfield APRN  Authorized by: Jaja Mansfield APRN   Rhythm comments: Sinus bradycardia, otherwise normal ECG, 57 bpm,  ms,  ms,  ms, sinus bradycardia has replaced atrial fibrillation compared to last ECG in December 2019                 Objective:       Vitals:    05/28/20 0959   BP: 126/70   BP Location: Right arm   Patient Position: Sitting   Pulse: 57   SpO2: 95%   Weight: (!) 149 kg (329 lb)   Height: 182.9 cm (72\")     Body mass index is 44.62 kg/m².  Last weight October 2019 was 325 pounds  Physical Exam   Constitutional: He is oriented to person, place, and time. He appears well-developed and well-nourished.   Neck: No JVD present. Carotid bruit is not present. No thyromegaly present.   Cardiovascular: S1 normal and S2 normal. Exam reveals no gallop, no S3 and no friction rub.   Murmur heard.   Medium-pitched harsh early systolic murmur is present with a grade of 2/6 at the lower left sternal border.  Pulses:       Dorsalis pedis pulses are 2+ on the right side, and 2+ on the left side.        Posterior tibial pulses are 2+ on the right side, and 2+ on the left side.   Pulmonary/Chest: Effort normal and breath sounds normal. He " has no wheezes. He has no rhonchi. He has no rales.   Abdominal: Soft. He exhibits no mass. There is no hepatosplenomegaly. There is no tenderness. There is no guarding.   Bowel sounds audible x4   Musculoskeletal: Normal range of motion. He exhibits no edema.   Lymphadenopathy:     He has no cervical adenopathy.   Neurological: He is alert and oriented to person, place, and time.   Skin: Skin is warm, dry and intact. No rash noted.   Vitals reviewed.        Lab Review:   Lab Results   Component Value Date    GLUCOSE 136 (H) 12/14/2019    BUN 13 12/14/2019    CREATININE 0.63 (L) 12/14/2019    EGFRIFNONA 124 12/14/2019    BCR 20.6 12/14/2019    CO2 26.0 12/14/2019    CALCIUM 9.0 12/14/2019    ALBUMIN 4.00 12/13/2019    AST 12 12/13/2019    ALT 10 12/13/2019       Lab Results   Component Value Date    WBC 20.62 (H) 12/14/2019    HGB 13.9 12/14/2019    HCT 41.9 12/14/2019    MCV 88.4 12/14/2019     12/14/2019       Lab Results   Component Value Date    HGBA1C 6.39 (H) 10/22/2019       Lab Results   Component Value Date    TSH 1.590 03/05/2019       Lab Results   Component Value Date    CHOL 150 03/05/2019    CHOL 149 08/23/2018     Lab Results   Component Value Date    TRIG 310 (H) 03/05/2019    TRIG 351 (H) 08/23/2018     Lab Results   Component Value Date    HDL 33 (L) 03/05/2019    HDL 32 (L) 08/23/2018     Lab Results   Component Value Date    LDL 55 03/05/2019    LDL 73 08/23/2018       Advance Care Planning   ACP discussion was held with the patient during this visit. Patient has an advance directive (not in EMR), copy requested.  Assessment:     Overall continued acceptable course with no new interim cardiopulmonary complaints with acceptable functional status. We will defer additional diagnostic or therapeutic intervention from a cardiac perspective at this time. He has mild RUY with nocturnal hypoxemia and is now on CPAP. ASCVD 10 year risk is 45.8%, 17.4% if treated so I will begin rosuvastatin 10 mg  nightly.  I may consider adding Vascepa 2g bid and the patient is going to try to adhere to a heart healthy diet and will get repeat laboratory testing.  I will order a CBC, CMP, FLP, and hemoglobin A1c for when the patient is fasting.  He is in normal sinus rhythm on ECG in office today with acceptable QTC on sotalol therapy.       Diagnosis Plan   1. Paroxysmal atrial fibrillation (CMS/[facility])  NSR on ECG in office today with acceptable QTc on sotalol therapy   2. Essential hypertension  Controlled, continue current cardiac medications   3. Obstructive sleep apnea, adult  Encouraged continued compliance   4. Obesity, Class III, BMI 40-49.9 (morbid obesity) (CMS/[facility])  Encouraged the patient to increase physical activity, adhere to a heart healthy diet, limit carbs    5. Type 2 diabetes mellitus: Hgb A1C 6.39% October 2019, encouraged the patient to limit carbs and increase physical activity, repeat hemoglobin A1c  6. Dyslipidemia; ASCVD 10 year risk is 45.8%, 17.4% if treated I will begin rosuvastatin 10 mg nightly, FLP     Plan:         1. Patient to continue current medications and close follow up with the above providers.  2. Tentative cardiology follow up in November 2020 or patient may return sooner PRN.  3. ECG at next appointment  4. Initiate rosuvastatin 10 mg nightly  5. CBC, CMP, FLP, hemoglobin A1c  6. Consider adding Vascepa 2g bid if the patient's repeat FLP shows continued elevated triglyceride levels  7. Patient will increase physical activity as tolerated and try to adhere to a heart healthy diet by limiting carbohydrate intake as well as fatty foods or fried foods  8. Encouraged continued CPAP compliance    Electronically signed by ANGELIA Browning, 05/28/20, 10:35 AM.

## 2020-05-28 ENCOUNTER — OFFICE VISIT (OUTPATIENT)
Dept: CARDIOLOGY | Facility: CLINIC | Age: 75
End: 2020-05-28

## 2020-05-28 VITALS
SYSTOLIC BLOOD PRESSURE: 126 MMHG | HEIGHT: 72 IN | BODY MASS INDEX: 42.66 KG/M2 | WEIGHT: 315 LBS | DIASTOLIC BLOOD PRESSURE: 70 MMHG | OXYGEN SATURATION: 95 % | HEART RATE: 57 BPM

## 2020-05-28 DIAGNOSIS — E11.9 TYPE 2 DIABETES MELLITUS WITHOUT COMPLICATION, WITHOUT LONG-TERM CURRENT USE OF INSULIN (HCC): ICD-10-CM

## 2020-05-28 DIAGNOSIS — G47.33 OBSTRUCTIVE SLEEP APNEA, ADULT: ICD-10-CM

## 2020-05-28 DIAGNOSIS — I48.0 PAROXYSMAL ATRIAL FIBRILLATION (HCC): Primary | ICD-10-CM

## 2020-05-28 DIAGNOSIS — I10 ESSENTIAL HYPERTENSION: ICD-10-CM

## 2020-05-28 DIAGNOSIS — E78.5 DYSLIPIDEMIA: ICD-10-CM

## 2020-05-28 DIAGNOSIS — E66.01 OBESITY, CLASS III, BMI 40-49.9 (MORBID OBESITY) (HCC): ICD-10-CM

## 2020-05-28 PROCEDURE — 99214 OFFICE O/P EST MOD 30 MIN: CPT | Performed by: NURSE PRACTITIONER

## 2020-05-28 PROCEDURE — 93000 ELECTROCARDIOGRAM COMPLETE: CPT | Performed by: NURSE PRACTITIONER

## 2020-05-28 RX ORDER — ROSUVASTATIN CALCIUM 10 MG/1
10 TABLET, COATED ORAL DAILY
Qty: 30 TABLET | Refills: 11 | Status: SHIPPED | OUTPATIENT
Start: 2020-05-28 | End: 2020-12-11

## 2020-06-15 ENCOUNTER — TELEPHONE (OUTPATIENT)
Dept: SLEEP MEDICINE | Facility: HOSPITAL | Age: 75
End: 2020-06-15

## 2020-06-15 ENCOUNTER — TELEMEDICINE (OUTPATIENT)
Dept: SLEEP MEDICINE | Facility: HOSPITAL | Age: 75
End: 2020-06-15

## 2020-06-15 VITALS — BODY MASS INDEX: 41.75 KG/M2 | WEIGHT: 315 LBS | HEIGHT: 73 IN

## 2020-06-15 DIAGNOSIS — G47.33 OBSTRUCTIVE SLEEP APNEA, ADULT: Primary | ICD-10-CM

## 2020-06-15 DIAGNOSIS — G47.34 NOCTURNAL HYPOXEMIA: ICD-10-CM

## 2020-06-15 PROCEDURE — 99212 OFFICE O/P EST SF 10 MIN: CPT | Performed by: NURSE PRACTITIONER

## 2020-06-15 NOTE — PROGRESS NOTES
Chief Complaint:   Chief Complaint   Patient presents with   • Follow-up       HPI:    Luis Perez is a 74 y.o. male here for follow-up of sleep apnea.  Patient was last seen 4/10/2020.  Patient was having difficulty using CPAP and was encouraged to try to be more compliant so that we could lower AHI.  Patient also showed nocturnal hypoxemia on the night of his study.  Patient states when he wears Breathe Right strips across his nose he does do better with CPAP.  Patient wishes to discontinue CPAP at this time but would like to try BiPAP before totally discontinuing therapy.  Patient is sleeping 6 to 7 hours nightly and does feel refreshed upon awakening.  Patient will go to sleep within 10 to 15 minutes and only intermittently gets up during the night.  We will switch him to BiPAP today.  Patient states 1 of the reasons he cannot tolerate CPAP as the feeling of smothering and increase in nasal drainage.  Cpap failed.  [place] score is 2/24.  Current medications are:   Current Outpatient Medications:   •  albuterol (PROVENTIL) (2.5 MG/3ML) 0.083% nebulizer solution, Take 2.5 mg by nebulization Every 4 (Four) Hours As Needed for Wheezing., Disp: 25 vial, Rfl: 12  •  albuterol sulfate  (90 Base) MCG/ACT inhaler, Inhale 2 puffs Every 4 (Four) Hours As Needed for Wheezing., Disp: 1 inhaler, Rfl: 0  •  amLODIPine (NORVASC) 10 MG tablet, Take 1 tablet by mouth Daily., Disp: 90 tablet, Rfl: 2  •  apixaban (Eliquis) 5 MG tablet tablet, Take 1 tablet by mouth 2 (Two) Times a Day., Disp: 180 tablet, Rfl: 3  •  Dupilumab (Dupixent) 300 MG/2ML solution prefilled syringe, Inject 300 mg under the skin into the appropriate area as directed Every 14 (Fourteen) Days., Disp: 2 syringe, Rfl: 11  •  Dupilumab (Dupixent) 300 MG/2ML solution prefilled syringe, Inject 300 mg under the skin into the appropriate area as directed Every 14 (Fourteen) Days., Disp: 2 syringe, Rfl: 11  •  fexofenadine (ALLEGRA ALLERGY) 180 MG  tablet, Take 1 tablet by mouth Daily., Disp: 30 tablet, Rfl: 2  •  Fluticasone Furoate-Vilanterol (BREO ELLIPTA) 200-25 MCG/INH inhaler, Inhale 1 puff Daily. 1 inhalation once a day, Disp: 180 each, Rfl: 3  •  hydroCHLOROthiazide (HYDRODIURIL) 50 MG tablet, Take 1 tablet by mouth Daily., Disp: 90 tablet, Rfl: 0  •  montelukast (SINGULAIR) 10 MG tablet, Take 1 tablet by mouth Daily., Disp: 90 tablet, Rfl: 1  •  Multiple Vitamins-Minerals (VITEYES AREDS FORMULA PO), Take 1 tablet by mouth 2 (Two) Times a Day., Disp: , Rfl:   •  promethazine-dextromethorphan (PROMETHAZINE-DM) 6.25-15 MG/5ML syrup, Take 5 mL by mouth 4 (Four) Times a Day As Needed for Cough., Disp: 180 mL, Rfl: 2  •  rosuvastatin (CRESTOR) 10 MG tablet, Take 1 tablet by mouth Daily., Disp: 30 tablet, Rfl: 11  •  sotalol (BETAPACE AF) 80 MG tablet tablet, TAKE 1 TABLET BY MOUTH  TWICE DAILY, Disp: 180 tablet, Rfl: 0  •  Umeclidinium Bromide (INCRUSE ELLIPTA) 62.5 MCG/INH aerosol powder , Inhale 1 puff Daily. 1 inhalation once a day, Disp: 90 each, Rfl: 3    Current Facility-Administered Medications:   •  aspirin chewable tablet 324 mg, 324 mg, Oral, Once, Arpita Mcdowell, APRN.      The patient's relevant past medical, surgical, family and social history were reviewed and updated in Epic as appropriate.       Review of Systems   HENT: Positive for sinus pressure.    Eyes: Positive for visual disturbance.   Respiratory: Positive for apnea, cough and shortness of breath.    Cardiovascular: Positive for palpitations.   Musculoskeletal: Positive for arthralgias.   Allergic/Immunologic: Positive for environmental allergies.   Neurological: Positive for headaches.   Psychiatric/Behavioral: Positive for sleep disturbance.   All other systems reviewed and are negative.        Objective:    Physical Exam   Constitutional: He is oriented to person, place, and time. He appears well-developed and well-nourished.   HENT:   Head: Normocephalic and atraumatic.    Neck: No tracheal deviation present.   Pulmonary/Chest: Effort normal.   Neurological: He is alert and oriented to person, place, and time.   Skin: No erythema. No pallor.   Psychiatric: He has a normal mood and affect. His behavior is normal. Judgment and thought content normal.     74/85 days of use.  Greater than 4-hour use 76.5.  90% pressure 10.5.  AHI of 7.1.  Current settings 8 to 18 cm H2O.    ASSESSMENT/PLAN    Luis was seen today for follow-up.    Diagnoses and all orders for this visit:    Obstructive sleep apnea, adult  -     PAP Therapy    Nocturnal hypoxemia            1. Counseled patient regarding multimodal approach with healthy nutrition, healthy sleep, regular physical activity, social activities, counseling, and medications. Encouraged to practice lateral  sleep position. Avoid alcohol and sedatives close to bedtime.  2. Discontinue CPAP and place patient on BiPAP minimum EPAP of 8 maximum IPAP of 25 minimum pressure support 4 maximum pressure support 8.  I will see patient back in 5 weeks to reassess.  3. When AHI is under control we will order overnight oximetry to reassess nocturnal hypoxemia.  After verbal consent was given we did move forward with video visit.  I have reviewed the results of my evaluation and impression and discussed my recommendations in detail with the patient.      Signed by  ANGELIA Suarez    Fara 15, 2020      CC: Viviane Colon APRN          No ref. provider found

## 2020-06-16 ENCOUNTER — TELEPHONE (OUTPATIENT)
Dept: PULMONOLOGY | Facility: CLINIC | Age: 75
End: 2020-06-16

## 2020-06-16 ENCOUNTER — CLINICAL SUPPORT (OUTPATIENT)
Dept: PULMONOLOGY | Facility: CLINIC | Age: 75
End: 2020-06-16

## 2020-06-16 DIAGNOSIS — J45.909 IDIOPATHIC ASTHMA: Primary | ICD-10-CM

## 2020-06-16 PROCEDURE — 96372 THER/PROPH/DIAG INJ SC/IM: CPT | Performed by: INTERNAL MEDICINE

## 2020-06-16 RX ORDER — EPINEPHRINE 0.3 MG/.3ML
0.3 INJECTION SUBCUTANEOUS ONCE
Qty: 1 EACH | Refills: 0 | Status: SHIPPED | OUTPATIENT
Start: 2020-06-16 | End: 2020-06-16

## 2020-06-16 NOTE — PROGRESS NOTES
Patient came in for Initial training of giving Dupixent to himself gave injection in Left and right arm pt tolerated well with no complaints

## 2020-06-26 ENCOUNTER — OFFICE VISIT (OUTPATIENT)
Dept: PULMONOLOGY | Facility: CLINIC | Age: 75
End: 2020-06-26

## 2020-06-26 VITALS
BODY MASS INDEX: 41.75 KG/M2 | WEIGHT: 315 LBS | OXYGEN SATURATION: 96 % | HEIGHT: 73 IN | SYSTOLIC BLOOD PRESSURE: 132 MMHG | DIASTOLIC BLOOD PRESSURE: 78 MMHG | TEMPERATURE: 98.3 F | HEART RATE: 115 BPM

## 2020-06-26 DIAGNOSIS — E66.01 OBESITY, CLASS III, BMI 40-49.9 (MORBID OBESITY) (HCC): ICD-10-CM

## 2020-06-26 DIAGNOSIS — J30.2 SEASONAL AND PERENNIAL ALLERGIC RHINITIS: ICD-10-CM

## 2020-06-26 DIAGNOSIS — G47.33 OBSTRUCTIVE SLEEP APNEA, ADULT: Primary | ICD-10-CM

## 2020-06-26 DIAGNOSIS — J30.89 SEASONAL AND PERENNIAL ALLERGIC RHINITIS: ICD-10-CM

## 2020-06-26 DIAGNOSIS — J45.909 IDIOPATHIC ASTHMA: ICD-10-CM

## 2020-06-26 PROCEDURE — 99214 OFFICE O/P EST MOD 30 MIN: CPT | Performed by: INTERNAL MEDICINE

## 2020-06-26 NOTE — PROGRESS NOTES
PULMONARY  NOTE    Chief Complaint     Chronic persistent asthma, perennial rhinitis, obstructive sleep apnea, class III obesity    History of Present Illness     74-year-old white male returns today for follow-up.  I last visited with him via a video visit on 5/7/2020    He has a chronic persistent asthma  He has been on Brio and Incruse.  He has albuterol to use on an as-needed basis.  After our last visit he was started on Dupixent  He is gotten 1 dose of to pick since about 3 weeks ago and is scheduled for another dose in the near future.  He does feel that he is at least stable, if not somewhat better.    However, he continues to have congestion, sinus, and postnasal drip.  He is using Allegra and Singulair.  Sometimes Mucinex, as well.  CPAP seems to aggravate his sinus symptoms.  He previously saw an allergist many years ago and did much better when he was on allergy shots.    He does not have regular reflux symptoms.    He has a history of obstructive sleep apnea is followed by Dr. Lopez.  His CPAP was just changed to BiPAP.  He just picked up the equipment but has not started it yet.    Patient Active Problem List   Diagnosis   • Hypertension   • Disorder of calcium metabolism   • Disorder of endocrine testis   • Cellulitis of lower extremity   • Paroxysmal atrial fibrillation (CMS/HCC)   • Snoring   • Obstructive sleep apnea, adult   • Pneumonia of right middle lobe (Resolved)   • Chronic persistent asthma   • Non-smoker   • Obesity, Class III, BMI 40-49.9 (morbid obesity) (CMS/HCC)   • Type 2 diabetes mellitus without complication, without long-term current use of insulin (CMS/[facility])   • Dyslipidemia   • Perennial rhinitis     No Known Allergies    Current Outpatient Medications:   •  albuterol (PROVENTIL) (2.5 MG/3ML) 0.083% nebulizer solution, Take 2.5 mg by nebulization Every 4 (Four) Hours As Needed for Wheezing., Disp: 25 vial, Rfl: 12  •  albuterol sulfate  (90 Base) MCG/ACT inhaler, Inhale 2  puffs Every 4 (Four) Hours As Needed for Wheezing., Disp: 1 inhaler, Rfl: 0  •  amLODIPine (NORVASC) 10 MG tablet, Take 1 tablet by mouth Daily., Disp: 90 tablet, Rfl: 2  •  apixaban (Eliquis) 5 MG tablet tablet, Take 1 tablet by mouth 2 (Two) Times a Day., Disp: 180 tablet, Rfl: 3  •  Dupilumab (Dupixent) 300 MG/2ML solution prefilled syringe, Inject 300 mg under the skin into the appropriate area as directed Every 14 (Fourteen) Days., Disp: 2 syringe, Rfl: 11  •  fexofenadine (ALLEGRA ALLERGY) 180 MG tablet, Take 1 tablet by mouth Daily., Disp: 30 tablet, Rfl: 2  •  Fluticasone Furoate-Vilanterol (BREO ELLIPTA) 200-25 MCG/INH inhaler, Inhale 1 puff Daily. 1 inhalation once a day, Disp: 180 each, Rfl: 3  •  hydroCHLOROthiazide (HYDRODIURIL) 50 MG tablet, Take 1 tablet by mouth Daily., Disp: 90 tablet, Rfl: 0  •  montelukast (SINGULAIR) 10 MG tablet, Take 1 tablet by mouth Daily., Disp: 90 tablet, Rfl: 1  •  Multiple Vitamins-Minerals (VITEYES AREDS FORMULA PO), Take 1 tablet by mouth 2 (Two) Times a Day., Disp: , Rfl:   •  promethazine-dextromethorphan (PROMETHAZINE-DM) 6.25-15 MG/5ML syrup, Take 5 mL by mouth 4 (Four) Times a Day As Needed for Cough., Disp: 180 mL, Rfl: 2  •  rosuvastatin (CRESTOR) 10 MG tablet, Take 1 tablet by mouth Daily., Disp: 30 tablet, Rfl: 11  •  sotalol (BETAPACE AF) 80 MG tablet tablet, TAKE 1 TABLET BY MOUTH  TWICE DAILY, Disp: 180 tablet, Rfl: 0  •  Umeclidinium Bromide (INCRUSE ELLIPTA) 62.5 MCG/INH aerosol powder , Inhale 1 puff Daily. 1 inhalation once a day, Disp: 90 each, Rfl: 3    Current Facility-Administered Medications:   •  aspirin chewable tablet 324 mg, 324 mg, Oral, Once, Arpita Mcdowell APRN  MEDICATION LIST AND ALLERGIES REVIEWED.    Family History   Problem Relation Age of Onset   • Cancer Mother         colon   • Diabetes Mother    • Alzheimer's disease Father    • No Known Problems Maternal Grandmother    • No Known Problems Maternal Grandfather    • No Known  "Problems Paternal Grandmother    • No Known Problems Paternal Grandfather      Social History     Tobacco Use   • Smoking status: Never Smoker   • Smokeless tobacco: Former User     Types: Chew   • Tobacco comment: states been over a month since use   Substance Use Topics   • Alcohol use: Yes     Alcohol/week: 0.0 - 2.0 standard drinks     Frequency: Monthly or less     Drinks per session: 1 or 2     Comment: 1-2 month    • Drug use: No     Social History     Social History Narrative    caffeine use average I or 2 servings weekly        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    Review of Systems  ALSO REFER TO SCANNED ROS SHEET FROM SAME DATE.    /78   Pulse 115   Temp 98.3 °F (36.8 °C)   Ht 185.4 cm (73\")   Wt (!) 149 kg (329 lb)   SpO2 96% Comment: resting, room air  BMI 43.41 kg/m²   Physical Exam   Constitutional: He is oriented to person, place, and time. He appears well-developed. No distress.   HENT:   Head: Normocephalic and atraumatic.   Neck: No thyromegaly present.   Cardiovascular: Normal rate, regular rhythm and normal heart sounds.   No murmur heard.  Pulmonary/Chest: Effort normal and breath sounds normal. No stridor.   Abdominal: Soft. Bowel sounds are normal.   Musculoskeletal: Normal range of motion. He exhibits edema.   Trace pitting ankle edema   Lymphadenopathy:     He has no cervical adenopathy.        Right: No supraclavicular and no epitrochlear adenopathy present.        Left: No supraclavicular and no epitrochlear adenopathy present.   Neurological: He is alert and oriented to person, place, and time.   Skin: Skin is warm and dry. He is not diaphoretic.   Psychiatric: He has a normal mood and affect. His behavior is normal.   Nursing note and vitals reviewed.      Results       Problem List       ICD-10-CM ICD-9-CM   1. Obstructive sleep apnea, adult " G47.33 327.23   2. Obesity, Class III, BMI 40-49.9 (morbid obesity) (CMS/[facility]) E66.01 278.01   3. Chronic persistent asthma J45.909 493.90   4. Perennial rhinitis J30.89 477.9    J30.2        Discussion     He is going to remain on Brio with Incruse.  Also, continue Dupixent  He also has albuterol to use on an as-needed basis.    We discussed treatment for his perennial rhinitis symptoms.  I have recommended continued use of Allegra and Singulair with the addition of fluticasone.  He might benefit from referral back to an allergist.    Have encouraged continued compliance with his BiPAP.    I recommended regular exercise and weight loss.  His BMI is over 40.    I will plan to see him back in about 4 months    Nicolas Bradley MD  Note electronically signed    CC: Viviane Colon APRN

## 2020-07-24 ENCOUNTER — TELEMEDICINE (OUTPATIENT)
Dept: SLEEP MEDICINE | Facility: HOSPITAL | Age: 75
End: 2020-07-24

## 2020-07-24 VITALS — WEIGHT: 315 LBS | HEIGHT: 73 IN | BODY MASS INDEX: 41.75 KG/M2

## 2020-07-24 DIAGNOSIS — G47.33 OBSTRUCTIVE SLEEP APNEA, ADULT: Primary | ICD-10-CM

## 2020-07-24 PROCEDURE — 99212 OFFICE O/P EST SF 10 MIN: CPT | Performed by: NURSE PRACTITIONER

## 2020-07-24 NOTE — PROGRESS NOTES
Chief Complaint:   Chief Complaint   Patient presents with   • Follow-up       HPI:    Luis Perez is a 74 y.o. male here for follow-up of sleep apnea.  Patient was last seen 6/15/2020 was not tolerating CPAP so we did switch him to BiPAP therapy.  Patient states he did not tolerate BiPAP and he has discontinued use.  Without BiPAP he is sleeping well 6-1/2-7 and half hours nightly and has an [place] score of 1/24.  Patient was up constantly during the night with BiPAP and he decided he will no longer use.  We have discussed oral mandibular advancement device patient will try one from the Internet to see if he can tolerate and if so we will move forward with one from his dentist.  Patient verbalizes understanding of consequences of untreated sleep apnea.        Current medications are:   Current Outpatient Medications:   •  albuterol (PROVENTIL) (2.5 MG/3ML) 0.083% nebulizer solution, Take 2.5 mg by nebulization Every 4 (Four) Hours As Needed for Wheezing., Disp: 25 vial, Rfl: 12  •  albuterol sulfate  (90 Base) MCG/ACT inhaler, Inhale 2 puffs Every 4 (Four) Hours As Needed for Wheezing., Disp: 1 inhaler, Rfl: 0  •  amLODIPine (NORVASC) 10 MG tablet, Take 1 tablet by mouth Daily., Disp: 90 tablet, Rfl: 2  •  apixaban (Eliquis) 5 MG tablet tablet, Take 1 tablet by mouth 2 (Two) Times a Day., Disp: 180 tablet, Rfl: 3  •  Dupilumab (Dupixent) 300 MG/2ML solution prefilled syringe, Inject 300 mg under the skin into the appropriate area as directed Every 14 (Fourteen) Days., Disp: 2 syringe, Rfl: 11  •  fexofenadine (ALLEGRA ALLERGY) 180 MG tablet, Take 1 tablet by mouth Daily., Disp: 30 tablet, Rfl: 2  •  Fluticasone Furoate-Vilanterol (BREO ELLIPTA) 200-25 MCG/INH inhaler, Inhale 1 puff Daily. 1 inhalation once a day, Disp: 180 each, Rfl: 3  •  hydroCHLOROthiazide (HYDRODIURIL) 50 MG tablet, Take 1 tablet by mouth Daily., Disp: 90 tablet, Rfl: 0  •  montelukast (SINGULAIR) 10 MG tablet, Take 1 tablet by  mouth Daily., Disp: 90 tablet, Rfl: 1  •  Multiple Vitamins-Minerals (VITEYES AREDS FORMULA PO), Take 1 tablet by mouth 2 (Two) Times a Day., Disp: , Rfl:   •  promethazine-dextromethorphan (PROMETHAZINE-DM) 6.25-15 MG/5ML syrup, Take 5 mL by mouth 4 (Four) Times a Day As Needed for Cough., Disp: 180 mL, Rfl: 2  •  rosuvastatin (CRESTOR) 10 MG tablet, Take 1 tablet by mouth Daily., Disp: 30 tablet, Rfl: 11  •  sotalol (BETAPACE AF) 80 MG tablet tablet, TAKE 1 TABLET BY MOUTH  TWICE DAILY, Disp: 180 tablet, Rfl: 0  •  Umeclidinium Bromide (INCRUSE ELLIPTA) 62.5 MCG/INH aerosol powder , Inhale 1 puff Daily. 1 inhalation once a day, Disp: 90 each, Rfl: 3    Current Facility-Administered Medications:   •  aspirin chewable tablet 324 mg, 324 mg, Oral, Once, Arpita Mcdowell APRN.      The patient's relevant past medical, surgical, family and social history were reviewed and updated in Epic as appropriate.       Review of Systems   Eyes: Positive for visual disturbance.   Respiratory: Positive for apnea, cough and shortness of breath.    Cardiovascular: Positive for palpitations.   Musculoskeletal: Positive for arthralgias.   Allergic/Immunologic: Positive for environmental allergies.   Neurological: Positive for headaches.   Psychiatric/Behavioral: Positive for sleep disturbance.   All other systems reviewed and are negative.        Objective:    Physical Exam   Constitutional: He is oriented to person, place, and time. He appears well-developed and well-nourished.   HENT:   Head: Normocephalic and atraumatic.   Class 4 airway   Neck: No tracheal deviation present.   Pulmonary/Chest: Effort normal.   Neurological: He is alert and oriented to person, place, and time.   Skin: No erythema. No pallor.   Psychiatric: He has a normal mood and affect. His behavior is normal. Judgment and thought content normal.         ASSESSMENT/PLAN    Luis was seen today for follow-up.    Diagnoses and all orders for this  visit:    Obstructive sleep apnea, adult            1. Counseled patient regarding multimodal approach with healthy nutrition, healthy sleep, regular physical activity, social activities, counseling, and medications. Encouraged to practice lateral  sleep position. Avoid alcohol and sedatives close to bedtime.  2. Patient DC BiPAP per his request.  Patient will try over-the-counter oral device for sleep apnea to see if he can tolerate if he can tolerate he will take an order to a dentist for a custom fabricated 1.  I will see him back in 2 months to reassess.  Did give verbal consent for video visit.  I have reviewed the results of my evaluation and impression and discussed my recommendations in detail with the patient.      Signed by  ANGELIA Suarez    July 24, 2020      CC: Viviane Colon APRN          No ref. provider found

## 2020-08-17 ENCOUNTER — OFFICE VISIT (OUTPATIENT)
Dept: FAMILY MEDICINE CLINIC | Facility: CLINIC | Age: 75
End: 2020-08-17

## 2020-08-17 VITALS
BODY MASS INDEX: 41.75 KG/M2 | RESPIRATION RATE: 18 BRPM | SYSTOLIC BLOOD PRESSURE: 128 MMHG | TEMPERATURE: 98.3 F | OXYGEN SATURATION: 96 % | DIASTOLIC BLOOD PRESSURE: 84 MMHG | HEART RATE: 65 BPM | HEIGHT: 73 IN | WEIGHT: 315 LBS

## 2020-08-17 DIAGNOSIS — R73.09 ELEVATED GLUCOSE: Primary | ICD-10-CM

## 2020-08-17 DIAGNOSIS — E55.9 VITAMIN D DEFICIENCY: ICD-10-CM

## 2020-08-17 DIAGNOSIS — Z13.220 LIPID SCREENING: ICD-10-CM

## 2020-08-17 DIAGNOSIS — R60.0 BILATERAL LOWER EXTREMITY EDEMA: ICD-10-CM

## 2020-08-17 DIAGNOSIS — E66.01 MORBIDLY OBESE (HCC): ICD-10-CM

## 2020-08-17 DIAGNOSIS — J45.30 MILD PERSISTENT ASTHMA WITHOUT COMPLICATION: ICD-10-CM

## 2020-08-17 DIAGNOSIS — G89.29 CHRONIC PAIN OF RIGHT KNEE: ICD-10-CM

## 2020-08-17 DIAGNOSIS — I10 ESSENTIAL HYPERTENSION: ICD-10-CM

## 2020-08-17 DIAGNOSIS — M25.561 CHRONIC PAIN OF RIGHT KNEE: ICD-10-CM

## 2020-08-17 DIAGNOSIS — Z12.5 ENCOUNTER FOR PROSTATE CANCER SCREENING: ICD-10-CM

## 2020-08-17 DIAGNOSIS — Z11.59 NEED FOR HEPATITIS C SCREENING TEST: ICD-10-CM

## 2020-08-17 DIAGNOSIS — I48.0 PAROXYSMAL ATRIAL FIBRILLATION (HCC): ICD-10-CM

## 2020-08-17 PROBLEM — E11.9 TYPE 2 DIABETES MELLITUS WITHOUT COMPLICATION, WITHOUT LONG-TERM CURRENT USE OF INSULIN (HCC): Status: RESOLVED | Noted: 2020-05-28 | Resolved: 2020-08-17

## 2020-08-17 LAB
EXPIRATION DATE: NORMAL
HBA1C MFR BLD: 5.7 %
Lab: NORMAL

## 2020-08-17 PROCEDURE — 99214 OFFICE O/P EST MOD 30 MIN: CPT | Performed by: NURSE PRACTITIONER

## 2020-08-17 PROCEDURE — 83036 HEMOGLOBIN GLYCOSYLATED A1C: CPT | Performed by: NURSE PRACTITIONER

## 2020-08-17 NOTE — PROGRESS NOTES
Subjective   Luis Perez is a 74 y.o. male.     History of Present Illness Here to establish care. Former patient of Viviane GALAN and Dr Ye.  1. Has Atrial fib, cardioverted and on Eliquis. Denies palpitations. Has some PALM but has asthma. Mild edema. Does not wear compression stockings. Denies chest pain. Has some senile purpura. No active bleeding.  2. Has sleep apnea but cannot tolerate c-pap or bi-pap. On Dupixent, breo ellipta and incruse. Managed by Dr Bradley at pul. NO cough. Breathing much better. Does not need to use rescue inhaler.  3. Cardiology put him on statin due to ASCVD but he did not start it. He denies ever being diagnosed as diabetic. Has never been treated for diabetes.   4. Has severe OA of bilat knees. Sees Dr Zuñiga. Plans on Right TKR in Nov. Will need pulm and card clearance again.  5. Has edema and history of cellulitis of lower ext.  6. , retired from [employer], daughter in [place]. Likes to [employer].      Outpatient Encounter Medications as of 8/17/2020   Medication Sig Dispense Refill   • albuterol (PROVENTIL) (2.5 MG/3ML) 0.083% nebulizer solution Take 2.5 mg by nebulization Every 4 (Four) Hours As Needed for Wheezing. 25 vial 12   • albuterol sulfate  (90 Base) MCG/ACT inhaler Inhale 2 puffs Every 4 (Four) Hours As Needed for Wheezing. 1 inhaler 0   • amLODIPine (NORVASC) 10 MG tablet Take 1 tablet by mouth Daily. 90 tablet 2   • apixaban (Eliquis) 5 MG tablet tablet Take 1 tablet by mouth 2 (Two) Times a Day. 180 tablet 3   • Dupilumab (Dupixent) 300 MG/2ML solution prefilled syringe Inject 300 mg under the skin into the appropriate area as directed Every 14 (Fourteen) Days. 2 syringe 11   • fexofenadine (ALLEGRA ALLERGY) 180 MG tablet Take 1 tablet by mouth Daily. 30 tablet 2   • Fluticasone Furoate-Vilanterol (BREO ELLIPTA) 200-25 MCG/INH inhaler Inhale 1 puff Daily. 1 inhalation once a day 180 each 3   • hydroCHLOROthiazide (HYDRODIURIL) 50 MG  "tablet Take 1 tablet by mouth Daily. 90 tablet 0   • montelukast (SINGULAIR) 10 MG tablet Take 1 tablet by mouth Daily. 90 tablet 1   • Multiple Vitamins-Minerals (VITEYES AREDS FORMULA PO) Take 1 tablet by mouth 2 (Two) Times a Day.     • promethazine-dextromethorphan (PROMETHAZINE-DM) 6.25-15 MG/5ML syrup Take 5 mL by mouth 4 (Four) Times a Day As Needed for Cough. 180 mL 2   • rosuvastatin (CRESTOR) 10 MG tablet Take 1 tablet by mouth Daily. 30 tablet 11   • sotalol (BETAPACE AF) 80 MG tablet tablet TAKE 1 TABLET BY MOUTH  TWICE DAILY 180 tablet 0   • Umeclidinium Bromide (INCRUSE ELLIPTA) 62.5 MCG/INH aerosol powder  Inhale 1 puff Daily. 1 inhalation once a day 90 each 3     Facility-Administered Encounter Medications as of 8/17/2020   Medication Dose Route Frequency Provider Last Rate Last Dose   • aspirin chewable tablet 324 mg  324 mg Oral Once Arpita Mcdowell APRN           The following portions of the patient's history were reviewed and updated as appropriate: allergies, current medications, past family history, past medical history, past social history, past surgical history and problem list.    Review of Systems    Objective     Visit Vitals  /84   Pulse 65   Temp 98.3 °F (36.8 °C) (Temporal)   Resp 18   Ht 185.4 cm (73\")   Wt (!) 147 kg (325 lb)   SpO2 96%   BMI 42.88 kg/m²       Physical Exam   Constitutional: He is oriented to person, place, and time. He appears well-developed and well-nourished. No distress.   HENT:   Head: Normocephalic and atraumatic.   Right Ear: Tympanic membrane and external ear normal.   Left Ear: Tympanic membrane and external ear normal.   Nose: Nose normal.   Mouth/Throat: Oropharynx is clear and moist. No oropharyngeal exudate.   Eyes: Pupils are equal, round, and reactive to light. Conjunctivae are normal. Right eye exhibits no discharge. Left eye exhibits no discharge. No scleral icterus.   Neck: Neck supple. No tracheal deviation present. No thyromegaly " present.   Cardiovascular: Normal rate, regular rhythm and normal heart sounds. Exam reveals no gallop and no friction rub.   No murmur heard.  Pulmonary/Chest: Effort normal. No respiratory distress. He has wheezes.   Faint exp wheeze right LL, post   Abdominal: Soft. Bowel sounds are normal. He exhibits no distension and no mass. There is no tenderness.   Musculoskeletal: He exhibits edema. He exhibits no deformity.   +1 pitting edema on bilat ankles   Lymphadenopathy:     He has no cervical adenopathy.   Neurological: He is alert and oriented to person, place, and time. Coordination normal.   Skin: Skin is warm and dry. Capillary refill takes less than 2 seconds. Rash noted. No erythema.   SK, AK, nevi, senile purpura on upper and lower ext.  Discoloration of bilat lower ext consistent with venous insuff.   Psychiatric: He has a normal mood and affect. His speech is normal and behavior is normal. Judgment and thought content normal.   Nursing note and vitals reviewed.    A1c 5.7  Patient has been erroneously marked as diabetic. Based on the available clinical information, he does not have diabetes and should therefore be excluded from diabetic health maintenance and quality measures for the remainder of the reporting period.      Assessment/Plan   Luis was seen today for establish care and fall.    Diagnoses and all orders for this visit:    Elevated glucose  -     POC Glycosylated Hemoglobin (Hb A1C)    Encounter for prostate cancer screening  -     PSA Screen; Future    Mild persistent asthma without complication    Essential hypertension    Paroxysmal atrial fibrillation (CMS/HCC)    Bilateral lower extremity edema  -     CBC & Differential; Future  -     Comprehensive Metabolic Panel; Future  -     T4, Free; Future  -     TSH; Future    Lipid screening  -     Lipid Panel; Future    Morbidly obese (CMS/HCC)    Chronic pain of right knee  -     Uric Acid; Future  -     Sedimentation Rate; Future  -      C-reactive Protein; Future    Vitamin D deficiency  -     Vitamin D 25 Hydroxy; Future    Need for hepatitis C screening test  -     Hepatitis C Antibody; Future    Check labs wednesday.  Reviewed chart and meds.  He is not diabetic. Will contact cardiology with new labs and discuss statin.  Encourage weight loss and increased exercise.  Return for medicare wellness.  Discussed he will need new cardiac and pulm clearance prior to knee replacement surg.  Discussed the nature of the disease including, risks, complications, implications, management, safe and proper use of medications. Encouraged therapeutic lifestyle changes including low calorie diet with plenty of fruits and vegetables, daily exercise, medication compliance, and keeping scheduled follow up appointments with me and any other providers. Encouraged patient to have appointment for complete physical, fasting labs, appropriate screenings, and immunizations on an annual basis.  I personally spent  40 minutes face to face with the patient with 39 spent in counseling and discussion and/or coordination of care as described above for multiple chronic medical problems listed above.

## 2020-08-19 ENCOUNTER — LAB (OUTPATIENT)
Dept: FAMILY MEDICINE CLINIC | Facility: CLINIC | Age: 75
End: 2020-08-19

## 2020-08-19 DIAGNOSIS — E55.9 VITAMIN D DEFICIENCY: ICD-10-CM

## 2020-08-19 DIAGNOSIS — M25.561 CHRONIC PAIN OF RIGHT KNEE: ICD-10-CM

## 2020-08-19 DIAGNOSIS — Z13.220 LIPID SCREENING: ICD-10-CM

## 2020-08-19 DIAGNOSIS — R60.0 BILATERAL LOWER EXTREMITY EDEMA: ICD-10-CM

## 2020-08-19 DIAGNOSIS — Z12.5 ENCOUNTER FOR PROSTATE CANCER SCREENING: ICD-10-CM

## 2020-08-19 DIAGNOSIS — Z11.59 NEED FOR HEPATITIS C SCREENING TEST: ICD-10-CM

## 2020-08-19 DIAGNOSIS — G89.29 CHRONIC PAIN OF RIGHT KNEE: ICD-10-CM

## 2020-08-19 LAB
25(OH)D3 SERPL-MCNC: 22.9 NG/ML (ref 30–100)
ALBUMIN SERPL-MCNC: 4.2 G/DL (ref 3.5–5.2)
ALBUMIN/GLOB SERPL: 1.6 G/DL
ALP SERPL-CCNC: 51 U/L (ref 39–117)
ALT SERPL W P-5'-P-CCNC: 11 U/L (ref 1–41)
ANION GAP SERPL CALCULATED.3IONS-SCNC: 11.3 MMOL/L (ref 5–15)
AST SERPL-CCNC: 12 U/L (ref 1–40)
BASOPHILS # BLD AUTO: 0.04 10*3/MM3 (ref 0–0.2)
BASOPHILS NFR BLD AUTO: 0.4 % (ref 0–1.5)
BILIRUB SERPL-MCNC: 0.5 MG/DL (ref 0–1.2)
BUN SERPL-MCNC: 18 MG/DL (ref 8–23)
BUN/CREAT SERPL: 23.7 (ref 7–25)
CALCIUM SPEC-SCNC: 9.8 MG/DL (ref 8.6–10.5)
CHLORIDE SERPL-SCNC: 101 MMOL/L (ref 98–107)
CHOLEST SERPL-MCNC: 122 MG/DL (ref 0–200)
CO2 SERPL-SCNC: 28.7 MMOL/L (ref 22–29)
CREAT SERPL-MCNC: 0.76 MG/DL (ref 0.76–1.27)
CRP SERPL-MCNC: 0.08 MG/DL (ref 0–0.5)
DEPRECATED RDW RBC AUTO: 42.8 FL (ref 37–54)
EOSINOPHIL # BLD AUTO: 0.07 10*3/MM3 (ref 0–0.4)
EOSINOPHIL NFR BLD AUTO: 0.7 % (ref 0.3–6.2)
ERYTHROCYTE [DISTWIDTH] IN BLOOD BY AUTOMATED COUNT: 13.5 % (ref 12.3–15.4)
ERYTHROCYTE [SEDIMENTATION RATE] IN BLOOD: 2 MM/HR (ref 0–20)
GFR SERPL CREATININE-BSD FRML MDRD: 100 ML/MIN/1.73
GLOBULIN UR ELPH-MCNC: 2.6 GM/DL
GLUCOSE SERPL-MCNC: 109 MG/DL (ref 65–99)
HCT VFR BLD AUTO: 49.6 % (ref 37.5–51)
HCV AB SER DONR QL: NORMAL
HDLC SERPL-MCNC: 35 MG/DL (ref 40–60)
HGB BLD-MCNC: 16.5 G/DL (ref 13–17.7)
IMM GRANULOCYTES # BLD AUTO: 0.06 10*3/MM3 (ref 0–0.05)
IMM GRANULOCYTES NFR BLD AUTO: 0.6 % (ref 0–0.5)
LDLC SERPL CALC-MCNC: 51 MG/DL (ref 0–100)
LDLC/HDLC SERPL: 1.46 {RATIO}
LYMPHOCYTES # BLD AUTO: 2.21 10*3/MM3 (ref 0.7–3.1)
LYMPHOCYTES NFR BLD AUTO: 21.6 % (ref 19.6–45.3)
MCH RBC QN AUTO: 29 PG (ref 26.6–33)
MCHC RBC AUTO-ENTMCNC: 33.3 G/DL (ref 31.5–35.7)
MCV RBC AUTO: 87.3 FL (ref 79–97)
MONOCYTES # BLD AUTO: 0.91 10*3/MM3 (ref 0.1–0.9)
MONOCYTES NFR BLD AUTO: 8.9 % (ref 5–12)
NEUTROPHILS NFR BLD AUTO: 6.94 10*3/MM3 (ref 1.7–7)
NEUTROPHILS NFR BLD AUTO: 67.8 % (ref 42.7–76)
NRBC BLD AUTO-RTO: 0 /100 WBC (ref 0–0.2)
PLATELET # BLD AUTO: 231 10*3/MM3 (ref 140–450)
PMV BLD AUTO: 11.5 FL (ref 6–12)
POTASSIUM SERPL-SCNC: 4 MMOL/L (ref 3.5–5.2)
PROT SERPL-MCNC: 6.8 G/DL (ref 6–8.5)
PSA SERPL-MCNC: 2.98 NG/ML (ref 0–4)
RBC # BLD AUTO: 5.68 10*6/MM3 (ref 4.14–5.8)
SODIUM SERPL-SCNC: 141 MMOL/L (ref 136–145)
T4 FREE SERPL-MCNC: 1.46 NG/DL (ref 0.93–1.7)
TRIGL SERPL-MCNC: 179 MG/DL (ref 0–150)
TSH SERPL DL<=0.05 MIU/L-ACNC: 1.85 UIU/ML (ref 0.27–4.2)
URATE SERPL-MCNC: 6 MG/DL (ref 3.4–7)
VLDLC SERPL-MCNC: 35.8 MG/DL (ref 5–40)
WBC # BLD AUTO: 10.23 10*3/MM3 (ref 3.4–10.8)

## 2020-08-19 PROCEDURE — G0103 PSA SCREENING: HCPCS | Performed by: NURSE PRACTITIONER

## 2020-08-19 PROCEDURE — 84439 ASSAY OF FREE THYROXINE: CPT | Performed by: NURSE PRACTITIONER

## 2020-08-19 PROCEDURE — 85652 RBC SED RATE AUTOMATED: CPT | Performed by: NURSE PRACTITIONER

## 2020-08-19 PROCEDURE — 80053 COMPREHEN METABOLIC PANEL: CPT | Performed by: NURSE PRACTITIONER

## 2020-08-19 PROCEDURE — 84443 ASSAY THYROID STIM HORMONE: CPT | Performed by: NURSE PRACTITIONER

## 2020-08-19 PROCEDURE — 86803 HEPATITIS C AB TEST: CPT | Performed by: NURSE PRACTITIONER

## 2020-08-19 PROCEDURE — 84550 ASSAY OF BLOOD/URIC ACID: CPT | Performed by: NURSE PRACTITIONER

## 2020-08-19 PROCEDURE — 82306 VITAMIN D 25 HYDROXY: CPT | Performed by: NURSE PRACTITIONER

## 2020-08-19 PROCEDURE — 86140 C-REACTIVE PROTEIN: CPT | Performed by: NURSE PRACTITIONER

## 2020-08-19 PROCEDURE — 85025 COMPLETE CBC W/AUTO DIFF WBC: CPT | Performed by: NURSE PRACTITIONER

## 2020-08-19 PROCEDURE — 80061 LIPID PANEL: CPT | Performed by: NURSE PRACTITIONER

## 2020-08-20 DIAGNOSIS — E55.9 VITAMIN D DEFICIENCY: Primary | ICD-10-CM

## 2020-08-20 RX ORDER — ERGOCALCIFEROL 1.25 MG/1
50000 CAPSULE ORAL WEEKLY
Qty: 12 CAPSULE | Refills: 0 | Status: SHIPPED | OUTPATIENT
Start: 2020-08-20 | End: 2020-12-11

## 2020-09-09 RX ORDER — SOTALOL HYDROCHLORIDE 80 MG/1
TABLET ORAL
Qty: 180 TABLET | Refills: 0 | Status: SHIPPED | OUTPATIENT
Start: 2020-09-09 | End: 2020-11-30

## 2020-09-11 DIAGNOSIS — I10 ESSENTIAL HYPERTENSION: ICD-10-CM

## 2020-09-11 DIAGNOSIS — J45.30 MILD PERSISTENT ASTHMA WITHOUT COMPLICATION: ICD-10-CM

## 2020-09-11 RX ORDER — HYDROCHLOROTHIAZIDE 50 MG/1
50 TABLET ORAL DAILY
Qty: 90 TABLET | Refills: 3 | Status: SHIPPED | OUTPATIENT
Start: 2020-09-11 | End: 2020-09-23 | Stop reason: SDUPTHER

## 2020-09-11 RX ORDER — AMLODIPINE BESYLATE 10 MG/1
10 TABLET ORAL DAILY
Qty: 90 TABLET | Refills: 3 | Status: SHIPPED | OUTPATIENT
Start: 2020-09-11 | End: 2020-09-23 | Stop reason: SDUPTHER

## 2020-09-23 DIAGNOSIS — I10 ESSENTIAL HYPERTENSION: ICD-10-CM

## 2020-09-23 DIAGNOSIS — J45.30 MILD PERSISTENT ASTHMA WITHOUT COMPLICATION: ICD-10-CM

## 2020-09-23 RX ORDER — AMLODIPINE BESYLATE 10 MG/1
10 TABLET ORAL DAILY
Qty: 90 TABLET | Refills: 3 | Status: SHIPPED | OUTPATIENT
Start: 2020-09-23 | End: 2020-10-07 | Stop reason: SDUPTHER

## 2020-09-23 RX ORDER — EPINEPHRINE 0.3 MG/.3ML
INJECTION SUBCUTANEOUS
COMMUNITY
Start: 2020-06-16

## 2020-09-23 RX ORDER — HYDROCHLOROTHIAZIDE 50 MG/1
50 TABLET ORAL DAILY
Qty: 90 TABLET | Refills: 3 | Status: SHIPPED | OUTPATIENT
Start: 2020-09-23 | End: 2020-10-07 | Stop reason: SDUPTHER

## 2020-09-25 ENCOUNTER — OFFICE VISIT (OUTPATIENT)
Dept: PULMONOLOGY | Facility: CLINIC | Age: 75
End: 2020-09-25

## 2020-09-25 VITALS
SYSTOLIC BLOOD PRESSURE: 120 MMHG | TEMPERATURE: 97.5 F | HEART RATE: 56 BPM | DIASTOLIC BLOOD PRESSURE: 90 MMHG | WEIGHT: 315 LBS | OXYGEN SATURATION: 97 % | HEIGHT: 73 IN | BODY MASS INDEX: 41.75 KG/M2 | RESPIRATION RATE: 16 BRPM

## 2020-09-25 DIAGNOSIS — J18.9 PNEUMONIA OF RIGHT MIDDLE LOBE DUE TO INFECTIOUS ORGANISM: ICD-10-CM

## 2020-09-25 DIAGNOSIS — J30.2 SEASONAL AND PERENNIAL ALLERGIC RHINITIS: ICD-10-CM

## 2020-09-25 DIAGNOSIS — G47.33 OBSTRUCTIVE SLEEP APNEA, ADULT: ICD-10-CM

## 2020-09-25 DIAGNOSIS — J30.89 SEASONAL AND PERENNIAL ALLERGIC RHINITIS: ICD-10-CM

## 2020-09-25 DIAGNOSIS — E66.01 OBESITY, CLASS III, BMI 40-49.9 (MORBID OBESITY) (HCC): ICD-10-CM

## 2020-09-25 DIAGNOSIS — J45.909 IDIOPATHIC ASTHMA: Primary | ICD-10-CM

## 2020-09-25 DIAGNOSIS — Z78.9 NON-SMOKER: ICD-10-CM

## 2020-09-25 PROCEDURE — 99214 OFFICE O/P EST MOD 30 MIN: CPT | Performed by: INTERNAL MEDICINE

## 2020-09-25 NOTE — PROGRESS NOTES
PULMONARY  NOTE    Chief Complaint     Chronic persistent asthma, perennial rhinitis, obstructive sleep apnea, class III obesity    History of Present Illness     75-year-old white male returns today for follow-up.  I last saw him on 6/26/2020    He has chronic persistent asthma.  He has been on Brio with Incruse.  He has received several doses of Dupixent, now.  He is tolerating it well and giving himself the shot at home.  Overall he feels that he is doing better.  He has had no acute exacerbation of asthma since I last saw him.    He typically has chronic sinus congestion, drainage, and postnasal drip.  He has been on Allegra and Singulair.  He feels that his sinus symptoms are improved, as well.    He has obstructive sleep apnea and has been followed by Dr. Lopez in the sleep clinic  He had been on CPAP and then that was changed to BiPAP when I last saw him.  He had a lot of problems with the BiPAP and felt that he was doing worse on it so he stopped it completely and is not currently using anything at night.    Patient Active Problem List   Diagnosis   • Hypertension   • Disorder of calcium metabolism   • Disorder of endocrine testis   • Cellulitis of lower extremity   • Paroxysmal atrial fibrillation (CMS/[facility])   • Snoring   • Obstructive sleep apnea, adult   • Pneumonia of right middle lobe (Resolved)   • Chronic persistent asthma   • Non-smoker   • Obesity, Class III, BMI 40-49.9 (morbid obesity) (CMS/HCC)   • Dyslipidemia   • Perennial rhinitis   • Vitamin D deficiency     No Known Allergies    Current Outpatient Medications:   •  albuterol (PROVENTIL) (2.5 MG/3ML) 0.083% nebulizer solution, Take 2.5 mg by nebulization Every 4 (Four) Hours As Needed for Wheezing., Disp: 25 vial, Rfl: 12  •  albuterol sulfate  (90 Base) MCG/ACT inhaler, Inhale 2 puffs Every 4 (Four) Hours As Needed for Wheezing., Disp: 1 inhaler, Rfl: 0  •  amLODIPine (NORVASC) 10 MG tablet, Take 1 tablet by mouth Daily., Disp: 90  tablet, Rfl: 3  •  apixaban (Eliquis) 5 MG tablet tablet, Take 1 tablet by mouth 2 (Two) Times a Day., Disp: 180 tablet, Rfl: 3  •  Dupilumab (Dupixent) 300 MG/2ML solution prefilled syringe, Inject 300 mg under the skin into the appropriate area as directed Every 14 (Fourteen) Days., Disp: 2 syringe, Rfl: 11  •  EPINEPHrine (EPIPEN) 0.3 MG/0.3ML solution auto-injector injection, , Disp: , Rfl:   •  fexofenadine (ALLEGRA ALLERGY) 180 MG tablet, Take 1 tablet by mouth Daily., Disp: 30 tablet, Rfl: 2  •  Fluticasone Furoate-Vilanterol (BREO ELLIPTA) 200-25 MCG/INH inhaler, Inhale 1 puff Daily. 1 inhalation once a day, Disp: 180 each, Rfl: 3  •  hydroCHLOROthiazide (HYDRODIURIL) 50 MG tablet, Take 1 tablet by mouth Daily., Disp: 90 tablet, Rfl: 3  •  montelukast (SINGULAIR) 10 MG tablet, Take 1 tablet by mouth Daily., Disp: 90 tablet, Rfl: 1  •  Multiple Vitamins-Minerals (VITEYES AREDS FORMULA PO), Take 1 tablet by mouth 2 (Two) Times a Day., Disp: , Rfl:   •  promethazine-dextromethorphan (PROMETHAZINE-DM) 6.25-15 MG/5ML syrup, Take 5 mL by mouth 4 (Four) Times a Day As Needed for Cough., Disp: 180 mL, Rfl: 2  •  rosuvastatin (CRESTOR) 10 MG tablet, Take 1 tablet by mouth Daily., Disp: 30 tablet, Rfl: 11  •  sotalol (BETAPACE AF) 80 MG tablet tablet, TAKE 1 TABLET BY MOUTH  TWICE DAILY, Disp: 180 tablet, Rfl: 0  •  Umeclidinium Bromide (INCRUSE ELLIPTA) 62.5 MCG/INH aerosol powder , Inhale 1 puff Daily. 1 inhalation once a day, Disp: 90 each, Rfl: 3  •  vitamin D (ERGOCALCIFEROL) 1.25 MG (37282 UT) capsule capsule, Take 1 capsule by mouth 1 (One) Time Per Week., Disp: 12 capsule, Rfl: 0    Current Facility-Administered Medications:   •  aspirin chewable tablet 324 mg, 324 mg, Oral, Once, Arpita Mcdowell APRN  MEDICATION LIST AND ALLERGIES REVIEWED.    Family History   Problem Relation Age of Onset   • Cancer Mother         colon   • Diabetes Mother    • Alzheimer's disease Father    • No Known Problems Maternal  "Grandmother    • No Known Problems Maternal Grandfather    • No Known Problems Paternal Grandmother    • No Known Problems Paternal Grandfather      Social History     Tobacco Use   • Smoking status: Never Smoker   • Smokeless tobacco: Former User     Types: Chew   • Tobacco comment: states been over a month since use   Substance Use Topics   • Alcohol use: Yes     Alcohol/week: 0.0 - 2.0 standard drinks     Frequency: Monthly or less     Drinks per session: 1 or 2     Comment: 1-2 month    • Drug use: No     Social History     Social History Narrative    caffeine use average I or 2 servings weekly        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    Review of Systems  ALSO REFER TO SCANNED ROS SHEET FROM SAME DATE.    /90 (BP Location: Right arm, Patient Position: Sitting, Cuff Size: Adult)   Pulse 56   Temp 97.5 °F (36.4 °C)   Resp 16   Ht 185.4 cm (73\")   Wt (!) 147 kg (323 lb)   SpO2 97% Comment: room air at rest  BMI 42.61 kg/m²   Physical Exam  Vitals signs and nursing note reviewed.   Constitutional:       General: He is not in acute distress.     Appearance: He is well-developed. He is not diaphoretic.   HENT:      Head: Normocephalic and atraumatic.   Neck:      Thyroid: No thyromegaly.   Cardiovascular:      Rate and Rhythm: Normal rate and regular rhythm.      Heart sounds: Normal heart sounds. No murmur.   Pulmonary:      Effort: Pulmonary effort is normal.      Breath sounds: Normal breath sounds. No stridor.   Abdominal:      General: Bowel sounds are normal.      Palpations: Abdomen is soft.   Musculoskeletal: Normal range of motion.      Comments: Pitting bilateral ankle edema   Lymphadenopathy:      Cervical: No cervical adenopathy.      Upper Body:      Right upper body: No supraclavicular or epitrochlear adenopathy.      Left upper body: No supraclavicular or " epitrochlear adenopathy.   Skin:     General: Skin is warm and dry.   Neurological:      Mental Status: He is alert and oriented to person, place, and time.   Psychiatric:         Behavior: Behavior normal.         Results       Problem List       ICD-10-CM ICD-9-CM   1. Chronic persistent asthma  J45.909 493.90   2. Non-smoker  Z78.9 V49.89   3. Obesity, Class III, BMI 40-49.9 (morbid obesity) (CMS/[facility])  E66.01 278.01   4. Obstructive sleep apnea, adult  G47.33 327.23   5. Pneumonia of right middle lobe (Resolved)  J18.9 486   6. Perennial rhinitis  J30.89 477.9    J30.2        Discussion     He appears stable on his current medical regimen for his asthma.  He is on Breo, Incruse, and Dupixent.  He is tolerating these medicines well and feels that they have helped.  Insurance is apparently covering most of the cost.    He is going to remain on the same medications for his sinuses.  This is an antihistamine, Singulair, and Mucinex as needed.    I encouraged him to follow-up with Dr. Lopez in the sleep clinic regarding management of his RUY treatment.    If he does not end up back on CPAP or BiPAP then we probably need to check nocturnal pulse oximetry.  I suspect that he desaturates at night.    As in the past I recommended efforts at regular exercise and weight loss.    I will plan to see him back in 6 months or earlier if there are any problems in the meantime    Nicolas Bradley MD  Note electronically signed    CC: Maggie Schwartz, DNP, APRN

## 2020-10-06 ENCOUNTER — TELEPHONE (OUTPATIENT)
Dept: FAMILY MEDICINE CLINIC | Facility: CLINIC | Age: 75
End: 2020-10-06

## 2020-10-06 DIAGNOSIS — J45.30 MILD PERSISTENT ASTHMA WITHOUT COMPLICATION: ICD-10-CM

## 2020-10-06 DIAGNOSIS — I10 ESSENTIAL HYPERTENSION: ICD-10-CM

## 2020-10-06 NOTE — TELEPHONE ENCOUNTER
Patient would like Amlodipine and HCTZ ordered through [facility] home delivery. They were sent to [facility] in Sept for 90days + 3 refills

## 2020-10-07 RX ORDER — AMLODIPINE BESYLATE 10 MG/1
10 TABLET ORAL DAILY
Qty: 90 TABLET | Refills: 3 | Status: SHIPPED | OUTPATIENT
Start: 2020-10-07

## 2020-10-07 RX ORDER — HYDROCHLOROTHIAZIDE 50 MG/1
50 TABLET ORAL DAILY
Qty: 90 TABLET | Refills: 3 | Status: SHIPPED | OUTPATIENT
Start: 2020-10-07

## 2020-10-09 RX ORDER — MONTELUKAST SODIUM 10 MG/1
10 TABLET ORAL DAILY
Qty: 90 TABLET | Refills: 3 | Status: SHIPPED | OUTPATIENT
Start: 2020-10-09 | End: 2021-08-23

## 2020-10-12 ENCOUNTER — TELEPHONE (OUTPATIENT)
Dept: FAMILY MEDICINE CLINIC | Facility: CLINIC | Age: 75
End: 2020-10-12

## 2020-10-12 NOTE — TELEPHONE ENCOUNTER
Scheduled for knee replacement Jan 2021. Yesterday he got his toe hung on a chair, fell and injured his knee. There is a skin flap, swelling and bruising. This am she took him [facility] ER. She was advised to take him immediately to Dr Gonzales, orthopedic for evaluation of a possible fracture. Due to a series of unfortunate events he was not seen. Wife called [facility] and they are going to see them tomorrow. Her concern is that she is not properly cleaning the wound.  She describes an area size of a half-dollar flap, that is open and oozing.  She cleaned it with hibiclens and dressed it.  The flap is not over the wound. I instructed her to place the flap back over the wound, and place a dressing until he is seen by Ortho tomorrow. I instructed her to take him to [facility] ER for fever, increased pain, swelling or discoloration of the wound. She verbalized understanding.

## 2020-10-12 NOTE — TELEPHONE ENCOUNTER
Patient wife requested to get a phone call, she requested to talk to someone about if she is properly cleaning the patient wound correctly. Wife can be reached at [phone]

## 2020-10-23 ENCOUNTER — LAB (OUTPATIENT)
Dept: LAB | Facility: HOSPITAL | Age: 75
End: 2020-10-23

## 2020-10-23 ENCOUNTER — TRANSCRIBE ORDERS (OUTPATIENT)
Dept: LAB | Facility: HOSPITAL | Age: 75
End: 2020-10-23

## 2020-10-23 DIAGNOSIS — I10 ESSENTIAL HYPERTENSION: ICD-10-CM

## 2020-10-23 DIAGNOSIS — S80.811A INFECTED ABRASION OF RIGHT LOWER EXTREMITY, INITIAL ENCOUNTER: ICD-10-CM

## 2020-10-23 DIAGNOSIS — I87.2 PERIPHERAL VENOUS INSUFFICIENCY: ICD-10-CM

## 2020-10-23 DIAGNOSIS — I48.0 PAROXYSMAL ATRIAL FIBRILLATION (HCC): Primary | ICD-10-CM

## 2020-10-23 DIAGNOSIS — I48.0 PAROXYSMAL ATRIAL FIBRILLATION (HCC): ICD-10-CM

## 2020-10-23 DIAGNOSIS — L08.9 INFECTED ABRASION OF RIGHT LOWER EXTREMITY, INITIAL ENCOUNTER: ICD-10-CM

## 2020-10-23 DIAGNOSIS — Z86.14 PERSONAL HISTORY OF METHICILLIN RESISTANT STAPHYLOCOCCUS AUREUS: ICD-10-CM

## 2020-10-23 LAB
ALBUMIN SERPL-MCNC: 4.1 G/DL (ref 3.5–5.2)
ALBUMIN/GLOB SERPL: 1.2 G/DL
ALP SERPL-CCNC: 77 U/L (ref 39–117)
ALT SERPL W P-5'-P-CCNC: 13 U/L (ref 1–41)
ANION GAP SERPL CALCULATED.3IONS-SCNC: 13 MMOL/L (ref 5–15)
AST SERPL-CCNC: 15 U/L (ref 1–40)
BASOPHILS # BLD AUTO: 0.07 10*3/MM3 (ref 0–0.2)
BASOPHILS NFR BLD AUTO: 0.6 % (ref 0–1.5)
BILIRUB SERPL-MCNC: 0.7 MG/DL (ref 0–1.2)
BUN SERPL-MCNC: 17 MG/DL (ref 8–23)
BUN/CREAT SERPL: 29.8 (ref 7–25)
CALCIUM SPEC-SCNC: 9.4 MG/DL (ref 8.6–10.5)
CHLORIDE SERPL-SCNC: 102 MMOL/L (ref 98–107)
CO2 SERPL-SCNC: 28 MMOL/L (ref 22–29)
CREAT SERPL-MCNC: 0.57 MG/DL (ref 0.76–1.27)
CRP SERPL-MCNC: 3.27 MG/DL (ref 0–0.5)
DEPRECATED RDW RBC AUTO: 50.9 FL (ref 37–54)
EOSINOPHIL # BLD AUTO: 0.17 10*3/MM3 (ref 0–0.4)
EOSINOPHIL NFR BLD AUTO: 1.4 % (ref 0.3–6.2)
ERYTHROCYTE [DISTWIDTH] IN BLOOD BY AUTOMATED COUNT: 15.6 % (ref 12.3–15.4)
ERYTHROCYTE [SEDIMENTATION RATE] IN BLOOD: 42 MM/HR (ref 0–20)
GFR SERPL CREATININE-BSD FRML MDRD: 139 ML/MIN/1.73
GLOBULIN UR ELPH-MCNC: 3.3 GM/DL
GLUCOSE SERPL-MCNC: 95 MG/DL (ref 65–99)
HCT VFR BLD AUTO: 45.1 % (ref 37.5–51)
HGB BLD-MCNC: 14.3 G/DL (ref 13–17.7)
IMM GRANULOCYTES # BLD AUTO: 0.1 10*3/MM3 (ref 0–0.05)
IMM GRANULOCYTES NFR BLD AUTO: 0.9 % (ref 0–0.5)
LYMPHOCYTES # BLD AUTO: 2.07 10*3/MM3 (ref 0.7–3.1)
LYMPHOCYTES NFR BLD AUTO: 17.6 % (ref 19.6–45.3)
MCH RBC QN AUTO: 29.2 PG (ref 26.6–33)
MCHC RBC AUTO-ENTMCNC: 31.7 G/DL (ref 31.5–35.7)
MCV RBC AUTO: 92 FL (ref 79–97)
MONOCYTES # BLD AUTO: 1.19 10*3/MM3 (ref 0.1–0.9)
MONOCYTES NFR BLD AUTO: 10.1 % (ref 5–12)
NEUTROPHILS NFR BLD AUTO: 69.4 % (ref 42.7–76)
NEUTROPHILS NFR BLD AUTO: 8.13 10*3/MM3 (ref 1.7–7)
NRBC BLD AUTO-RTO: 0 /100 WBC (ref 0–0.2)
PLATELET # BLD AUTO: 353 10*3/MM3 (ref 140–450)
PMV BLD AUTO: 10.3 FL (ref 6–12)
POTASSIUM SERPL-SCNC: 3.4 MMOL/L (ref 3.5–5.2)
PROT SERPL-MCNC: 7.4 G/DL (ref 6–8.5)
RBC # BLD AUTO: 4.9 10*6/MM3 (ref 4.14–5.8)
SODIUM SERPL-SCNC: 143 MMOL/L (ref 136–145)
WBC # BLD AUTO: 11.73 10*3/MM3 (ref 3.4–10.8)

## 2020-10-23 PROCEDURE — 86140 C-REACTIVE PROTEIN: CPT

## 2020-10-23 PROCEDURE — 36415 COLL VENOUS BLD VENIPUNCTURE: CPT

## 2020-10-23 PROCEDURE — 85652 RBC SED RATE AUTOMATED: CPT

## 2020-10-23 PROCEDURE — 80053 COMPREHEN METABOLIC PANEL: CPT

## 2020-10-23 PROCEDURE — 85025 COMPLETE CBC W/AUTO DIFF WBC: CPT

## 2020-10-26 ENCOUNTER — TRANSCRIBE ORDERS (OUTPATIENT)
Dept: LAB | Facility: HOSPITAL | Age: 75
End: 2020-10-26

## 2020-10-26 ENCOUNTER — LAB (OUTPATIENT)
Dept: LAB | Facility: HOSPITAL | Age: 75
End: 2020-10-26

## 2020-10-26 DIAGNOSIS — L03.115 CELLULITIS OF RIGHT FOOT: ICD-10-CM

## 2020-10-26 DIAGNOSIS — L03.115 CELLULITIS OF RIGHT FOOT: Primary | ICD-10-CM

## 2020-10-26 LAB
ALBUMIN SERPL-MCNC: 3.9 G/DL (ref 3.5–5.2)
ALBUMIN/GLOB SERPL: 1.3 G/DL
ALP SERPL-CCNC: 74 U/L (ref 39–117)
ALT SERPL W P-5'-P-CCNC: 15 U/L (ref 1–41)
ANION GAP SERPL CALCULATED.3IONS-SCNC: 9 MMOL/L (ref 5–15)
AST SERPL-CCNC: 15 U/L (ref 1–40)
BASOPHILS # BLD AUTO: 0.06 10*3/MM3 (ref 0–0.2)
BASOPHILS NFR BLD AUTO: 0.7 % (ref 0–1.5)
BILIRUB SERPL-MCNC: 0.6 MG/DL (ref 0–1.2)
BUN SERPL-MCNC: 17 MG/DL (ref 8–23)
BUN/CREAT SERPL: 25.4 (ref 7–25)
CALCIUM SPEC-SCNC: 9.5 MG/DL (ref 8.6–10.5)
CHLORIDE SERPL-SCNC: 107 MMOL/L (ref 98–107)
CK SERPL-CCNC: 43 U/L (ref 20–200)
CO2 SERPL-SCNC: 27 MMOL/L (ref 22–29)
CREAT SERPL-MCNC: 0.67 MG/DL (ref 0.76–1.27)
CRP SERPL-MCNC: 1.29 MG/DL (ref 0–0.5)
DEPRECATED RDW RBC AUTO: 52 FL (ref 37–54)
EOSINOPHIL # BLD AUTO: 0.12 10*3/MM3 (ref 0–0.4)
EOSINOPHIL NFR BLD AUTO: 1.3 % (ref 0.3–6.2)
ERYTHROCYTE [DISTWIDTH] IN BLOOD BY AUTOMATED COUNT: 15.7 % (ref 12.3–15.4)
ERYTHROCYTE [SEDIMENTATION RATE] IN BLOOD: 38 MM/HR (ref 0–20)
GFR SERPL CREATININE-BSD FRML MDRD: 116 ML/MIN/1.73
GLOBULIN UR ELPH-MCNC: 3 GM/DL
GLUCOSE SERPL-MCNC: 111 MG/DL (ref 65–99)
HCT VFR BLD AUTO: 43.1 % (ref 37.5–51)
HGB BLD-MCNC: 13.4 G/DL (ref 13–17.7)
IMM GRANULOCYTES # BLD AUTO: 0.1 10*3/MM3 (ref 0–0.05)
IMM GRANULOCYTES NFR BLD AUTO: 1.1 % (ref 0–0.5)
LYMPHOCYTES # BLD AUTO: 1.91 10*3/MM3 (ref 0.7–3.1)
LYMPHOCYTES NFR BLD AUTO: 20.8 % (ref 19.6–45.3)
MCH RBC QN AUTO: 28.6 PG (ref 26.6–33)
MCHC RBC AUTO-ENTMCNC: 31.1 G/DL (ref 31.5–35.7)
MCV RBC AUTO: 92.1 FL (ref 79–97)
MONOCYTES # BLD AUTO: 1 10*3/MM3 (ref 0.1–0.9)
MONOCYTES NFR BLD AUTO: 10.9 % (ref 5–12)
NEUTROPHILS NFR BLD AUTO: 6 10*3/MM3 (ref 1.7–7)
NEUTROPHILS NFR BLD AUTO: 65.2 % (ref 42.7–76)
NRBC BLD AUTO-RTO: 0 /100 WBC (ref 0–0.2)
PLATELET # BLD AUTO: 317 10*3/MM3 (ref 140–450)
PMV BLD AUTO: 9.6 FL (ref 6–12)
POTASSIUM SERPL-SCNC: 3.8 MMOL/L (ref 3.5–5.2)
PROT SERPL-MCNC: 6.9 G/DL (ref 6–8.5)
RBC # BLD AUTO: 4.68 10*6/MM3 (ref 4.14–5.8)
SODIUM SERPL-SCNC: 143 MMOL/L (ref 136–145)
WBC # BLD AUTO: 9.19 10*3/MM3 (ref 3.4–10.8)

## 2020-10-26 PROCEDURE — 80053 COMPREHEN METABOLIC PANEL: CPT

## 2020-10-26 PROCEDURE — 85652 RBC SED RATE AUTOMATED: CPT

## 2020-10-26 PROCEDURE — 85025 COMPLETE CBC W/AUTO DIFF WBC: CPT

## 2020-10-26 PROCEDURE — 36415 COLL VENOUS BLD VENIPUNCTURE: CPT

## 2020-10-26 PROCEDURE — 82550 ASSAY OF CK (CPK): CPT | Performed by: INTERNAL MEDICINE

## 2020-10-26 PROCEDURE — 86140 C-REACTIVE PROTEIN: CPT

## 2020-10-27 ENCOUNTER — TRANSCRIBE ORDERS (OUTPATIENT)
Dept: PHYSICAL THERAPY | Facility: HOSPITAL | Age: 75
End: 2020-10-27

## 2020-10-27 DIAGNOSIS — L03.115 CELLULITIS OF RIGHT LOWER EXTREMITY: Primary | ICD-10-CM

## 2020-11-02 ENCOUNTER — TRANSCRIBE ORDERS (OUTPATIENT)
Dept: LAB | Facility: HOSPITAL | Age: 75
End: 2020-11-02

## 2020-11-02 ENCOUNTER — LAB (OUTPATIENT)
Dept: LAB | Facility: HOSPITAL | Age: 75
End: 2020-11-02

## 2020-11-02 DIAGNOSIS — L03.115 CELLULITIS OF RIGHT FOOT: Primary | ICD-10-CM

## 2020-11-02 DIAGNOSIS — L03.115 CELLULITIS OF RIGHT FOOT: ICD-10-CM

## 2020-11-02 LAB
ALBUMIN SERPL-MCNC: 3.9 G/DL (ref 3.5–5.2)
ALBUMIN/GLOB SERPL: 1.1 G/DL
ALP SERPL-CCNC: 74 U/L (ref 39–117)
ALT SERPL W P-5'-P-CCNC: 12 U/L (ref 1–41)
ANION GAP SERPL CALCULATED.3IONS-SCNC: 9 MMOL/L (ref 5–15)
AST SERPL-CCNC: 16 U/L (ref 1–40)
BASOPHILS # BLD AUTO: 0.06 10*3/MM3 (ref 0–0.2)
BASOPHILS NFR BLD AUTO: 0.8 % (ref 0–1.5)
BILIRUB SERPL-MCNC: 0.4 MG/DL (ref 0–1.2)
BUN SERPL-MCNC: 11 MG/DL (ref 8–23)
BUN/CREAT SERPL: 15.3 (ref 7–25)
CALCIUM SPEC-SCNC: 9.8 MG/DL (ref 8.6–10.5)
CHLORIDE SERPL-SCNC: 102 MMOL/L (ref 98–107)
CK SERPL-CCNC: 57 U/L (ref 20–200)
CO2 SERPL-SCNC: 31 MMOL/L (ref 22–29)
CREAT SERPL-MCNC: 0.72 MG/DL (ref 0.76–1.27)
CRP SERPL-MCNC: 0.32 MG/DL (ref 0–0.5)
DEPRECATED RDW RBC AUTO: 50.4 FL (ref 37–54)
EOSINOPHIL # BLD AUTO: 0.09 10*3/MM3 (ref 0–0.4)
EOSINOPHIL NFR BLD AUTO: 1.1 % (ref 0.3–6.2)
ERYTHROCYTE [DISTWIDTH] IN BLOOD BY AUTOMATED COUNT: 15.3 % (ref 12.3–15.4)
ERYTHROCYTE [SEDIMENTATION RATE] IN BLOOD: 25 MM/HR (ref 0–20)
GFR SERPL CREATININE-BSD FRML MDRD: 106 ML/MIN/1.73
GLOBULIN UR ELPH-MCNC: 3.6 GM/DL
GLUCOSE SERPL-MCNC: 116 MG/DL (ref 65–99)
HCT VFR BLD AUTO: 44.6 % (ref 37.5–51)
HGB BLD-MCNC: 14.2 G/DL (ref 13–17.7)
IMM GRANULOCYTES # BLD AUTO: 0.04 10*3/MM3 (ref 0–0.05)
IMM GRANULOCYTES NFR BLD AUTO: 0.5 % (ref 0–0.5)
LYMPHOCYTES # BLD AUTO: 1.8 10*3/MM3 (ref 0.7–3.1)
LYMPHOCYTES NFR BLD AUTO: 22.9 % (ref 19.6–45.3)
MCH RBC QN AUTO: 29 PG (ref 26.6–33)
MCHC RBC AUTO-ENTMCNC: 31.8 G/DL (ref 31.5–35.7)
MCV RBC AUTO: 91 FL (ref 79–97)
MONOCYTES # BLD AUTO: 0.98 10*3/MM3 (ref 0.1–0.9)
MONOCYTES NFR BLD AUTO: 12.5 % (ref 5–12)
NEUTROPHILS NFR BLD AUTO: 4.9 10*3/MM3 (ref 1.7–7)
NEUTROPHILS NFR BLD AUTO: 62.2 % (ref 42.7–76)
NRBC BLD AUTO-RTO: 0 /100 WBC (ref 0–0.2)
PLATELET # BLD AUTO: 281 10*3/MM3 (ref 140–450)
PMV BLD AUTO: 9.6 FL (ref 6–12)
POTASSIUM SERPL-SCNC: 3.6 MMOL/L (ref 3.5–5.2)
PROT SERPL-MCNC: 7.5 G/DL (ref 6–8.5)
RBC # BLD AUTO: 4.9 10*6/MM3 (ref 4.14–5.8)
SODIUM SERPL-SCNC: 142 MMOL/L (ref 136–145)
WBC # BLD AUTO: 7.87 10*3/MM3 (ref 3.4–10.8)

## 2020-11-02 PROCEDURE — 85025 COMPLETE CBC W/AUTO DIFF WBC: CPT

## 2020-11-02 PROCEDURE — 85652 RBC SED RATE AUTOMATED: CPT

## 2020-11-02 PROCEDURE — 80053 COMPREHEN METABOLIC PANEL: CPT

## 2020-11-02 PROCEDURE — 36415 COLL VENOUS BLD VENIPUNCTURE: CPT

## 2020-11-02 PROCEDURE — 86140 C-REACTIVE PROTEIN: CPT

## 2020-11-02 PROCEDURE — 82550 ASSAY OF CK (CPK): CPT | Performed by: INTERNAL MEDICINE

## 2020-11-03 ENCOUNTER — HOSPITAL ENCOUNTER (OUTPATIENT)
Dept: PHYSICAL THERAPY | Facility: HOSPITAL | Age: 75
Setting detail: THERAPIES SERIES
Discharge: HOME | End: 2020-11-03

## 2020-11-03 DIAGNOSIS — R60.0 BILATERAL LEG EDEMA: ICD-10-CM

## 2020-11-03 DIAGNOSIS — S81.001D OPEN KNEE WOUND, RIGHT, SUBSEQUENT ENCOUNTER: Primary | ICD-10-CM

## 2020-11-03 PROCEDURE — 97597 DBRDMT OPN WND 1ST 20 CM/<: CPT

## 2020-11-03 PROCEDURE — 29581 APPL MULTLAYER CMPRN SYS LEG: CPT

## 2020-11-03 PROCEDURE — 97161 PT EVAL LOW COMPLEX 20 MIN: CPT

## 2020-11-03 NOTE — THERAPY EVALUATION
Outpatient Rehabilitation - Wound/Debridement Initial Eval  Psychiatric     Patient Name: Luis Perez  : 1945  MRN: [id number]  Today's Date: 11/3/2020                   Admit Date: 11/3/2020    Visit Dx:    ICD-10-CM ICD-9-CM   1. Open knee wound, right, subsequent encounter  S81.001D V58.89     891.0   2. Bilateral leg edema  R60.0 782.3       Patient Active Problem List   Diagnosis   • Hypertension   • Disorder of calcium metabolism   • Disorder of endocrine testis   • Cellulitis of lower extremity   • Paroxysmal atrial fibrillation (CMS/HCC)   • Snoring   • Obstructive sleep apnea, adult   • Pneumonia of right middle lobe (Resolved)   • Chronic persistent asthma   • Non-smoker   • Obesity, Class III, BMI 40-49.9 (morbid obesity) (CMS/HCC)   • Dyslipidemia   • Perennial rhinitis   • Vitamin D deficiency        Past Medical History:   Diagnosis Date   • Asthma    • Bronchitis, chronic (CMS/HCC)    • Cancer (CMS/[facility])     Skin    • Chronic persistent asthma 2020   • Hyperlipidemia    • Hypertension    • Nephrolithiasis    • Obesity, Class III, BMI 40-49.9 (morbid obesity) (CMS/HCC) 2020   • Paroxysmal atrial fibrillation (CMS/HCC) 7/15/2019   • Pneumonia    • Sleep apnea    • Vitamin D deficiency 2020        Past Surgical History:   Procedure Laterality Date   • CARDIOVERSION  2019   • COLONOSCOPY     • NASAL SEPTUM SURGERY      Deviation Repair   • SKIN CANCER EXCISION     • TONSILLECTOMY     • VASECTOMY         Patient History     Row Name 20 1000             History    Chief Complaint  Swelling;Ulcer, wound or other skin conditions  -MP      Date Current Problem(s) Began  10/11/20  -MP      Brief Description of Current Complaint  Patient presents with open wound to R knee s/p fall that occured on 10/11/20. Patient visited ER the morning after with imaging of area being unremarkable. [facility] due for abx tx for R LE  cellulitis. Of note, patient has a past medical hx significant for cellulitis of R LE. He is supposed to have a R knee placement in mid-January.  -MP      Previous treatment for THIS PROBLEM  Medication  -MP      Patient/Caregiver Goals  Relieve pain;Improve mobility;Improve strength;Decrease swelling;Heal wound  -MP      Patient's Rating of General Health  Fair  -MP      Patient seeing anyone else for problem(s)?  [facility] orthopedics, [facility] for abx tx  -MP      How has patient tried to help current problem?  Patient has sought out care for R LE wound. Per patient, [facility] performs dressing changes for him when he goes daily for abx tx.  -MP      What clinical tests have you had for this problem?  X-ray;CT scan;MRI;Blood Work  -MP         Pain     Pain at Present  0  -MP         Fall Risk Assessment    Any falls in the past year:  Yes  -MP         Services    Prior Rehab/Home Health Experiences  No  -MP      Are you currently receiving Home Health services  No  -MP         Daily Activities    Primary Language  English  -MP        User Key  (r) = Recorded By, (t) = Taken By, (c) = Cosigned By    Initials Name Provider Type    Clifford Badillo PT Physical Therapist          EVALUATION  PT Ortho     Row Name 11/03/20 1000       Subjective Comments    Subjective Comments  Patient arrives to clinic with bandage applied to R knee. Per patient, [facility] performs dressing changes daily for him when he arrives for abx tx.  -MP       Subjective Pain    Able to rate subjective pain?  yes  -MP    Pre-Treatment Pain Level  0  -MP    Post-Treatment Pain Level  0  -MP       Transfers    Comment (Transfers)  Seated in transport chair  -MP      User Key  (r) = Recorded By, (t) = Taken By, (c) = Cosigned By    Initials Name Provider Type    Clifford Badillo PT Physical Therapist        LDA Wound     Row Name 11/03/20 1000             Wound 11/03/20 1000 Right anterior knee Traumatic    Wound - Properties Group Placement Date: 11/03/20   -MP Placement Time: 1000  -MP Present on Hospital Admission: Y  -MP Side: Right  -MP Orientation: anterior  -MP Location: knee  -MP Primary Wound Type: Traumatic  -MP    Wound Image  Images linked: 1  -MP      Dressing Appearance  intact;moist drainage  -MP      Base  black eschar;maroon/purple;moist;pink;red;necrotic;yellow;slough  -MP      Black (%), Wound Tissue Color  25  -MP      Periwound  indurated;moist;pale white;pink;swelling;warm  -MP      Periwound Temperature  warm  -MP      Periwound Skin Turgor  firm  -MP      Edges  irregular;open  -MP      Wound Length (cm)  9 cm unclear wound border  -MP      Wound Width (cm)  9 cm  -MP      Wound Depth (cm)  0.1 cm  -MP      Drainage Characteristics/Odor  serosanguineous  -MP      Drainage Amount  small  -MP      Care, Wound  cleansed with;wound cleanser;debrided;honey applied  -MP      Dressing Care  dressing applied;silver impregnated;low-adherent;foam;border dressing;multi-layer wrap Therahoney, Mepilex Ag, 6'' optifoam, primafix tape, MLW  -MP      Periwound Care  cleansed with pH balanced cleanser;dry periwound area maintained  -MP      Retired Wound - Properties Group Date first assessed: 11/03/20  -MP Time first assessed: 1000  -MP Present on Hospital Admission: Y  -MP Side: Right  -MP Location: knee  -MP Primary Wound Type: Traumatic  -MP      User Key  (r) = Recorded By, (t) = Taken By, (c) = Cosigned By    Initials Name Provider Type    Clifford Badillo PT Physical Therapist        Lymphedema     Row Name 11/03/20 1000             Lymphedema Edema Assessment    Ptting Edema Category  By severity  -MP      Pitting Edema  Moderate  -MP         Lymphedema Pulses/Capillary Refill    Lymphedema Pulses/Capillary Refill  lower extremity pulses;capillary refill  -MP      Dorsalis Pedis Pulse  right:;left:;+1 diminished  -MP      Posterior Tibialis Pulse  right:;left:;+1 diminished  -MP      Capillary Refill  lower extremity capillary refill  -MP      Lower  Extremity Capillary Refill  right:;left:;less than 3 seconds  -MP         Lymphedema Measurements    Measurement Type(s)  Quick Girth  -MP      Quick Girth Areas  Lower extremities  -MP         LLE Quick Girth (cm)    Met-heads  27.2 cm  -MP      Smallest ankle  29 cm  -MP      Largest calf  45.2 cm  -MP         RLE Quick Girth (cm)    Met-heads  29.9 cm  -MP      Smallest ankle  30 cm  -MP      Largest calf  49.1 cm  -MP      RLE Quick Girth Total  109  -MP         Compression/Skin Care    Compression/Skin Care  skin care;wrapping location;bandaging  -MP      Skin Care  washed/dried;lotion applied;moisturizing lotion applied  -MP      Wrapping Location  lower extremity  -MP      Wrapping Location LE  bilateral:;foot to knee  -MP      Wrapping Comments  Wound dressings, size 4/6 compressogrip to B LE applied in doubled and overlapping fashion for gradient compression   -MP        User Key  (r) = Recorded By, (t) = Taken By, (c) = Cosigned By    Initials Name Provider Type    Clifford Badillo, PT Physical Therapist          WOUND DEBRIDEMENT  Total area of Debridement: 5 cm2  Debridement Site 1  Location- Site 1: R knee  Selective Debridement- Site 1: Wound Surface <20cmsq  Instruments- Site 1: tweezers  Excised Tissue Description- Site 1: minimum, slough, eschar, other (comment)(macerated skin)  Bleeding- Site 1: none             Therapy Education     Row Name 11/03/20 1000             Therapy Education    Education Details  Patient provided information on rationale behind dressings and was educated on how to perform home dressing management if dressing was soiled before next appt on Thursday. Patient was instructed to leave dressing in place for 2-3 days unless dressing became saturated. Information given on how to contact office if issues arise.  -MP      Given  Symptoms/condition management;Bandaging/dressing change  -MP      Program  New  -MP      How Provided  Verbal;Demonstration  -MP      Provided to   Patient;Other (comment) spouse  -MP        User Key  (r) = Recorded By, (t) = Taken By, (c) = Cosigned By    Initials Name Provider Type    MP Clifford Haskins PT Physical Therapist          Recommendation and Plan  PT Assessment/Plan     Row Name 20 1000          PT Assessment    Functional Limitations  Impaired gait;Limitation in home management;Limitations in community activities;Performance in work activities;Other (comment) Wound management  -MP     Impairments  Edema;Impaired venous circulation;Integumentary integrity  -MP     Assessment Comments  Patient presents to wound clinic with B LE edema and R knee wound that occured after a fall on 10/11/20. One week later, patient began seeing [facility] for abx management for R LE cellulitis. R anterior knee wound presents with slough and eschar in wound bed with area of unclear wound margins due to fragile nature of periwound skin. Moderate edema presents in B LE's and was addressed with application of size 4/6 compressogrip. PT applied honey to wound to promote softening of eschar and autolytic debridement. Patient's wife is limited in ability to perform home dressing management and application of compression due to physical disability. Patient would benefit from skilled PT wound care to promote increased wound healing potential.  -MP     Rehab Potential  Good  -MP     Patient/caregiver participated in establishment of treatment plan and goals  Yes  -MP     Patient would benefit from skilled therapy intervention  Yes  -MP        PT Plan    PT Frequency  2x/week  -MP     Predicted Duration of Therapy Intervention (PT)  25 visits  -MP     Planned CPT's?  PT EVAL LOW COMPLEXITY: 77868;PT SELF CARE/MGMT/TRAIN 15 MIN: 35405;PT NONSELECT DEBRIDE 15 MIN: 33358;PT JUVENTINO DEBRIDE OPEN WOUND UP TO 20 CM: 24763;PT JUVENTINO DEBRIDE OPEN WOUND EA ADD 20 CM: 49638;PT NLFU MIST: 87263;PT UNNA BOOT: 54868;PT MULTI LAYER COMP SYS LE  -MP     Physical Therapy Interventions (Optional  Details)  patient/family education;wound care  -MP     PT Plan Comments  Debridement, dressing management, compression  -MP       User Key  (r) = Recorded By, (t) = Taken By, (c) = Cosigned By    Initials Name Provider Type    Clifford Badillo PT Physical Therapist            Goals  PT OP Goals     Row Name 11/03/20 1000          PT Short Term Goals    STG 1  Patient will present with 50% decrease in wound size as evidence of wound healing.  -MP     STG 1 Progress  New  -MP     STG 2  Patient will verbalize signs and symptoms of infection.  -MP     STG 2 Progress  New  -MP        Long Term Goals    LTG 1  Patient will present with 85% decrease in wound size as evidence of wound healing.  -MP     LTG 1 Progress  New  -MP     LTG 2  Patient will demonstrate 4cm reduction in B LE measurements as evidence of improved venous return.  -MP     LTG 2 Progress  New  -MP        Time Calculation    PT Goal Re-Cert Due Date  02/01/21  -MP       User Key  (r) = Recorded By, (t) = Taken By, (c) = Cosigned By    Initials Name Provider Type    Clifford Badillo PT Physical Therapist          Time Calculation: Start Time: 1000  Therapy Charges for Today     Code Description Service Date Service Provider Modifiers Qty    85381148479 HC PT EVAL LOW COMPLEXITY 4 11/3/2020 Clifford Haskins, PT GP 1    73350051306 HC PT MULTI LAYER COMP SYS BELOW KNEE 11/3/2020 Clifford Haskins, PT GP 1    [id number] HC JUVENTINO DEBRIDE OPEN WOUND UP TO 20CM 11/3/2020 Clifford Haskins, PT GP 1                Clifford Haskins PT  11/3/2020

## 2020-11-05 ENCOUNTER — HOSPITAL ENCOUNTER (OUTPATIENT)
Dept: PHYSICAL THERAPY | Facility: HOSPITAL | Age: 75
Setting detail: THERAPIES SERIES
Discharge: HOME | End: 2020-11-05

## 2020-11-05 DIAGNOSIS — R60.0 BILATERAL LEG EDEMA: ICD-10-CM

## 2020-11-05 DIAGNOSIS — S81.001D OPEN KNEE WOUND, RIGHT, SUBSEQUENT ENCOUNTER: Primary | ICD-10-CM

## 2020-11-05 PROCEDURE — 97597 DBRDMT OPN WND 1ST 20 CM/<: CPT | Performed by: PHYSICAL THERAPIST

## 2020-11-05 PROCEDURE — 29581 APPL MULTLAYER CMPRN SYS LEG: CPT | Performed by: PHYSICAL THERAPIST

## 2020-11-05 NOTE — THERAPY WOUND CARE TREATMENT
"    Outpatient Rehabilitation - Wound/Debridement Treatment Note  [facility]     Patient Name: Luis Perez  : 1945  MRN: [id number]  Today's Date: 2020                 Admit Date: 2020    Visit Dx:    ICD-10-CM ICD-9-CM   1. Open knee wound, right, subsequent encounter  S81.001D V58.89     891.0   2. Bilateral leg edema  R60.0 782.3       Patient Active Problem List   Diagnosis   • Hypertension   • Disorder of calcium metabolism   • Disorder of endocrine testis   • Cellulitis of lower extremity   • Paroxysmal atrial fibrillation (CMS/HCC)   • Snoring   • Obstructive sleep apnea, adult   • Pneumonia of right middle lobe (Resolved)   • Chronic persistent asthma   • Non-smoker   • Obesity, Class III, BMI 40-49.9 (morbid obesity) (CMS/HCC)   • Dyslipidemia   • Perennial rhinitis   • Vitamin D deficiency        Past Medical History:   Diagnosis Date   • Asthma    • Bronchitis, chronic (CMS/HCC)    • Cancer (CMS/HCC)     Skin    • Chronic persistent asthma 2020   • Hyperlipidemia    • Hypertension    • Nephrolithiasis    • Obesity, Class III, BMI 40-49.9 (morbid obesity) (CMS/HCC) 2020   • Paroxysmal atrial fibrillation (CMS/HCC) 7/15/2019   • Pneumonia    • Sleep apnea    • Vitamin D deficiency 2020        Past Surgical History:   Procedure Laterality Date   • CARDIOVERSION  2019   • COLONOSCOPY     • NASAL SEPTUM SURGERY      Deviation Repair   • SKIN CANCER EXCISION     • TONSILLECTOMY     • VASECTOMY           PT Ortho     Row Name 20 1135       Subjective Comments    Subjective Comments  Doctor thought it was doing better than expected. Temporary dressing from [facility] office. Pt reports Lt leg compression got \"too tight\" so he took it off over night. Has compression socks/ stockings at home.   -MW       Subjective Pain    Able to rate subjective pain?  yes  -MW    Pre-Treatment Pain Level  1  -MW    Post-Treatment Pain Level  1  -MW       Transfers    Comment (Transfers) " " Seated in transport chair  -MW       Gait/Stairs (Locomotion)    Comment (Gait/Stairs)  Pt and spouse both in transport chairs   -MW      User Key  (r) = Recorded By, (t) = Taken By, (c) = Cosigned By    Initials Name Provider Type    Jennifer Cast, PT Physical Therapist          LDA Wound     Row Name 11/05/20 1135             Wound 11/03/20 1000 Right anterior knee Traumatic    Wound - Properties Group Placement Date: 11/03/20  -MP Placement Time: 1000  -MP Present on Hospital Admission: Y  -MP Side: Right  -MP Orientation: anterior  -MP Location: knee  -MP Primary Wound Type: Traumatic  -MP    Wound Image  Images linked: 1  -MW      Dressing Appearance  intact;moist drainage  -MW      Base  moist;pink;red;subcutaneous;yellow;white;slough  -MW      Periwound  indurated;pink;swelling;warm  -MW      Periwound Temperature  warm  -MW      Periwound Skin Turgor  firm  -MW      Edges  irregular;open  -MW      Drainage Characteristics/Odor  serosanguineous  -MW      Drainage Amount  small  -MW      Care, Wound  cleansed with;wound cleanser;debrided;honey applied  -MW      Dressing Care  dressing changed;low-adherent;foam;multi-layer wrap Therahoney, 6\" optifoam, primafix to edges   -MW      Periwound Care  cleansed with pH balanced cleanser;dry periwound area maintained  -MW      Retired Wound - Properties Group Date first assessed: 11/03/20  - Time first assessed: 1000  -MP Present on Hospital Admission: Y  -MP Side: Right  -MP Location: knee  -MP Primary Wound Type: Traumatic  -MP      User Key  (r) = Recorded By, (t) = Taken By, (c) = Cosigned By    Initials Name Provider Type    Jennifer Cast, PT Physical Therapist    Clifford Badillo, PT Physical Therapist        Lymphedema     Row Name 11/05/20 1135             Lymphedema Edema Assessment    Ptting Edema Category  By severity  -MW      Pitting Edema  Moderate  -MW      Edema Assessment Comment  LLE> RLE   -MW         Lymphedema Pulses/Capillary " Refill    Lymphedema Pulses/Capillary Refill  --  -MW      Dorsalis Pedis Pulse  --  -MW      Posterior Tibialis Pulse  --  -MW      Capillary Refill  lower extremity capillary refill  -MW      Lower Extremity Capillary Refill  right:;left:;less than 3 seconds  -MW         Lymphedema Measurements    Measurement Type(s)  --  -MW      Quick Girth Areas  --  -MW         Compression/Skin Care    Compression/Skin Care  skin care;wrapping location;bandaging  -MW      Skin Care  washed/dried;lotion applied;moisturizing lotion applied  -MW      Wrapping Location  lower extremity  -MW      Wrapping Location LE  bilateral:;foot to knee  -MW      Wrapping Comments  Compressogrips BLE: size 4 MH to mid shin, doubled on foot and ankle. size 6 ankle to knee, doubled distal 2/3rds. Clipped tops on LLE to ease transition of sizes.   -MW        User Key  (r) = Recorded By, (t) = Taken By, (c) = Cosigned By    Initials Name Provider Type    Jennifer Cast, PT Physical Therapist          WOUND DEBRIDEMENT  Total area of Debridement: ~12 cm2   Debridement Site 1  Location- Site 1: R knee  Selective Debridement- Site 1: Wound Surface <20cmsq  Instruments- Site 1: tweezers  Excised Tissue Description- Site 1: minimum, slough, moderate, other (comment)(dry periwound skin debris, scabs, crusts )  Bleeding- Site 1: none             Therapy Education     Row Name 11/05/20 1136             Therapy Education    Education Details  Cont MLW, transition LLE back into compression socks/ stockings tomorrow morning for improved fit. Keep Rt knee dressing in place; change prn loosening or soiling. Therahoney may remain in place until next visit.   -MW      Given  Edema management;Bandaging/dressing change  -MW      Program  Reinforced;Modified  -MW      How Provided  Verbal;Demonstration  -MW      Provided to  Patient;Caregiver spouse  -MW        User Key  (r) = Recorded By, (t) = Taken By, (c) = Cosigned By    Initials Name Provider Type    MELISSA  Jennifer Lemus, PT Physical Therapist          Recommendation and Plan  PT Assessment/Plan     Row Name 11/05/20 1135          PT Assessment    Functional Limitations  Impaired gait;Limitation in home management;Limitations in community activities;Performance in work activities;Other (comment) Wound management  -     Impairments  Edema;Impaired venous circulation;Integumentary integrity  -     Assessment Comments  Rt knee wound with now only small open area; mixed tissue bed of white/ yellow adherent slough and pink subq tissue. Majority of proximal area now with pink intact epithelial tissue. PT was able to debride mod amount of dry skin crusts and debris with clean intact tissue beneath. cont gentle gradient compression; encouraged elevation and return to compression socks for LLE. cont PT wound care.   -MW     Rehab Potential  Good  -MW     Patient/caregiver participated in establishment of treatment plan and goals  Yes  -MW     Patient would benefit from skilled therapy intervention  Yes  -MW        PT Plan    PT Frequency  2x/week  -     Physical Therapy Interventions (Optional Details)  wound care;patient/family education  -     PT Plan Comments  Debridement, dressing management, compression  -       User Key  (r) = Recorded By, (t) = Taken By, (c) = Cosigned By    Initials Name Provider Type    MW Jennifer Lemus, PT Physical Therapist          Goals                   Time Calculation: Start Time: 1135  Therapy Charges for Today     Code Description Service Date Service Provider Modifiers Qty    58857243856 HC JUVENTINO DEBRIDE OPEN WOUND UP TO 20CM 11/5/2020 Jennifer Lemus, PT GP 1    [id number]  PT MULTI LAYER COMP SYS BELOW KNEE 11/5/2020 Jennifer Lemus, PT GP 1                  Jennifer Lemus PT  11/5/2020

## 2020-11-09 ENCOUNTER — LAB (OUTPATIENT)
Dept: LAB | Facility: HOSPITAL | Age: 75
End: 2020-11-09

## 2020-11-09 ENCOUNTER — HOSPITAL ENCOUNTER (OUTPATIENT)
Dept: PHYSICAL THERAPY | Facility: HOSPITAL | Age: 75
Setting detail: THERAPIES SERIES
Discharge: HOME | End: 2020-11-09

## 2020-11-09 ENCOUNTER — TRANSCRIBE ORDERS (OUTPATIENT)
Dept: LAB | Facility: HOSPITAL | Age: 75
End: 2020-11-09

## 2020-11-09 DIAGNOSIS — S81.001D OPEN KNEE WOUND, RIGHT, SUBSEQUENT ENCOUNTER: Primary | ICD-10-CM

## 2020-11-09 DIAGNOSIS — L03.115 CELLULITIS OF RIGHT FOOT: Primary | ICD-10-CM

## 2020-11-09 DIAGNOSIS — R60.0 BILATERAL LEG EDEMA: ICD-10-CM

## 2020-11-09 DIAGNOSIS — L03.115 CELLULITIS OF RIGHT FOOT: ICD-10-CM

## 2020-11-09 LAB
ALBUMIN SERPL-MCNC: 4.2 G/DL (ref 3.5–5.2)
ALBUMIN/GLOB SERPL: 1.6 G/DL
ALP SERPL-CCNC: 65 U/L (ref 39–117)
ALT SERPL W P-5'-P-CCNC: 12 U/L (ref 1–41)
ANION GAP SERPL CALCULATED.3IONS-SCNC: 13 MMOL/L (ref 5–15)
AST SERPL-CCNC: 12 U/L (ref 1–40)
BASOPHILS # BLD AUTO: 0.08 10*3/MM3 (ref 0–0.2)
BASOPHILS NFR BLD AUTO: 0.8 % (ref 0–1.5)
BILIRUB SERPL-MCNC: 0.5 MG/DL (ref 0–1.2)
BUN SERPL-MCNC: 14 MG/DL (ref 8–23)
BUN/CREAT SERPL: 25.5 (ref 7–25)
CALCIUM SPEC-SCNC: 9.3 MG/DL (ref 8.6–10.5)
CHLORIDE SERPL-SCNC: 102 MMOL/L (ref 98–107)
CK SERPL-CCNC: 40 U/L (ref 20–200)
CO2 SERPL-SCNC: 28 MMOL/L (ref 22–29)
CREAT SERPL-MCNC: 0.55 MG/DL (ref 0.76–1.27)
CRP SERPL-MCNC: 0.14 MG/DL (ref 0–0.5)
DEPRECATED RDW RBC AUTO: 47.9 FL (ref 37–54)
EOSINOPHIL # BLD AUTO: 0.12 10*3/MM3 (ref 0–0.4)
EOSINOPHIL NFR BLD AUTO: 1.3 % (ref 0.3–6.2)
ERYTHROCYTE [DISTWIDTH] IN BLOOD BY AUTOMATED COUNT: 14.8 % (ref 12.3–15.4)
ERYTHROCYTE [SEDIMENTATION RATE] IN BLOOD: 15 MM/HR (ref 0–20)
GFR SERPL CREATININE-BSD FRML MDRD: 145 ML/MIN/1.73
GLOBULIN UR ELPH-MCNC: 2.7 GM/DL
GLUCOSE SERPL-MCNC: 124 MG/DL (ref 65–99)
HCT VFR BLD AUTO: 46.4 % (ref 37.5–51)
HGB BLD-MCNC: 14.7 G/DL (ref 13–17.7)
IMM GRANULOCYTES # BLD AUTO: 0.05 10*3/MM3 (ref 0–0.05)
IMM GRANULOCYTES NFR BLD AUTO: 0.5 % (ref 0–0.5)
LYMPHOCYTES # BLD AUTO: 2.05 10*3/MM3 (ref 0.7–3.1)
LYMPHOCYTES NFR BLD AUTO: 21.7 % (ref 19.6–45.3)
MCH RBC QN AUTO: 28 PG (ref 26.6–33)
MCHC RBC AUTO-ENTMCNC: 31.7 G/DL (ref 31.5–35.7)
MCV RBC AUTO: 88.4 FL (ref 79–97)
MONOCYTES # BLD AUTO: 1.12 10*3/MM3 (ref 0.1–0.9)
MONOCYTES NFR BLD AUTO: 11.8 % (ref 5–12)
NEUTROPHILS NFR BLD AUTO: 6.04 10*3/MM3 (ref 1.7–7)
NEUTROPHILS NFR BLD AUTO: 63.9 % (ref 42.7–76)
NRBC BLD AUTO-RTO: 0 /100 WBC (ref 0–0.2)
PLATELET # BLD AUTO: 257 10*3/MM3 (ref 140–450)
PMV BLD AUTO: 10 FL (ref 6–12)
POTASSIUM SERPL-SCNC: 3.2 MMOL/L (ref 3.5–5.2)
PROT SERPL-MCNC: 6.9 G/DL (ref 6–8.5)
RBC # BLD AUTO: 5.25 10*6/MM3 (ref 4.14–5.8)
SODIUM SERPL-SCNC: 143 MMOL/L (ref 136–145)
WBC # BLD AUTO: 9.46 10*3/MM3 (ref 3.4–10.8)

## 2020-11-09 PROCEDURE — 29581 APPL MULTLAYER CMPRN SYS LEG: CPT | Performed by: PHYSICAL THERAPIST

## 2020-11-09 PROCEDURE — 82550 ASSAY OF CK (CPK): CPT

## 2020-11-09 PROCEDURE — 80053 COMPREHEN METABOLIC PANEL: CPT

## 2020-11-09 PROCEDURE — 36415 COLL VENOUS BLD VENIPUNCTURE: CPT

## 2020-11-09 PROCEDURE — 97597 DBRDMT OPN WND 1ST 20 CM/<: CPT | Performed by: PHYSICAL THERAPIST

## 2020-11-09 PROCEDURE — 85652 RBC SED RATE AUTOMATED: CPT

## 2020-11-09 PROCEDURE — 86140 C-REACTIVE PROTEIN: CPT

## 2020-11-09 PROCEDURE — 85025 COMPLETE CBC W/AUTO DIFF WBC: CPT

## 2020-11-09 NOTE — THERAPY WOUND CARE TREATMENT
Outpatient Rehabilitation - Wound/Debridement Treatment Note   [facility]     Patient Name: Luis Perez  : 1945  MRN: [id number]  Today's Date: 2020                 Admit Date: 2020    Visit Dx:    ICD-10-CM ICD-9-CM   1. Open knee wound, right, subsequent encounter  S81.001D V58.89     891.0   2. Bilateral leg edema  R60.0 782.3       Patient Active Problem List   Diagnosis   • Hypertension   • Disorder of calcium metabolism   • Disorder of endocrine testis   • Cellulitis of lower extremity   • Paroxysmal atrial fibrillation (CMS/HCC)   • Snoring   • Obstructive sleep apnea, adult   • Pneumonia of right middle lobe (Resolved)   • Chronic persistent asthma   • Non-smoker   • Obesity, Class III, BMI 40-49.9 (morbid obesity) (CMS/[facility])   • Dyslipidemia   • Perennial rhinitis   • Vitamin D deficiency        Past Medical History:   Diagnosis Date   • Asthma    • Bronchitis, chronic (CMS/[facility])    • Cancer (CMS/[facility])     Skin    • Chronic persistent asthma 2020   • Hyperlipidemia    • Hypertension    • Nephrolithiasis    • Obesity, Class III, BMI 40-49.9 (morbid obesity) (CMS/HCC) 2020   • Paroxysmal atrial fibrillation (CMS/HCC) 7/15/2019   • Pneumonia    • Sleep apnea    • Vitamin D deficiency 2020        Past Surgical History:   Procedure Laterality Date   • CARDIOVERSION  2019   • COLONOSCOPY     • NASAL SEPTUM SURGERY      Deviation Repair   • SKIN CANCER EXCISION     • TONSILLECTOMY     • VASECTOMY           PT Ortho     Row Name 20 0945       Subjective Comments    Subjective Comments  Didn't need to change the dressing, just reinforced the tape. LLE better too, not cutting in as much   -MW       Subjective Pain    Able to rate subjective pain?  yes  -MW    Pre-Treatment Pain Level  0  -MW    Post-Treatment Pain Level  0  -MW       Transfers    Comment (Transfers)  Seated in transport chair  -MW       Gait/Stairs (Locomotion)    Comment (Gait/Stairs)  Pt and spouse  "both in transport chairs   -MW      User Key  (r) = Recorded By, (t) = Taken By, (c) = Cosigned By    Initials Name Provider Type    Jennifer Cast, PT Physical Therapist          LDA Wound     Row Name 11/09/20 0945             Wound 11/03/20 1000 Right anterior knee Traumatic    Wound - Properties Group Placement Date: 11/03/20  -MP Placement Time: 1000  -MP Present on Hospital Admission: Y  -MP Side: Right  -MP Orientation: anterior  -MP Location: knee  -MP Primary Wound Type: Traumatic  -MP    Dressing Appearance  intact;moist drainage  -MW      Base  moist;pink;red;subcutaneous;yellow;white;slough  -MW      Periwound  pink;swelling;warm;dry  -MW      Periwound Temperature  warm  -MW      Periwound Skin Turgor  soft  -MW      Edges  irregular;open  -MW      Wound Length (cm)  2 cm  -MW      Wound Width (cm)  3.8 cm  -MW      Wound Depth (cm)  0.1 cm  -MW      Drainage Characteristics/Odor  serosanguineous  -MW      Drainage Amount  small  -MW      Care, Wound  cleansed with;wound cleanser;debrided;honey applied  -MW      Dressing Care  dressing changed;low-adherent;foam;multi-layer wrap Therahoney, 6\" optifoam   -MW      Periwound Care  cleansed with pH balanced cleanser;dry periwound area maintained  -MW      Retired Wound - Properties Group Date first assessed: 11/03/20  - Time first assessed: 1000  -MP Present on Hospital Admission: Y  -MP Side: Right  -MP Location: knee  -MP Primary Wound Type: Traumatic  -MP      User Key  (r) = Recorded By, (t) = Taken By, (c) = Cosigned By    Initials Name Provider Type    Jennifer Cast, PT Physical Therapist    MP Clifford Haskins, PT Physical Therapist        Lymphedema     Row Name 11/09/20 0945             Lymphedema Edema Assessment    Ptting Edema Category  By severity  -MW      Pitting Edema  Moderate;Mild  -MW         Lymphedema Pulses/Capillary Refill    Capillary Refill  lower extremity capillary refill  -MW      Lower Extremity Capillary Refill  " right:;left:;less than 3 seconds  -MW         Compression/Skin Care    Compression/Skin Care  skin care;wrapping location;bandaging  -MW      Skin Care  washed/dried;lotion applied;moisturizing lotion applied  -MW      Wrapping Location  lower extremity  -MW      Wrapping Location LE  bilateral:;foot to knee  -MW      Wrapping Comments  Compressogrips BLE: size 4 MH to mid shin, doubled on foot and ankle.  RLE: size 6 ankle to knee, doubled distal 2/3rds; LLE size 5 ankle to knee, doubled distal 2/3rds. Clipped tops on LLE size 4 ease transition of sizes.   -MW      Compression/Skin Care Comments  change LLE to compression sock tomorrow morning   -MW        User Key  (r) = Recorded By, (t) = Taken By, (c) = Cosigned By    Initials Name Provider Type    Jennifer Cast, PT Physical Therapist          WOUND DEBRIDEMENT  Total area of Debridement: ~4 cm2  Debridement Site 1  Location- Site 1: R knee  Selective Debridement- Site 1: Wound Surface <20cmsq  Instruments- Site 1: tweezers  Excised Tissue Description- Site 1: scant, slough, minimum, other (comment)(dry skin crust )  Bleeding- Site 1: none             Therapy Education     Row Name 11/09/20 5702             Therapy Education    Education Details  Provided supplies for RT knee dressing to replace after MD appt tomorrow. Keep compressogrips in place; change LLE to compression sock tomorrow morning.   -MW      Given  Edema management;Bandaging/dressing change  -MW      Program  Reinforced;Modified  -MW      How Provided  Verbal;Demonstration  -MW      Provided to  Patient;Caregiver spouse  -MW      Level of Understanding  Verbalized;Teach back education performed  -MW      85472 - PT Self Care/Mgmt Minutes  5  -MW        User Key  (r) = Recorded By, (t) = Taken By, (c) = Cosigned By    Initials Name Provider Type    Jennifer Cast, PT Physical Therapist          Recommendation and Plan  PT Assessment/Plan     Row Name 11/09/20 2797          PT  Assessment    Functional Limitations  Impaired gait;Limitation in home management;Limitations in community activities;Performance in work activities;Other (comment) Wound management  -     Impairments  Edema;Impaired venous circulation;Integumentary integrity  -     Assessment Comments  Rt knee wound with continued improvement; new epithelial growth at centeral wound superior edge. Other areas of dry skin crusts loosening with new pink epithelail tissue intact. Edema decreasing; able to transition LLE to size 4/5; RLE remains in size 4/6. Cont PT wound care.   -MW     Rehab Potential  Good  -MW     Patient/caregiver participated in establishment of treatment plan and goals  Yes  -MW     Patient would benefit from skilled therapy intervention  Yes  -MW        PT Plan    PT Frequency  2x/week  -     Physical Therapy Interventions (Optional Details)  wound care;patient/family education  -     PT Plan Comments  Debridement, dressing management, compression  -       User Key  (r) = Recorded By, (t) = Taken By, (c) = Cosigned By    Initials Name Provider Type    MW Jennifer Lemus, PT Physical Therapist          Goals                   Time Calculation: Start Time: 0945  Total Timed Code Minutes- PT: 5 minute(s)  Therapy Charges for Today     Code Description Service Date Service Provider Modifiers Qty    90798923001 HC JUVENTINO DEBRIDE OPEN WOUND UP TO 20CM 11/9/2020 Jennifer Lemus, PT GP 1    [id number] HC PT MULTI LAYER COMP SYS BELOW KNEE 11/9/2020 Jennifer Lemus, PT GP 1                  Jennifer Lemus, PT  11/9/2020

## 2020-11-12 ENCOUNTER — HOSPITAL ENCOUNTER (OUTPATIENT)
Dept: PHYSICAL THERAPY | Facility: HOSPITAL | Age: 75
Setting detail: THERAPIES SERIES
Discharge: HOME | End: 2020-11-12

## 2020-11-12 DIAGNOSIS — R60.0 BILATERAL LEG EDEMA: ICD-10-CM

## 2020-11-12 DIAGNOSIS — S81.001D OPEN KNEE WOUND, RIGHT, SUBSEQUENT ENCOUNTER: Primary | ICD-10-CM

## 2020-11-12 PROCEDURE — 97597 DBRDMT OPN WND 1ST 20 CM/<: CPT | Performed by: PHYSICAL THERAPIST

## 2020-11-12 PROCEDURE — 29581 APPL MULTLAYER CMPRN SYS LEG: CPT | Performed by: PHYSICAL THERAPIST

## 2020-11-12 NOTE — THERAPY WOUND CARE TREATMENT
Outpatient Rehabilitation - Wound/Debridement Treatment Note  [facility]     Patient Name: Luis Perez  : 1945  MRN: [id number]  Today's Date: 2020                 Admit Date: 2020    Visit Dx:    ICD-10-CM ICD-9-CM   1. Open knee wound, right, subsequent encounter  S81.001D V58.89     891.0   2. Bilateral leg edema  R60.0 782.3       Patient Active Problem List   Diagnosis   • Hypertension   • Disorder of calcium metabolism   • Disorder of endocrine testis   • Cellulitis of lower extremity   • Paroxysmal atrial fibrillation (CMS/HCC)   • Snoring   • Obstructive sleep apnea, adult   • Pneumonia of right middle lobe (Resolved)   • Chronic persistent asthma   • Non-smoker   • Obesity, Class III, BMI 40-49.9 (morbid obesity) (CMS/HCC)   • Dyslipidemia   • Perennial rhinitis   • Vitamin D deficiency        Past Medical History:   Diagnosis Date   • Asthma    • Bronchitis, chronic (CMS/HCC)    • Cancer (CMS/HCC)     Skin    • Chronic persistent asthma 2020   • Hyperlipidemia    • Hypertension    • Nephrolithiasis    • Obesity, Class III, BMI 40-49.9 (morbid obesity) (CMS/HCC) 2020   • Paroxysmal atrial fibrillation (CMS/HCC) 7/15/2019   • Pneumonia    • Sleep apnea    • Vitamin D deficiency 2020        Past Surgical History:   Procedure Laterality Date   • CARDIOVERSION  2019   • COLONOSCOPY     • NASAL SEPTUM SURGERY      Deviation Repair   • SKIN CANCER EXCISION     • TONSILLECTOMY     • VASECTOMY           EVALUATION  PT Ortho     Row Name 20 0900       Subjective Comments    Subjective Comments  Wife changed dressing after MD appt as the office dressing came off in the car. Doctor has continued antibx infusions another week.   -MW       Subjective Pain    Able to rate subjective pain?  yes  -MW    Pre-Treatment Pain Level  0  -MW    Post-Treatment Pain Level  1  -MW       Transfers    Comment (Transfers)  Seated in transport chair  -MW       Gait/Stairs  "(Locomotion)    Comment (Gait/Stairs)  Pt and spouse both in transport chairs   -MW      User Key  (r) = Recorded By, (t) = Taken By, (c) = Cosigned By    Initials Name Provider Type    Jennifer Cast, PT Physical Therapist          LDA Wound     Row Name 11/12/20 0900             Wound 11/03/20 1000 Right anterior knee Traumatic    Wound - Properties Group Placement Date: 11/03/20  -MP Placement Time: 1000  -MP Present on Hospital Admission: Y  -MP Side: Right  -MP Orientation: anterior  -MP Location: knee  -MP Primary Wound Type: Traumatic  -MP    Dressing Appearance  intact;moist drainage  -MW      Base  moist;pink;red;subcutaneous;white;slough  -MW      Periwound  pink;swelling;warm;dry  -MW      Periwound Temperature  warm  -MW      Periwound Skin Turgor  soft  -MW      Edges  irregular;open  -MW      Drainage Characteristics/Odor  serosanguineous  -MW      Drainage Amount  small  -MW      Care, Wound  cleansed with;wound cleanser;debrided;honey applied  -MW      Dressing Care  dressing changed;silver impregnated;collagen;low-adherent;foam;border dressing [employer], 6\" optifoam, primafix   -MW      Periwound Care  cleansed with pH balanced cleanser;dry periwound area maintained  -MW      Retired Wound - Properties Group Date first assessed: 11/03/20  -MP Time first assessed: 1000  -MP Present on Hospital Admission: Y  -MP Side: Right  -MP Location: knee  -MP Primary Wound Type: Traumatic  -MP      User Key  (r) = Recorded By, (t) = Taken By, (c) = Cosigned By    Initials Name Provider Type    Jennifer Cast, PT Physical Therapist    Clifford Badillo, PT Physical Therapist        Lymphedema     Row Name 11/12/20 0900             Lymphedema Edema Assessment    Ptting Edema Category  By severity  -MW      Pitting Edema  Moderate;Mild  -MW         Lymphedema Pulses/Capillary Refill    Capillary Refill  lower extremity capillary refill  -MW      Lower Extremity Capillary Refill  " right:;left:;less than 3 seconds  -MW         Compression/Skin Care    Compression/Skin Care  skin care;wrapping location;bandaging  -MW      Skin Care  washed/dried;lotion applied;moisturizing lotion applied  -MW      Wrapping Location  lower extremity  -MW      Wrapping Location LE  foot to knee;right:  -MW      Wrapping Comments  Compressogrips RLE: size 4 MH to mid shin, doubled on foot and ankle, size 5 ankle to knee, doubled distal 2/3rds. Clipped top of priscila 5. LLE pt wear his compression knee high   -MW        User Key  (r) = Recorded By, (t) = Taken By, (c) = Cosigned By    Initials Name Provider Type    Jennifer Cast, PT Physical Therapist          WOUND DEBRIDEMENT  Total area of Debridement: ~3 cm2  Debridement Site 1  Location- Site 1: R knee  Selective Debridement- Site 1: Wound Surface <20cmsq  Instruments- Site 1: tweezers, #15, scapel  Excised Tissue Description- Site 1: moderate, slough, other (comment)(white nonviable tissue )  Bleeding- Site 1: seeping, scant             Therapy Education     Row Name 11/12/20 0900             Therapy Education    Education Details  explained benefit of adding collagen dressing, as well as debriding white spongy unhealthy tissue. Change dressing q 2-3 days. Cont compression   -MW      Given  Bandaging/dressing change;Edema management  -MW      Program  Reinforced;Modified  -MW      How Provided  Verbal;Demonstration  -MW      Provided to  Patient;Caregiver spouse  -MW      Level of Understanding  Verbalized;Teach back education performed  -MW        User Key  (r) = Recorded By, (t) = Taken By, (c) = Cosigned By    Initials Name Provider Type    Jennifer Cast, PT Physical Therapist          Recommendation and Plan  PT Assessment/Plan     Row Name 11/12/20 0900          PT Assessment    Functional Limitations  Impaired gait;Limitation in home management;Limitations in community activities;Performance in work activities;Other (comment) Wound management   -MW     Impairments  Edema;Impaired venous circulation;Integumentary integrity  -     Assessment Comments  Rt knee wound with increased thickness of white spongy tissue, which was sharply debrided to reveal moist pale wound bed. Added collagen matrix to promote granulation formation and wound closure. Continued Therahoney to assist with debridement of residual nonviable tissue. RLE edema has decreased enough to transition to size 4/5 instead of 4/6. Cont PT wound care.   -MW     Rehab Potential  Good  -MW     Patient/caregiver participated in establishment of treatment plan and goals  Yes  -MW     Patient would benefit from skilled therapy intervention  Yes  -MW        PT Plan    PT Frequency  2x/week  -MW     Physical Therapy Interventions (Optional Details)  wound care;patient/family education  -     PT Plan Comments  Debridement, dressing management, compression; consider MIST if wound does not granulate   -       User Key  (r) = Recorded By, (t) = Taken By, (c) = Cosigned By    Initials Name Provider Type    MW Jennifer Lemus, PT Physical Therapist          Goals                   Time Calculation: Start Time: 0900  Therapy Charges for Today     Code Description Service Date Service Provider Modifiers Qty    [id number] HC JUVENTINO DEBRIDE OPEN WOUND UP TO 20CM 11/12/2020 Jennifer Lemus, PT GP 1    [id number] HC PT MULTI LAYER COMP SYS BELOW KNEE 11/12/2020 Jennifer Lemus, PT GP 1                  Jennifer Lemus, PT  11/12/2020

## 2020-11-16 ENCOUNTER — TRANSCRIBE ORDERS (OUTPATIENT)
Dept: LAB | Facility: HOSPITAL | Age: 75
End: 2020-11-16

## 2020-11-16 ENCOUNTER — LAB (OUTPATIENT)
Dept: LAB | Facility: HOSPITAL | Age: 75
End: 2020-11-16

## 2020-11-16 DIAGNOSIS — L03.115 CELLULITIS OF RIGHT FOOT: ICD-10-CM

## 2020-11-16 DIAGNOSIS — L03.115 CELLULITIS OF RIGHT FOOT: Primary | ICD-10-CM

## 2020-11-16 LAB
ALBUMIN SERPL-MCNC: 4.1 G/DL (ref 3.5–5.2)
ALBUMIN/GLOB SERPL: 1.5 G/DL
ALP SERPL-CCNC: 67 U/L (ref 39–117)
ALT SERPL W P-5'-P-CCNC: 13 U/L (ref 1–41)
ANION GAP SERPL CALCULATED.3IONS-SCNC: 8 MMOL/L (ref 5–15)
AST SERPL-CCNC: 13 U/L (ref 1–40)
BASOPHILS # BLD AUTO: 0.07 10*3/MM3 (ref 0–0.2)
BASOPHILS NFR BLD AUTO: 0.9 % (ref 0–1.5)
BILIRUB SERPL-MCNC: 0.4 MG/DL (ref 0–1.2)
BUN SERPL-MCNC: 16 MG/DL (ref 8–23)
BUN/CREAT SERPL: 28.1 (ref 7–25)
CALCIUM SPEC-SCNC: 9.5 MG/DL (ref 8.6–10.5)
CHLORIDE SERPL-SCNC: 101 MMOL/L (ref 98–107)
CK SERPL-CCNC: 41 U/L (ref 20–200)
CO2 SERPL-SCNC: 31 MMOL/L (ref 22–29)
CREAT SERPL-MCNC: 0.57 MG/DL (ref 0.76–1.27)
CRP SERPL-MCNC: 0.1 MG/DL (ref 0–0.5)
DEPRECATED RDW RBC AUTO: 48.6 FL (ref 37–54)
EOSINOPHIL # BLD AUTO: 0.16 10*3/MM3 (ref 0–0.4)
EOSINOPHIL NFR BLD AUTO: 2.1 % (ref 0.3–6.2)
ERYTHROCYTE [DISTWIDTH] IN BLOOD BY AUTOMATED COUNT: 14.7 % (ref 12.3–15.4)
ERYTHROCYTE [SEDIMENTATION RATE] IN BLOOD: 16 MM/HR (ref 0–20)
GFR SERPL CREATININE-BSD FRML MDRD: 139 ML/MIN/1.73
GLOBULIN UR ELPH-MCNC: 2.8 GM/DL
GLUCOSE SERPL-MCNC: 140 MG/DL (ref 65–99)
HCT VFR BLD AUTO: 47.4 % (ref 37.5–51)
HGB BLD-MCNC: 14.8 G/DL (ref 13–17.7)
IMM GRANULOCYTES # BLD AUTO: 0.04 10*3/MM3 (ref 0–0.05)
IMM GRANULOCYTES NFR BLD AUTO: 0.5 % (ref 0–0.5)
LYMPHOCYTES # BLD AUTO: 1.99 10*3/MM3 (ref 0.7–3.1)
LYMPHOCYTES NFR BLD AUTO: 26.1 % (ref 19.6–45.3)
MCH RBC QN AUTO: 27.8 PG (ref 26.6–33)
MCHC RBC AUTO-ENTMCNC: 31.2 G/DL (ref 31.5–35.7)
MCV RBC AUTO: 88.9 FL (ref 79–97)
MONOCYTES # BLD AUTO: 0.89 10*3/MM3 (ref 0.1–0.9)
MONOCYTES NFR BLD AUTO: 11.7 % (ref 5–12)
NEUTROPHILS NFR BLD AUTO: 4.46 10*3/MM3 (ref 1.7–7)
NEUTROPHILS NFR BLD AUTO: 58.7 % (ref 42.7–76)
NRBC BLD AUTO-RTO: 0 /100 WBC (ref 0–0.2)
PLATELET # BLD AUTO: 234 10*3/MM3 (ref 140–450)
PMV BLD AUTO: 10.5 FL (ref 6–12)
POTASSIUM SERPL-SCNC: 3.7 MMOL/L (ref 3.5–5.2)
PROT SERPL-MCNC: 6.9 G/DL (ref 6–8.5)
RBC # BLD AUTO: 5.33 10*6/MM3 (ref 4.14–5.8)
SODIUM SERPL-SCNC: 140 MMOL/L (ref 136–145)
WBC # BLD AUTO: 7.61 10*3/MM3 (ref 3.4–10.8)

## 2020-11-16 PROCEDURE — 85025 COMPLETE CBC W/AUTO DIFF WBC: CPT

## 2020-11-16 PROCEDURE — 82550 ASSAY OF CK (CPK): CPT | Performed by: INTERNAL MEDICINE

## 2020-11-16 PROCEDURE — 80053 COMPREHEN METABOLIC PANEL: CPT

## 2020-11-16 PROCEDURE — 85652 RBC SED RATE AUTOMATED: CPT

## 2020-11-16 PROCEDURE — 86140 C-REACTIVE PROTEIN: CPT

## 2020-11-16 PROCEDURE — 36415 COLL VENOUS BLD VENIPUNCTURE: CPT

## 2020-11-17 ENCOUNTER — APPOINTMENT (OUTPATIENT)
Dept: PHYSICAL THERAPY | Facility: HOSPITAL | Age: 75
End: 2020-11-17

## 2020-11-20 ENCOUNTER — HOSPITAL ENCOUNTER (OUTPATIENT)
Dept: PHYSICAL THERAPY | Facility: HOSPITAL | Age: 75
Setting detail: THERAPIES SERIES
Discharge: HOME | End: 2020-11-20

## 2020-11-20 DIAGNOSIS — R60.0 BILATERAL LEG EDEMA: ICD-10-CM

## 2020-11-20 DIAGNOSIS — S81.001D OPEN KNEE WOUND, RIGHT, SUBSEQUENT ENCOUNTER: Primary | ICD-10-CM

## 2020-11-20 PROCEDURE — 97597 DBRDMT OPN WND 1ST 20 CM/<: CPT

## 2020-11-20 PROCEDURE — 29581 APPL MULTLAYER CMPRN SYS LEG: CPT

## 2020-11-20 NOTE — THERAPY WOUND CARE TREATMENT
Outpatient Rehabilitation - Wound/Debridement Treatment Note   Ken     Patient Name: Luis Perez  : 1945  MRN: [id number]  Today's Date: 2020                 Admit Date: 2020    Visit Dx:    ICD-10-CM ICD-9-CM   1. Open knee wound, right, subsequent encounter  S81.001D V58.89     891.0   2. Bilateral leg edema  R60.0 782.3       Patient Active Problem List   Diagnosis   • Hypertension   • Disorder of calcium metabolism   • Disorder of endocrine testis   • Cellulitis of lower extremity   • Paroxysmal atrial fibrillation (CMS/HCC)   • Snoring   • Obstructive sleep apnea, adult   • Pneumonia of right middle lobe (Resolved)   • Chronic persistent asthma   • Non-smoker   • Obesity, Class III, BMI 40-49.9 (morbid obesity) (CMS/[facility])   • Dyslipidemia   • Perennial rhinitis   • Vitamin D deficiency        Past Medical History:   Diagnosis Date   • Asthma    • Bronchitis, chronic (CMS/[facility])    • Cancer (CMS/[facility])     Skin    • Chronic persistent asthma 2020   • Hyperlipidemia    • Hypertension    • Nephrolithiasis    • Obesity, Class III, BMI 40-49.9 (morbid obesity) (CMS/HCC) 2020   • Paroxysmal atrial fibrillation (CMS/HCC) 7/15/2019   • Pneumonia    • Sleep apnea    • Vitamin D deficiency 2020        Past Surgical History:   Procedure Laterality Date   • CARDIOVERSION  2019   • COLONOSCOPY     • NASAL SEPTUM SURGERY      Deviation Repair   • SKIN CANCER EXCISION     • TONSILLECTOMY     • VASECTOMY           EVALUATION  PT Ortho     Row Name 20 0933       Subjective Comments    Subjective Comments  Patient arrives early for appt because he finished with infusion early. States he has been walking more with less difficulty.  -MP       Subjective Pain    Able to rate subjective pain?  yes  -MP    Pre-Treatment Pain Level  0  -MP    Post-Treatment Pain Level  0  -MP       Transfers    Comment (Transfers)  Seated for tx  -MP       Gait/Stairs (Locomotion)     [place] Level (Gait)  independent  -MP      User Key  (r) = Recorded By, (t) = Taken By, (c) = Cosigned By    Initials Name Provider Type    Clifford Badillo PT Physical Therapist          LDA Wound     Row Name 11/20/20 0950             Wound 11/03/20 1000 Right anterior knee Traumatic    Wound - Properties Group Placement Date: 11/03/20  -MP Placement Time: 1000  -MP Present on Hospital Admission: Y  -MP Side: Right  -MP Orientation: anterior  -MP Location: knee  -MP Primary Wound Type: Traumatic  -MP    Dressing Appearance  intact;moist drainage  -MP      Base  moist;pink;red;subcutaneous;white;slough  -MP      Periwound  pink;swelling;warm;dry  -MP      Periwound Temperature  warm  -MP      Periwound Skin Turgor  soft  -MP      Edges  irregular;open  -MP      Drainage Characteristics/Odor  serosanguineous  -MP      Drainage Amount  small  -MP      Care, Wound  cleansed with;wound cleanser;debrided;honey applied  -MP      Dressing Care  dressing applied;collagen;silver impregnated;low-adherent;foam;border dressing;multi-layer wrap Therahoney, Mindy, 6'' optifoam, primafix  -MP      Periwound Care  cleansed with pH balanced cleanser;dry periwound area maintained  -MP      Retired Wound - Properties Group Date first assessed: 11/03/20  -MP Time first assessed: 1000  -MP Present on Hospital Admission: Y  -MP Side: Right  -MP Location: knee  -MP Primary Wound Type: Traumatic  -MP      User Key  (r) = Recorded By, (t) = Taken By, (c) = Cosigned By    Initials Name Provider Type    Clifford Badillo PT Physical Therapist        Lymphedema     Row Name 11/20/20 0950             Lymphedema Edema Assessment    Ptting Edema Category  By severity  -MP      Pitting Edema  Moderate;Mild  -MP         Lymphedema Pulses/Capillary Refill    Lymphedema Pulses/Capillary Refill  lower extremity pulses;capillary refill  -MP      Dorsalis Pedis Pulse  right:;left:;+1 diminished  -MP      Posterior Tibialis Pulse   right:;left:;+1 diminished  -MP      Capillary Refill  lower extremity capillary refill  -MP      Lower Extremity Capillary Refill  right:;left:;less than 3 seconds  -MP         Lymphedema Measurements    Measurement Type(s)  Quick Girth  -MP      Quick Girth Areas  Lower extremities  -MP         Compression/Skin Care    Compression/Skin Care  skin care;wrapping location;bandaging  -MP      Skin Care  washed/dried;lotion applied;moisturizing lotion applied  -MP      Wrapping Location  lower extremity  -MP      Wrapping Location LE  foot to knee;right:  -MP      Wrapping Comments  Size 4/5 compressogrip doubled and overlapping for gradient compression. Both pieces clipped at top to alleviate pressure.  -MP        User Key  (r) = Recorded By, (t) = Taken By, (c) = Cosigned By    Initials Name Provider Type    Clifford Badillo, PT Physical Therapist          WOUND DEBRIDEMENT  Total area of Debridement: 3 cm2  Debridement Site 1  Location- Site 1: R knee  Selective Debridement- Site 1: Wound Surface <20cmsq  Instruments- Site 1: tweezers, #15, scapel  Excised Tissue Description- Site 1: moderate, slough, other (comment)(white nonviable skin)  Bleeding- Site 1: seeping, held pressure, 1 minute             Therapy Education     Row Name 11/20/20 5258             Therapy Education    Education Details  Continue current POC  -MP      Given  Bandaging/dressing change;Edema management  -MP      Program  Reinforced;Modified  -MP      How Provided  Verbal;Demonstration  -MP      Provided to  Patient  -MP      Level of Understanding  Verbalized;Teach back education performed  -MP        User Key  (r) = Recorded By, (t) = Taken By, (c) = Cosigned By    Initials Name Provider Type    Clifford Badillo, PT Physical Therapist          Recommendation and Plan  PT Assessment/Plan     Row Name 11/20/20 5389          PT Assessment    Functional Limitations  Impaired gait;Limitation in home management;Limitations in community  activities;Performance in work activities;Other (comment) Wound management  -MP     Impairments  Edema;Impaired venous circulation;Integumentary integrity  -MP     Assessment Comments  PT able to debride moderate amount of white spongy tissue revealing moist pale pink tissue with minimal granulation tissue present. Patient able to tolerate size 4/5 compressogrip without issue which PT reapplied this date. May consider use of MIST next tx to promote angiogenesis due to only minimal granulation tissue being present in wound bed. Patient would continue to benefit from skilled PT wound care to promote increased wound healing potential.  -MP     Rehab Potential  Good  -MP     Patient/caregiver participated in establishment of treatment plan and goals  Yes  -MP     Patient would benefit from skilled therapy intervention  Yes  -MP        PT Plan    PT Frequency  2x/week  -MP     Physical Therapy Interventions (Optional Details)  patient/family education;wound care  -MP     PT Plan Comments  Debridement, dressing management, ?MIST  -MP       User Key  (r) = Recorded By, (t) = Taken By, (c) = Cosigned By    Initials Name Provider Type    Clifford Badillo PT Physical Therapist          Goals  PT OP Goals     Row Name 11/20/20 0950          Time Calculation    PT Goal Re-Cert Due Date  02/01/21  -MP       User Key  (r) = Recorded By, (t) = Taken By, (c) = Cosigned By    Initials Name Provider Type    Clifford Badillo PT Physical Therapist          PT Goal Re-Cert Due Date: 02/01/21            Time Calculation: Start Time: 0950  Therapy Charges for Today     Code Description Service Date Service Provider Modifiers Qty    60931561781 HC JUVENTINO DEBRIDE OPEN WOUND UP TO 20CM 11/20/2020 Clifford Haskins, PT GP 1    62201255669 HC PT MULTI LAYER COMP SYS BELOW KNEE 11/20/2020 Clifford Haskins PT GP 1                  Clifford Haskins PT  11/20/2020

## 2020-11-23 ENCOUNTER — HOSPITAL ENCOUNTER (OUTPATIENT)
Dept: PHYSICAL THERAPY | Facility: HOSPITAL | Age: 75
Setting detail: THERAPIES SERIES
Discharge: HOME | End: 2020-11-23

## 2020-11-23 ENCOUNTER — TRANSCRIBE ORDERS (OUTPATIENT)
Dept: LAB | Facility: HOSPITAL | Age: 75
End: 2020-11-23

## 2020-11-23 ENCOUNTER — LAB (OUTPATIENT)
Dept: LAB | Facility: HOSPITAL | Age: 75
End: 2020-11-23

## 2020-11-23 DIAGNOSIS — R60.0 BILATERAL LEG EDEMA: ICD-10-CM

## 2020-11-23 DIAGNOSIS — L03.115 CELLULITIS OF RIGHT FOOT: Primary | ICD-10-CM

## 2020-11-23 DIAGNOSIS — S81.001D OPEN KNEE WOUND, RIGHT, SUBSEQUENT ENCOUNTER: Primary | ICD-10-CM

## 2020-11-23 DIAGNOSIS — L03.115 CELLULITIS OF RIGHT FOOT: ICD-10-CM

## 2020-11-23 LAB
ALBUMIN SERPL-MCNC: 4.2 G/DL (ref 3.5–5.2)
ALBUMIN/GLOB SERPL: 1.3 G/DL
ALP SERPL-CCNC: 66 U/L (ref 39–117)
ALT SERPL W P-5'-P-CCNC: 14 U/L (ref 1–41)
ANION GAP SERPL CALCULATED.3IONS-SCNC: 14 MMOL/L (ref 5–15)
AST SERPL-CCNC: 30 U/L (ref 1–40)
BASOPHILS # BLD AUTO: 0.09 10*3/MM3 (ref 0–0.2)
BASOPHILS NFR BLD AUTO: 1.1 % (ref 0–1.5)
BILIRUB SERPL-MCNC: 0.4 MG/DL (ref 0–1.2)
BUN SERPL-MCNC: 12 MG/DL (ref 8–23)
BUN/CREAT SERPL: 17.1 (ref 7–25)
CALCIUM SPEC-SCNC: 9.7 MG/DL (ref 8.6–10.5)
CHLORIDE SERPL-SCNC: 100 MMOL/L (ref 98–107)
CK SERPL-CCNC: 49 U/L (ref 20–200)
CO2 SERPL-SCNC: 26 MMOL/L (ref 22–29)
CREAT SERPL-MCNC: 0.7 MG/DL (ref 0.76–1.27)
CRP SERPL-MCNC: 0.08 MG/DL (ref 0–0.5)
DEPRECATED RDW RBC AUTO: 46.9 FL (ref 37–54)
EOSINOPHIL # BLD AUTO: 0.14 10*3/MM3 (ref 0–0.4)
EOSINOPHIL NFR BLD AUTO: 1.7 % (ref 0.3–6.2)
ERYTHROCYTE [DISTWIDTH] IN BLOOD BY AUTOMATED COUNT: 14.6 % (ref 12.3–15.4)
ERYTHROCYTE [SEDIMENTATION RATE] IN BLOOD: 14 MM/HR (ref 0–20)
GFR SERPL CREATININE-BSD FRML MDRD: 110 ML/MIN/1.73
GLOBULIN UR ELPH-MCNC: 3.2 GM/DL
GLUCOSE SERPL-MCNC: 146 MG/DL (ref 65–99)
HCT VFR BLD AUTO: 49.8 % (ref 37.5–51)
HGB BLD-MCNC: 15.5 G/DL (ref 13–17.7)
IMM GRANULOCYTES # BLD AUTO: 0.05 10*3/MM3 (ref 0–0.05)
IMM GRANULOCYTES NFR BLD AUTO: 0.6 % (ref 0–0.5)
LYMPHOCYTES # BLD AUTO: 2 10*3/MM3 (ref 0.7–3.1)
LYMPHOCYTES NFR BLD AUTO: 23.8 % (ref 19.6–45.3)
MCH RBC QN AUTO: 27.6 PG (ref 26.6–33)
MCHC RBC AUTO-ENTMCNC: 31.1 G/DL (ref 31.5–35.7)
MCV RBC AUTO: 88.8 FL (ref 79–97)
MONOCYTES # BLD AUTO: 0.86 10*3/MM3 (ref 0.1–0.9)
MONOCYTES NFR BLD AUTO: 10.2 % (ref 5–12)
NEUTROPHILS NFR BLD AUTO: 5.26 10*3/MM3 (ref 1.7–7)
NEUTROPHILS NFR BLD AUTO: 62.6 % (ref 42.7–76)
NRBC BLD AUTO-RTO: 0 /100 WBC (ref 0–0.2)
PLATELET # BLD AUTO: 268 10*3/MM3 (ref 140–450)
PMV BLD AUTO: 10.5 FL (ref 6–12)
POTASSIUM SERPL-SCNC: 4 MMOL/L (ref 3.5–5.2)
PROT SERPL-MCNC: 7.4 G/DL (ref 6–8.5)
RBC # BLD AUTO: 5.61 10*6/MM3 (ref 4.14–5.8)
SODIUM SERPL-SCNC: 140 MMOL/L (ref 136–145)
WBC # BLD AUTO: 8.4 10*3/MM3 (ref 3.4–10.8)

## 2020-11-23 PROCEDURE — 85652 RBC SED RATE AUTOMATED: CPT

## 2020-11-23 PROCEDURE — 86140 C-REACTIVE PROTEIN: CPT

## 2020-11-23 PROCEDURE — 97597 DBRDMT OPN WND 1ST 20 CM/<: CPT | Performed by: PHYSICAL THERAPIST

## 2020-11-23 PROCEDURE — 80053 COMPREHEN METABOLIC PANEL: CPT

## 2020-11-23 PROCEDURE — 85025 COMPLETE CBC W/AUTO DIFF WBC: CPT

## 2020-11-23 PROCEDURE — 36415 COLL VENOUS BLD VENIPUNCTURE: CPT

## 2020-11-23 PROCEDURE — 82550 ASSAY OF CK (CPK): CPT | Performed by: INTERNAL MEDICINE

## 2020-11-23 PROCEDURE — 29581 APPL MULTLAYER CMPRN SYS LEG: CPT | Performed by: PHYSICAL THERAPIST

## 2020-11-23 PROCEDURE — 97610 LOW FREQUENCY NON-THERMAL US: CPT | Performed by: PHYSICAL THERAPIST

## 2020-11-23 NOTE — THERAPY WOUND CARE TREATMENT
Outpatient Rehabilitation - Wound/Debridement Treatment Note   Wise     Patient Name: Luis Perez  : 1945  MRN: [id number]  Today's Date: 2020                 Admit Date: 2020    Visit Dx:    ICD-10-CM ICD-9-CM   1. Open knee wound, right, subsequent encounter  S81.001D V58.89     891.0   2. Bilateral leg edema  R60.0 782.3       Patient Active Problem List   Diagnosis   • Hypertension   • Disorder of calcium metabolism   • Disorder of endocrine testis   • Cellulitis of lower extremity   • Paroxysmal atrial fibrillation (CMS/HCC)   • Snoring   • Obstructive sleep apnea, adult   • Pneumonia of right middle lobe (Resolved)   • Chronic persistent asthma   • Non-smoker   • Obesity, Class III, BMI 40-49.9 (morbid obesity) (CMS/HCC)   • Dyslipidemia   • Perennial rhinitis   • Vitamin D deficiency        Past Medical History:   Diagnosis Date   • Asthma    • Bronchitis, chronic (CMS/HCC)    • Cancer (CMS/[facility])     Skin    • Chronic persistent asthma 2020   • Hyperlipidemia    • Hypertension    • Nephrolithiasis    • Obesity, Class III, BMI 40-49.9 (morbid obesity) (CMS/HCC) 2020   • Paroxysmal atrial fibrillation (CMS/HCC) 7/15/2019   • Pneumonia    • Sleep apnea    • Vitamin D deficiency 2020        Past Surgical History:   Procedure Laterality Date   • CARDIOVERSION  2019   • COLONOSCOPY     • NASAL SEPTUM SURGERY      Deviation Repair   • SKIN CANCER EXCISION     • TONSILLECTOMY     • VASECTOMY               PT Ortho     Row Name 20 0937       Subjective Comments    Subjective Comments  Pt pushing wife in transport chair. Has MD appt tomorrow. Wife asking about cleaning skin crusts off of leg between PT visits.   -MW       Subjective Pain    Able to rate subjective pain?  yes  -MW    Pre-Treatment Pain Level  0  -MW    Post-Treatment Pain Level  0  -MW       Transfers    Comment (Transfers)  Seated for tx  -MW       Gait/Stairs (Locomotion)    [place]  "Level (Gait)  independent  -MW      User Key  (r) = Recorded By, (t) = Taken By, (c) = Cosigned By    Initials Name Provider Type    Jennifer Cast, PT Physical Therapist          LDA Wound     Row Name 11/23/20 0930             Wound 11/03/20 1000 Right anterior knee Traumatic    Wound - Properties Group Placement Date: 11/03/20  -MP Placement Time: 1000  -MP Present on Hospital Admission: Y  -MP Side: Right  -MP Orientation: anterior  -MP Location: knee  -MP Primary Wound Type: Traumatic  -MP    Wound Image  Images linked: 1  -MW      Dressing Appearance  intact;moist drainage;dried drainage  -MW      Base  moist;pink;red;subcutaneous;white;slough  -MW      Periwound  pink;swelling;warm;dry  -MW      Periwound Temperature  warm  -MW      Periwound Skin Turgor  soft  -MW      Edges  irregular;open  -MW      Drainage Characteristics/Odor  serosanguineous  -MW      Drainage Amount  small  -MW      Care, Wound  cleansed with;wound cleanser;irrigated with;sterile normal saline;ultrasound therapy, non contact low frequency;debrided;honey applied MIST x 6 min  -MW      Dressing Care  dressing applied;collagen;silver impregnated;low-adherent;foam;border dressing;multi-layer wrap Mindy, Mepilex AG, 6\" optifoam, primafix   -MW      Periwound Care  cleansed with pH balanced cleanser;dry periwound area maintained  -MW      Retired Wound - Properties Group Date first assessed: 11/03/20  -MP Time first assessed: 1000  -MP Present on Hospital Admission: Y  -MP Side: Right  -MP Location: knee  -MP Primary Wound Type: Traumatic  -MP      User Key  (r) = Recorded By, (t) = Taken By, (c) = Cosigned By    Initials Name Provider Type    Jennifer Cast, PT Physical Therapist    MP Clifford Haskins, PT Physical Therapist        Lymphedema     Row Name 11/23/20 0930             Lymphedema Edema Assessment    Ptting Edema Category  By severity  -MW      Pitting Edema  Moderate;Mild  -MW         Lymphedema Pulses/Capillary " "Refill    Lymphedema Pulses/Capillary Refill  --  -MW      Dorsalis Pedis Pulse  --  -MW      Posterior Tibialis Pulse  --  -MW      Capillary Refill  lower extremity capillary refill  -MW      Lower Extremity Capillary Refill  right:;less than 3 seconds  -MW         Lymphedema Measurements    Measurement Type(s)  --  -MW      Quick Girth Areas  --  -MW         Compression/Skin Care    Compression/Skin Care  skin care;wrapping location;bandaging  -MW      Skin Care  washed/dried;lotion applied;moisturizing lotion applied  -MW      Wrapping Location  lower extremity  -MW      Wrapping Location LE  foot to knee;right:  -MW      Wrapping Comments  6\" optifoam to shin (seeping areas from skin cleaning); Compressogrips RLE: size 4 MH to mid shin, doubled on foot and ankle, size 5 ankle to knee, doubled distal 2/3rds. Clipped top of szie 5. LLE pt wearing his compression knee high   -MW        User Key  (r) = Recorded By, (t) = Taken By, (c) = Cosigned By    Initials Name Provider Type    Jennifer Cast, PT Physical Therapist          WOUND DEBRIDEMENT  Total area of Debridement: ~3 cm2  Debridement Site 1  Location- Site 1: R knee  Selective Debridement- Site 1: Wound Surface <20cmsq  Instruments- Site 1: tweezers  Excised Tissue Description- Site 1: minimum, slough, other (comment), maximum(white spongy tissuse; max skin scales, crusts )  Bleeding- Site 1: seeping, scant, held pressure, 1 minute             Therapy Education     Row Name 11/23/20 4027             Therapy Education    Education Details  Reviewed benefits of MIST to promote increased blood flow for wound healing; cleaned skin debris distally.   -MW      Given  Bandaging/dressing change;Edema management  -MW      Program  Reinforced;Progressed  -MW      How Provided  Verbal;Demonstration  -MW      Provided to  Patient;Caregiver  -MW      Level of Understanding  Verbalized;Teach back education performed  -MW        User Key  (r) = Recorded By, (t) = " Taken By, (c) = Cosigned By    Initials Name Provider Type    Jennifer Cast, PT Physical Therapist          Recommendation and Plan  PT Assessment/Plan     Row Name 11/23/20 0930          PT Assessment    Functional Limitations  Impaired gait;Limitation in home management;Limitations in community activities;Performance in work activities;Other (comment) Wound management  -     Impairments  Edema;Impaired venous circulation;Integumentary integrity  -     Assessment Comments  RT knee wound with increased epithelial growth and less white slough. Wound bed remains pale; trail of MIST this week to promote increased localized blood flow and to facilitate wound closure. Cont dressings and compression. Monitor Rt shin / cover prn seeping.   -MW     Rehab Potential  Good  -     Patient/caregiver participated in establishment of treatment plan and goals  Yes  -MW     Patient would benefit from skilled therapy intervention  Yes  -MW        PT Plan    PT Frequency  2x/week  -     Physical Therapy Interventions (Optional Details)  wound care;patient/family education  -     PT Plan Comments  MIST, debridement, dressing management.   -       User Key  (r) = Recorded By, (t) = Taken By, (c) = Cosigned By    Initials Name Provider Type    Jennifer Cast, PT Physical Therapist          Goals                   Time Calculation: Start Time: 0930  Therapy Charges for Today     Code Description Service Date Service Provider Modifiers Qty    01553689795 HC PT NLFU MIST 11/23/2020 Jennifer Lemus, PT GP 1    [id number] HC JUVENTINO DEBRIDE OPEN WOUND UP TO 20CM 11/23/2020 Jennifer Lemus, PT GP 1    [id number] HC PT MULTI LAYER COMP SYS BELOW KNEE 11/23/2020 Jennifer Lemus, PT GP 1                  Jennifre Lemus PT  11/23/2020

## 2020-11-25 ENCOUNTER — HOSPITAL ENCOUNTER (OUTPATIENT)
Dept: PHYSICAL THERAPY | Facility: HOSPITAL | Age: 75
Setting detail: THERAPIES SERIES
Discharge: HOME | End: 2020-11-25

## 2020-11-25 DIAGNOSIS — R60.0 BILATERAL LEG EDEMA: ICD-10-CM

## 2020-11-25 DIAGNOSIS — S81.001D OPEN KNEE WOUND, RIGHT, SUBSEQUENT ENCOUNTER: Primary | ICD-10-CM

## 2020-11-25 PROCEDURE — 29581 APPL MULTLAYER CMPRN SYS LEG: CPT

## 2020-11-25 PROCEDURE — 97610 LOW FREQUENCY NON-THERMAL US: CPT

## 2020-11-25 PROCEDURE — 97597 DBRDMT OPN WND 1ST 20 CM/<: CPT

## 2020-11-25 NOTE — THERAPY WOUND CARE TREATMENT
Outpatient Rehabilitation - Wound/Debridement Treatment Note   Ken     Patient Name: Luis Perez  : 1945  MRN: [id number]  Today's Date: 2020                 Admit Date: 2020    Visit Dx:    ICD-10-CM ICD-9-CM   1. Open knee wound, right, subsequent encounter  S81.001D V58.89     891.0   2. Bilateral leg edema  R60.0 782.3       Patient Active Problem List   Diagnosis   • Hypertension   • Disorder of calcium metabolism   • Disorder of endocrine testis   • Cellulitis of lower extremity   • Paroxysmal atrial fibrillation (CMS/HCC)   • Snoring   • Obstructive sleep apnea, adult   • Pneumonia of right middle lobe (Resolved)   • Chronic persistent asthma   • Non-smoker   • Obesity, Class III, BMI 40-49.9 (morbid obesity) (CMS/HCC)   • Dyslipidemia   • Perennial rhinitis   • Vitamin D deficiency        Past Medical History:   Diagnosis Date   • Asthma    • Bronchitis, chronic (CMS/HCC)    • Cancer (CMS/[facility])     Skin    • Chronic persistent asthma 2020   • Hyperlipidemia    • Hypertension    • Nephrolithiasis    • Obesity, Class III, BMI 40-49.9 (morbid obesity) (CMS/HCC) 2020   • Paroxysmal atrial fibrillation (CMS/HCC) 7/15/2019   • Pneumonia    • Sleep apnea    • Vitamin D deficiency 2020        Past Surgical History:   Procedure Laterality Date   • CARDIOVERSION  2019   • COLONOSCOPY     • NASAL SEPTUM SURGERY      Deviation Repair   • SKIN CANCER EXCISION     • TONSILLECTOMY     • VASECTOMY           EVALUATION  PT Ortho     Row Name 20 1300       Subjective Comments    Subjective Comments  Patient states walking is becoming easier.  -MP       Subjective Pain    Able to rate subjective pain?  yes  -MP    Pre-Treatment Pain Level  0  -MP    Post-Treatment Pain Level  0  -MP       Transfers    Comment (Transfers)  seated for tx  -MP       Gait/Stairs (Locomotion)    [place] Level (Gait)  independent  -MP      User Key  (r) = Recorded By, (t) = Taken By,  (c) = Cosigned By    Initials Name Provider Type    Clifford Badillo PT Physical Therapist          LDA Wound     Row Name 11/25/20 1300             Wound 11/03/20 1000 Right anterior knee Traumatic    Wound - Properties Group Placement Date: 11/03/20  -MP Placement Time: 1000  -MP Present on Hospital Admission: Y  -MP Side: Right  -MP Orientation: anterior  -MP Location: knee  -MP Primary Wound Type: Traumatic  -MP    Dressing Appearance  intact;moist drainage  -MP      Base  moist;pink;red;subcutaneous;white;slough  -MP      Periwound  pink;swelling;warm;dry  -MP      Periwound Temperature  warm  -MP      Periwound Skin Turgor  soft  -MP      Edges  irregular;open  -MP      Drainage Characteristics/Odor  serosanguineous  -MP      Drainage Amount  small  -MP      Care, Wound  cleansed with;wound cleanser;debrided;ultrasound therapy, non contact low frequency Mist x 6 minutes  -MP      Dressing Care  dressing applied;collagen;silver impregnated;low-adherent;foam Mindy, Mepilex Ag, 6'' optifoam, primafix tape  -MP      Periwound Care  cleansed with pH balanced cleanser;dry periwound area maintained  -MP      Retired Wound - Properties Group Date first assessed: 11/03/20  -MP Time first assessed: 1000  -MP Present on Hospital Admission: Y  -MP Side: Right  -MP Location: knee  -MP Primary Wound Type: Traumatic  -MP      User Key  (r) = Recorded By, (t) = Taken By, (c) = Cosigned By    Initials Name Provider Type    Clifford Badillo PT Physical Therapist        Lymphedema     Row Name 11/25/20 1300             Lymphedema Edema Assessment    Ptting Edema Category  By severity  -MP      Pitting Edema  Moderate;Mild  -MP         Lymphedema Pulses/Capillary Refill    Lymphedema Pulses/Capillary Refill  lower extremity pulses;capillary refill  -MP      Dorsalis Pedis Pulse  right:;left:;+1 diminished  -MP      Posterior Tibialis Pulse  right:;left:;+1 diminished  -MP      Capillary Refill  lower extremity capillary  refill  -MP      Lower Extremity Capillary Refill  right:;less than 3 seconds  -MP         Lymphedema Measurements    Measurement Type(s)  Quick Girth  -MP      Quick Girth Areas  Lower extremities  -MP         Compression/Skin Care    Compression/Skin Care  skin care;wrapping location;bandaging  -MP      Skin Care  washed/dried;lotion applied;moisturizing lotion applied  -MP      Wrapping Location  lower extremity  -MP      Wrapping Location LE  foot to knee;right:  -MP      Wrapping Comments  Wound dressings to R knee, 6'' optifoam to shin, size 4/5 compressogrip doubled and overlapping for gradient compression. Size 5 compressogrip cut.  -MP        User Key  (r) = Recorded By, (t) = Taken By, (c) = Cosigned By    Initials Name Provider Type    Clifford Badillo, PT Physical Therapist          WOUND DEBRIDEMENT  Total area of Debridement: 3 cm2  Debridement Site 1  Location- Site 1: R knee  Selective Debridement- Site 1: Wound Surface <20cmsq  Instruments- Site 1: tweezers  Excised Tissue Description- Site 1: minimum, slough, other (comment)(white spongy tissue, dry skin flakes)  Bleeding- Site 1: scant             Therapy Education     Row Name 11/25/20 1300             Therapy Education    Education Details  Continue current POC  -MP      Given  Bandaging/dressing change;Edema management  -MP      Program  Reinforced;Progressed  -MP      How Provided  Verbal;Demonstration  -MP      Provided to  Patient;Caregiver  -MP      Level of Understanding  Verbalized;Teach back education performed  -MP        User Key  (r) = Recorded By, (t) = Taken By, (c) = Cosigned By    Initials Name Provider Type    Clifford Badillo, PT Physical Therapist          Recommendation and Plan  PT Assessment/Plan     Row Name 11/25/20 1300          PT Assessment    Functional Limitations  Impaired gait;Limitation in home management;Limitations in community activities;Performance in work activities;Other (comment) Wound management  -MP      Impairments  Edema;Impaired venous circulation;Integumentary integrity  -MP     Assessment Comments  R knee continues to epithelialize. White spongy tissue still present in wound bed that PT was able to debride off; may consider use of sorbact next session. Noted improvement in scaly hypertrophic skin patches on R LE. Patient would continue to benefit from skilled PT wound care to promote increased wound healing potential.  -MP     Rehab Potential  Good  -MP        PT Plan    PT Frequency  2x/week  -MP     Physical Therapy Interventions (Optional Details)  patient/family education;wound care  -MP     PT Plan Comments  MIST, debridement, dressing management  -MP       User Key  (r) = Recorded By, (t) = Taken By, (c) = Cosigned By    Initials Name Provider Type    Clifford Badillo, PT Physical Therapist          Goals  PT OP Goals     Row Name 11/25/20 1300          Time Calculation    PT Goal Re-Cert Due Date  02/01/21  -MP       User Key  (r) = Recorded By, (t) = Taken By, (c) = Cosigned By    Initials Name Provider Type    Clifford Badillo, PT Physical Therapist          PT Goal Re-Cert Due Date: 02/01/21            Time Calculation: Start Time: 1300  Therapy Charges for Today     Code Description Service Date Service Provider Modifiers Qty    98658291588 HC PT MULTI LAYER COMP SYS BELOW KNEE 11/25/2020 Clifford Haskins, PT GP 1    [id number] HC JUVENTINO DEBRIDE OPEN WOUND UP TO 20CM 11/25/2020 Clifford Haskins, PT GP 1    [id number] HC PT NLFU MIST 11/25/2020 Clifford Haskins, PT GP 1                  Clifford Haskins PT  11/25/2020

## 2020-11-30 ENCOUNTER — TRANSCRIBE ORDERS (OUTPATIENT)
Dept: LAB | Facility: HOSPITAL | Age: 75
End: 2020-11-30

## 2020-11-30 ENCOUNTER — LAB (OUTPATIENT)
Dept: LAB | Facility: HOSPITAL | Age: 75
End: 2020-11-30

## 2020-11-30 DIAGNOSIS — L03.115 CELLULITIS OF RIGHT FOOT: Primary | ICD-10-CM

## 2020-11-30 DIAGNOSIS — L03.115 CELLULITIS OF RIGHT FOOT: ICD-10-CM

## 2020-11-30 LAB
ALBUMIN SERPL-MCNC: 4.2 G/DL (ref 3.5–5.2)
ALBUMIN/GLOB SERPL: 1.5 G/DL
ALP SERPL-CCNC: 68 U/L (ref 39–117)
ALT SERPL W P-5'-P-CCNC: 9 U/L (ref 1–41)
ANION GAP SERPL CALCULATED.3IONS-SCNC: 10 MMOL/L (ref 5–15)
AST SERPL-CCNC: 13 U/L (ref 1–40)
BASOPHILS # BLD AUTO: 0.08 10*3/MM3 (ref 0–0.2)
BASOPHILS NFR BLD AUTO: 1.1 % (ref 0–1.5)
BILIRUB SERPL-MCNC: 0.3 MG/DL (ref 0–1.2)
BUN SERPL-MCNC: 11 MG/DL (ref 8–23)
BUN/CREAT SERPL: 18.3 (ref 7–25)
CALCIUM SPEC-SCNC: 9.3 MG/DL (ref 8.6–10.5)
CHLORIDE SERPL-SCNC: 103 MMOL/L (ref 98–107)
CK SERPL-CCNC: 46 U/L (ref 20–200)
CO2 SERPL-SCNC: 28 MMOL/L (ref 22–29)
CREAT SERPL-MCNC: 0.6 MG/DL (ref 0.76–1.27)
CRP SERPL-MCNC: 0.13 MG/DL (ref 0–0.5)
DEPRECATED RDW RBC AUTO: 48 FL (ref 37–54)
EOSINOPHIL # BLD AUTO: 0.17 10*3/MM3 (ref 0–0.4)
EOSINOPHIL NFR BLD AUTO: 2.2 % (ref 0.3–6.2)
ERYTHROCYTE [DISTWIDTH] IN BLOOD BY AUTOMATED COUNT: 14.5 % (ref 12.3–15.4)
ERYTHROCYTE [SEDIMENTATION RATE] IN BLOOD: 17 MM/HR (ref 0–20)
GFR SERPL CREATININE-BSD FRML MDRD: 131 ML/MIN/1.73
GLOBULIN UR ELPH-MCNC: 2.8 GM/DL
GLUCOSE SERPL-MCNC: 146 MG/DL (ref 65–99)
HCT VFR BLD AUTO: 47.8 % (ref 37.5–51)
HGB BLD-MCNC: 14.9 G/DL (ref 13–17.7)
IMM GRANULOCYTES # BLD AUTO: 0.05 10*3/MM3 (ref 0–0.05)
IMM GRANULOCYTES NFR BLD AUTO: 0.7 % (ref 0–0.5)
LYMPHOCYTES # BLD AUTO: 2.04 10*3/MM3 (ref 0.7–3.1)
LYMPHOCYTES NFR BLD AUTO: 26.9 % (ref 19.6–45.3)
MCH RBC QN AUTO: 28.1 PG (ref 26.6–33)
MCHC RBC AUTO-ENTMCNC: 31.2 G/DL (ref 31.5–35.7)
MCV RBC AUTO: 90.2 FL (ref 79–97)
MONOCYTES # BLD AUTO: 0.72 10*3/MM3 (ref 0.1–0.9)
MONOCYTES NFR BLD AUTO: 9.5 % (ref 5–12)
NEUTROPHILS NFR BLD AUTO: 4.53 10*3/MM3 (ref 1.7–7)
NEUTROPHILS NFR BLD AUTO: 59.6 % (ref 42.7–76)
NRBC BLD AUTO-RTO: 0 /100 WBC (ref 0–0.2)
PLATELET # BLD AUTO: 205 10*3/MM3 (ref 140–450)
PMV BLD AUTO: 10.5 FL (ref 6–12)
POTASSIUM SERPL-SCNC: 3.7 MMOL/L (ref 3.5–5.2)
PROT SERPL-MCNC: 7 G/DL (ref 6–8.5)
RBC # BLD AUTO: 5.3 10*6/MM3 (ref 4.14–5.8)
SODIUM SERPL-SCNC: 141 MMOL/L (ref 136–145)
WBC # BLD AUTO: 7.59 10*3/MM3 (ref 3.4–10.8)

## 2020-11-30 PROCEDURE — 80053 COMPREHEN METABOLIC PANEL: CPT

## 2020-11-30 PROCEDURE — 85025 COMPLETE CBC W/AUTO DIFF WBC: CPT

## 2020-11-30 PROCEDURE — 86140 C-REACTIVE PROTEIN: CPT

## 2020-11-30 PROCEDURE — 36415 COLL VENOUS BLD VENIPUNCTURE: CPT

## 2020-11-30 PROCEDURE — 82550 ASSAY OF CK (CPK): CPT | Performed by: INTERNAL MEDICINE

## 2020-11-30 PROCEDURE — 85652 RBC SED RATE AUTOMATED: CPT

## 2020-11-30 RX ORDER — SOTALOL HYDROCHLORIDE 80 MG/1
TABLET ORAL
Qty: 180 TABLET | Refills: 3 | Status: SHIPPED | OUTPATIENT
Start: 2020-11-30 | End: 2021-10-13

## 2020-12-02 ENCOUNTER — HOSPITAL ENCOUNTER (OUTPATIENT)
Dept: PHYSICAL THERAPY | Facility: HOSPITAL | Age: 75
Setting detail: THERAPIES SERIES
Discharge: HOME | End: 2020-12-02

## 2020-12-02 DIAGNOSIS — S81.001D OPEN KNEE WOUND, RIGHT, SUBSEQUENT ENCOUNTER: Primary | ICD-10-CM

## 2020-12-02 DIAGNOSIS — R60.0 BILATERAL LEG EDEMA: ICD-10-CM

## 2020-12-02 PROCEDURE — 29581 APPL MULTLAYER CMPRN SYS LEG: CPT

## 2020-12-02 PROCEDURE — 97597 DBRDMT OPN WND 1ST 20 CM/<: CPT

## 2020-12-02 PROCEDURE — 97610 LOW FREQUENCY NON-THERMAL US: CPT

## 2020-12-02 NOTE — THERAPY PROGRESS REPORT/RE-CERT
Outpatient Rehabilitation - Wound/Debridement Progress Note  [facility]     Patient Name: Luis Perez  : 1945  MRN: [id number]  Today's Date: 2020                 Admit Date: 2020    Visit Dx:    ICD-10-CM ICD-9-CM   1. Open knee wound, right, subsequent encounter  S81.001D V58.89     891.0   2. Bilateral leg edema  R60.0 782.3   R knee:    RLE:      Patient Active Problem List   Diagnosis   • Hypertension   • Disorder of calcium metabolism   • Disorder of endocrine testis   • Cellulitis of lower extremity   • Paroxysmal atrial fibrillation (CMS/HCC)   • Snoring   • Obstructive sleep apnea, adult   • Pneumonia of right middle lobe (Resolved)   • Chronic persistent asthma   • Non-smoker   • Obesity, Class III, BMI 40-49.9 (morbid obesity) (CMS/[facility])   • Dyslipidemia   • Perennial rhinitis   • Vitamin D deficiency        Past Medical History:   Diagnosis Date   • Asthma    • Bronchitis, chronic (CMS/HCC)    • Cancer (CMS/[facility])     Skin    • Chronic persistent asthma 2020   • Hyperlipidemia    • Hypertension    • Nephrolithiasis    • Obesity, Class III, BMI 40-49.9 (morbid obesity) (CMS/HCC) 2020   • Paroxysmal atrial fibrillation (CMS/HCC) 7/15/2019   • Pneumonia    • Sleep apnea    • Vitamin D deficiency 2020        Past Surgical History:   Procedure Laterality Date   • CARDIOVERSION  2019   • COLONOSCOPY     • NASAL SEPTUM SURGERY      Deviation Repair   • SKIN CANCER EXCISION     • TONSILLECTOMY     • VASECTOMY           EVALUATION  PT Ortho     Row Name 20 1100       Subjective Comments    Subjective Comments  No complaints, states wife changed dressing on Saturday.  -       Subjective Pain    Able to rate subjective pain?  yes  -JM    Pre-Treatment Pain Level  0  -JM    Post-Treatment Pain Level  0  -JM       Transfers    Sit-Stand [place] (Transfers)  independent  -JM    Stand-Sit [place] (Transfers)  independent  -    Comment (Transfers)  seated  "for tx  -       Gait/Stairs (Locomotion)    [place] Level (Gait)  independent  -      User Key  (r) = Recorded By, (t) = Taken By, (c) = Cosigned By    Initials Name Provider Type    Karin Waterman PT Physical Therapist          LDA Wound     Row Name 12/02/20 1100             Wound 11/03/20 1000 Right anterior knee Traumatic    Wound - Properties Group Placement Date: 11/03/20  -MP Placement Time: 1000  -MP Present on Hospital Admission: Y  -MP Side: Right  -MP Orientation: anterior  -MP Location: knee  -MP Primary Wound Type: Traumatic  -MP    Wound Image  Images linked: 2  -JM      Dressing Appearance  intact;moist drainage;dried drainage  -JM      Base  moist;pink;red;subcutaneous;white;slough willow adherent to wound  -JM      Periwound  pink;swelling;warm;dry  -JM      Periwound Temperature  warm  -JM      Periwound Skin Turgor  soft  -JM      Edges  irregular;open  -JM      Wound Length (cm)  0.3 cm  -JM      Wound Width (cm)  0.5 cm  -JM      Wound Depth (cm)  0.1 cm  -JM      Drainage Characteristics/Odor  serosanguineous  -JM      Drainage Amount  small  -JM      Care, Wound  cleansed with;wound cleanser;debrided;ultrasound therapy, non contact low frequency;honey applied MIST 6min  -JM      Dressing Care  dressing applied;foam;silver impregnated;border dressing;multi-layer wrap therahoney, mepilex ag, 6\" optifoam, primafix, MWL  -JM      Periwound Care  cleansed with pH balanced cleanser;dry periwound area maintained  -      Retired Wound - Properties Group Date first assessed: 11/03/20  - Time first assessed: 1000  -MP Present on Hospital Admission: Y  -MP Side: Right  -MP Location: knee  -MP Primary Wound Type: Traumatic  -MP      User Key  (r) = Recorded By, (t) = Taken By, (c) = Cosigned By    Initials Name Provider Type    Karin Waterman, PT Physical Therapist    Clifford Badillo PT Physical Therapist        Lymphedema     Row Name 12/02/20 1100             Lymphedema " "Edema Assessment    Ptting Edema Category  By severity  -      Pitting Edema  Moderate;Mild  -         Lymphedema Pulses/Capillary Refill    Lymphedema Pulses/Capillary Refill  lower extremity pulses;capillary refill  -      Dorsalis Pedis Pulse  right:;left:;+1 diminished  -      Posterior Tibialis Pulse  right:;left:;+1 diminished  -      Capillary Refill  lower extremity capillary refill  -      Lower Extremity Capillary Refill  right:;less than 3 seconds  -         Lymphedema Measurements    Measurement Type(s)  Quick Girth  -      Quick Girth Areas  Lower extremities  -         LLE Quick Girth (cm)    Met-heads  28.5 cm  -      Mid foot  28.5 cm  -      Smallest ankle  29.2 cm  -      Largest calf  50.5 cm  -      Tib tuberosity  50.7 cm  -         Compression/Skin Care    Compression/Skin Care  skin care;wrapping location;bandaging  -      Skin Care  washed/dried;lotion applied;moisturizing lotion applied  -      Wrapping Location  lower extremity  -      Wrapping Location LE  foot to knee;right:  -      Wrapping Comments  xeroform and 4\" optifoan to ant shin, 4\" optifoam to pad ant ankle, size 4&5 compressogrips doubled/overlapping for gradient multilayer compression  -        User Key  (r) = Recorded By, (t) = Taken By, (c) = Cosigned By    Initials Name Provider Type    Karin Waterman, PT Physical Therapist          WOUND DEBRIDEMENT  Total area of Debridement: 2cmsq  Debridement Site 1  Location- Site 1: R knee  Selective Debridement- Site 1: Wound Surface <20cmsq  Instruments- Site 1: tweezers, scissors  Excised Tissue Description- Site 1: minimum, slough, other (comment)(dry crusts)  Bleeding- Site 1: scant             Therapy Education     Row Name 12/02/20 1100             Therapy Education    Education Details  Ok to remove compressogrip if needing a skin break but must have leg elevated to prevent return of edema.  -      Given  Bandaging/dressing " change;Edema management  -      Program  Reinforced;Progressed  -      How Provided  Verbal;Demonstration  -      Provided to  Patient;Caregiver  -      Level of Understanding  Verbalized;Teach back education performed  -        User Key  (r) = Recorded By, (t) = Taken By, (c) = Cosigned By    Initials Name Provider Type    Karin Waterman PT Physical Therapist          Recommendation and Plan  PT Assessment/Plan     Row Name 20 1100          PT Assessment    Functional Limitations  Impaired gait;Limitation in home management;Limitations in community activities;Performance in work activities;Other (comment) Wound management  -     Impairments  Edema;Impaired venous circulation;Integumentary integrity  -     Assessment Comments  Pt making good progress, has met all STGs and 1 of 2 LTGs.  BLE edema reduced, wound area decreased and should have wound closure in next few txs.  PT deferred use of willow this tx as previous willow was dry and adherent to wound bed.  Pt also likely to no longer need MIST.  Pt will continue to benefit from debridement, advanced dressings, and MLW.  -     Rehab Potential  Good  -     Patient/caregiver participated in establishment of treatment plan and goals  Yes  -     Patient would benefit from skilled therapy intervention  Yes  -        PT Plan    PT Frequency  2x/week  -     Predicted Duration of Therapy Intervention (PT)  6 visits  -     Planned CPT's?  PT MULTI LAYER COMP SYS LE;PT JUVENTINO DEBRIDE OPEN WOUND UP TO 20 CM: 97231;PT NONSELECT DEBRIDE 15 MIN: 26882;PT SELF CARE/MGMT/TRAIN 15 MIN: 39771  -     Physical Therapy Interventions (Optional Details)  patient/family education;wound care  -     PT Plan Comments  debridement, MLW  -       User Key  (r) = Recorded By, (t) = Taken By, (c) = Cosigned By    Initials Name Provider Type    Karin Waterman PT Physical Therapist          Goals  PT OP Goals     Row Name 20 1100           PT Short Term Goals    STG 1  Patient will present with 50% decrease in wound size as evidence of wound healing.  -     STG 1 Progress  Met  -     STG 2  Patient will verbalize signs and symptoms of infection.  -     STG 2 Progress  Met  -        Long Term Goals    LTG 1  Patient will present with 85% decrease in wound size as evidence of wound healing.  -     LTG 1 Progress  Progressing  -     LTG 2  Patient will demonstrate 4cm reduction in B LE measurements as evidence of improved venous return.  -     LTG 2 Progress  Met  -        Time Calculation    PT Goal Re-Cert Due Date  02/01/21  -       User Key  (r) = Recorded By, (t) = Taken By, (c) = Cosigned By    Initials Name Provider Type    Karin Waterman, PT Physical Therapist          PT Goal Re-Cert Due Date: 02/01/21  PT Short Term Goals  STG 1: Patient will present with 50% decrease in wound size as evidence of wound healing.  STG 1 Progress: Met  STG 2: Patient will verbalize signs and symptoms of infection.  STG 2 Progress: Met  Long Term Goals  LTG 1: Patient will present with 85% decrease in wound size as evidence of wound healing.  LTG 1 Progress: Progressing  LTG 2: Patient will demonstrate 4cm reduction in B LE measurements as evidence of improved venous return.  LTG 2 Progress: Met      Time Calculation: Start Time: 1100  Therapy Charges for Today     Code Description Service Date Service Provider Modifiers Qty    57935242884 HC JUVENTINO DEBRIDE OPEN WOUND UP TO 20CM 12/2/2020 Karin Horton, PT GP 1    86211595238 HC PT NLFU MIST 12/2/2020 Karin Horton, PT GP 1    [id number] HC PT MULTI LAYER COMP SYS BELOW KNEE 12/2/2020 Karin Horton, PT GP 1                  Karin Horton, PT  12/2/2020

## 2020-12-04 ENCOUNTER — HOSPITAL ENCOUNTER (OUTPATIENT)
Dept: PHYSICAL THERAPY | Facility: HOSPITAL | Age: 75
Setting detail: THERAPIES SERIES
Discharge: HOME | End: 2020-12-04

## 2020-12-04 DIAGNOSIS — S81.001D OPEN KNEE WOUND, RIGHT, SUBSEQUENT ENCOUNTER: Primary | ICD-10-CM

## 2020-12-04 PROCEDURE — 29581 APPL MULTLAYER CMPRN SYS LEG: CPT

## 2020-12-04 PROCEDURE — 97597 DBRDMT OPN WND 1ST 20 CM/<: CPT

## 2020-12-04 NOTE — THERAPY WOUND CARE TREATMENT
Outpatient Rehabilitation - Wound/Debridement Treatment Note   [facility]     Patient Name: Luis Perez  : 1945  MRN: [id number]  Today's Date: 2020                 Admit Date: 2020    Visit Dx:    ICD-10-CM ICD-9-CM   1. Open knee wound, right, subsequent encounter  S81.001D V58.89     891.0       Patient Active Problem List   Diagnosis   • Hypertension   • Disorder of calcium metabolism   • Disorder of endocrine testis   • Cellulitis of lower extremity   • Paroxysmal atrial fibrillation (CMS/HCC)   • Snoring   • Obstructive sleep apnea, adult   • Pneumonia of right middle lobe (Resolved)   • Chronic persistent asthma   • Non-smoker   • Obesity, Class III, BMI 40-49.9 (morbid obesity) (CMS/HCC)   • Dyslipidemia   • Perennial rhinitis   • Vitamin D deficiency        Past Medical History:   Diagnosis Date   • Asthma    • Bronchitis, chronic (CMS/HCC)    • Cancer (CMS/HCC)     Skin    • Chronic persistent asthma 2020   • Hyperlipidemia    • Hypertension    • Nephrolithiasis    • Obesity, Class III, BMI 40-49.9 (morbid obesity) (CMS/HCC) 2020   • Paroxysmal atrial fibrillation (CMS/HCC) 7/15/2019   • Pneumonia    • Sleep apnea    • Vitamin D deficiency 2020        Past Surgical History:   Procedure Laterality Date   • CARDIOVERSION  2019   • COLONOSCOPY     • NASAL SEPTUM SURGERY      Deviation Repair   • SKIN CANCER EXCISION     • TONSILLECTOMY     • VASECTOMY           EVALUATION  PT Ortho     Row Name 20 1030       Subjective Comments    Subjective Comments  Patient apologizes for being late to appt. States that size 5 compressogrip felt a little tight.  -MP       Subjective Pain    Able to rate subjective pain?  yes  -MP    Pre-Treatment Pain Level  0  -MP    Post-Treatment Pain Level  0  -MP       Transfers    Sit-Stand [place] (Transfers)  independent  -MP    Stand-Sit [place] (Transfers)  independent  -MP    Comment (Transfers)  seated for tx  -MP        Gait/Stairs (Locomotion)    [place] Level (Gait)  independent  -MP      User Key  (r) = Recorded By, (t) = Taken By, (c) = Cosigned By    Initials Name Provider Type    Clifford Badillo PT Physical Therapist          LDA Wound     Row Name 12/04/20 1030             Wound 12/04/20 1030 Right lower leg Venous Ulcer    Wound - Properties Group Placement Date: 12/04/20  -MP Placement Time: 1030  -MP Present on Hospital Admission: Y  -MP Side: Right  -MP Orientation: lower  -MP Location: leg  -MP Primary Wound Type: Venous ulcer  -MP    Dressing Appearance  intact;moist drainage  -MP      Base  moist;pink;red;yellow;slough  -MP      Periwound  moist;pink;redness;swelling  -MP      Periwound Temperature  warm  -MP      Periwound Skin Turgor  firm  -MP      Edges  open;irregular  -MP      Drainage Characteristics/Odor  serosanguineous  -MP      Drainage Amount  small  -MP      Care, Wound  cleansed with;wound cleanser;debrided  -MP      Dressing Care  dressing applied;silver impregnated;foam Mepilex Ag secured with cast padding  -MP      Periwound Care  dry periwound area maintained;cleansed with pH balanced cleanser  -MP      Retired Wound - Properties Group Date first assessed: 12/04/20  -MP Time first assessed: 1030  -MP Present on Hospital Admission: Y  -MP Side: Right  -MP Location: leg  -MP Primary Wound Type: Venous ulcer  -MP       Wound 11/03/20 1000 Right anterior knee Traumatic    Wound - Properties Group Placement Date: 11/03/20  -MP Placement Time: 1000  -MP Present on Hospital Admission: Y  -MP Side: Right  -MP Orientation: anterior  -MP Location: knee  -MP Primary Wound Type: Traumatic  -MP    Dressing Appearance  intact;moist drainage  -MP      Base  moist;pink;red;subcutaneous;white;slough  -MP      Periwound  pink;swelling;warm;dry  -MP      Periwound Temperature  warm  -MP      Periwound Skin Turgor  soft  -MP      Edges  irregular;open  -MP      Drainage Characteristics/Odor  serosanguineous   -MP      Drainage Amount  small  -MP      Care, Wound  cleansed with;wound cleanser;debrided;honey applied  -MP      Dressing Care  dressing applied;silver impregnated;low-adherent;foam;border dressing Therahoney, Mepilex Ag, 6'' optifoam, primafix tape, MLW  -MP      Periwound Care  cleansed with pH balanced cleanser;dry periwound area maintained  -MP      Retired Wound - Properties Group Date first assessed: 11/03/20  -MP Time first assessed: 1000  -MP Present on Hospital Admission: Y  -MP Side: Right  -MP Location: knee  -MP Primary Wound Type: Traumatic  -MP      User Key  (r) = Recorded By, (t) = Taken By, (c) = Cosigned By    Initials Name Provider Type    Clifford Badillo, PT Physical Therapist        Lymphedema     Row Name 12/04/20 1030             Lymphedema Edema Assessment    Ptting Edema Category  By severity  -MP      Pitting Edema  Moderate;Mild  -MP         Lymphedema Pulses/Capillary Refill    Lymphedema Pulses/Capillary Refill  --  -MP      Dorsalis Pedis Pulse  --  -MP      Posterior Tibialis Pulse  --  -MP      Capillary Refill  --  -MP      Lower Extremity Capillary Refill  --  -MP         Lymphedema Measurements    Measurement Type(s)  --  -MP      Quick Girth Areas  --  -MP         Compression/Skin Care    Compression/Skin Care  skin care;wrapping location;bandaging  -MP      Skin Care  washed/dried;lotion applied;moisturizing lotion applied  -MP      Wrapping Location  lower extremity  -MP      Wrapping Location LE  foot to knee;right:  -MP      Wrapping Comments  Wound dressings, 4'' optifoam to anterior ankle, size 4/5 compressogrip doubled and overlapping for gradient compression with size 5 clipped at top for pressure relief  -MP        User Key  (r) = Recorded By, (t) = Taken By, (c) = Cosigned By    Initials Name Provider Type    Clifford Badillo PT Physical Therapist          WOUND DEBRIDEMENT  Total area of Debridement: 2 cm2  Debridement Site 1  Location- Site 1: R LE  Selective  Debridement- Site 1: Wound Surface <20cmsq  Instruments- Site 1: tweezers, scissors  Excised Tissue Description- Site 1: scant, slough, minimum, other (comment)(nonviable loose skin flakes)  Bleeding- Site 1: none             Therapy Education     Row Name 12/04/20 1030             Therapy Education    Education Details  PT educated patient to pull compressogrip up if they roll down at the top.   -MP      Given  Bandaging/dressing change;Edema management  -MP      Program  Reinforced;Progressed  -MP      How Provided  Verbal;Demonstration  -MP      Provided to  Patient;Caregiver  -MP      Level of Understanding  Verbalized;Teach back education performed  -MP        User Key  (r) = Recorded By, (t) = Taken By, (c) = Cosigned By    Initials Name Provider Type    Clifford Badillo PT Physical Therapist          Recommendation and Plan  PT Assessment/Plan     Row Name 12/04/20 1030          PT Assessment    Functional Limitations  Impaired gait;Limitation in home management;Limitations in community activities;Performance in work activities;Other (comment) Wound management  -MP     Impairments  Edema;Impaired venous circulation;Integumentary integrity  -MP     Assessment Comments  R anterior knee wound continues to reduce in size with MIST being held this session as wound healing is progressing. R anterior shin ulceration appeared slightly larger on visual inspection with increased erythema of periwound skin. PT applied Mepilex Ag and secured with cast padding. Optifoam appeared to slightly irritate skin. Patient would continue to benefit from skilled PT wound care to promote increased wound healing potential.  -MP        PT Plan    PT Frequency  2x/week  -MP     Physical Therapy Interventions (Optional Details)  patient/family education;wound care  -MP     PT Plan Comments  debridement, MLW  -MP       User Key  (r) = Recorded By, (t) = Taken By, (c) = Cosigned By    Initials Name Provider Type    Clifford Badillo, PT  Physical Therapist          Goals  PT OP Goals     Row Name 12/04/20 1030          Time Calculation    PT Goal Re-Cert Due Date  02/01/21  -RUPA       User Key  (r) = Recorded By, (t) = Taken By, (c) = Cosigned By    Initials Name Provider Type    Clifford Badillo, PT Physical Therapist          PT Goal Re-Cert Due Date: 02/01/21            Time Calculation: Start Time: 1030  Therapy Charges for Today     Code Description Service Date Service Provider Modifiers Qty    [id number] HC PT MULTI LAYER COMP SYS BELOW KNEE 12/4/2020 Clifford Haskins, PT GP 1    [id number] HC JUVENTINO DEBRIDE OPEN WOUND UP TO 20CM 12/4/2020 Clifford Haskins, PT GP 1                  Clifford Haskins PT  12/4/2020

## 2020-12-07 ENCOUNTER — LAB (OUTPATIENT)
Dept: LAB | Facility: HOSPITAL | Age: 75
End: 2020-12-07

## 2020-12-07 ENCOUNTER — HOSPITAL ENCOUNTER (OUTPATIENT)
Dept: PHYSICAL THERAPY | Facility: HOSPITAL | Age: 75
Setting detail: THERAPIES SERIES
Discharge: HOME | End: 2020-12-07

## 2020-12-07 ENCOUNTER — TRANSCRIBE ORDERS (OUTPATIENT)
Dept: LAB | Facility: HOSPITAL | Age: 75
End: 2020-12-07

## 2020-12-07 DIAGNOSIS — S81.001D OPEN KNEE WOUND, RIGHT, SUBSEQUENT ENCOUNTER: Primary | ICD-10-CM

## 2020-12-07 DIAGNOSIS — L03.115 CELLULITIS OF RIGHT FOOT: ICD-10-CM

## 2020-12-07 DIAGNOSIS — R60.0 BILATERAL LEG EDEMA: ICD-10-CM

## 2020-12-07 DIAGNOSIS — L03.115 CELLULITIS OF RIGHT FOOT: Primary | ICD-10-CM

## 2020-12-07 LAB
ALBUMIN SERPL-MCNC: 4.2 G/DL (ref 3.5–5.2)
ALBUMIN/GLOB SERPL: 1.4 G/DL
ALP SERPL-CCNC: 68 U/L (ref 39–117)
ALT SERPL W P-5'-P-CCNC: 13 U/L (ref 1–41)
ANION GAP SERPL CALCULATED.3IONS-SCNC: 9 MMOL/L (ref 5–15)
AST SERPL-CCNC: 13 U/L (ref 1–40)
BASOPHILS # BLD AUTO: 0.06 10*3/MM3 (ref 0–0.2)
BASOPHILS NFR BLD AUTO: 0.7 % (ref 0–1.5)
BILIRUB SERPL-MCNC: 0.4 MG/DL (ref 0–1.2)
BUN SERPL-MCNC: 13 MG/DL (ref 8–23)
BUN/CREAT SERPL: 20.3 (ref 7–25)
CALCIUM SPEC-SCNC: 9.6 MG/DL (ref 8.6–10.5)
CHLORIDE SERPL-SCNC: 100 MMOL/L (ref 98–107)
CK SERPL-CCNC: 41 U/L (ref 20–200)
CO2 SERPL-SCNC: 31 MMOL/L (ref 22–29)
CREAT SERPL-MCNC: 0.64 MG/DL (ref 0.76–1.27)
CRP SERPL-MCNC: 0.14 MG/DL (ref 0–0.5)
DEPRECATED RDW RBC AUTO: 46.2 FL (ref 37–54)
EOSINOPHIL # BLD AUTO: 0.17 10*3/MM3 (ref 0–0.4)
EOSINOPHIL NFR BLD AUTO: 2.1 % (ref 0.3–6.2)
ERYTHROCYTE [DISTWIDTH] IN BLOOD BY AUTOMATED COUNT: 14.2 % (ref 12.3–15.4)
ERYTHROCYTE [SEDIMENTATION RATE] IN BLOOD: 19 MM/HR (ref 0–20)
GFR SERPL CREATININE-BSD FRML MDRD: 122 ML/MIN/1.73
GLOBULIN UR ELPH-MCNC: 3.1 GM/DL
GLUCOSE SERPL-MCNC: 134 MG/DL (ref 65–99)
HCT VFR BLD AUTO: 49.3 % (ref 37.5–51)
HGB BLD-MCNC: 15.6 G/DL (ref 13–17.7)
IMM GRANULOCYTES # BLD AUTO: 0.05 10*3/MM3 (ref 0–0.05)
IMM GRANULOCYTES NFR BLD AUTO: 0.6 % (ref 0–0.5)
LYMPHOCYTES # BLD AUTO: 2.13 10*3/MM3 (ref 0.7–3.1)
LYMPHOCYTES NFR BLD AUTO: 26 % (ref 19.6–45.3)
MCH RBC QN AUTO: 28.2 PG (ref 26.6–33)
MCHC RBC AUTO-ENTMCNC: 31.6 G/DL (ref 31.5–35.7)
MCV RBC AUTO: 89 FL (ref 79–97)
MONOCYTES # BLD AUTO: 0.91 10*3/MM3 (ref 0.1–0.9)
MONOCYTES NFR BLD AUTO: 11.1 % (ref 5–12)
NEUTROPHILS NFR BLD AUTO: 4.88 10*3/MM3 (ref 1.7–7)
NEUTROPHILS NFR BLD AUTO: 59.5 % (ref 42.7–76)
NRBC BLD AUTO-RTO: 0 /100 WBC (ref 0–0.2)
PLATELET # BLD AUTO: 225 10*3/MM3 (ref 140–450)
PMV BLD AUTO: 10.6 FL (ref 6–12)
POTASSIUM SERPL-SCNC: 3.9 MMOL/L (ref 3.5–5.2)
PROT SERPL-MCNC: 7.3 G/DL (ref 6–8.5)
RBC # BLD AUTO: 5.54 10*6/MM3 (ref 4.14–5.8)
SODIUM SERPL-SCNC: 140 MMOL/L (ref 136–145)
WBC # BLD AUTO: 8.2 10*3/MM3 (ref 3.4–10.8)

## 2020-12-07 PROCEDURE — 86140 C-REACTIVE PROTEIN: CPT | Performed by: INTERNAL MEDICINE

## 2020-12-07 PROCEDURE — 97602 WOUND(S) CARE NON-SELECTIVE: CPT | Performed by: PHYSICAL THERAPIST

## 2020-12-07 PROCEDURE — 29581 APPL MULTLAYER CMPRN SYS LEG: CPT | Performed by: PHYSICAL THERAPIST

## 2020-12-07 PROCEDURE — 36415 COLL VENOUS BLD VENIPUNCTURE: CPT | Performed by: INTERNAL MEDICINE

## 2020-12-07 PROCEDURE — 82550 ASSAY OF CK (CPK): CPT

## 2020-12-07 PROCEDURE — 85652 RBC SED RATE AUTOMATED: CPT | Performed by: INTERNAL MEDICINE

## 2020-12-07 PROCEDURE — 85025 COMPLETE CBC W/AUTO DIFF WBC: CPT

## 2020-12-07 PROCEDURE — 80053 COMPREHEN METABOLIC PANEL: CPT | Performed by: INTERNAL MEDICINE

## 2020-12-07 NOTE — THERAPY WOUND CARE TREATMENT
Outpatient Rehabilitation - Wound/Debridement Treatment Note   [facility]     Patient Name: Luis Perez  : 1945  MRN: [id number]  Today's Date: 2020                 Admit Date: 2020    Visit Dx:    ICD-10-CM ICD-9-CM   1. Open knee wound, right, subsequent encounter  S81.001D V58.89     891.0   2. Bilateral leg edema  R60.0 782.3       Patient Active Problem List   Diagnosis   • Hypertension   • Disorder of calcium metabolism   • Disorder of endocrine testis   • Cellulitis of lower extremity   • Paroxysmal atrial fibrillation (CMS/HCC)   • Snoring   • Obstructive sleep apnea, adult   • Pneumonia of right middle lobe (Resolved)   • Chronic persistent asthma   • Non-smoker   • Obesity, Class III, BMI 40-49.9 (morbid obesity) (CMS/HCC)   • Dyslipidemia   • Perennial rhinitis   • Vitamin D deficiency        Past Medical History:   Diagnosis Date   • Asthma    • Bronchitis, chronic (CMS/HCC)    • Cancer (CMS/HCC)     Skin    • Chronic persistent asthma 2020   • Hyperlipidemia    • Hypertension    • Nephrolithiasis    • Obesity, Class III, BMI 40-49.9 (morbid obesity) (CMS/HCC) 2020   • Paroxysmal atrial fibrillation (CMS/HCC) 7/15/2019   • Pneumonia    • Sleep apnea    • Vitamin D deficiency 2020        Past Surgical History:   Procedure Laterality Date   • CARDIOVERSION  2019   • COLONOSCOPY     • NASAL SEPTUM SURGERY      Deviation Repair   • SKIN CANCER EXCISION     • TONSILLECTOMY     • VASECTOMY           PT Ortho     Row Name 20 1100       Subjective Comments    Subjective Comments  No complaints. See MD tomorrow   -MW       Subjective Pain    Able to rate subjective pain?  yes  -MW    Pre-Treatment Pain Level  0  -MW    Post-Treatment Pain Level  0  -MW       Transfers    Sit-Stand [place] (Transfers)  independent  -MW    Stand-Sit [place] (Transfers)  independent  -MW    Comment (Transfers)  seated for tx  -MW       Gait/Stairs (Locomotion)     "[place] Level (Gait)  independent  -MW      User Key  (r) = Recorded By, (t) = Taken By, (c) = Cosigned By    Initials Name Provider Type    MW Jennifer Lemus, PT Physical Therapist          LDA Wound     Row Name 12/07/20 1100             Wound 12/04/20 1030 Right lower leg Venous Ulcer    Wound - Properties Group Placement Date: 12/04/20  -MP Placement Time: 1030  -MP Present on Hospital Admission: Y  -MP Side: Right  -MP Orientation: lower  -MP Location: leg  -MP Primary Wound Type: Venous ulcer  -MP    Dressing Appearance  intact;moist drainage  -MW      Base  moist;pink;red  -MW      Periwound  pink;swelling  -MW      Periwound Temperature  warm  -MW      Periwound Skin Turgor  firm  -MW      Edges  open;irregular  -MW      Drainage Characteristics/Odor  serosanguineous  -MW      Drainage Amount  scant  -MW      Care, Wound  cleansed with;wound cleanser  -MW      Dressing Care  dressing applied;low-adherent;foam;multi-layer wrap 4\" optifoam   -MW      Periwound Care  cleansed with pH balanced cleanser;dry periwound area maintained  -MW      Retired Wound - Properties Group Date first assessed: 12/04/20  -MP Time first assessed: 1030  -MP Present on Hospital Admission: Y  -MP Side: Right  -MP Location: leg  -MP Primary Wound Type: Venous ulcer  -MP       Wound 11/03/20 1000 Right anterior knee Traumatic    Wound - Properties Group Placement Date: 11/03/20  -MP Placement Time: 1000  -MP Present on Hospital Admission: Y  -MP Side: Right  -MP Orientation: anterior  -MP Location: knee  -MP Primary Wound Type: Traumatic  -MP    Dressing Appearance  intact;moist drainage;dried drainage  -MW      Base  moist;pink;subcutaneous  -MW      Periwound  pink;swelling;warm;dry hypertrophic skin crusts   -MW      Periwound Temperature  warm  -MW      Periwound Skin Turgor  soft  -MW      Edges  irregular;open  -MW      Wound Length (cm)  0.1 cm  -MW      Wound Width (cm)  0.1 cm  -MW      Wound Depth (cm)  0.1 cm  -MW   " "   Drainage Characteristics/Odor  serosanguineous  -MW      Drainage Amount  scant  -MW      Care, Wound  cleansed with;wound cleanser;debrided  -MW      Dressing Care  dressing changed;silver impregnated;collagen;low-adherent;foam;border dressing Mindy, 6\" optifoam also covering skin tear inferior   -MW      Periwound Care  cleansed with pH balanced cleanser;dry periwound area maintained  -MW      Retired Wound - Properties Group Date first assessed: 11/03/20  -MP Time first assessed: 1000  -MP Present on Hospital Admission: Y  -MP Side: Right  -MP Location: knee  -MP Primary Wound Type: Traumatic  -MP      User Key  (r) = Recorded By, (t) = Taken By, (c) = Cosigned By    Initials Name Provider Type    Jennifer Cast, PT Physical Therapist    MP Clifford Haskins, PT Physical Therapist        Lymphedema     Row Name 12/07/20 1100             Lymphedema Edema Assessment    Ptting Edema Category  By severity  -MW      Pitting Edema  Moderate;Mild  -MW         Lymphedema Measurements    Measurement Type(s)  Quick Girth  -MW      Quick Girth Areas  Lower extremities  -MW         LLE Quick Girth (cm)    Smallest ankle  28 cm  -MW      Largest calf  45.5 cm  -MW         RLE Quick Girth (cm)    Smallest ankle  27 cm  -MW      Largest calf  45 cm  -MW      RLE Quick Girth Total  72  -MW         Compression/Skin Care    Compression/Skin Care  skin care;wrapping location;bandaging  -MW      Skin Care  washed/dried;lotion applied;moisturizing lotion applied  -MW      Wrapping Location  lower extremity  -MW      Wrapping Location LE  foot to knee;right:  -MW      Wrapping Comments  Wound dressings,, size 5 compressogrip doubled distally. Pt to change into compression knee high when he gets home   -MW        User Key  (r) = Recorded By, (t) = Taken By, (c) = Cosigned By    Initials Name Provider Type    Jennifer Cast, PT Physical Therapist          WOUND DEBRIDEMENT  Total area of Debridement: non selective ~ " 20cm2  Debridement Site 1  Location- Site 1: R LE  Selective Debridement- Site 1: (non selective )  Instruments- Site 1: tweezers, other (comment)(wipes )  Excised Tissue Description- Site 1: moderate, other (comment)(dry skin debris )  Bleeding- Site 1: none             Therapy Education     Row Name 12/07/20 1100             Therapy Education    Education Details  RLE edema resolved; within 1 cm of LLE. Pt to transition into knee high compression socks upon arrival home. Cont to wear compression socks daily. Change dressing on Thur/ Fri after showering with covering in place. Call to cancel if knee has closed/ no drainage.   -MW      Given  Bandaging/dressing change;Edema management  -MW      Program  Reinforced;Progressed  -MW      How Provided  Verbal;Demonstration  -MW      Provided to  Patient;Caregiver  -MW      Level of Understanding  Verbalized;Teach back education performed  -        User Key  (r) = Recorded By, (t) = Taken By, (c) = Cosigned By    Initials Name Provider Type    MW Jennifer Lemus, PT Physical Therapist          Recommendation and Plan  PT Assessment/Plan     Row Name 12/07/20 1100          PT Assessment    Functional Limitations  Impaired gait;Limitation in home management;Limitations in community activities;Performance in work activities;Other (comment) Wound management  -MW     Impairments  Edema;Impaired venous circulation;Integumentary integrity  -     Assessment Comments  Rt anterior knee wound now approx 0.1 cm in all dimensions. Small skin tear inferior to original wound from Primafix removal; area is clean and pink, covered with optifoam to protect. Inferior leg skin tear is clean with red base. Edema has resolved. Pt to transition to knee high compression socks for RLE and d/c compressogrips. Expect pt to call to cancel as wound will be closed by next week. Follow up prn or d/c.   -MW     Rehab Potential  Excellent  -MW        PT Plan    PT Frequency  1x/week  -MW      Physical Therapy Interventions (Optional Details)  wound care;patient/family education  -MELISSA     PT Plan Comments  debridement, compression socks   -MW       User Key  (r) = Recorded By, (t) = Taken By, (c) = Cosigned By    Initials Name Provider Type    Jennifer Cast, PT Physical Therapist          Goals                   Time Calculation: Start Time: 1100  Therapy Charges for Today     Code Description Service Date Service Provider Modifiers Qty    59233192478 HC NONSELECTIVE DEBRIDEMENT 12/7/2020 Jennifer Lemus, PT GP 1    77503603965 HC PT MULTI LAYER COMP SYS BELOW KNEE 12/7/2020 Jennifer Lemus, PT GP 1                  Jennifer Lemus, PT  12/7/2020

## 2020-12-10 ENCOUNTER — APPOINTMENT (OUTPATIENT)
Dept: PHYSICAL THERAPY | Facility: HOSPITAL | Age: 75
End: 2020-12-10

## 2020-12-10 NOTE — PROGRESS NOTES
"    Subjective:     Encounter Date:12/11/2020    Patient ID: Luis Perez is a 75 y.o.  white male from [place], [place], retired from [employer].     REFERRING PROVIDER: ANGELIA Rios  FORMER HEALTHCARE PROVIDER:ANGELIA Matias  CURRENT HEALTHCARE PROVIDER: Maggie Schwartz, SHONA, APRN  SLEEP PHYSICIAN: Nicoals Lopez MD, [facility]  GASTROENTEROLOGIST: Tay Torres MD  INFECTIOUS DISEASE: Eriberto Weiner MD  ORTHOPEDIC SURGEON:  Abhijit Ambriz MD.    Chief Complaint:   Chief Complaint   Patient presents with   • Paroxysmal atrial fibrillation (CMS/HCC)       Problem List:  1. Persistent atrial fibrillation:  a. CHADsVasc 2, anticoagulated with Eliquis  b. Echocardiogram, 7/1/2019: LVEF 0.65, mild to moderate TR, RVSP 35 mmHg, right atrial mass is present measuring 2.6 x 1.6 cm; differential includes thrombus versus atypical right atrial myxoma versus atrial intraseptal tumor with clinical correlation and consideration of transesophageal echocardiogram recommended, LA moderately dilated, normal RV wall thickness, systolic function and motion noted with RV cavity mildly dilated, aortic valve exhibits mild sclerosis, mild pulmonary hypertension, no pericardial effusion  c. PIETER, 7/8/2019: Mild MR, LVEF 0.60, LV wall thickness consistent with moderate concentric hypertrophy, LA mild to moderately dilated, 3D echo LVEF was 0.51, normal RV cavity size, wall thickness, systolic function septal motion noted, marked lipomatous hypertrophy of the interatrial septum present.  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 Initiation of sotalol, 7/13/2019, with persistent atrial fibrillation on EKG, 7/26/2019  e. Successful external cardioversion, 08/05/2019, with frequent atrial ectopy on " EKG, 08/09/2019, now in normal sinus rhythm on EKG, 10/16/2019  f. Normal sinus rhythm on ECG in office with acceptable QTC on sotalol therapy, May 2020, December 2020  2. 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 Mild dyspnea on exertion  a. Remote stress test 30-40 years ago; negative per patient-data deficit, patient reports that this was performed for a baseline and not because of any symptoms  b. PFTs 2/25/2020: Moderate airway obstruction, improvement in FEV1 with bronchodilator, no restriction, air-trapping present, no diffusion  c. CCS class 0 chest discomfort/NYHA class I-II PALM  4. At risk for sleep apnea with sleep consult, 8/21/2019, with polysomnogram January 2020 that showed mild obstructive sleep apnea and nocturnal hypoxemia with initiation of CPAP therapy  5. Chronic lower extremity edema  6. Hypertension  7. Dyslipidemia; ASCVD 10 year risk is 45.8%, 17.4% if treated, initiate rosuvastatin 10 mg nightly May 2020 with consideration of vascepa 2g bid after repeat FLP  8. Type 2 diabetes mellitus;HgbA1C 6.39% October 2019; 5.7%, August 2020  9. Mild intermittent asthma  10. History of lower extremity cellulitis with MRSA  11. History of melanoma  12. Morbid obesity: BMI 43.41  13. Mechanical fall with subsequent development on cellulitis with IV antibiotic administration, October 2020  14. Surgical history  a. Vasectomy  b. Tonsillectomy  c. Deviated septum surgery  d. Multiple skin cancer excisions  e. Scheduled for partial right knee replacement, November 2019    No Known Allergies    Current Outpatient Medications:   •  albuterol (PROVENTIL) (2.5 MG/3ML) 0.083% nebulizer solution, Take 2.5 mg by nebulization Every 4 (Four) Hours As Needed for Wheezing., Disp: 25 vial, Rfl: 12  •  albuterol sulfate  (90 Base) MCG/ACT inhaler, Inhale 2 puffs Every 4 (Four) Hours As Needed for  Wheezing., Disp: 1 inhaler, Rfl: 0  •  amLODIPine (NORVASC) 10 MG tablet, Take 1 tablet by mouth Daily., Disp: 90 tablet, Rfl: 3  •  amoxicillin-clavulanate (AUGMENTIN) 500-125 MG per tablet, Take 1 tablet by mouth 2 (Two) Times a Day., Disp: , Rfl:   •  apixaban (Eliquis) 5 MG tablet tablet, Take 1 tablet by mouth 2 (Two) Times a Day., Disp: 180 tablet, Rfl: 3  •  Dupilumab (Dupixent) 300 MG/2ML solution prefilled syringe, Inject 300 mg under the skin into the appropriate area as directed Every 14 (Fourteen) Days., Disp: 2 syringe, Rfl: 11  •  EPINEPHrine (EPIPEN) 0.3 MG/0.3ML solution auto-injector injection, , Disp: , Rfl:   •  fexofenadine (ALLEGRA ALLERGY) 180 MG tablet, Take 1 tablet by mouth Daily., Disp: 30 tablet, Rfl: 2  •  Fluticasone Furoate-Vilanterol (BREO ELLIPTA) 200-25 MCG/INH inhaler, Inhale 1 puff Daily. 1 inhalation once a day, Disp: 180 each, Rfl: 3  •  hydroCHLOROthiazide (HYDRODIURIL) 50 MG tablet, Take 1 tablet by mouth Daily., Disp: 90 tablet, Rfl: 3  •  montelukast (SINGULAIR) 10 MG tablet, TAKE 1 TABLET BY MOUTH  DAILY, Disp: 90 tablet, Rfl: 3  •  Multiple Vitamins-Minerals (VITEYES AREDS FORMULA PO), Take 1 tablet by mouth 2 (Two) Times a Day., Disp: , Rfl:   •  sotalol (BETAPACE AF) 80 MG tablet tablet, TAKE 1 TABLET BY MOUTH  TWICE DAILY, Disp: 180 tablet, Rfl: 3  •  Umeclidinium Bromide (INCRUSE ELLIPTA) 62.5 MCG/INH aerosol powder , Inhale 1 puff Daily. 1 inhalation once a day, Disp: 90 each, Rfl: 3    Current Facility-Administered Medications:   •  aspirin chewable tablet 324 mg, 324 mg, Oral, Once, Arpita Mcdowell, ANGELIA    History of Present Illness: Patient returns for scheduled 6-month follow up. Since last visit, patient fell onto carpet injuring his right knee on 11 October 2020 with subsequent development of right lower extremity cellulitis. He has been going to wound care at [facility] and has completed 46 days of IV antibiotics. He is supposed to have his right knee replaced in  "February 2021. He has not noticed any episodes of atrial fibrillation and has no cardiopulmonary complaint. He states he is due for an appointment with Dr. Schwartz. His recent laboratory studies from August and December 2020 are reviewed with him. Patient reports he has not been on rosuvastatin for quite some time. He has had no other interim ER visits, hospitalizations, serious illnesses, or surgeries. Patient otherwise denies chest pain, shortness of breath, PND, edema, palpitations, syncope, or presyncope at this time. He received influenza immunization.         ROS   Obtained and negative except as outlined in problem list and HPI.      ECG 12 Lead    Date/Time: 12/11/2020 11:13 AM  Performed by: Norberto Perez MD  Authorized by: Norberto Perez MD   Comparison: compared with previous ECG from 5/28/2020  Similar to previous ECG  Rhythm: sinus rhythm  Ectopy: atrial premature contractions  BPM: 63    Clinical impression: abnormal EKG  Comments: QRS 98 ms  QTc 431 ms   ms               Objective:       Vitals:    12/11/20 1044 12/11/20 1045   BP: 143/85 131/82   BP Location: Right arm Right arm   Patient Position: Sitting Standing   Cuff Size: Adult Adult   Pulse: 65 70   Temp: 97.7 °F (36.5 °C)    SpO2: 95%    Weight: (!) 149 kg (329 lb)    Height: 185.4 cm (73\")      Body mass index is 43.41 kg/m².  Last weight: 329 lbs    Vitals signs reviewed.   Constitutional:       Appearance: Well-developed.   Neck:      Thyroid: No thyromegaly.      Vascular: No carotid bruit or JVD.      Lymphadenopathy: No cervical adenopathy.   Pulmonary:      Effort: Pulmonary effort is normal.      Breath sounds: Decreased breath sounds present. No wheezing. No rhonchi. No rales.   Cardiovascular:      Regular rhythm.      Murmurs: There is a grade 1/6 mid frequency early systolic murmur at the URSB.      No gallop. No S3 gallop.   Pulses:     Dorsalis pedis: 1+ bilaterally.     Posterior tibial: 1+ bilaterally.  Edema:     " Peripheral edema present.     Ankle: 1+ edema of the right ankle.  Abdominal:      Palpations: Abdomen is soft. There is no abdominal mass.      Tenderness: There is no abdominal tenderness. There is no guarding.   Musculoskeletal: Normal range of motion.   Skin:     General: Skin is warm and dry.      Findings: No rash.   Neurological:      Mental Status: Alert and oriented to person, place, and time.           Lab Review:   Reviewed with patient in office:  Lab Results   Component Value Date    GLUCOSE 134 (H) 12/07/2020    BUN 13 12/07/2020    CREATININE 0.64 (L) 12/07/2020    EGFRIFNONA 122 12/07/2020    BCR 20.3 12/07/2020     12/07/2020    K 3.9 12/07/2020     12/07/2020    CO2 31.0 (H) 12/07/2020    CALCIUM 9.6 12/07/2020    ALBUMIN 4.20 12/07/2020    AST 13 12/07/2020    ALT 13 12/07/2020       Lab Results   Component Value Date    WBC 8.20 12/07/2020    HGB 15.6 12/07/2020    HCT 49.3 12/07/2020    MCV 89.0 12/07/2020     12/07/2020       Lab Results   Component Value Date    HGBA1C 5.7 08/17/2020       Lab Results   Component Value Date    TSH 1.850 08/19/2020       Lab Results   Component Value Date    CHOL 122 08/19/2020    CHOL 150 03/05/2019    CHOL 149 08/23/2018     Lab Results   Component Value Date    TRIG 179 (H) 08/19/2020    TRIG 310 (H) 03/05/2019    TRIG 351 (H) 08/23/2018     Lab Results   Component Value Date    HDL 35 (L) 08/19/2020    HDL 33 (L) 03/05/2019    HDL 32 (L) 08/23/2018     Lab Results   Component Value Date    LDL 51 08/19/2020    LDL 55 03/05/2019    LDL 73 08/23/2018             Assessment:       Overall continued acceptable course with no new interim cardiopulmonary complaints with good functional status. We will defer additional diagnostic or therapeutic intervention from a cardiac perspective at this time.     Diagnosis Plan   1. Paroxysmal atrial fibrillation (CMS/HCC)  No documented recurrence; Continue current treatment.    2. Essential hypertension   Suboptimal control; Continue current treatment and monitoring at home   3. Obstructive sleep apnea, adult  Compliance stressed   4. Obesity, Class III, BMI 40-49.9 (morbid obesity) (CMS/[facility])  Caloric restriction and activity encouraged   5. Type 2 diabetes mellitus without complication, without long-term current use of insulin (CMS/[facility])  Hemoglobin A1c 5.7%, August 2020; Continue current treatment.    6. Dyslipidemia  Acceptable FLP, August 2020; continue heart healthy diet          Plan:         1. Patient to continue current medications and close follow up with the above providers.  2. Tentative cardiology follow up in June 2021 with electrocardiogram or patient may return sooner PRN.    Scribed for Norberto Perez MD by Cristin Quiroga. 12/11/2020  11:10 EST     I, Norberto Perez MD, [facility], personally performed the services described in this documentation as scribed by the above named individual in my presence, and it is both accurate and complete. At 11:10 EST on 12/11/2020

## 2020-12-11 ENCOUNTER — HOSPITAL ENCOUNTER (OUTPATIENT)
Dept: PHYSICAL THERAPY | Facility: HOSPITAL | Age: 75
Setting detail: THERAPIES SERIES
Discharge: HOME | End: 2020-12-11

## 2020-12-11 ENCOUNTER — OFFICE VISIT (OUTPATIENT)
Dept: CARDIOLOGY | Facility: CLINIC | Age: 75
End: 2020-12-11

## 2020-12-11 VITALS
OXYGEN SATURATION: 95 % | HEART RATE: 70 BPM | HEIGHT: 73 IN | SYSTOLIC BLOOD PRESSURE: 131 MMHG | DIASTOLIC BLOOD PRESSURE: 82 MMHG | WEIGHT: 315 LBS | TEMPERATURE: 97.7 F | BODY MASS INDEX: 41.75 KG/M2

## 2020-12-11 DIAGNOSIS — E78.5 DYSLIPIDEMIA: ICD-10-CM

## 2020-12-11 DIAGNOSIS — E66.01 OBESITY, CLASS III, BMI 40-49.9 (MORBID OBESITY) (HCC): ICD-10-CM

## 2020-12-11 DIAGNOSIS — G47.33 OBSTRUCTIVE SLEEP APNEA, ADULT: ICD-10-CM

## 2020-12-11 DIAGNOSIS — I48.0 PAROXYSMAL ATRIAL FIBRILLATION (HCC): Primary | ICD-10-CM

## 2020-12-11 DIAGNOSIS — E11.9 TYPE 2 DIABETES MELLITUS WITHOUT COMPLICATION, WITHOUT LONG-TERM CURRENT USE OF INSULIN (HCC): ICD-10-CM

## 2020-12-11 DIAGNOSIS — R60.0 BILATERAL LEG EDEMA: ICD-10-CM

## 2020-12-11 DIAGNOSIS — S81.001D OPEN KNEE WOUND, RIGHT, SUBSEQUENT ENCOUNTER: Primary | ICD-10-CM

## 2020-12-11 DIAGNOSIS — I10 ESSENTIAL HYPERTENSION: ICD-10-CM

## 2020-12-11 PROCEDURE — 97597 DBRDMT OPN WND 1ST 20 CM/<: CPT

## 2020-12-11 PROCEDURE — 99214 OFFICE O/P EST MOD 30 MIN: CPT | Performed by: INTERNAL MEDICINE

## 2020-12-11 PROCEDURE — 29581 APPL MULTLAYER CMPRN SYS LEG: CPT

## 2020-12-11 RX ORDER — AMOXICILLIN AND CLAVULANATE POTASSIUM 500; 125 MG/1; MG/1
1 TABLET, FILM COATED ORAL 2 TIMES DAILY
COMMUNITY
End: 2021-03-03

## 2020-12-11 NOTE — THERAPY WOUND CARE TREATMENT
Outpatient Rehabilitation - Wound/Debridement Treatment Note   [place]     Patient Name: Luis Perez  : 1945  MRN: [id number]  Today's Date: 2020                 Admit Date: 2020    Visit Dx:    ICD-10-CM ICD-9-CM   1. Open knee wound, right, subsequent encounter  S81.001D V58.89     891.0   2. Bilateral leg edema  R60.0 782.3       Patient Active Problem List   Diagnosis   • Hypertension   • Disorder of calcium metabolism   • Disorder of endocrine testis   • Cellulitis of lower extremity   • Paroxysmal atrial fibrillation (CMS/HCC)   • Snoring   • Obstructive sleep apnea, adult   • Pneumonia of right middle lobe (Resolved)   • Chronic persistent asthma   • Non-smoker   • Obesity, Class III, BMI 40-49.9 (morbid obesity) (CMS/HCC)   • Dyslipidemia   • Perennial rhinitis   • Vitamin D deficiency        Past Medical History:   Diagnosis Date   • Asthma    • Bronchitis, chronic (CMS/HCC)    • Cancer (CMS/HCC)     Skin    • Cellulitis and abscess of right leg    • Chronic persistent asthma 2020   • Hyperlipidemia    • Hypertension    • Nephrolithiasis    • Obesity, Class III, BMI 40-49.9 (morbid obesity) (CMS/HCC) 2020   • Paroxysmal atrial fibrillation (CMS/HCC) 7/15/2019   • Pneumonia    • Sleep apnea    • Vitamin D deficiency 2020        Past Surgical History:   Procedure Laterality Date   • CARDIOVERSION  2019   • COLONOSCOPY     • NASAL SEPTUM SURGERY      Deviation Repair   • SKIN CANCER EXCISION     • TONSILLECTOMY     • VASECTOMY           EVALUATION  PT Ortho     Row Name 20 4765       Subjective Pain    Able to rate subjective pain?  yes  -MC    Pre-Treatment Pain Level  0  -MC    Post-Treatment Pain Level  0  -MC       Transfers    Sit-Stand [place] (Transfers)  independent  -MC    Stand-Sit [place] (Transfers)  independent  -MC    Comment (Transfers)  seated for tx  -MC       Gait/Stairs (Locomotion)    [place]okes Level (Gait)  independent   -      User Key  (r) = Recorded By, (t) = Taken By, (c) = Cosigned By    Initials Name Provider Type    Tammie James PT Physical Therapist          [facility] Wound     Row Name 12/11/20 1115             Wound 12/04/20 1030 Right lower leg Venous Ulcer    Wound - Properties Group Placement Date: 12/04/20  -MP Placement Time: 1030  -MP Present on Hospital Admission: Y  -MP Side: Right  -MP Orientation: lower  -MP Location: leg  -MP Primary Wound Type: Venous ulcer  -MP    Dressing Appearance  open to air  -      Base  pink;dry;closed/resurfaced  -      Periwound  pink;swelling;moist very thin/fragile  -      Periwound Temperature  warm  -      Periwound Skin Turgor  firm  -      Drainage Amount  none  -MC      Care, Wound  cleansed with;wound cleanser  -      Dressing Care  open to air MLW only  -      Periwound Care  cleansed with pH balanced cleanser;dry periwound area maintained  -      Retired Wound - Properties Group Date first assessed: 12/04/20  - Time first assessed: 1030  -MP Present on Hospital Admission: Y  -MP Side: Right  -MP Location: leg  -MP Primary Wound Type: Venous ulcer  -MP       Wound 11/03/20 1000 Right anterior knee Traumatic    Wound - Properties Group Placement Date: 11/03/20  -MP Placement Time: 1000  -MP Present on Hospital Admission: Y  -MP Side: Right  -MP Orientation: anterior  -MP Location: knee  -MP Primary Wound Type: Traumatic  -MP    Dressing Appearance  intact;no drainage  -      Base  clean;closed/resurfaced;dry thin hypertrophic skin crust remaining  -      Periwound  pink;swelling;warm;dry hypertrophic skin crusts   -      Periwound Temperature  warm  -      Periwound Skin Turgor  soft  -      Edges  irregular;open  -      Drainage Amount  none  -      Care, Wound  cleansed with;wound cleanser;debrided  -      Dressing Care  open to air MLW only  -      Periwound Care  cleansed with pH balanced cleanser;dry periwound area maintained  -       Retired Wound - Properties Group Date first assessed: 11/03/20  -MP Time first assessed: 1000  -MP Present on Hospital Admission: Y  -MP Side: Right  -MP Location: knee  -MP Primary Wound Type: Traumatic  -MP      User Key  (r) = Recorded By, (t) = Taken By, (c) = Cosigned By    Initials Name Provider Type    Tammie James, PT Physical Therapist    Clifford Badillo PT Physical Therapist        Lymphedema     Row Name 12/11/20 1111             Subjective Comments    Subjective Comments  Pt called this AM concerned about new blistering/weeping from the RLE. Reports being more active/elevating his leg less since being released from daily IV infusions   -         Lymphedema Edema Assessment    Ptting Edema Category  By severity  -      Pitting Edema  Moderate  -      Edema Assessment Comment  increased pitting edema in ankle/foot today  -         Lymphedema Pulses/Capillary Refill    Lower Extremity Capillary Refill  right:;less than 3 seconds  -         Compression/Skin Care    Compression/Skin Care  skin care;wrapping location;bandaging  -      Skin Care  washed/dried;lotion applied;moisturizing lotion applied  -      Wrapping Location  lower extremity  -      Wrapping Location LE  foot to knee;right:  -      Wrapping Comments  cast padding to mid/lower shin to cover fragile areas. Size 4 MH to ankle dbl over foot, size 5 ankle to calf, dbl distally. Multiple layers/sizes to create gradient compression.  -      Bandage Layers  padding/fluff layer;cotton elastic stocking- double layer (comment size)  -        User Key  (r) = Recorded By, (t) = Taken By, (c) = Cosigned By    Initials Name Provider Type    Tammie James, PT Physical Therapist          WOUND DEBRIDEMENT  Total area of Debridement: 2 cm2  Debridement Site 1  Location- Site 1: R knee  Selective Debridement- Site 1: Wound Surface <20cmsq  Instruments- Site 1: tweezers, scissors  Excised Tissue Description- Site 1:  moderate, other (comment)(hypertrophic skin)  Bleeding- Site 1: none             Therapy Education     Row Name 12/11/20 111             Therapy Education    Education Details  No need for dressings to RLE, but maintain cast padding over fragile area on shin. Elevate legs as often as possible. Use size 4/5 compressogrip. Follow up Monday to assess skin integrity.  -MC      Given  Bandaging/dressing change;Edema management  -      Program  Reinforced;Modified  -      How Provided  Verbal;Demonstration  -      Provided to  Patient  -      Level of Understanding  Verbalized;Teach back education performed  -        User Key  (r) = Recorded By, (t) = Taken By, (c) = Cosigned By    Initials Name Provider Type    Tammie James, PT Physical Therapist          Recommendation and Plan  PT Assessment/Plan     Row Name 12/11/20 6783          PT Assessment    Functional Limitations  Impaired gait;Limitation in home management;Limitations in community activities;Performance in work activities;Other (comment) wound mgmt  -     Impairments  Edema;Impaired venous circulation;Integumentary integrity  -     Assessment Comments  R knee wound and lower leg wounds both appear closed today. However, pt with increased edema to the R ankle/foot and a fragile, shiny area to the R anterior shin with some scabbing indicative of recent weeping. RLE not currently weeping. Adjusted size of MLW to address increased edema, and educated pt on maintaining leg elevation when able. Pt will continue to benefit from MLW and skin integrity assessment to promote healing.  -     Rehab Potential  Excellent  -     Patient/caregiver participated in establishment of treatment plan and goals  Yes  -     Patient would benefit from skilled therapy intervention  Yes  -MC        PT Plan    PT Frequency  1x/week  -     Physical Therapy Interventions (Optional Details)  wound care;patient/family education  -     PT Plan Comments   debridement prn, size 4/5 MLW, transition to compression stockings when able  -       User Key  (r) = Recorded By, (t) = Taken By, (c) = Cosigned By    Initials Name Provider Type    Tammie James, PT Physical Therapist          Goals  PT OP Goals     Row Name 12/11/20 1115          Time Calculation    PT Goal Re-Cert Due Date  02/01/21  -       User Key  (r) = Recorded By, (t) = Taken By, (c) = Cosigned By    Initials Name Provider Type    Tammie James, PT Physical Therapist          PT Goal Re-Cert Due Date: 02/01/21            Time Calculation: Start Time: 1115  Therapy Charges for Today     Code Description Service Date Service Provider Modifiers Qty    12926895746 HC JUVENTINO DEBRIDE OPEN WOUND UP TO 20CM 12/11/2020 Tammie Jones, PT GP 1    03768275675 HC PT MULTI LAYER COMP SYS BELOW KNEE 12/11/2020 Tammie Jones, PT GP 1                  Tammie Jones, PT  12/11/2020

## 2020-12-14 ENCOUNTER — HOSPITAL ENCOUNTER (OUTPATIENT)
Dept: PHYSICAL THERAPY | Facility: HOSPITAL | Age: 75
Setting detail: THERAPIES SERIES
Discharge: HOME | End: 2020-12-14

## 2020-12-14 DIAGNOSIS — R60.0 BILATERAL LEG EDEMA: ICD-10-CM

## 2020-12-14 DIAGNOSIS — S81.001D OPEN KNEE WOUND, RIGHT, SUBSEQUENT ENCOUNTER: Primary | ICD-10-CM

## 2020-12-14 PROCEDURE — 29581 APPL MULTLAYER CMPRN SYS LEG: CPT

## 2020-12-14 NOTE — THERAPY WOUND CARE TREATMENT
Outpatient Rehabilitation - Wound/Debridement Treatment Note   [place]     Patient Name: Luis Perez  : 1945  MRN: [id number]  Today's Date: 2020                 Admit Date: 2020    Visit Dx:    ICD-10-CM ICD-9-CM   1. Open knee wound, right, subsequent encounter  S81.001D V58.89     891.0   2. Bilateral leg edema  R60.0 782.3       Patient Active Problem List   Diagnosis   • Hypertension   • Disorder of calcium metabolism   • Disorder of endocrine testis   • Cellulitis of lower extremity   • Paroxysmal atrial fibrillation (CMS/HCC)   • Snoring   • Obstructive sleep apnea, adult   • Pneumonia of right middle lobe (Resolved)   • Chronic persistent asthma   • Non-smoker   • Obesity, Class III, BMI 40-49.9 (morbid obesity) (CMS/[facility])   • Dyslipidemia   • Perennial rhinitis   • Vitamin D deficiency        Past Medical History:   Diagnosis Date   • Asthma    • Bronchitis, chronic (CMS/HCC)    • Cancer (CMS/[facility])     Skin    • Cellulitis and abscess of right leg    • Chronic persistent asthma 2020   • Hyperlipidemia    • Hypertension    • Nephrolithiasis    • Obesity, Class III, BMI 40-49.9 (morbid obesity) (CMS/HCC) 2020   • Paroxysmal atrial fibrillation (CMS/HCC) 7/15/2019   • Pneumonia    • Sleep apnea    • Vitamin D deficiency 2020        Past Surgical History:   Procedure Laterality Date   • CARDIOVERSION  2019   • COLONOSCOPY     • NASAL SEPTUM SURGERY      Deviation Repair   • SKIN CANCER EXCISION     • TONSILLECTOMY     • VASECTOMY           EVALUATION  PT Ortho     Row Name 20 1030       Subjective Comments    Subjective Comments  Pt without complaints, states he did not change the wrap.  Was on his feet a lot yesterday.  -       Subjective Pain    Able to rate subjective pain?  yes  -JM    Pre-Treatment Pain Level  0  -    Post-Treatment Pain Level  0  -JM       Transfers    Sit-Stand [place] (Transfers)  independent  -    Stand-Sit [place]  (Transfers)  independent  -    Comment (Transfers)  seated for tx  -JM       Gait/Stairs (Locomotion)    [place] Level (Gait)  independent  -JM      User Key  (r) = Recorded By, (t) = Taken By, (c) = Cosigned By    Initials Name Provider Type    Karin Waterman, PT Physical Therapist          LDA Wound     Row Name 12/14/20 1030             Wound 12/04/20 1030 Right lower leg Venous Ulcer    Wound - Properties Group Placement Date: 12/04/20  -MP Placement Time: 1030  -MP Present on Hospital Admission: Y  -MP Side: Right  -MP Orientation: lower  -MP Location: leg  -MP Primary Wound Type: Venous ulcer  -MP    Dressing Appearance  dry;intact;no drainage  -      Base  pink;dry;closed/resurfaced;scab;yellow dry crusts  -      Periwound  pink;swelling;dry very thin/fragile  -      Periwound Temperature  warm  -      Periwound Skin Turgor  firm  -      Drainage Amount  none  -      Care, Wound  cleansed with;wound cleanser  -      Dressing Care  multi-layer wrap  -      Periwound Care  cleansed with pH balanced cleanser;dry periwound area maintained;moisturizer applied  -      Retired Wound - Properties Group Date first assessed: 12/04/20  -MP Time first assessed: 1030  -MP Present on Hospital Admission: Y  -MP Side: Right  -MP Location: leg  -MP Primary Wound Type: Venous ulcer  -MP       Wound 11/03/20 1000 Right anterior knee Traumatic    Wound - Properties Group Placement Date: 11/03/20  -MP Placement Time: 1000  -MP Present on Hospital Admission: Y  -MP Side: Right  -MP Orientation: anterior  -MP Location: knee  -MP Primary Wound Type: Traumatic  -MP    Dressing Appearance  open to air;no drainage  -      Base  clean;closed/resurfaced;dry thin hypertrophic skin crust remaining  -      Periwound  pink;swelling;warm;dry hypertrophic skin crusts   -      Periwound Temperature  warm  -      Periwound Skin Turgor  soft  -      Edges  irregular;open  -      Drainage Amount  none   -      Care, Wound  cleansed with;wound cleanser  -      Dressing Care  open to air  -      Periwound Care  cleansed with pH balanced cleanser;dry periwound area maintained;moisturizer applied  -      Retired Wound - Properties Group Date first assessed: 11/03/20  - Time first assessed: 1000  -MP Present on Hospital Admission: Y  -MP Side: Right  -MP Location: knee  -MP Primary Wound Type: Traumatic  -      User Key  (r) = Recorded By, (t) = Taken By, (c) = Cosigned By    Initials Name Provider Type     Karin Horton, PT Physical Therapist    MP Clifford Haskins, ISABELL Physical Therapist        Lymphedema     Row Name 12/14/20 1030             Lymphedema Edema Assessment    Ptting Edema Category  By severity  -      Pitting Edema  Moderate  -         Skin Changes/Observations    Location/Assessment  Lower Extremity  -      Lower Extremity Conditions  right:;clean;dry;shiny;crust;fragile  -      Lower Extremity Color/Pigment  right:;red;blanchable  -         Lymphedema Pulses/Capillary Refill    Lower Extremity Capillary Refill  right:;less than 3 seconds  -         LLE Quick Girth (cm)    Met-heads  28.5 cm  -JM      Mid foot  27.5 cm  -JM      Smallest ankle  28 cm  -JM      Largest calf  45.5 cm  -      Tib tuberosity  50.3 cm  -         Compression/Skin Care    Compression/Skin Care  skin care;wrapping location;bandaging  -      Skin Care  washed/dried;lotion applied;moisturizing lotion applied  -      Wrapping Location  lower extremity  -      Wrapping Location LE  foot to knee;right:  -      Wrapping Comments  cast padding to mid/lower shin to cover fragile areas. Size 4 MH to ankle dbl over foot, size 5 ankle to calf, dbl distally. Multiple layers/sizes to create gradient compression  -      Bandage Layers  padding/fluff layer;cotton elastic stocking- double layer (comment size)  -        User Key  (r) = Recorded By, (t) = Taken By, (c) = Cosigned By    Initials Name  Provider Type    Karin Waterman, PT Physical Therapist            Therapy Education     Row Name 12/14/20 1100             Therapy Education    Education Details  OK to remove compression and shower every couple days, apply moisturizer.  Continue leg elevation throughout the day.  -MEME      Given  Bandaging/dressing change;Edema management  -MEME      Program  Reinforced;Modified  -MEME      How Provided  Verbal;Demonstration  -MEME      Provided to  Patient  -MEME      Level of Understanding  Verbalized;Teach back education performed  -MEME        User Key  (r) = Recorded By, (t) = Taken By, (c) = Cosigned By    Initials Name Provider Type    Karin Waterman, PT Physical Therapist          Recommendation and Plan  PT Assessment/Plan     Row Name 12/14/20 1030          PT Assessment    Functional Limitations  Impaired gait;Limitation in home management;Limitations in community activities;Performance in work activities;Other (comment) wound mgmt  -     Impairments  Edema;Impaired venous circulation;Integumentary integrity  -     Assessment Comments  RLE without drainage but still fragile and crusted ant/distal aspect and warm/shiny.  PT continued with cast padding to protect fragile skin with MLW for gradient compression.  Pt verbalizing understanding of home wrap changes and compression use.  Pt to continue wrap changes every 2-3 days.  Will reduce to once/week tx.  Pt also start looking for compression stockings.  PT gave pt his measurements from this tx for sizing.  -MEME        PT Plan    PT Frequency  1x/week  -MEME     Physical Therapy Interventions (Optional Details)  patient/family education;wound care  -     PT Plan Comments  MLW  -       User Key  (r) = Recorded By, (t) = Taken By, (c) = Cosigned By    Initials Name Provider Type    Karin Waterman, PT Physical Therapist          Goals  PT OP Goals     Row Name 12/14/20 1030          Time Calculation    PT Goal Re-Cert Due Date  02/01/21  -MEME        User Key  (r) = Recorded By, (t) = Taken By, (c) = Cosigned By    Initials Name Provider Type    Karin Waterman, PT Physical Therapist          PT Goal Re-Cert Due Date: 02/01/21            Time Calculation: Start Time: 1030  Therapy Charges for Today     Code Description Service Date Service Provider Modifiers Qty    [id number] HC PT MULTI LAYER COMP SYS BELOW KNEE 12/14/2020 Karin Horton, PT GP 1                  Karin Horton, PT  12/14/2020

## 2020-12-17 ENCOUNTER — APPOINTMENT (OUTPATIENT)
Dept: PHYSICAL THERAPY | Facility: HOSPITAL | Age: 75
End: 2020-12-17

## 2020-12-21 ENCOUNTER — HOSPITAL ENCOUNTER (OUTPATIENT)
Dept: PHYSICAL THERAPY | Facility: HOSPITAL | Age: 75
Setting detail: THERAPIES SERIES
Discharge: HOME | End: 2020-12-21

## 2020-12-21 ENCOUNTER — DOCUMENTATION (OUTPATIENT)
Dept: PULMONOLOGY | Facility: CLINIC | Age: 75
End: 2020-12-21

## 2020-12-21 DIAGNOSIS — R60.0 BILATERAL LEG EDEMA: ICD-10-CM

## 2020-12-21 DIAGNOSIS — L03.115 CELLULITIS OF RIGHT LOWER EXTREMITY WITHOUT FOOT: ICD-10-CM

## 2020-12-21 DIAGNOSIS — S81.001D OPEN KNEE WOUND, RIGHT, SUBSEQUENT ENCOUNTER: Primary | ICD-10-CM

## 2020-12-21 PROCEDURE — 97602 WOUND(S) CARE NON-SELECTIVE: CPT | Performed by: PHYSICAL THERAPIST

## 2020-12-21 PROCEDURE — 29580 STRAPPING UNNA BOOT: CPT | Performed by: PHYSICAL THERAPIST

## 2020-12-21 NOTE — THERAPY WOUND CARE TREATMENT
Outpatient Rehabilitation - Wound/Debridement Treatment Note  [facility]     Patient Name: Luis Perez  : 1945  MRN: [id number]  Today's Date: 2020                 Admit Date: 2020    Visit Dx:    ICD-10-CM ICD-9-CM   1. Open knee wound, right, subsequent encounter  S81.001D V58.89     891.0   2. Bilateral leg edema  R60.0 782.3   3. Cellulitis of right lower extremity without foot  L03.115 682.6       Patient Active Problem List   Diagnosis   • Hypertension   • Disorder of calcium metabolism   • Disorder of endocrine testis   • Cellulitis of lower extremity   • Paroxysmal atrial fibrillation (CMS/HCC)   • Snoring   • Obstructive sleep apnea, adult   • Pneumonia of right middle lobe (Resolved)   • Chronic persistent asthma   • Non-smoker   • Obesity, Class III, BMI 40-49.9 (morbid obesity) (CMS/HCC)   • Dyslipidemia   • Perennial rhinitis   • Vitamin D deficiency        Past Medical History:   Diagnosis Date   • Asthma    • Bronchitis, chronic (CMS/HCC)    • Cancer (CMS/[facility])     Skin    • Cellulitis and abscess of right leg    • Chronic persistent asthma 2020   • Hyperlipidemia    • Hypertension    • Nephrolithiasis    • Obesity, Class III, BMI 40-49.9 (morbid obesity) (CMS/HCC) 2020   • Paroxysmal atrial fibrillation (CMS/HCC) 7/15/2019   • Pneumonia    • Sleep apnea    • Vitamin D deficiency 2020        Past Surgical History:   Procedure Laterality Date   • CARDIOVERSION  2019   • COLONOSCOPY     • NASAL SEPTUM SURGERY      Deviation Repair   • SKIN CANCER EXCISION     • TONSILLECTOMY     • VASECTOMY           EVALUATION  PT Ortho     Row Name 20 5809       Subjective Comments    Subjective Comments  Pt reports he has only 2 days left of oral antibx; has a call into [facility]. Was told he would be on antibx x 6 months. States leg is less red, swollen and hot first thing in the morning; improves in the evening with elevation.   -MW       Subjective Pain    Able to  rate subjective pain?  yes  -MW    Pre-Treatment Pain Level  0  -MW    Post-Treatment Pain Level  0  -MW       Transfers    Sit-Stand Perkins (Transfers)  independent  -MW    Stand-Sit [place] (Transfers)  independent  -MW    Comment (Transfers)  seated for tx  -MW       Gait/Stairs (Locomotion)    Perkins Level (Gait)  independent  -MW      User Key  (r) = Recorded By, (t) = Taken By, (c) = Cosigned By    Initials Name Provider Type    MW Jennifer Lemus, ISABELL Physical Therapist          LDA Wound     Row Name 12/21/20 1115             Wound 12/04/20 1030 Right lower leg Venous Ulcer    Wound - Properties Group Placement Date: 12/04/20  -MP Placement Time: 1030  -MP Present on Hospital Admission: Y  -MP Side: Right  -MP Orientation: lower  -MP Location: leg  -MP Primary Wound Type: Venous ulcer  -MP    Wound Image  Images linked: 1  -MW      Dressing Appearance  dry;intact;no drainage  -MW      Base  pink;dry;closed/resurfaced;scab;yellow dry crusts  -MW      Periwound  pink;swelling;dry;redness;warm very thin/fragile  -MW      Periwound Temperature  warm;hot  -MW      Periwound Skin Turgor  firm  -MW      Drainage Amount  none  -MW      Care, Wound  cleansed with;wound cleanser;debrided;celina boot  -MW      Dressing Care  other (see comments);coban unnaboot, coban, elastic net   -MW      Periwound Care  cleansed with pH balanced cleanser;topical treatment applied;moisturizer applied z guard & Eucerin combined   -MW      Retired Wound - Properties Group Date first assessed: 12/04/20  -MP Time first assessed: 1030  -MP Present on Hospital Admission: Y  -MP Side: Right  -MP Location: leg  -MP Primary Wound Type: Venous ulcer  -MP       Wound 11/03/20 1000 Right anterior knee Traumatic    Wound - Properties Group Placement Date: 11/03/20  -MP Placement Time: 1000  -MP Present on Hospital Admission: Y  -MP Side: Right  -MP Orientation: anterior  -MP Location: knee  -MP Primary Wound Type: Traumatic  -MP     Dressing Appearance  open to air;no drainage;dry  -MW      Base  clean;closed/resurfaced;dry thin hypertrophic skin crust remaining  -MW      Periwound  pink;swelling;warm;dry hypertrophic skin crusts   -MW      Periwound Temperature  warm  -MW      Periwound Skin Turgor  soft  -MW      Edges  irregular;open  -MW      Drainage Amount  none  -MW      Care, Wound  cleansed with;wound cleanser  -MW      Dressing Care  open to air  -MW      Periwound Care  cleansed with pH balanced cleanser;dry periwound area maintained;moisturizer applied  -MW      Retired Wound - Properties Group Date first assessed: 11/03/20  -MP Time first assessed: 1000  -MP Present on Hospital Admission: Y  -MP Side: Right  -MP Location: knee  -MP Primary Wound Type: Traumatic  -MP      User Key  (r) = Recorded By, (t) = Taken By, (c) = Cosigned By    Initials Name Provider Type    MW Jennifer Lemus, PT Physical Therapist    MP Clifford Haskins, PT Physical Therapist        Lymphedema     Row Name 12/21/20 1200 12/21/20 1115          Lymphedema Edema Assessment    Ptting Edema Category  --  -MW  By severity  -MW     Pitting Edema  --  -MW  Moderate  -MW        Skin Changes/Observations    Location/Assessment  --  -MW  Lower Extremity  -MW     Lower Extremity Conditions  --  -MW  right:;clean;dry;shiny;crust;fragile;inflamed  -MW     Lower Extremity Color/Pigment  --  -MW  right:;red;blanchable  -MW        Lymphedema Pulses/Capillary Refill    Lower Extremity Capillary Refill  --  -MW  right:;less than 3 seconds  -MW        Compression/Skin Care    Compression/Skin Care  skin care;wrapping location;bandaging  -MW  skin care;wrapping location;bandaging  -MW     Skin Care  washed/dried;lotion applied;moisturizing lotion applied  -MW  washed/dried;lotion applied;moisturizing lotion applied  -MW     Wrapping Location  lower extremity  -MW  lower extremity  -MW     Wrapping Location LE  foot to knee;right:  -MW  foot to knee;right:  -MW     Wrapping  "Comments  --  unnaboot, 4\" coban, size 6 elastic net   -MW     Bandage Layers  padding/fluff layer;cotton elastic stocking- double layer (comment size)  -MW  padding/fluff layer;cotton elastic stocking- double layer (comment size)  -MW       User Key  (r) = Recorded By, (t) = Taken By, (c) = Cosigned By    Initials Name Provider Type    Jennifer Cast, PT Physical Therapist          WOUND DEBRIDEMENT  Total area of Debridement: ~10 cm2   Debridement Site 1  Location- Site 1: RLE  Selective Debridement- Site 1: (non selective )  Instruments- Site 1: other (comment)(wipes )  Excised Tissue Description- Site 1: minimum, moderate, other (comment)(dry skin debris )  Bleeding- Site 1: none             Therapy Education     Row Name 12/21/20 9124             Therapy Education    Education Details  Reviewed benefts of unnaboot vs MLW; concern for edema, redness and warmth of leg. Trial of unnaboot - keep clean, dry and intact. Remove if painful or irritation. Elevate as much as possible as edema fluctuations are not helpful for healing process. Bring compression sock to next visit.   -MW      Given  Bandaging/dressing change;Edema management  -MW      Program  Modified;Reinforced  -MW      How Provided  Verbal;Demonstration  -MW      Provided to  Patient  -MW      Level of Understanding  Verbalized;Teach back education performed  -MW        User Key  (r) = Recorded By, (t) = Taken By, (c) = Cosigned By    Initials Name Provider Type    Jennifer Cast, PT Physical Therapist          Recommendation and Plan  PT Assessment/Plan     Row Name 12/21/20 1112          PT Assessment    Functional Limitations  Impaired gait;Limitation in home management;Limitations in community activities;Performance in work activities;Other (comment) wound mgmt  -MW     Impairments  Edema;Impaired venous circulation;Integumentary integrity  -MW     Assessment Comments  RLE with significant erythema, warmth and edema. Pt notes it looks " better in the morning. Trial of unnaboot for next 4 days to attempt to improve skin integrity and decrease edema. Pt to follow up on antibx with MD. Cont PT wound care as this may be a mild, chronic cellulitis.   -MW     Rehab Potential  Good  -MW     Patient/caregiver participated in establishment of treatment plan and goals  Yes  -MW     Patient would benefit from skilled therapy intervention  Yes  -MW        PT Plan    PT Frequency  2x/week;1x/week  -MW     Physical Therapy Interventions (Optional Details)  wound care;patient/family education  -MW     PT Plan Comments  unnaboot vs MLW   -MW       User Key  (r) = Recorded By, (t) = Taken By, (c) = Cosigned By    Initials Name Provider Type    MW Jennifer Lemus, PT Physical Therapist          Goals                   Time Calculation: Start Time: 1115  Therapy Charges for Today     Code Description Service Date Service Provider Modifiers Qty    98269922067 HC PT STAPPING UNNA BOOT 12/21/2020 Jennifer Lemus, PT GP 1    21464795183 HC NONSELECTIVE DEBRIDEMENT 12/21/2020 Jennifer Lemus, PT GP 1                  Jennifer Lemus, PT  12/21/2020

## 2020-12-24 ENCOUNTER — HOSPITAL ENCOUNTER (OUTPATIENT)
Dept: PHYSICAL THERAPY | Facility: HOSPITAL | Age: 75
Setting detail: THERAPIES SERIES
Discharge: HOME | End: 2020-12-24

## 2020-12-24 DIAGNOSIS — L03.115 CELLULITIS OF RIGHT LOWER EXTREMITY WITHOUT FOOT: ICD-10-CM

## 2020-12-24 DIAGNOSIS — R60.0 BILATERAL LEG EDEMA: Primary | ICD-10-CM

## 2020-12-24 PROCEDURE — 29580 STRAPPING UNNA BOOT: CPT

## 2020-12-24 NOTE — THERAPY WOUND CARE TREATMENT
Outpatient Rehabilitation - Wound/Debridement Treatment Note   [place]     Patient Name: Luis Perez  : 1945  MRN: [id number]  Today's Date: 2020                 Admit Date: 2020    Visit Dx:    ICD-10-CM ICD-9-CM   1. Bilateral leg edema  R60.0 782.3   2. Cellulitis of right lower extremity without foot  L03.115 682.6       Patient Active Problem List   Diagnosis   • Hypertension   • Disorder of calcium metabolism   • Disorder of endocrine testis   • Cellulitis of lower extremity   • Paroxysmal atrial fibrillation (CMS/HCC)   • Snoring   • Obstructive sleep apnea, adult   • Pneumonia of right middle lobe (Resolved)   • Chronic persistent asthma   • Non-smoker   • Obesity, Class III, BMI 40-49.9 (morbid obesity) (CMS/[facility])   • Dyslipidemia   • Perennial rhinitis   • Vitamin D deficiency        Past Medical History:   Diagnosis Date   • Asthma    • Bronchitis, chronic (CMS/[facility])    • Cancer (CMS/[facility])     Skin    • Cellulitis and abscess of right leg    • Chronic persistent asthma 2020   • Hyperlipidemia    • Hypertension    • Nephrolithiasis    • Obesity, Class III, BMI 40-49.9 (morbid obesity) (CMS/[facility]) 2020   • Paroxysmal atrial fibrillation (CMS/[facility]) 7/15/2019   • Pneumonia    • Sleep apnea    • Vitamin D deficiency 2020        Past Surgical History:   Procedure Laterality Date   • CARDIOVERSION  2019   • COLONOSCOPY     • NASAL SEPTUM SURGERY      Deviation Repair   • SKIN CANCER EXCISION     • TONSILLECTOMY     • VASECTOMY           EVALUATION  PT Ortho     Row Name 20 1100       Subjective Comments    Subjective Comments  No complaints or changes since last treatment. Tolerating the unna boot well.  -MC       Subjective Pain    Able to rate subjective pain?  yes  -MC    Pre-Treatment Pain Level  0  -MC    Post-Treatment Pain Level  0  -MC       Transfers    Sit-Stand [place] (Transfers)  independent  -MC    Stand-Sit [place] (Transfers)   independent  -    Comment (Transfers)  seated for tx  -       Gait/Stairs (Locomotion)    [place] Level (Gait)  independent  -      User Key  (r) = Recorded By, (t) = Taken By, (c) = Cosigned By    Initials Name Provider Type    Tammie James PT Physical Therapist          LDA Wound     Row Name 12/24/20 1115             [REMOVED] Wound 11/03/20 1000 Right anterior knee Traumatic    Wound - Properties Group Placement Date: 11/03/20  -MP Placement Time: 1000  -MP Present on Hospital Admission: Y  -MP Side: Right  -MP Orientation: anterior  -MP Location: knee  -MP Primary Wound Type: Traumatic  -MP Removal Date: 12/24/20  -MC Removal Time: 1100  -MC Wound Outcome: Healed  -MC    Dressing Appearance  open to air  -      Base  clean;closed/resurfaced;dry  -      Periwound  pink;swelling;warm;dry  -MC      Periwound Temperature  warm  -      Periwound Skin Turgor  soft  -MC      Edges  irregular;open  -MC      Drainage Amount  none  -MC      Retired Wound - Properties Group Date first assessed: 11/03/20  -MP Time first assessed: 1000  -MP Present on Hospital Admission: Y  -MP Side: Right  -MP Location: knee  -MP Primary Wound Type: Traumatic  -MP Resolution Date: 12/24/20  - Resolution Time: 1100  -MC Wound Outcome: Healed  -MC       Wound 12/04/20 1030 Right lower leg Venous Ulcer    Wound - Properties Group Placement Date: 12/04/20  -MP Placement Time: 1030  -MP Present on Hospital Admission: Y  -MP Side: Right  -MP Orientation: lower  -MP Location: leg  -MP Primary Wound Type: Venous ulcer  -MP    Dressing Appearance  dry;intact;no drainage  -      Base  pink;dry;closed/resurfaced no open areas noted today  -      Periwound  pink;swelling;dry;redness;warm improving skin integrity  -      Periwound Temperature  warm;hot  -      Periwound Skin Turgor  firm  -      Drainage Amount  none  -      Care, Wound  cleansed with;wound cleanser;celina boot  -      Dressing Care  dressing  "changed  -      Periwound Care  cleansed with pH balanced cleanser;barrier ointment applied;moisturizer applied zguard/eucerin mixed  -      Retired Wound - Properties Group Date first assessed: 12/04/20  - Time first assessed: 1030  -MP Present on Hospital Admission: Y  -MP Side: Right  -MP Location: leg  -MP Primary Wound Type: Venous ulcer  -      User Key  (r) = Recorded By, (t) = Taken By, (c) = Cosigned By    Initials Name Provider Type    Tammie James, PT Physical Therapist    Clifford Badillo, PT Physical Therapist        Lymphedema     Row Name 12/24/20 1100             Lymphedema Edema Assessment    Ptting Edema Category  By severity  -      Pitting Edema  Mild  -         Skin Changes/Observations    Lower Extremity Conditions  right:;clean;dry;shiny;crust;fragile;inflamed  -      Lower Extremity Color/Pigment  right:;red;blanchable  -         Lymphedema Pulses/Capillary Refill    Lower Extremity Capillary Refill  right:;less than 3 seconds  -         Compression/Skin Care    Compression/Skin Care  skin care;wrapping location;bandaging  -      Skin Care  washed/dried;lotion applied;moisturizing lotion applied  -      Wrapping Location  lower extremity  -      Wrapping Location LE  foot to knee;right:  -      Wrapping Comments  unna boot, 4\" coban, size 6 spandage  -      Bandaging Technique  figure-eight;light compression  -        User Key  (r) = Recorded By, (t) = Taken By, (c) = Cosigned By    Initials Name Provider Type    Tammie James, PT Physical Therapist          WOUND DEBRIDEMENT                   Therapy Education     Row Name 12/24/20 1100             Therapy Education    Education Details  Continue with unna boot at least 1-2 additional treatments.  -      Given  Bandaging/dressing change;Edema management  -      Program  Reinforced  -MC      How Provided  Verbal;Demonstration  -MC      Provided to  Patient  -MC      Level of Understanding  " Verbalized;Teach back education performed  -        User Key  (r) = Recorded By, (t) = Taken By, (c) = Cosigned By    Initials Name Provider Type    Tammie James, PT Physical Therapist          Recommendation and Plan  PT Assessment/Plan     Row Name 12/24/20 1100          PT Assessment    Functional Limitations  Impaired gait;Limitation in home management;Limitations in community activities;Performance in work activities;Other (comment) wound mgmt  -     Impairments  Edema;Impaired venous circulation;Integumentary integrity  -     Assessment Comments  Pt with improving skin integrity since last assessment. Pt with no open areas and improving inflammation/edema. Pt will benefit from continued unna boot therapy prior to transitioning to MLW or long-term compression option.  -     Rehab Potential  Good  -     Patient/caregiver participated in establishment of treatment plan and goals  Yes  -     Patient would benefit from skilled therapy intervention  Yes  -        PT Plan    PT Frequency  2x/week;1x/week  -     Physical Therapy Interventions (Optional Details)  wound care;patient/family education  -     PT Plan Comments  unna boot x1-2 more treatments  -       User Key  (r) = Recorded By, (t) = Taken By, (c) = Cosigned By    Initials Name Provider Type     Tammie Jones, PT Physical Therapist          Goals  PT OP Goals     Row Name 12/24/20 1100          Time Calculation    PT Goal Re-Cert Due Date  02/01/21  -       User Key  (r) = Recorded By, (t) = Taken By, (c) = Cosigned By    Initials Name Provider Type    Tammie James, PT Physical Therapist          PT Goal Re-Cert Due Date: 02/01/21            Time Calculation: Start Time: 1100  Therapy Charges for Today     Code Description Service Date Service Provider Modifiers Qty    81765583224  PT STAPPING UNNA BOOT 12/24/2020 Tammie Jones, PT GP 1                  Tammie Jones, PT  12/24/2020

## 2020-12-28 ENCOUNTER — HOSPITAL ENCOUNTER (OUTPATIENT)
Dept: PHYSICAL THERAPY | Facility: HOSPITAL | Age: 75
Setting detail: THERAPIES SERIES
Discharge: HOME | End: 2020-12-28

## 2020-12-28 DIAGNOSIS — L03.115 CELLULITIS OF RIGHT LOWER EXTREMITY WITHOUT FOOT: ICD-10-CM

## 2020-12-28 DIAGNOSIS — R60.0 BILATERAL LEG EDEMA: Primary | ICD-10-CM

## 2020-12-28 PROCEDURE — 97597 DBRDMT OPN WND 1ST 20 CM/<: CPT

## 2020-12-28 PROCEDURE — 29580 STRAPPING UNNA BOOT: CPT

## 2020-12-28 NOTE — THERAPY WOUND CARE TREATMENT
Acute Care - Wound/Debridement Treatment Note  [facility]     Patient Name: Luis Perez  : 1945  MRN: [id number]  Today's Date: 2020         R LE (After debridement and Z guard application):            Admit Date: 2020    Visit Dx:    ICD-10-CM ICD-9-CM   1. Bilateral leg edema  R60.0 782.3   2. Cellulitis of right lower extremity without foot  L03.115 682.6       Patient Active Problem List   Diagnosis   • Hypertension   • Disorder of calcium metabolism   • Disorder of endocrine testis   • Cellulitis of lower extremity   • Paroxysmal atrial fibrillation (CMS/HCC)   • Snoring   • Obstructive sleep apnea, adult   • Pneumonia of right middle lobe (Resolved)   • Chronic persistent asthma   • Non-smoker   • Obesity, Class III, BMI 40-49.9 (morbid obesity) (CMS/[facility])   • Dyslipidemia   • Perennial rhinitis   • Vitamin D deficiency        Past Medical History:   Diagnosis Date   • Asthma    • Bronchitis, chronic (CMS/HCC)    • Cancer (CMS/HCC)     Skin    • Cellulitis and abscess of right leg    • Chronic persistent asthma 2020   • Hyperlipidemia    • Hypertension    • Nephrolithiasis    • Obesity, Class III, BMI 40-49.9 (morbid obesity) (CMS/HCC) 2020   • Paroxysmal atrial fibrillation (CMS/HCC) 7/15/2019   • Pneumonia    • Sleep apnea    • Vitamin D deficiency 2020        Past Surgical History:   Procedure Laterality Date   • CARDIOVERSION  2019   • COLONOSCOPY     • NASAL SEPTUM SURGERY      Deviation Repair   • SKIN CANCER EXCISION     • TONSILLECTOMY     • VASECTOMY           LDA Wound     Row Name 20 1115             Wound 20 1030 Right lower leg Venous Ulcer    Wound - Properties Group Placement Date: 20  -MP Placement Time: 1030  -MP Present on Hospital Admission: Y  -MP Side: Right  -MP Orientation: lower  -MP Location: leg  -MP Primary Wound Type: Venous ulcer  -MP    Wound Image  Images linked: 2 Pictures taken after application of Zguard  -MP       Dressing Appearance  dry;intact;no drainage  -MP      Base  moist;pink;red  -MP      Periwound  pink;swelling;dry;redness;warm  -MP      Periwound Temperature  warm;hot  -MP      Periwound Skin Turgor  firm  -MP      Edges  open  -MP      Wound Length (cm)  0.2 cm  -MP      Wound Width (cm)  0.2 cm  -MP      Wound Depth (cm)  0.1 cm  -MP      Drainage Characteristics/Odor  serosanguineous  -MP      Drainage Amount  scant  -MP      Care, Wound  cleansed with;wound cleanser;debrided;celina boot  -MP      Dressing Care  dressing changed unna boot  -MP      Periwound Care  cleansed with pH balanced cleanser;barrier ointment applied;other (see comments) Zguard/Eucerin mix  -MP      Retired Wound - Properties Group Date first assessed: 12/04/20  -MP Time first assessed: 1030  -MP Present on Hospital Admission: Y  -MP Side: Right  -MP Location: leg  -MP Primary Wound Type: Venous ulcer  -MP      User Key  (r) = Recorded By, (t) = Taken By, (c) = Cosigned By    Initials Name Provider Type    Clifford Badillo PT Physical Therapist        Wound 12/04/20 1030 Right lower leg Venous Ulcer (Active)   Wound Image    12/28/20 1115   Dressing Appearance dry;intact;no drainage 12/28/20 1115   Base moist;pink;red 12/28/20 1115   Periwound pink;swelling;dry;redness;warm 12/28/20 1115   Periwound Temperature warm;hot 12/28/20 1115   Periwound Skin Turgor firm 12/28/20 1115   Edges open 12/28/20 1115   Wound Length (cm) 0.2 cm 12/28/20 1115   Wound Width (cm) 0.2 cm 12/28/20 1115   Wound Depth (cm) 0.1 cm 12/28/20 1115   Drainage Characteristics/Odor serosanguineous 12/28/20 1115   Drainage Amount scant 12/28/20 1115   Care, Wound cleansed with;wound cleanser;debrided;celina boot 12/28/20 1115   Dressing Care dressing changed 12/28/20 1115   Periwound Care cleansed with pH balanced cleanser;barrier ointment applied;other (see comments) 12/28/20 1115     Lymphedema     Row Name 12/28/20 1115             Lymphedema Edema Assessment    Ptting  Edema Category  By severity  -MP      Pitting Edema  Mild  -MP         Skin Changes/Observations    Lower Extremity Conditions  right:;clean;dry;shiny;crust;fragile;inflamed  -MP      Lower Extremity Color/Pigment  right:;red;blanchable  -MP         Lymphedema Pulses/Capillary Refill    Lower Extremity Capillary Refill  right:;less than 3 seconds  -MP         Compression/Skin Care    Compression/Skin Care  skin care;wrapping location;bandaging  -MP      Skin Care  washed/dried;lotion applied;moisturizing lotion applied  -MP      Wrapping Location  lower extremity  -MP      Wrapping Location LE  foot to knee;right:  -MP      Wrapping Comments  unna boot, 3'' coban, size 6 spandage  -MP      Bandaging Technique  figure-eight;light compression  -MP        User Key  (r) = Recorded By, (t) = Taken By, (c) = Cosigned By    Initials Name Provider Type    Clifford Badillo PT Physical Therapist          WOUND DEBRIDEMENT  Total area of Debridement: 2 cm2  Debridement Site 1  Location- Site 1: RLE  Selective Debridement- Site 1: Wound Surface <20cmsq  Instruments- Site 1: tweezers  Excised Tissue Description- Site 1: minimum, other (comment)(hypertrophic crusted skin)  Bleeding- Site 1: none                  PT Assessment (last 12 hours)      PT Evaluation and Treatment    No documentation.           Recommendation and Plan                              Time Calculation  PT Charges     Row Name 12/28/20 1115             Time Calculation    Start Time  1115  -MP      PT Received On  12/28/20  -      PT Goal Re-Cert Due Date  02/01/21  -        User Key  (r) = Recorded By, (t) = Taken By, (c) = Cosigned By    Initials Name Provider Type    Clifford Badillo PT Physical Therapist            Therapy Charges for Today     Code Description Service Date Service Provider Modifiers Qty    16132596218 HC PT STAPPING UNNA BOOT 12/28/2020 Clifford Haskins PT GP 1    [id number] HC JUVENTINO DEBRIDE OPEN WOUND UP TO 20CM 12/28/2020 Jozef  Clifford, PT GP 1                    Clifford Haskins, PT  12/28/2020

## 2020-12-31 ENCOUNTER — APPOINTMENT (OUTPATIENT)
Dept: PHYSICAL THERAPY | Facility: HOSPITAL | Age: 75
End: 2020-12-31

## 2021-01-01 ENCOUNTER — HOSPITAL ENCOUNTER (OUTPATIENT)
Dept: PHYSICAL THERAPY | Facility: HOSPITAL | Age: 76
Setting detail: THERAPIES SERIES
Discharge: HOME | End: 2021-01-01

## 2021-01-01 DIAGNOSIS — L03.115 CELLULITIS OF RIGHT LOWER EXTREMITY WITHOUT FOOT: ICD-10-CM

## 2021-01-01 DIAGNOSIS — R60.0 BILATERAL LEG EDEMA: Primary | ICD-10-CM

## 2021-01-01 DIAGNOSIS — S81.001D OPEN KNEE WOUND, RIGHT, SUBSEQUENT ENCOUNTER: ICD-10-CM

## 2021-01-01 PROCEDURE — 29580 STRAPPING UNNA BOOT: CPT

## 2021-01-01 NOTE — THERAPY PROGRESS REPORT/RE-CERT
Outpatient Rehabilitation - Wound/Debridement Progress Note  [facility]     Patient Name: Luis Perez  : 1945  MRN: [id number]  Today's Date: 2021                 Admit Date: 2021    Visit Dx:    ICD-10-CM ICD-9-CM   1. Bilateral leg edema  R60.0 782.3   2. Cellulitis of right lower extremity without foot  L03.115 682.6   3. Open knee wound, right, subsequent encounter  S81.001D V58.89     891.0       Patient Active Problem List   Diagnosis   • Hypertension   • Disorder of calcium metabolism   • Disorder of endocrine testis   • Cellulitis of lower extremity   • Paroxysmal atrial fibrillation (CMS/HCC)   • Snoring   • Obstructive sleep apnea, adult   • Pneumonia of right middle lobe (Resolved)   • Chronic persistent asthma   • Non-smoker   • Obesity, Class III, BMI 40-49.9 (morbid obesity) (CMS/HCC)   • Dyslipidemia   • Perennial rhinitis   • Vitamin D deficiency        Past Medical History:   Diagnosis Date   • Asthma    • Bronchitis, chronic (CMS/HCC)    • Cancer (CMS/[facility])     Skin    • Cellulitis and abscess of right leg    • Chronic persistent asthma 2020   • Hyperlipidemia    • Hypertension    • Nephrolithiasis    • Obesity, Class III, BMI 40-49.9 (morbid obesity) (CMS/HCC) 2020   • Paroxysmal atrial fibrillation (CMS/HCC) 7/15/2019   • Pneumonia    • Sleep apnea    • Vitamin D deficiency 2020        Past Surgical History:   Procedure Laterality Date   • CARDIOVERSION  2019   • COLONOSCOPY     • NASAL SEPTUM SURGERY      Deviation Repair   • SKIN CANCER EXCISION     • TONSILLECTOMY     • VASECTOMY           EVALUATION  PT Ortho     Row Name 21 0800       Subjective Comments    Subjective Comments  No complaints.  -       Subjective Pain    Able to rate subjective pain?  yes  -    Pre-Treatment Pain Level  0  -    Post-Treatment Pain Level  0  -JM       Transfers    Sit-Stand [place] (Transfers)  independent  -    Stand-Sit [place] (Transfers)   independent  -    Comment (Transfers)  seated for tx  -       Gait/Stairs (Locomotion)    [place] Level (Gait)  independent  -      User Key  (r) = Recorded By, (t) = Taken By, (c) = Cosigned By    Initials Name Provider Type    Karin Waterman, PT Physical Therapist          LDA Wound     Row Name 01/01/21 0800             Wound 12/04/20 1030 Right lower leg Venous Ulcer    Wound - Properties Group Placement Date: 12/04/20  -MP Placement Time: 1030  -MP Present on Hospital Admission: Y  -MP Side: Right  -MP Orientation: lower  -MP Location: leg  -MP Primary Wound Type: Venous ulcer  -MP    Dressing Appearance  dry;intact;dried drainage  -      Base  moist;pink;red  -      Periwound  pink;swelling;dry;redness;warm  -      Periwound Temperature  warm;hot  -      Periwound Skin Turgor  firm  -      Edges  open  -      Drainage Characteristics/Odor  serosanguineous  -      Drainage Amount  scant  -      Care, Wound  cleansed with;wound cleanser;celina boot  -      Dressing Care  dressing changed unna boot  -      Periwound Care  cleansed with pH balanced cleanser;dry periwound area maintained  -      Retired Wound - Properties Group Date first assessed: 12/04/20  -MP Time first assessed: 1030  -MP Present on Hospital Admission: Y  -MP Side: Right  -MP Location: leg  -MP Primary Wound Type: Venous ulcer  -MP      User Key  (r) = Recorded By, (t) = Taken By, (c) = Cosigned By    Initials Name Provider Type    Karin Waterman, PT Physical Therapist    MP Clifford Haskins, ISABELL Physical Therapist        Lymphedema     Row Name 01/01/21 0800             Lymphedema Edema Assessment    Ptting Edema Category  By severity  -      Pitting Edema  Mild;Moderate mod in knee prox to unna boot  -         Skin Changes/Observations    Lower Extremity Conditions  right:;clean;dry;shiny;crust;fragile;inflamed  -      Lower Extremity Color/Pigment  right:;red;blanchable  -         Lymphedema  Pulses/Capillary Refill    Lower Extremity Capillary Refill  right:;less than 3 seconds  -JM         RLE Quick Girth (cm)    Met-heads  28 cm  -JM      Mid foot  26.5 cm  -JM      Smallest ankle  27 cm  -JM      Largest calf  42 cm  -JM      Tib tuberosity  49.6 cm  -JM      RLE Quick Girth Total  173.1  -JM         Compression/Skin Care    Compression/Skin Care  skin care;wrapping location;bandaging  -      Skin Care  washed/dried;lotion applied;moisturizing lotion applied  -      Wrapping Location  lower extremity  -      Wrapping Location LE  foot to knee;right:  -JM      Wrapping Comments  unna boot, coban, size 6 spandage  -      Bandaging Technique  figure-eight;light compression  -        User Key  (r) = Recorded By, (t) = Taken By, (c) = Cosigned By    Initials Name Provider Type    Karin Waterman, PT Physical Therapist          WOUND DEBRIDEMENT                   Therapy Education     Row Name 01/01/21 0800             Therapy Education    Education Details  Bring compression stocking to next tx for use if appropriate.  Continue leg elevation throughout the day.  Discussed need for daily compression use.  -MEME      Given  Bandaging/dressing change;Edema management  -      Program  Reinforced  -MEME      How Provided  Verbal;Demonstration  -MEME      Provided to  Patient  -MEME      Level of Understanding  Verbalized;Teach back education performed  -        User Key  (r) = Recorded By, (t) = Taken By, (c) = Cosigned By    Initials Name Provider Type    Karin Waterman, PT Physical Therapist          Recommendation and Plan  PT Assessment/Plan     Row Name 01/01/21 0800          PT Assessment    Functional Limitations  Impaired gait;Limitation in home management;Limitations in community activities;Performance in work activities;Other (comment) wound mgmt  -     Impairments  Edema;Impaired venous circulation;Integumentary integrity  -     Assessment Comments  RLE wound improving with  continued reepithelialization, only scant drainage.  RLE erythema resolving, and pt may be appropriate to use compression stocking next tx with dressing over residual open area.  PT added size 6 compressogrip to R knee due to proximal edema and redness  of knee prox to unna boot.  -        PT Plan    PT Frequency  1x/week;2x/week  -     Physical Therapy Interventions (Optional Details)  patient/family education;wound care  -     PT Plan Comments  unna boot vs compression stocking  -       User Key  (r) = Recorded By, (t) = Taken By, (c) = Cosigned By    Initials Name Provider Type    Karin Waterman, PT Physical Therapist          Goals  PT OP Goals     Row Name 01/01/21 0800          Time Calculation    PT Goal Re-Cert Due Date  02/01/21  -       User Key  (r) = Recorded By, (t) = Taken By, (c) = Cosigned By    Initials Name Provider Type    Karin Waterman, PT Physical Therapist          PT Goal Re-Cert Due Date: 02/01/21            Time Calculation: Start Time: 0800  Therapy Charges for Today     Code Description Service Date Service Provider Modifiers Qty    [id number]  PT STAPPING UNNA BOOT 1/1/2021 Karin Horton, PT GP 1                  Karin Horton PT  1/1/2021

## 2021-01-04 ENCOUNTER — HOSPITAL ENCOUNTER (OUTPATIENT)
Dept: PHYSICAL THERAPY | Facility: HOSPITAL | Age: 76
Setting detail: THERAPIES SERIES
Discharge: HOME | End: 2021-01-04

## 2021-01-04 DIAGNOSIS — L03.115 CELLULITIS OF RIGHT LOWER EXTREMITY WITHOUT FOOT: Primary | ICD-10-CM

## 2021-01-04 PROCEDURE — 29580 STRAPPING UNNA BOOT: CPT

## 2021-01-04 PROCEDURE — 97597 DBRDMT OPN WND 1ST 20 CM/<: CPT

## 2021-01-04 NOTE — THERAPY WOUND CARE TREATMENT
Outpatient Rehabilitation - Wound/Debridement Treatment Note   [facility]     Patient Name: Luis Perez  : 1945  MRN: [id number]  Today's Date: 2021                   Admit Date: 2021    Visit Dx:    ICD-10-CM ICD-9-CM   1. Cellulitis of right lower extremity without foot  L03.115 682.6       Patient Active Problem List   Diagnosis   • Hypertension   • Disorder of calcium metabolism   • Disorder of endocrine testis   • Cellulitis of lower extremity   • Paroxysmal atrial fibrillation (CMS/HCC)   • Snoring   • Obstructive sleep apnea, adult   • Pneumonia of right middle lobe (Resolved)   • Chronic persistent asthma   • Non-smoker   • Obesity, Class III, BMI 40-49.9 (morbid obesity) (CMS/HCC)   • Dyslipidemia   • Perennial rhinitis   • Vitamin D deficiency        Past Medical History:   Diagnosis Date   • Asthma    • Bronchitis, chronic (CMS/HCC)    • Cancer (CMS/HCC)     Skin    • Cellulitis and abscess of right leg    • Chronic persistent asthma 2020   • Hyperlipidemia    • Hypertension    • Nephrolithiasis    • Obesity, Class III, BMI 40-49.9 (morbid obesity) (CMS/HCC) 2020   • Paroxysmal atrial fibrillation (CMS/HCC) 7/15/2019   • Pneumonia    • Sleep apnea    • Vitamin D deficiency 2020        Past Surgical History:   Procedure Laterality Date   • CARDIOVERSION  2019   • COLONOSCOPY     • NASAL SEPTUM SURGERY      Deviation Repair   • SKIN CANCER EXCISION     • TONSILLECTOMY     • VASECTOMY           EVALUATION  PT Ortho     Row Name 21 1300       Subjective Comments    Subjective Comments  Patient states he started OP PT today to address knee pain. Patient did not bring compression stocking to tx.  -MP       Subjective Pain    Able to rate subjective pain?  yes  -MP    Pre-Treatment Pain Level  0  -MP    Post-Treatment Pain Level  0  -MP       Transfers    Sit-Stand [place] (Transfers)  independent  -MP    Stand-Sit [place] (Transfers)  independent  -MP     Comment (Transfers)  seated for tx  -MP       Gait/Stairs (Locomotion)    [place] Level (Gait)  independent  -MP      User Key  (r) = Recorded By, (t) = Taken By, (c) = Cosigned By    Initials Name Provider Type    Clifford Badillo PT Physical Therapist          LDA Wound     Row Name 01/04/21 1300             Wound 12/04/20 1030 Right lower leg Venous Ulcer    Wound - Properties Group Placement Date: 12/04/20  -MP Placement Time: 1030  -MP Present on Hospital Admission: Y  -MP Side: Right  -MP Orientation: lower  -MP Location: leg  -MP Primary Wound Type: Venous ulcer  -MP    Wound Image  Images linked: 2  -MP      Dressing Appearance  dry;intact;dried drainage  -MP      Base  moist;pink;red  -MP      Periwound  pink;swelling;dry;redness;warm  -MP      Periwound Temperature  warm;hot  -MP      Periwound Skin Turgor  firm  -MP      Edges  open  -MP      Drainage Characteristics/Odor  serosanguineous  -MP      Drainage Amount  scant  -MP      Care, Wound  cleansed with;wound cleanser;celina boot  -MP      Dressing Care  dressing changed unna boot  -MP      Periwound Care  cleansed with pH balanced cleanser  -MP      Retired Wound - Properties Group Date first assessed: 12/04/20  -MP Time first assessed: 1030  -MP Present on Hospital Admission: Y  -MP Side: Right  -MP Location: leg  -MP Primary Wound Type: Venous ulcer  -MP      User Key  (r) = Recorded By, (t) = Taken By, (c) = Cosigned By    Initials Name Provider Type    Clifford Badillo PT Physical Therapist        Lymphedema     Row Name 01/04/21 1300             Lymphedema Edema Assessment    Ptting Edema Category  By severity  -MP      Pitting Edema  Mild;Moderate mod in knee prox to unna boot  -MP         Skin Changes/Observations    Lower Extremity Conditions  right:;clean;dry;shiny;crust;fragile;inflamed  -MP      Lower Extremity Color/Pigment  right:;red;blanchable  -MP         Lymphedema Pulses/Capillary Refill    Lower Extremity Capillary Refill   right:;less than 3 seconds  -MP         Compression/Skin Care    Compression/Skin Care  skin care;wrapping location;bandaging  -MP      Skin Care  washed/dried;lotion applied;moisturizing lotion applied  -MP      Wrapping Location  lower extremity  -MP      Wrapping Location LE  foot to knee;right:  -MP      Wrapping Comments  unna boot, 4'' coban, size 6 spandage  -MP      Bandaging Technique  figure-eight;light compression  -MP        User Key  (r) = Recorded By, (t) = Taken By, (c) = Cosigned By    Initials Name Provider Type    Clifford Badillo, PT Physical Therapist          WOUND DEBRIDEMENT  Total area of Debridement: 1 cm2  Debridement Site 1  Location- Site 1: RLE  Selective Debridement- Site 1: Wound Surface <20cmsq  Instruments- Site 1: tweezers  Excised Tissue Description- Site 1: minimum, other (comment)(hypertrophic crusted skin)  Bleeding- Site 1: none             Therapy Education     Row Name 01/04/21 1300             Therapy Education    Education Details  Patient educated to bring compression stocking to next session. Patient educated to elevate LE's after exercise with OP PT.  -MP      Given  Bandaging/dressing change;Edema management  -MP      Program  Reinforced  -MP      How Provided  Verbal;Demonstration  -MP      Provided to  Patient  -MP      Level of Understanding  Verbalized;Teach back education performed  -MP        User Key  (r) = Recorded By, (t) = Taken By, (c) = Cosigned By    Initials Name Provider Type    Clifford Badillo, PT Physical Therapist          Recommendation and Plan  PT Assessment/Plan     Row Name 01/04/21 1300          PT Assessment    Functional Limitations  Impaired gait;Limitation in home management;Limitations in community activities;Performance in work activities;Other (comment) wound mgmt  -MP     Impairments  Edema;Impaired venous circulation;Integumentary integrity  -MP     Assessment Comments  R LE wound continues to epithelialize with only scant drainage  present on unna boot upon removal. Erythema continues to improve as well. Patient did not bring compression stocking to tx this date but anticipate transition within 1-2 more treatments. R knee presents with small scabbed areas. Patient would continue to benefit from skilled PT wound care to promote increased wound healing potential.  -MP        PT Plan    PT Frequency  1x/week;2x/week  -MP     Physical Therapy Interventions (Optional Details)  patient/family education;wound care  -MP     PT Plan Comments  unna boot vs compression stocking  -MP       User Key  (r) = Recorded By, (t) = Taken By, (c) = Cosigned By    Initials Name Provider Type    Clifford Badillo PT Physical Therapist          Goals  PT OP Goals     Row Name 01/04/21 1300          Time Calculation    PT Goal Re-Cert Due Date  02/01/21  -MP       User Key  (r) = Recorded By, (t) = Taken By, (c) = Cosigned By    Initials Name Provider Type    Clifford Badillo, PT Physical Therapist          PT Goal Re-Cert Due Date: 02/01/21            Time Calculation: Start Time: 1300  Therapy Charges for Today     Code Description Service Date Service Provider Modifiers Qty    19801022370 HC JUVENTINO DEBRIDE OPEN WOUND UP TO 20CM 1/4/2021 Clifford Haskins, PT GP 1    [id number] HC PT STAPPING UNNA BOOT 1/4/2021 Clifford Haskins, PT GP 1                  Clifford Haskins PT  1/4/2021

## 2021-01-07 ENCOUNTER — HOSPITAL ENCOUNTER (OUTPATIENT)
Dept: PHYSICAL THERAPY | Facility: HOSPITAL | Age: 76
Setting detail: THERAPIES SERIES
Discharge: HOME | End: 2021-01-07

## 2021-01-07 DIAGNOSIS — L03.115 CELLULITIS OF RIGHT LOWER EXTREMITY WITHOUT FOOT: Primary | ICD-10-CM

## 2021-01-07 DIAGNOSIS — R60.0 BILATERAL LEG EDEMA: ICD-10-CM

## 2021-01-07 DIAGNOSIS — S81.001D OPEN KNEE WOUND, RIGHT, SUBSEQUENT ENCOUNTER: ICD-10-CM

## 2021-01-07 PROCEDURE — 29580 STRAPPING UNNA BOOT: CPT

## 2021-01-07 PROCEDURE — 97597 DBRDMT OPN WND 1ST 20 CM/<: CPT

## 2021-01-07 NOTE — THERAPY WOUND CARE TREATMENT
Outpatient Rehabilitation - Wound/Debridement Treatment Note  [facility]     Patient Name: Luis Perez  : 1945  MRN: [id number]  Today's Date: 2021                 Admit Date: 2021    Visit Dx:    ICD-10-CM ICD-9-CM   1. Cellulitis of right lower extremity without foot  L03.115 682.6   2. Bilateral leg edema  R60.0 782.3   3. Open knee wound, right, subsequent encounter  S81.001D V58.89     891.0       Patient Active Problem List   Diagnosis   • Hypertension   • Disorder of calcium metabolism   • Disorder of endocrine testis   • Cellulitis of lower extremity   • Paroxysmal atrial fibrillation (CMS/HCC)   • Snoring   • Obstructive sleep apnea, adult   • Pneumonia of right middle lobe (Resolved)   • Chronic persistent asthma   • Non-smoker   • Obesity, Class III, BMI 40-49.9 (morbid obesity) (CMS/HCC)   • Dyslipidemia   • Perennial rhinitis   • Vitamin D deficiency        Past Medical History:   Diagnosis Date   • Asthma    • Bronchitis, chronic (CMS/HCC)    • Cancer (CMS/[facility])     Skin    • Cellulitis and abscess of right leg    • Chronic persistent asthma 2020   • Hyperlipidemia    • Hypertension    • Nephrolithiasis    • Obesity, Class III, BMI 40-49.9 (morbid obesity) (CMS/HCC) 2020   • Paroxysmal atrial fibrillation (CMS/HCC) 7/15/2019   • Pneumonia    • Sleep apnea    • Vitamin D deficiency 2020        Past Surgical History:   Procedure Laterality Date   • CARDIOVERSION  2019   • COLONOSCOPY     • NASAL SEPTUM SURGERY      Deviation Repair   • SKIN CANCER EXCISION     • TONSILLECTOMY     • VASECTOMY           EVALUATION  PT Ortho     Row Name 21 0876       Subjective Comments    Subjective Comments  No complaints or changes. Curious about crusting on R knee  -MC       Subjective Pain    Able to rate subjective pain?  yes  -MC    Pre-Treatment Pain Level  0  -MC    Post-Treatment Pain Level  0  -MC       Transfers    Sit-Stand [place] (Transfers)   independent  -    Stand-Sit [place] (Transfers)  independent  -    Comment (Transfers)  seated for tx  -       Gait/Stairs (Locomotion)    [place] Level (Gait)  independent  -      User Key  (r) = Recorded By, (t) = Taken By, (c) = Cosigned By    Initials Name Provider Type    Tammie James, PT Physical Therapist          LDA Wound     Row Name 01/07/21 0845             Wound 12/04/20 1030 Right lower leg Venous Ulcer    Wound - Properties Group Placement Date: 12/04/20  -MP Placement Time: 1030  -MP Present on Hospital Admission: Y  -MP Side: Right  -MP Orientation: lower  -MP Location: leg  -MP Primary Wound Type: Venous ulcer  -MP    Dressing Appearance  open to air  -      Base  moist;pink;red after debridement of knee crusts  -      Periwound  pink;swelling;dry;redness;warm  -      Periwound Temperature  warm;hot  -      Periwound Skin Turgor  firm  -      Edges  open  -      Drainage Characteristics/Odor  serosanguineous  -      Drainage Amount  scant  -      Care, Wound  cleansed with;wound cleanser;debrided;celina boot  -      Dressing Care  open to air  -      Periwound Care  cleansed with pH balanced cleanser;topical treatment applied antifungal ointment  -      Retired Wound - Properties Group Date first assessed: 12/04/20 -MP Time first assessed: 1030 -MP Present on Hospital Admission: Y  -MP Side: Right  -MP Location: leg  -MP Primary Wound Type: Venous ulcer  -MP      User Key  (r) = Recorded By, (t) = Taken By, (c) = Cosigned By    Initials Name Provider Type    Tammie James, PT Physical Therapist    MP Clifford Haskins, PT Physical Therapist        Lymphedema     Row Name 01/07/21 0845             Lymphedema Edema Assessment    Ptting Edema Category  By severity  -      Pitting Edema  Mild;Moderate  -      Edema Assessment Comment  mod proximal to unna boot  -         Skin Changes/Observations    Lower Extremity Conditions   "right:;clean;dry;shiny;crust;fragile;inflamed  -      Lower Extremity Color/Pigment  right:;red;blanchable  -         Lymphedema Pulses/Capillary Refill    Lower Extremity Capillary Refill  right:;less than 3 seconds  -         Compression/Skin Care    Compression/Skin Care  skin care;wrapping location;bandaging  -      Skin Care  washed/dried;lotion applied;moisturizing lotion applied  -      Wrapping Location  lower extremity  -      Wrapping Location LE  foot to knee;right:  -      Wrapping Comments  unna boot, 3\" coban (x2), size 6 spandage  -      Bandaging Technique  figure-eight;light compression  -        User Key  (r) = Recorded By, (t) = Taken By, (c) = Cosigned By    Initials Name Provider Type    Tammie James, PT Physical Therapist          WOUND DEBRIDEMENT  Total area of Debridement: 3 cm2  Debridement Site 1  Location- Site 1: R knee  Selective Debridement- Site 1: Wound Surface <20cmsq  Instruments- Site 1: tweezers  Excised Tissue Description- Site 1: moderate, other (comment)(hypertrophic, adherent crusts)  Bleeding- Site 1: scant, held pressure, 1 minute             Therapy Education     Row Name 01/07/21 0808             Therapy Education    Education Details  Bring compression stocking next session. Explained that drainage/crusts to the knee may be from reaction to adhesives, as it is in the general shape of a square dressing. Continue with unna boot therapy until appropriate to transition to compression stocking.  -MC      Given  Bandaging/dressing change;Edema management  -MC      Program  Reinforced  -MC      How Provided  Verbal;Demonstration  -MC      Provided to  Patient  -MC      Level of Understanding  Verbalized;Teach back education performed  -        User Key  (r) = Recorded By, (t) = Taken By, (c) = Cosigned By    Initials Name Provider Type    Tammie James, PT Physical Therapist          Recommendation and Plan  PT Assessment/Plan     Row Name " 01/07/21 0845          PT Assessment    Functional Limitations  Impaired gait;Limitation in home management;Limitations in community activities;Performance in work activities;Other (comment) wound mgmt  -     Impairments  Edema;Impaired venous circulation;Integumentary integrity  -     Assessment Comments  Lower R leg is completely dry/intact with no areas of concern today. Pt's lower leg edema is well controlled with unna boot therapy at this time, and the pt is likely appropriate to transition to personal compression stockings soon. Pt has some irritation remaining around the knee, with adherent crusts that required debridement today. The irritation appears to have defined area, perhaps from a reaction to dressings or the estim pads used by PT mobility to address his knee pain. Pt will continue to benefit from skilled PT wound care to promote healing. Anticipate transition to compression stockings soon.  -     Rehab Potential  Good  -     Patient/caregiver participated in establishment of treatment plan and goals  Yes  -     Patient would benefit from skilled therapy intervention  Yes  -        PT Plan    PT Frequency  1x/week;2x/week  -     Physical Therapy Interventions (Optional Details)  patient/family education;wound care  -     PT Plan Comments  unna boot vs. compression stocking, Assess R knee irritation  -       User Key  (r) = Recorded By, (t) = Taken By, (c) = Cosigned By    Initials Name Provider Type    Tammie James, PT Physical Therapist          Goals  PT OP Goals     Row Name 01/07/21 0845          Time Calculation    PT Goal Re-Cert Due Date  02/01/21  -       User Key  (r) = Recorded By, (t) = Taken By, (c) = Cosigned By    Initials Name Provider Type    Tammie James, PT Physical Therapist          PT Goal Re-Cert Due Date: 02/01/21            Time Calculation: Start Time: 0845  Therapy Charges for Today     Code Description Service Date Service Provider  Modifiers Qty    [id number] HC JUVENTINO DEBRIDE OPEN WOUND UP TO 20CM 1/7/2021 Tammie Jones, PT GP 1    [id number] HC PT STAPPING UNNA BOOT 1/7/2021 Tammie Jones, PT GP 1                  Tammie Jones, PT  1/7/2021

## 2021-01-11 ENCOUNTER — HOSPITAL ENCOUNTER (OUTPATIENT)
Dept: PHYSICAL THERAPY | Facility: HOSPITAL | Age: 76
Setting detail: THERAPIES SERIES
Discharge: HOME | End: 2021-01-11

## 2021-01-11 DIAGNOSIS — R60.0 BILATERAL LEG EDEMA: ICD-10-CM

## 2021-01-11 DIAGNOSIS — L03.115 CELLULITIS OF RIGHT LOWER EXTREMITY WITHOUT FOOT: Primary | ICD-10-CM

## 2021-01-11 DIAGNOSIS — S81.001D OPEN KNEE WOUND, RIGHT, SUBSEQUENT ENCOUNTER: ICD-10-CM

## 2021-01-11 PROCEDURE — 97535 SELF CARE MNGMENT TRAINING: CPT

## 2021-01-11 NOTE — THERAPY WOUND CARE TREATMENT
Outpatient Rehabilitation - Wound/Debridement Treatment Note   Ken     Patient Name: Luis Perez  : 1945  MRN: [id number]  Today's Date: 2021             R knee      RLE      Admit Date: 2021    Visit Dx:    ICD-10-CM ICD-9-CM   1. Cellulitis of right lower extremity without foot  L03.115 682.6   2. Bilateral leg edema  R60.0 782.3   3. Open knee wound, right, subsequent encounter  S81.001D V58.89     891.0       Patient Active Problem List   Diagnosis   • Hypertension   • Disorder of calcium metabolism   • Disorder of endocrine testis   • Cellulitis of lower extremity   • Paroxysmal atrial fibrillation (CMS/HCC)   • Snoring   • Obstructive sleep apnea, adult   • Pneumonia of right middle lobe (Resolved)   • Chronic persistent asthma   • Non-smoker   • Obesity, Class III, BMI 40-49.9 (morbid obesity) (CMS/HCC)   • Dyslipidemia   • Perennial rhinitis   • Vitamin D deficiency        Past Medical History:   Diagnosis Date   • Asthma    • Bronchitis, chronic (CMS/HCC)    • Cancer (CMS/HCC)     Skin    • Cellulitis and abscess of right leg    • Chronic persistent asthma 2020   • Hyperlipidemia    • Hypertension    • Nephrolithiasis    • Obesity, Class III, BMI 40-49.9 (morbid obesity) (CMS/HCC) 2020   • Paroxysmal atrial fibrillation (CMS/HCC) 7/15/2019   • Pneumonia    • Sleep apnea    • Vitamin D deficiency 2020        Past Surgical History:   Procedure Laterality Date   • CARDIOVERSION  2019   • COLONOSCOPY     • NASAL SEPTUM SURGERY      Deviation Repair   • SKIN CANCER EXCISION     • TONSILLECTOMY     • VASECTOMY           EVALUATION  PT Ortho     Row Name 21 [id number]       Subjective Comments    Subjective Comments  No complaints or changes since last treatment. Brought his compression stocking for the RLE. Sees [facility] again the first week of February.  -       Subjective Pain    Able to rate subjective pain?  yes  -MC    Pre-Treatment Pain Level  0  -     Post-Treatment Pain Level  0  -       Transfers    Sit-Stand Travis (Transfers)  independent  -    Stand-Sit Travis (Transfers)  independent  -    Comment (Transfers)  seated for tx  -       Gait/Stairs (Locomotion)    Travis Level (Gait)  independent  -      User Key  (r) = Recorded By, (t) = Taken By, (c) = Cosigned By    Initials Name Provider Type    Tammie James, PT Physical Therapist          LDA Wound     Row Name 01/11/21 0930             Wound 12/04/20 1030 Right lower leg Venous Ulcer    Wound - Properties Group Placement Date: 12/04/20  -MP Placement Time: 1030  -MP Present on Hospital Admission: Y  -MP Side: Right  -MP Orientation: lower  -MP Location: leg  -MP Primary Wound Type: Venous ulcer  -MP    Dressing Appearance  open to air  -      Base  scab;dry scabbing present, fragile skin underneath  -      Periwound  pink;swelling;dry;redness;warm  -      Periwound Temperature  warm;hot  -      Periwound Skin Turgor  firm  -      Drainage Amount  none  -      Care, Wound  cleansed with;wound cleanser  -      Dressing Care  open to air  -      Periwound Care  cleansed with pH balanced cleanser;topical treatment applied antifungal ointment  -      Retired Wound - Properties Group Date first assessed: 12/04/20  -MP Time first assessed: 1030  -MP Present on Hospital Admission: Y  -MP Side: Right  -MP Location: leg  -MP Primary Wound Type: Venous ulcer  -MP      User Key  (r) = Recorded By, (t) = Taken By, (c) = Cosigned By    Initials Name Provider Type    Tammie James, PT Physical Therapist    Clifford Badillo, PT Physical Therapist        Lymphedema     Row Name 01/11/21 0930             Lymphedema Edema Assessment    Ptting Edema Category  By severity  -      Pitting Edema  Mild  -         Skin Changes/Observations    Lower Extremity Conditions  right:;clean;dry;shiny;crust;fragile  -      Lower Extremity Color/Pigment   right:;red;blanchable;hyperpigmented  -         Lymphedema Pulses/Capillary Refill    Lower Extremity Capillary Refill  right:;less than 3 seconds  -         Compression/Skin Care    Compression/Skin Care  skin care;wrapping location;bandaging  -      Skin Care  washed/dried;lotion applied  -      Wrapping Location  lower extremity  -      Wrapping Location LE  foot to knee;right:  -      Wrapping Comments  antifungal ointment, placed pt's personal compression stocking  -      Compression/Skin Care Comments  Extra time in education re: donning/doffing techniques, wear schedule, skin observation, skin care  -        User Key  (r) = Recorded By, (t) = Taken By, (c) = Cosigned By    Initials Name Provider Type    Tammie James, PT Physical Therapist          WOUND DEBRIDEMENT                   Therapy Education     Row Name 01/11/21 7110             Therapy Education    Education Details  Pt actively involved in education, including demonstration and discussion of donning/doffing techniques, appropriate skin care, wear schedule, and s/sx of infection that would warrant return to PT wound care. Discussed tentative d/c.  -      Given  Bandaging/dressing change;Edema management  -      Program  Progressed  -      How Provided  Verbal;Demonstration  -MC      Provided to  Patient  -      Level of Understanding  Verbalized;Teach back education performed  -      15443 - PT Self Care/Mgmt Minutes  15  -        User Key  (r) = Recorded By, (t) = Taken By, (c) = Cosigned By    Initials Name Provider Type    Tammie James, PT Physical Therapist          Recommendation and Plan  PT Assessment/Plan     Row Name 01/11/21 0037          PT Assessment    Functional Limitations  Impaired gait;Limitation in home management;Limitations in community activities;Performance in work activities;Other (comment) edema management  -     Impairments  Edema;Impaired venous circulation;Integumentary  integrity  -     Assessment Comments  Right lower leg is intact and dry today, with no open areas or notably fragile areas. Pt with only mild edema remaining that is appropriate for management with the patient's personal compression stockings. Pt still with scabbing and skin irritation around the R knee, that appears to be continued irritation from adhesives rather than new area of concern. Educated completed for compression stocking use, with patient actively involved in the education session. Pt is appropriate for tentative d/c at this time, as he appears appropriately independent with donning/doffing at this time.  -     Rehab Potential  Good  -     Patient/caregiver participated in establishment of treatment plan and goals  Yes  -     Patient would benefit from skilled therapy intervention  Yes  -        PT Plan    PT Frequency  Other (comment) prn within 30 days  -     Physical Therapy Interventions (Optional Details)  patient/family education;wound care  -     PT Plan Comments  tentative d/c  -       User Key  (r) = Recorded By, (t) = Taken By, (c) = Cosigned By    Initials Name Provider Type    Tammie James, PT Physical Therapist          Goals  PT OP Goals     Row Name 01/11/21 0930          Time Calculation    PT Goal Re-Cert Due Date  02/01/21  -       User Key  (r) = Recorded By, (t) = Taken By, (c) = Cosigned By    Initials Name Provider Type    Tammie James, PT Physical Therapist          PT Goal Re-Cert Due Date: 02/01/21            Time Calculation: Start Time: 0930  Therapy Charges for Today     Code Description Service Date Service Provider Modifiers Qty    40386560551 HC PT SELF CARE/MGMT/TRAIN EA 15 MIN 1/11/2021 Tammie Jones, PT GP 1                  Tammie Jones, PT  1/11/2021

## 2021-01-12 ENCOUNTER — DOCUMENTATION (OUTPATIENT)
Dept: PULMONOLOGY | Facility: CLINIC | Age: 76
End: 2021-01-12

## 2021-02-02 ENCOUNTER — TRANSCRIBE ORDERS (OUTPATIENT)
Dept: LAB | Facility: HOSPITAL | Age: 76
End: 2021-02-02

## 2021-02-02 ENCOUNTER — LAB (OUTPATIENT)
Dept: LAB | Facility: HOSPITAL | Age: 76
End: 2021-02-02

## 2021-02-02 DIAGNOSIS — L03.115 CELLULITIS OF RIGHT FOOT: Primary | ICD-10-CM

## 2021-02-02 DIAGNOSIS — L03.115 CELLULITIS OF RIGHT FOOT: ICD-10-CM

## 2021-02-02 LAB
ALBUMIN SERPL-MCNC: 4 G/DL (ref 3.5–5.2)
ALBUMIN/GLOB SERPL: 1.3 G/DL
ALP SERPL-CCNC: 70 U/L (ref 39–117)
ALT SERPL W P-5'-P-CCNC: 9 U/L (ref 1–41)
ANION GAP SERPL CALCULATED.3IONS-SCNC: 12 MMOL/L (ref 5–15)
AST SERPL-CCNC: 13 U/L (ref 1–40)
BASOPHILS # BLD AUTO: 0.06 10*3/MM3 (ref 0–0.2)
BASOPHILS NFR BLD AUTO: 0.8 % (ref 0–1.5)
BILIRUB SERPL-MCNC: 0.3 MG/DL (ref 0–1.2)
BUN SERPL-MCNC: 14 MG/DL (ref 8–23)
BUN/CREAT SERPL: 19.2 (ref 7–25)
CALCIUM SPEC-SCNC: 8.9 MG/DL (ref 8.6–10.5)
CHLORIDE SERPL-SCNC: 98 MMOL/L (ref 98–107)
CO2 SERPL-SCNC: 28 MMOL/L (ref 22–29)
CREAT SERPL-MCNC: 0.73 MG/DL (ref 0.76–1.27)
CRP SERPL-MCNC: <0.3 MG/DL (ref 0–0.5)
DEPRECATED RDW RBC AUTO: 45.2 FL (ref 37–54)
EOSINOPHIL # BLD AUTO: 0.14 10*3/MM3 (ref 0–0.4)
EOSINOPHIL NFR BLD AUTO: 2 % (ref 0.3–6.2)
ERYTHROCYTE [DISTWIDTH] IN BLOOD BY AUTOMATED COUNT: 14.6 % (ref 12.3–15.4)
ERYTHROCYTE [SEDIMENTATION RATE] IN BLOOD: 22 MM/HR (ref 0–20)
GFR SERPL CREATININE-BSD FRML MDRD: 105 ML/MIN/1.73
GLOBULIN UR ELPH-MCNC: 3.2 GM/DL
GLUCOSE SERPL-MCNC: 156 MG/DL (ref 65–99)
HCT VFR BLD AUTO: 51.3 % (ref 37.5–51)
HGB BLD-MCNC: 16.4 G/DL (ref 13–17.7)
IMM GRANULOCYTES # BLD AUTO: 0.04 10*3/MM3 (ref 0–0.05)
IMM GRANULOCYTES NFR BLD AUTO: 0.6 % (ref 0–0.5)
LYMPHOCYTES # BLD AUTO: 2.21 10*3/MM3 (ref 0.7–3.1)
LYMPHOCYTES NFR BLD AUTO: 31.1 % (ref 19.6–45.3)
MCH RBC QN AUTO: 27.2 PG (ref 26.6–33)
MCHC RBC AUTO-ENTMCNC: 32 G/DL (ref 31.5–35.7)
MCV RBC AUTO: 84.9 FL (ref 79–97)
MONOCYTES # BLD AUTO: 0.7 10*3/MM3 (ref 0.1–0.9)
MONOCYTES NFR BLD AUTO: 9.9 % (ref 5–12)
NEUTROPHILS NFR BLD AUTO: 3.95 10*3/MM3 (ref 1.7–7)
NEUTROPHILS NFR BLD AUTO: 55.6 % (ref 42.7–76)
NRBC BLD AUTO-RTO: 0 /100 WBC (ref 0–0.2)
PLATELET # BLD AUTO: 225 10*3/MM3 (ref 140–450)
PMV BLD AUTO: 10.7 FL (ref 6–12)
POTASSIUM SERPL-SCNC: 3.2 MMOL/L (ref 3.5–5.2)
PROT SERPL-MCNC: 7.2 G/DL (ref 6–8.5)
RBC # BLD AUTO: 6.04 10*6/MM3 (ref 4.14–5.8)
SODIUM SERPL-SCNC: 138 MMOL/L (ref 136–145)
WBC # BLD AUTO: 7.1 10*3/MM3 (ref 3.4–10.8)

## 2021-02-02 PROCEDURE — 80053 COMPREHEN METABOLIC PANEL: CPT | Performed by: INTERNAL MEDICINE

## 2021-02-02 PROCEDURE — 86140 C-REACTIVE PROTEIN: CPT | Performed by: INTERNAL MEDICINE

## 2021-02-02 PROCEDURE — 85025 COMPLETE CBC W/AUTO DIFF WBC: CPT

## 2021-02-02 PROCEDURE — 36415 COLL VENOUS BLD VENIPUNCTURE: CPT | Performed by: INTERNAL MEDICINE

## 2021-02-02 PROCEDURE — 85652 RBC SED RATE AUTOMATED: CPT | Performed by: INTERNAL MEDICINE

## 2021-02-10 ENCOUNTER — DOCUMENTATION (OUTPATIENT)
Dept: PHYSICAL THERAPY | Facility: HOSPITAL | Age: 76
End: 2021-02-10

## 2021-02-10 NOTE — THERAPY DISCHARGE NOTE
Outpatient Rehabilitation - Wound/Debridement D/C Summary    Patient Name: Luis Perez  : 1945  MRN: [id number]  Today's Date: 2/10/2021                  Admit Date: (Not on file)    Visit Dx:  No diagnosis found.    Patient Active Problem List   Diagnosis   • Hypertension   • Disorder of calcium metabolism   • Disorder of endocrine testis   • Cellulitis of lower extremity   • Paroxysmal atrial fibrillation (CMS/HCC)   • Snoring   • Obstructive sleep apnea, adult   • Pneumonia of right middle lobe (Resolved)   • Chronic persistent asthma   • Non-smoker   • Obesity, Class III, BMI 40-49.9 (morbid obesity) (CMS/[facility])   • Dyslipidemia   • Perennial rhinitis   • Vitamin D deficiency        Past Medical History:   Diagnosis Date   • Asthma    • Bronchitis, chronic (CMS/HCC)    • Cancer (CMS/[facility])     Skin    • Cellulitis and abscess of right leg    • Chronic persistent asthma 2020   • Hyperlipidemia    • Hypertension    • Nephrolithiasis    • Obesity, Class III, BMI 40-49.9 (morbid obesity) (CMS/[facility]) 2020   • Paroxysmal atrial fibrillation (CMS/HCC) 7/15/2019   • Pneumonia    • Sleep apnea    • Vitamin D deficiency 2020        Past Surgical History:   Procedure Laterality Date   • CARDIOVERSION  2019   • COLONOSCOPY     • NASAL SEPTUM SURGERY      Deviation Repair   • SKIN CANCER EXCISION     • TONSILLECTOMY     • VASECTOMY           EVALUATION                WOUND DEBRIDEMENT                            Recommendation and Plan      Goals  PT OP Goals     Row Name 02/10/21 0909          PT Short Term Goals    STG 1  Patient will present with 50% decrease in wound size as evidence of wound healing.  -     STG 1 Progress  Met  -     STG 2  Patient will verbalize signs and symptoms of infection.  -     STG 2 Progress  Met  -        Long Term Goals    LTG 1  Patient will present with 85% decrease in wound size as evidence of wound healing.  -     LTG 1 Progress  Met  -     LTG 2   Patient will demonstrate 4cm reduction in B LE measurements as evidence of improved venous return.  -     LTG 2 Progress  Met  -       User Key  (r) = Recorded By, (t) = Taken By, (c) = Cosigned By    Initials Name Provider Type    Tammie James, PT Physical Therapist          Time Calculation:              OP Discharge Summary     Row Name 02/10/21 0909             OP PT Discharge Summary    Date of Discharge  02/10/21  -      Reason for Discharge  All goals achieved tentative d/c 30 days ago, will now require a new MD order for OP PT care if needed.  -      Outcomes Achieved  Able to achieve all goals within established timeline  -      Discharge Destination  Home with home program  -        User Key  (r) = Recorded By, (t) = Taken By, (c) = Cosigned By    Initials Name Provider Type    Tammie James, PT Physical Therapist          Tammie Jones, ISABELL  2/10/2021

## 2021-03-03 ENCOUNTER — OFFICE VISIT (OUTPATIENT)
Dept: FAMILY MEDICINE CLINIC | Facility: CLINIC | Age: 76
End: 2021-03-03

## 2021-03-03 VITALS
DIASTOLIC BLOOD PRESSURE: 84 MMHG | WEIGHT: 315 LBS | BODY MASS INDEX: 41.75 KG/M2 | RESPIRATION RATE: 18 BRPM | OXYGEN SATURATION: 98 % | TEMPERATURE: 97.7 F | HEART RATE: 59 BPM | HEIGHT: 73 IN | SYSTOLIC BLOOD PRESSURE: 124 MMHG

## 2021-03-03 DIAGNOSIS — L03.90 CHRONIC CELLULITIS: ICD-10-CM

## 2021-03-03 DIAGNOSIS — R05.9 COUGH: Primary | ICD-10-CM

## 2021-03-03 PROCEDURE — 99213 OFFICE O/P EST LOW 20 MIN: CPT | Performed by: NURSE PRACTITIONER

## 2021-03-03 NOTE — PROGRESS NOTES
"Chief Complaint  Asthma (Follow up)    Subjective          Luis Perez presents to [facility]  History of Present Illness   Complains of cough for 5-6 months. Was waking him from sleep. Cough is productive of scant,  light green sputum in am. No fever. No wheezing. Better during the day. Worse in am and pm. Helps to be outside or when the window is open. Has not used albuterol. He thinks the room atomizer is triggering it. No chest pain, or increased sob. On Dupixent, allegra, singulair, incruse ellipta and breo ellipta. Compliant with inhalers. Followed by pulm.    Being treated for chronic cellulitis of RLE after a trauma. Symptoms are contolled if he wears his compression stockings. No fever or pain.       Objective   Vital Signs:   /84   Pulse 59   Temp 97.7 °F (36.5 °C) (Temporal)   Resp 18   Ht 185.4 cm (73\")   Wt (!) 152 kg (334 lb)   SpO2 98%   BMI 44.07 kg/m²     Physical Exam  Vitals signs reviewed.   Constitutional:       General: He is not in acute distress.     Appearance: He is well-developed.   HENT:      Head: Normocephalic and atraumatic.      Right Ear: External ear normal.      Left Ear: External ear normal.      Nose: Nose normal.      Mouth/Throat:      Pharynx: No oropharyngeal exudate.   Eyes:      General: No scleral icterus.        Right eye: No discharge.         Left eye: No discharge.      Conjunctiva/sclera: Conjunctivae normal.   Neck:      Musculoskeletal: Normal range of motion and neck supple.      Thyroid: No thyromegaly.   Cardiovascular:      Rate and Rhythm: Normal rate and regular rhythm.      Heart sounds: Normal heart sounds. No murmur. No friction rub. No gallop.    Pulmonary:      Effort: Pulmonary effort is normal. No respiratory distress.      Breath sounds: Normal breath sounds. No wheezing or rales.   Abdominal:      General: Bowel sounds are normal. There is no distension.      Palpations: Abdomen is soft. There is no " mass.      Tenderness: There is no abdominal tenderness. There is no guarding or rebound.   Musculoskeletal: Normal range of motion.         General: Swelling present. No deformity.   Lymphadenopathy:      Cervical: No cervical adenopathy.   Skin:     General: Skin is warm and dry.      Findings: Erythema present. No rash.      Comments: Right lower ext with generalized brunilda and erythema. No focal lesion. States this is his baseline.   Neurological:      Mental Status: He is alert and oriented to person, place, and time.      Cranial Nerves: No cranial nerve deficit.      Coordination: Coordination normal.      Deep Tendon Reflexes: Reflexes are normal and symmetric. Reflexes normal.   Psychiatric:         Behavior: Behavior normal.         Thought Content: Thought content normal.         Judgment: Judgment normal.        Result Review :                 Assessment and Plan    Diagnoses and all orders for this visit:    1. Cough (Primary)  -     XR Chest PA & Lateral; Future    2. Chronic cellulitis    Continue current meds. Check CXR. Has appt with pulm in 3 weeks.  Continue to wear compression hose. Keep leg elevated. Keep followup with  ID.  Will call with x-ray results.      Follow Up   Return if symptoms worsen or fail to improve.  Patient was given instructions and counseling regarding his condition or for health maintenance advice. Please see specific information pulled into the AVS if appropriate.

## 2021-03-04 ENCOUNTER — HOSPITAL ENCOUNTER (OUTPATIENT)
Dept: GENERAL RADIOLOGY | Facility: HOSPITAL | Age: 76
Discharge: HOME | End: 2021-03-04
Admitting: NURSE PRACTITIONER

## 2021-03-04 DIAGNOSIS — R05.9 COUGH: ICD-10-CM

## 2021-03-04 PROCEDURE — 71046 X-RAY EXAM CHEST 2 VIEWS: CPT

## 2021-03-05 ENCOUNTER — TELEPHONE (OUTPATIENT)
Dept: FAMILY MEDICINE CLINIC | Facility: CLINIC | Age: 76
End: 2021-03-05

## 2021-03-05 NOTE — TELEPHONE ENCOUNTER
Called and discussed nml cxr. No change in his symptoms. He has appt with Pulm in a few days. Instructed him to call them for further instructions. States cough isn't that bad and will wait until his appt.

## 2021-03-10 ENCOUNTER — OFFICE VISIT (OUTPATIENT)
Dept: FAMILY MEDICINE CLINIC | Facility: CLINIC | Age: 76
End: 2021-03-10

## 2021-03-10 VITALS
OXYGEN SATURATION: 98 % | WEIGHT: 315 LBS | SYSTOLIC BLOOD PRESSURE: 122 MMHG | RESPIRATION RATE: 18 BRPM | DIASTOLIC BLOOD PRESSURE: 80 MMHG | HEART RATE: 55 BPM | BODY MASS INDEX: 41.75 KG/M2 | TEMPERATURE: 97.8 F | HEIGHT: 73 IN

## 2021-03-10 DIAGNOSIS — I10 ESSENTIAL HYPERTENSION: ICD-10-CM

## 2021-03-10 DIAGNOSIS — R35.1 NOCTURIA: ICD-10-CM

## 2021-03-10 DIAGNOSIS — R60.0 BILATERAL LOWER EXTREMITY EDEMA: ICD-10-CM

## 2021-03-10 DIAGNOSIS — L03.90 CHRONIC CELLULITIS: ICD-10-CM

## 2021-03-10 DIAGNOSIS — R73.09 ELEVATED GLUCOSE: ICD-10-CM

## 2021-03-10 DIAGNOSIS — J45.909 IDIOPATHIC ASTHMA: ICD-10-CM

## 2021-03-10 DIAGNOSIS — L03.116 CELLULITIS OF LEFT LOWER EXTREMITY: ICD-10-CM

## 2021-03-10 DIAGNOSIS — E66.01 MORBIDLY OBESE (HCC): ICD-10-CM

## 2021-03-10 DIAGNOSIS — E66.01 OBESITY, CLASS III, BMI 40-49.9 (MORBID OBESITY) (HCC): ICD-10-CM

## 2021-03-10 DIAGNOSIS — G47.33 OBSTRUCTIVE SLEEP APNEA, ADULT: ICD-10-CM

## 2021-03-10 DIAGNOSIS — Z13.220 LIPID SCREENING: ICD-10-CM

## 2021-03-10 DIAGNOSIS — E55.9 VITAMIN D DEFICIENCY: ICD-10-CM

## 2021-03-10 DIAGNOSIS — Z00.00 MEDICARE ANNUAL WELLNESS VISIT, SUBSEQUENT: Primary | ICD-10-CM

## 2021-03-10 DIAGNOSIS — R73.03 PREDIABETES: ICD-10-CM

## 2021-03-10 DIAGNOSIS — E78.5 DYSLIPIDEMIA: ICD-10-CM

## 2021-03-10 LAB
EXPIRATION DATE: NORMAL
Lab: 4027
POC CREATININE URINE: 200
POC MICROALBUMIN URINE: 80

## 2021-03-10 PROCEDURE — 85025 COMPLETE CBC W/AUTO DIFF WBC: CPT | Performed by: NURSE PRACTITIONER

## 2021-03-10 PROCEDURE — 36415 COLL VENOUS BLD VENIPUNCTURE: CPT | Performed by: NURSE PRACTITIONER

## 2021-03-10 PROCEDURE — 84439 ASSAY OF FREE THYROXINE: CPT | Performed by: NURSE PRACTITIONER

## 2021-03-10 PROCEDURE — 84443 ASSAY THYROID STIM HORMONE: CPT | Performed by: NURSE PRACTITIONER

## 2021-03-10 PROCEDURE — 82044 UR ALBUMIN SEMIQUANTITATIVE: CPT | Performed by: NURSE PRACTITIONER

## 2021-03-10 PROCEDURE — 80053 COMPREHEN METABOLIC PANEL: CPT | Performed by: NURSE PRACTITIONER

## 2021-03-10 PROCEDURE — G0439 PPPS, SUBSEQ VISIT: HCPCS | Performed by: NURSE PRACTITIONER

## 2021-03-10 PROCEDURE — 80061 LIPID PANEL: CPT | Performed by: NURSE PRACTITIONER

## 2021-03-10 PROCEDURE — 82306 VITAMIN D 25 HYDROXY: CPT | Performed by: NURSE PRACTITIONER

## 2021-03-10 NOTE — PROGRESS NOTES
The ABCs of the Annual Wellness Visit  Subsequent Medicare Wellness Visit    Chief Complaint   Patient presents with   • Medicare Wellness-subsequent       Subjective   History of Present Illness:  Luis Perez is a 75 y.o. male who presents for a Subsequent Medicare Wellness Visit.    HEALTH RISK ASSESSMENT    Recent Hospitalizations:  No hospitalization(s) within the last year.    Current Medical Providers:  Patient Care Team:  Maggie Schwartz, DNP, APRN as PCP - General (Nurse Practitioner)  Latasha Poe MD as Consulting Physician (Dermatopathology)   Dr Bradley-Pulm  Dr Garsia-ID  Dr Perez-Cardiology      Smoking Status:  Social History     Tobacco Use   Smoking Status Never Smoker   Smokeless Tobacco Former User   • Types: Chew   Tobacco Comment    states been over a month since use       Alcohol Consumption:  Social History     Substance and Sexual Activity   Alcohol Use Yes   • Alcohol/week: 0.0 - 2.0 standard drinks    Comment: 1-2 month        Depression Screen:   PHQ-2/PHQ-9 Depression Screening 3/10/2021   Little interest or pleasure in doing things 0   Feeling down, depressed, or hopeless 0   Total Score 0       Fall Risk Screen:  J[facility]ON Fall Risk Assessment was completed, and patient is at HIGH risk for falls. Assessment completed on:3/10/2021    Health Habits and Functional and Cognitive Screening:  Functional & Cognitive Status 3/10/2021   Do you have difficulty preparing food and eating? No   Do you have difficulty bathing yourself, getting dressed or grooming yourself? No   Do you have difficulty using the toilet? No   Do you have difficulty moving around from place to place? Yes   Do you have trouble with steps or getting out of a bed or a chair? Yes   Current Diet Well Balanced Diet   Dental Exam Up to date   Eye Exam Up to date   Exercise (times per week) 0 times per week   Current Exercise Activities Include None   Do you need help using the phone?  No   Are you deaf or do you  have serious difficulty hearing?  No   Do you need help with transportation? No   Do you need help shopping? No   Do you need help preparing meals?  No   Do you need help with housework?  No   Do you need help with laundry? No   Do you need help taking your medications? No   Do you need help managing money? No   Do you ever drive or ride in a car without wearing a seat belt? No   Have you felt unusual stress, anger or loneliness in the last month? No   Who do you live with? Spouse   If you need help, do you have trouble finding someone available to you? No   Have you been bothered in the last four weeks by sexual problems? No   Do you have difficulty concentrating, remembering or making decisions? No     ACE MINI III          Does the patient have evidence of cognitive impairment? No    Asprin use counseling:Does not need ASA (and currently is not on it)    Age-appropriate Screening Schedule:  Refer to the list below for future screening recommendations based on patient's age, sex and/or medical conditions. Orders for these recommended tests are listed in the plan section. The patient has been provided with a written plan.    Health Maintenance   Topic Date Due   • TDAP/TD VACCINES (1 - Tdap) Never done   • LIPID PANEL  08/19/2021   • HEMOGLOBIN A1C  09/10/2021   • DIABETIC EYE EXAM  03/03/2022   • DIABETIC FOOT EXAM  03/10/2022   • URINE MICROALBUMIN  03/10/2022   • COLONOSCOPY  06/28/2029   • INFLUENZA VACCINE  Completed   • ZOSTER VACCINE  Discontinued          The following portions of the patient's history were reviewed and updated as appropriate: allergies, current medications, past family history, past medical history, past social history, past surgical history and problem list.    Outpatient Medications Prior to Visit   Medication Sig Dispense Refill   • albuterol (PROVENTIL) (2.5 MG/3ML) 0.083% nebulizer solution Take 2.5 mg by nebulization Every 4 (Four) Hours As Needed for Wheezing. 25 vial 12   •  albuterol sulfate  (90 Base) MCG/ACT inhaler Inhale 2 puffs Every 4 (Four) Hours As Needed for Wheezing. 1 inhaler 0   • amLODIPine (NORVASC) 10 MG tablet Take 1 tablet by mouth Daily. 90 tablet 3   • apixaban (Eliquis) 5 MG tablet tablet Take 1 tablet by mouth 2 (Two) Times a Day. 180 tablet 3   • Dupilumab (Dupixent) 300 MG/2ML solution prefilled syringe Inject 300 mg under the skin into the appropriate area as directed Every 14 (Fourteen) Days. 2 syringe 11   • EPINEPHrine (EPIPEN) 0.3 MG/0.3ML solution auto-injector injection      • fexofenadine (ALLEGRA ALLERGY) 180 MG tablet Take 1 tablet by mouth Daily. 30 tablet 2   • Fluticasone Furoate-Vilanterol (BREO ELLIPTA) 200-25 MCG/INH inhaler Inhale 1 puff Daily. 1 inhalation once a day 180 each 3   • hydroCHLOROthiazide (HYDRODIURIL) 50 MG tablet Take 1 tablet by mouth Daily. 90 tablet 3   • montelukast (SINGULAIR) 10 MG tablet TAKE 1 TABLET BY MOUTH  DAILY 90 tablet 3   • Multiple Vitamins-Minerals (VITEYES AREDS FORMULA PO) Take 1 tablet by mouth 2 (Two) Times a Day.     • sotalol (BETAPACE AF) 80 MG tablet tablet TAKE 1 TABLET BY MOUTH  TWICE DAILY 180 tablet 3   • Umeclidinium Bromide (INCRUSE ELLIPTA) 62.5 MCG/INH aerosol powder  Inhale 1 puff Daily. 1 inhalation once a day 90 each 3     Facility-Administered Medications Prior to Visit   Medication Dose Route Frequency Provider Last Rate Last Admin   • aspirin chewable tablet 324 mg  324 mg Oral Once Arpita Mcdowell, APRN           Patient Active Problem List   Diagnosis   • Hypertension   • Disorder of calcium metabolism   • Disorder of endocrine testis   • Cellulitis of lower extremity   • Paroxysmal atrial fibrillation (CMS/HCC)   • Snoring   • Obstructive sleep apnea, adult   • Pneumonia of right middle lobe (Resolved)   • Chronic persistent asthma   • Non-smoker   • Obesity, Class III, BMI 40-49.9 (morbid obesity) (CMS/HCC)   • Dyslipidemia   • Perennial rhinitis   • Vitamin D deficiency  "      Advanced Care Planning:  ACP discussion was held with the patient during this visit. Patient has an advance directive (not in EMR), copy requested.    Review of Systems    Compared to one year ago, the patient feels his physical health is worse.  Compared to one year ago, the patient feels his mental health is the same.    Reviewed chart for potential of high risk medication in the elderly: yes  Reviewed chart for potential of harmful drug interactions in the elderly:yes    Objective         Vitals:    03/10/21 1447   BP: 122/80   Pulse: 55   Resp: 18   Temp: 97.8 °F (36.6 °C)   TempSrc: Temporal   SpO2: 98%   Weight: (!) 149 kg (328 lb)   Height: 185.4 cm (73\")       Body mass index is 43.27 kg/m².  Discussed the patient's BMI with him. The BMI is above average; BMI management plan is completed.    Physical Exam  Vitals reviewed.   Constitutional:       General: He is not in acute distress.     Appearance: He is well-developed.   HENT:      Head: Normocephalic and atraumatic.      Right Ear: External ear normal.      Left Ear: External ear normal.      Nose: Nose normal.      Mouth/Throat:      Pharynx: No oropharyngeal exudate.   Eyes:      General: No scleral icterus.        Right eye: No discharge.         Left eye: No discharge.      Conjunctiva/sclera: Conjunctivae normal.   Neck:      Thyroid: No thyromegaly.   Cardiovascular:      Rate and Rhythm: Normal rate and regular rhythm.      Heart sounds: Normal heart sounds. No murmur. No friction rub. No gallop.    Pulmonary:      Effort: Pulmonary effort is normal. No respiratory distress.      Breath sounds: Normal breath sounds. No wheezing or rales.   Abdominal:      General: Bowel sounds are normal. There is no distension.      Palpations: Abdomen is soft. There is no mass.      Tenderness: There is no abdominal tenderness. There is no guarding or rebound.   Musculoskeletal:         General: No deformity. Normal range of motion.      Cervical back: " Normal range of motion and neck supple.   Feet:      Right foot:      Skin integrity: Erythema present.      Left foot:      Skin integrity: Skin integrity normal.      Comments: RLE with chronic edema and erythema of pre-tibial area. Better today than yesterday. 1cm oval area of crust that is stable. Small callous on right heel. Monofilament 10/10 bilat.  Lymphadenopathy:      Cervical: No cervical adenopathy.   Skin:     General: Skin is warm and dry.      Findings: No rash.   Neurological:      Mental Status: He is alert and oriented to person, place, and time.      Cranial Nerves: No cranial nerve deficit.      Coordination: Coordination normal.      Deep Tendon Reflexes: Reflexes are normal and symmetric. Reflexes normal.   Psychiatric:         Behavior: Behavior normal.         Thought Content: Thought content normal.         Judgment: Judgment normal.               Assessment/Plan   Medicare Risks and Personalized Health Plan  CMS Preventative Services Quick Reference  Advance Directive Discussion  Cardiovascular risk  Chronic Pain   Colon Cancer Screening  Dementia/Memory   Depression/Dysphoria  Fall Risk  Immunizations Discussed/Encouraged (specific immunizations; covid )  Inactivity/Sedentary  Obesity/Overweight   Polypharmacy  Prostate Cancer Screening     The above risks/problems have been discussed with the patient.  Pertinent information has been shared with the patient in the After Visit Summary.  Follow up plans and orders are seen below in the Assessment/Plan Section.    Diagnoses and all orders for this visit:    1. Medicare annual wellness visit, subsequent (Primary)    2. Chronic cellulitis    3. Essential hypertension    4. Vitamin D deficiency  -     Vitamin D 25 Hydroxy    5. Bilateral lower extremity edema  -     CBC & Differential  -     Comprehensive Metabolic Panel    6. Elevated glucose  -     T4, Free  -     TSH  -     POC Microalbumin    7. Morbidly obese (CMS/[facility])    8. Lipid  screening  -     Lipid Panel    9. Prediabetes   -     T4, Free  -     TSH    10. Nocturia    11. Dyslipidemia    12. Obesity, Class III, BMI 40-49.9 (morbid obesity) (CMS/[facility])    13. Chronic persistent asthma    14. Obstructive sleep apnea, adult    15. Cellulitis of left lower extremity    Continue current meds and follow up with all providers.  Discussed the nature of the disease including, risks, complications, implications, management, safe and proper use of medications. Encouraged therapeutic lifestyle changes including low calorie diet with plenty of fruits and vegetables, daily exercise, medication compliance, and keeping scheduled follow up appointments with me and any other providers. Encouraged patient to have appointment for complete physical, fasting labs, appropriate screenings, and immunizations on an annual basis.  Monitor bp at home and bring log. Encourage DASH diet and 150 minutes of weekly exercise. Reviewed signs and symptoms of MI and stroke. If symptoms persist or worsen go to the ER.    Discussed the importance of low carb diet, annual dilated eye exam, nightly foot checks, annual dentist visit, daily exercise. Monitor blood sugar at least twice a week. Maintain BMI under 25. Have regular follow up appointments for labs and screenings.    Follow Up:  No follow-ups on file.     An After Visit Summary and PPPS were given to the patient.

## 2021-03-11 ENCOUNTER — TELEPHONE (OUTPATIENT)
Dept: FAMILY MEDICINE CLINIC | Facility: CLINIC | Age: 76
End: 2021-03-11

## 2021-03-11 DIAGNOSIS — E55.9 VITAMIN D DEFICIENCY: Primary | ICD-10-CM

## 2021-03-11 LAB
25(OH)D3 SERPL-MCNC: 22.3 NG/ML (ref 30–100)
ALBUMIN SERPL-MCNC: 4.5 G/DL (ref 3.5–5.2)
ALBUMIN/GLOB SERPL: 1.4 G/DL
ALP SERPL-CCNC: 64 U/L (ref 39–117)
ALT SERPL W P-5'-P-CCNC: 14 U/L (ref 1–41)
ANION GAP SERPL CALCULATED.3IONS-SCNC: 13.2 MMOL/L (ref 5–15)
AST SERPL-CCNC: 16 U/L (ref 1–40)
BASOPHILS # BLD AUTO: 0.07 10*3/MM3 (ref 0–0.2)
BASOPHILS NFR BLD AUTO: 0.8 % (ref 0–1.5)
BILIRUB SERPL-MCNC: 0.6 MG/DL (ref 0–1.2)
BUN SERPL-MCNC: 15 MG/DL (ref 8–23)
BUN/CREAT SERPL: 18.8 (ref 7–25)
CALCIUM SPEC-SCNC: 9.8 MG/DL (ref 8.6–10.5)
CHLORIDE SERPL-SCNC: 99 MMOL/L (ref 98–107)
CHOLEST SERPL-MCNC: 144 MG/DL (ref 0–200)
CO2 SERPL-SCNC: 29.8 MMOL/L (ref 22–29)
CREAT SERPL-MCNC: 0.8 MG/DL (ref 0.76–1.27)
DEPRECATED RDW RBC AUTO: 47.2 FL (ref 37–54)
EOSINOPHIL # BLD AUTO: 0.16 10*3/MM3 (ref 0–0.4)
EOSINOPHIL NFR BLD AUTO: 1.8 % (ref 0.3–6.2)
ERYTHROCYTE [DISTWIDTH] IN BLOOD BY AUTOMATED COUNT: 15.6 % (ref 12.3–15.4)
GFR SERPL CREATININE-BSD FRML MDRD: 94 ML/MIN/1.73
GLOBULIN UR ELPH-MCNC: 3.3 GM/DL
GLUCOSE SERPL-MCNC: 105 MG/DL (ref 65–99)
HCT VFR BLD AUTO: 50.8 % (ref 37.5–51)
HDLC SERPL-MCNC: 31 MG/DL (ref 40–60)
HGB BLD-MCNC: 17.1 G/DL (ref 13–17.7)
IMM GRANULOCYTES # BLD AUTO: 0.03 10*3/MM3 (ref 0–0.05)
IMM GRANULOCYTES NFR BLD AUTO: 0.3 % (ref 0–0.5)
LDLC SERPL CALC-MCNC: 64 MG/DL (ref 0–100)
LDLC/HDLC SERPL: 1.65 {RATIO}
LYMPHOCYTES # BLD AUTO: 2.56 10*3/MM3 (ref 0.7–3.1)
LYMPHOCYTES NFR BLD AUTO: 29.2 % (ref 19.6–45.3)
MCH RBC QN AUTO: 28.4 PG (ref 26.6–33)
MCHC RBC AUTO-ENTMCNC: 33.7 G/DL (ref 31.5–35.7)
MCV RBC AUTO: 84.4 FL (ref 79–97)
MONOCYTES # BLD AUTO: 0.93 10*3/MM3 (ref 0.1–0.9)
MONOCYTES NFR BLD AUTO: 10.6 % (ref 5–12)
NEUTROPHILS NFR BLD AUTO: 5.02 10*3/MM3 (ref 1.7–7)
NEUTROPHILS NFR BLD AUTO: 57.3 % (ref 42.7–76)
NRBC BLD AUTO-RTO: 0 /100 WBC (ref 0–0.2)
PLATELET # BLD AUTO: 204 10*3/MM3 (ref 140–450)
PMV BLD AUTO: 11.4 FL (ref 6–12)
POTASSIUM SERPL-SCNC: 3.7 MMOL/L (ref 3.5–5.2)
PROT SERPL-MCNC: 7.8 G/DL (ref 6–8.5)
RBC # BLD AUTO: 6.02 10*6/MM3 (ref 4.14–5.8)
SODIUM SERPL-SCNC: 142 MMOL/L (ref 136–145)
T4 FREE SERPL-MCNC: 1.22 NG/DL (ref 0.93–1.7)
TRIGL SERPL-MCNC: 309 MG/DL (ref 0–150)
TSH SERPL DL<=0.05 MIU/L-ACNC: 2.02 UIU/ML (ref 0.27–4.2)
VLDLC SERPL-MCNC: 49 MG/DL (ref 5–40)
WBC # BLD AUTO: 8.77 10*3/MM3 (ref 3.4–10.8)

## 2021-03-11 RX ORDER — ERGOCALCIFEROL 1.25 MG/1
50000 CAPSULE ORAL WEEKLY
Qty: 12 CAPSULE | Refills: 0 | Status: SHIPPED | OUTPATIENT
Start: 2021-03-11 | End: 2021-07-22 | Stop reason: SDDI

## 2021-03-11 NOTE — TELEPHONE ENCOUNTER
PT STATES THAT HE HAS HIS SHINGLES SHOTS ON 9/13/2018 AND 11/20/2018. PT WOULD LIKE THIS UPDATED IN HIS CHART.          PLEASE ADVISE  [phone]

## 2021-03-16 ENCOUNTER — TRANSCRIBE ORDERS (OUTPATIENT)
Dept: LAB | Facility: HOSPITAL | Age: 76
End: 2021-03-16

## 2021-03-16 ENCOUNTER — LAB (OUTPATIENT)
Dept: LAB | Facility: HOSPITAL | Age: 76
End: 2021-03-16

## 2021-03-16 DIAGNOSIS — I96 GANGRENE (HCC): ICD-10-CM

## 2021-03-16 DIAGNOSIS — B37.0 CANDIDIASIS OF MOUTH: ICD-10-CM

## 2021-03-16 DIAGNOSIS — R79.82 ELEVATED C-REACTIVE PROTEIN (CRP): ICD-10-CM

## 2021-03-16 DIAGNOSIS — B37.0 CANDIDIASIS OF MOUTH: Primary | ICD-10-CM

## 2021-03-16 LAB
ALBUMIN SERPL-MCNC: 4.2 G/DL (ref 3.5–5.2)
ALBUMIN/GLOB SERPL: 1.3 G/DL
ALP SERPL-CCNC: 66 U/L (ref 39–117)
ALT SERPL W P-5'-P-CCNC: 11 U/L (ref 1–41)
ANION GAP SERPL CALCULATED.3IONS-SCNC: 9 MMOL/L (ref 5–15)
AST SERPL-CCNC: 14 U/L (ref 1–40)
BASOPHILS # BLD AUTO: 0.08 10*3/MM3 (ref 0–0.2)
BASOPHILS NFR BLD AUTO: 1 % (ref 0–1.5)
BILIRUB SERPL-MCNC: 0.5 MG/DL (ref 0–1.2)
BUN SERPL-MCNC: 16 MG/DL (ref 8–23)
BUN/CREAT SERPL: 21.1 (ref 7–25)
CALCIUM SPEC-SCNC: 9.5 MG/DL (ref 8.6–10.5)
CHLORIDE SERPL-SCNC: 101 MMOL/L (ref 98–107)
CO2 SERPL-SCNC: 31 MMOL/L (ref 22–29)
CREAT SERPL-MCNC: 0.76 MG/DL (ref 0.76–1.27)
CRP SERPL-MCNC: <0.3 MG/DL (ref 0–0.5)
DEPRECATED RDW RBC AUTO: 47.3 FL (ref 37–54)
EOSINOPHIL # BLD AUTO: 0.16 10*3/MM3 (ref 0–0.4)
EOSINOPHIL NFR BLD AUTO: 2.1 % (ref 0.3–6.2)
ERYTHROCYTE [DISTWIDTH] IN BLOOD BY AUTOMATED COUNT: 15.4 % (ref 12.3–15.4)
ERYTHROCYTE [SEDIMENTATION RATE] IN BLOOD: 19 MM/HR (ref 0–20)
GFR SERPL CREATININE-BSD FRML MDRD: 100 ML/MIN/1.73
GLOBULIN UR ELPH-MCNC: 3.2 GM/DL
GLUCOSE SERPL-MCNC: 155 MG/DL (ref 65–99)
HCT VFR BLD AUTO: 49.5 % (ref 37.5–51)
HGB BLD-MCNC: 16.1 G/DL (ref 13–17.7)
IMM GRANULOCYTES # BLD AUTO: 0.04 10*3/MM3 (ref 0–0.05)
IMM GRANULOCYTES NFR BLD AUTO: 0.5 % (ref 0–0.5)
LYMPHOCYTES # BLD AUTO: 2.36 10*3/MM3 (ref 0.7–3.1)
LYMPHOCYTES NFR BLD AUTO: 30.7 % (ref 19.6–45.3)
MCH RBC QN AUTO: 27.6 PG (ref 26.6–33)
MCHC RBC AUTO-ENTMCNC: 32.5 G/DL (ref 31.5–35.7)
MCV RBC AUTO: 84.9 FL (ref 79–97)
MONOCYTES # BLD AUTO: 0.79 10*3/MM3 (ref 0.1–0.9)
MONOCYTES NFR BLD AUTO: 10.3 % (ref 5–12)
NEUTROPHILS NFR BLD AUTO: 4.26 10*3/MM3 (ref 1.7–7)
NEUTROPHILS NFR BLD AUTO: 55.4 % (ref 42.7–76)
NRBC BLD AUTO-RTO: 0 /100 WBC (ref 0–0.2)
PLATELET # BLD AUTO: 221 10*3/MM3 (ref 140–450)
PMV BLD AUTO: 11.1 FL (ref 6–12)
POTASSIUM SERPL-SCNC: 3.5 MMOL/L (ref 3.5–5.2)
PROT SERPL-MCNC: 7.4 G/DL (ref 6–8.5)
RBC # BLD AUTO: 5.83 10*6/MM3 (ref 4.14–5.8)
SODIUM SERPL-SCNC: 141 MMOL/L (ref 136–145)
WBC # BLD AUTO: 7.69 10*3/MM3 (ref 3.4–10.8)

## 2021-03-16 PROCEDURE — 80053 COMPREHEN METABOLIC PANEL: CPT

## 2021-03-16 PROCEDURE — 85652 RBC SED RATE AUTOMATED: CPT

## 2021-03-16 PROCEDURE — 86140 C-REACTIVE PROTEIN: CPT

## 2021-03-16 PROCEDURE — 85025 COMPLETE CBC W/AUTO DIFF WBC: CPT

## 2021-03-16 PROCEDURE — 36415 COLL VENOUS BLD VENIPUNCTURE: CPT

## 2021-03-17 RX ORDER — APIXABAN 5 MG/1
TABLET, FILM COATED ORAL
Qty: 180 TABLET | Refills: 3 | Status: SHIPPED | OUTPATIENT
Start: 2021-03-17 | End: 2022-01-31

## 2021-03-24 ENCOUNTER — OFFICE VISIT (OUTPATIENT)
Dept: PULMONOLOGY | Facility: CLINIC | Age: 76
End: 2021-03-24

## 2021-03-24 VITALS
HEIGHT: 73 IN | SYSTOLIC BLOOD PRESSURE: 132 MMHG | BODY MASS INDEX: 41.75 KG/M2 | HEART RATE: 64 BPM | OXYGEN SATURATION: 91 % | DIASTOLIC BLOOD PRESSURE: 80 MMHG | WEIGHT: 315 LBS | TEMPERATURE: 97.3 F

## 2021-03-24 DIAGNOSIS — J30.89 SEASONAL AND PERENNIAL ALLERGIC RHINITIS: ICD-10-CM

## 2021-03-24 DIAGNOSIS — E66.01 OBESITY, CLASS III, BMI 40-49.9 (MORBID OBESITY) (HCC): ICD-10-CM

## 2021-03-24 DIAGNOSIS — J45.909 IDIOPATHIC ASTHMA: Primary | ICD-10-CM

## 2021-03-24 DIAGNOSIS — J30.2 SEASONAL AND PERENNIAL ALLERGIC RHINITIS: ICD-10-CM

## 2021-03-24 DIAGNOSIS — G47.33 OBSTRUCTIVE SLEEP APNEA, ADULT: ICD-10-CM

## 2021-03-24 PROCEDURE — 99214 OFFICE O/P EST MOD 30 MIN: CPT | Performed by: INTERNAL MEDICINE

## 2021-03-24 NOTE — PROGRESS NOTES
PULMONARY  NOTE    Chief Complaint     Chronic persistent asthma, perennial rhinitis, RUY, class III obesity    History of Present Illness     75-year-old male returns today for follow-up.  I last saw him on 9/25/2020    He has chronic persistent asthma.  Current medical regimen includes Breo, Incruse, albuterol on an as-needed basis.  He has been on Dupixent since last fall.  Overall he feels that his breathing is doing much better on this medical regimen.  He has had no acute exacerbation of asthma since I last saw him    He has had a chronic sinus drainage and postnasal drip.  He remains on Singulair and Allegra.  The symptoms appear better, as well.    He has a history of obstructive sleep apnea, previously followed by Dr. Lopez in the sleep clinic.  He has been trialed on CPAP and BiPAP but has not been able to tolerate either.  Is currently not using anything at night    Since I last saw him he fell and traumatized his right knee that required outpatient antibiotic therapy for cellulitis.  He is apparently significantly improved from that injury.    Patient Active Problem List   Diagnosis   • Hypertension   • Disorder of calcium metabolism   • Disorder of endocrine testis   • Cellulitis of lower extremity   • Paroxysmal atrial fibrillation (CMS/HCC)   • Snoring   • Obstructive sleep apnea - Intolerant CPAP/BiPAP   • Pneumonia of right middle lobe (Resolved)   • Chronic persistent asthma   • Non-smoker   • Obesity, Class III, BMI 40-49.9 (morbid obesity) (CMS/HCC)   • Dyslipidemia   • Perennial rhinitis   • Vitamin D deficiency     No Known Allergies    Current Outpatient Medications:   •  albuterol (PROVENTIL) (2.5 MG/3ML) 0.083% nebulizer solution, Take 2.5 mg by nebulization Every 4 (Four) Hours As Needed for Wheezing., Disp: 25 vial, Rfl: 12  •  albuterol sulfate  (90 Base) MCG/ACT inhaler, Inhale 2 puffs Every 4 (Four) Hours As Needed for Wheezing., Disp: 1 inhaler, Rfl: 0  •  amLODIPine (NORVASC)  10 MG tablet, Take 1 tablet by mouth Daily., Disp: 90 tablet, Rfl: 3  •  Dupilumab (Dupixent) 300 MG/2ML solution prefilled syringe, Inject 300 mg under the skin into the appropriate area as directed Every 14 (Fourteen) Days., Disp: 2 syringe, Rfl: 11  •  Eliquis 5 MG tablet tablet, TAKE 1 TABLET BY MOUTH  TWICE DAILY, Disp: 180 tablet, Rfl: 3  •  EPINEPHrine (EPIPEN) 0.3 MG/0.3ML solution auto-injector injection, , Disp: , Rfl:   •  fexofenadine (ALLEGRA ALLERGY) 180 MG tablet, Take 1 tablet by mouth Daily., Disp: 30 tablet, Rfl: 2  •  Fluticasone Furoate-Vilanterol (BREO ELLIPTA) 200-25 MCG/INH inhaler, Inhale 1 puff Daily. 1 inhalation once a day, Disp: 180 each, Rfl: 3  •  hydroCHLOROthiazide (HYDRODIURIL) 50 MG tablet, Take 1 tablet by mouth Daily., Disp: 90 tablet, Rfl: 3  •  montelukast (SINGULAIR) 10 MG tablet, TAKE 1 TABLET BY MOUTH  DAILY, Disp: 90 tablet, Rfl: 3  •  Multiple Vitamins-Minerals (VITEYES AREDS FORMULA PO), Take 1 tablet by mouth 2 (Two) Times a Day., Disp: , Rfl:   •  sotalol (BETAPACE AF) 80 MG tablet tablet, TAKE 1 TABLET BY MOUTH  TWICE DAILY, Disp: 180 tablet, Rfl: 3  •  Umeclidinium Bromide (INCRUSE ELLIPTA) 62.5 MCG/INH aerosol powder , Inhale 1 puff Daily. 1 inhalation once a day, Disp: 90 each, Rfl: 3  •  vitamin D (ERGOCALCIFEROL) 1.25 MG (69374 UT) capsule capsule, Take 1 capsule by mouth 1 (One) Time Per Week., Disp: 12 capsule, Rfl: 0    Current Facility-Administered Medications:   •  aspirin chewable tablet 324 mg, 324 mg, Oral, Once, Arpita Mcdowell APRN  MEDICATION LIST AND ALLERGIES REVIEWED.    Family History   Problem Relation Age of Onset   • Cancer Mother         colon   • Diabetes Mother    • Alzheimer's disease Father    • No Known Problems Maternal Grandmother    • No Known Problems Maternal Grandfather    • No Known Problems Paternal Grandmother    • No Known Problems Paternal Grandfather      Social History     Tobacco Use   • Smoking status: Never Smoker   •  "Smokeless tobacco: Former User     Types: Chew   • Tobacco comment: states been over a month since use   Vaping Use   • Vaping Use: Never used   Substance Use Topics   • Alcohol use: Yes     Alcohol/week: 0.0 - 2.0 standard drinks     Comment: 1-2 month    • Drug use: No     Social History     Social History Narrative    caffeine use average I or 2 servings weekly        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    Review of Systems  ALSO REFER TO SCANNED ROS SHEET FROM SAME DATE.    /80   Pulse 64   Temp 97.3 °F (36.3 °C)   Ht 185.4 cm (73\")   Wt (!) 153 kg (337 lb 3.2 oz)   SpO2 91% Comment: resting at room air  BMI 44.49 kg/m²   Physical Exam  Vitals and nursing note reviewed.   Constitutional:       General: He is not in acute distress.     Appearance: He is well-developed. He is not diaphoretic.   HENT:      Head: Normocephalic and atraumatic.   Neck:      Thyroid: No thyromegaly.   Cardiovascular:      Rate and Rhythm: Normal rate and regular rhythm.      Heart sounds: Normal heart sounds. No murmur heard.     Pulmonary:      Effort: Pulmonary effort is normal.      Breath sounds: Normal breath sounds. No stridor.   Abdominal:      General: Bowel sounds are normal.      Palpations: Abdomen is soft.   Musculoskeletal:         General: Normal range of motion.      Comments: Trace bilateral ankle edema   Lymphadenopathy:      Cervical: No cervical adenopathy.      Upper Body:      Right upper body: No supraclavicular or epitrochlear adenopathy.      Left upper body: No supraclavicular or epitrochlear adenopathy.   Skin:     General: Skin is warm and dry.   Neurological:      Mental Status: He is alert and oriented to person, place, and time.   Psychiatric:         Behavior: Behavior normal.         Results     Immunization History   Administered Date(s) Administered   • COVID-19 " (MODERNA) 01/27/2021, 02/24/2021   • Flu Vaccine Intradermal Quad 18-64YR 10/01/2014   • Flu Vaccine Quad PF >18YRS 10/01/2017, 10/01/2018, 10/16/2019   • Flu Vaccine Quad PF >36MO 10/01/2017   • Flu Vaccine Split Quad 10/01/2017   • Fluad Quad 65+ 10/01/2020   • Fluzone High Dose =>65 Years ([facility] ONLY) 10/29/2015, 11/09/2016, 10/04/2017, 09/28/2018, 10/19/2019   • Hepatitis A 11/20/2018, 07/19/2019   • Influenza Quad Vaccine (Inpatient) 10/01/2012   • Pneumococcal Conjugate 13-Valent (PCV13) 01/15/2015   • Pneumococcal Polysaccharide (PPSV23) 01/31/2019   • Shingrix 09/13/2018, 11/20/2018     Problem List       ICD-10-CM ICD-9-CM   1. Chronic persistent asthma  J45.909 493.90   2. Obesity, Class III, BMI 40-49.9 (morbid obesity) (CMS/[facility])  E66.01 278.01   3. Perennial rhinitis  J30.89 477.9    J30.2    4. Obstructive sleep apnea - Intolerant CPAP/BiPAP  G47.33 327.23       Discussion     He appears stable from an asthma perspective.  Remains on Breo, Incruse, and Dupixent.  He feels that this medical regimen has helped his breathing significantly.    He remains intolerant of CPAP or BiPAP for his RUY.  I have encouraged him to follow-up with Dr. Lopez in the sleep clinic.  We discussed potential side effects of untreated sleep apnea.    He is can remain on Singulair with an antihistamine for his periodic sinus congestion, drainage, and postnasal drip    I have encouraged efforts at regular exercise and weight loss    I will plan to see him back in 6 months    Moderate level of Medical Decision Making complexity based on 2 or more chronic stable illnesses and prescription drug management.    Nicolas Bradley MD  Note electronically signed    CC: Maggie Schwartz, DNP, APRN

## 2021-06-07 ENCOUNTER — TELEPHONE (OUTPATIENT)
Dept: PULMONOLOGY | Facility: CLINIC | Age: 76
End: 2021-06-07

## 2021-06-07 DIAGNOSIS — J45.50 SEVERE PERSISTENT ASTHMA WITHOUT COMPLICATION: Primary | ICD-10-CM

## 2021-06-07 NOTE — TELEPHONE ENCOUNTER
Received fax from [facility] ([facility]) requesting refill for Dupixent. Spoke with patient to verify this is correct [facility]. Refill sent.     Dupixent 300 mg PFS every 14 days, #2, 11 refills.

## 2021-07-19 NOTE — PROGRESS NOTES
"    Subjective:     Encounter Date:07/22/2021      Patient ID: Luis Perez is a 75 y.o.  white male from [place], [place], retired from [employer].     REFERRING PROVIDER: ANGELIA Rios  FORMER HEALTHCARE PROVIDER:ANGELIA Matias  CURRENT HEALTHCARE PROVIDER: Maggie Schwartz DNP, APRN  SLEEP PHYSICIAN: Nicolas Lopez MD, [facility]  GASTROENTEROLOGIST: Tay Torres MD  INFECTIOUS DISEASE: Eriberto Weiner MD  ORTHOPEDIC SURGEON:  Abhijit Ambriz MD.  PULMONOLOGIST: AHMET Bradley MD    Chief Complaint:   Chief Complaint   Patient presents with   • paf   • Hypertension     Problem List:  1. Persistent atrial fibrillation:  a. CHADsVasc 2, anticoagulated with Eliquis  b. Echocardiogram, 7/1/2019: LVEF 0.65, mild to moderate TR, RVSP 35 mmHg, right atrial mass is present measuring 2.6 x 1.6 cm; differential includes thrombus versus atypical right atrial myxoma versus atrial intraseptal tumor with clinical correlation and consideration of transesophageal echocardiogram recommended, LA moderately dilated, normal RV wall thickness, systolic function and motion noted with RV cavity mildly dilated, aortic valve exhibits mild sclerosis, mild pulmonary hypertension, no pericardial effusion  c. PIETER, 7/8/2019: Mild MR, LVEF 0.60, LV wall thickness consistent with moderate concentric hypertrophy, LA mild to moderately dilated, 3D echo LVEF was 0.51, normal RV cavity size, wall thickness, systolic function septal motion noted, marked lipomatous hypertrophy of the interatrial septum present.  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 Initiation of sotalol, 7/13/2019, with persistent atrial fibrillation on EKG, 7/26/2019  e. Successful external cardioversion, 08/05/2019, with frequent " atrial ectopy on EKG, 08/09/2019, now in normal sinus rhythm on EKG, 10/16/2019  f. Normal sinus rhythm on ECG in office with acceptable QTC on sotalol therapy, May 2020, December 2020, July 2021  2. 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 Mild dyspnea on exertion  a. Remote stress test 30-40 years ago; negative per patient-data deficit, patient reports that this was performed for a baseline and not because of any symptoms  b. PFTs 2/25/2020: Moderate airway obstruction, improvement in FEV1 with bronchodilator, no restriction, air-trapping present, no diffusion  c. CCS class 0 chest discomfort/NYHA class I-II PALM  4. At risk for sleep apnea with sleep consult, 8/21/2019, with polysomnogram January 2020 that showed mild obstructive sleep apnea and nocturnal hypoxemia with initiation of CPAP therapy  5. Chronic lower extremity edema  6. Hypertension  7. Dyslipidemia; ASCVD 10 year risk is 45.8%, 17.4% if treated, initiate rosuvastatin 10 mg nightly May 2020 with consideration of vascepa 2g bid after repeat FLP, initiation of Vascepa July 2021  8. Type 2 diabetes mellitus;HgbA1C 6.39% October 2019; 5.7%, August 2020  9. Chronic persistent asthma  10. History of lower extremity cellulitis with MRSA  11.History of melanoma  12. Morbid obesity: BMI 44.08  13. Mechanical fall with subsequent development on cellulitis with IV antibiotic administration, October 2020  14. Surgical history  a. Vasectomy  b. Tonsillectomy  c. Deviated septum surgery  d. Multiple skin cancer excisions  e. Scheduled for partial right knee replacement, November 2019    No Known Allergies      Current Outpatient Medications:   •  albuterol (PROVENTIL) (2.5 MG/3ML) 0.083% nebulizer solution, Take 2.5 mg by nebulization Every 4 (Four) Hours As Needed for Wheezing., Disp: 25 vial, Rfl: 12  •  albuterol sulfate  (90 Base) MCG/ACT  inhaler, Inhale 2 puffs Every 4 (Four) Hours As Needed for Wheezing., Disp: 1 inhaler, Rfl: 0  •  amLODIPine (NORVASC) 10 MG tablet, Take 1 tablet by mouth Daily., Disp: 90 tablet, Rfl: 3  •  Dupilumab 300 MG/2ML solution prefilled syringe, Inject 300 mg under the skin into the appropriate area as directed Every 14 (Fourteen) Days., Disp: 2 mL, Rfl: 11  •  Eliquis 5 MG tablet tablet, TAKE 1 TABLET BY MOUTH  TWICE DAILY, Disp: 180 tablet, Rfl: 3  •  EPINEPHrine (EPIPEN) 0.3 MG/0.3ML solution auto-injector injection, , Disp: , Rfl:   •  fexofenadine (ALLEGRA ALLERGY) 180 MG tablet, Take 1 tablet by mouth Daily., Disp: 30 tablet, Rfl: 2  •  finasteride (PROSCAR) 5 MG tablet, , Disp: , Rfl:   •  Fluticasone Furoate-Vilanterol (Breo Ellipta) 200-25 MCG/INH inhaler, Inhale 1 puff Daily., Disp: 180 each, Rfl: 3  •  hydroCHLOROthiazide (HYDRODIURIL) 50 MG tablet, Take 1 tablet by mouth Daily., Disp: 90 tablet, Rfl: 3  •  Incruse Ellipta 62.5 MCG/INH aerosol powder , USE 1 INHALATION BY MOUTH  DAILY, Disp: 90 each, Rfl: 3  •  montelukast (SINGULAIR) 10 MG tablet, TAKE 1 TABLET BY MOUTH  DAILY, Disp: 90 tablet, Rfl: 3  •  Multiple Vitamins-Minerals (VITEYES AREDS FORMULA PO), Take 1 tablet by mouth 2 (Two) Times a Day., Disp: , Rfl:   •  sotalol (BETAPACE AF) 80 MG tablet tablet, TAKE 1 TABLET BY MOUTH  TWICE DAILY, Disp: 180 tablet, Rfl: 3  •  tamsulosin (FLOMAX) 0.4 MG capsule 24 hr capsule, , Disp: , Rfl:   •  vitamin D (ERGOCALCIFEROL) 1.25 MG (43138 UT) capsule capsule, Take 1 capsule by mouth 1 (One) Time Per Week., Disp: 12 capsule, Rfl: 0    Current Facility-Administered Medications:   •  aspirin chewable tablet 324 mg, 324 mg, Oral, Once, Arpita Mcdowell, APRN    HISTORY OF PRESENT ILLNESS:  The patient is here for 7-month follow-up.  He has had both of his Moderna Covid vaccinations.  In fall 2020, the patient had a mechanical fall and developed cellulitis in his right lower extremity.  He was supposed to have  "a right knee replacement prior to his fall but he deferred this and instead is getting knee injections.  He denies any dizziness prior to his fall.  He is on limited activity due to knee pain and admits to gaining weight during the quarantine.  He denies any chest pain, shortness of breath, palpitations, dizziness, presyncope, or syncope.  He does not often check his blood pressure at home.  He wears compression stockings daily.  He is supposed to see his primary care physician in a few weeks for routine laboratory testing.  The patient follows up with Dr. Weiner in infectious disease.      Review of Systems   Cardiovascular: Positive for leg swelling.   Skin:        Cellulitis right lower extremity October 2020      All other systems reviewed and otherwise negative.      ECG 12 Lead    Date/Time: 7/22/2021 2:50 PM  Performed by: Jaja Mansfield APRN  Authorized by: Jaja Mansfield APRN   Rhythm comments: Sinus bradycardia with PACs, left axis deviation, abnormal ECG, 59 bpm,  ms, QRS 94 ms,  ms, no significant changes from last EKG in December 2020                 Objective:       Vitals:    07/22/21 1402 07/22/21 1424   BP: 142/83 144/79   BP Location: Right arm Right arm   Patient Position: Sitting Standing   Pulse: 57 63   SpO2: 93%    Weight: (!) 152 kg (334 lb)    Height: 185.4 cm (72.99\")    Recheck blood pressure right arm sitting was 140/78  Body mass index is 44.08 kg/m².  Last weight December 2020 was 329 pounds  Constitutional:       Appearance: Healthy appearance. Not in distress.   Neck:      Vascular: No JVR. JVD normal.   Pulmonary:      Effort: Pulmonary effort is normal.      Breath sounds: Decreased breath sounds present. No wheezing. No rhonchi. No rales.   Chest:      Chest wall: Not tender to palpatation.   Cardiovascular:      PMI at left midclavicular line. Normal rate. Regular rhythm. Normal S1. Normal S2.      Murmurs: There is a grade 1/6 mid frequency harsh early " systolic murmur at the URSB.      No gallop. No click. No rub.      Comments: Compression stockings in place  Pulses:     Intact distal pulses.   Edema:     Pretibial: bilateral 1+ edema of the pretibial area.     Ankle: bilateral 1+ edema of the ankle.     Feet: bilateral 1+ edema of the feet.  Abdominal:      General: Bowel sounds are normal.      Palpations: Abdomen is soft.      Tenderness: There is no abdominal tenderness.   Musculoskeletal: Normal range of motion.         General: No tenderness. Skin:     General: Skin is warm and dry.   Neurological:      General: No focal deficit present.      Mental Status: Alert and oriented to person, place and time.           Lab Review:   Lab Results   Component Value Date    GLUCOSE 155 (H) 03/16/2021    BUN 16 03/16/2021    CREATININE 0.76 03/16/2021    EGFRIFNONA 100 03/16/2021    BCR 21.1 03/16/2021    CO2 31.0 (H) 03/16/2021    CALCIUM 9.5 03/16/2021    ALBUMIN 4.20 03/16/2021    AST 14 03/16/2021    ALT 11 03/16/2021       Lab Results   Component Value Date    WBC 7.69 03/16/2021    HGB 16.1 03/16/2021    HCT 49.5 03/16/2021    MCV 84.9 03/16/2021     03/16/2021       Lab Results   Component Value Date    HGBA1C 5.7 08/17/2020       Lab Results   Component Value Date    TSH 2.020 03/10/2021       Lab Results   Component Value Date    CHOL 144 03/10/2021    CHOL 122 08/19/2020     Lab Results   Component Value Date    TRIG 309 (H) 03/10/2021    TRIG 179 (H) 08/19/2020     Lab Results   Component Value Date    HDL 31 (L) 03/10/2021    HDL 35 (L) 08/19/2020     Lab Results   Component Value Date    LDL 64 03/10/2021    LDL 51 08/19/2020     Advance Care Planning   ACP discussion was held with the patient during this visit. Patient has an advance directive (not in EMR), copy requested.      Assessment:     Overall continued acceptable course with no new interim cardiopulmonary complaints with acceptable functional status on limited activity. We will defer  additional diagnostic or therapeutic intervention from a cardiac perspective at this time.  The patient had an abnormal lipid panel March 2021 so I will begin Vascepa 2 g twice daily.  He will get a repeat FLP with his primary care physician in a couple months and have the results sent to me for review.  The patient is in normal sinus rhythm with acceptable QTC on sotalol therapy on ECG in office today.       Diagnosis Plan   1. Paroxysmal atrial fibrillation (CMS/HCC)  The patient is in normal sinus rhythm with acceptable QTC on sotalol therapy on ECG in office today   2. Essential hypertension   Continue current cardiac medications   3. Dyslipidemia   Vascepa 2 g twice daily, repeat FLP in 3 months   4. Obstructive sleep apnea - Intolerant CPAP/BiPAP   Intolerant to CPAP          Plan:         1. Patient to continue current medications and close follow up with the above providers.  2. Tentative cardiology follow up in January 2022 or patient may return sooner PRN.  3. Vascepa 2g bid      Electronically signed by ANGELIA Browning, 07/22/21, 2:48 PM EDT.

## 2021-07-22 ENCOUNTER — OFFICE VISIT (OUTPATIENT)
Dept: CARDIOLOGY | Facility: CLINIC | Age: 76
End: 2021-07-22

## 2021-07-22 VITALS
DIASTOLIC BLOOD PRESSURE: 79 MMHG | BODY MASS INDEX: 41.75 KG/M2 | HEIGHT: 73 IN | SYSTOLIC BLOOD PRESSURE: 144 MMHG | WEIGHT: 315 LBS | HEART RATE: 63 BPM | OXYGEN SATURATION: 93 %

## 2021-07-22 DIAGNOSIS — I10 ESSENTIAL HYPERTENSION: ICD-10-CM

## 2021-07-22 DIAGNOSIS — E78.5 DYSLIPIDEMIA: ICD-10-CM

## 2021-07-22 DIAGNOSIS — I48.0 PAROXYSMAL ATRIAL FIBRILLATION (HCC): Primary | ICD-10-CM

## 2021-07-22 DIAGNOSIS — G47.33 OBSTRUCTIVE SLEEP APNEA, ADULT: ICD-10-CM

## 2021-07-22 PROCEDURE — 93000 ELECTROCARDIOGRAM COMPLETE: CPT | Performed by: NURSE PRACTITIONER

## 2021-07-22 PROCEDURE — 99214 OFFICE O/P EST MOD 30 MIN: CPT | Performed by: NURSE PRACTITIONER

## 2021-07-22 RX ORDER — FINASTERIDE 5 MG/1
TABLET, FILM COATED ORAL
COMMUNITY
Start: 2021-04-27

## 2021-07-22 RX ORDER — TAMSULOSIN HYDROCHLORIDE 0.4 MG/1
CAPSULE ORAL
COMMUNITY
Start: 2021-06-28

## 2021-07-22 RX ORDER — ICOSAPENT ETHYL 1000 MG/1
2 CAPSULE ORAL 2 TIMES DAILY WITH MEALS
Qty: 120 CAPSULE | Refills: 6 | Status: SHIPPED | OUTPATIENT
Start: 2021-07-22 | End: 2022-02-17 | Stop reason: SDUPTHER

## 2021-08-23 RX ORDER — MONTELUKAST SODIUM 10 MG/1
10 TABLET ORAL DAILY
Qty: 90 TABLET | Refills: 3 | Status: SHIPPED | OUTPATIENT
Start: 2021-08-23 | End: 2022-10-17

## 2021-09-30 ENCOUNTER — OFFICE VISIT (OUTPATIENT)
Dept: PULMONOLOGY | Facility: CLINIC | Age: 76
End: 2021-09-30

## 2021-09-30 VITALS
OXYGEN SATURATION: 95 % | DIASTOLIC BLOOD PRESSURE: 80 MMHG | HEART RATE: 80 BPM | WEIGHT: 315 LBS | TEMPERATURE: 98.1 F | HEIGHT: 73 IN | SYSTOLIC BLOOD PRESSURE: 138 MMHG | BODY MASS INDEX: 41.75 KG/M2

## 2021-09-30 DIAGNOSIS — G47.33 OBSTRUCTIVE SLEEP APNEA, ADULT: ICD-10-CM

## 2021-09-30 DIAGNOSIS — J45.909 IDIOPATHIC ASTHMA: Primary | ICD-10-CM

## 2021-09-30 DIAGNOSIS — Z78.9 NON-SMOKER: ICD-10-CM

## 2021-09-30 DIAGNOSIS — J30.2 SEASONAL AND PERENNIAL ALLERGIC RHINITIS: ICD-10-CM

## 2021-09-30 DIAGNOSIS — E66.01 OBESITY, CLASS III, BMI 40-49.9 (MORBID OBESITY) (HCC): ICD-10-CM

## 2021-09-30 DIAGNOSIS — J18.9 PNEUMONIA OF RIGHT MIDDLE LOBE DUE TO INFECTIOUS ORGANISM: ICD-10-CM

## 2021-09-30 DIAGNOSIS — J30.89 SEASONAL AND PERENNIAL ALLERGIC RHINITIS: ICD-10-CM

## 2021-09-30 PROCEDURE — 99214 OFFICE O/P EST MOD 30 MIN: CPT | Performed by: INTERNAL MEDICINE

## 2021-09-30 NOTE — PROGRESS NOTES
PULMONARY  NOTE    Chief Complaint     Chronic persistent asthma, perennial rhinitis, RUY, class III morbid obesity    History of Present Illness     76-year-old male returns today for follow-up  I last saw him on 3/24/2021    He has chronic persistent asthma  He had been on Breo, Incruse, and albuterol as needed  He also has do pick sent and he gives himself Dupixent injections at home  He has been tolerating these medications well without acute exacerbation.  However, he feels that he gets lightheaded after he uses the Incruse.  He is not sure why that is occurring.    He has had chronic sinus drainage and postnasal drip  He has been on Singulair and Allegra in the past    He has a history of obstructive sleep apnea but has been intolerant of CPAP or BiPAP.    Unfortunately, has not been amenable to make any progress at weight loss since I last saw him    Patient Active Problem List   Diagnosis   • Hypertension   • Disorder of calcium metabolism   • Disorder of endocrine testis   • Cellulitis of lower extremity   • Paroxysmal atrial fibrillation (CMS/HCC)   • Snoring   • Obstructive sleep apnea - Intolerant CPAP/BiPAP   • Pneumonia of right middle lobe (Resolved)   • Chronic persistent asthma   • Non-smoker   • Obesity, Class III, BMI 40-49.9 (morbid obesity) (CMS/HCC)   • Dyslipidemia   • Perennial rhinitis   • Vitamin D deficiency     No Known Allergies    Current Outpatient Medications:   •  albuterol (PROVENTIL) (2.5 MG/3ML) 0.083% nebulizer solution, Take 2.5 mg by nebulization Every 4 (Four) Hours As Needed for Wheezing., Disp: 25 vial, Rfl: 12  •  albuterol sulfate  (90 Base) MCG/ACT inhaler, Inhale 2 puffs Every 4 (Four) Hours As Needed for Wheezing., Disp: 1 inhaler, Rfl: 0  •  amLODIPine (NORVASC) 10 MG tablet, Take 1 tablet by mouth Daily., Disp: 90 tablet, Rfl: 3  •  Dupilumab 300 MG/2ML solution prefilled syringe, Inject 300 mg under the skin into the appropriate area as directed Every 14  (Fourteen) Days., Disp: 2 mL, Rfl: 11  •  Eliquis 5 MG tablet tablet, TAKE 1 TABLET BY MOUTH  TWICE DAILY, Disp: 180 tablet, Rfl: 3  •  EPINEPHrine (EPIPEN) 0.3 MG/0.3ML solution auto-injector injection, , Disp: , Rfl:   •  fexofenadine (ALLEGRA ALLERGY) 180 MG tablet, Take 1 tablet by mouth Daily., Disp: 30 tablet, Rfl: 2  •  finasteride (PROSCAR) 5 MG tablet, , Disp: , Rfl:   •  Fluticasone Furoate-Vilanterol (Breo Ellipta) 200-25 MCG/INH inhaler, Inhale 1 puff Daily., Disp: 180 each, Rfl: 3  •  hydroCHLOROthiazide (HYDRODIURIL) 50 MG tablet, Take 1 tablet by mouth Daily., Disp: 90 tablet, Rfl: 3  •  icosapent ethyl (Vascepa) 1 g capsule capsule, Take 2 g by mouth 2 (Two) Times a Day With Meals., Disp: 120 capsule, Rfl: 6  •  Incruse Ellipta 62.5 MCG/INH aerosol powder , USE 1 INHALATION BY MOUTH  DAILY, Disp: 90 each, Rfl: 3  •  montelukast (SINGULAIR) 10 MG tablet, TAKE 1 TABLET BY MOUTH  DAILY, Disp: 90 tablet, Rfl: 3  •  Multiple Vitamins-Minerals (VITEYES AREDS FORMULA PO), Take 1 tablet by mouth 2 (Two) Times a Day., Disp: , Rfl:   •  sotalol (BETAPACE AF) 80 MG tablet tablet, TAKE 1 TABLET BY MOUTH  TWICE DAILY, Disp: 180 tablet, Rfl: 3  •  tamsulosin (FLOMAX) 0.4 MG capsule 24 hr capsule, , Disp: , Rfl:     Current Facility-Administered Medications:   •  aspirin chewable tablet 324 mg, 324 mg, Oral, Once, Arpita Mcdowell, ANGELIA  MEDICATION LIST AND ALLERGIES REVIEWED.    Family History   Problem Relation Age of Onset   • Cancer Mother         colon   • Diabetes Mother    • Alzheimer's disease Father    • No Known Problems Maternal Grandmother    • No Known Problems Maternal Grandfather    • No Known Problems Paternal Grandmother    • No Known Problems Paternal Grandfather      Social History     Tobacco Use   • Smoking status: Never Smoker   • Smokeless tobacco: Former User     Types: Chew   Vaping Use   • Vaping Use: Never used   Substance Use Topics   • Alcohol use: Yes     Alcohol/week: 0.0 - 2.0  "standard drinks     Comment: 1-2 month    • Drug use: No     Social History     Social History Narrative    caffeine use average I or 2 servings weekly        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    Review of Systems  ALSO REFER TO SCANNED ROS SHEET FROM SAME DATE.    /80   Pulse 80   Temp 98.1 °F (36.7 °C)   Ht 185.4 cm (73\")   Wt (!) 152 kg (334 lb)   SpO2 95% Comment: resting, room air  BMI 44.07 kg/m²   Physical Exam  Vitals and nursing note reviewed.   Constitutional:       General: He is not in acute distress.     Appearance: He is well-developed. He is not diaphoretic.   HENT:      Head: Normocephalic and atraumatic.   Neck:      Thyroid: No thyromegaly.   Cardiovascular:      Rate and Rhythm: Normal rate and regular rhythm.      Heart sounds: Normal heart sounds. No murmur heard.     Pulmonary:      Effort: Pulmonary effort is normal.      Breath sounds: Normal breath sounds. No stridor.   Abdominal:      General: Bowel sounds are normal.      Palpations: Abdomen is soft.   Musculoskeletal:         General: Normal range of motion.   Lymphadenopathy:      Cervical: No cervical adenopathy.      Upper Body:      Right upper body: No supraclavicular or epitrochlear adenopathy.      Left upper body: No supraclavicular or epitrochlear adenopathy.   Skin:     General: Skin is warm and dry.   Neurological:      Mental Status: He is alert and oriented to person, place, and time.   Psychiatric:         Behavior: Behavior normal.         Results     Immunization History   Administered Date(s) Administered   • COVID-19 (MODERNA) 01/27/2021, 02/24/2021, 09/03/2021   • Flu Vaccine Intradermal Quad 18-64YR 10/01/2014   • Flu Vaccine Quad PF >18YRS 10/01/2017, 10/01/2018, 10/16/2019   • Flu Vaccine Quad PF >36MO 10/01/2017   • Flu Vaccine Split Quad 10/01/2017   • Fluad Quad 65+ " "10/01/2020   • Fluzone High Dose =>65 Years ([facility] ONLY) 10/29/2015, 11/09/2016, 10/04/2017, 09/28/2018, 10/19/2019   • Fluzone High-Dose 65+yrs 09/10/2021   • Hepatitis A 11/20/2018, 07/19/2019   • Influenza Quad Vaccine (Inpatient) 10/01/2012   • Pneumococcal Conjugate 13-Valent (PCV13) 01/15/2015   • Pneumococcal Polysaccharide (PPSV23) 01/31/2019   • Shingrix 09/13/2018, 11/20/2018     Problem List       ICD-10-CM ICD-9-CM   1. Chronic persistent asthma  J45.909 493.90   2. Non-smoker  Z78.9 V49.89   3. Obesity, Class III, BMI 40-49.9 (morbid obesity) (CMS/[facility])  E66.01 278.01   4. Obstructive sleep apnea - Intolerant CPAP/BiPAP  G47.33 327.23   5. Pneumonia of right middle lobe (Resolved)  J18.9 486   6. Perennial rhinitis  J30.89 477.9    J30.2        Discussion     Overall he appears to be doing well with no acute exacerbation of asthma.  Have suggested that he go off the Incruse for a period of time to see if the \"lightheadedness\" resolves.  Otherwise, he is going to remain on Breo with albuterol as needed and Dupixent.    He is can remain on Singulair and Allegra as needed for his sinus symptoms    I have encouraged efforts at regular exercise and weight loss.    Moderate level of Medical Decision Making complexity based on 2 or more chronic stable illnesses and prescription drug management.    Nicolas Bradley MD  Note electronically signed    CC: Maggie Schwartz, DNP, APRN  "

## 2021-10-13 RX ORDER — SOTALOL HYDROCHLORIDE 80 MG/1
TABLET ORAL
Qty: 180 TABLET | Refills: 3 | Status: SHIPPED | OUTPATIENT
Start: 2021-10-13 | End: 2022-12-06

## 2021-12-17 ENCOUNTER — TRANSCRIBE ORDERS (OUTPATIENT)
Dept: PHYSICAL THERAPY | Facility: HOSPITAL | Age: 76
End: 2021-12-17

## 2021-12-17 DIAGNOSIS — I89.0 LYMPHEDEMA: Primary | ICD-10-CM

## 2022-01-04 ENCOUNTER — HOSPITAL ENCOUNTER (OUTPATIENT)
Dept: PHYSICAL THERAPY | Facility: HOSPITAL | Age: 77
Setting detail: THERAPIES SERIES
Discharge: HOME | End: 2022-01-04

## 2022-01-04 DIAGNOSIS — R60.0 BILATERAL LEG EDEMA: Primary | ICD-10-CM

## 2022-01-04 PROCEDURE — 97535 SELF CARE MNGMENT TRAINING: CPT | Performed by: PHYSICAL THERAPIST

## 2022-01-04 PROCEDURE — 97162 PT EVAL MOD COMPLEX 30 MIN: CPT | Performed by: PHYSICAL THERAPIST

## 2022-01-04 NOTE — THERAPY DISCHARGE NOTE
Outpatient Physical Therapy Lymphedema Initial Evaluation & Discharge  [facility]     Patient Name: Luis Perez  : 1945  MRN: [id number]  Today's Date: 2022      Visit Date: 2022    Visit Dx:    ICD-10-CM ICD-9-CM   1. Bilateral leg edema  R60.0 782.3       Patient Active Problem List   Diagnosis   • Hypertension   • Disorder of calcium metabolism   • Disorder of endocrine testis   • Cellulitis of lower extremity   • Paroxysmal atrial fibrillation (HCC)   • Snoring   • Obstructive sleep apnea - Intolerant CPAP/BiPAP   • Pneumonia of right middle lobe (Resolved)   • Chronic persistent asthma   • Non-smoker   • Obesity, Class III, BMI 40-49.9 (morbid obesity) (HCC)   • Dyslipidemia   • Perennial rhinitis   • Vitamin D deficiency        Past Medical History:   Diagnosis Date   • Asthma    • Bronchitis, chronic (HCC)    • Cancer (HCC)     Skin    • Cellulitis and abscess of right leg    • Chronic persistent asthma 2020   • Hyperlipidemia    • Hypertension    • Nephrolithiasis    • Obesity, Class III, BMI 40-49.9 (morbid obesity) (HCC) 2020   • Paroxysmal atrial fibrillation (HCC) 7/15/2019   • Pneumonia    • Sleep apnea    • Vitamin D deficiency 2020        Past Surgical History:   Procedure Laterality Date   • CARDIOVERSION  2019   • COLONOSCOPY     • NASAL SEPTUM SURGERY      Deviation Repair   • SKIN CANCER EXCISION     • TONSILLECTOMY     • VASECTOMY          Patient History     Row Name 22 1000             History    Chief Complaint Swelling; Other 1 (comment)  -MW      Hx Chief Complaint Comment 1  Oozing wound Rt ankle/ calf  -MW      Date Current Problem(s) Began 12/15/21  -MW      Brief Description of Current Complaint Pt reports cat poked a couple holes in the Rt ankle/ calf. Didn't notice at first, but then noticed when shoe was getting wet from fluid leaking. Has been wearing compression knee highs since d/c'd from wound care 2021. He has several  "pairs but may need to get some new ones. Leg is no longer seeping but came anyway incase he needed to be doing something different with his legs.  -MW      Patient/Caregiver Goals Know what to do to help the symptoms; Decrease swelling  -MW      Patient's Rating of General Health Fair  -MW      Hand Dominance right-handed  -MW              Pain     Pain Comments denies leg pain  -MW              Daily Activities    Primary Language English  -MW      Teaching needs identified Management of Condition  -MW      Barriers to learning None  -MW      Pt Participated in POC and Goals Yes  -MW              Safety    Are you being hurt, hit, or frightened by anyone at home or in your life? No  -MW      Are you being neglected by a caregiver No  -MW      Have you had any of the following issues with N/A  -MW            User Key  (r) = Recorded By, (t) = Taken By, (c) = Cosigned By    Initials Name Provider Type    Jennifer Cast, PT Physical Therapist                 Lymphedema     Row Name 01/04/22 1000             Subjective Pain    Able to rate subjective pain? yes  -MW      Pre-Treatment Pain Level 5  -MW      Post-Treatment Pain Level 5  -MW      Subjective Pain Comment chronic knee pain due to arthritis  -MW              Subjective Comments    Subjective Comments Cat scratch has healed but wanted to get legs checked any way  -MW              Physical Concerns    The amount of pain associated with my lymphedema is: 3  -MW      The amount of limb heaviness associated with my lymphedema is: 3  -MW      The amount of skin tightness associated with my lymphedema is: 4  -MW      The size of my swollen limb(s) seems: 3  -MW      Lymphedema affects the movement of my swollen limb(s): 3  -MW      The strength in my swollen limb(s) is: 2  -MW              Psychosocial Concerns    Lymphedema affects my body image (i.e., \"how I think I look\"). 3  -MW      Lymphedema affects my socializing with others. 2  -MW      Lymphedema " "affects my intimate relations with spouse or partner (rate 0 if not applicable 0  -MW      Lymphedema \"gets me down\" (i.e., depression, frustration, or anger) 1  -MW      I must rely on others for help due to my lymphedema. 2  -MW      I know what to do to manage my lymphedema 1  -MW              Functional Concerns    Lymphedema affects my ability to perform self-care activities (i.e. eating, dressing, hygiene) 2  -MW      Lymphedema affects my ability to perform routine home or work-related activities. 3  -MW      Lymphedema affects my performance of preferred leisure activities. 4  -MW      Lymphedema affects proper fit of clothing/shoes 2  -MW      Lymphedema affects my sleep 1  -MW              General ROM    GENERAL ROM COMMENTS Bilat ankles WFL, knees WFL but painful  -MW              Lymphedema Edema Assessment    Ptting Edema Category By severity  -MW      Pitting Edema Mild  -MW      Stemmer Sign bilateral:; positive  -MW      [place] Hump bilateral:; negative  -MW              Skin Changes/Observations    Location/Assessment Lower Extremity  -MW      Lower Extremity Conditions bilateral:; intact; clean; dry; fragile; scab(s); right:; crust  -MW      Lower Extremity Color/Pigment bilateral:; hyperpigmented; right:; erythema  Rt faint erythema distally  -MW      Lower Extremity Skin Details Scab posterior Rt calf, no puss. Medial Rt ankle crust (? ecczyma vs psoriasis like)  -MW              Lymphedema Sensation    Lymphedema Sensation Comments grossly WFL  -MW              Lymphedema Pulses/Capillary Refill    Lymphedema Pulses/Capillary Refill lower extremity pulses; capillary refill  -MW      Dorsalis Pedis Pulse right:; left:; 0 absent  unable to palpate  -MW      Posterior Tibialis Pulse right:; left:; +1 diminished  -MW      Capillary Refill lower extremity capillary refill  -MW      Lower Extremity Capillary Refill right:; left:; less than 3 seconds  -MW      Lymph Pulses Capillary Refill Comments " Feet warm  -MW              Lymphedema Measurements    Measurement Type(s) Quick Girth  -MW      Quick Girth Areas Lower extremities  -MW              LLE Quick Girth (cm)    Smallest ankle 28.5 cm  -MW      Largest calf 45 cm  -MW      Tib tuberosity 46 cm  -MW      Other 1 31 cm  10 cm prox to ankle  -MW              RLE Quick Girth (cm)    Smallest ankle 28.5 cm  -MW      Largest calf 45.1 cm  -MW      Tib tuberosity 46.2 cm  -MW      Other 1 32.3 cm  10 cm prox to ankle  -MW      RLE Quick Girth Total 152.1  -MW              Compression/Skin Care    Compression/Skin Care skin care; compression garment  -MW      Skin Care washed/dried; moisturizing lotion applied  -MW      Compression Garment Comments Doffed and redonned Juzo knee high 20-30 mmHg stockings  -MW              Lymphedema Life Impact Scale Totals    A.  Total Q1 - Q17 (Do not include Q18) 39  -MW      B.  Total number of questions answered (Q1-Q17) 17  -MW      C. Divide A by B 2.29  -MW      D. Multiple C by 25 57.25  -MW            User Key  (r) = Recorded By, (t) = Taken By, (c) = Cosigned By    Initials Name Provider Type    MW Jennifer Lemus, PT Physical Therapist                                   Therapy Education  Education Details: Discussed compression sock use & care, timely replacement, proper positioning (had been pulling up too high). use of grippy gloves to aid in fabric distribution to improve garment stability (minimize slipping down). Confirmed pt is in appropriate size of Juzo knee highs IV. Reinforced daily moisturizing lotion to minimize skin cracks to decrease risk of cellulitis. Offered option for home compression pump to aid in lymphedema management, but pt feels he is managing well enough now using compression daily, elevating in evenenings. Main limiting factor is knee arthritis limits his exercise options and therefore makes loosing weight difficult. Encouraged trial of recumbant bike as this angle may be less stressful  on knees.  Given: Edema management, Symptoms/condition management  Program: Reinforced, Progressed  How Provided: Verbal, Demonstration  Provided to: Patient  Level of Understanding: Verbalized  71853 - PT Self Care/Mgmt Minutes: 12     OP Exercises     Row Name 01/04/22 1000             Subjective Comments    Subjective Comments Cat scratch has healed but wanted to get legs checked any way  -MW              Subjective Pain    Able to rate subjective pain? yes  -MW      Pre-Treatment Pain Level 5  -MW      Post-Treatment Pain Level 5  -MW      Subjective Pain Comment chronic knee pain due to arthritis  -MW            User Key  (r) = Recorded By, (t) = Taken By, (c) = Cosigned By    Initials Name Provider Type    Jennifer Cast, PT Physical Therapist                               PT OP Goals     Row Name 01/04/22 1000          PT Short Term Goals    STG 1 Pt indepedent with compression stockings to manage lower extremity swelling, including timely replacement.  -MW     STG 1 Progress New; Met  -MW           User Key  (r) = Recorded By, (t) = Taken By, (c) = Cosigned By    Initials Name Provider Type    Jennifer Cast, PT Physical Therapist                 PT Assessment/Plan     Row Name 01/04/22 1000          PT Assessment    Functional Limitations Limitation in home management  -MW     Impairments Integumentary integrity; Impaired lymphatic circulation; Impaired venous circulation; Pain  -MW     Assessment Comments Pt presents with resolved skin tear/ puncture at RLE from a cat. No s/s of cellulitis or infection. Pt with skin changes related to chronic lower extremity swelling, including positive Stemmer sign, spider veins and thin skin with multiple scabs / scars from past traumas. BLE circumferential measurements are fairly symmetrical as well as consistent with previous measurements. He has mild edema in spite of compression knee highs in place as these are worn; pt is wearing appropriate size and  style of knee high. He was educated in proper donning, positioning of stockings to minimize risk of impeading flow due to garment compressing in popliteal space. Also encouraged use of grippy gloves to aid in fabric distribution for optimum support. Pt able to manage his BLE swelling with compression knee highs. Main limitations in function are related to knee arthritis, asthma and obesity.  No follow up needed at this time for lymphedema care; one time visit for eval and education.  -MW     Please refer to paper survey for additional self-reported information Yes  -MW            PT Plan    PT Frequency One time visit  -MW           User Key  (r) = Recorded By, (t) = Taken By, (c) = Cosigned By    Initials Name Provider Type    Jennifer Cast, PT Physical Therapist                 Outcome Measure Options: Lower Extremity Functional Scale (LEFS)  Lower Extremity Functional Index  Any of your usual work, housework or school activities: Moderate difficulty  Your usual hobbies, recreational or sporting activities: Quite a bit of difficulty  Getting into or out of the bath: A little bit of difficulty  Walking between rooms: No difficulty  Putting on your shoes or socks: A little bit of difficulty  Squatting: Quite a bit of difficulty  Lifting an object, like a bag of groceries from the floor: No difficulty  Performing light activities around your home: No difficulty  Performing heavy activities around your home: Quite a bit of difficulty  Getting into or out of a car: Moderate difficulty  Walking 2 blocks: Moderate difficulty  Walking a mile: Quite a bit of difficulty  Going up or down 10 stairs (about 1 flight of stairs): Extreme difficulty or unable to perform activity  Standing for 1 hour: Quite a bit of difficulty  Sitting for 1 hour: Moderate difficulty  Running on even ground: Extreme difficulty or unable to perform activity  Running on uneven ground: Extreme difficulty or unable to perform activity  Making  sharp turns while running fast: Extreme difficulty or unable to perform activity  Hopping: Extreme difficulty or unable to perform activity  Rolling over in bed: No difficulty  Total: 35      Time Calculation:   Start Time: 1000  Total Timed Code Minutes- PT: 12 minute(s)  Timed Charges  78776 - PT Self Care/Mgmt Minutes: 12  Untimed Charges  PT Eval/Re-eval Minutes: 48  Total Minutes  Timed Charges Total Minutes: 12  Untimed Charges Total Minutes: 48   Total Minutes: 60     Therapy Charges for Today     Code Description Service Date Service Provider Modifiers Qty    02638642743 HC PT SELF CARE/MGMT/TRAIN EA 15 MIN 1/4/2022 Jennifer Lemus, PT GP 1    97065141180 HC PT EVAL MOD COMPLEXITY 3 1/4/2022 Jennifer Lemus, PT GP 1          PT G-Codes  Outcome Measure Options: Lower Extremity Functional Scale (LEFS)  Total: 35     OP PT Discharge Summary  Date of Discharge: 01/04/22  Reason for Discharge: Independent  Outcomes Achieved: Able to achieve all goals within established timeline  Discharge Destination: Home with home program  Discharge Instructions/Additional Comments: Continue daily compression stocking use, replace at least every 6 months or sooner if worn. Elevate LE daily. Moisturize skin daily or BID.      Jennifer Lemus, PT  1/4/2022

## 2022-01-31 RX ORDER — APIXABAN 5 MG/1
TABLET, FILM COATED ORAL
Qty: 180 TABLET | Refills: 3 | Status: SHIPPED | OUTPATIENT
Start: 2022-01-31 | End: 2023-01-09

## 2022-02-01 ENCOUNTER — TRANSCRIBE ORDERS (OUTPATIENT)
Dept: ADMINISTRATIVE | Facility: HOSPITAL | Age: 77
End: 2022-02-01

## 2022-02-01 DIAGNOSIS — L03.116 CELLULITIS OF LEFT LEG: Primary | ICD-10-CM

## 2022-02-07 NOTE — PROGRESS NOTES
"    Subjective:     Encounter Date:02/10/2022      Patient ID: Luis Perez Jr. is a 76 y.o.   white male from [place], [place], retired from [employer].     REFERRING PROVIDER: ANGELIA Rios  FORMER HEALTHCARE PROVIDER:ANGELIA Matias  CURRENT HEALTHCARE PROVIDER: Maggie Schwartz DNP, APRN  SLEEP PHYSICIAN: Nicolas Lopez MD, [facility]  GASTROENTEROLOGIST: Tay Torres MD  INFECTIOUS DISEASE: Eriberto Weiner MD  ORTHOPEDIC SURGEON:  Abhijit Ambriz MD.  PULMONOLOGIST: AHMET Bradley MD  UROLOGIST: Tyrel Wolf MD     Chief Complaint:   Chief Complaint   Patient presents with   • Paroxysmal atrial fibrillation   • Essential hypertension     Problem List:  1. Persistent atrial fibrillation:  a. CHADsVasc 2, anticoagulated with Eliquis  b. Echocardiogram, 7/1/2019: LVEF 0.65, mild to moderate TR, RVSP 35 mmHg, right atrial mass is present measuring 2.6 x 1.6 cm; differential includes thrombus versus atypical right atrial myxoma versus atrial intraseptal tumor with clinical correlation and consideration of transesophageal echocardiogram recommended, LA moderately dilated, normal RV wall thickness, systolic function and motion noted with RV cavity mildly dilated, aortic valve exhibits mild sclerosis, mild pulmonary hypertension, no pericardial effusion  c. PIETER, 7/8/2019: Mild MR, LVEF 0.60, LV wall thickness consistent with moderate concentric hypertrophy, LA mild to moderately dilated, 3D echo LVEF was 0.51, normal RV cavity size, wall thickness, systolic function septal motion noted, marked lipomatous hypertrophy of the interatrial septum present.  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 Initiation of sotalol, 7/13/2019, with persistent atrial fibrillation on " EKG, 7/26/2019  e. Successful external cardioversion, 08/05/2019, with frequent atrial ectopy on EKG, 08/09/2019, now in normal sinus rhythm on EKG, 10/16/2019  f. Normal sinus rhythm on ECG in office with acceptable QTC on sotalol therapy, May 2020, December 2020, July 2021, February 2022  2. 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 Mild dyspnea on exertion/chronic persistent asthma  a. Remote stress test 30-40 years ago; negative per patient-data deficit, patient reports that this was performed for a baseline and not because of any symptoms  b. PFTs 2/25/2020: Moderate airway obstruction, improvement in FEV1 with bronchodilator, no restriction, air-trapping present, no diffusion  c. CCS class 0 chest discomfort/NYHA class I-II PALM  4. At risk for sleep apnea with sleep consult, 8/21/2019, with polysomnogram January 2020 that showed mild obstructive sleep apnea and nocturnal hypoxemia with initiation of CPAP therapy, intolerant CPAP  5. Chronic lower extremity edema  6. Hypertension  7. Dyslipidemia; ASCVD 10 year risk is 45.8%, 17.4% if treated, initiate rosuvastatin 10 mg nightly May 2020 with consideration of vascepa 2g bid after repeat FLP, initiation of Vascepa July 2021  8. Type 2 diabetes mellitus;HgbA1C 6.39% October 2019; 5.7%, August 2020  9. Chronic persistent asthma  10. History of lower extremity cellulitis with MRSA  11.History of melanoma  12. Morbid obesity: BMI 44.08  13. Mechanical fall with subsequent development on cellulitis with IV antibiotic administration, October 2020  14. BPH  15.  Mechanical fall with severe bruising/mild lacerations to his left knee, right wrist, forehead with negative work-up at [facility] January 2022-data deficit  16. Surgical history  a. Vasectomy  b. Tonsillectomy  c. Deviated septum surgery  d. Multiple skin cancer  excisions  e. Scheduled for partial right knee replacement, November 2019    No Known Allergies    Current Outpatient Medications   Medication Instructions   • albuterol (PROVENTIL) 2.5 mg, Nebulization, Every 4 Hours PRN   • albuterol sulfate  (90 Base) MCG/ACT inhaler 2 puffs, Inhalation, Every 4 Hours PRN   • amLODIPine (NORVASC) 10 mg, Oral, Daily   • ascorbic acid (VITAMIN C) 1000 MG tablet No dose, route, or frequency recorded.   • Dupilumab 300 mg, Subcutaneous, Every 14 Days   • Eliquis 5 MG tablet tablet TAKE 1 TABLET BY MOUTH  TWICE DAILY   • EPINEPHrine (EPIPEN) 0.3 MG/0.3ML solution auto-injector injection No dose, route, or frequency recorded.   • fexofenadine (ALLEGRA ALLERGY) 180 mg, Oral, Daily   • finasteride (PROSCAR) 5 MG tablet No dose, route, or frequency recorded.   • Fluticasone Furoate-Vilanterol (Breo Ellipta) 200-25 MCG/INH inhaler 1 puff, Inhalation, Daily - RT   • hydroCHLOROthiazide (HYDRODIURIL) 50 mg, Oral, Daily   • icosapent ethyl (VASCEPA) 2 g, Oral, 2 Times Daily With Meals   • montelukast (SINGULAIR) 10 mg, Oral, Daily   • Multiple Vitamins-Minerals (VITEYES AREDS FORMULA PO) 1 tablet, Oral, 2 Times Daily   • sotalol (BETAPACE AF) 80 MG tablet tablet TAKE 1 TABLET BY MOUTH  TWICE DAILY   • tamsulosin (FLOMAX) 0.4 MG capsule 24 hr capsule No dose, route, or frequency recorded.         HISTORY OF PRESENT ILLNESS:  The patient is here for a 7-month follow-up.  At his last appointment I recommended that the patient start Vascepa 2 g twice daily get a repeat FLP in a couple months.  He had laboratory testing a few weeks ago with his primary care physician but he is unsure whether a lipid panel was included in this.  He will call and try and have the results sent to me for review.  He gets blood work done with Dr. Paulson because he is receiving antibiotics.  He had a mechanical fall on uneven pavement a few weeks ago.The patient had been to a dermatology appointment  "immediately before the fall and the fall happened when he was walking outside in the parking lot to his car.   When he fell he hit his head but did not lose consciousness.  He severely bruised and scraped his left knee, right wrist, and forehead.  He went to [facility] and had a negative CT of the head and overall work-up was apparently unremarkable and he was discharged a few hours later-data deficit.  He denies any dizziness, presyncope, syncope, chest pain, or palpitations.  The patient checks his blood pressure at home and is normally around 130/80mmHg.  He is on very limited activity right now because his knee is swollen and has some mild shortness of breath on exertion.      Review of Systems   Cardiovascular: Positive for leg swelling.      All other systems reviewed and otherwise negative.      ECG 12 Lead    Date/Time: 2/10/2022 4:53 PM  Performed by: aJja Mansfield APRN  Authorized by: Jaja Mansfield APRN   Rhythm comments: Sinus rhythm with premature supraventricular complexes, left axis deviation, pulmonary disease pattern, nonspecific T wave abnormality, abnormal ECG, 65 bpm  ms,  ms, [id number], sinus rhythm has replaced sinus bradycardia compared to ECG 7/21                 Objective:       Vitals:    02/10/22 1515 02/10/22 1516   BP: 174/98 148/96   BP Location: Right arm Right arm   Patient Position: Standing Sitting   Pulse: 94 67   SpO2: 94%    Weight: (!) 154 kg (339 lb) (!) 154 kg (339 lb)   Height: 185.4 cm (73\") 185.4 cm (73\")   Recheck blood pressure left arm sitting was 140/78  Body mass index is 44.73 kg/m².  Last weight July 2021 was 334 pounds  Constitutional:       Appearance: Healthy appearance. Not in distress.   Neck:      Vascular: No JVR. JVD normal.   Pulmonary:      Effort: Pulmonary effort is normal.      Breath sounds: Normal breath sounds. No wheezing. No rhonchi. No rales.   Chest:      Chest wall: Not tender to palpatation.   Cardiovascular:      PMI at " left midclavicular line. Normal rate. Regular rhythm. Normal S1. Normal S2.      Murmurs: There is a grade 1/6 systolic murmur at the URSB.      No gallop. No click. No rub.   Pulses:     Dorsalis pedis: 1+ bilaterally.     Posterior tibial: 1+ bilaterally.  Edema:     Pretibial: edema of the left pretibial area and 1+ edema of the right pretibial area.     Ankle: bilateral 1+ edema of the ankle.     Feet: bilateral 1+ edema of the feet.  Abdominal:      General: Bowel sounds are normal.      Palpations: Abdomen is soft.      Tenderness: There is no abdominal tenderness.   Musculoskeletal: Normal range of motion.         General: No tenderness. Skin:     General: Skin is warm and dry.   Neurological:      General: No focal deficit present.      Mental Status: Alert and oriented to person, place and time.           Lab Review:   Lab Results   Component Value Date    GLUCOSE 155 (H) 03/16/2021    BUN 16 03/16/2021    CREATININE 0.76 03/16/2021    EGFRIFNONA 100 03/16/2021    BCR 21.1 03/16/2021    CO2 31.0 (H) 03/16/2021    CALCIUM 9.5 03/16/2021    ALBUMIN 4.20 03/16/2021    AST 14 03/16/2021    ALT 11 03/16/2021       Lab Results   Component Value Date    WBC 7.69 03/16/2021    HGB 16.1 03/16/2021    HCT 49.5 03/16/2021    MCV 84.9 03/16/2021     03/16/2021       Lab Results   Component Value Date    HGBA1C 5.7 08/17/2020       Lab Results   Component Value Date    TSH 2.020 03/10/2021       Lab Results   Component Value Date    CHOL 144 03/10/2021    CHOL 122 08/19/2020     Lab Results   Component Value Date    TRIG 309 (H) 03/10/2021    TRIG 179 (H) 08/19/2020     Lab Results   Component Value Date    HDL 31 (L) 03/10/2021    HDL 35 (L) 08/19/2020     Lab Results   Component Value Date    LDL 64 03/10/2021    LDL 51 08/19/2020     Advance Care Planning   ACP discussion was held with the patient during this visit. Patient has an advance directive (not in EMR), copy requested.      Assessment:     Overall  continued acceptable course with no new interim cardiopulmonary complaints with fair functional status on limited activity. We will defer additional diagnostic or therapeutic intervention from a cardiac perspective at this time.  The patient is in normal sinus rhythm with acceptable QTC on sotalol therapy on ECG in office today.  The patient will follow up with infectious disease for antibiotic treatment.  I will order a repeat FLP since the patient has been on Vascepa and I have no new lab results.        Diagnosis Plan   1. Paroxysmal atrial fibrillation (HCC)  The patient is in normal sinus rhythm with acceptable QTC on sotalol therapy on ECG in office today.      2. Primary hypertension   Continue current cardiac medications, continue monitoring blood pressure at home   3. Dyslipidemia   Abnormal lipid panel March 2021, patient has started Vascepa but has not had a repeat FLP so I will order this   4. Obstructive sleep apnea - Intolerant CPAP/BiPAP  Polysomnogram January 2020 that showed mild obstructive sleep apnea and nocturnal hypoxemia with initiation of CPAP therapy, intolerant to CPAP          Plan:         1. Patient to continue current medications and close follow up with the above providers.  2. Tentative cardiology follow up in August 2022 or patient may return sooner PRN.  3. FLP  4. May consider repeat echocardiogram at next 6-12 months  5. ECG next appointment      Electronically signed by ANGELIA Browning, 02/10/22, 4:55 PM EST.

## 2022-02-10 ENCOUNTER — OFFICE VISIT (OUTPATIENT)
Dept: CARDIOLOGY | Facility: CLINIC | Age: 77
End: 2022-02-10

## 2022-02-10 VITALS
DIASTOLIC BLOOD PRESSURE: 96 MMHG | HEART RATE: 67 BPM | WEIGHT: 315 LBS | BODY MASS INDEX: 41.75 KG/M2 | SYSTOLIC BLOOD PRESSURE: 148 MMHG | OXYGEN SATURATION: 94 % | HEIGHT: 73 IN

## 2022-02-10 DIAGNOSIS — E78.5 DYSLIPIDEMIA: ICD-10-CM

## 2022-02-10 DIAGNOSIS — G47.33 OBSTRUCTIVE SLEEP APNEA, ADULT: ICD-10-CM

## 2022-02-10 DIAGNOSIS — I10 PRIMARY HYPERTENSION: ICD-10-CM

## 2022-02-10 DIAGNOSIS — I48.0 PAROXYSMAL ATRIAL FIBRILLATION: Primary | ICD-10-CM

## 2022-02-10 PROCEDURE — 93000 ELECTROCARDIOGRAM COMPLETE: CPT | Performed by: NURSE PRACTITIONER

## 2022-02-10 PROCEDURE — 99214 OFFICE O/P EST MOD 30 MIN: CPT | Performed by: NURSE PRACTITIONER

## 2022-02-10 RX ORDER — MULTIVIT WITH MINERALS/LUTEIN
TABLET ORAL
COMMUNITY
Start: 2022-01-01

## 2022-02-17 RX ORDER — ICOSAPENT ETHYL 1000 MG/1
2 CAPSULE ORAL 2 TIMES DAILY WITH MEALS
Qty: 360 CAPSULE | Refills: 3 | Status: SHIPPED | OUTPATIENT
Start: 2022-02-17 | End: 2023-03-13

## 2022-06-10 ENCOUNTER — TRANSCRIBE ORDERS (OUTPATIENT)
Dept: PHYSICAL THERAPY | Facility: HOSPITAL | Age: 77
End: 2022-06-10

## 2022-06-10 DIAGNOSIS — I89.0 LYMPHEDEMA: Primary | ICD-10-CM

## 2022-07-12 ENCOUNTER — HOSPITAL ENCOUNTER (OUTPATIENT)
Dept: PHYSICAL THERAPY | Facility: HOSPITAL | Age: 77
Setting detail: THERAPIES SERIES
Discharge: HOME | End: 2022-07-12

## 2022-07-12 DIAGNOSIS — R60.0 BILATERAL LEG EDEMA: ICD-10-CM

## 2022-07-12 DIAGNOSIS — I89.0 LYMPHEDEMA: Primary | ICD-10-CM

## 2022-07-12 PROCEDURE — 97161 PT EVAL LOW COMPLEX 20 MIN: CPT

## 2022-07-12 NOTE — THERAPY EVALUATION
Physical Therapy Lymphedema Initial Evaluation  [facility]     Patient Name: Luis Perez Jr.  : 1945  MRN: [id number]  Today's Date: 2022      Visit Date: 2022    Visit Dx:    ICD-10-CM ICD-9-CM   1. Lymphedema  I89.0 457.1   2. Bilateral leg edema  R60.0 782.3       Patient Active Problem List   Diagnosis   • Hypertension   • Disorder of calcium metabolism   • Disorder of endocrine testis   • Cellulitis of lower extremity   • Paroxysmal atrial fibrillation (HCC)   • Snoring   • Obstructive sleep apnea - Intolerant CPAP/BiPAP   • Pneumonia of right middle lobe (Resolved)   • Chronic persistent asthma   • Non-smoker   • Obesity, Class III, BMI 40-49.9 (morbid obesity) (HCC)   • Dyslipidemia   • Perennial rhinitis   • Vitamin D deficiency        Past Medical History:   Diagnosis Date   • Asthma    • Bronchitis, chronic (HCC)    • Cancer ([facility])     Skin    • Cellulitis and abscess of right leg    • Chronic persistent asthma 2020   • Hyperlipidemia    • Hypertension    • Nephrolithiasis    • Obesity, Class III, BMI 40-49.9 (morbid obesity) (HCC) 2020   • Paroxysmal atrial fibrillation (HCC) 7/15/2019   • Pneumonia    • Sleep apnea    • Vitamin D deficiency 2020        Past Surgical History:   Procedure Laterality Date   • CARDIOVERSION  2019   • COLONOSCOPY     • NASAL SEPTUM SURGERY      Deviation Repair   • SKIN CANCER EXCISION     • TONSILLECTOMY     • VASECTOMY         Visit Dx:    ICD-10-CM ICD-9-CM   1. Lymphedema  I89.0 457.1   2. Bilateral leg edema  R60.0 782.3        Patient History     Row Name 22 0800             History    Chief Complaint Swelling;Pain;Difficulty Walking;Difficulty with daily activities  -LF      Type of Pain Lower Extremity / Leg  -LF      Date Current Problem(s) Began --    -LF      Brief Description of Current Complaint Presents with c/o BLE swelling. Onset about 2 years ago.  Had episode cellulitis RLE about 2 years ago after a  fall.  Had episode cellulitis LLE January of this year.  Swelling generally L>R.  Increases during the day despite wearing knee high compression socks (Juzo 20-30mmHg).  Socks will cut into legs and uncomfortable below his knee, and has increased swelling at knee.  Some improvement overnight.  -LF      Previous treatment for THIS PROBLEM Rehabilitation;Other (comment)  wound care, compression  -LF      Patient/Caregiver Goals Know what to do to help the symptoms;Decrease swelling  -LF      Occupation/sports/leisure activities retired from [employer]  -LF              Pain     Pain Location Leg  -LF      Pain at Present 4  -LF      Pain Frequency Constant/continuous  -LF              Fall Risk Assessment    Any falls in the past year: Unspecified  -LF              Daily Activities    Primary Language English  -LF      How does patient learn best? Listening;Demonstration;Reading  -LF      Teaching needs identified Management of Condition  -LF      Does patient have problems with the following? None  -LF      Barriers to learning None  -LF      Pt Participated in POC and Goals Yes  -LF            User Key  (r) = Recorded By, (t) = Taken By, (c) = Cosigned By    Initials Name Provider Type    LF Latasha Box, PT Physical Therapist                 Lymphedema     Row Name 07/12/22 1300             Lymphedema Assessment    Lymphedema Classification RLE:;LLE:;secondary;stage 2 (Spontaneously Irreversible)  -LF      Infections or Cellulitis? yes  -LF      Infection/Cellulitis Treatment 2020, January 2022  -LF              Posture/Observations    Posture/Observations Comments Ambulates w/o A.D.  -LF              General ROM    GENERAL ROM COMMENTS LE ROM WFL's  -LF              MMT (Manual Muscle Testing)    General MMT Comments LE strength WFL's  -LF              Lymphedema Edema Assessment    Ptting Edema Category By severity  -LF      Pitting Edema Moderate  -LF      Stemmer Sign bilateral:;positive  -LF              Skin  Changes/Observations    Location/Assessment Lower Extremity  -LF      Lower Extremity Conditions bilateral:;intact;clean;dry  -LF      Lower Extremity Color/Pigment bilateral:;hyperpigmented;erythema;fibrosis  -LF              Lymphedema Sensation    Lymphedema Sensation Reports RLE:;LLE:;normal  -LF              Lymphedema Pulses/Capillary Refill    Lymphedema Pulses/Capillary Refill lower extremity pulses;capillary refill  -LF      Dorsalis Pedis Pulse right:;+2 normal;left:;+1 diminished  -LF      Capillary Refill lower extremity capillary refill  -LF      Lower Extremity Capillary Refill right:;left:;less than 3 seconds  -LF              Lymphedema Measurements    Measurement Type(s) Quick Girth  -LF      Quick Girth Areas Lower extremities  -LF              LLE Quick Girth (cm)    Met-heads 27 cm  -LF      Mid foot 27.4 cm  -LF      Smallest ankle 30.5 cm  -LF      Largest calf 47.3 cm  -LF      Tib tuberosity 47.3 cm  -LF      Mid patella 51 cm  -LF      Distal thigh 55.3 cm  -LF      Other 1 39 cm  10cm. prox. ankle  -LF              RLE Quick Girth (cm)    Met-heads 26.3 cm  -LF      Mid foot 28 cm  -LF      Smallest ankle 29.3 cm  -LF      Largest calf 46.5 cm  -LF      Tib tuberosity 47.4 cm  -LF      Mid patella 49.8 cm  -LF      Distal thigh 53.3 cm  -LF      Other 1 40 cm  10cm. prox. ankle  -LF      RLE Quick Girth Total 320.6  -LF              Compression/Skin Care    Compression/Skin Care compression garment  -LF      Compression Garment Comments reapplied Juzo kne high 20-30mmHg  -LF            User Key  (r) = Recorded By, (t) = Taken By, (c) = Cosigned By    Initials Name Provider Type    LF Latasha Box PT Physical Therapist                    Therapy Education  Education Details: compression garment options (velcro, Exostrong, or high compression class of his current knee high); components of lymphedema management; encouraged seated LE ROM exercises and elevation  Given: HEP, Edema  management  Program: New  How Provided: Verbal, Demonstration, Written  Provided to: Patient  Level of Understanding: Teach back education performed             PT OP Goals     Row Name 07/12/22 1300          PT Short Term Goals    STG Date to Achieve 08/09/22  -LF     STG 1 BLE circumferential calf measurements to decrease >2cm to demonstrate decreased edema  -LF     STG 1 Progress New  -LF            Long Term Goals    LTG Date to Achieve 09/06/22  -LF     LTG 1 Pt to be measured and fit with compression garment/system for long term self management of lymphedema  -LF     LTG 1 Progress New;Partially Met  -LF     LTG 1 Progress Comments provided sizing for velcro garment  -LF     LTG 2 Patient independent with self-management of BLE lymphedema to include skin care, self-MLD/compression pump, compression, and exercise  -LF     LTG 2 Progress New  -LF     LTG 3 LE total circumferential measurements decreased by at least 8 cm. to promote decrease pain, improved ROM and skin integrity, and decrease risk for infection  -LF     LTG 3 Progress New  -LF            Time Calculation    PT Goal Re-Cert Due Date 10/10/22  -LF           User Key  (r) = Recorded By, (t) = Taken By, (c) = Cosigned By    Initials Name Provider Type    LF Latasha Box, PT Physical Therapist                 PT Assessment/Plan     Row Name 07/12/22 0800          PT Assessment    Functional Limitations Other (comment);Performance in self-care ADL;Performance in leisure activities;Limitation in home management;Impaired gait  edema management  -LF     Impairments Integumentary integrity;Impaired lymphatic circulation;Impaired venous circulation;Pain;Gait;Edema  -LF     Assessment Comments Patient presents with signs and symptoms consistent with BLE lymphedema/CVI with h/o cellulitis.  He has severe knee OA which limits mobility and exercise.  Currently independent with compression sock use and elevation.  Would benefit from compression pump to  promote improved lymphatic return and circulation, and assist with self-management.  Further treatment indicated for maximal reduction in symptoms.  Made recommendations for compression garments for better containment.  -LF     Please refer to paper survey for additional self-reported information Yes  -LF     Rehab Potential Good  -LF     Patient/caregiver participated in establishment of treatment plan and goals Yes  -LF     Patient would benefit from skilled therapy intervention Yes  -LF            PT Plan    PT Frequency 1x/week  -LF     Predicted Duration of Therapy Intervention (PT) 4-6 visits  -LF     Planned CPT's? PT EVAL LOW COMPLEXITY: 02626;PT THER PROC EA 15 MIN: 50624;PT THER ACT EA 15 MIN: 76947;PT MANUAL THERAPY EA 15 MIN: 67491;PT SELF CARE/HOME MGMT/TRAIN EA 15: 05458  -LF     PT Plan Comments cont. per POC.  Patient wants to continue with current compression socks for now, although did provide with measurements velcro garment.  Refer for compression pump.  -LF           User Key  (r) = Recorded By, (t) = Taken By, (c) = Cosigned By    Initials Name Provider Type    LF Latasha Box, PT Physical Therapist                   Outcome Measure Options: Lower Extremity Functional Scale (LEFS)  Lower Extremity Functional Index  Any of your usual work, housework or school activities: Quite a bit of difficulty  Your usual hobbies, recreational or sporting activities: Quite a bit of difficulty  Getting into or out of the bath: A little bit of difficulty  Walking between rooms: No difficulty  Putting on your shoes or socks: A little bit of difficulty  Squatting: Extreme difficulty or unable to perform activity  Lifting an object, like a bag of groceries from the floor: A little bit of difficulty  Performing light activities around your home: A little bit of difficulty  Performing heavy activities around your home: Quite a bit of difficulty  Getting into or out of a car: Moderate difficulty  Walking 2  blocks: Quite a bit of difficulty  Walking a mile: Extreme difficulty or unable to perform activity  Going up or down 10 stairs (about 1 flight of stairs): Quite a bit of difficulty  Standing for 1 hour: Quite a bit of difficulty  Sitting for 1 hour: A little bit of difficulty  Running on even ground: Extreme difficulty or unable to perform activity  Running on uneven ground: Extreme difficulty or unable to perform activity  Making sharp turns while running fast: Extreme difficulty or unable to perform activity  Hopping: Extreme difficulty or unable to perform activity  Rolling over in bed: Quite a bit of difficulty  Total: 28      Time Calculation:   Start Time: 0800  Untimed Charges  PT Eval/Re-eval Minutes: 80  Total Minutes  Untimed Charges Total Minutes: 80   Total Minutes: 80   Therapy Charges for Today     Code Description Service Date Service Provider Modifiers Qty    99146185823 HC PT EVAL LOW COMPLEXITY 4 7/12/2022 Latasha Box, PT GP 1          PT G-Codes  Outcome Measure Options: Lower Extremity Functional Scale (LEFS)  Total: 28         Latasha Box PT  7/12/2022

## 2022-08-02 ENCOUNTER — HOSPITAL ENCOUNTER (OUTPATIENT)
Dept: PHYSICAL THERAPY | Facility: HOSPITAL | Age: 77
Setting detail: THERAPIES SERIES
Discharge: HOME | End: 2022-08-02

## 2022-08-02 DIAGNOSIS — I89.0 LYMPHEDEMA: ICD-10-CM

## 2022-08-02 DIAGNOSIS — R60.0 BILATERAL LEG EDEMA: Primary | ICD-10-CM

## 2022-08-02 PROCEDURE — 97140 MANUAL THERAPY 1/> REGIONS: CPT

## 2022-08-02 NOTE — THERAPY TREATMENT NOTE
Outpatient Physical Therapy Lymphedema Treatment Note  [facility]     Patient Name: Luis Perez Jr.  : 1945  MRN: [id number]  Today's Date: 2022        Visit Date: 2022    Visit Dx:    ICD-10-CM ICD-9-CM   1. Bilateral leg edema  R60.0 782.3   2. Lymphedema  I89.0 457.1       Patient Active Problem List   Diagnosis   • Hypertension   • Disorder of calcium metabolism   • Disorder of endocrine testis   • Cellulitis of lower extremity   • Paroxysmal atrial fibrillation (HCC)   • Snoring   • Obstructive sleep apnea - Intolerant CPAP/BiPAP   • Pneumonia of right middle lobe (Resolved)   • Chronic persistent asthma   • Non-smoker   • Obesity, Class III, BMI 40-49.9 (morbid obesity) (HCC)   • Dyslipidemia   • Perennial rhinitis   • Vitamin D deficiency         Lymphedema     Row Name 22 1000             Subjective Comments    Subjective Comments Has issues with increased swelling at top of compression socks and around knee, causes band to be tight and uncomfortable.  -LF              Lymphedema Edema Assessment    Ptting Edema Category By severity  -LF      Pitting Edema Moderate;Mild  -LF              Skin Changes/Observations    Location/Assessment Lower Extremity  -LF      Lower Extremity Conditions bilateral:;intact;clean;dry  -LF      Lower Extremity Color/Pigment bilateral:;hyperpigmented;erythema;fibrosis  -LF              Lymphedema Measurements    Measurement Type(s) Quick Girth  -LF      Quick Girth Areas Lower extremities  -LF              LLE Quick Girth (cm)    Met-heads 26.4 cm  -LF      Mid foot 26 cm  -LF      Smallest ankle 29.4 cm  -LF      Largest calf 46 cm  -LF      Tib tuberosity 46 cm  -LF      Other 1 37.5 cm  10cm. prox. ankle  -LF              RLE Quick Girth (cm)    Met-heads 26 cm  -LF      Mid foot 27 cm  -LF      Smallest ankle 28.5 cm  -LF      Largest calf 45.5 cm  -LF      Tib tuberosity 47 cm  -LF      Other 1 36.5 cm  10cm. prox. ankle  -LF      RLE Quick  Girth Total 210.5  -LF              Manual Lymphatic Drainage    Manual Lymphatic Drainage initial sequence;opened regional lymph nodes;opened anastamoses;extremity treatment  -LF      Initial Sequence supraclavicular;shoulder collectors;diaphragmatic breathing  -LF      Supraclavicular right;left  -LF      Shoulder Collectors right;left  -LF      Diaphragmatic Breathing completed  -LF      Opened Regional Lymph Nodes inguinal  -LF      Inguinal right;left  -LF      Extremity Treatment MLD to full limb;extremity treatment focus on  -LF      MLD to Full Limb BLE  -LF      Extremity Treatment Focus On lower legs  -LF              Compression/Skin Care    Compression/Skin Care compression garment  -LF      Compression Garment Comments reapplied Monico tapiaciarra high 20-30mmHg  -LF            User Key  (r) = Recorded By, (t) = Taken By, (c) = Cosigned By    Initials Name Provider Type     Latasha Box PT Physical Therapist                     PT Assessment/Plan     Row Name 08/02/22 1000          PT Assessment    Assessment Comments Some improvement in measurements this a.m., especially at proximal ankle.  -LF            PT Plan    PT Plan Comments cont. per POC  -LF           User Key  (r) = Recorded By, (t) = Taken By, (c) = Cosigned By    Initials Name Provider Type     Latasha Box, PT Physical Therapist                    OP Exercises     Row Name 08/02/22 1000             Subjective Comments    Subjective Comments Has issues with increased swelling at top of compression socks and around knee, causes band to be tight and uncomfortable.  -LF              Total Minutes    14657 - PT Manual Therapy Minutes 54  -LF            User Key  (r) = Recorded By, (t) = Taken By, (c) = Cosigned By    Initials Name Provider Type     Latasha Box, PT Physical Therapist                  Manual Rx (last 36 hours)     Manual Treatments     Row Name 08/02/22 1000             Total Minutes    16087 - PT Manual Therapy  Minutes 54  -LF            User Key  (r) = Recorded By, (t) = Taken By, (c) = Cosigned By    Initials Name Provider Type    LF Latasha Box, PT Physical Therapist                  Therapy Education  Education Details: educated on self-MLD sequence.  Reviewed compression options/modifications but for now plans to continue with current socks.  Encouraged MLD thigh/knee in afternoon to help mitigate accumulation fluid here  Given: HEP, Symptoms/condition management  Program: Reinforced, Progressed  How Provided: Verbal, Demonstration, Written  Provided to: Patient  Level of Understanding: Teach back education performed, Verbalized, Demonstrated       Time Calculation:   Start Time: 1000  Timed Charges  76626 - PT Manual Therapy Minutes: 54  Total Minutes  Timed Charges Total Minutes: 54   Total Minutes: 54   Therapy Charges for Today     Code Description Service Date Service Provider Modifiers Qty    [id number]  PT MANUAL THERAPY EA 15 MIN 8/2/2022 Latasha Box, PT GP 4                    Latasha Box PT  8/2/2022

## 2022-08-05 NOTE — PROGRESS NOTES
"    Subjective:     Encounter Date:08/10/2022    Patient ID: Luis Perez Jr. is a 76 y.o.  white male from [place], [place], retired from [employer].     REFERRING PROVIDER: ANGELIA Rios  FORMER HEALTHCARE PROVIDER:ANGELIA Matias  FORMER HEALTHCARE PROVIDER: Maggie Schwartz DNP, APRN  CURRENT PHYSICIAN: Sierra Kuo MD  SLEEP PHYSICIAN: Nicolas Lopez MD, [facility]  GASTROENTEROLOGIST: Tay Torres MD  INFECTIOUS DISEASE: Eriberto Weiner MD  ORTHOPEDIC SURGEON:  Abhijit Ambriz MD.  PULMONOLOGIST: AHMET Bradley MD, [facility]  UROLOGIST: Tyrel Wolf MD  OPHTHALMOLOGIST: Harsha Rosales MD    Chief Complaint:   Chief Complaint   Patient presents with   • Paroxysmal atrial fibrillation       Problem List:  1. Persistent atrial fibrillation:  a. CHADsVasc 2, anticoagulated with Eliquis  b. Echocardiogram, 7/1/2019: LVEF 0.65, mild to moderate TR, RVSP 35 mmHg, right atrial mass is present measuring 2.6 x 1.6 cm; differential includes thrombus versus atypical right atrial myxoma versus atrial intraseptal tumor with clinical correlation and consideration of transesophageal echocardiogram recommended, LA moderately dilated, normal RV wall thickness, systolic function and motion noted with RV cavity mildly dilated, aortic valve exhibits mild sclerosis, mild pulmonary hypertension, no pericardial effusion  c. PIETER, 7/8/2019: Mild MR, LVEF 0.60, LV wall thickness consistent with moderate concentric hypertrophy, LA mild to moderately dilated, 3D echo LVEF was 0.51, normal RV cavity size, wall thickness, systolic function septal motion noted, marked lipomatous hypertrophy of the interatrial septum present.  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 Initiation of " sotalol, 7/13/2019, with persistent atrial fibrillation on EKG, 7/26/2019  e. Successful external cardioversion, 08/05/2019, with frequent atrial ectopy on EKG, 08/09/2019, now in normal sinus rhythm on EKG, 10/16/2019  f. Normal sinus rhythm on ECG in office with acceptable QTC on sotalol therapy, May 2020, December 2020, July 2021, February 2022, August 2022  2. 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 Mild dyspnea on exertion/chronic persistent asthma  a. Remote stress test 30-40 years ago; negative per patient-data deficit, patient reports that this was performed for a baseline and not because of any symptoms  b. PFTs 2/25/2020: Moderate airway obstruction, improvement in FEV1 with bronchodilator, no restriction, air-trapping present, no diffusion  c. CCS class 0 chest discomfort/NYHA class I-II PALM, August 2022  4. At risk for sleep apnea with sleep consult, 8/21/2019, with polysomnogram January 2020 that showed mild obstructive sleep apnea and nocturnal hypoxemia with initiation of CPAP therapy, intolerant CPAP  5. Chronic lower extremity edema  6. Hypertension  7. Dyslipidemia; ASCVD 10 year risk is 45.8%, 17.4% if treated, initiate rosuvastatin 10 mg nightly May 2020 with consideration of vascepa 2g bid after repeat FLP, initiation of Vascepa July 2021  8. Type 2 diabetes mellitus;HgbA1C 6.39% October 2019; 5.7%, August 2020  9. Chronic persistent asthma  10. History of lower extremity cellulitis with MRSA  11.History of melanoma  12. Morbid obesity: BMI 44.08  13. Mechanical fall with subsequent development on cellulitis with IV antibiotic administration, October 2020  14. BPH  15.  Mechanical fall with severe bruising/mild lacerations to his left knee, right wrist, forehead with negative work-up at [facility] January 2022-data deficit  16. Surgical  history  a. Vasectomy  b. Tonsillectomy  c. Deviated septum surgery  d. Multiple skin cancer excisions  e. Partial right knee replacement, November 2019-data deficit    No Known Allergies    Current Outpatient Medications:   •  albuterol (PROVENTIL) (2.5 MG/3ML) 0.083% nebulizer solution, Take 2.5 mg by nebulization Every 4 (Four) Hours As Needed for Wheezing., Disp: 25 vial, Rfl: 12  •  albuterol sulfate  (90 Base) MCG/ACT inhaler, Inhale 2 puffs Every 4 (Four) Hours As Needed for Wheezing., Disp: 1 inhaler, Rfl: 0  •  amLODIPine (NORVASC) 10 MG tablet, Take 1 tablet by mouth Daily., Disp: 90 tablet, Rfl: 3  •  ascorbic acid (VITAMIN C) 1000 MG tablet, , Disp: , Rfl:   •  Breo Ellipta 200-25 MCG/INH inhaler, USE 1 INHALATION BY MOUTH  DAILY, Disp: 180 each, Rfl: 3  •  Dupilumab 300 MG/2ML solution prefilled syringe, Inject 300 mg under the skin into the appropriate area as directed Every 14 (Fourteen) Days., Disp: 2 mL, Rfl: 11  •  Eliquis 5 MG tablet tablet, TAKE 1 TABLET BY MOUTH  TWICE DAILY, Disp: 180 tablet, Rfl: 3  •  EPINEPHrine (EPIPEN) 0.3 MG/0.3ML solution auto-injector injection, , Disp: , Rfl:   •  fexofenadine (ALLEGRA ALLERGY) 180 MG tablet, Take 1 tablet by mouth Daily., Disp: 30 tablet, Rfl: 2  •  finasteride (PROSCAR) 5 MG tablet, , Disp: , Rfl:   •  hydroCHLOROthiazide (HYDRODIURIL) 50 MG tablet, Take 1 tablet by mouth Daily., Disp: 90 tablet, Rfl: 3  •  icosapent ethyl (Vascepa) 1 g capsule capsule, Take 2 g by mouth 2 (Two) Times a Day With Meals., Disp: 360 capsule, Rfl: 3  •  montelukast (SINGULAIR) 10 MG tablet, TAKE 1 TABLET BY MOUTH  DAILY, Disp: 90 tablet, Rfl: 3  •  Multiple Vitamins-Minerals (VITEYES AREDS FORMULA PO), Take 1 tablet by mouth 2 (Two) Times a Day., Disp: , Rfl:   •  sotalol (BETAPACE AF) 80 MG tablet tablet, TAKE 1 TABLET BY MOUTH  TWICE DAILY, Disp: 180 tablet, Rfl: 3  •  tamsulosin (FLOMAX) 0.4 MG capsule 24 hr capsule, , Disp: , Rfl:     Current  "Facility-Administered Medications:   •  aspirin chewable tablet 324 mg, 324 mg, Oral, Once, Arpita Mcdowell ANDRES, ANGELIA    History of Present Illness: Patient returns for scheduled 6-month follow up. Patient reports that he sleeps on his side, and has \"good nights and bad nights.\" Patient reports that he isn't able to do much due to lower extremity problems. He will be working with the [facility] to get a machine for his lower extremity edema. 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 He has not had any cardiopulmonary complaints with his activity. He follows up with Ms. Box. He will be having a blepharoplasty with Dr. Harsha Rosales in October 2022. He travelled to [place] for his daughter's wedding this summer. He monitors his blood pressure at home, and it is typically around 130/80. Patient denies chest pain, shortness of breath, orthopnea, palpitations, dizziness, and syncope.  He has had no interim ER visits, hospitalizations, serious illnesses, or surgeries. He uses compression stockings.     ROS   Obtained and negative except as outlined in problem list and HPI.      ECG 12 Lead    Date/Time: 8/10/2022 1:40 PM  Performed by: Norberto Perez MD  Authorized by: Norberto Perez MD   Rhythm: sinus bradycardia  BPM: 55  Conduction: left posterior fascicular block    Clinical impression: abnormal EKG  Comments: Possible anterior infarct, age undetermined   ms  QTc 434 ms   ms               Objective:       Vitals:    08/10/22 1316 08/10/22 1318 08/10/22 1335   BP: 163/89 156/91 138/84   BP Location: Right arm Right arm Right arm   Patient Position: Sitting Standing Sitting   Cuff Size:   Adult   Pulse: 59 63    SpO2: 96% 96%    Weight: (!) 152 kg (336 lb) (!) 152 kg (336 lb) (!) 152 kg (336 lb)   Height: 185.4 cm (73\") 185.4 cm (73\") 185.4 cm (73\")     Body mass index is 44.33 kg/m².  Last weight: 339 lbs    Vitals reviewed.   Constitutional:       " Appearance: Well-developed.   Neck:      Thyroid: No thyromegaly.      Vascular: No carotid bruit or JVD.      Lymphadenopathy: No cervical adenopathy.   Pulmonary:      Effort: Pulmonary effort is normal.      Breath sounds: Normal breath sounds. No wheezing. No rhonchi. No rales.   Cardiovascular:      Regular rhythm.      Murmurs: There is a grade 1/6 mid frequency early systolic murmur at the LLSB.      No gallop. No S3 gallop.   Pulses:     Dorsalis pedis: 1+ bilaterally.     Posterior tibial: 1+ bilaterally.  Edema:     Peripheral edema present.     Pretibial: bilateral 1+ edema of the pretibial area.     Ankle: bilateral 1+ edema of the ankle.  Abdominal:      Palpations: Abdomen is soft. There is no abdominal mass.      Tenderness: There is no abdominal tenderness.   Musculoskeletal: Normal range of motion. Skin:     General: Skin is warm and dry.      Findings: No rash.   Neurological:      Mental Status: Alert and oriented to person, place, and time.       Lab Review:     No recent laboratory studies available for review today.    Lab Results   Component Value Date    GLUCOSE 155 (H) 03/16/2021    BUN 16 03/16/2021    CREATININE 0.76 03/16/2021    EGFRIFNONA 100 03/16/2021    BCR 21.1 03/16/2021     03/16/2021    K 3.5 03/16/2021     03/16/2021    CO2 31.0 (H) 03/16/2021    CALCIUM 9.5 03/16/2021    ALBUMIN 4.20 03/16/2021    AST 14 03/16/2021    ALT 11 03/16/2021       Lab Results   Component Value Date    WBC 7.69 03/16/2021    HGB 16.1 03/16/2021    HCT 49.5 03/16/2021    MCV 84.9 03/16/2021     03/16/2021       Lab Results   Component Value Date    HGBA1C 5.7 08/17/2020       Lab Results   Component Value Date    TSH 2.020 03/10/2021       Lab Results   Component Value Date    CHOL 144 03/10/2021    CHOL 122 08/19/2020    CHOL 150 03/05/2019     Lab Results   Component Value Date    TRIG 309 (H) 03/10/2021    TRIG 179 (H) 08/19/2020    TRIG 310 (H) 03/05/2019     Lab Results    Component Value Date    HDL 31 (L) 03/10/2021    HDL 35 (L) 08/19/2020    HDL 33 (L) 03/05/2019     Lab Results   Component Value Date    LDL 64 03/10/2021    LDL 51 08/19/2020    LDL 55 03/05/2019         Assessment:       Overall continued acceptable course with no new interim cardiopulmonary complaints with acceptable functional status. We will defer additional diagnostic or therapeutic intervention from a cardiac perspective at this time.     Diagnosis Plan   1. Paroxysmal atrial fibrillation (HCC)  Stable and asymptomatic. Continue current treatment, including heart healthy diet, exercise, and cardiac medications.   2. Primary hypertension  Controlled. Continue current treatment.   3. Dyslipidemia  Suboptimal control. Continue current treatment.   4. Obstructive sleep apnea - Intolerant CPAP/BiPAP  Side-sleeping stressed.          Plan:         1. Patient to continue current medications and close follow up with the above providers.  2. Patient should monitor his blood pressure 1-2 times a week.  3. Patient is encouraged to implement a regular aerobic exercise routine, at least 30 minutes daily, 4-5 days per week.  4. Tentative cardiology follow up in February 2023 or patient may return sooner PRN.    Scribed for Norberto Perez MD by Carmelina Garibay. 8/10/2022  13:38 EDT    I, Norberto Perez MD, [facility], personally performed the services described in this documentation as scribed by the above named individual in my presence, and it is both accurate and complete. At 13:53 EDT on 08/10/2022

## 2022-08-10 ENCOUNTER — OFFICE VISIT (OUTPATIENT)
Dept: CARDIOLOGY | Facility: CLINIC | Age: 77
End: 2022-08-10

## 2022-08-10 VITALS
HEIGHT: 73 IN | BODY MASS INDEX: 41.75 KG/M2 | WEIGHT: 315 LBS | HEART RATE: 63 BPM | DIASTOLIC BLOOD PRESSURE: 84 MMHG | SYSTOLIC BLOOD PRESSURE: 138 MMHG | OXYGEN SATURATION: 96 %

## 2022-08-10 DIAGNOSIS — E78.5 DYSLIPIDEMIA: ICD-10-CM

## 2022-08-10 DIAGNOSIS — I48.0 PAROXYSMAL ATRIAL FIBRILLATION: Primary | ICD-10-CM

## 2022-08-10 DIAGNOSIS — I10 PRIMARY HYPERTENSION: ICD-10-CM

## 2022-08-10 DIAGNOSIS — G47.33 OBSTRUCTIVE SLEEP APNEA, ADULT: ICD-10-CM

## 2022-08-10 PROCEDURE — 93000 ELECTROCARDIOGRAM COMPLETE: CPT | Performed by: INTERNAL MEDICINE

## 2022-08-10 PROCEDURE — 99214 OFFICE O/P EST MOD 30 MIN: CPT | Performed by: INTERNAL MEDICINE

## 2022-08-16 ENCOUNTER — HOSPITAL ENCOUNTER (OUTPATIENT)
Dept: PHYSICAL THERAPY | Facility: HOSPITAL | Age: 77
Setting detail: THERAPIES SERIES
Discharge: HOME | End: 2022-08-16

## 2022-08-16 DIAGNOSIS — I89.0 LYMPHEDEMA: ICD-10-CM

## 2022-08-16 DIAGNOSIS — R60.0 BILATERAL LEG EDEMA: Primary | ICD-10-CM

## 2022-08-16 PROCEDURE — 97140 MANUAL THERAPY 1/> REGIONS: CPT

## 2022-08-16 NOTE — THERAPY PROGRESS REPORT/RE-CERT
Physical Therapy Lymphedema Progress Note  [facility]     Patient Name: Luis Perez Jr.  : 1945  MRN: [id number]  Today's Date: 2022      Visit Date: 2022    Visit Dx:    ICD-10-CM ICD-9-CM   1. Bilateral leg edema  R60.0 782.3   2. Lymphedema  I89.0 457.1       Patient Active Problem List   Diagnosis   • Hypertension   • Disorder of calcium metabolism   • Disorder of endocrine testis   • Cellulitis of lower extremity   • Paroxysmal atrial fibrillation (HCC)   • Snoring   • Obstructive sleep apnea - Intolerant CPAP/BiPAP   • Pneumonia of right middle lobe (Resolved)   • Chronic persistent asthma   • Non-smoker   • Obesity, Class III, BMI 40-49.9 (morbid obesity) (HCC)   • Dyslipidemia   • Perennial rhinitis   • Vitamin D deficiency        Past Medical History:   Diagnosis Date   • Asthma    • Bronchitis, chronic (HCC)    • Cancer (HCC)     Skin    • Cellulitis and abscess of right leg    • Chronic persistent asthma 2020   • Hyperlipidemia    • Hypertension    • Nephrolithiasis    • Obesity, Class III, BMI 40-49.9 (morbid obesity) (HCC) 2020   • Paroxysmal atrial fibrillation (HCC) 7/15/2019   • Pneumonia    • Sleep apnea    • Vitamin D deficiency 2020        Past Surgical History:   Procedure Laterality Date   • CARDIOVERSION  2019   • COLONOSCOPY     • NASAL SEPTUM SURGERY      Deviation Repair   • SKIN CANCER EXCISION     • TONSILLECTOMY     • VASECTOMY         Visit Dx:    ICD-10-CM ICD-9-CM   1. Bilateral leg edema  R60.0 782.3   2. Lymphedema  I89.0 457.1            Lymphedema     Row Name 22 1400             Subjective Pain    Able to rate subjective pain? yes  -LF      Pre-Treatment Pain Level 0  -LF      Post-Treatment Pain Level 0  -LF              Subjective Comments    Subjective Comments Reports increased swelling by end of day despite having compression in place.  -LF              Lymphedema Edema Assessment    Ptting Edema Category By severity  -LF       Pitting Edema Moderate  -LF              Skin Changes/Observations    Location/Assessment Lower Extremity  -LF      Lower Extremity Conditions bilateral:;intact;clean;dry  -LF      Lower Extremity Color/Pigment bilateral:;hyperpigmented;erythema;fibrosis  -LF              Lymphedema Measurements    Measurement Type(s) Quick Girth  -LF      Quick Girth Areas Lower extremities  -LF              LLE Quick Girth (cm)    Met-heads 27 cm  -LF      Mid foot 26.8 cm  -LF      Smallest ankle 30 cm  -LF      Largest calf 49.2 cm  -LF      Tib tuberosity 47 cm  -LF      Mid patella 54 cm  -LF      Distal thigh 57.3 cm  -LF      Other 1 36.5 cm  -LF              RLE Quick Girth (cm)    Met-heads 26.5 cm  -LF      Mid foot 27.5 cm  -LF      Smallest ankle 28.4 cm  -LF      Largest calf 47 cm  -LF      Tib tuberosity 47.6 cm  -LF      Mid patella 53 cm  -LF      Distal thigh 54.8 cm  -LF      Other 1 36.2 cm  -LF      RLE Quick Girth Total 321  -LF              Manual Lymphatic Drainage    Manual Lymphatic Drainage initial sequence;opened regional lymph nodes;opened anastamoses;extremity treatment  -LF      Initial Sequence supraclavicular;shoulder collectors;diaphragmatic breathing;abdomen  -LF      Supraclavicular right;left  -LF      Shoulder Collectors right;left  -LF      Abdomen superficial  -LF      Diaphragmatic Breathing completed  -LF      Opened Regional Lymph Nodes inguinal  -LF      Inguinal right;left  -LF      Extremity Treatment MLD to full limb;extremity treatment focus on  -LF      MLD to Full Limb BLE  -LF      Extremity Treatment Focus On lower legs L>R  -LF              Compression/Skin Care    Compression/Skin Care compression garment  -LF      Compression Garment Comments reapplied Juzo knee high 20-30mmHg  -LF            User Key  (r) = Recorded By, (t) = Taken By, (c) = Cosigned By    Initials Name Provider Type    Latasha Bowles PT Physical Therapist               OP Exercises     Row Name  08/16/22 1400             Subjective Comments    Subjective Comments Reports increased swelling by end of day despite having compression in place.  -LF              Subjective Pain    Able to rate subjective pain? yes  -LF      Pre-Treatment Pain Level 0  -LF      Post-Treatment Pain Level 0  -LF              Total Minutes    07572 - PT Manual Therapy Minutes 50  -LF            User Key  (r) = Recorded By, (t) = Taken By, (c) = Cosigned By    Initials Name Provider Type    LF Latasha Box PT Physical Therapist                Manual Rx (last 36 hours)     Manual Treatments     Row Name 08/16/22 1400             Total Minutes    11534 - PT Manual Therapy Minutes 50  -LF            User Key  (r) = Recorded By, (t) = Taken By, (c) = Cosigned By    Initials Name Provider Type    LF Latasha Box PT Physical Therapist                 PT OP Goals     Row Name 08/16/22 1400          PT Short Term Goals    STG Date to Achieve 08/09/22  -LF     STG 1 BLE circumferential calf measurements to decrease >2cm to demonstrate decreased edema  -LF     STG 1 Progress Ongoing  -LF            Long Term Goals    LTG Date to Achieve 09/06/22  -LF     LTG 1 Pt to be measured and fit with compression garment/system for long term self management of lymphedema  -LF     LTG 1 Progress Partially Met  -LF     LTG 1 Progress Comments previously provided sizing for velcro garment  -LF     LTG 2 Patient independent with self-management of BLE lymphedema to include skin care, self-MLD/compression pump, compression, and exercise  -LF     LTG 2 Progress Progressing;Ongoing  -LF     LTG 3 LE total circumferential measurements decreased by at least 8 cm. to promote decrease pain, improved ROM and skin integrity, and decrease risk for infection  -LF     LTG 3 Progress Ongoing  -LF           User Key  (r) = Recorded By, (t) = Taken By, (c) = Cosigned By    Initials Name Provider Type    LF Latasha Box, PT Physical Therapist                  PT Assessment/Plan     Row Name 08/16/22 1400          PT Assessment    Functional Limitations Other (comment);Performance in self-care ADL;Performance in leisure activities;Limitation in home management;Impaired gait  -LF     Impairments Integumentary integrity;Impaired lymphatic circulation;Impaired venous circulation;Pain;Gait;Edema  -LF     Assessment Comments Patient continues with persistent LE edema/lymphedema despite compliance with compression and elevation >4weeks.  functional mobility is limited due to knee OA.  Will get increased swelling around his knee just above the top of the compression socks.  Some increase in today's measurements compared to initial eval.  Would benefit from compression pump to promote improved lymphatic return and circulation in order to  help achieve better self-management of symptoms and decrease risk for recurrent infection.  -LF     Please refer to paper survey for additional self-reported information Yes  -LF     Rehab Potential Good  -LF     Patient/caregiver participated in establishment of treatment plan and goals Yes  -LF     Patient would benefit from skilled therapy intervention Yes  -LF            PT Plan    PT Frequency 1x/week  -LF     Planned CPT's? PT EVAL LOW COMPLEXITY: 99145;PT THER PROC EA 15 MIN: 02681;PT THER ACT EA 15 MIN: 14168;PT MANUAL THERAPY EA 15 MIN: 91596;PT SELF CARE/HOME MGMT/TRAIN EA 15: 81794  -LF     PT Plan Comments cont. per POC  -LF           User Key  (r) = Recorded By, (t) = Taken By, (c) = Cosigned By    Initials Name Provider Type    Latasha Bowles PT Physical Therapist                   Outcome Measure Options: Lower Extremity Functional Scale (LEFS)  Lower Extremity Functional Index  Any of your usual work, housework or school activities: Quite a bit of difficulty  Your usual hobbies, recreational or sporting activities: Quite a bit of difficulty  Getting into or out of the bath: A little bit of difficulty  Walking  between rooms: A little bit of difficulty  Putting on your shoes or socks: Extreme difficulty or unable to perform activity  Squatting: Quite a bit of difficulty  Lifting an object, like a bag of groceries from the floor: Moderate difficulty  Performing light activities around your home: Moderate difficulty  Performing heavy activities around your home: Extreme difficulty or unable to perform activity  Getting into or out of a car: Quite a bit of difficulty  Walking 2 blocks: Moderate difficulty  Walking a mile: Quite a bit of difficulty  Going up or down 10 stairs (about 1 flight of stairs): Quite a bit of difficulty  Standing for 1 hour: Quite a bit of difficulty  Sitting for 1 hour: No difficulty  Running on even ground: Extreme difficulty or unable to perform activity  Running on uneven ground: Extreme difficulty or unable to perform activity  Making sharp turns while running fast: Extreme difficulty or unable to perform activity  Hopping: Extreme difficulty or unable to perform activity  Rolling over in bed: Moderate difficulty  Total: 25      Time Calculation:   Start Time: 1400  Timed Charges  36796 - PT Manual Therapy Minutes: 50  Total Minutes  Timed Charges Total Minutes: 50   Total Minutes: 50   Therapy Charges for Today     Code Description Service Date Service Provider Modifiers Qty    99130389128 HC PT MANUAL THERAPY EA 15 MIN 8/16/2022 Latasha Box, PT GP 3          PT G-Codes  Outcome Measure Options: Lower Extremity Functional Scale (LEFS)  Total: 25         Latasha Box PT  8/16/2022

## 2022-10-17 RX ORDER — MONTELUKAST SODIUM 10 MG/1
10 TABLET ORAL DAILY
Qty: 90 TABLET | Refills: 3 | Status: SHIPPED | OUTPATIENT
Start: 2022-10-17

## 2022-11-08 ENCOUNTER — DOCUMENTATION (OUTPATIENT)
Dept: PHYSICAL THERAPY | Facility: HOSPITAL | Age: 77
End: 2022-11-08

## 2022-11-08 DIAGNOSIS — R60.0 BILATERAL LEG EDEMA: Primary | ICD-10-CM

## 2022-11-08 DIAGNOSIS — I89.0 LYMPHEDEMA: ICD-10-CM

## 2022-11-08 NOTE — THERAPY DISCHARGE NOTE
Outpatient Physical Therapy Discharge Summary         Patient Name: Luis Perez Jr.  : 1945  MRN: [id number]    Today's Date: 2022    Visit Dx:    ICD-10-CM ICD-9-CM   1. Bilateral leg edema  R60.0 782.3   2. Lymphedema  I89.0 457.1           OP PT Discharge Summary  Date of Discharge: 22  Reason for Discharge: Patient/Caregiver request  Outcomes Achieved: Refer to plan of care for updates on goals achieved  Discharge Destination: Home with home program  Discharge Instructions/Additional Comments: Patient was compliant with compression garments, and was set-up with a pneumatic compression pump to help manage symptoms.            Latasha Box, PT  2022

## 2022-12-06 RX ORDER — SOTALOL HYDROCHLORIDE 80 MG/1
TABLET ORAL
Qty: 180 TABLET | Refills: 3 | Status: SHIPPED | OUTPATIENT
Start: 2022-12-06

## 2023-01-09 RX ORDER — APIXABAN 5 MG/1
TABLET, FILM COATED ORAL
Qty: 180 TABLET | Refills: 3 | Status: SHIPPED | OUTPATIENT
Start: 2023-01-09

## 2023-02-08 NOTE — PROGRESS NOTES
Called patient and let him know that his xray was clear and there was no evidence of previous pneumonia. Advised him that I sent referral to pulmonary rehab to help with his respiratory issues prior to having knee replacement surgery. Patient verbalizes understanding. Pharmacist from [facility] called and stated that thy need refills for PT's insulin syringes. She stated they've been faxing us prescriptions since 1/20/23 and need us to approve or deny them.

## 2023-02-17 ENCOUNTER — OFFICE VISIT (OUTPATIENT)
Dept: CARDIOLOGY | Facility: CLINIC | Age: 78
End: 2023-02-17
Payer: MEDICARE

## 2023-02-17 VITALS
DIASTOLIC BLOOD PRESSURE: 74 MMHG | HEART RATE: 66 BPM | WEIGHT: 315 LBS | HEIGHT: 73 IN | SYSTOLIC BLOOD PRESSURE: 132 MMHG | OXYGEN SATURATION: 96 % | BODY MASS INDEX: 41.75 KG/M2

## 2023-02-17 DIAGNOSIS — I10 PRIMARY HYPERTENSION: ICD-10-CM

## 2023-02-17 DIAGNOSIS — G47.33 OBSTRUCTIVE SLEEP APNEA, ADULT: ICD-10-CM

## 2023-02-17 DIAGNOSIS — I48.0 PAROXYSMAL ATRIAL FIBRILLATION: Primary | ICD-10-CM

## 2023-02-17 DIAGNOSIS — E78.5 DYSLIPIDEMIA: ICD-10-CM

## 2023-02-17 PROCEDURE — 93000 ELECTROCARDIOGRAM COMPLETE: CPT | Performed by: NURSE PRACTITIONER

## 2023-02-17 PROCEDURE — 99214 OFFICE O/P EST MOD 30 MIN: CPT | Performed by: NURSE PRACTITIONER

## 2023-02-17 RX ORDER — MELOXICAM 15 MG/1
TABLET ORAL AS NEEDED
COMMUNITY

## 2023-02-17 RX ORDER — TRAMADOL HYDROCHLORIDE 50 MG/1
TABLET ORAL AS NEEDED
COMMUNITY

## 2023-03-09 ENCOUNTER — OFFICE VISIT (OUTPATIENT)
Dept: PULMONOLOGY | Facility: CLINIC | Age: 78
End: 2023-03-09
Payer: MEDICARE

## 2023-03-09 VITALS
DIASTOLIC BLOOD PRESSURE: 90 MMHG | WEIGHT: 315 LBS | TEMPERATURE: 98 F | HEIGHT: 73 IN | BODY MASS INDEX: 41.75 KG/M2 | HEART RATE: 59 BPM | SYSTOLIC BLOOD PRESSURE: 150 MMHG | OXYGEN SATURATION: 97 %

## 2023-03-09 DIAGNOSIS — J45.909 IDIOPATHIC ASTHMA: Primary | ICD-10-CM

## 2023-03-09 DIAGNOSIS — J30.2 SEASONAL AND PERENNIAL ALLERGIC RHINITIS: ICD-10-CM

## 2023-03-09 DIAGNOSIS — G47.33 OBSTRUCTIVE SLEEP APNEA, ADULT: ICD-10-CM

## 2023-03-09 DIAGNOSIS — J30.89 SEASONAL AND PERENNIAL ALLERGIC RHINITIS: ICD-10-CM

## 2023-03-09 DIAGNOSIS — J45.50 SEVERE PERSISTENT ASTHMA WITHOUT COMPLICATION: ICD-10-CM

## 2023-03-09 PROCEDURE — 94726 PLETHYSMOGRAPHY LUNG VOLUMES: CPT | Performed by: NURSE PRACTITIONER

## 2023-03-09 PROCEDURE — 99214 OFFICE O/P EST MOD 30 MIN: CPT | Performed by: NURSE PRACTITIONER

## 2023-03-09 PROCEDURE — 94729 DIFFUSING CAPACITY: CPT | Performed by: NURSE PRACTITIONER

## 2023-03-09 PROCEDURE — 94060 EVALUATION OF WHEEZING: CPT | Performed by: NURSE PRACTITIONER

## 2023-03-09 RX ORDER — FLUTICASONE FUROATE AND VILANTEROL 200; 25 UG/1; UG/1
1 POWDER RESPIRATORY (INHALATION)
Qty: 180 EACH | Refills: 3 | Status: SHIPPED | OUTPATIENT
Start: 2023-03-09

## 2023-03-09 RX ORDER — HYDROCODONE BITARTRATE AND ACETAMINOPHEN 7.5; 325 MG/1; MG/1
TABLET ORAL
COMMUNITY
Start: 2023-03-07

## 2023-03-09 RX ORDER — ALBUTEROL SULFATE 90 UG/1
4 AEROSOL, METERED RESPIRATORY (INHALATION) ONCE
Status: COMPLETED | OUTPATIENT
Start: 2023-03-09 | End: 2023-03-09

## 2023-03-09 RX ADMIN — ALBUTEROL SULFATE 4 PUFF: 90 AEROSOL, METERED RESPIRATORY (INHALATION) at 09:08

## 2023-03-09 NOTE — PROGRESS NOTES
[facility] Pulmonary Follow up    CHIEF COMPLAINT    Dyspnea with exertion    HISTORY OF PRESENT ILLNESS    Luis Perez Jr. is a 77 y.o.male here today for follow-up.  He was last seen in the office in 2021.  He denies any respiratory illnesses since his last appointment.      He continues to use Breo daily.  He also uses Dupixent every 2 weeks.  He states this combination has managed his asthma very well.  He does not use an albuterol rescue inhaler that regularly.    He is unable to do much activity due to his chronic knee pain.    He does have a history of atrial fibrillation and remains on Eliquis twice a day for anticoagulation.  He also has a history of RUY but is intolerable to CPAP.  He denies any snoring or sleeping difficulties currently.    He denies chest pain or palpitations.  He denies calf tenderness but does have chronic lower extremity edema.  He does wear compression hose on a regular basis.    He denies any reflux symptoms.  He does continue to take Allegra and Singulair nightly for his allergies.    Patient Active Problem List   Diagnosis   • Hypertension   • Disorder of calcium metabolism   • Disorder of endocrine testis   • Cellulitis of lower extremity   • Paroxysmal atrial fibrillation (HCC)   • Snoring   • Obstructive sleep apnea - Intolerant CPAP/BiPAP   • Pneumonia of right middle lobe (Resolved)   • Chronic persistent asthma   • Non-smoker   • Obesity, Class III, BMI 40-49.9 (morbid obesity) (HCC)   • Dyslipidemia   • Perennial rhinitis   • Vitamin D deficiency       Allergies   Allergen Reactions   • Adhesive Tape Rash       Current Outpatient Medications:   •  albuterol (PROVENTIL) (2.5 MG/3ML) 0.083% nebulizer solution, Take 2.5 mg by nebulization Every 4 (Four) Hours As Needed for Wheezing., Disp: 25 vial, Rfl: 12  •  albuterol sulfate  (90 Base) MCG/ACT inhaler, Inhale 2 puffs Every 4 (Four) Hours As Needed for Wheezing., Disp: 1 inhaler, Rfl: 0  •  amLODIPine (NORVASC) 10 MG  tablet, Take 1 tablet by mouth Daily., Disp: 90 tablet, Rfl: 3  •  ascorbic acid (VITAMIN C) 1000 MG tablet, , Disp: , Rfl:   •  Dupilumab 300 MG/2ML solution prefilled syringe, Inject 2 mL under the skin into the appropriate area as directed Every 14 (Fourteen) Days., Disp: 2 mL, Rfl: 11  •  Eliquis 5 MG tablet tablet, TAKE 1 TABLET BY MOUTH  TWICE DAILY, Disp: 180 tablet, Rfl: 3  •  EPINEPHrine (EPIPEN) 0.3 MG/0.3ML solution auto-injector injection, , Disp: , Rfl:   •  fexofenadine (ALLEGRA ALLERGY) 180 MG tablet, Take 1 tablet by mouth Daily., Disp: 30 tablet, Rfl: 2  •  finasteride (PROSCAR) 5 MG tablet, , Disp: , Rfl:   •  Fluticasone Furoate-Vilanterol (Breo Ellipta) 200-25 MCG/ACT inhaler, Inhale 1 puff Daily., Disp: 180 each, Rfl: 3  •  hydroCHLOROthiazide (HYDRODIURIL) 50 MG tablet, Take 1 tablet by mouth Daily., Disp: 90 tablet, Rfl: 3  •  HYDROcodone-acetaminophen (NORCO) 7.5-325 MG per tablet, , Disp: , Rfl:   •  icosapent ethyl (Vascepa) 1 g capsule capsule, Take 2 g by mouth 2 (Two) Times a Day With Meals., Disp: 360 capsule, Rfl: 3  •  meloxicam (MOBIC) 15 MG tablet, As Needed., Disp: , Rfl:   •  montelukast (SINGULAIR) 10 MG tablet, TAKE 1 TABLET BY MOUTH  DAILY, Disp: 90 tablet, Rfl: 3  •  Multiple Vitamins-Minerals (VITEYES AREDS FORMULA PO), Take 1 tablet by mouth 2 (Two) Times a Day., Disp: , Rfl:   •  sotalol (BETAPACE AF) 80 MG tablet tablet, TAKE 1 TABLET BY MOUTH  TWICE DAILY, Disp: 180 tablet, Rfl: 3  •  tamsulosin (FLOMAX) 0.4 MG capsule 24 hr capsule, , Disp: , Rfl:   •  traMADol (ULTRAM) 50 MG tablet, As Needed., Disp: , Rfl:     Current Facility-Administered Medications:   •  aspirin chewable tablet 324 mg, 324 mg, Oral, Once, Mcdowell, Arpita W, APRN  MEDICATION LIST AND ALLERGIES REVIEWED.    Social History     Tobacco Use   • Smoking status: Never   • Smokeless tobacco: Former     Types: Chew     Quit date: 4/26/2019   Vaping Use   • Vaping Use: Never used   Substance Use Topics   •  "Alcohol use: Yes     Alcohol/week: 0.0 - 2.0 standard drinks     Comment: 1-2 month    • Drug use: No       FAMILY AND SOCIAL HISTORY REVIEWED.    Review of Systems   Constitutional: Negative for activity change, appetite change, fatigue, fever and unexpected weight change.   HENT: Positive for rhinorrhea. Negative for congestion, postnasal drip, sinus pressure, sore throat and voice change.    Eyes: Negative for visual disturbance.   Respiratory: Positive for shortness of breath. Negative for cough, chest tightness and wheezing.    Cardiovascular: Negative for chest pain, palpitations and leg swelling.   Gastrointestinal: Negative for abdominal distention, abdominal pain, nausea and vomiting.   Endocrine: Negative for cold intolerance and heat intolerance.   Genitourinary: Negative for difficulty urinating and urgency.   Musculoskeletal: Negative for arthralgias, back pain and neck pain.   Skin: Negative for color change and pallor.   Allergic/Immunologic: Negative for environmental allergies and food allergies.   Neurological: Negative for dizziness, syncope, weakness and light-headedness.   Hematological: Negative for adenopathy. Does not bruise/bleed easily.   Psychiatric/Behavioral: Negative for agitation and behavioral problems.   .    /90   Pulse 59   Temp 98 °F (36.7 °C) (Infrared)   Ht 185.4 cm (73\")   Wt (!) 149 kg (328 lb 9.6 oz)   SpO2 97% Comment: resting room air  BMI 43.35 kg/m²     Immunization History   Administered Date(s) Administered   • COVID-19 (MODERNA) 1st, 2nd, 3rd Dose Only 01/27/2021, 02/24/2021, 09/03/2021, 04/11/2022   • COVID-19 (MODERNA) BIVALENT BOOSTER 12+YRS 09/29/2022   • Flu Vaccine Intradermal Quad 18-64YR 10/01/2014   • Flu Vaccine Quad PF >36MO 10/01/2017   • Flu Vaccine Split Quad 10/01/2017   • Fluad Quad 65+ 10/01/2020   • Fluzone High Dose =>65 Years ([facility] ONLY) 10/29/2015, 11/09/2016, 10/04/2017, 09/28/2018, 10/19/2019   • Fluzone High-Dose 65+yrs 09/10/2021 "   • Fluzone Quad >6mos (Multi-dose) 10/01/2017, 10/01/2018, 10/16/2019   • Hepatitis A 11/20/2018, 07/19/2019   • Influenza Quad Vaccine (Inpatient) 10/01/2012   • Pneumococcal Conjugate 13-Valent (PCV13) 01/15/2015   • Pneumococcal Polysaccharide (PPSV23) 01/31/2019   • Shingrix 09/13/2018, 11/20/2018       Physical Exam  Vitals and nursing note reviewed.   Constitutional:       Appearance: He is well-developed. He is not diaphoretic.   HENT:      Head: Normocephalic and atraumatic.   Eyes:      Pupils: Pupils are equal, round, and reactive to light.   Neck:      Thyroid: No thyromegaly.   Cardiovascular:      Rate and Rhythm: Normal rate and regular rhythm.      Heart sounds: Normal heart sounds. No murmur heard.    No friction rub. No gallop.   Pulmonary:      Effort: Pulmonary effort is normal. No respiratory distress.      Breath sounds: Normal breath sounds. No wheezing or rales.   Chest:      Chest wall: No tenderness.   Abdominal:      General: Bowel sounds are normal.      Palpations: Abdomen is soft.      Tenderness: There is no abdominal tenderness.   Musculoskeletal:         General: No swelling. Normal range of motion.      Cervical back: Normal range of motion and neck supple.   Lymphadenopathy:      Cervical: No cervical adenopathy.   Skin:     General: Skin is warm and dry.      Capillary Refill: Capillary refill takes less than 2 seconds.   Neurological:      Mental Status: He is alert and oriented to person, place, and time.   Psychiatric:         Mood and Affect: Mood normal.         Behavior: Behavior normal.           RESULTS    PFTS in the office today, read by me, FVC 3.42 76% predicted, FEV1 1.53 47% predicted, FEV1/FVC 45% predicted, TLC 6.68 97% predicted, DLCO 90% predicted, moderate obstruction with no postbronchodilator response, no restriction and normal diffusion.    Chest PA/lateral: Official report pending    PROBLEM LIST    Problem List Items Addressed This Visit        Allergies  and Adverse Reactions    Perennial rhinitis       Pulmonary and Pneumonias    Chronic persistent asthma - Primary    Relevant Medications    albuterol sulfate HFA (PROVENTIL HFA;VENTOLIN HFA;PROAIR HFA) inhaler 4 puff (Completed)    Fluticasone Furoate-Vilanterol (Breo Ellipta) 200-25 MCG/ACT inhaler    Other Relevant Orders    XR Chest PA & Lateral    Pulmonary Function Test (Completed)       Sleep    Obstructive sleep apnea - Intolerant CPAP/BiPAP   Other Visit Diagnoses     Severe persistent asthma without complication        Relevant Medications    Dupilumab 300 MG/2ML solution prefilled syringe    Fluticasone Furoate-Vilanterol (Breo Ellipta) 200-25 MCG/ACT inhaler            DISCUSSION    Mr. Perez was here for follow-up.  He seems to be doing well from a pulmonary standpoint.  He has not had any exacerbations since his last appointment.  We did review his full PFTs in the office today and he continues to have a moderate obstruction.  He will remain on Breo daily and albuterol as needed.    He will remain on Dupixent for his asthma.    We reviewed his chest x-ray and the official report is pending.  I will notify him of any abnormalities.    He will remain on Allegra and Singulair for his perennial rhinitis.    We did encourage weight loss in the office today.  He is intolerable to CPAP.    He will follow-up in 1 year or sooner if his symptoms worsen.  Advised him to call with any additional concerns or questions.  I personally spent a total of 32 minutes on patient visit today including chart review, face to face with the patient obtaining the history and physical exam, review of pertinent images and tests, counseling and discussion and/or coordination of care as described above, and documentation.  Total time excludes time spent on other separate services such as performing procedures or test interpretation, if applicable.        Camille Mccord, APRN  03/09/202309:23 EST  Electronically signed     Please  note that portions of this note were completed with a voice recognition program.        CC: Sierra Kuo MD

## 2023-03-13 RX ORDER — ICOSAPENT ETHYL 1000 MG/1
CAPSULE ORAL
Qty: 360 CAPSULE | Refills: 3 | Status: SHIPPED | OUTPATIENT
Start: 2023-03-13

## 2023-08-08 NOTE — PROGRESS NOTES
"    Subjective:     Encounter Date:08/18/2023      Patient ID: Luis Perez Jr. is a 77 y.o.  white male from [place], [place], retired from [employer].     REFERRING PROVIDER: ANGELIA Rios  FORMER HEALTHCARE PROVIDER:ANGELIA Matias  FORMER HEALTHCARE PROVIDER: Maggie Schwartz DNP, APRN  CURRENT PHYSICIAN: Sierra Kuo MD  SLEEP PHYSICIAN: Nicolas Lopez MD, [facility]  GASTROENTEROLOGIST: Tay Torres MD  INFECTIOUS DISEASE: Eriberto Weiner MD  ORTHOPEDIC SURGEON:  Abhijit Ambriz MD.  PULMONOLOGIST: AHMET Bradley MD, [facility]  UROLOGIST: Tyrel Wolf MD  OPHTHALMOLOGIST: Harsha Rosales MD .    Chief Complaint:   Chief Complaint   Patient presents with    Paroxysmal atrial fibrillation     Problem List:  Persistent atrial fibrillation:  CHADsVasc 2, anticoagulated with Eliquis  Echocardiogram, 7/1/2019: LVEF 0.65, mild to moderate TR, RVSP 35 mmHg, right atrial mass is present measuring 2.6 x 1.6 cm; differential includes thrombus versus atypical right atrial myxoma versus atrial intraseptal tumor with clinical correlation and consideration of transesophageal echocardiogram recommended, LA moderately dilated, normal RV wall thickness, systolic function and motion noted with RV cavity mildly dilated, aortic valve exhibits mild sclerosis, mild pulmonary hypertension, no pericardial effusion  PIETER, 7/8/2019: Mild MR, LVEF 0.60, LV wall thickness consistent with moderate concentric hypertrophy, LA mild to moderately dilated, 3D echo LVEF was 0.51, normal RV cavity size, wall thickness, systolic function septal motion noted, marked lipomatous hypertrophy of the interatrial septum present.  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 (normal variant)  Initiation of sotalol, 7/13/2019, with " persistent atrial fibrillation on EKG, 7/26/2019  Successful external cardioversion, 08/05/2019, with frequent atrial ectopy on EKG, 08/09/2019, now in normal sinus rhythm on EKG, 10/16/2019  Normal sinus rhythm on ECG in office with acceptable QTC on sotalol therapy, May 2020, December 2020, July 2021, February 2022, August 2022, February 2023, August 2023  Incidental asymptomatic echocardiographic right atrial mass subsequently felt to be a prominent ato terminalis on PIETER study (2.6 x 1.6 cm), July 2019  Mild dyspnea on exertion/chronic persistent asthma  Remote stress test 30-40 years ago; negative per patient-data deficit, patient reports that this was performed for a baseline and not because of any symptoms  PFTs 2/25/2020: Moderate airway obstruction, improvement in FEV1 with bronchodilator, no restriction, air-trapping present, no diffusion  CCS class 0 chest discomfort/NYHA class I-II PALM, August 2022, August 2023  At risk for sleep apnea with sleep consult, 8/21/2019, with polysomnogram January 2020 that showed mild obstructive sleep apnea and nocturnal hypoxemia with initiation of CPAP therapy, intolerant CPAP  Chronic lower extremity edema  Hypertension  Dyslipidemia; ASCVD 10 year risk is 45.8%, 17.4% if treated, initiate rosuvastatin 10 mg nightly May 2020 with consideration of vascepa 2g bid after repeat FLP, initiation of Vascepa July 2021  Type 2 diabetes mellitus;HgbA1C 6.39% October 2019; 5.7%, August 2020, 6.2% December 2022  Chronic persistent asthma  10. History of lower extremity cellulitis with MRSA  11.History of melanoma  12. Morbid obesity: BMI 43.93  13. Mechanical fall with subsequent development on cellulitis with IV antibiotic administration, October 2020  14. BPH  15.  Mechanical fall with severe bruising/mild lacerations to his left knee, right wrist, forehead with negative work-up at [facility] January 2022-data deficit  16. Surgical  history  Vasectomy  Tonsillectomy  Deviated septum surgery  Multiple skin cancer excisions  Partial right knee replacement, November 2019-data deficit    Allergies   Allergen Reactions    Adhesive Tape Rash       Current Outpatient Medications   Medication Instructions    albuterol (PROVENTIL) 2.5 mg, Nebulization, Every 4 Hours PRN    albuterol sulfate  (90 Base) MCG/ACT inhaler 2 puffs, Inhalation, Every 4 Hours PRN    amLODIPine (NORVASC) 10 mg, Oral, Daily    ascorbic acid (VITAMIN C) 1000 MG tablet No dose, route, or frequency recorded.    Dupilumab 300 mg, Subcutaneous, Every 14 Days    Eliquis 5 MG tablet tablet TAKE 1 TABLET BY MOUTH  TWICE DAILY    EPINEPHrine (EPIPEN) 0.3 MG/0.3ML solution auto-injector injection No dose, route, or frequency recorded.    fexofenadine (ALLEGRA ALLERGY) 180 mg, Oral, Daily    finasteride (PROSCAR) 5 MG tablet No dose, route, or frequency recorded.    Fluticasone Furoate-Vilanterol (Breo Ellipta) 200-25 MCG/ACT inhaler 1 puff, Inhalation, Daily - RT    hydroCHLOROthiazide (HYDRODIURIL) 50 mg, Oral, Daily    HYDROcodone-acetaminophen (NORCO) 7.5-325 MG per tablet No dose, route, or frequency recorded.    icosapent ethyl (VASCEPA) 1 g capsule capsule TAKE 2 CAPSULES BY MOUTH  TWICE DAILY WITH MEALS    levoFLOXacin (LEVAQUIN) 500 MG tablet     montelukast (SINGULAIR) 10 mg, Oral, Daily    Multiple Vitamins-Minerals (VITEYES AREDS FORMULA PO) 1 tablet, Oral, 2 Times Daily    sotalol (BETAPACE AF) 80 MG tablet tablet TAKE 1 TABLET BY MOUTH  TWICE DAILY    tamsulosin (FLOMAX) 0.4 MG capsule 24 hr capsule No dose, route, or frequency recorded.    traMADol (ULTRAM) 50 MG tablet As Needed         HISTORY OF PRESENT ILLNESS:  The patient is here for 6-month follow-up.  He denies any chest pain, palpitations, dizziness, presyncope, syncope, or increased edema.  He has had increased stress because his wife is not in good health and they are currently in the process of looking at  "different nursing homes that they may want to go to.  He has a lot of bilateral knee pain and has been to pain management and orthopedics.  He is on limited activity due to this.  He has some mild shortness of breath but does not feel like it is any worse over the past 6-12 months.  His blood pressures are generally in the 130s over 80s but if he is up \"running around\" then his blood pressure will be a little higher when he checks it.  He has not had any recent labs and is going to follow-up with Dr. Ayaan gagnon.      ROS   All other systems reviewed and otherwise negative.      ECG 12 Lead    Date/Time: 8/18/2023 9:40 AM  Performed by: Jaja Mansfield APRN  Authorized by: Jaja aMnsfield APRN   Rhythm comments: Normal sinus rhythm with sinus arrhythmia, left axis deviation, possible anterior infarct, age undetermined, abnormal ECG, 65 bpm,  ms, QTc 440 ms,  ms, no significant changes from last ECG in February 2023           Objective:       Vitals:    08/18/23 0929 08/18/23 0935 08/18/23 1011   BP: 178/92 158/97 140/78   BP Location: Left arm Left arm Right arm   Patient Position: Sitting Standing Sitting   Pulse: 65 73    SpO2: 96% 94%    Weight: (!) 151 kg (333 lb)     Height: 185.4 cm (73\")       Body mass index is 43.93 kg/mý.  Last weight February 2023 was 331 pounds  Constitutional:       Appearance: Healthy appearance. Not in distress.   Neck:      Vascular: No JVR. JVD normal.   Pulmonary:      Effort: Pulmonary effort is normal.      Breath sounds: Normal breath sounds. No wheezing. No rhonchi. No rales.   Chest:      Chest wall: Not tender to palpatation.   Cardiovascular:      PMI at left midclavicular line. Normal rate. Regular rhythm. Normal S1. Normal S2.       Murmurs: There is a grade 1/6 systolic murmur at the LLSB.      No gallop.  No click. No rub.      Comments: Compression stockings in place  Pulses:     Dorsalis pedis: 2+ bilaterally.     Posterior tibial: 2+ " bilaterally.  Edema:     Peripheral edema absent.   Abdominal:      General: Bowel sounds are normal.      Palpations: Abdomen is soft.      Tenderness: There is no abdominal tenderness.   Musculoskeletal: Normal range of motion.         General: No tenderness. Skin:     General: Skin is warm and dry.   Neurological:      General: No focal deficit present.      Mental Status: Alert and oriented to person, place and time.         Lab Review:   Lab Results   Component Value Date    GLUCOSE 155 (H) 03/16/2021    BUN 16 03/16/2021    CREATININE 0.76 03/16/2021    EGFRIFNONA 100 03/16/2021    BCR 21.1 03/16/2021    CO2 31.0 (H) 03/16/2021    CALCIUM 9.5 03/16/2021    ALBUMIN 4.20 03/16/2021    AST 14 03/16/2021    ALT 11 03/16/2021       Lab Results   Component Value Date    WBC 7.69 03/16/2021    HGB 16.1 03/16/2021    HCT 49.5 03/16/2021    MCV 84.9 03/16/2021     03/16/2021       Lab Results   Component Value Date    HGBA1C 5.7 08/17/2020       Lab Results   Component Value Date    TSH 2.020 03/10/2021       Lab Results   Component Value Date    CHOL 144 03/10/2021    CHOL 122 08/19/2020     Lab Results   Component Value Date    TRIG 309 (H) 03/10/2021    TRIG 179 (H) 08/19/2020     Lab Results   Component Value Date    HDL 31 (L) 03/10/2021    HDL 35 (L) 08/19/2020     Lab Results   Component Value Date    LDL 64 03/10/2021    LDL 51 08/19/2020     Advance Care Planning   ACP discussion was held with the patient during this visit. Patient has an advance directive (not in EMR), copy requested.              Assessment:       Overall continued acceptable course with no new interim cardiopulmonary complaints with acceptable functional status. We will defer additional diagnostic or therapeutic intervention from a cardiac perspective at this time. The patient is in normal sinus rhythm on ECG in office today with acceptable QTc on sotalol therapy.  The patient is going to follow-up with Dr. Ayaan gagnon for labs.   The patient will continue monitoring his blood pressure for the next 2 weeks and call back with readings: May consider switching hydrochlorothiazide to chlorthalidone.     Diagnosis Plan   1. Paroxysmal atrial fibrillation  The patient is in normal sinus rhythm on ECG in office today with acceptable QTc on sotalol therapy.       2. Primary hypertension  The patient will continue monitoring his blood pressure for the next 2 weeks and call back with readings: May consider switching hydrochlorothiazide to chlorthalidone.      3. Dyslipidemia  No new labs to review, continue Vascepa      4. Obstructive sleep apnea - Intolerant CPAP/BiPAP  Intolerant to CPAP             Plan:         Patient to continue current medications and close follow up with the above providers.  Tentative cardiology follow up in February 2024 or patient may return sooner PRN.  The patient will continue monitoring his blood pressure for the next 2 weeks and call back with readings: May consider switching hydrochlorothiazide to chlorthalidone.      Electronically signed by ANGELIA Liu, 08/18/23, 10:14 AM EDT.

## 2023-08-18 ENCOUNTER — OFFICE VISIT (OUTPATIENT)
Dept: CARDIOLOGY | Facility: CLINIC | Age: 78
End: 2023-08-18
Payer: MEDICARE

## 2023-08-18 VITALS
OXYGEN SATURATION: 94 % | HEIGHT: 73 IN | BODY MASS INDEX: 41.75 KG/M2 | HEART RATE: 73 BPM | WEIGHT: 315 LBS | DIASTOLIC BLOOD PRESSURE: 78 MMHG | SYSTOLIC BLOOD PRESSURE: 140 MMHG

## 2023-08-18 DIAGNOSIS — G47.33 OBSTRUCTIVE SLEEP APNEA, ADULT: ICD-10-CM

## 2023-08-18 DIAGNOSIS — I10 PRIMARY HYPERTENSION: ICD-10-CM

## 2023-08-18 DIAGNOSIS — I48.0 PAROXYSMAL ATRIAL FIBRILLATION: Primary | ICD-10-CM

## 2023-08-18 DIAGNOSIS — E78.5 DYSLIPIDEMIA: ICD-10-CM

## 2023-08-18 PROCEDURE — 1160F RVW MEDS BY RX/DR IN RCRD: CPT | Performed by: NURSE PRACTITIONER

## 2023-08-18 PROCEDURE — 99214 OFFICE O/P EST MOD 30 MIN: CPT | Performed by: NURSE PRACTITIONER

## 2023-08-18 PROCEDURE — 1159F MED LIST DOCD IN RCRD: CPT | Performed by: NURSE PRACTITIONER

## 2023-08-18 PROCEDURE — 3077F SYST BP >= 140 MM HG: CPT | Performed by: NURSE PRACTITIONER

## 2023-08-18 PROCEDURE — 93000 ELECTROCARDIOGRAM COMPLETE: CPT | Performed by: NURSE PRACTITIONER

## 2023-08-18 PROCEDURE — 3078F DIAST BP <80 MM HG: CPT | Performed by: NURSE PRACTITIONER

## 2023-08-18 RX ORDER — LEVOFLOXACIN 500 MG/1
TABLET, FILM COATED ORAL
COMMUNITY
Start: 2023-08-16

## 2023-09-18 RX ORDER — MONTELUKAST SODIUM 10 MG/1
10 TABLET ORAL DAILY
Qty: 90 TABLET | Refills: 3 | Status: SHIPPED | OUTPATIENT
Start: 2023-09-18

## 2023-10-31 RX ORDER — SOTALOL HYDROCHLORIDE 80 MG/1
TABLET ORAL
Qty: 180 TABLET | Refills: 3 | Status: SHIPPED | OUTPATIENT
Start: 2023-10-31

## 2023-12-25 ENCOUNTER — APPOINTMENT (OUTPATIENT)
Dept: GENERAL RADIOLOGY | Facility: HOSPITAL | Age: 78
End: 2023-12-25
Payer: MEDICARE

## 2023-12-25 ENCOUNTER — HOSPITAL ENCOUNTER (INPATIENT)
Facility: HOSPITAL | Age: 78
LOS: 2 days | Discharge: HOME HEALTH | End: 2023-12-27
Attending: EMERGENCY MEDICINE | Admitting: INTERNAL MEDICINE
Payer: MEDICARE

## 2023-12-25 DIAGNOSIS — R06.09 DOE (DYSPNEA ON EXERTION): ICD-10-CM

## 2023-12-25 DIAGNOSIS — B34.9 ACUTE VIRAL SYNDROME: Primary | ICD-10-CM

## 2023-12-25 DIAGNOSIS — I48.0 PAROXYSMAL ATRIAL FIBRILLATION WITH RAPID VENTRICULAR RESPONSE: ICD-10-CM

## 2023-12-25 DIAGNOSIS — I10 ELEVATED BLOOD PRESSURE READING WITH DIAGNOSIS OF HYPERTENSION: ICD-10-CM

## 2023-12-25 PROBLEM — I48.91 RAPID ATRIAL FIBRILLATION: Status: ACTIVE | Noted: 2023-12-25

## 2023-12-25 PROBLEM — B34.8 RHINOVIRUS: Status: ACTIVE | Noted: 2023-12-25

## 2023-12-25 LAB
ALBUMIN SERPL-MCNC: 4 G/DL (ref 3.5–5.2)
ALBUMIN/GLOB SERPL: 1.3 G/DL
ALP SERPL-CCNC: 67 U/L (ref 39–117)
ALT SERPL W P-5'-P-CCNC: 73 U/L (ref 1–41)
ANION GAP SERPL CALCULATED.3IONS-SCNC: 16 MMOL/L (ref 5–15)
AST SERPL-CCNC: 88 U/L (ref 1–40)
B PARAPERT DNA SPEC QL NAA+PROBE: NOT DETECTED
B PERT DNA SPEC QL NAA+PROBE: NOT DETECTED
BACTERIA UR QL AUTO: ABNORMAL /HPF
BASOPHILS # BLD AUTO: 0.05 10*3/MM3 (ref 0–0.2)
BASOPHILS NFR BLD AUTO: 0.4 % (ref 0–1.5)
BILIRUB SERPL-MCNC: 0.8 MG/DL (ref 0–1.2)
BILIRUB UR QL STRIP: NEGATIVE
BUN SERPL-MCNC: 27 MG/DL (ref 8–23)
BUN/CREAT SERPL: 26 (ref 7–25)
C PNEUM DNA NPH QL NAA+NON-PROBE: NOT DETECTED
CALCIUM SPEC-SCNC: 9.3 MG/DL (ref 8.6–10.5)
CHLORIDE SERPL-SCNC: 94 MMOL/L (ref 98–107)
CLARITY UR: ABNORMAL
CO2 SERPL-SCNC: 25 MMOL/L (ref 22–29)
COLOR UR: ABNORMAL
CREAT SERPL-MCNC: 1.04 MG/DL (ref 0.76–1.27)
DEPRECATED RDW RBC AUTO: 45.1 FL (ref 37–54)
EGFRCR SERPLBLD CKD-EPI 2021: 73.5 ML/MIN/1.73
EOSINOPHIL # BLD AUTO: 0.01 10*3/MM3 (ref 0–0.4)
EOSINOPHIL NFR BLD AUTO: 0.1 % (ref 0.3–6.2)
ERYTHROCYTE [DISTWIDTH] IN BLOOD BY AUTOMATED COUNT: 14.3 % (ref 12.3–15.4)
FLUAV SUBTYP SPEC NAA+PROBE: NOT DETECTED
FLUBV RNA ISLT QL NAA+PROBE: NOT DETECTED
GLOBULIN UR ELPH-MCNC: 3 GM/DL
GLUCOSE SERPL-MCNC: 178 MG/DL (ref 65–99)
GLUCOSE UR STRIP-MCNC: NEGATIVE MG/DL
HADV DNA SPEC NAA+PROBE: NOT DETECTED
HCOV 229E RNA SPEC QL NAA+PROBE: NOT DETECTED
HCOV HKU1 RNA SPEC QL NAA+PROBE: NOT DETECTED
HCOV NL63 RNA SPEC QL NAA+PROBE: NOT DETECTED
HCOV OC43 RNA SPEC QL NAA+PROBE: NOT DETECTED
HCT VFR BLD AUTO: 42.4 % (ref 37.5–51)
HGB BLD-MCNC: 14.1 G/DL (ref 13–17.7)
HGB UR QL STRIP.AUTO: ABNORMAL
HMPV RNA NPH QL NAA+NON-PROBE: NOT DETECTED
HOLD SPECIMEN: NORMAL
HPIV1 RNA ISLT QL NAA+PROBE: NOT DETECTED
HPIV2 RNA SPEC QL NAA+PROBE: NOT DETECTED
HPIV3 RNA NPH QL NAA+PROBE: NOT DETECTED
HPIV4 P GENE NPH QL NAA+PROBE: NOT DETECTED
HYALINE CASTS UR QL AUTO: ABNORMAL /LPF
IMM GRANULOCYTES # BLD AUTO: 0.18 10*3/MM3 (ref 0–0.05)
IMM GRANULOCYTES NFR BLD AUTO: 1.5 % (ref 0–0.5)
KETONES UR QL STRIP: NEGATIVE
LEUKOCYTE ESTERASE UR QL STRIP.AUTO: ABNORMAL
LYMPHOCYTES # BLD AUTO: 1.94 10*3/MM3 (ref 0.7–3.1)
LYMPHOCYTES NFR BLD AUTO: 16.2 % (ref 19.6–45.3)
M PNEUMO IGG SER IA-ACNC: NOT DETECTED
MCH RBC QN AUTO: 29 PG (ref 26.6–33)
MCHC RBC AUTO-ENTMCNC: 33.3 G/DL (ref 31.5–35.7)
MCV RBC AUTO: 87.2 FL (ref 79–97)
MONOCYTES # BLD AUTO: 1.67 10*3/MM3 (ref 0.1–0.9)
MONOCYTES NFR BLD AUTO: 13.9 % (ref 5–12)
MUCOUS THREADS URNS QL MICRO: ABNORMAL /HPF
NEUTROPHILS NFR BLD AUTO: 67.9 % (ref 42.7–76)
NEUTROPHILS NFR BLD AUTO: 8.14 10*3/MM3 (ref 1.7–7)
NITRITE UR QL STRIP: NEGATIVE
NRBC BLD AUTO-RTO: 0.7 /100 WBC (ref 0–0.2)
NT-PROBNP SERPL-MCNC: 455.3 PG/ML (ref 0–1800)
PH UR STRIP.AUTO: 5.5 [PH] (ref 5–8)
PLATELET # BLD AUTO: 319 10*3/MM3 (ref 140–450)
PMV BLD AUTO: 10.8 FL (ref 6–12)
POTASSIUM SERPL-SCNC: 3.5 MMOL/L (ref 3.5–5.2)
PROT SERPL-MCNC: 7 G/DL (ref 6–8.5)
PROT UR QL STRIP: ABNORMAL
QT INTERVAL: 438 MS
QTC INTERVAL: 573 MS
RBC # BLD AUTO: 4.86 10*6/MM3 (ref 4.14–5.8)
RBC # UR STRIP: ABNORMAL /HPF
REF LAB TEST METHOD: ABNORMAL
RHINOVIRUS RNA SPEC NAA+PROBE: DETECTED
RSV RNA NPH QL NAA+NON-PROBE: NOT DETECTED
SARS-COV-2 RNA NPH QL NAA+NON-PROBE: NOT DETECTED
SODIUM SERPL-SCNC: 135 MMOL/L (ref 136–145)
SP GR UR STRIP: 1.01 (ref 1–1.03)
SQUAMOUS #/AREA URNS HPF: ABNORMAL /HPF
TROPONIN T SERPL HS-MCNC: 10 NG/L
UROBILINOGEN UR QL STRIP: ABNORMAL
WBC # UR STRIP: ABNORMAL /HPF
WBC NRBC COR # BLD AUTO: 11.99 10*3/MM3 (ref 3.4–10.8)
WHOLE BLOOD HOLD COAG: NORMAL
WHOLE BLOOD HOLD SPECIMEN: NORMAL

## 2023-12-25 PROCEDURE — 0202U NFCT DS 22 TRGT SARS-COV-2: CPT | Performed by: EMERGENCY MEDICINE

## 2023-12-25 PROCEDURE — 83880 ASSAY OF NATRIURETIC PEPTIDE: CPT | Performed by: EMERGENCY MEDICINE

## 2023-12-25 PROCEDURE — 71045 X-RAY EXAM CHEST 1 VIEW: CPT

## 2023-12-25 PROCEDURE — 94799 UNLISTED PULMONARY SVC/PX: CPT

## 2023-12-25 PROCEDURE — 85025 COMPLETE CBC W/AUTO DIFF WBC: CPT | Performed by: EMERGENCY MEDICINE

## 2023-12-25 PROCEDURE — 93005 ELECTROCARDIOGRAM TRACING: CPT | Performed by: INTERNAL MEDICINE

## 2023-12-25 PROCEDURE — 93005 ELECTROCARDIOGRAM TRACING: CPT | Performed by: EMERGENCY MEDICINE

## 2023-12-25 PROCEDURE — 99223 1ST HOSP IP/OBS HIGH 75: CPT | Performed by: INTERNAL MEDICINE

## 2023-12-25 PROCEDURE — 80053 COMPREHEN METABOLIC PANEL: CPT | Performed by: EMERGENCY MEDICINE

## 2023-12-25 PROCEDURE — 99285 EMERGENCY DEPT VISIT HI MDM: CPT

## 2023-12-25 PROCEDURE — 84484 ASSAY OF TROPONIN QUANT: CPT | Performed by: EMERGENCY MEDICINE

## 2023-12-25 PROCEDURE — 93010 ELECTROCARDIOGRAM REPORT: CPT | Performed by: INTERNAL MEDICINE

## 2023-12-25 PROCEDURE — 81001 URINALYSIS AUTO W/SCOPE: CPT | Performed by: EMERGENCY MEDICINE

## 2023-12-25 PROCEDURE — 94640 AIRWAY INHALATION TREATMENT: CPT

## 2023-12-25 RX ORDER — BUDESONIDE AND FORMOTEROL FUMARATE DIHYDRATE 160; 4.5 UG/1; UG/1
1 AEROSOL RESPIRATORY (INHALATION)
Status: DISCONTINUED | OUTPATIENT
Start: 2023-12-25 | End: 2023-12-27 | Stop reason: HOSPADM

## 2023-12-25 RX ORDER — SODIUM CHLORIDE 0.9 % (FLUSH) 0.9 %
10 SYRINGE (ML) INJECTION EVERY 12 HOURS SCHEDULED
Status: DISCONTINUED | OUTPATIENT
Start: 2023-12-25 | End: 2023-12-27 | Stop reason: HOSPADM

## 2023-12-25 RX ORDER — ESMOLOL HYDROCHLORIDE 10 MG/ML
50-300 INJECTION, SOLUTION INTRAVENOUS
Status: DISCONTINUED | OUTPATIENT
Start: 2023-12-25 | End: 2023-12-25

## 2023-12-25 RX ORDER — FINASTERIDE 5 MG/1
5 TABLET, FILM COATED ORAL DAILY
Status: DISCONTINUED | OUTPATIENT
Start: 2023-12-25 | End: 2023-12-27 | Stop reason: HOSPADM

## 2023-12-25 RX ORDER — SOTALOL HYDROCHLORIDE 80 MG/1
80 TABLET ORAL EVERY 12 HOURS SCHEDULED
Status: DISCONTINUED | OUTPATIENT
Start: 2023-12-25 | End: 2023-12-27 | Stop reason: HOSPADM

## 2023-12-25 RX ORDER — SODIUM CHLORIDE 0.9 % (FLUSH) 0.9 %
10 SYRINGE (ML) INJECTION AS NEEDED
Status: DISCONTINUED | OUTPATIENT
Start: 2023-12-25 | End: 2023-12-27 | Stop reason: HOSPADM

## 2023-12-25 RX ORDER — TAMSULOSIN HYDROCHLORIDE 0.4 MG/1
0.4 CAPSULE ORAL DAILY
Status: DISCONTINUED | OUTPATIENT
Start: 2023-12-25 | End: 2023-12-27 | Stop reason: HOSPADM

## 2023-12-25 RX ORDER — SODIUM CHLORIDE 9 MG/ML
40 INJECTION, SOLUTION INTRAVENOUS AS NEEDED
Status: DISCONTINUED | OUTPATIENT
Start: 2023-12-25 | End: 2023-12-27 | Stop reason: HOSPADM

## 2023-12-25 RX ORDER — SODIUM CHLORIDE 0.9 % (FLUSH) 0.9 %
10 SYRINGE (ML) INJECTION AS NEEDED
Status: DISCONTINUED | OUTPATIENT
Start: 2023-12-25 | End: 2023-12-26

## 2023-12-25 RX ORDER — ALBUTEROL SULFATE 2.5 MG/3ML
2.5 SOLUTION RESPIRATORY (INHALATION) EVERY 4 HOURS PRN
Status: DISCONTINUED | OUTPATIENT
Start: 2023-12-25 | End: 2023-12-27 | Stop reason: HOSPADM

## 2023-12-25 RX ORDER — MONTELUKAST SODIUM 10 MG/1
10 TABLET ORAL DAILY
Status: DISCONTINUED | OUTPATIENT
Start: 2023-12-25 | End: 2023-12-27 | Stop reason: HOSPADM

## 2023-12-25 RX ORDER — ALBUTEROL SULFATE 90 UG/1
2 AEROSOL, METERED RESPIRATORY (INHALATION) EVERY 4 HOURS PRN
Status: DISCONTINUED | OUTPATIENT
Start: 2023-12-25 | End: 2023-12-25 | Stop reason: SDUPTHER

## 2023-12-25 RX ORDER — BUMETANIDE 0.25 MG/ML
2 INJECTION INTRAMUSCULAR; INTRAVENOUS ONCE
Status: COMPLETED | OUTPATIENT
Start: 2023-12-25 | End: 2023-12-25

## 2023-12-25 RX ORDER — HYDROCODONE BITARTRATE AND ACETAMINOPHEN 7.5; 325 MG/1; MG/1
1 TABLET ORAL EVERY 6 HOURS PRN
Status: DISCONTINUED | OUTPATIENT
Start: 2023-12-25 | End: 2023-12-27 | Stop reason: HOSPADM

## 2023-12-25 RX ORDER — AMLODIPINE BESYLATE 10 MG/1
10 TABLET ORAL DAILY
Status: DISCONTINUED | OUTPATIENT
Start: 2023-12-25 | End: 2023-12-27 | Stop reason: HOSPADM

## 2023-12-25 RX ORDER — BUMETANIDE 1 MG/1
2 TABLET ORAL ONCE
Status: COMPLETED | OUTPATIENT
Start: 2023-12-25 | End: 2023-12-25

## 2023-12-25 RX ADMIN — MONTELUKAST 10 MG: 10 TABLET, FILM COATED ORAL at 20:10

## 2023-12-25 RX ADMIN — BUMETANIDE 2 MG: 1 TABLET ORAL at 20:10

## 2023-12-25 RX ADMIN — Medication 10 ML: at 20:11

## 2023-12-25 RX ADMIN — BUDESONIDE AND FORMOTEROL FUMARATE DIHYDRATE 1 PUFF: 160; 4.5 AEROSOL RESPIRATORY (INHALATION) at 20:40

## 2023-12-25 RX ADMIN — APIXABAN 5 MG: 5 TABLET, FILM COATED ORAL at 20:10

## 2023-12-25 RX ADMIN — AMLODIPINE BESYLATE 10 MG: 10 TABLET ORAL at 20:10

## 2023-12-25 RX ADMIN — BUMETANIDE 2 MG: 0.25 INJECTION, SOLUTION INTRAMUSCULAR; INTRAVENOUS at 15:51

## 2023-12-25 RX ADMIN — TAMSULOSIN HYDROCHLORIDE 0.4 MG: 0.4 CAPSULE ORAL at 20:10

## 2023-12-25 RX ADMIN — FINASTERIDE 5 MG: 5 TABLET, FILM COATED ORAL at 20:10

## 2023-12-25 RX ADMIN — SOTALOL HYDROCHLORIDE 80 MG: 80 TABLET ORAL at 20:10

## 2023-12-25 RX ADMIN — NITROGLYCERIN 1 INCH: 20 OINTMENT TOPICAL at 15:05

## 2023-12-25 NOTE — ED NOTES
Luis ePrez Jr.    Nursing Report ED to Floor:  Mental status:AOX4  Ambulatory status: W/ ASSISTANCE  Oxygen Therapy:  RA  Cardiac Rhythm: AFIB W/ RVR  Admitted from: HOME  Safety Concerns:  NA  Social Issues: NA  ED Room #:  04    ED Nurse Phone Extension - [phone] or may call [phone].      HPI:   Chief Complaint   Patient presents with    Shortness of Breath       Past Medical History:  Past Medical History:   Diagnosis Date    Asthma     Bronchitis, chronic     Cancer     Skin     Cellulitis and abscess of right leg     Chronic persistent asthma 02/25/2020    Hyperlipidemia     Hypertension     Nephrolithiasis     Obesity, Class III, BMI 40-49.9 (morbid obesity) 02/25/2020    Paroxysmal atrial fibrillation 07/15/2019    Pneumonia     Sleep apnea     UTI (urinary tract infection)     Vitamin D deficiency 08/20/2020        Past Surgical History:  Past Surgical History:   Procedure Laterality Date    CARDIOVERSION  08/05/2019    COLONOSCOPY      NASAL SEPTUM SURGERY      Deviation Repair    SKIN CANCER EXCISION      TONSILLECTOMY      VASECTOMY          Admitting Doctor:   Memo Kolb MD    Consulting Provider(s):  Consults       No orders found from 11/26/2023 to 12/26/2023.             Admitting Diagnosis:   The primary encounter diagnosis was Acute viral syndrome. Diagnoses of Paroxysmal atrial fibrillation with rapid ventricular response, PALM (dyspnea on exertion), and Elevated blood pressure reading with diagnosis of hypertension were also pertinent to this visit.    Most Recent Vitals:   Vitals:    12/25/23 1551 12/25/23 1730 12/25/23 1737 12/25/23 1759   BP: (!) 152/119 116/77     BP Location:       Patient Position:       Pulse: 112 117 113 120   Resp:       Temp:       TempSrc:       SpO2:  94% 94% 94%   Weight:       Height:           Active LDAs/IV Access:   Lines, Drains & Airways       Active LDAs       Name Placement date Placement time Site Days    Peripheral IV 12/13/19 0825 Right Antecubital  12/13/19  0854  Antecubital  1473    Peripheral IV 12/25/23 1419 Left Antecubital 12/25/23  1419  Antecubital  less than 1                    Labs (abnormal labs have a star):   Labs Reviewed   RESPIRATORY PANEL PCR W/ COVID-19 (SARS-COV-2), NP SWAB IN UTM/VTP, 2 HR TAT - Abnormal; Notable for the following components:       Result Value    Human Rhinovirus/Enterovirus Detected (*)     All other components within normal limits    Narrative:     In the setting of a positive respiratory panel with a viral infection PLUS a negative procalcitonin without other underlying concern for bacterial infection, consider observing off antibiotics or discontinuation of antibiotics and continue supportive care. If the respiratory panel is positive for atypical bacterial infection (Bordetella pertussis, Chlamydophila pneumoniae, or Mycoplasma pneumoniae), consider antibiotic de-escalation to target atypical bacterial infection.   COMPREHENSIVE METABOLIC PANEL - Abnormal; Notable for the following components:    Glucose 178 (*)     BUN 27 (*)     Sodium 135 (*)     Chloride 94 (*)     ALT (SGPT) 73 (*)     AST (SGOT) 88 (*)     BUN/Creatinine Ratio 26.0 (*)     Anion Gap 16.0 (*)     All other components within normal limits    Narrative:     GFR Normal >60  Chronic Kidney Disease <60  Kidney Failure <15    The GFR formula is only valid for adults with stable renal function between ages 18 and 70.   CBC WITH AUTO DIFFERENTIAL - Abnormal; Notable for the following components:    WBC 11.99 (*)     Lymphocyte % 16.2 (*)     Monocyte % 13.9 (*)     Eosinophil % 0.1 (*)     Immature Grans % 1.5 (*)     Neutrophils, Absolute 8.14 (*)     Monocytes, Absolute 1.67 (*)     Immature Grans, Absolute 0.18 (*)     nRBC 0.7 (*)     All other components within normal limits   BNP (IN-HOUSE) - Normal    Narrative:     This assay is used as an aid in the diagnosis of individuals suspected of having heart failure. It can be used as an aid in the  diagnosis of acute decompensated heart failure (ADHF) in patients presenting with signs and symptoms of ADHF to the emergency department (ED). In addition, NT-proBNP of <300 pg/mL indicates ADHF is not likely.    Age Range Result Interpretation  NT-proBNP Concentration (pg/mL:      <50             Positive            >450                   Gray                 300-450                    Negative             <300    50-75           Positive            >900                  Gray                300-900                  Negative            <300      >75             Positive            >1800                  Gray                300-1800                  Negative            <300   SINGLE HSTROPONIN T - Normal    Narrative:     High Sensitive Troponin T Reference Range:  <14.0 ng/L- Negative Female for AMI  <22.0 ng/L- Negative Male for AMI  >=14 - Abnormal Female indicating possible myocardial injury.  >=22 - Abnormal Male indicating possible myocardial injury.   Clinicians would have to utilize clinical acumen, EKG, Troponin, and serial changes to determine if it is an Acute Myocardial Infarction or myocardial injury due to an underlying chronic condition.        COVID PRE-OP / PRE-PROCEDURE SCREENING ORDER (NO ISOLATION)    Narrative:     The following orders were created for panel order COVID PRE-OP / PRE-PROCEDURE SCREENING ORDER (NO ISOLATION) - Swab, Nasopharynx.  Procedure                               Abnormality         Status                     ---------                               -----------         ------                     Respiratory Panel PCR w/...[[id number]]  Abnormal            Final result                 Please view results for these tests on the individual orders.   RAINBOW DRAW    Narrative:     The following orders were created for panel order [place] Draw.  Procedure                               Abnormality         Status                     ---------                               -----------          ------                     Green Top (Gel)[[id number]]                                  Final result               Lavender Top[[id number]]                                     Final result               Gold Top - SST[[id number]]                                   Final result               Horner Top[[id number]]                                         In process                 Light Blue Top[[id number]]                                   Final result                 Please view results for these tests on the individual orders.   URINALYSIS W/ MICROSCOPIC IF INDICATED (NO CULTURE)   URINALYSIS, MICROSCOPIC ONLY   CBC AND DIFFERENTIAL    Narrative:     The following orders were created for panel order CBC & Differential.  Procedure                               Abnormality         Status                     ---------                               -----------         ------                     CBC Auto Differential[[id number]]        Abnormal            Final result                 Please view results for these tests on the individual orders.   GREEN TOP   LAVENDER TOP   GOLD TOP - SST   LIGHT BLUE TOP   GRAY TOP       Meds Given in ED:   Medications   sodium chloride 0.9 % flush 10 mL (has no administration in time range)   bumetanide (BUMEX) tablet 2 mg (has no administration in time range)   nitroglycerin (NITROSTAT) ointment 1 inch (1 inch Topical Given 12/25/23 6915)   bumetanide (BUMEX) injection 2 mg (2 mg Intravenous Given 12/25/23 6921)

## 2023-12-25 NOTE — H&P
[facility] Medicine Services  HISTORY AND PHYSICAL    Patient Name: Luis Perez Jr.  : 1945  MRN: [id number]  Primary Care Physician: Sierra Kuo MD  Date of admission: 2023      Subjective   Subjective     Chief Complaint:  Shortness of breath    HPI:  Luis Perez Jr. is a 78 y.o. male with a history of paroxysmal A-fib on sotalol and Eliquis followed by Dr. Perez, morbid obesity with a BMI of 43, obstructive lung disease, sleep apnea who presents to the emergency room with complaints of progressive shortness of breath over the last 1 to 2 weeks.  It is very limiting to where he is now only able to take a few steps without having to stop to catch his breath.  At his baseline he would be able to walk for a fair distance.  Denies any specific orthopnea.  Denies any palpitations or chest pain.  Was seen at a urgent treatment center 2 days ago and was given doxycycline and some medicine for congestion but it has not improved his symptoms.  He does have some upper respiratory symptoms and here has tested positive for rhinovirus/enterovirus.  More importantly he appears to be in rapid A-fib with his rates ranging from 110-130 while here within acceptable blood pressure.  States he has been compliant with his medications.  He has had some increasing lower extremity edema.  Chest x-ray shows cardiomegaly and some venous congestion.  He was given 2 mg of IV Bumex but is not yet had much output and has not yet had any improvement of his shortness of breath.  Last echocardiogram was in 2019 with a good EF.  He is not on oxygen chronically and has not required it here.  He will be admitted for further evaluation.      Personal History     Past Medical History:   Diagnosis Date    Asthma     Bronchitis, chronic     Cancer     Skin     Cellulitis and abscess of right leg     Chronic persistent asthma 2020    Hyperlipidemia     Hypertension     Nephrolithiasis      Obesity, Class III, BMI 40-49.9 (morbid obesity) 02/25/2020    Paroxysmal atrial fibrillation 07/15/2019    Pneumonia     Sleep apnea     UTI (urinary tract infection)     Vitamin D deficiency 08/20/2020             Past Surgical History:   Procedure Laterality Date    CARDIOVERSION  08/05/2019    COLONOSCOPY      NASAL SEPTUM SURGERY      Deviation Repair    SKIN CANCER EXCISION      TONSILLECTOMY      VASECTOMY         Family History: family history includes Alzheimer's disease in his father; Cancer in his mother; Diabetes in his mother; No Known Problems in his maternal grandfather, maternal grandmother, paternal grandfather, and paternal grandmother.     Social History:  reports that he has never smoked. He has never been exposed to tobacco smoke. He quit smokeless tobacco use about 4 years ago.  His smokeless tobacco use included chew. He reports current alcohol use. He reports that he does not use drugs.  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      Allergies   Allergen Reactions    Adhesive Tape Rash       Objective   Objective     Vital Signs:   Temp:  [98 °F (36.7 °C)] 98 °F (36.7 °C)  Heart Rate:  [] 120  Resp:  [16] 16  BP: (116-152)/() 116/77       Physical Exam   Constitutional: Awake, alert, lying in bed, no acute distress  Eyes: PERRLA, sclerae anicteric, no conjunctival injection  HENT: NCAT, mucous membranes moist  Neck: Supple, no thyromegaly, no lymphadenopathy, trachea midline  Respiratory: Diminished somewhat bilaterally but no wheezes or rales  Cardiovascular: Irregularly irregular, rate ranging from 110-130, no murmurs, rubs, or gallops  Gastrointestinal: Obese positive bowel sounds, soft, nontender, nondistended  Musculoskeletal: 2+  lower extremity edema with chronic venous changes no clubbing or cyanosis to extremities  Psychiatric: Appropriate affect, cooperative  Neurologic: Oriented x 3, no deficits  Skin: No rashes      Result Review:  I have personally reviewed the results from the time of this admission to 12/25/2023 18:10 EST and agree with these findings:  [x]  Laboratory list / accordion  [x]  Microbiology  [x]  Radiology  []  EKG/Telemetry   []  Cardiology/Vascular   []  Pathology  []  Old records  []  Other:  Most notable findings include: I personally reviewed chest x-ray which showed some cardiomegaly and congestion.  Labs as below        LAB RESULTS:      Lab 12/25/23  1421   WBC 11.99*   HEMOGLOBIN 14.1   HEMATOCRIT 42.4   PLATELETS 319   NEUTROS ABS 8.14*   IMMATURE GRANS (ABS) 0.18*   LYMPHS ABS 1.94   MONOS ABS 1.67*   EOS ABS 0.01   MCV 87.2         Lab 12/25/23  1421   SODIUM 135*   POTASSIUM 3.5   CHLORIDE 94*   CO2 25.0   ANION GAP 16.0*   BUN 27*   CREATININE 1.04   EGFR 73.5   GLUCOSE 178*   CALCIUM 9.3         Lab 12/25/23  1421   TOTAL PROTEIN 7.0   ALBUMIN 4.0   GLOBULIN 3.0   ALT (SGPT) 73*   AST (SGOT) 88*   BILIRUBIN 0.8   ALK PHOS 67         Lab 12/25/23  1421   PROBNP 455.3   HSTROP T 10                     Microbiology Results (last 10 days)       Procedure Component Value - Date/Time    COVID PRE-OP / PRE-PROCEDURE SCREENING ORDER (NO ISOLATION) - Swab, Nasopharynx [[id number]]  (Abnormal) Collected: 12/25/23 1512    Lab Status: Final result Specimen: Swab from Nasopharynx Updated: 12/25/23 1639    Narrative:      The following orders were created for panel order COVID PRE-OP / PRE-PROCEDURE SCREENING ORDER (NO ISOLATION) - Swab, Nasopharynx.  Procedure                               Abnormality         Status                     ---------                               -----------         ------                     Respiratory Panel PCR w/...[[id number]]  Abnormal            Final result                 Please  view results for these tests on the individual orders.    Respiratory Panel PCR w/COVID-19(SARS-CoV-2) RIKY/DIEGO/JAVI/PAD/COR/KYLIE In-House, NP Swab in UTM/VTM, 2 HR TAT - Swab, Nasopharynx [[id number]]  (Abnormal) Collected: 12/25/23 4027    Lab Status: Final result Specimen: Swab from Nasopharynx Updated: 12/25/23 1639     ADENOVIRUS, PCR Not Detected     Coronavirus 229E Not Detected     Coronavirus HKU1 Not Detected     Coronavirus NL63 Not Detected     Coronavirus OC43 Not Detected     COVID19 Not Detected     Human Metapneumovirus Not Detected     Human Rhinovirus/Enterovirus Detected     Influenza A PCR Not Detected     Influenza B PCR Not Detected     Parainfluenza Virus 1 Not Detected     Parainfluenza Virus 2 Not Detected     Parainfluenza Virus 3 Not Detected     Parainfluenza Virus 4 Not Detected     RSV, PCR Not Detected     Bordetella pertussis pcr Not Detected     Bordetella parapertussis PCR Not Detected     Chlamydophila pneumoniae PCR Not Detected     Mycoplasma pneumo by PCR Not Detected    Narrative:      In the setting of a positive respiratory panel with a viral infection PLUS a negative procalcitonin without other underlying concern for bacterial infection, consider observing off antibiotics or discontinuation of antibiotics and continue supportive care. If the respiratory panel is positive for atypical bacterial infection (Bordetella pertussis, Chlamydophila pneumoniae, or Mycoplasma pneumoniae), consider antibiotic de-escalation to target atypical bacterial infection.            XR Chest 1 View    Result Date: 12/25/2023  XR CHEST 1 VW Date of Exam: 12/25/2023 2:39 PM EST Indication: SOA triage protocol Comparison: 3/9/2023 and prior Findings: Study is limited by overlying support and monitoring apparatus. The heart size is enlarged. This is accentuated by portable technique and lower lung volumes compared to the prior study. There is mild pulmonary vascular congestion. No acute osseous  "abnormality noted.     Impression: Impression: Cardiomegaly with mild pulmonary vascular congestion No focal consolidation noted. Electronically Signed: Vlad Antony MD  12/25/2023 2:56 PM EST  Workstation ID: [id number]     Results for orders placed during the hospital encounter of 07/08/19    Adult Transesophageal Echo (PIETER) W/ Cont if Necessary Per Protocol    Interpretation Summary  · Mild mitral valve regurgitation is present.  · Left ventricular wall thickness is consistent with moderate concentric hypertrophy.  · Estimated EF = 60%.  · Left ventricular systolic function is normal.  · Left atrial cavity size is mild-to-moderately dilated.  · 3D ECHO LVEF = 0.51.  · Normal right ventricular cavity size, wall thickness, systolic function and septal motion noted.  · Marked lipomatous hypertrophy of the interatrial septum present.  · No evidence of a patent foramen ovale.  · No evidence of a left atrial appendage thrombus was present.  · No evidence of pulmonary hypertension is present.  · There is no evidence of pericardial effusion.  · No significant structural valvular abnormality demonstrated.  · The previously noted \"right atrial mass\" on transthoracic echocardiographic study on 1 July 2019 is felt to represent a prominent tao terminalis (normal variant).      Assessment & Plan   Assessment & Plan     Active Hospital Problems    Diagnosis  POA    **Rapid atrial fibrillation [I48.91]  Yes    Paroxysmal atrial fibrillation with rapid ventricular response [I48.0]  Yes     Priority: High    Rhinovirus [B34.8]  Yes    Obesity, Class III, BMI 40-49.9 (morbid obesity) [E66.01]  Yes    Obstructive sleep apnea - Intolerant CPAP/BiPAP [G47.33]  Yes    Hypertension [I10]  Yes       Mr. Perez is a 78-year-old gentleman with history of paroxysmal A-fib on sotalol and Eliquis, some chronic lung disease, morbid obesity who presents with some progressive shortness of breath and is found to be in rapid A-fib.    Rapid " paroxysmal A-fib  -Continue his sotalol at current dose, as he is not overly tachycardic will hold further medications  -Continue Eliquis  -Has previously been cardioverted, keep n.p.o. after midnight in case of need.  Consult cardiology  -Last echo was in 2019, given his pulmonary congestion and lower extremity edema will repeat to see if there is any change in his EF  -Give another dose of Bumex tonight and recheck creatinine in the morning to ensure stability    Enterovirus/rhinovirus  -Supportive care  -No indication to continue doxycycline that was started by [facility], will stop    Hypertension  -Continue home meds    BPH   - continue home meds    Debility-PT and OT evaluations    DVT prophylaxis: Eliquis      CODE STATUS:    Code Status and Medical Interventions:   Ordered at: 12/25/23 1803     Level Of Support Discussed With:    Patient     Code Status (Patient has no pulse and is not breathing):    CPR (Attempt to Resuscitate)     Medical Interventions (Patient has pulse or is breathing):    Full Support       Expected Discharge   Expected Discharge Date: 12/27/2023; Expected Discharge Time:      Memo Kolb MD  12/25/23

## 2023-12-25 NOTE — ED PROVIDER NOTES
Subjective   History of Present Illness  Patient is a 78-year-old male presenting here with his wife with a 7 to 10-day history of progressively worsening dyspnea on exertion and decreased activity tolerance.  Patient reports that he is to the point now where he walks a short distance and becomes significantly short of breath and is unable to speak.  Patient denies any chest discomfort.  He does report a history of atrial fibrillation but states he is unsure of when he is in it.  He denies any palpitations.  Patient does have a history of asthma, bronchitis, hyperlipidemia, hypertension, obesity, the atrial fibrillation, and sleep apnea.  Patient does report some mild worsening of edema    History provided by:  Patient      Review of Systems    Past Medical History:   Diagnosis Date    Asthma     Bronchitis, chronic     Cancer     Skin     Cellulitis and abscess of right leg     Chronic persistent asthma 02/25/2020    Hyperlipidemia     Hypertension     Nephrolithiasis     Obesity, Class III, BMI 40-49.9 (morbid obesity) 02/25/2020    Paroxysmal atrial fibrillation 07/15/2019    Pneumonia     Sleep apnea     UTI (urinary tract infection)     Vitamin D deficiency 08/20/2020       Allergies   Allergen Reactions    Adhesive Tape Rash       Past Surgical History:   Procedure Laterality Date    CARDIOVERSION  08/05/2019    COLONOSCOPY      NASAL SEPTUM SURGERY      Deviation Repair    SKIN CANCER EXCISION      TONSILLECTOMY      VASECTOMY         Family History   Problem Relation Age of Onset    Cancer Mother         colon    Diabetes Mother     Alzheimer's disease Father     No Known Problems Maternal Grandmother     No Known Problems Maternal Grandfather     No Known Problems Paternal Grandmother     No Known Problems Paternal Grandfather        Social History     Socioeconomic History    Marital status:    Tobacco Use    Smoking status: Never     Passive exposure: Never    Smokeless tobacco: Former     Types:  Mariaelena     Quit date: 4/26/2019   Vaping Use    Vaping Use: Never used   Substance and Sexual Activity    Alcohol use: Yes     Alcohol/week: 0.0 - 2.0 standard drinks of alcohol     Comment: 1-2 month     Drug use: No    Sexual activity: Defer           Objective   Physical Exam  Vitals and nursing note reviewed.   Constitutional:       General: He is not in acute distress.     Appearance: He is well-developed. He is obese. He is not toxic-appearing.   Cardiovascular:      Rate and Rhythm: Normal rate. Rhythm irregularly irregular.   Pulmonary:      Effort: Pulmonary effort is normal.      Breath sounds: Normal breath sounds. No wheezing.      Comments: Mild increased work of breathing.  Musculoskeletal:      Right lower leg: Edema present.      Left lower leg: Edema present.      Comments: 2+ bilateral lower extremity pitting edema   Neurological:      Mental Status: He is alert and oriented to person, place, and time.   Psychiatric:         Mood and Affect: Mood normal.         Behavior: Behavior normal.         Procedures           ED Course  ED Course as of 12/25/23 1916   Mon Dec 25, 2023   1704 Human Rhinovirus/Enterovirus(!): Detected  Viral panel positive for rhino/enterovirus. [RS]      ED Course User Index  [RS] Antolin Roach MD                                             Medical Decision Making  Problems Addressed:  Acute viral syndrome: complicated acute illness or injury  PALM (dyspnea on exertion): complicated acute illness or injury  Elevated blood pressure reading with diagnosis of hypertension: complicated acute illness or injury  Paroxysmal atrial fibrillation with rapid ventricular response: complicated acute illness or injury    Amount and/or Complexity of Data Reviewed  Independent Historian: spouse and EMS  External Data Reviewed: labs.  Labs: ordered. Decision-making details documented in ED Course.  Radiology: ordered.  ECG/medicine tests: ordered.  Discussion of management or test  interpretation with external provider(s): hospitalist    Risk  Prescription drug management.  Decision regarding hospitalization.        Final diagnoses:   Acute viral syndrome   Paroxysmal atrial fibrillation with rapid ventricular response   PALM (dyspnea on exertion)   Elevated blood pressure reading with diagnosis of hypertension       ED Disposition  ED Disposition       ED Disposition   Decision to Admit    Condition   --    Comment   Level of Care: Telemetry [5]   Diagnosis: Rapid atrial fibrillation [[id number]]   Admitting Physician: SELENA ARRIAZA [[id number]]   Attending Physician: SELENA ARRIAZA [[id number]]   Certification: I Certify That Inpatient Hospital Services Are Medically Necessary For Greater Than 2 Midnights                 No follow-up provider specified.       Medication List      No changes were made to your prescriptions during this visit.            Antolin Roach MD  12/25/23 5465

## 2023-12-25 NOTE — Clinical Note
Level of Care: Telemetry [5]   Diagnosis: Rapid atrial fibrillation [[id number]]   Admitting Physician: SELENA ARRIAZA [[id number]]   Attending Physician: SELENA ARRIAZA [[id number]]

## 2023-12-26 ENCOUNTER — APPOINTMENT (OUTPATIENT)
Dept: CT IMAGING | Facility: HOSPITAL | Age: 78
End: 2023-12-26
Payer: MEDICARE

## 2023-12-26 ENCOUNTER — APPOINTMENT (OUTPATIENT)
Dept: CARDIOLOGY | Facility: HOSPITAL | Age: 78
End: 2023-12-26
Payer: MEDICARE

## 2023-12-26 PROBLEM — I31.39 PERICARDIAL EFFUSION: Status: ACTIVE | Noted: 2023-12-26

## 2023-12-26 PROBLEM — I48.91 RAPID ATRIAL FIBRILLATION: Status: RESOLVED | Noted: 2023-12-25 | Resolved: 2023-12-26

## 2023-12-26 PROBLEM — E78.5 DYSLIPIDEMIA: Status: RESOLVED | Noted: 2020-05-28 | Resolved: 2023-12-26

## 2023-12-26 PROBLEM — L03.119 CELLULITIS OF LOWER EXTREMITY: Status: RESOLVED | Noted: 2019-01-31 | Resolved: 2023-12-26

## 2023-12-26 PROBLEM — E29.9 DISORDER OF ENDOCRINE TESTIS: Status: RESOLVED | Noted: 2019-01-31 | Resolved: 2023-12-26

## 2023-12-26 PROBLEM — J18.9 PNEUMONIA OF RIGHT MIDDLE LOBE DUE TO INFECTIOUS ORGANISM: Status: RESOLVED | Noted: 2019-12-13 | Resolved: 2023-12-26

## 2023-12-26 LAB
ANION GAP SERPL CALCULATED.3IONS-SCNC: 18 MMOL/L (ref 5–15)
BH CV ECHO MEAS - AO MAX PG: 6 MMHG
BH CV ECHO MEAS - AO MEAN PG: 3 MMHG
BH CV ECHO MEAS - AO ROOT DIAM: 3.1 CM
BH CV ECHO MEAS - AO V2 MAX: 122 CM/SEC
BH CV ECHO MEAS - AO V2 VTI: 17.5 CM
BH CV ECHO MEAS - AVA(I,D): 2.23 CM2
BH CV ECHO MEAS - EDV(CUBED): 97.3 ML
BH CV ECHO MEAS - ESV(CUBED): 59.3 ML
BH CV ECHO MEAS - FS: 15.2 %
BH CV ECHO MEAS - IVS/LVPW: 0.93 CM
BH CV ECHO MEAS - IVSD: 1.4 CM
BH CV ECHO MEAS - LA DIMENSION: 3.4 CM
BH CV ECHO MEAS - LAT PEAK E' VEL: 5.7 CM/SEC
BH CV ECHO MEAS - LV MASS(C)D: 270.6 GRAMS
BH CV ECHO MEAS - LV MAX PG: 1.89 MMHG
BH CV ECHO MEAS - LV MEAN PG: 1 MMHG
BH CV ECHO MEAS - LV V1 MAX: 68.7 CM/SEC
BH CV ECHO MEAS - LV V1 VTI: 12.4 CM
BH CV ECHO MEAS - LVIDD: 4.6 CM
BH CV ECHO MEAS - LVIDS: 3.9 CM
BH CV ECHO MEAS - LVOT AREA: 3.1 CM2
BH CV ECHO MEAS - LVOT DIAM: 2 CM
BH CV ECHO MEAS - LVPWD: 1.5 CM
BH CV ECHO MEAS - MED PEAK E' VEL: 7.1 CM/SEC
BH CV ECHO MEAS - MV DEC SLOPE: 350 CM/SEC2
BH CV ECHO MEAS - MV DEC TIME: 0.29 SEC
BH CV ECHO MEAS - MV E MAX VEL: 88.6 CM/SEC
BH CV ECHO MEAS - MV MAX PG: 2.15 MMHG
BH CV ECHO MEAS - MV MEAN PG: 1 MMHG
BH CV ECHO MEAS - MV P1/2T: 62.2 MSEC
BH CV ECHO MEAS - MV V2 VTI: 16.8 CM
BH CV ECHO MEAS - MVA(P1/2T): 3.5 CM2
BH CV ECHO MEAS - MVA(VTI): 2.32 CM2
BH CV ECHO MEAS - PA ACC TIME: 0.1 SEC
BH CV ECHO MEAS - RAP SYSTOLE: 15 MMHG
BH CV ECHO MEAS - RVSP: 21 MMHG
BH CV ECHO MEAS - SV(LVOT): 39 ML
BH CV ECHO MEAS - TR MAX PG: 5.7 MMHG
BH CV ECHO MEAS - TR MAX VEL: 119 CM/SEC
BH CV ECHO MEASUREMENTS AVERAGE E/E' RATIO: 13.84
BH CV VAS BP RIGHT ARM: NORMAL MMHG
BH CV XLRA - TDI S': 9.7 CM/SEC
BUN SERPL-MCNC: 31 MG/DL (ref 8–23)
BUN/CREAT SERPL: 23.7 (ref 7–25)
CALCIUM SPEC-SCNC: 9 MG/DL (ref 8.6–10.5)
CHLORIDE SERPL-SCNC: 93 MMOL/L (ref 98–107)
CO2 SERPL-SCNC: 26 MMOL/L (ref 22–29)
CREAT SERPL-MCNC: 1.31 MG/DL (ref 0.76–1.27)
EGFRCR SERPLBLD CKD-EPI 2021: 55.7 ML/MIN/1.73
GLUCOSE SERPL-MCNC: 176 MG/DL (ref 65–99)
LV EF 2D ECHO EST: 60 %
POTASSIUM SERPL-SCNC: 3.2 MMOL/L (ref 3.5–5.2)
POTASSIUM SERPL-SCNC: 3.9 MMOL/L (ref 3.5–5.2)
SODIUM SERPL-SCNC: 137 MMOL/L (ref 136–145)

## 2023-12-26 PROCEDURE — 93306 TTE W/DOPPLER COMPLETE: CPT

## 2023-12-26 PROCEDURE — 99232 SBSQ HOSP IP/OBS MODERATE 35: CPT | Performed by: INTERNAL MEDICINE

## 2023-12-26 PROCEDURE — 25010000002 MAGNESIUM SULFATE 2 GM/50ML SOLUTION: Performed by: INTERNAL MEDICINE

## 2023-12-26 PROCEDURE — 93306 TTE W/DOPPLER COMPLETE: CPT | Performed by: INTERNAL MEDICINE

## 2023-12-26 PROCEDURE — 92960 CARDIOVERSION ELECTRIC EXT: CPT | Performed by: INTERNAL MEDICINE

## 2023-12-26 PROCEDURE — 80048 BASIC METABOLIC PNL TOTAL CA: CPT | Performed by: INTERNAL MEDICINE

## 2023-12-26 PROCEDURE — 25510000001 IOPAMIDOL 61 % SOLUTION: Performed by: INTERNAL MEDICINE

## 2023-12-26 PROCEDURE — 92960 CARDIOVERSION ELECTRIC EXT: CPT

## 2023-12-26 PROCEDURE — 94799 UNLISTED PULMONARY SVC/PX: CPT

## 2023-12-26 PROCEDURE — 71270 CT THORAX DX C-/C+: CPT

## 2023-12-26 PROCEDURE — 84132 ASSAY OF SERUM POTASSIUM: CPT | Performed by: INTERNAL MEDICINE

## 2023-12-26 PROCEDURE — 25810000003 SODIUM CHLORIDE 0.9 % SOLUTION: Performed by: INTERNAL MEDICINE

## 2023-12-26 PROCEDURE — 99222 1ST HOSP IP/OBS MODERATE 55: CPT | Performed by: INTERNAL MEDICINE

## 2023-12-26 PROCEDURE — 5A2204Z RESTORATION OF CARDIAC RHYTHM, SINGLE: ICD-10-PCS | Performed by: INTERNAL MEDICINE

## 2023-12-26 PROCEDURE — 93005 ELECTROCARDIOGRAM TRACING: CPT | Performed by: INTERNAL MEDICINE

## 2023-12-26 RX ORDER — POTASSIUM CHLORIDE 20 MEQ/1
40 TABLET, EXTENDED RELEASE ORAL ONCE
Status: COMPLETED | OUTPATIENT
Start: 2023-12-26 | End: 2023-12-26

## 2023-12-26 RX ORDER — FLUMAZENIL 0.1 MG/ML
0.5 INJECTION INTRAVENOUS ONCE AS NEEDED
Status: DISCONTINUED | OUTPATIENT
Start: 2023-12-26 | End: 2023-12-27 | Stop reason: HOSPADM

## 2023-12-26 RX ORDER — MAGNESIUM SULFATE HEPTAHYDRATE 40 MG/ML
2 INJECTION, SOLUTION INTRAVENOUS ONCE
Status: COMPLETED | OUTPATIENT
Start: 2023-12-26 | End: 2023-12-26

## 2023-12-26 RX ORDER — MIDAZOLAM HYDROCHLORIDE 1 MG/ML
INJECTION INTRAMUSCULAR; INTRAVENOUS
Status: DISCONTINUED
Start: 2023-12-26 | End: 2023-12-27 | Stop reason: HOSPADM

## 2023-12-26 RX ORDER — MIDAZOLAM HYDROCHLORIDE 1 MG/ML
2-20 INJECTION INTRAMUSCULAR; INTRAVENOUS ONCE AS NEEDED
Status: DISCONTINUED | OUTPATIENT
Start: 2023-12-26 | End: 2023-12-26

## 2023-12-26 RX ORDER — NALOXONE HCL 0.4 MG/ML
0.4 VIAL (ML) INJECTION ONCE AS NEEDED
Status: DISCONTINUED | OUTPATIENT
Start: 2023-12-26 | End: 2023-12-27 | Stop reason: HOSPADM

## 2023-12-26 RX ORDER — FENTANYL CITRATE 50 UG/ML
50-100 INJECTION, SOLUTION INTRAMUSCULAR; INTRAVENOUS ONCE AS NEEDED
Status: DISCONTINUED | OUTPATIENT
Start: 2023-12-26 | End: 2023-12-26

## 2023-12-26 RX ORDER — POTASSIUM CHLORIDE 20 MEQ/1
40 TABLET, EXTENDED RELEASE ORAL EVERY 4 HOURS
Status: DISCONTINUED | OUTPATIENT
Start: 2023-12-26 | End: 2023-12-26 | Stop reason: SDUPTHER

## 2023-12-26 RX ORDER — ALBUTEROL SULFATE 2.5 MG/3ML
2.5 SOLUTION RESPIRATORY (INHALATION) EVERY 6 HOURS PRN
Status: DISCONTINUED | OUTPATIENT
Start: 2023-12-26 | End: 2023-12-27 | Stop reason: HOSPADM

## 2023-12-26 RX ADMIN — FINASTERIDE 5 MG: 5 TABLET, FILM COATED ORAL at 08:24

## 2023-12-26 RX ADMIN — MONTELUKAST 10 MG: 10 TABLET, FILM COATED ORAL at 08:24

## 2023-12-26 RX ADMIN — APIXABAN 5 MG: 5 TABLET, FILM COATED ORAL at 08:24

## 2023-12-26 RX ADMIN — POTASSIUM CHLORIDE 40 MEQ: 1500 TABLET, EXTENDED RELEASE ORAL at 11:10

## 2023-12-26 RX ADMIN — AMLODIPINE BESYLATE 10 MG: 10 TABLET ORAL at 08:24

## 2023-12-26 RX ADMIN — TAMSULOSIN HYDROCHLORIDE 0.4 MG: 0.4 CAPSULE ORAL at 08:24

## 2023-12-26 RX ADMIN — APIXABAN 5 MG: 5 TABLET, FILM COATED ORAL at 22:24

## 2023-12-26 RX ADMIN — METHOHEXITAL SODIUM 40 MG: 500 INJECTION, POWDER, LYOPHILIZED, FOR SOLUTION INTRAMUSCULAR; INTRAVENOUS; RECTAL at 13:40

## 2023-12-26 RX ADMIN — SOTALOL HYDROCHLORIDE 80 MG: 80 TABLET ORAL at 08:24

## 2023-12-26 RX ADMIN — Medication 10 ML: at 22:25

## 2023-12-26 RX ADMIN — BUDESONIDE AND FORMOTEROL FUMARATE DIHYDRATE 1 PUFF: 160; 4.5 AEROSOL RESPIRATORY (INHALATION) at 09:56

## 2023-12-26 RX ADMIN — Medication 10 ML: at 08:25

## 2023-12-26 RX ADMIN — BUDESONIDE AND FORMOTEROL FUMARATE DIHYDRATE 1 PUFF: 160; 4.5 AEROSOL RESPIRATORY (INHALATION) at 19:21

## 2023-12-26 RX ADMIN — MAGNESIUM SULFATE HEPTAHYDRATE 2 G: 2 INJECTION, SOLUTION INTRAVENOUS at 11:09

## 2023-12-26 RX ADMIN — POTASSIUM CHLORIDE 40 MEQ: 1500 TABLET, EXTENDED RELEASE ORAL at 16:57

## 2023-12-26 RX ADMIN — HYDROCODONE BITARTRATE AND ACETAMINOPHEN 1 TABLET: 7.5; 325 TABLET ORAL at 02:14

## 2023-12-26 RX ADMIN — IOPAMIDOL 80 ML: 612 INJECTION, SOLUTION INTRAVENOUS at 20:59

## 2023-12-26 RX ADMIN — SOTALOL HYDROCHLORIDE 80 MG: 80 TABLET ORAL at 22:24

## 2023-12-26 RX ADMIN — SODIUM CHLORIDE 250 ML: 9 INJECTION, SOLUTION INTRAVENOUS at 11:09

## 2023-12-26 NOTE — CONSULTS
"Luis Perez Jr.  [id number]  1945   LOS: 1 day   Patient Care Team:  Sierra Kuo MD as PCP - General (Internal Medicine)  Latasha Poe MD as Consulting Physician (Dermatopathology)  Provider, No Known  Camille Mccord APRN as Nurse Practitioner (Pulmonary Disease)  Jaja Mansfield APRN as Nurse Practitioner (Cardiology)    ID: 78-year-old  white male retired [employer]  from [place], [place] admitted from [facility] ED via EMS.    Chief Complaint:  ATRIAL FIBRILLATION    Problem List:    Paroxysmal atrial fibrillation:  CHADsVasc 2, anticoagulated with Eliquis  Echocardiogram, 7/1/2019: LVEF 0.65, mild to moderate TR, RVSP 35 mmHg, right atrial mass is present measuring 2.6 x 1.6 cm; differential includes thrombus versus atypical right atrial myxoma versus atrial intraseptal tumor with clinical correlation and consideration of transesophageal echocardiogram recommended, LA moderately dilated, normal RV wall thickness, systolic function and motion noted with RV cavity mildly dilated, aortic valve exhibits mild sclerosis, mild pulmonary hypertension, no pericardial effusion  PIETER, 7/8/2019: Mild MR, LVEF 0.60, LV wall thickness consistent with moderate concentric hypertrophy, LA mild to moderately dilated, 3D echo LVEF was 0.51, normal RV cavity size, wall thickness, systolic function septal motion noted, marked lipomatous hypertrophy of the interatrial septum present.  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 (normal variant)  Initiation of sotalol, 7/13/2019, with persistent atrial fibrillation on EKG, 7/26/2019  Successful external cardioversion, 08/05/2019, with frequent atrial ectopy on EKG, 08/09/2019, now in normal sinus rhythm on EKG, 10/16/2019  Normal sinus rhythm on ECG in " office with acceptable QTC on sotalol therapy, May 2020, December 2020, July 2021, February 2022, August 2022, February 2023, August 2023  Recurrent atrial fibrillation with moderate ventricular response in the setting of possible viral pneumonitis/CHF ([id number] score 5; 6.7% annual stroke risk), December 2023  Incidental asymptomatic echocardiographic right atrial mass subsequently 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 on PIETER study (2.6 x 1.6 cm), July 2019  Mild dyspnea on exertion/chronic persistent asthma  Remote stress test 30-40 years ago; negative per patient-data deficit, patient reports that this was performed for a baseline and not because of any symptoms  PFTs 2/25/2020: Moderate airway obstruction, improvement in FEV1 with bronchodilator, no restriction, air-trapping present, no diffusion  CCS class 0 chest discomfort/NYHA class I-II PALM, August 2022, August 2023  Atypical chest pain with NYHA class III exertional dyspnea and fatigue, December 2023  At risk for sleep apnea with sleep consult, 8/21/2019, with polysomnogram January 2020 that showed mild obstructive sleep apnea and nocturnal hypoxemia with initiation of CPAP therapy, intolerant CPAP  Chronic lower extremity edema  Hypertension  Dyslipidemia; ASCVD 10 year risk is 45.8%, 17.4% if treated, initiate rosuvastatin 10 mg nightly May 2020 with consideration of vascepa 2g bid after repeat FLP, initiation of Vascepa July 2021  Type 2 diabetes mellitus;HgbA1C 6.39% October 2019; 5.7%, August 2020, 6.2% December 2022  Chronic persistent asthma  10. History of lower extremity cellulitis with MRSA  11.History of melanoma  12. Morbid obesity: BMI 42.88  13. Mechanical fall with subsequent development on cellulitis with IV antibiotic administration, October 2020  14. BPH  15.  Mechanical fall with severe bruising/mild lacerations to his left knee, right wrist, forehead with negative work-up at [facility] January 2022-data deficit  16.  Surgical history  Vasectomy  Tonsillectomy  Deviated septum surgery  Multiple skin cancer excisions  Partial right knee replacement, November 2019-data deficit    Allergies   Allergen Reactions    Adhesive Tape Rash     Medications Prior to Admission   Medication Sig Dispense Refill Last Dose    albuterol (PROVENTIL) (2.5 MG/3ML) 0.083% nebulizer solution Take 2.5 mg by nebulization Every 4 (Four) Hours As Needed for Wheezing. 25 vial 12     albuterol sulfate  (90 Base) MCG/ACT inhaler Inhale 2 puffs Every 4 (Four) Hours As Needed for Wheezing. 1 inhaler 0     amLODIPine (NORVASC) 10 MG tablet Take 1 tablet by mouth Daily. 90 tablet 3     ascorbic acid (VITAMIN C) 1000 MG tablet        Dupilumab 300 MG/2ML solution prefilled syringe Inject 2 mL under the skin into the appropriate area as directed Every 14 (Fourteen) Days. 2 mL 11     Eliquis 5 MG tablet tablet TAKE 1 TABLET BY MOUTH  TWICE DAILY 180 tablet 3     EPINEPHrine (EPIPEN) 0.3 MG/0.3ML solution auto-injector injection        fexofenadine (ALLEGRA ALLERGY) 180 MG tablet Take 1 tablet by mouth Daily. 30 tablet 2     finasteride (PROSCAR) 5 MG tablet Take 1 tablet by mouth Daily.       Fluticasone Furoate-Vilanterol (Breo Ellipta) 200-25 MCG/ACT inhaler Inhale 1 puff Daily. 180 each 3     hydroCHLOROthiazide (HYDRODIURIL) 50 MG tablet Take 1 tablet by mouth Daily. 90 tablet 3     montelukast (SINGULAIR) 10 MG tablet TAKE 1 TABLET BY MOUTH  DAILY 90 tablet 3     sotalol (BETAPACE AF) 80 MG tablet tablet TAKE 1 TABLET BY MOUTH TWICE  DAILY 180 tablet 3     tamsulosin (FLOMAX) 0.4 MG capsule 24 hr capsule Take 1 capsule by mouth Daily.       icosapent ethyl (VASCEPA) 1 g capsule capsule TAKE 2 CAPSULES BY MOUTH  TWICE DAILY WITH MEALS (Patient taking differently: Take 2 g by mouth 2 (Two) Times a Day With Meals.) 360 capsule 3     Multiple Vitamins-Minerals (VITEYES AREDS FORMULA PO) Take 1 tablet by mouth 2 (Two) Times a Day.       traMADol (ULTRAM) 50  "MG tablet Take 1 tablet by mouth Every 6 (Six) Hours As Needed for Moderate Pain.        Scheduled Meds:amLODIPine, 10 mg, Oral, Daily  apixaban, 5 mg, Oral, BID  budesonide-formoterol, 1 puff, Inhalation, BID - RT  finasteride, 5 mg, Oral, Daily  montelukast, 10 mg, Oral, Daily  sodium chloride, 10 mL, Intravenous, Q12H  sotalol, 80 mg, Oral, Q12H  tamsulosin, 0.4 mg, Oral, Daily      Continuous Infusions:   PRN Meds:.  albuterol    HYDROcodone-acetaminophen    sodium chloride    sodium chloride       History of Present Illness:      This older middle-aged male is followed by [facility] for the above medical and cardiology problems and was last seen in follow-up by ANGELIA Glover in August 2023.  It was admitted on the afternoon of 25 December 2023 with a 7-10-day history of progressive exertional dyspnea and impaired exercise tolerance somewhat so that he could not tolerate even minimal walking activity at home.  He was admitted for suspected acute viral syndrome and he did not undergo ECV in ED.  And had recent outpatient evaluation at [facility] was prescribed doxycycline but over the ensuing 48 hours had progressive deterioration in symptoms and was admitted with abnormal viral serology.  The patient was given 2 mg IV Bumex, admitted, home medications continued, and cardiology consultation requested with patient remaining NPO.  The patient states she received a nebulizer treatment via EMS enroute to ED with some improvement in symptoms.  Denies anginal type chest discomfort but did have some intermittent transient left axillary \"sharp\" chest discomfort over the past week.  He has had copious \"greenish\" sputum production over the past week but denies fever, chills, or hemoptysis.  He states he has severe nausea currently but denies emesis or abdominal pain.  No focal motor-sensory changes or headache.  No focal lower extremity edema.  No recent thoracoabdominal trauma or fall.  He remains noncompliant with " "treatment for RUY.    Cardiac risk factors: advanced age (older than 55 for men, 65 for women), diabetes mellitus, dyslipidemia, hypertension, male gender, obesity (BMI >= 30 kg/m2), and sedentary lifestyle.    Social History     Socioeconomic History    Marital status:    Tobacco Use    Smoking status: Never     Passive exposure: Never    Smokeless tobacco: Former     Types: Chew     Quit date: 4/26/2019   Vaping Use    Vaping Use: Never used   Substance and Sexual Activity    Alcohol use: Yes     Alcohol/week: 0.0 - 2.0 standard drinks of alcohol     Comment: 1-2 month     Drug use: No    Sexual activity: Defer     Family History   Problem Relation Age of Onset    Cancer Mother         colon    Diabetes Mother     Alzheimer's disease Father     No Known Problems Maternal Grandmother     No Known Problems Maternal Grandfather     No Known Problems Paternal Grandmother     No Known Problems Paternal Grandfather        Review of Systems  10 point review of systems was completed, positives outlined in the HPI, and otherwise all other systems are negative.      Objective:       Physical Exam  /97 (BP Location: Left arm, Patient Position: Lying)   Pulse 107   Temp 98.2 °F (36.8 °C) (Oral)   Resp 18   Ht 185.4 cm (73\")   Wt (!) 147 kg (325 lb)   SpO2 94%   BMI 42.88 kg/m²       12/25/23  1416   Weight: (!) 147 kg (325 lb)     Body mass index is 42.88 kg/m².    Intake/Output Summary (Last 24 hours) at 12/26/2023 0842  Last data filed at 12/26/2023 0656  Gross per 24 hour   Intake --   Output 200 ml   Net -200 ml       General Appearance:  Alert, cooperative, no distress, appears stated age   Head:  Normocephalic, without obvious abnormality, atraumatic   Eyes:  PERRL, conjunctivae/corneas clear, EOM's intact, fundi benign, both eyes   Throat: Lips, mucosa, and tongue normal; teeth and gums normal   Neck: Supple, symmetrical, trachea midline, no adenopathy, thyroid: not enlarged, symmetric, no " tenderness/mass/nodules, no carotid bruit or JVD   Lungs:   Fuhs diminished breath sounds with scattered rhonchi and wheezes bilaterally, respirations unlabored (oximetry 96% on 2 L/min nasal cannula)   Heart:  Irregular rate and rhythm, variable S1, S2 normal, grade 1/6 systolic murmur, no rub or gallop   Abdomen:   Soft, nontender, no masses, no organomegaly, bowel sounds audible x4   Extremities: 1+ bilateral calf edema, normal range of motion   Pulses: 1+ and symmetric   Skin: Skin color, texture, turgor normal, no rashes or lesions   Neurologic: Normal     Cardiographics:    EKG:    Atrial fibrillation with rapid ventricular response  Low voltage QRS  Cannot rule out Anterior infarct (cited on or before 25-DEC-2023)  Abnormal ECG  When compared with ECG of 25-DEC-2023 14:21,  QT has shortened    Imaging:    Chest x-ray:    Findings:    Study is limited by overlying support and monitoring apparatus. The heart size is enlarged. This is accentuated by portable technique and lower lung volumes compared to the prior study. There is mild pulmonary vascular congestion. No acute osseous   abnormality noted.     IMPRESSIONS:    Cardiomegaly with mild pulmonary vascular congestion     No focal consolidation noted.    Lab Review   Results from last 7 days   Lab Units 12/26/23  0308 12/25/23  1421   SODIUM mmol/L 137 135*   POTASSIUM mmol/L 3.2* 3.5   CHLORIDE mmol/L 93* 94*   CO2 mmol/L 26.0 25.0   BUN mg/dL 31* 27*   CREATININE mg/dL 1.31* 1.04   GLUCOSE mg/dL 176* 178*   CALCIUM mg/dL 9.0 9.3     Results from last 7 days   Lab Units 12/25/23  1421   WBC 10*3/mm3 11.99*   HEMOGLOBIN g/dL 14.1   HEMATOCRIT % 42.4   PLATELETS 10*3/mm3 319     Results from last 7 days   Lab Units 12/25/23  1421   HSTROP T ng/L 10     URINALYSIS: Cloudy; 3+ blood; mild proteinuria; TNTC RBC with 6-10 WBCs/hpf with 1+ bacteriuria  VIRAL RESPIRATORY PANEL: Positive rhinovirus/enterovirus; otherwise negative  proBNP: 455.3  ALBUMIN: 4.0  AST:  88  ALT: 73  NO THYROID PROFILE / LACTATE / CRP / PROCALCITONIN / LIPASE / ESR / IRON STUDIES / MAGNESIUM / IRON STUDIES  NO DRIPS.     Assessment:      Unfortunate older middle-aged gentleman with probable combined viral pneumonitis and volume overload although certainly proBNP value and chest x-ray not markedly abnormal.  .  There is no evidence that suggests in my opinion bronchopneumonia or lobar pneumonitis.  No thoracic CT scan obtained.  No D-dimer.  I am uncertain whether resolution of asymptomatic atrial fibrillation will markedly improve his status but since he is n.p.o. we will attempt ECV since he seems to be fairly hemodynamically stable and is oxygenating fairly well on low-flow oxygen therapy.  Will initiate nebulizer treatments and would recommend supportive care for suspected pneumonitis.  Doubt acute coronary artery syndrome or important valvular heart disease.      Plan:     1.  Echocardiogram  2.  Defer further IV diuresis at this time in view of the fact that he has significant elevation of serum creatinine post IV Bumex in ED  3.  Bronchodilators  4.  Defer MPS/LHC  5.  Tentative ECV today; procedure, risks, and potential complications discussed with patient and he is in agreement to proceed (scheduled at 1300 hrs. with Dr.Tyler Daniels).

## 2023-12-26 NOTE — PROGRESS NOTES
[facility] Medicine Services  PROGRESS NOTE    Patient Name: Luis Perez Jr.  : 1945  MRN: [id number]    Date of Admission: 2023  Primary Care Physician: Sierra Kuo MD    Subjective   Subjective     CC:  diarrhea    HPI  feels bad, dyspnea, cough.  Leg swelling is worse than baseline but he notes he is not wearing his usual compression socks.         Objective   Objective     Vital Signs:   Temp:  [97.8 °F (36.6 °C)-98.2 °F (36.8 °C)] 98.2 °F (36.8 °C)  Heart Rate:  [] 107  Resp:  [16-18] 18  BP: ()/() 123/97  Flow (L/min):  [2] 2     Physical Exam:  Gen:  in bed, very pleasant, weak, conversant and alert   Neuro: alert and oriented, clear speech, follows commands, grossly nonfocal  HEENT:  NC/AT   Neck:  Supple, no LAD  Heart irreg irreg   Lungs sl decreased througout  Abd:  Soft, nontender, no rebound or guarding, pos BS  Extrem:  No c/c/, 1+ edema       Results Reviewed:  LAB RESULTS:      Lab 23  1421   WBC 11.99*   HEMOGLOBIN 14.1   HEMATOCRIT 42.4   PLATELETS 319   NEUTROS ABS 8.14*   IMMATURE GRANS (ABS) 0.18*   LYMPHS ABS 1.94   MONOS ABS 1.67*   EOS ABS 0.01   MCV 87.2         Lab 23  0308 23  1421   SODIUM 137 135*   POTASSIUM 3.2* 3.5   CHLORIDE 93* 94*   CO2 26.0 25.0   ANION GAP 18.0* 16.0*   BUN 31* 27*   CREATININE 1.31* 1.04   EGFR 55.7* 73.5   GLUCOSE 176* 178*   CALCIUM 9.0 9.3         Lab 23  1421   TOTAL PROTEIN 7.0   ALBUMIN 4.0   GLOBULIN 3.0   ALT (SGPT) 73*   AST (SGOT) 88*   BILIRUBIN 0.8   ALK PHOS 67         Lab 23  1421   PROBNP 455.3   HSTROP T 10                 Brief Urine Lab Results  (Last result in the past 365 days)        Color   Clarity   Blood   Leuk Est   Nitrite   Protein   CREAT   Urine HCG        23 1747 Orange   Cloudy   Large (3+)   Trace   Negative   100 mg/dL (2+)                   Microbiology Results Abnormal       None            XR Chest 1 View    Result Date:  "12/25/2023  XR CHEST 1 VW Date of Exam: 12/25/2023 2:39 PM EST Indication: SOA triage protocol Comparison: 3/9/2023 and prior Findings: Study is limited by overlying support and monitoring apparatus. The heart size is enlarged. This is accentuated by portable technique and lower lung volumes compared to the prior study. There is mild pulmonary vascular congestion. No acute osseous abnormality noted.     Impression: Impression: Cardiomegaly with mild pulmonary vascular congestion No focal consolidation noted. Electronically Signed: Vlad Antony MD  12/25/2023 2:56 PM EST  Workstation ID: [id number]     Results for orders placed during the hospital encounter of 07/08/19    Adult Transesophageal Echo (PIETER) W/ Cont if Necessary Per Protocol    Interpretation Summary  · Mild mitral valve regurgitation is present.  · Left ventricular wall thickness is consistent with moderate concentric hypertrophy.  · Estimated EF = 60%.  · Left ventricular systolic function is normal.  · Left atrial cavity size is mild-to-moderately dilated.  · 3D ECHO LVEF = 0.51.  · Normal right ventricular cavity size, wall thickness, systolic function and septal motion noted.  · Marked lipomatous hypertrophy of the interatrial septum present.  · No evidence of a patent foramen ovale.  · No evidence of a left atrial appendage thrombus was present.  · No evidence of pulmonary hypertension is present.  · There is no evidence of pericardial effusion.  · No significant structural valvular abnormality demonstrated.  · The previously noted \"right atrial mass\" on transthoracic echocardiographic study on 1 July 2019 is felt to represent a prominent tao terminalis (normal variant).      Current medications:  Scheduled Meds:amLODIPine, 10 mg, Oral, Daily  apixaban, 5 mg, Oral, BID  budesonide-formoterol, 1 puff, Inhalation, BID - RT  finasteride, 5 mg, Oral, Daily  montelukast, 10 mg, Oral, Daily  sodium chloride, 10 mL, Intravenous, Q12H  sotalol, 80 mg, " Oral, Q12H  tamsulosin, 0.4 mg, Oral, Daily      Continuous Infusions:   PRN Meds:.  albuterol    HYDROcodone-acetaminophen    sodium chloride    sodium chloride    Assessment & Plan   Assessment & Plan     Active Hospital Problems    Diagnosis  POA    **Rapid atrial fibrillation [I48.91]  Yes    Rhinovirus [B34.8]  Yes    Obesity, Class III, BMI 40-49.9 (morbid obesity) [E66.01]  Yes    Obstructive sleep apnea - Intolerant CPAP/BiPAP [G47.33]  Yes    Paroxysmal atrial fibrillation with rapid ventricular response [I48.0]  Yes    Hypertension [I10]  Yes      Resolved Hospital Problems   No resolved problems to display.        Brief Hospital Course to date:  Luis Perez Jr. is a 78 y.o. male w PAF on sotalol/eliquis and sleep apnea.  Admitted for a week of dyspnea with .      paroxysmal A-fib w RVR  CHF   - in setting of enterovirus and RUY  - sotalol, Eliquis  -Has previously been cardioverted, keep n.p.o. after midnight in case of need.  Consult cardiology - plan for echo and ECV   -bumex ongoing - minimal output recorded   - echo pending     MONIQUE  - creat up from 1.0 to 1.3 with bumex  - hold further diuresis     Enterovirus/rhinovirus  -Supportive care  - droplet precautions      Hypertension  -Continue home meds     BPH   - continue home meds     Debility-PT and OT evaluations      Expected Discharge Location and Transportation: home by car  Expected Discharge   Expected Discharge Date: 12/27/2023; Expected Discharge Time:      DVT prophylaxis:  Medical DVT prophylaxis orders are present.     AM-PAC 6 Clicks Score (PT): 20 (12/25/23 2003)    CODE STATUS:   Code Status and Medical Interventions:   Ordered at: 12/25/23 1803     Level Of Support Discussed With:    Patient     Code Status (Patient has no pulse and is not breathing):    CPR (Attempt to Resuscitate)     Medical Interventions (Patient has pulse or is breathing):    Full Support       Joaquina Ny MD  12/26/23

## 2023-12-26 NOTE — CASE MANAGEMENT/SOCIAL WORK
Discharge Planning Assessment  [facility]     Patient Name: Luis Perez Jr.  MRN: [id number]  Today's Date: 12/26/2023    Admit Date: 12/25/2023    Plan: HOME   Discharge Needs Assessment    No documentation.                  Discharge Plan       Row Name 12/26/23 1550       Plan    Plan HOME    Patient/Family in Agreement with Plan yes    Plan Comments Met with pt and wife at bedside for DCP.  They reside in [facility] [place] [facility] [place].  Pt is independent with ADLs.  Uses a cane prn.  No current  services.  Confirmed he has Medicare and [employer] insurance with Rx coverage.  Goal is to return home upon DC.  No immediate needs identified/voiced.  CM will cont to follow.    Final Discharge Disposition Code 01 - home or self-care                  Continued Care and Services - Admitted Since 12/25/2023    Coordination has not been started for this encounter.       Expected Discharge Date and Time       Expected Discharge Date Expected Discharge Time    Dec 27, 2023            Demographic Summary    No documentation.                  Functional Status    No documentation.                  Psychosocial    No documentation.                  Abuse/Neglect    No documentation.                  Legal    No documentation.                  Substance Abuse    No documentation.                  Patient Forms    No documentation.                     Elis Wagner, RN

## 2023-12-26 NOTE — PLAN OF CARE
Successful ECV.  Echocardiogram demonstrates large pericardial effusion and currently I do not feel there are any findings of echocardiographic or clinical tamponade.  Will obtain high-resolution thoracic CT scan without contrast to assess for intrathoracic adenopathy/mass as well as pulmonary parenchymal disease.

## 2023-12-27 VITALS
HEART RATE: 83 BPM | OXYGEN SATURATION: 93 % | DIASTOLIC BLOOD PRESSURE: 76 MMHG | BODY MASS INDEX: 41.75 KG/M2 | SYSTOLIC BLOOD PRESSURE: 167 MMHG | WEIGHT: 315 LBS | TEMPERATURE: 97.4 F | RESPIRATION RATE: 18 BRPM | HEIGHT: 73 IN

## 2023-12-27 LAB — MAGNESIUM SERPL-MCNC: 2.7 MG/DL (ref 1.6–2.4)

## 2023-12-27 PROCEDURE — 83735 ASSAY OF MAGNESIUM: CPT | Performed by: INTERNAL MEDICINE

## 2023-12-27 PROCEDURE — 94799 UNLISTED PULMONARY SVC/PX: CPT

## 2023-12-27 PROCEDURE — 99232 SBSQ HOSP IP/OBS MODERATE 35: CPT | Performed by: INTERNAL MEDICINE

## 2023-12-27 PROCEDURE — 99239 HOSP IP/OBS DSCHRG MGMT >30: CPT | Performed by: NURSE PRACTITIONER

## 2023-12-27 PROCEDURE — 97166 OT EVAL MOD COMPLEX 45 MIN: CPT

## 2023-12-27 PROCEDURE — 97535 SELF CARE MNGMENT TRAINING: CPT

## 2023-12-27 RX ORDER — POTASSIUM CHLORIDE 750 MG/1
10 TABLET, FILM COATED, EXTENDED RELEASE ORAL 2 TIMES DAILY
Qty: 30 TABLET | Refills: 0 | Status: SHIPPED | OUTPATIENT
Start: 2023-12-27

## 2023-12-27 RX ORDER — TORSEMIDE 10 MG/1
10 TABLET ORAL DAILY
Qty: 30 TABLET | Refills: 0 | Status: SHIPPED | OUTPATIENT
Start: 2023-12-27

## 2023-12-27 RX ORDER — SOTALOL HYDROCHLORIDE 80 MG/1
120 TABLET ORAL 2 TIMES DAILY
Qty: 90 TABLET | Refills: 0 | Status: SHIPPED | OUTPATIENT
Start: 2023-12-27 | End: 2024-01-06 | Stop reason: HOSPADM

## 2023-12-27 RX ADMIN — AMLODIPINE BESYLATE 10 MG: 10 TABLET ORAL at 08:20

## 2023-12-27 RX ADMIN — BUDESONIDE AND FORMOTEROL FUMARATE DIHYDRATE 1 PUFF: 160; 4.5 AEROSOL RESPIRATORY (INHALATION) at 09:45

## 2023-12-27 RX ADMIN — MONTELUKAST 10 MG: 10 TABLET, FILM COATED ORAL at 08:20

## 2023-12-27 RX ADMIN — Medication 10 ML: at 08:21

## 2023-12-27 RX ADMIN — TAMSULOSIN HYDROCHLORIDE 0.4 MG: 0.4 CAPSULE ORAL at 08:20

## 2023-12-27 RX ADMIN — SOTALOL HYDROCHLORIDE 80 MG: 80 TABLET ORAL at 08:20

## 2023-12-27 RX ADMIN — APIXABAN 5 MG: 5 TABLET, FILM COATED ORAL at 08:20

## 2023-12-27 RX ADMIN — FINASTERIDE 5 MG: 5 TABLET, FILM COATED ORAL at 08:20

## 2023-12-27 NOTE — THERAPY EVALUATION
Patient Name: Luis Perez Jr.  : 1945    MRN: [id number]                              Today's Date: 2023       Admit Date: 2023    Visit Dx:     ICD-10-CM ICD-9-CM   1. Acute viral syndrome  B34.9 079.99   2. Paroxysmal atrial fibrillation with rapid ventricular response  I48.0 427.31   3. PALM (dyspnea on exertion)  R06.09 786.09   4. Elevated blood pressure reading with diagnosis of hypertension  I10 401.9     Patient Active Problem List   Diagnosis    Hypertension    Paroxysmal atrial fibrillation    Snoring    Obstructive sleep apnea - Intolerant CPAP/BiPAP    Chronic persistent asthma    Non-smoker    Obesity, Class III, BMI 40-49.9 (morbid obesity)    Perennial rhinitis    Vitamin D deficiency    Rhinovirus    Pericardial effusion     Past Medical History:   Diagnosis Date    Asthma     Bronchitis, chronic     Cancer     Skin     Cellulitis and abscess of right leg     Chronic persistent asthma 2020    Hyperlipidemia     Hypertension     Nephrolithiasis     Obesity, Class III, BMI 40-49.9 (morbid obesity) 2020    Paroxysmal atrial fibrillation 07/15/2019    Pneumonia     Sleep apnea     UTI (urinary tract infection)     Vitamin D deficiency 2020     Past Surgical History:   Procedure Laterality Date    CARDIOVERSION  2019    COLONOSCOPY      NASAL SEPTUM SURGERY      Deviation Repair    SKIN CANCER EXCISION      TONSILLECTOMY      VASECTOMY        General Information       Row Name 23 1125          OT Time and Intention    Document Type evaluation  -SW     Mode of Treatment occupational therapy  -SW       Row Name 23 1125          General Information    Patient Profile Reviewed yes  -SW     Prior Level of Function independent:;all household mobility;community mobility;ADL's;driving;home management  -SW     Existing Precautions/Restrictions fall  -SW     Barriers to Rehab none identified  -SW       Row Name 23 1125          Occupational Profile     Environmental Supports and Barriers (Occupational Profile) lives with wife, has a walk in shower with a seat, has a quad cane.  -[facility] Name 12/27/23 1125          Living Environment    People in Home spouse  -[facility] Name 12/27/23 [facility]          Home Main Entrance    Number of Stairs, Main Entrance two;other (see comments)  garage is in basement, but can go in on grond level with 2 steps to enter.  -[facility] Name 12/27/23 1125          Stairs Within Home, Primary    Number of Stairs, Within Home, Primary none  -SW       Row Name 12/27/23 1125          Cognition    Orientation Status (Cognition) oriented x 3  -[facility] Name 12/27/23 1125          Safety Issues, Functional Mobility    Safety Issues Affecting Function (Mobility) safety precautions follow-through/compliance;safety precaution awareness  -     Impairments Affecting Function (Mobility) endurance/activity tolerance;balance;strength  -               User Key  (r) = Recorded By, (t) = Taken By, (c) = Cosigned By      Initials Name Provider Type     Sierra Cummins OT Occupational Therapist                     Mobility/ADL's       [facility] Name 12/27/23 1125          Bed Mobility    Bed Mobility supine-sit  -     Supine-Sit [place] (Bed Mobility) set up  -     Assistive Device (Bed Mobility) bed rails;head of bed elevated  -SW       Row Name 12/27/23 1125          Transfers    Transfers bed-chair transfer;sit-stand transfer  -SW       Row Name 12/27/23 1125          Bed-Chair Transfer    Bed-Chair [place] (Transfers) contact guard  -     Assistive Device (Bed-Chair Transfers) other (see comments)  no device  -SW       Row Name 12/27/23 1125          Sit-Stand Transfer    Sit-Stand [place] (Transfers) standby assist  -     Assistive Device (Sit-Stand Transfers) other (see comments)  -     Comment, (Sit-Stand Transfer) no device  -[facility] Name 12/27/23 1125          Functional Mobility    Functional Mobility- Ind.  Level contact guard assist  -     Functional Mobility- Device other (see comments)  no device  -     Functional Mobility-Distance (Feet) 5  -SW       Row Name 12/27/23 1125          Activities of Daily Living    BADL Assessment/Intervention lower body dressing;feeding;grooming  -SW       Row Name 12/27/23 1125          Lower Body Dressing Assessment/Training    [place] Level (Lower Body Dressing) lower body dressing skills;socks;dependent (less than 25% patient effort)  -     Position (Lower Body Dressing) edge of bed sitting  -SW       Row Name 12/27/23 1125          Self-Feeding Assessment/Training    [place] Level (Feeding) feeding skills;set up  -     Position (Self-Feeding) sitting up in bed  -SW       Row Name 12/27/23 1125          Grooming Assessment/Training    [place] Level (Grooming) grooming skills;wash face, hands;set up  -SW     Position (Grooming) sitting up in bed  -               User Key  (r) = Recorded By, (t) = Taken By, (c) = Cosigned By      Initials Name Provider Type     Sierra Cummins OT Occupational Therapist                   Obj/Interventions       Row Name 12/27/23 1125          Sensory Assessment (Somatosensory)    Sensory Assessment (Somatosensory) UE sensation intact  -SW       Row Name 12/27/23 1125          Vision Assessment/Intervention    Visual Impairment/Limitations WFL;corrective lenses for reading  -SW       Row Name 12/27/23 1125          Range of Motion Comprehensive    General Range of Motion bilateral upper extremity ROM WFL  -SW       Row Name 12/27/23 1125          Strength Comprehensive (MMT)    General Manual Muscle Testing (MMT) Assessment no strength deficits identified  -     Comment, General Manual Muscle Testing (MMT) Assessment BUEs 5/5  -SW       Row Name 12/27/23 1125          Balance    Balance Assessment sitting static balance;sitting dynamic balance;sit to stand dynamic balance;standing static balance;standing dynamic balance  -      Static Sitting Balance standby assist  -SW     Dynamic Sitting Balance standby assist  -SW     Position, Sitting Balance unsupported  -SW     Sit to Stand Dynamic Balance standby assist  -SW     Static Standing Balance standby assist  -SW     Dynamic Standing Balance contact guard  -SW     Position/Device Used, Standing Balance unsupported  -SW     Balance Interventions sitting;standing;sit to stand;supported;dynamic;static;minimal challenge  -               User Key  (r) = Recorded By, (t) = Taken By, (c) = Cosigned By      Initials Name Provider Type    Sierra Jin OT Occupational Therapist                   Goals/Plan       Row Name 12/27/23 [facility]5          Transfer Goal 1 (OT)    Activity/Assistive Device (Transfer Goal 1, OT) toilet  -SW     [place] Level/Cues Needed (Transfer Goal 1, OT) independent  -SW     Time Frame (Transfer Goal 1, OT) long term goal (LTG);by discharge  -SW     Progress/Outcome (Transfer Goal 1, OT) goal ongoing  -       Row Name 12/27/23 1125          Dressing Goal 1 (OT)    Activity/Device (Dressing Goal 1, OT) lower body dressing  -SW     [place]/Cues Needed (Dressing Goal 1, OT) moderate assist (50-74% patient effort)  -SW     Time Frame (Dressing Goal 1, OT) long term goal (LTG);by discharge  -SW     Progress/Outcome (Dressing Goal 1, OT) goal ongoing  -       Row Name 12/27/23 1125          Therapy Assessment/Plan (OT)    Planned Therapy Interventions (OT) activity tolerance training;adaptive equipment training;BADL retraining;functional balance retraining;patient/caregiver education/training;transfer/mobility retraining;strengthening exercise  -               User Key  (r) = Recorded By, (t) = Taken By, (c) = Cosigned By      Initials Name Provider Type    Sierra Jin OT Occupational Therapist                   Clinical Impression       Row Name 12/27/23 [facility]5          Pain Assessment    Pretreatment Pain Rating 0/10 - no pain  -SW     Posttreatment Pain  Rating 0/10 - no pain  -       Row Name 12/27/23 1125          Plan of Care Review    Plan of Care Reviewed With patient;spouse;sibling  -     Outcome Evaluation OT eval complete. Pt presents with decreased act hien, weakness, and overall decline in adl performance. His wife and sister present during eval. He completed bed mob with setup, sts with sba, mob and t/f with cga, dep for donning socks, setup for grooming and self feeding. Recommend IPOT and HHOT at d/c.  -       Row Name 12/27/23 1125          Therapy Assessment/Plan (OT)    Rehab Potential (OT) good, to achieve stated therapy goals  -     Criteria for Skilled Therapeutic Interventions Met (OT) yes;meets criteria;skilled treatment is necessary  -     Therapy Frequency (OT) daily  -       Row Name 12/27/23 1125          Therapy Plan Review/Discharge Plan (OT)    Anticipated Discharge Disposition (OT) home with home health;home  -       Row Name 12/27/23 1125          Vital Signs    Pre Systolic BP Rehab 167  -SW     Pre Treatment Diastolic BP 96  -SW     Pretreatment Heart Rate (beats/min) 76  -SW     Pre SpO2 (%) 93  -SW     O2 Delivery Pre Treatment room air  -SW     O2 Delivery Intra Treatment room air  -SW     O2 Delivery Post Treatment room air  -SW     Pre Patient Position Supine  -SW     Intra Patient Position Standing  -SW     Post Patient Position Sitting  -       Row Name 12/27/23 1125          Positioning and Restraints    Pre-Treatment Position in bed  -SW     Post Treatment Position chair  -SW     In Chair notified nsg;reclined;sitting;call light within reach;encouraged to call for assist;exit alarm on;with family/caregiver;waffle cushion;legs elevated  -               User Key  (r) = Recorded By, (t) = Taken By, (c) = Cosigned By      Initials Name Provider Type    Sierra Jin, OT Occupational Therapist                   Outcome Measures       Row Name 12/27/23 0748          How much help from another is currently  needed...    Putting on and taking off regular lower body clothing? 2  -SW     Bathing (including washing, rinsing, and drying) 2  -SW     Toileting (which includes using toilet bed pan or urinal) 2  -SW     Putting on and taking off regular upper body clothing 3  -SW     Taking care of personal grooming (such as brushing teeth) 4  -SW     Eating meals 4  -SW     AM-PAC 6 Clicks Score (OT) 17  -SW       Row Name 12/27/23 0800          How much help from another person do you currently need...    Turning from your back to your side while in flat bed without using bedrails? 4  -MG     Moving from lying on back to sitting on the side of a flat bed without bedrails? 4  -MG     Moving to and from a bed to a chair (including a wheelchair)? 3  -MG     Standing up from a chair using your arms (e.g., wheelchair, bedside chair)? 3  -MG     Climbing 3-5 steps with a railing? 3  -MG     To walk in hospital room? 3  -MG     AM-PAC 6 Clicks Score (PT) 20  -MG     Highest Level of Mobility Goal 6 --> Walk 10 steps or more  -MG       Row Name 12/27/23 1432          Functional Assessment    Outcome Measure Options AM-PAC 6 Clicks Daily Activity (OT)  -SW               User Key  (r) = Recorded By, (t) = Taken By, (c) = Cosigned By      Initials Name Provider Type    Berta Cooper RN Registered Nurse    Sierra Jin OT Occupational Therapist                    Occupational Therapy Education       Title: PT OT SLP Therapies (In Progress)       Topic: Occupational Therapy (In Progress)       Point: ADL training (Done)       Description:   Instruct learner(s) on proper safety adaptation and remediation techniques during self care or transfers.   Instruct in proper use of assistive devices.                  Learning Progress Summary             Patient Acceptance, E, VU by NATALIE at 12/27/2023 1434                         Point: Home exercise program (Not Started)       Description:   Instruct learner(s) on appropriate technique  for monitoring, assisting and/or progressing therapeutic exercises/activities.                  Learner Progress:  Not documented in this visit.              Point: Precautions (Done)       Description:   Instruct learner(s) on prescribed precautions during self-care and functional transfers.                  Learning Progress Summary             Patient Colby, ROXANN, VU by NATALIE at 12/27/2023 6083                         Point: Body mechanics (Not Started)       Description:   Instruct learner(s) on proper positioning and spine alignment during self-care, functional mobility activities and/or exercises.                  Learner Progress:  Not documented in this visit.                              User Key       Initials Effective Dates Name Provider Type Discipline    NATALIE 06/16/21 -  Sierra Cummins, ROMY Occupational Therapist OT                  OT Recommendation and Plan  Planned Therapy Interventions (OT): activity tolerance training, adaptive equipment training, BADL retraining, functional balance retraining, patient/caregiver education/training, transfer/mobility retraining, strengthening exercise  Therapy Frequency (OT): daily  Plan of Care Review  Plan of Care Reviewed With: patient, spouse, sibling  Outcome Evaluation: OT eval complete. Pt presents with decreased act hien, weakness, and overall decline in adl performance. His wife and sister present during eval. He completed bed mob with setup, sts with sba, mob and t/f with cga, dep for donning socks, setup for grooming and self feeding. Recommend IPOT and HHOT at d/c.     Time Calculation:   Evaluation Complexity (OT)  Review Occupational Profile/Medical/Therapy History Complexity: expanded/moderate complexity  Assessment, Occupational Performance/Identification of Deficit Complexity: 3-5 performance deficits  Clinical Decision Making Complexity (OT): detailed assessment/moderate complexity  Overall Complexity of Evaluation (OT): moderate complexity     Time  Calculation- OT       Row Name 12/27/23 1125             Time Calculation- OT    OT Start Time 1125  -SW      OT Received On 12/27/23  -SW      OT Goal Re-Cert Due Date 01/06/24  -SW         Timed Charges    08511 - OT Self Care/Mgmt Minutes 15  -SW         Untimed Charges    OT Eval/Re-eval Minutes 39  -SW         Total Minutes    Timed Charges Total Minutes 15  -SW      Untimed Charges Total Minutes 39  -SW       Total Minutes 54  -SW                User Key  (r) = Recorded By, (t) = Taken By, (c) = Cosigned By      Initials Name Provider Type     Sierra Cummins OT Occupational Therapist                  Therapy Charges for Today       Code Description Service Date Service Provider Modifiers Qty    51907852326 HC OT SELF CARE/MGMT/TRAIN EA 15 MIN 12/27/2023 Sierra Cummins OT GO 1    43829951342 HC OT EVAL MOD COMPLEXITY 3 12/27/2023 Sierra Cummins OT GO 1                 Sierra Cummins OT  12/27/2023

## 2023-12-27 NOTE — CASE MANAGEMENT/SOCIAL WORK
Case Management Discharge Note      Final Note: Met with pt and wife at bedside for DCP.  Discussed OT recs for HH.  Both are agreeable.  CM called referral to Shanda at [facility].  No other needs identified/voiced.         Selected Continued Care - Admitted Since 12/25/2023       Destination    No services have been selected for the patient.                Durable Medical Equipment    No services have been selected for the patient.                Dialysis/Infusion    No services have been selected for the patient.                Home Medical Care       Service Provider Selected Services Address Phone Fax Patient Preferred    [facility] ,  Home Rehabilitation [address], [address] [address] [phone] --              Therapy    No services have been selected for the patient.                Community Resources    No services have been selected for the patient.                Community & DME    No services have been selected for the patient.                         Final Discharge Disposition Code: 06 - home with home health care

## 2023-12-27 NOTE — PROGRESS NOTES
Luis Perez Jr.  [id number]  1945   LOS: 2 days   Patient Care Team:  Sierra Kuo MD as PCP - General (Internal Medicine)  Latasha Poe MD as Consulting Physician (Dermatopathology)  Provider, No Known  Camille Mccord APRN as Nurse Practitioner (Pulmonary Disease)  Jaja Mansfield APRN as Nurse Practitioner (Cardiology)    Chief Complaint:  RESPIRATORY FAILURE / PAF / MORBID OBESITY / HTN / RUY / LARGE PE    Subjective     Comfortable this morning on 2 L/min nasal cannula.  Patient states he ate breakfast well.  He has less notable tachypnea and dyspnea.  He is unaware of palpitation.  No anginal type chest discomfort.  No significant pleurisy or hemoptysis but he did have significant cough with sputum production earlier this morning.  He ate the majority of breakfast without GI complaints.  No fever, chills, rash, or abdominal pain.    Objective     Vital Sign Min/Max for last 24 hours  Temp  Min: 97.4 °F (36.3 °C)  Max: 98.5 °F (36.9 °C)   BP  Min: 110/75  Max: 149/111   Pulse  Min: 67  Max: 110   Resp  Min: 18  Max: 20   SpO2  Min: 90 %  Max: 97 %      Weight  Min: 147 kg (324 lb 1.2 oz)  Max: 147 kg (324 lb 1.2 oz)         12/25/23  1416 12/26/23  1007   Weight: (!) 147 kg (325 lb) (!) 147 kg (324 lb 1.2 oz)         Intake/Output Summary (Last 24 hours) at 12/27/2023 0806  Last data filed at 12/26/2023 1700  Gross per 24 hour   Intake --   Output 0 ml   Net 0 ml       Physical Exam:     General Appearance:    Alert, cooperative, in no acute distress   Lungs:     Clear to auscultation,respirations regular, even and                unlabored    Heart:    Irregular and normal rate, normal S1 and S2, grade 1/6 systolic murmur, no gallop, no rub, no click   Abdomen:  Extremities:   Soft, nontender, bowel sounds audible x4    No edema, normal range of motion   Pulses:   Pulses palpable and equal bilaterally      Results Review:   Results from last 7 days   Lab Units 12/26/23 2121  12/26/23  0308 12/25/23  1421   SODIUM mmol/L  --  137 135*   POTASSIUM mmol/L 3.9 3.2* 3.5   CHLORIDE mmol/L  --  93* 94*   CO2 mmol/L  --  26.0 25.0   BUN mg/dL  --  31* 27*   CREATININE mg/dL  --  1.31* 1.04   GLUCOSE mg/dL  --  176* 178*   CALCIUM mg/dL  --  9.0 9.3     Results from last 7 days   Lab Units 12/25/23  1421   WBC 10*3/mm3 11.99*   HEMOGLOBIN g/dL 14.1   HEMATOCRIT % 42.4   PLATELETS 10*3/mm3 319     Results from last 7 days   Lab Units 12/25/23  1421   HSTROP T ng/L 10     ECHO:       Left ventricular systolic function is normal. Estimated left ventricular EF = 60%    Left ventricular wall thickness is consistent with moderate concentric hypertrophy.    The cardiac valves are anatomically and functionally normal.    Estimated right ventricular systolic pressure from tricuspid regurgitation is normal (<35 mmHg).    There is a large (>2cm) circumferential pericardial effusion. There is no evidence of cardiac tamponade.    EKG(12/26/23):    Sinus rhythm with premature atrial complexes  Cannot rule out Inferior infarct , age undetermined  T wave abnormality, consider lateral ischemia  Abnormal ECG  When compared with ECG of 25-DEC-2023 23:06, (Unconfirmed)  Sinus rhythm has replaced Atrial fibrillation  Minimal criteria for Anterior infarct are no longer present  T wave inversion now evident in Lateral leads    ECV: SUCCESSFUL    CHEST CT SCAN:      Findings:     Cardiovascular: Pericardial effusion measuring up to 3 cm in thickness. Normal caliber thoracic aorta. Coronary artery calcifications.     Lymph nodes and mediastinum: No lymphadenopathy or mass.     Lungs and airways: Central airways are patent. No suspicious pulmonary nodules or masses. No focal consolidation. Low-grade bibasilar atelectasis/scarring. No subpleural reticulations, honeycombing, architectural distortion, or significant   bronchiectasis.     Pleura: No pleural effusion or pneumothorax.     Bones and soft tissues: No acute or  suspicious osseous abnormality. Multilevel degenerative changes of the spine. Soft tissues are within normal limits.     Upper abdomen: Trace perihepatic free fluid. 5 mm left renal stone. Left renal cyst partially included within the field-of-view.     IMPRESSIONS:     No evidence of acute cardiopulmonary disease. No findings of significant interstitial lung disease, as questioned.      Moderate/large pericardial effusion, known per chart review.    Medication Review: REVIEWED; NO DRIPS.    Assessment & Plan     No new electrocardiogram or laboratory results to review this morning.  Telemetry suggests sinus rhythm with frequent PACs.  He does not have clinical features of cardiac tamponade today but does have a large pericardial effusion which probably was manifest as his cardiomegaly on admission portable upright chest x-ray.  Systolic left ventricular function is nominal.  Doubt acute coronary artery syndrome and there are no important findings of valvular heart disease on transthoracic echocardiogram.  His thoracic CT scan does not suggest important bronchiectasis, pneumonitis, or interstitial lung disease.  Would defer attempt at pericardiocentesis in view of the fact that he is probably largely asymptomatic despite the large effusion and is on anticoagulation with recent atrial fibrillation and ECV.  I have asked him to ambulate in room and hallway off oxygen therapy and if stable tentatively he can be discharged later today from a cardiology perspective on the following discharge cardiac medications and recommended follow-up:    Amlodipine 10 mg daily  Apixaban 5 mg BID  Sotalol 120 mg BID  Vascepa 2 g BID  Torsemide 10 mg daily  Micro-K 10 mill equivalents daily  Electro cardiogram [facility] office 2 January 2024  [facility] follow-up 2-3 weeks with limited echocardiogram/BMP to assess pericardial effusion  [facility] follow-up Ms. Jaja Mansfield, APRN 1 month    Discussed with  patient and RN.      Paroxysmal atrial fibrillation    Hypertension    Obstructive sleep apnea - Intolerant CPAP/BiPAP    Obesity, Class III, BMI 40-49.9 (morbid obesity)    Rhinovirus    Pericardial effusion    12/27/23  08:06 EST

## 2023-12-27 NOTE — DISCHARGE SUMMARY
[facility] Medicine Services  DISCHARGE SUMMARY    Patient Name: Luis Perez Jr.  : 1945  MRN: [id number]    Date of Admission: 2023  2:14 PM  Date of Discharge:  2023  Primary Care Physician: Sierra Kuo MD    Consults       Date and Time Order Name Status Description    2023  7:15 PM Inpatient Cardiology Consult Completed             Hospital Course     Presenting Problem: Shortness of breath    Active Hospital Problems    Diagnosis  POA    **Paroxysmal atrial fibrillation [I48.0]  Yes    Pericardial effusion [I31.39]  Yes    Rhinovirus [B34.8]  Yes    Obesity, Class III, BMI 40-49.9 (morbid obesity) [E66.01]  Yes    Obstructive sleep apnea - Intolerant CPAP/BiPAP [G47.33]  Yes    Hypertension [I10]  Yes      Resolved Hospital Problems    Diagnosis Date Resolved POA    Rapid atrial fibrillation [I48.91] 2023 Yes          Hospital Course:  Luis Perez Jr. is a 78 y.o. male w PAF on sotalol/eliquis, obstructive lung disease, and sleep apnea.  Admitted for dyspnea x one week with .  EKG on admission showed A-fib with RVR.  Patient is followed by [facility] regularly as an outpatient and has required cardioversion in the past. Cardiology was consulted, and patient was successfully cardioverted on 2024. Cardiology also made follow-up and medication recommendations for discharge home.        Paroxysmal A-fib w RVR, resolved  SOA, resolved  - in setting of enterovirus and RUY  - s/p diuresis with Bumex 2 mg IV in ED  - echo shows large pericardial effusion without evidence of tamponade  - CT chest also showed moderate/large pericardial effusion that is known per chart review, did not show evidence of acute cardiopulmonary disease or findings of significant interstitial lung disease  - Cardiology recommends increasing Sotalol to 120 mg BID, continue Eliquis, begin torsemide 10 mg daily with daily K replacement    MONIQUE  - creat up from  1.0 to 1.3 with Bumex, further diuresis held  - repeat BMP at PCP follow-up to monitor creatinine given new diuretic     Enterovirus/rhinovirus  - Supportive care    Debility  BLE Edema  -Home with home  health   - Change HCTZ to torsemide with close monitoring of creatinine  - Consider changing amlodipine to nifedipine given BLE, defer to cardiology/PCP for further management      Discharge Follow Up Recommendations for outpatient labs/diagnostics:  --Follow up with PCP one week, check BMP to monitor creatinine  --Follow up [facility] 1/2/2024 for EKG  --Follow up with [facility] 2 weeks  --Follow up with ANGELIA Garcia with [facility] 1 month      Day of Discharge     HPI:   Sitting up in chair.  Family is at the bedside.  Patient denies shortness of air, chest pain.  Says he is feeling better.  Appears to be in controlled A-fib on the monitor.  Satting 96% on room air.    Review of Systems  Gen- No fevers, chills  CV- No chest pain, palpitations  Resp- No cough, dyspnea  GI- No N/V/D, abd pain      Vital Signs:   Temp:  [97.4 °F (36.3 °C)-98.5 °F (36.9 °C)] 97.4 °F (36.3 °C)  Heart Rate:  [72-92] 83  Resp:  [18-20] 18  BP: (110-167)/() 167/76  Flow (L/min):  [2] 2      Physical Exam:  Constitutional: No acute distress, awake, alert  HENT: NCAT, mucous membranes moist  Respiratory: Diminished to auscultation right lung, clear left lung, respiratory effort normal, room air  Cardiovascular: RRR, no murmurs, rubs, or gallops  Gastrointestinal: Positive bowel sounds, soft, nontender, nondistended  Musculoskeletal: 2+ bilateral ankle edema  Psychiatric: Appropriate affect, cooperative  Neurologic: Oriented x 3, moves all extremities, Cranial Nerves grossly intact to confrontation, speech clear  Skin: No rashes      Pertinent  and/or Most Recent Results     LAB RESULTS:      Lab 12/25/23  1421   WBC 11.99*   HEMOGLOBIN 14.1   HEMATOCRIT 42.4   PLATELETS 319   NEUTROS ABS  8.14*   IMMATURE GRANS (ABS) 0.18*   LYMPHS ABS 1.94   MONOS ABS 1.67*   EOS ABS 0.01   MCV 87.2         Lab 12/27/23  0811 12/26/23  2128 12/26/23  0308 12/25/23  1421   SODIUM  --   --  137 135*   POTASSIUM  --  3.9 3.2* 3.5   CHLORIDE  --   --  93* 94*   CO2  --   --  26.0 25.0   ANION GAP  --   --  18.0* 16.0*   BUN  --   --  31* 27*   CREATININE  --   --  1.31* 1.04   EGFR  --   --  55.7* 73.5   GLUCOSE  --   --  176* 178*   CALCIUM  --   --  9.0 9.3   MAGNESIUM 2.7*  --   --   --          Lab 12/25/23  1421   TOTAL PROTEIN 7.0   ALBUMIN 4.0   GLOBULIN 3.0   ALT (SGPT) 73*   AST (SGOT) 88*   BILIRUBIN 0.8   ALK PHOS 67         Lab 12/25/23  1421   PROBNP 455.3   HSTROP T 10                 Brief Urine Lab Results  (Last result in the past 365 days)        Color   Clarity   Blood   Leuk Est   Nitrite   Protein   CREAT   Urine HCG        12/25/23 1747 Orange   Cloudy   Large (3+)   Trace   Negative   100 mg/dL (2+)                 Microbiology Results (last 10 days)       Procedure Component Value - Date/Time    COVID PRE-OP / PRE-PROCEDURE SCREENING ORDER (NO ISOLATION) - Swab, Nasopharynx [[id number]]  (Abnormal) Collected: 12/25/23 1512    Lab Status: Final result Specimen: Swab from Nasopharynx Updated: 12/25/23 1639    Narrative:      The following orders were created for panel order COVID PRE-OP / PRE-PROCEDURE SCREENING ORDER (NO ISOLATION) - Swab, Nasopharynx.  Procedure                               Abnormality         Status                     ---------                               -----------         ------                     Respiratory Panel PCR w/...[[id number]]  Abnormal            Final result                 Please view results for these tests on the individual orders.    Respiratory Panel PCR w/COVID-19(SARS-CoV-2) RIKY/DIEGO/JAVI/PAD/COR/KYLIE In-House, NP Swab in [facility], 2 HR TAT - Swab, Nasopharynx [[id number]]  (Abnormal) Collected: 12/25/23 1518    Lab Status: Final result Specimen: Swab from  Nasopharynx Updated: 12/25/23 1639     ADENOVIRUS, PCR Not Detected     Coronavirus 229E Not Detected     Coronavirus HKU1 Not Detected     Coronavirus NL63 Not Detected     Coronavirus OC43 Not Detected     COVID19 Not Detected     Human Metapneumovirus Not Detected     Human Rhinovirus/Enterovirus Detected     Influenza A PCR Not Detected     Influenza B PCR Not Detected     Parainfluenza Virus 1 Not Detected     Parainfluenza Virus 2 Not Detected     Parainfluenza Virus 3 Not Detected     Parainfluenza Virus 4 Not Detected     RSV, PCR Not Detected     Bordetella pertussis pcr Not Detected     Bordetella parapertussis PCR Not Detected     Chlamydophila pneumoniae PCR Not Detected     Mycoplasma pneumo by PCR Not Detected    Narrative:      In the setting of a positive respiratory panel with a viral infection PLUS a negative procalcitonin without other underlying concern for bacterial infection, consider observing off antibiotics or discontinuation of antibiotics and continue supportive care. If the respiratory panel is positive for atypical bacterial infection (Bordetella pertussis, Chlamydophila pneumoniae, or Mycoplasma pneumoniae), consider antibiotic de-escalation to target atypical bacterial infection.            CT Chest With & Without Contrast Diagnostic    Result Date: 12/26/2023  CT CHEST W WO CONTRAST DIAGNOSTIC Date of Exam: 12/26/2023 8:46 PM EST Indication: Diffuse/interstitial lung disease Dyspnea, chronic, unclear etiology. Comparison: None available. Technique: Axial CT images were obtained of the chest before and after the uneventful intravenous administration of 80 mL Isovue-300.  Reconstructed coronal and sagittal images were also obtained. Automated exposure control and iterative construction methods were used. Findings: Cardiovascular: Pericardial effusion measuring up to 3 cm in thickness. Normal caliber thoracic aorta. Coronary artery calcifications. Lymph nodes and mediastinum: No  lymphadenopathy or mass. Lungs and airways: Central airways are patent. No suspicious pulmonary nodules or masses. No focal consolidation. Low-grade bibasilar atelectasis/scarring. No subpleural reticulations, honeycombing, architectural distortion, or significant bronchiectasis. Pleura: No pleural effusion or pneumothorax.  Bones and soft tissues: No acute or suspicious osseous abnormality. Multilevel degenerative changes of the spine. Soft tissues are within normal limits. Upper abdomen: Trace perihepatic free fluid. 5 mm left renal stone. Left renal cyst partially included within the field-of-view.     Impression: No evidence of acute cardiopulmonary disease. No findings of significant interstitial lung disease, as questioned. Moderate/large pericardial effusion, known per chart review. Electronically Signed: Seven Arriola MD  12/26/2023 10:51 PM EST  Workstation ID: [id number]    Cardioversion External in Cardiology Department    Result Date: 12/26/2023    Successful cardioversion for atrial fibrillation     Adult Transthoracic Echo Complete W/ Cont if Necessary Per Protocol    Result Date: 12/26/2023    Left ventricular systolic function is normal. Estimated left ventricular EF = 60%   Left ventricular wall thickness is consistent with moderate concentric hypertrophy.   The cardiac valves are anatomically and functionally normal.   Estimated right ventricular systolic pressure from tricuspid regurgitation is normal (<35 mmHg).   There is a large (>2cm) circumferential pericardial effusion. There is no evidence of cardiac tamponade.     XR Chest 1 View    Result Date: 12/25/2023  XR CHEST 1 VW Date of Exam: 12/25/2023 2:39 PM EST Indication: SOA triage protocol Comparison: 3/9/2023 and prior Findings: Study is limited by overlying support and monitoring apparatus. The heart size is enlarged. This is accentuated by portable technique and lower lung volumes compared to the prior study. There is mild pulmonary  vascular congestion. No acute osseous abnormality noted.     Impression: Cardiomegaly with mild pulmonary vascular congestion No focal consolidation noted. Electronically Signed: Vlad Antony MD  12/25/2023 2:56 PM EST  Workstation ID: [id number]     Results for orders placed in visit on 06/19/19    Duplex Venous Lower Extremity - Bilateral CAR    Interpretation Summary  · Normal bilateral lower extremity venous duplex scan.      Results for orders placed in visit on 06/19/19    Duplex Venous Lower Extremity - Bilateral CAR    Interpretation Summary  · Normal bilateral lower extremity venous duplex scan.      Results for orders placed during the hospital encounter of 12/25/23    Adult Transthoracic Echo Complete W/ Cont if Necessary Per Protocol    Interpretation Summary    Left ventricular systolic function is normal. Estimated left ventricular EF = 60%    Left ventricular wall thickness is consistent with moderate concentric hypertrophy.    The cardiac valves are anatomically and functionally normal.    Estimated right ventricular systolic pressure from tricuspid regurgitation is normal (<35 mmHg).    There is a large (>2cm) circumferential pericardial effusion. There is no evidence of cardiac tamponade.      Plan for Follow-up of Pending Labs/Results:     Discharge Details        Discharge Medications        New Medications        Instructions Start Date   potassium chloride 10 MEQ CR tablet   10 mEq, Oral, 2 Times Daily      torsemide 10 MG tablet  Commonly known as: DEMADEX   10 mg, Oral, Daily             Changes to Medications        Instructions Start Date   sotalol 80 MG tablet tablet  Commonly known as: BETAPACE AF  What changed: how much to take   120 mg, Oral, 2 Times Daily             Continue These Medications        Instructions Start Date   albuterol sulfate  (90 Base) MCG/ACT inhaler  Commonly known as: PROVENTIL HFA;VENTOLIN HFA;PROAIR HFA   2 puffs, Inhalation, Every 4 Hours PRN       albuterol (2.5 MG/3ML) 0.083% nebulizer solution  Commonly known as: PROVENTIL   2.5 mg, Nebulization, Every 4 Hours PRN      amLODIPine 10 MG tablet  Commonly known as: NORVASC   10 mg, Oral, Daily      ascorbic acid 1000 MG tablet  Commonly known as: VITAMIN C   No dose, route, or frequency recorded.      Dupilumab 300 MG/2ML solution prefilled syringe   300 mg, Subcutaneous, Every 14 Days      Eliquis 5 MG tablet tablet  Generic drug: apixaban   TAKE 1 TABLET BY MOUTH  TWICE DAILY      EPINEPHrine 0.3 MG/0.3ML solution auto-injector injection  Commonly known as: EPIPEN   No dose, route, or frequency recorded.      fexofenadine 180 MG tablet  Commonly known as: Allegra Allergy   180 mg, Oral, Daily      finasteride 5 MG tablet  Commonly known as: PROSCAR   5 mg, Oral, Daily      Fluticasone Furoate-Vilanterol 200-25 MCG/ACT inhaler  Commonly known as: Breo Ellipta   1 puff, Inhalation, Daily - RT      icosapent ethyl 1 g capsule capsule  Commonly known as: VASCEPA   TAKE 2 CAPSULES BY MOUTH  TWICE DAILY WITH MEALS      montelukast 10 MG tablet  Commonly known as: SINGULAIR   10 mg, Oral, Daily      tamsulosin 0.4 MG capsule 24 hr capsule  Commonly known as: FLOMAX   1 capsule, Oral, Daily      traMADol 50 MG tablet  Commonly known as: ULTRAM   50 mg, Oral, Every 6 Hours PRN      VITEYES AREDS FORMULA PO   1 tablet, Oral, 2 Times Daily             Stop These Medications      hydroCHLOROthiazide 50 MG tablet  Commonly known as: HYDRODIURIL              Allergies   Allergen Reactions    Adhesive Tape Rash         Discharge Disposition:  Home-Health Care [facility] [place] [facility]    Diet:  Hospital:  Diet Order   Procedures    Diet: Cardiac Diets; Healthy Heart (2-3 Na+); Texture: Regular Texture (IDDSI 7); Fluid Consistency: Thin (IDDSI 0)       Diet Instructions       Diet: Cardiac Diets; Healthy Heart (2-3 Na+); Regular Texture (IDDSI 7); Thin (IDDSI 0)      Discharge Diet: Cardiac Diets    Cardiac Diet: Healthy Heart (2-3 Na+)     Texture: Regular Texture (IDDSI 7)    Fluid Consistency: Thin (IDDSI 0)             Activity:  Activity Instructions       Activity as Tolerated              Restrictions or Other Recommendations:         CODE STATUS:    Code Status and Medical Interventions:   Ordered at: 12/25/23 1803     Level Of Support Discussed With:    Patient     Code Status (Patient has no pulse and is not breathing):    CPR (Attempt to Resuscitate)     Medical Interventions (Patient has pulse or is breathing):    Full Support       Future Appointments   Date Time Provider Department Center   2/21/2024 10:20 AM Jaja Mansfield APRN [facility]   2/26/2024  9:45 AM RAD TECH [facility]   2/26/2024 10:00 AM [facility], PFT LAB 2 [facility]   2/26/2024 10:30 AM Camille Mccord APRN [facility]       Additional Instructions for the Follow-ups that You Need to Schedule       Discharge Follow-up with PCP   As directed       Currently Documented PCP:    Sierra Kuo MD    PCP Phone Number:    [phone]     Follow Up Details: One week, check BMP to monitor creatinine        Discharge Follow-up with Specified Provider: [facility]   As directed      To: [facility]   Follow Up Details: January 2, 2024 for EKG        Discharge Follow-up with Specified Provider: [facility]; 2 Weeks   As directed      To: [facility]   Follow Up: 2 Weeks        Discharge Follow-up with Specified Provider: ANGELIA Garcia with [facility]; 1 Month   As directed      To: ANGELIA Garcia with [facility]   Follow Up: 1 Month                  ANGELIA Parr  12/27/23      Time Spent on Discharge:  I spent  45  minutes on this discharge activity which included: face-to-face encounter with the patient, reviewing the data in the system, coordination of the care with the nursing staff as well as consultants, documentation, and entering orders.

## 2023-12-27 NOTE — PLAN OF CARE
Goal Outcome Evaluation:  Plan of Care Reviewed With: patient, spouse, sibling           Outcome Evaluation: OT eval complete. Pt presents with decreased act hien, weakness, and overall decline in adl performance. His wife and sister present during eval. He completed bed mob with setup, sts with sba, mob and t/f with cga, dep for donning socks, setup for grooming and self feeding. Recommend IPOT and HHOT at d/c.      Anticipated Discharge Disposition (OT): home with home health, home

## 2023-12-27 NOTE — THERAPY EVALUATION
Patient Name: Luis Perez Jr.  : 1945    MRN: [id number]                              Today's Date: 2023       Admit Date: 2023    Visit Dx:     ICD-10-CM ICD-9-CM   1. Acute viral syndrome  B34.9 079.99   2. Paroxysmal atrial fibrillation with rapid ventricular response  I48.0 427.31   3. PALM (dyspnea on exertion)  R06.09 786.09   4. Elevated blood pressure reading with diagnosis of hypertension  I10 401.9     Patient Active Problem List   Diagnosis    Hypertension    Paroxysmal atrial fibrillation    Snoring    Obstructive sleep apnea - Intolerant CPAP/BiPAP    Chronic persistent asthma    Non-smoker    Obesity, Class III, BMI 40-49.9 (morbid obesity)    Perennial rhinitis    Vitamin D deficiency    Rhinovirus    Pericardial effusion     Past Medical History:   Diagnosis Date    Asthma     Bronchitis, chronic     Cancer     Skin     Cellulitis and abscess of right leg     Chronic persistent asthma 2020    Hyperlipidemia     Hypertension     Nephrolithiasis     Obesity, Class III, BMI 40-49.9 (morbid obesity) 2020    Paroxysmal atrial fibrillation 07/15/2019    Pneumonia     Sleep apnea     UTI (urinary tract infection)     Vitamin D deficiency 2020     Past Surgical History:   Procedure Laterality Date    CARDIOVERSION  2019    COLONOSCOPY      NASAL SEPTUM SURGERY      Deviation Repair    SKIN CANCER EXCISION      TONSILLECTOMY      VASECTOMY        General Information       Row Name 23 1125          OT Time and Intention    Document Type evaluation  -SW     Mode of Treatment occupational therapy  -SW       Row Name 23 1125          General Information    Patient Profile Reviewed yes  -SW     Prior Level of Function independent:;all household mobility;community mobility;ADL's;driving;home management  -SW     Existing Precautions/Restrictions fall  -SW     Barriers to Rehab none identified  -SW       Row Name 23 1125          Occupational Profile     Environmental Supports and Barriers (Occupational Profile) lives with wife, has a walk in shower with a seat, has a quad cane.  -[facility] Name 12/27/23 1125          Living Environment    People in Home spouse  -[facility] Name 12/27/23 [facility]          Home Main Entrance    Number of Stairs, Main Entrance two;other (see comments)  garage is in basement, but can go in on grond level with 2 steps to enter.  -[facility] Name 12/27/23 1125          Stairs Within Home, Primary    Number of Stairs, Within Home, Primary none  -SW       Row Name 12/27/23 1125          Cognition    Orientation Status (Cognition) oriented x 3  -[facility] Name 12/27/23 1125          Safety Issues, Functional Mobility    Safety Issues Affecting Function (Mobility) safety precautions follow-through/compliance;safety precaution awareness  -     Impairments Affecting Function (Mobility) endurance/activity tolerance;balance;strength  -               User Key  (r) = Recorded By, (t) = Taken By, (c) = Cosigned By      Initials Name Provider Type     Sierra Cummins OT Occupational Therapist                     Mobility/ADL's       [facility] Name 12/27/23 1125          Bed Mobility    Bed Mobility supine-sit  -     Supine-Sit [place] (Bed Mobility) set up  -     Assistive Device (Bed Mobility) bed rails;head of bed elevated  -SW       Row Name 12/27/23 1125          Transfers    Transfers bed-chair transfer;sit-stand transfer  -SW       Row Name 12/27/23 1125          Bed-Chair Transfer    Bed-Chair Bar[place] (Transfers) contact guard  -     Assistive Device (Bed-Chair Transfers) other (see comments)  no device  -SW       Row Name 12/27/23 1125          Sit-Stand Transfer    Sit-Stand [place] (Transfers) standby assist  -     Assistive Device (Sit-Stand Transfers) other (see comments)  -     Comment, (Sit-Stand Transfer) no device  -[facility] Name 12/27/23 1125          Functional Mobility    Functional Mobility- Ind.  Level contact guard assist  -     Functional Mobility- Device other (see comments)  no device  -     Functional Mobility-Distance (Feet) 5  -SW       Row Name 12/27/23 1125          Activities of Daily Living    BADL Assessment/Intervention lower body dressing;feeding;grooming  -SW       Row Name 12/27/23 1125          Lower Body Dressing Assessment/Training    [place] Level (Lower Body Dressing) lower body dressing skills;socks;dependent (less than 25% patient effort)  -     Position (Lower Body Dressing) edge of bed sitting  -SW       Row Name 12/27/23 1125          Self-Feeding Assessment/Training    [place] Level (Feeding) feeding skills;set up  -     Position (Self-Feeding) sitting up in bed  -SW       Row Name 12/27/23 1125          Grooming Assessment/Training    [place] Level (Grooming) grooming skills;wash face, hands;set up  -SW     Position (Grooming) sitting up in bed  -               User Key  (r) = Recorded By, (t) = Taken By, (c) = Cosigned By      Initials Name Provider Type     Sierra Cummins OT Occupational Therapist                   Obj/Interventions       Row Name 12/27/23 1125          Sensory Assessment (Somatosensory)    Sensory Assessment (Somatosensory) UE sensation intact  -SW       Row Name 12/27/23 1125          Vision Assessment/Intervention    Visual Impairment/Limitations WFL;corrective lenses for reading  -SW       Row Name 12/27/23 1125          Range of Motion Comprehensive    General Range of Motion bilateral upper extremity ROM WFL  -SW       Row Name 12/27/23 1125          Strength Comprehensive (MMT)    General Manual Muscle Testing (MMT) Assessment no strength deficits identified  -     Comment, General Manual Muscle Testing (MMT) Assessment BUEs 5/5  -SW       Row Name 12/27/23 1125          Balance    Balance Assessment sitting static balance;sitting dynamic balance;sit to stand dynamic balance;standing static balance;standing dynamic balance  -      Static Sitting Balance standby assist  -SW     Dynamic Sitting Balance standby assist  -SW     Position, Sitting Balance unsupported  -SW     Sit to Stand Dynamic Balance standby assist  -SW     Static Standing Balance standby assist  -SW     Dynamic Standing Balance contact guard  -SW     Position/Device Used, Standing Balance unsupported  -SW     Balance Interventions sitting;standing;sit to stand;supported;dynamic;static;minimal challenge  -               User Key  (r) = Recorded By, (t) = Taken By, (c) = Cosigned By      Initials Name Provider Type    Sierra Jin OT Occupational Therapist                   Goals/Plan       Row Name 12/27/23 [facility]5          Transfer Goal 1 (OT)    Activity/Assistive Device (Transfer Goal 1, OT) toilet  -SW     [place] Level/Cues Needed (Transfer Goal 1, OT) independent  -SW     Time Frame (Transfer Goal 1, OT) long term goal (LTG);by discharge  -SW     Progress/Outcome (Transfer Goal 1, OT) goal ongoing  -       Row Name 12/27/23 1125          Dressing Goal 1 (OT)    Activity/Device (Dressing Goal 1, OT) lower body dressing  -SW     [place]/Cues Needed (Dressing Goal 1, OT) moderate assist (50-74% patient effort)  -SW     Time Frame (Dressing Goal 1, OT) long term goal (LTG);by discharge  -SW     Progress/Outcome (Dressing Goal 1, OT) goal ongoing  -       Row Name 12/27/23 1125          Therapy Assessment/Plan (OT)    Planned Therapy Interventions (OT) activity tolerance training;adaptive equipment training;BADL retraining;functional balance retraining;patient/caregiver education/training;transfer/mobility retraining;strengthening exercise  -               User Key  (r) = Recorded By, (t) = Taken By, (c) = Cosigned By      Initials Name Provider Type    Sierra Jin OT Occupational Therapist                   Clinical Impression       Row Name 12/27/23 [facility]5          Pain Assessment    Pretreatment Pain Rating 0/10 - no pain  -SW     Posttreatment Pain  Rating 0/10 - no pain  -       Row Name 12/27/23 1125          Plan of Care Review    Plan of Care Reviewed With patient;spouse;sibling  -     Outcome Evaluation OT eval complete. Pt presents with decreased act hien, weakness, and overall decline in adl performance. His wife and sister present during eval. He completed bed mob with setup, sts with sba, mob and t/f with cga, dep for donning socks, setup for grooming and self feeding. Recommend IPOT and HHOT at d/c.  -       Row Name 12/27/23 1125          Therapy Assessment/Plan (OT)    Rehab Potential (OT) good, to achieve stated therapy goals  -     Criteria for Skilled Therapeutic Interventions Met (OT) yes;meets criteria;skilled treatment is necessary  -     Therapy Frequency (OT) daily  -       Row Name 12/27/23 1125          Therapy Plan Review/Discharge Plan (OT)    Anticipated Discharge Disposition (OT) home with home health;home  -       Row Name 12/27/23 1125          Vital Signs    Pre Systolic BP Rehab 167  -SW     Pre Treatment Diastolic BP 96  -SW     Pretreatment Heart Rate (beats/min) 76  -SW     Pre SpO2 (%) 93  -SW     O2 Delivery Pre Treatment room air  -SW     O2 Delivery Intra Treatment room air  -SW     O2 Delivery Post Treatment room air  -SW     Pre Patient Position Supine  -SW     Intra Patient Position Standing  -SW     Post Patient Position Sitting  -       Row Name 12/27/23 1125          Positioning and Restraints    Pre-Treatment Position in bed  -SW     Post Treatment Position chair  -SW     In Chair notified nsg;reclined;sitting;call light within reach;encouraged to call for assist;exit alarm on;with family/caregiver;waffle cushion;legs elevated  -               User Key  (r) = Recorded By, (t) = Taken By, (c) = Cosigned By      Initials Name Provider Type    Sierra Jin, OT Occupational Therapist                   Outcome Measures       Row Name 12/27/23 4068          How much help from another is currently  needed...    Putting on and taking off regular lower body clothing? 2  -SW     Bathing (including washing, rinsing, and drying) 2  -SW     Toileting (which includes using toilet bed pan or urinal) 2  -SW     Putting on and taking off regular upper body clothing 3  -SW     Taking care of personal grooming (such as brushing teeth) 4  -SW     Eating meals 4  -SW     AM-PAC 6 Clicks Score (OT) 17  -SW       Row Name 12/27/23 0800          How much help from another person do you currently need...    Turning from your back to your side while in flat bed without using bedrails? 4  -MG     Moving from lying on back to sitting on the side of a flat bed without bedrails? 4  -MG     Moving to and from a bed to a chair (including a wheelchair)? 3  -MG     Standing up from a chair using your arms (e.g., wheelchair, bedside chair)? 3  -MG     Climbing 3-5 steps with a railing? 3  -MG     To walk in hospital room? 3  -MG     AM-PAC 6 Clicks Score (PT) 20  -MG     Highest Level of Mobility Goal 6 --> Walk 10 steps or more  -MG       Row Name 12/27/23 1432          Functional Assessment    Outcome Measure Options AM-PAC 6 Clicks Daily Activity (OT)  -SW               User Key  (r) = Recorded By, (t) = Taken By, (c) = Cosigned By      Initials Name Provider Type    Berta Cooper RN Registered Nurse    Sierra Jin OT Occupational Therapist                    Occupational Therapy Education       Title: PT OT SLP Therapies (In Progress)       Topic: Occupational Therapy (In Progress)       Point: ADL training (Done)       Description:   Instruct learner(s) on proper safety adaptation and remediation techniques during self care or transfers.   Instruct in proper use of assistive devices.                  Learning Progress Summary             Patient Acceptance, E, VU by NATALIE at 12/27/2023 1434                         Point: Home exercise program (Not Started)       Description:   Instruct learner(s) on appropriate technique  for monitoring, assisting and/or progressing therapeutic exercises/activities.                  Learner Progress:  Not documented in this visit.              Point: Precautions (Done)       Description:   Instruct learner(s) on prescribed precautions during self-care and functional transfers.                  Learning Progress Summary             Patient Colby, ROXANN, VU by NATALIE at 12/27/2023 0131                         Point: Body mechanics (Not Started)       Description:   Instruct learner(s) on proper positioning and spine alignment during self-care, functional mobility activities and/or exercises.                  Learner Progress:  Not documented in this visit.                              User Key       Initials Effective Dates Name Provider Type Discipline    NATALIE 06/16/21 -  Sierra Cummins, ROMY Occupational Therapist OT                  OT Recommendation and Plan  Planned Therapy Interventions (OT): activity tolerance training, adaptive equipment training, BADL retraining, functional balance retraining, patient/caregiver education/training, transfer/mobility retraining, strengthening exercise  Therapy Frequency (OT): daily  Plan of Care Review  Plan of Care Reviewed With: patient, spouse, sibling  Outcome Evaluation: OT eval complete. Pt presents with decreased act hien, weakness, and overall decline in adl performance. His wife and sister present during eval. He completed bed mob with setup, sts with sba, mob and t/f with cga, dep for donning socks, setup for grooming and self feeding. Recommend IPOT and HHOT at d/c.     Time Calculation:   Evaluation Complexity (OT)  Review Occupational Profile/Medical/Therapy History Complexity: expanded/moderate complexity  Assessment, Occupational Performance/Identification of Deficit Complexity: 3-5 performance deficits  Clinical Decision Making Complexity (OT): detailed assessment/moderate complexity  Overall Complexity of Evaluation (OT): moderate complexity     Time  Calculation- OT       Row Name 12/27/23 1125             Time Calculation- OT    OT Start Time 1125  -SW      OT Received On 12/27/23  -SW      OT Goal Re-Cert Due Date 01/06/24  -SW         Timed Charges    39350 - OT Self Care/Mgmt Minutes 15  -SW         Untimed Charges    OT Eval/Re-eval Minutes 39  -SW         Total Minutes    Timed Charges Total Minutes 15  -SW      Untimed Charges Total Minutes 39  -SW       Total Minutes 54  -SW                User Key  (r) = Recorded By, (t) = Taken By, (c) = Cosigned By      Initials Name Provider Type     Sierra Cummins OT Occupational Therapist                  Therapy Charges for Today       Code Description Service Date Service Provider Modifiers Qty    31604158781 HC OT SELF CARE/MGMT/TRAIN EA 15 MIN 12/27/2023 Sierra Cummins OT GO 1    03757656155 HC OT EVAL MOD COMPLEXITY 3 12/27/2023 Sierra Cummins OT GO 1                 Sierra Cummins OT  12/27/2023

## 2023-12-28 ENCOUNTER — READMISSION MANAGEMENT (OUTPATIENT)
Dept: CALL CENTER | Facility: HOSPITAL | Age: 78
End: 2023-12-28
Payer: MEDICARE

## 2023-12-28 LAB
QT INTERVAL: 368 MS
QT INTERVAL: 408 MS
QTC INTERVAL: 461 MS
QTC INTERVAL: 488 MS

## 2023-12-28 NOTE — OUTREACH NOTE
Prep Survey      Flowsheet Row Responses   [facility] facility patient discharged from? [place]   Is LACE score < 7 ? No   Eligibility Readm Mgmt   Discharge diagnosis *Paroxysmal atrial fibrillation   Does the patient have one of the following disease processes/diagnoses(primary or secondary)? Other   Does the patient have Home health ordered? Yes   What is the Home health agency?  [facility]   Is there a DME ordered? No   Prep survey completed? Yes            BUZZ SPAULDING - Registered Nurse

## 2023-12-29 NOTE — PROGRESS NOTES
Enter Query Response Below      Query Response: acute on chronic diastolic heart failure ruled in              If applicable, please update the problem list.     Patient: Luis Perez Jr.        : 1945  Account: [id number]           Admit Date: 2023        How to Respond to this query:       a. Click New Note     b. Answer query within the yellow box.                c. Update the Problem List, if applicable.      If you have any questions about this query contact me at: [email or web address]     Dr. Ny:    78-year-old presented  with progressive dyspnea on exertion and leg edema and was admitted with atrial fibrillation with RVR.  ,  xray- Cardiomegaly with mild pulmonary vascular congestion.  Positive for rhinovirus/enterovirus on Respiratory panel. Received Bumex IV in ED.  Sotalol increased, Cardioverted .  Echo -Estimated left ventricular EF = 60%- Left ventricular wall thickness is consistent with moderate concentric hypertrophy. The cardiac valves are anatomically and functionally normal.   CHF is noted in the  progress note and cardiology note.  Patient discharging with demadex po. Discharge summary does not include diagnosis of CHF.       Please clarify the following:    Acute diastolic CHF ruled in  Acute diastolic CHF ruled out  Other- specify______  Unable to determine      By submitting this query, we are merely seeking further clarification of documentation to accurately reflect all conditions that you are monitoring, evaluating, treating or that extend the hospitalization or utilize additional resources of care. Please utilize your independent clinical judgment when addressing the question(s) above.     This query and your response, once completed, will be entered into the legal medical record.    Sincerely,  Virginia Barrera RN, CCDS  Clinical Documentation Integrity Program

## 2023-12-30 ENCOUNTER — READMISSION MANAGEMENT (OUTPATIENT)
Dept: CALL CENTER | Facility: HOSPITAL | Age: 78
End: 2023-12-30
Payer: MEDICARE

## 2023-12-30 ENCOUNTER — HOSPITAL ENCOUNTER (INPATIENT)
Facility: HOSPITAL | Age: 78
LOS: 7 days | Discharge: NURSING, REHAB OR LONG-TERM CARE | DRG: 314 | End: 2024-01-06
Attending: EMERGENCY MEDICINE | Admitting: INTERNAL MEDICINE
Payer: MEDICARE

## 2023-12-30 ENCOUNTER — APPOINTMENT (OUTPATIENT)
Dept: GENERAL RADIOLOGY | Facility: HOSPITAL | Age: 78
DRG: 314 | End: 2023-12-30
Payer: MEDICARE

## 2023-12-30 ENCOUNTER — APPOINTMENT (OUTPATIENT)
Dept: ULTRASOUND IMAGING | Facility: HOSPITAL | Age: 78
DRG: 314 | End: 2023-12-30
Payer: MEDICARE

## 2023-12-30 DIAGNOSIS — R31.9 HEMATURIA, UNSPECIFIED TYPE: ICD-10-CM

## 2023-12-30 DIAGNOSIS — R53.1 GENERALIZED WEAKNESS: ICD-10-CM

## 2023-12-30 DIAGNOSIS — N17.9 ACUTE KIDNEY INJURY: Primary | ICD-10-CM

## 2023-12-30 DIAGNOSIS — R79.89 ELEVATED LFTS: ICD-10-CM

## 2023-12-30 DIAGNOSIS — I31.39 PERICARDIAL EFFUSION: ICD-10-CM

## 2023-12-30 DIAGNOSIS — D72.829 LEUKOCYTOSIS, UNSPECIFIED TYPE: ICD-10-CM

## 2023-12-30 PROBLEM — E87.29 METABOLIC ACIDOSIS, INCREASED ANION GAP: Status: ACTIVE | Noted: 2023-12-30

## 2023-12-30 PROBLEM — R74.01 TRANSAMINITIS: Status: ACTIVE | Noted: 2023-12-30

## 2023-12-30 PROBLEM — E87.20 LACTIC ACIDOSIS: Status: ACTIVE | Noted: 2023-12-30

## 2023-12-30 LAB
ALBUMIN SERPL-MCNC: 3.7 G/DL (ref 3.5–5.2)
ALBUMIN/GLOB SERPL: 1.2 G/DL
ALP SERPL-CCNC: 87 U/L (ref 39–117)
ALT SERPL W P-5'-P-CCNC: 453 U/L (ref 1–41)
ANION GAP SERPL CALCULATED.3IONS-SCNC: 16 MMOL/L (ref 5–15)
AST SERPL-CCNC: 664 U/L (ref 1–40)
B PARAPERT DNA SPEC QL NAA+PROBE: NOT DETECTED
B PERT DNA SPEC QL NAA+PROBE: NOT DETECTED
BACTERIA UR QL AUTO: ABNORMAL /HPF
BASOPHILS # BLD AUTO: 0.08 10*3/MM3 (ref 0–0.2)
BASOPHILS NFR BLD AUTO: 0.4 % (ref 0–1.5)
BILIRUB SERPL-MCNC: 1.2 MG/DL (ref 0–1.2)
BILIRUB UR QL STRIP: ABNORMAL
BUN SERPL-MCNC: 69 MG/DL (ref 8–23)
BUN/CREAT SERPL: 30 (ref 7–25)
C PNEUM DNA NPH QL NAA+NON-PROBE: NOT DETECTED
CALCIUM SPEC-SCNC: 9.1 MG/DL (ref 8.6–10.5)
CHLORIDE SERPL-SCNC: 91 MMOL/L (ref 98–107)
CLARITY UR: ABNORMAL
CO2 SERPL-SCNC: 24 MMOL/L (ref 22–29)
COLOR UR: ABNORMAL
CREAT SERPL-MCNC: 2.3 MG/DL (ref 0.76–1.27)
D-LACTATE SERPL-SCNC: 4.3 MMOL/L (ref 0.5–2)
D-LACTATE SERPL-SCNC: 5.3 MMOL/L (ref 0.5–2)
D-LACTATE SERPL-SCNC: 6.1 MMOL/L (ref 0.5–2)
D-LACTATE SERPL-SCNC: 6.1 MMOL/L (ref 0.5–2)
DEPRECATED RDW RBC AUTO: 47.5 FL (ref 37–54)
EGFRCR SERPLBLD CKD-EPI 2021: 28.4 ML/MIN/1.73
EOSINOPHIL # BLD AUTO: 0.02 10*3/MM3 (ref 0–0.4)
EOSINOPHIL NFR BLD AUTO: 0.1 % (ref 0.3–6.2)
ERYTHROCYTE [DISTWIDTH] IN BLOOD BY AUTOMATED COUNT: 14.7 % (ref 12.3–15.4)
FLUAV SUBTYP SPEC NAA+PROBE: NOT DETECTED
FLUBV RNA ISLT QL NAA+PROBE: NOT DETECTED
GGT SERPL-CCNC: <3 U/L (ref 8–61)
GLOBULIN UR ELPH-MCNC: 3.2 GM/DL
GLUCOSE SERPL-MCNC: 163 MG/DL (ref 65–99)
GLUCOSE UR STRIP-MCNC: NEGATIVE MG/DL
HADV DNA SPEC NAA+PROBE: NOT DETECTED
HAV IGM SERPL QL IA: NORMAL
HBA1C MFR BLD: 6.4 % (ref 4.8–5.6)
HBV CORE IGM SERPL QL IA: NORMAL
HBV SURFACE AG SERPL QL IA: NORMAL
HCOV 229E RNA SPEC QL NAA+PROBE: NOT DETECTED
HCOV HKU1 RNA SPEC QL NAA+PROBE: NOT DETECTED
HCOV NL63 RNA SPEC QL NAA+PROBE: NOT DETECTED
HCOV OC43 RNA SPEC QL NAA+PROBE: NOT DETECTED
HCT VFR BLD AUTO: 46.8 % (ref 37.5–51)
HCV AB SER DONR QL: NORMAL
HGB BLD-MCNC: 15.3 G/DL (ref 13–17.7)
HGB UR QL STRIP.AUTO: ABNORMAL
HMPV RNA NPH QL NAA+NON-PROBE: NOT DETECTED
HOLD SPECIMEN: NORMAL
HPIV1 RNA ISLT QL NAA+PROBE: NOT DETECTED
HPIV2 RNA SPEC QL NAA+PROBE: NOT DETECTED
HPIV3 RNA NPH QL NAA+PROBE: NOT DETECTED
HPIV4 P GENE NPH QL NAA+PROBE: NOT DETECTED
HYALINE CASTS UR QL AUTO: ABNORMAL /LPF
IMM GRANULOCYTES # BLD AUTO: 0.52 10*3/MM3 (ref 0–0.05)
IMM GRANULOCYTES NFR BLD AUTO: 2.5 % (ref 0–0.5)
KETONES UR QL STRIP: ABNORMAL
LEUKOCYTE ESTERASE UR QL STRIP.AUTO: ABNORMAL
LYMPHOCYTES # BLD AUTO: 1.98 10*3/MM3 (ref 0.7–3.1)
LYMPHOCYTES NFR BLD AUTO: 9.5 % (ref 19.6–45.3)
M PNEUMO IGG SER IA-ACNC: NOT DETECTED
MCH RBC QN AUTO: 29.5 PG (ref 26.6–33)
MCHC RBC AUTO-ENTMCNC: 32.7 G/DL (ref 31.5–35.7)
MCV RBC AUTO: 90.2 FL (ref 79–97)
MONOCYTES # BLD AUTO: 2.96 10*3/MM3 (ref 0.1–0.9)
MONOCYTES NFR BLD AUTO: 14.2 % (ref 5–12)
NEUTROPHILS NFR BLD AUTO: 15.3 10*3/MM3 (ref 1.7–7)
NEUTROPHILS NFR BLD AUTO: 73.3 % (ref 42.7–76)
NITRITE UR QL STRIP: NEGATIVE
NRBC BLD AUTO-RTO: 0.5 /100 WBC (ref 0–0.2)
NT-PROBNP SERPL-MCNC: 306.9 PG/ML (ref 0–1800)
PH UR STRIP.AUTO: <=5 [PH] (ref 5–8)
PLATELET # BLD AUTO: 374 10*3/MM3 (ref 140–450)
PMV BLD AUTO: 11 FL (ref 6–12)
POTASSIUM SERPL-SCNC: 4.7 MMOL/L (ref 3.5–5.2)
PROCALCITONIN SERPL-MCNC: 0.28 NG/ML (ref 0–0.25)
PROT SERPL-MCNC: 6.9 G/DL (ref 6–8.5)
PROT UR QL STRIP: ABNORMAL
QT INTERVAL: 398 MS
QTC INTERVAL: 444 MS
RBC # BLD AUTO: 5.19 10*6/MM3 (ref 4.14–5.8)
RBC # UR STRIP: ABNORMAL /HPF
REF LAB TEST METHOD: ABNORMAL
RHINOVIRUS RNA SPEC NAA+PROBE: NOT DETECTED
RSV RNA NPH QL NAA+NON-PROBE: NOT DETECTED
SARS-COV-2 RNA NPH QL NAA+NON-PROBE: NOT DETECTED
SODIUM SERPL-SCNC: 131 MMOL/L (ref 136–145)
SP GR UR STRIP: 1.02 (ref 1–1.03)
SQUAMOUS #/AREA URNS HPF: ABNORMAL /HPF
TROPONIN T SERPL HS-MCNC: 13 NG/L
TSH SERPL DL<=0.05 MIU/L-ACNC: 2.46 UIU/ML (ref 0.27–4.2)
UROBILINOGEN UR QL STRIP: ABNORMAL
WBC # UR STRIP: ABNORMAL /HPF
WBC NRBC COR # BLD AUTO: 20.86 10*3/MM3 (ref 3.4–10.8)
WHOLE BLOOD HOLD COAG: NORMAL
WHOLE BLOOD HOLD SPECIMEN: NORMAL

## 2023-12-30 PROCEDURE — 81001 URINALYSIS AUTO W/SCOPE: CPT | Performed by: EMERGENCY MEDICINE

## 2023-12-30 PROCEDURE — 80053 COMPREHEN METABOLIC PANEL: CPT | Performed by: EMERGENCY MEDICINE

## 2023-12-30 PROCEDURE — G0378 HOSPITAL OBSERVATION PER HR: HCPCS

## 2023-12-30 PROCEDURE — 0202U NFCT DS 22 TRGT SARS-COV-2: CPT | Performed by: EMERGENCY MEDICINE

## 2023-12-30 PROCEDURE — 87040 BLOOD CULTURE FOR BACTERIA: CPT | Performed by: PEDIATRICS

## 2023-12-30 PROCEDURE — 94640 AIRWAY INHALATION TREATMENT: CPT

## 2023-12-30 PROCEDURE — 84443 ASSAY THYROID STIM HORMONE: CPT | Performed by: PEDIATRICS

## 2023-12-30 PROCEDURE — 84145 PROCALCITONIN (PCT): CPT | Performed by: PEDIATRICS

## 2023-12-30 PROCEDURE — 83880 ASSAY OF NATRIURETIC PEPTIDE: CPT | Performed by: EMERGENCY MEDICINE

## 2023-12-30 PROCEDURE — 84484 ASSAY OF TROPONIN QUANT: CPT | Performed by: EMERGENCY MEDICINE

## 2023-12-30 PROCEDURE — 25810000003 SODIUM CHLORIDE 0.9 % SOLUTION: Performed by: EMERGENCY MEDICINE

## 2023-12-30 PROCEDURE — 83605 ASSAY OF LACTIC ACID: CPT | Performed by: EMERGENCY MEDICINE

## 2023-12-30 PROCEDURE — 83036 HEMOGLOBIN GLYCOSYLATED A1C: CPT | Performed by: PEDIATRICS

## 2023-12-30 PROCEDURE — 25810000003 SODIUM CHLORIDE 0.9 % SOLUTION: Performed by: INTERNAL MEDICINE

## 2023-12-30 PROCEDURE — 82977 ASSAY OF GGT: CPT | Performed by: PEDIATRICS

## 2023-12-30 PROCEDURE — 71045 X-RAY EXAM CHEST 1 VIEW: CPT

## 2023-12-30 PROCEDURE — 99285 EMERGENCY DEPT VISIT HI MDM: CPT

## 2023-12-30 PROCEDURE — 80074 ACUTE HEPATITIS PANEL: CPT | Performed by: EMERGENCY MEDICINE

## 2023-12-30 PROCEDURE — 93005 ELECTROCARDIOGRAM TRACING: CPT

## 2023-12-30 PROCEDURE — 93005 ELECTROCARDIOGRAM TRACING: CPT | Performed by: EMERGENCY MEDICINE

## 2023-12-30 PROCEDURE — 99223 1ST HOSP IP/OBS HIGH 75: CPT | Performed by: PEDIATRICS

## 2023-12-30 PROCEDURE — 25810000003 SODIUM CHLORIDE 0.9 % SOLUTION: Performed by: PEDIATRICS

## 2023-12-30 PROCEDURE — 83605 ASSAY OF LACTIC ACID: CPT | Performed by: PEDIATRICS

## 2023-12-30 PROCEDURE — 85025 COMPLETE CBC W/AUTO DIFF WBC: CPT

## 2023-12-30 RX ORDER — LIDOCAINE HYDROCHLORIDE 20 MG/ML
JELLY TOPICAL ONCE
Status: COMPLETED | OUTPATIENT
Start: 2023-12-30 | End: 2023-12-30

## 2023-12-30 RX ORDER — AMOXICILLIN 250 MG
2 CAPSULE ORAL 2 TIMES DAILY
Status: DISCONTINUED | OUTPATIENT
Start: 2023-12-30 | End: 2024-01-01

## 2023-12-30 RX ORDER — ONDANSETRON 4 MG/1
4 TABLET, FILM COATED ORAL EVERY 6 HOURS PRN
Status: DISCONTINUED | OUTPATIENT
Start: 2023-12-30 | End: 2024-01-01

## 2023-12-30 RX ORDER — SODIUM CHLORIDE 0.9 % (FLUSH) 0.9 %
10 SYRINGE (ML) INJECTION AS NEEDED
Status: DISCONTINUED | OUTPATIENT
Start: 2023-12-30 | End: 2024-01-01

## 2023-12-30 RX ORDER — AMLODIPINE BESYLATE 10 MG/1
10 TABLET ORAL DAILY
Status: DISCONTINUED | OUTPATIENT
Start: 2023-12-30 | End: 2024-01-01

## 2023-12-30 RX ORDER — FLUTICASONE FUROATE AND VILANTEROL 200; 25 UG/1; UG/1
1 POWDER RESPIRATORY (INHALATION)
Status: DISCONTINUED | OUTPATIENT
Start: 2023-12-30 | End: 2024-01-01

## 2023-12-30 RX ORDER — BISACODYL 5 MG/1
5 TABLET, DELAYED RELEASE ORAL DAILY PRN
Status: DISCONTINUED | OUTPATIENT
Start: 2023-12-30 | End: 2024-01-01

## 2023-12-30 RX ORDER — ALBUTEROL SULFATE 90 UG/1
2 AEROSOL, METERED RESPIRATORY (INHALATION) EVERY 4 HOURS PRN
Status: DISCONTINUED | OUTPATIENT
Start: 2023-12-30 | End: 2024-01-01

## 2023-12-30 RX ORDER — BISACODYL 10 MG
10 SUPPOSITORY, RECTAL RECTAL DAILY PRN
Status: DISCONTINUED | OUTPATIENT
Start: 2023-12-30 | End: 2024-01-01

## 2023-12-30 RX ORDER — IPRATROPIUM BROMIDE AND ALBUTEROL SULFATE 2.5; .5 MG/3ML; MG/3ML
3 SOLUTION RESPIRATORY (INHALATION) ONCE
Status: COMPLETED | OUTPATIENT
Start: 2023-12-30 | End: 2023-12-30

## 2023-12-30 RX ORDER — ONDANSETRON 2 MG/ML
4 INJECTION INTRAMUSCULAR; INTRAVENOUS EVERY 6 HOURS PRN
Status: DISCONTINUED | OUTPATIENT
Start: 2023-12-30 | End: 2024-01-01

## 2023-12-30 RX ORDER — POLYETHYLENE GLYCOL 3350 17 G/17G
17 POWDER, FOR SOLUTION ORAL DAILY PRN
Status: DISCONTINUED | OUTPATIENT
Start: 2023-12-30 | End: 2024-01-01

## 2023-12-30 RX ORDER — MONTELUKAST SODIUM 10 MG/1
10 TABLET ORAL DAILY
Status: DISCONTINUED | OUTPATIENT
Start: 2023-12-30 | End: 2024-01-01

## 2023-12-30 RX ORDER — SODIUM CHLORIDE 9 MG/ML
40 INJECTION, SOLUTION INTRAVENOUS AS NEEDED
Status: DISCONTINUED | OUTPATIENT
Start: 2023-12-30 | End: 2024-01-01

## 2023-12-30 RX ORDER — HYDROCHLOROTHIAZIDE 50 MG/1
50 TABLET ORAL DAILY
COMMUNITY
End: 2024-01-06 | Stop reason: HOSPADM

## 2023-12-30 RX ORDER — FINASTERIDE 5 MG/1
5 TABLET, FILM COATED ORAL DAILY
Status: DISCONTINUED | OUTPATIENT
Start: 2023-12-30 | End: 2024-01-01

## 2023-12-30 RX ORDER — ASCORBIC ACID 500 MG
1000 TABLET ORAL DAILY
Status: DISCONTINUED | OUTPATIENT
Start: 2023-12-30 | End: 2024-01-01

## 2023-12-30 RX ORDER — NITROGLYCERIN 0.4 MG/1
0.4 TABLET SUBLINGUAL
Status: DISCONTINUED | OUTPATIENT
Start: 2023-12-30 | End: 2024-01-01

## 2023-12-30 RX ORDER — SODIUM CHLORIDE 0.9 % (FLUSH) 0.9 %
10 SYRINGE (ML) INJECTION EVERY 12 HOURS SCHEDULED
Status: DISCONTINUED | OUTPATIENT
Start: 2023-12-30 | End: 2024-01-01

## 2023-12-30 RX ORDER — SOTALOL HYDROCHLORIDE 80 MG/1
120 TABLET ORAL EVERY 12 HOURS SCHEDULED
Status: DISCONTINUED | OUTPATIENT
Start: 2023-12-30 | End: 2023-12-31

## 2023-12-30 RX ORDER — TAMSULOSIN HYDROCHLORIDE 0.4 MG/1
0.4 CAPSULE ORAL DAILY
Status: DISCONTINUED | OUTPATIENT
Start: 2023-12-30 | End: 2024-01-01

## 2023-12-30 RX ORDER — CHOLECALCIFEROL (VITAMIN D3) 125 MCG
5 CAPSULE ORAL NIGHTLY PRN
Status: DISCONTINUED | OUTPATIENT
Start: 2023-12-30 | End: 2024-01-01

## 2023-12-30 RX ORDER — ACETAMINOPHEN 325 MG/1
650 TABLET ORAL EVERY 4 HOURS PRN
Status: DISCONTINUED | OUTPATIENT
Start: 2023-12-30 | End: 2023-12-30

## 2023-12-30 RX ADMIN — SODIUM CHLORIDE 500 ML: 9 INJECTION, SOLUTION INTRAVENOUS at 11:47

## 2023-12-30 RX ADMIN — AMLODIPINE BESYLATE 10 MG: 10 TABLET ORAL at 20:20

## 2023-12-30 RX ADMIN — SOTALOL HYDROCHLORIDE 120 MG: 80 TABLET ORAL at 20:21

## 2023-12-30 RX ADMIN — IPRATROPIUM BROMIDE AND ALBUTEROL SULFATE 3 ML: .5; 3 SOLUTION RESPIRATORY (INHALATION) at 12:33

## 2023-12-30 RX ADMIN — SENNOSIDES AND DOCUSATE SODIUM 2 TABLET: 8.6; 5 TABLET ORAL at 20:20

## 2023-12-30 RX ADMIN — TAMSULOSIN HYDROCHLORIDE 0.4 MG: 0.4 CAPSULE ORAL at 16:25

## 2023-12-30 RX ADMIN — MONTELUKAST 10 MG: 10 TABLET, FILM COATED ORAL at 16:25

## 2023-12-30 RX ADMIN — SODIUM CHLORIDE 500 ML: 9 INJECTION, SOLUTION INTRAVENOUS at 23:10

## 2023-12-30 RX ADMIN — FINASTERIDE 5 MG: 5 TABLET, FILM COATED ORAL at 16:28

## 2023-12-30 RX ADMIN — OXYCODONE HYDROCHLORIDE AND ACETAMINOPHEN 1000 MG: 500 TABLET ORAL at 16:25

## 2023-12-30 RX ADMIN — LIDOCAINE HYDROCHLORIDE: 20 JELLY TOPICAL at 12:31

## 2023-12-30 RX ADMIN — SODIUM CHLORIDE 1000 ML: 9 INJECTION, SOLUTION INTRAVENOUS at 15:58

## 2023-12-30 NOTE — Clinical Note
Pericardiocentesis performed by manual aspiration.Bloody fluid removed and 60 mL was sent to lab for analysis..

## 2023-12-30 NOTE — Clinical Note
Level of Care: Telemetry [5]   Diagnosis: MONIQUE (acute kidney injury) [[id number]]   Admitting Physician: JOSE COX [[id number]]   Attending Physician: JOSE COX [[id number]]

## 2023-12-30 NOTE — ED NOTES
Luis Perez Jr.    Nursing Report ED to Floor:  Mental status: a/o x4  Ambulatory status: assist x2  Oxygen Therapy:  RA  Cardiac Rhythm: NSR   Admitted from: ED  Safety Concerns:  none  Social Issues: none  ED Room #:  08    ED Nurse Phone Extension - [phone] or may call [phone].      HPI:   Chief Complaint   Patient presents with    Shortness of Breath       Past Medical History:  Past Medical History:   Diagnosis Date    Asthma     Bronchitis, chronic     Cancer     Skin     Cellulitis and abscess of right leg     Chronic persistent asthma 02/25/2020    Hyperlipidemia     Hypertension     Nephrolithiasis     Obesity, Class III, BMI 40-49.9 (morbid obesity) 02/25/2020    Paroxysmal atrial fibrillation 07/15/2019    Pneumonia     Sleep apnea     UTI (urinary tract infection)     Vitamin D deficiency 08/20/2020        Past Surgical History:  Past Surgical History:   Procedure Laterality Date    CARDIOVERSION  08/05/2019    COLONOSCOPY      NASAL SEPTUM SURGERY      Deviation Repair    SKIN CANCER EXCISION      TONSILLECTOMY      VASECTOMY          Admitting Doctor:   Amanda Arredondo MD    Consulting Provider(s):  Consults       Date and Time Order Name Status Description    12/25/2023  7:15 PM Inpatient Cardiology Consult Completed              Admitting Diagnosis:   The primary encounter diagnosis was Acute kidney injury. Diagnoses of Generalized weakness, Pericardial effusion, Leukocytosis, unspecified type, Elevated LFTs, and Hematuria, unspecified type were also pertinent to this visit.    Most Recent Vitals:   Vitals:    12/30/23 1330 12/30/23 1401 12/30/23 1432 12/30/23 1502   BP: 97/71 113/60 (!) 89/73 (!) 88/65   Pulse: 68 67 67 69   Resp:       Temp:       TempSrc:       SpO2: 92%  91% 92%   Weight:       Height:           Active LDAs/IV Access:   Lines, Drains & Airways       Active LDAs       Name Placement date Placement time Site Days    Peripheral IV 12/30/23 1056 Right Antecubital 12/30/23  1056   Antecubital  less than 1                    Labs (abnormal labs have a star):   Labs Reviewed   COMPREHENSIVE METABOLIC PANEL - Abnormal; Notable for the following components:       Result Value    Glucose 163 (*)     BUN 69 (*)     Creatinine 2.30 (*)     Sodium 131 (*)     Chloride 91 (*)     ALT (SGPT) 453 (*)     AST (SGOT) 664 (*)     BUN/Creatinine Ratio 30.0 (*)     Anion Gap 16.0 (*)     eGFR 28.4 (*)     All other components within normal limits    Narrative:     GFR Normal >60  Chronic Kidney Disease <60  Kidney Failure <15    The GFR formula is only valid for adults with stable renal function between ages 18 and 70.   CBC WITH AUTO DIFFERENTIAL - Abnormal; Notable for the following components:    WBC 20.86 (*)     Lymphocyte % 9.5 (*)     Monocyte % 14.2 (*)     Eosinophil % 0.1 (*)     Immature Grans % 2.5 (*)     Neutrophils, Absolute 15.30 (*)     Monocytes, Absolute 2.96 (*)     Immature Grans, Absolute 0.52 (*)     nRBC 0.5 (*)     All other components within normal limits   LACTIC ACID, PLASMA - Abnormal; Notable for the following components:    Lactate 4.3 (*)     All other components within normal limits   URINALYSIS W/ MICROSCOPIC IF INDICATED (NO CULTURE) - Abnormal; Notable for the following components:    Color, UA Dark Yellow (*)     Appearance, UA Turbid (*)     Ketones, UA Trace (*)     Bilirubin, UA Small (1+) (*)     Blood, UA Large (3+) (*)     Protein,  mg/dL (2+) (*)     Leuk Esterase, UA Small (1+) (*)     All other components within normal limits   LACTIC ACID, REFLEX - Abnormal; Notable for the following components:    Lactate 5.3 (*)     All other components within normal limits   URINALYSIS, MICROSCOPIC ONLY - Abnormal; Notable for the following components:    RBC, UA Too Numerous to Count (*)     All other components within normal limits   RESPIRATORY PANEL PCR W/ COVID-19 (SARS-COV-2), NP SWAB IN UTM/VTP, 2 HR TAT - Normal    Narrative:     In the setting of a positive  respiratory panel with a viral infection PLUS a negative procalcitonin without other underlying concern for bacterial infection, consider observing off antibiotics or discontinuation of antibiotics and continue supportive care. If the respiratory panel is positive for atypical bacterial infection (Bordetella pertussis, Chlamydophila pneumoniae, or Mycoplasma pneumoniae), consider antibiotic de-escalation to target atypical bacterial infection.   BNP (IN-HOUSE) - Normal    Narrative:     This assay is used as an aid in the diagnosis of individuals suspected of having heart failure. It can be used as an aid in the diagnosis of acute decompensated heart failure (ADHF) in patients presenting with signs and symptoms of ADHF to the emergency department (ED). In addition, NT-proBNP of <300 pg/mL indicates ADHF is not likely.    Age Range Result Interpretation  NT-proBNP Concentration (pg/mL:      <50             Positive            >450                   Gray                 300-450                    Negative             <300    50-75           Positive            >900                  Gray                300-900                  Negative            <300      >75             Positive            >1800                  Gray                300-1800                  Negative            <300   SINGLE HSTROPONIN T - Normal    Narrative:     High Sensitive Troponin T Reference Range:  <14.0 ng/L- Negative Female for AMI  <22.0 ng/L- Negative Male for AMI  >=14 - Abnormal Female indicating possible myocardial injury.  >=22 - Abnormal Male indicating possible myocardial injury.   Clinicians would have to utilize clinical acumen, EKG, Troponin, and serial changes to determine if it is an Acute Myocardial Infarction or myocardial injury due to an underlying chronic condition.        HEPATITIS PANEL, ACUTE - Normal    Narrative:     Results may be falsely decreased if patient taking Biotin.    COVID PRE-OP / PRE-PROCEDURE SCREENING  ORDER (NO ISOLATION)    Narrative:     The following orders were created for panel order COVID PRE-OP / PRE-PROCEDURE SCREENING ORDER (NO ISOLATION) - Swab, Nasopharynx.  Procedure                               Abnormality         Status                     ---------                               -----------         ------                     Respiratory Panel PCR w/...[[id number]]  Normal              Final result                 Please view results for these tests on the individual orders.   RAINBOW DRAW    Narrative:     The following orders were created for panel order [place] Draw.  Procedure                               Abnormality         Status                     ---------                               -----------         ------                     Green Top (Gel)[[id number]]                                  Final result               Lavender Top[[id number]]                                     Final result               Gold Top - SST[[id number]]                                   Final result               Gray Top[[id number]]                                         Final result               Light Blue Top[[id number]]                                   Final result                 Please view results for these tests on the individual orders.   LACTIC ACID, REFLEX   CBC AND DIFFERENTIAL    Narrative:     The following orders were created for panel order CBC & Differential.  Procedure                               Abnormality         Status                     ---------                               -----------         ------                     CBC Auto Differential[[id number]]        Abnormal            Final result                 Please view results for these tests on the individual orders.   GREEN TOP   LAVENDER TOP   GOLD TOP - SST   GRAY TOP   LIGHT BLUE TOP       Meds Given in ED:   Medications   sodium chloride 0.9 % flush 10 mL (has no administration in time range)   sodium chloride 0.9 % bolus 500 mL (0  mL Intravenous Stopped 12/30/23 1258)   ipratropium-albuterol (DUO-NEB) nebulizer solution 3 mL (3 mL Nebulization Given 12/30/23 1233)   Lidocaine HCl gel (XYLOCAINE) urethral/mucosal syringe ( Urethral Given 12/30/23 1231)

## 2023-12-30 NOTE — PLAN OF CARE
Goal Outcome Evaluation:  Plan of Care Reviewed With: patient        Progress: no change  Outcome Evaluation: VSS, room air, A&ox4. Bolus infusing for elevated lactic acid. Blood cultures drawn. NPO at midnight for liver ultrasound in the AM. Plan of care discussed with the patient and wife at bedside

## 2023-12-30 NOTE — ED PROVIDER NOTES
EMERGENCY DEPARTMENT ENCOUNTER    Pt Name: Luis Perez Jr.  MRN: [id number]  Pt :   1945  Room Number:    Date of encounter:  2023  PCP: Sierra Kuo MD  ED Provider: Eriberto Lloyd MD    Historian: Patient's wife and to a lesser extent the patient      HPI:  Chief Complaint: Generalized weakness with shortness of breath        Context: Luis Perez Jr. is a 78 y.o. male who presents to the ED c/o generalized weakness and increasing shortness of breath ongoing for multiple days but significantly worse over the last 1 to 2 days.  The patient has a difficult time ambulating whatsoever.  He was admitted to our facility recently and treated for pericardial effusion with torsemide on discharge.  He has been taking that medication as prescribed.  His urine output is significantly down today.  He has had no dysuria but he has had hematuria.  This is a first occurrence of hematuria for him.  He is anticoagulated with Eliquis.      PAST MEDICAL HISTORY  Past Medical History:   Diagnosis Date    Asthma     Bronchitis, chronic     Cancer     Skin     Cellulitis and abscess of right leg     Chronic persistent asthma 2020    Hyperlipidemia     Hypertension     Nephrolithiasis     Obesity, Class III, BMI 40-49.9 (morbid obesity) 2020    Paroxysmal atrial fibrillation 07/15/2019    Pneumonia     Sleep apnea     UTI (urinary tract infection)     Vitamin D deficiency 2020         PAST SURGICAL HISTORY  Past Surgical History:   Procedure Laterality Date    CARDIOVERSION  2019    COLONOSCOPY      NASAL SEPTUM SURGERY      Deviation Repair    SKIN CANCER EXCISION      TONSILLECTOMY      VASECTOMY           FAMILY HISTORY  Family History   Problem Relation Age of Onset    Cancer Mother         colon    Diabetes Mother     Alzheimer's disease Father     No Known Problems Maternal Grandmother     No Known Problems Maternal Grandfather     No Known Problems Paternal Grandmother      No Known Problems Paternal Grandfather          SOCIAL HISTORY  Social History     Socioeconomic History    Marital status:    Tobacco Use    Smoking status: Never     Passive exposure: Never    Smokeless tobacco: Former     Types: Chew     Quit date: 4/26/2019   Vaping Use    Vaping Use: Never used   Substance and Sexual Activity    Alcohol use: Yes     Alcohol/week: 0.0 - 2.0 standard drinks of alcohol     Comment: 1-2 month     Drug use: No    Sexual activity: Defer         ALLERGIES  Adhesive tape        REVIEW OF SYSTEMS  Review of Systems       All systems reviewed and negative except for those discussed in HPI.       PHYSICAL EXAM    I have reviewed the triage vital signs and nursing notes.    ED Triage Vitals   Temp Heart Rate Resp BP SpO2   12/30/23 1105 12/30/23 1054 12/30/23 1056 12/30/23 1056 12/30/23 1054   97.3 °F (36.3 °C) 63 22 124/95 94 %      Temp src Heart Rate Source Patient Position BP Location FiO2 (%)   12/30/23 1105 12/30/23 1056 -- -- --   Oral Monitor          Physical Exam  GENERAL:   Appears in mild distress secondary to cough and shortness of breath as well as generalized weakness.  He defers to his wife who does almost all of the talking.  HENT: Nares patent.  EYES: No scleral icterus.  CV: Regular rhythm, regular rate.  No audible murmurs gallops rubs  RESPIRATORY: Mild increase in effort.  Scattered rhonchi in upper lung fields but relatively clear to auscultation in lower half of lung fields.  No accessory muscle use or retractions.  ABDOMEN: Soft, nontender, protuberant with adipose but nontender  MUSCULOSKELETAL: No deformities.   NEURO: Alert, moves all extremities, follows commands.  SKIN: Warm, dry, no rash visualized.      LAB RESULTS  Recent Results (from the past 24 hour(s))   ECG 12 Lead ED Triage Standing Order; SOA    Collection Time: 12/30/23 10:54 AM   Result Value Ref Range    QT Interval 398 ms    QTC Interval 444 ms   Comprehensive Metabolic Panel    Collection  Time: 12/30/23 10:55 AM    Specimen: Blood   Result Value Ref Range    Glucose 163 (H) 65 - 99 mg/dL    BUN 69 (H) 8 - 23 mg/dL    Creatinine 2.30 (H) 0.76 - 1.27 mg/dL    Sodium 131 (L) 136 - 145 mmol/L    Potassium 4.7 3.5 - 5.2 mmol/L    Chloride 91 (L) 98 - 107 mmol/L    CO2 24.0 22.0 - 29.0 mmol/L    Calcium 9.1 8.6 - 10.5 mg/dL    Total Protein 6.9 6.0 - 8.5 g/dL    Albumin 3.7 3.5 - 5.2 g/dL    ALT (SGPT) 453 (H) 1 - 41 U/L    AST (SGOT) 664 (H) 1 - 40 U/L    Alkaline Phosphatase 87 39 - 117 U/L    Total Bilirubin 1.2 0.0 - 1.2 mg/dL    Globulin 3.2 gm/dL    A/G Ratio 1.2 g/dL    BUN/Creatinine Ratio 30.0 (H) 7.0 - 25.0    Anion Gap 16.0 (H) 5.0 - 15.0 mmol/L    eGFR 28.4 (L) >60.0 mL/min/1.73   BNP    Collection Time: 12/30/23 10:55 AM    Specimen: Blood   Result Value Ref Range    proBNP 306.9 0.0 - 1,800.0 pg/mL   Single High Sensitivity Troponin T    Collection Time: 12/30/23 10:55 AM    Specimen: Blood   Result Value Ref Range    HS Troponin T 13 <22 ng/L   Green Top (Gel)    Collection Time: 12/30/23 10:55 AM   Result Value Ref Range    Extra Tube Hold for add-ons.    Lavender Top    Collection Time: 12/30/23 10:55 AM   Result Value Ref Range    Extra Tube hold for add-on    Gold Top - SST    Collection Time: 12/30/23 10:55 AM   Result Value Ref Range    Extra Tube Hold for add-ons.    Light Blue Top    Collection Time: 12/30/23 10:55 AM   Result Value Ref Range    Extra Tube Hold for add-ons.    CBC Auto Differential    Collection Time: 12/30/23 10:55 AM    Specimen: Blood   Result Value Ref Range    WBC 20.86 (H) 3.40 - 10.80 10*3/mm3    RBC 5.19 4.14 - 5.80 10*6/mm3    Hemoglobin 15.3 13.0 - 17.7 g/dL    Hematocrit 46.8 37.5 - 51.0 %    MCV 90.2 79.0 - 97.0 fL    MCH 29.5 26.6 - 33.0 pg    MCHC 32.7 31.5 - 35.7 g/dL    RDW 14.7 12.3 - 15.4 %    RDW-SD 47.5 37.0 - 54.0 fl    MPV 11.0 6.0 - 12.0 fL    Platelets 374 140 - 450 10*3/mm3    Neutrophil % 73.3 42.7 - 76.0 %    Lymphocyte % 9.5 (L) 19.6 -  45.3 %    Monocyte % 14.2 (H) 5.0 - 12.0 %    Eosinophil % 0.1 (L) 0.3 - 6.2 %    Basophil % 0.4 0.0 - 1.5 %    Immature Grans % 2.5 (H) 0.0 - 0.5 %    Neutrophils, Absolute 15.30 (H) 1.70 - 7.00 10*3/mm3    Lymphocytes, Absolute 1.98 0.70 - 3.10 10*3/mm3    Monocytes, Absolute 2.96 (H) 0.10 - 0.90 10*3/mm3    Eosinophils, Absolute 0.02 0.00 - 0.40 10*3/mm3    Basophils, Absolute 0.08 0.00 - 0.20 10*3/mm3    Immature Grans, Absolute 0.52 (H) 0.00 - 0.05 10*3/mm3    nRBC 0.5 (H) 0.0 - 0.2 /100 WBC   Lactic Acid, Plasma    Collection Time: 12/30/23 10:55 AM    Specimen: Blood   Result Value Ref Range    Lactate 4.3 (C) 0.5 - 2.0 mmol/L   Hepatitis Panel, Acute    Collection Time: 12/30/23 10:55 AM    Specimen: Blood   Result Value Ref Range    Hepatitis B Surface Ag Non-Reactive Non-Reactive    Hep A IgM Non-Reactive Non-Reactive    Hep B C IgM Non-Reactive Non-Reactive    Hepatitis C Ab Non-Reactive Non-Reactive   Respiratory Panel PCR w/COVID-19(SARS-CoV-2) RIKY/DIEGO/JAVI/PAD/COR/KYLIE In-House, NP Swab in UTM/[facility], 2 HR TAT - Swab, Nasopharynx    Collection Time: 12/30/23 11:41 AM    Specimen: Nasopharynx; Swab   Result Value Ref Range    ADENOVIRUS, PCR Not Detected Not Detected    Coronavirus 229E Not Detected Not Detected    Coronavirus HKU1 Not Detected Not Detected    Coronavirus NL63 Not Detected Not Detected    Coronavirus OC43 Not Detected Not Detected    COVID19 Not Detected Not Detected - Ref. Range    Human Metapneumovirus Not Detected Not Detected    Human Rhinovirus/Enterovirus Not Detected Not Detected    Influenza A PCR Not Detected Not Detected    Influenza B PCR Not Detected Not Detected    Parainfluenza Virus 1 Not Detected Not Detected    Parainfluenza Virus 2 Not Detected Not Detected    Parainfluenza Virus 3 Not Detected Not Detected    Parainfluenza Virus 4 Not Detected Not Detected    RSV, PCR Not Detected Not Detected    Bordetella pertussis pcr Not Detected Not Detected    Bordetella  parapertussis PCR Not Detected Not Detected    Chlamydophila pneumoniae PCR Not Detected Not Detected    Mycoplasma pneumo by PCR Not Detected Not Detected   Urinalysis With Microscopic If Indicated (No Culture) - Straight Cath    Collection Time: 12/30/23 12:39 PM    Specimen: Straight Cath; Urine   Result Value Ref Range    Color, UA Dark Yellow (A) Yellow, Straw    Appearance, UA Turbid (A) Clear    pH, UA <=5.0 5.0 - 8.0    Specific Gravity, UA 1.019 1.001 - 1.030    Glucose, UA Negative Negative    Ketones, UA Trace (A) Negative    Bilirubin, UA Small (1+) (A) Negative    Blood, UA Large (3+) (A) Negative    Protein,  mg/dL (2+) (A) Negative    Leuk Esterase, UA Small (1+) (A) Negative    Nitrite, UA Negative Negative    Urobilinogen, UA 0.2 E.U./dL 0.2 - 1.0 E.U./dL   Urinalysis, Microscopic Only - Straight Cath    Collection Time: 12/30/23 12:39 PM    Specimen: Straight Cath; Urine   Result Value Ref Range    RBC, UA Too Numerous to Count (A) None Seen, 0-2 /HPF    WBC, UA 0-2 None Seen, 0-2 /HPF    Bacteria, UA None Seen None Seen, Trace /HPF    Squamous Epithelial Cells, UA None Seen None Seen, 0-2 /HPF    Hyaline Casts, UA 0-6 0 - 6 /LPF    Methodology Automated Microscopy        If labs were ordered, I independently reviewed the results and considered them in treating the patient.        RADIOLOGY  XR Chest 1 View    Result Date: 12/30/2023  XR CHEST 1 VW Date of Exam: 12/30/2023 10:55 AM EST Indication: SOA triage protocol Comparison: 12/25/2023 Findings: Stable enlargement of the cardiac silhouette. Pulmonary vasculature within normal limits. Lungs grossly clear other than mild atelectasis in the bases. Costophrenic angles sharp     Impression: No acute process demonstrated Electronically Signed: Winston Sheldon  12/30/2023 11:09 AM EST  Workstation ID: [id number]     I ordered and independently reviewed the above noted radiographic studies.      I viewed images of chest x-ray which showed no active  disease per my independent interpretation.    See radiologist's dictation for official interpretation.        PROCEDURES    Procedures    ECG 12 Lead ED Triage Standing Order; SOA   Final Result   Test Reason : ED Triage Standing Order~   Blood Pressure :   */*   mmHG   Vent. Rate :  75 BPM     Atrial Rate :  75 BPM      P-R Int : 196 ms          QRS Dur :  80 ms       QT Int : 398 ms       P-R-T Axes :  62  18 117 degrees      QTc Int : 444 ms      Sinus rhythm with marked sinus arrhythmia   Low voltage QRS   Septal infarct , age undetermined   Abnormal ECG   When compared with ECG of 26-DEC-2023 13:45,   premature atrial complexes are no longer present   Confirmed by KYREE BYNUM MD ([id number]) on 12/30/2023 11:55:13 AM      Referred By: ED MD           Confirmed By: KYREE BYNUM MD          MEDICATIONS GIVEN IN ER    Medications   sodium chloride 0.9 % flush 10 mL (has no administration in time range)   sodium chloride 0.9 % bolus 500 mL (0 mL Intravenous Stopped 12/30/23 1258)   ipratropium-albuterol (DUO-NEB) nebulizer solution 3 mL (3 mL Nebulization Given 12/30/23 1233)   Lidocaine HCl gel (XYLOCAINE) urethral/mucosal syringe ( Urethral Given 12/30/23 1231)         MEDICAL DECISION MAKING, PROGRESS, and CONSULTS    All labs, if obtained, have been independently reviewed by me.  All radiology studies, if obtained, have been reviewed by me and the radiologist dictating the report.  All EKG's, if obtained, have been independently viewed and interpreted by me/my attending physician.      Discussion below represents my analysis of pertinent findings related to patient's condition, differential diagnosis, treatment plan and final disposition.                         Differential diagnosis:    Volume depletion with acute kidney injury.  Consider occult infection.  Elevated LFTs which could reflect hepatitis or hepatic congestion.  The patient's EKG could indicate worsening of his pericardial effusion.      Additional  sources:    - Discussed/ obtained information from independent historians: Patient's wife is a very good historian gives most of his history.    - External (non-ED) record review: I reviewed multiple records on this patient to include discharge summary dated 12/27/2023 when the patient had been admitted for paroxysmal atrial fibrillation, pericardial effusion, rhinovirus, obesity, obstructive sleep apnea and hypertension.    I reviewed the last EKG performed and compared to today's EKG and note that the electrical conduction voltage has decreased in the interim.    I reviewed CT of the chest with and without contrast dated 12/26/2023 which showed no acute cardiopulmonary disease.  Moderate to large pericardial effusion.    I reviewed the electrical cardioversion performed on the patient by cardiology dated 12/26/2023.      I reviewed echocardiogram dated 12/26/2023 which showed a large, greater than 2 cm circumferential pericardial effusion.  - Chronic or social conditions impacting care: Obesity with BMI 43.    - Shared decision making: Patient and wife in agreement with current plans for evaluation, treatment, admission to the hospital.      Orders placed during this visit:  Orders Placed This Encounter   Procedures    COVID PRE-OP / PRE-PROCEDURE SCREENING ORDER (NO ISOLATION) - Swab, Nasopharynx    Respiratory Panel PCR w/COVID-19(SARS-CoV-2) RIKY/DIEGO/JAVI/PAD/COR/KYLIE In-House, NP Swab in UTM/VTM, 2 HR TAT - Swab, Nasopharynx    XR Chest 1 View    [place] Draw    Comprehensive Metabolic Panel    BNP    Single High Sensitivity Troponin T    CBC Auto Differential    Lactic Acid, Plasma    Urinalysis With Microscopic If Indicated (No Culture) - Urine, Clean Catch    Hepatitis Panel, Acute    STAT Lactic Acid, Reflex    Urinalysis, Microscopic Only - Urine, Clean Catch    NPO Diet NPO Type: Strict NPO    Undress & Gown    Continuous Pulse Oximetry    Vital Signs    Code Status and Medical Interventions:    Oxygen  Therapy- Nasal Cannula; Titrate 1-6 LPM Per SpO2; 90 - 95%    ECG 12 Lead ED Triage Standing Order; SOA    Insert Peripheral IV    Inpatient Admission    ED Bed Request    CBC & Differential    Green Top (Gel)    Lavender Top    Gold Top - SST    Gray Top    Light Blue Top         Additional orders considered but not ordered:  Echocardiogram.    ED Course:    Consultants:      ED Course as of 12/30/23 1342   Sat Dec 30, 2023   1202 Of ordered a DuoNeb as well as 500 mL bolus of normal saline.  I have contacted Dr. Encinas for admission. [MS]      ED Course User Index  [MS] Eriberto Lloyd MD              Shared Decision Making:  After my consideration of clinical presentation and any laboratory/radiology studies obtained, I discussed the findings with the patient/patient representative who is in agreement with the treatment plan and the final disposition.   Risks and benefits of discharge and/or observation/admission were discussed.       AS OF 13:42 EST VITALS:    BP - 92/75  HR - 67  TEMP - 97.3 °F (36.3 °C) (Oral)  O2 SATS - 93%                  DIAGNOSIS  Final diagnoses:   Acute kidney injury   Generalized weakness   Pericardial effusion   Leukocytosis, unspecified type   Elevated LFTs   Hematuria, unspecified type         DISPOSITION  Admission      Please note that portions of this document were completed with voice recognition software.        Eriberto Lloyd MD  12/30/23 3875

## 2023-12-31 ENCOUNTER — APPOINTMENT (OUTPATIENT)
Dept: GENERAL RADIOLOGY | Facility: HOSPITAL | Age: 78
DRG: 314 | End: 2023-12-31
Payer: MEDICARE

## 2023-12-31 ENCOUNTER — APPOINTMENT (OUTPATIENT)
Dept: CARDIOLOGY | Facility: HOSPITAL | Age: 78
DRG: 314 | End: 2023-12-31
Payer: MEDICARE

## 2023-12-31 ENCOUNTER — APPOINTMENT (OUTPATIENT)
Dept: ULTRASOUND IMAGING | Facility: HOSPITAL | Age: 78
DRG: 314 | End: 2023-12-31
Payer: MEDICARE

## 2023-12-31 ENCOUNTER — APPOINTMENT (OUTPATIENT)
Dept: CT IMAGING | Facility: HOSPITAL | Age: 78
DRG: 314 | End: 2023-12-31
Payer: MEDICARE

## 2023-12-31 LAB
ALBUMIN SERPL-MCNC: 3.5 G/DL (ref 3.5–5.2)
ALBUMIN SERPL-MCNC: 3.5 G/DL (ref 3.5–5.2)
ALBUMIN/GLOB SERPL: 1.2 G/DL
ALBUMIN/GLOB SERPL: 1.2 G/DL
ALP SERPL-CCNC: 109 U/L (ref 39–117)
ALP SERPL-CCNC: 96 U/L (ref 39–117)
ALT SERPL W P-5'-P-CCNC: 1358 U/L (ref 1–41)
ALT SERPL W P-5'-P-CCNC: 1426 U/L (ref 1–41)
ANION GAP SERPL CALCULATED.3IONS-SCNC: 21 MMOL/L (ref 5–15)
ANION GAP SERPL CALCULATED.3IONS-SCNC: 27 MMOL/L (ref 5–15)
AST SERPL-CCNC: 1488 U/L (ref 1–40)
AST SERPL-CCNC: 3069 U/L (ref 1–40)
BASOPHILS # BLD AUTO: 0.05 10*3/MM3 (ref 0–0.2)
BASOPHILS # BLD AUTO: 0.1 10*3/MM3 (ref 0–0.2)
BASOPHILS NFR BLD AUTO: 0.3 % (ref 0–1.5)
BASOPHILS NFR BLD AUTO: 0.5 % (ref 0–1.5)
BH CV ECHO MEAS - EDV(CUBED): 74 ML
BH CV ECHO MEAS - EDV(MOD-SP2): 119 ML
BH CV ECHO MEAS - EDV(MOD-SP4): 109 ML
BH CV ECHO MEAS - EF(MOD-BP): 60.7 %
BH CV ECHO MEAS - EF(MOD-SP2): 66.2 %
BH CV ECHO MEAS - EF(MOD-SP4): 55.3 %
BH CV ECHO MEAS - ESV(CUBED): 9.8 ML
BH CV ECHO MEAS - ESV(MOD-SP2): 40.2 ML
BH CV ECHO MEAS - ESV(MOD-SP4): 48.7 ML
BH CV ECHO MEAS - FS: 49 %
BH CV ECHO MEAS - IVS/LVPW: 0.94 CM
BH CV ECHO MEAS - IVSD: 1.02 CM
BH CV ECHO MEAS - LV DIASTOLIC VOL/BSA (35-75): 39.6 CM2
BH CV ECHO MEAS - LV MASS(C)D: 147.3 GRAMS
BH CV ECHO MEAS - LV SYSTOLIC VOL/BSA (12-30): 17.7 CM2
BH CV ECHO MEAS - LVIDD: 4.2 CM
BH CV ECHO MEAS - LVIDS: 2.14 CM
BH CV ECHO MEAS - LVPWD: 1.08 CM
BH CV ECHO MEAS - SI(MOD-SP2): 28.7 ML/M2
BH CV ECHO MEAS - SI(MOD-SP4): 21.9 ML/M2
BH CV ECHO MEAS - SV(MOD-SP2): 78.8 ML
BH CV ECHO MEAS - SV(MOD-SP4): 60.3 ML
BH CV VAS BP LEFT ARM: NORMAL MMHG
BH CV VAS BP LEFT ARM: NORMAL MMHG
BILIRUB SERPL-MCNC: 1 MG/DL (ref 0–1.2)
BILIRUB SERPL-MCNC: 1.3 MG/DL (ref 0–1.2)
BUN SERPL-MCNC: 75 MG/DL (ref 8–23)
BUN SERPL-MCNC: 91 MG/DL (ref 8–23)
BUN/CREAT SERPL: 27 (ref 7–25)
BUN/CREAT SERPL: 27.5 (ref 7–25)
CALCIUM SPEC-SCNC: 8.3 MG/DL (ref 8.6–10.5)
CALCIUM SPEC-SCNC: 8.5 MG/DL (ref 8.6–10.5)
CHLORIDE SERPL-SCNC: 89 MMOL/L (ref 98–107)
CHLORIDE SERPL-SCNC: 91 MMOL/L (ref 98–107)
CO2 SERPL-SCNC: 13 MMOL/L (ref 22–29)
CO2 SERPL-SCNC: 17 MMOL/L (ref 22–29)
CREAT SERPL-MCNC: 2.73 MG/DL (ref 0.76–1.27)
CREAT SERPL-MCNC: 3.37 MG/DL (ref 0.76–1.27)
D-LACTATE SERPL-SCNC: 7.3 MMOL/L (ref 0.5–2)
D-LACTATE SERPL-SCNC: 7.3 MMOL/L (ref 0.5–2)
DEPRECATED RDW RBC AUTO: 47 FL (ref 37–54)
DEPRECATED RDW RBC AUTO: 47.9 FL (ref 37–54)
EGFRCR SERPLBLD CKD-EPI 2021: 17.9 ML/MIN/1.73
EGFRCR SERPLBLD CKD-EPI 2021: 23.1 ML/MIN/1.73
EOSINOPHIL # BLD AUTO: 0 10*3/MM3 (ref 0–0.4)
EOSINOPHIL # BLD AUTO: 0.01 10*3/MM3 (ref 0–0.4)
EOSINOPHIL NFR BLD AUTO: 0 % (ref 0.3–6.2)
EOSINOPHIL NFR BLD AUTO: 0 % (ref 0.3–6.2)
ERYTHROCYTE [DISTWIDTH] IN BLOOD BY AUTOMATED COUNT: 14.9 % (ref 12.3–15.4)
ERYTHROCYTE [DISTWIDTH] IN BLOOD BY AUTOMATED COUNT: 15 % (ref 12.3–15.4)
GLOBULIN UR ELPH-MCNC: 2.9 GM/DL
GLOBULIN UR ELPH-MCNC: 3 GM/DL
GLUCOSE SERPL-MCNC: 141 MG/DL (ref 65–99)
GLUCOSE SERPL-MCNC: 154 MG/DL (ref 65–99)
HCT VFR BLD AUTO: 43.3 % (ref 37.5–51)
HCT VFR BLD AUTO: 44.6 % (ref 37.5–51)
HGB BLD-MCNC: 14.2 G/DL (ref 13–17.7)
HGB BLD-MCNC: 14.8 G/DL (ref 13–17.7)
IMM GRANULOCYTES # BLD AUTO: 0.5 10*3/MM3 (ref 0–0.05)
IMM GRANULOCYTES # BLD AUTO: 0.66 10*3/MM3 (ref 0–0.05)
IMM GRANULOCYTES NFR BLD AUTO: 2.6 % (ref 0–0.5)
IMM GRANULOCYTES NFR BLD AUTO: 3.1 % (ref 0–0.5)
INR PPP: 2.18 (ref 0.89–1.12)
INR PPP: 2.23 (ref 0.89–1.12)
LDH SERPL-CCNC: 2294 U/L (ref 135–225)
LYMPHOCYTES # BLD AUTO: 1.54 10*3/MM3 (ref 0.7–3.1)
LYMPHOCYTES # BLD AUTO: 1.9 10*3/MM3 (ref 0.7–3.1)
LYMPHOCYTES NFR BLD AUTO: 8 % (ref 19.6–45.3)
LYMPHOCYTES NFR BLD AUTO: 8.8 % (ref 19.6–45.3)
MAGNESIUM SERPL-MCNC: 2.9 MG/DL (ref 1.6–2.4)
MCH RBC QN AUTO: 29 PG (ref 26.6–33)
MCH RBC QN AUTO: 30 PG (ref 26.6–33)
MCHC RBC AUTO-ENTMCNC: 32.8 G/DL (ref 31.5–35.7)
MCHC RBC AUTO-ENTMCNC: 33.2 G/DL (ref 31.5–35.7)
MCV RBC AUTO: 88.4 FL (ref 79–97)
MCV RBC AUTO: 90.3 FL (ref 79–97)
MONOCYTES # BLD AUTO: 2.3 10*3/MM3 (ref 0.1–0.9)
MONOCYTES # BLD AUTO: 2.81 10*3/MM3 (ref 0.1–0.9)
MONOCYTES NFR BLD AUTO: 12 % (ref 5–12)
MONOCYTES NFR BLD AUTO: 13 % (ref 5–12)
NEUTROPHILS NFR BLD AUTO: 14.85 10*3/MM3 (ref 1.7–7)
NEUTROPHILS NFR BLD AUTO: 16.07 10*3/MM3 (ref 1.7–7)
NEUTROPHILS NFR BLD AUTO: 74.6 % (ref 42.7–76)
NEUTROPHILS NFR BLD AUTO: 77.1 % (ref 42.7–76)
NRBC BLD AUTO-RTO: 1 /100 WBC (ref 0–0.2)
NRBC BLD AUTO-RTO: 1.1 /100 WBC (ref 0–0.2)
NT-PROBNP SERPL-MCNC: 354.2 PG/ML (ref 0–1800)
PHOSPHATE SERPL-MCNC: 7.8 MG/DL (ref 2.5–4.5)
PLATELET # BLD AUTO: 374 10*3/MM3 (ref 140–450)
PLATELET # BLD AUTO: 375 10*3/MM3 (ref 140–450)
PMV BLD AUTO: 11 FL (ref 6–12)
PMV BLD AUTO: 11.4 FL (ref 6–12)
POTASSIUM SERPL-SCNC: 5 MMOL/L (ref 3.5–5.2)
POTASSIUM SERPL-SCNC: 5.2 MMOL/L (ref 3.5–5.2)
PROCALCITONIN SERPL-MCNC: 1.28 NG/ML (ref 0–0.25)
PROT SERPL-MCNC: 6.4 G/DL (ref 6–8.5)
PROT SERPL-MCNC: 6.5 G/DL (ref 6–8.5)
PROTHROMBIN TIME: 24.5 SECONDS (ref 12.2–14.5)
PROTHROMBIN TIME: 24.9 SECONDS (ref 12.2–14.5)
RBC # BLD AUTO: 4.9 10*6/MM3 (ref 4.14–5.8)
RBC # BLD AUTO: 4.94 10*6/MM3 (ref 4.14–5.8)
SODIUM SERPL-SCNC: 129 MMOL/L (ref 136–145)
SODIUM SERPL-SCNC: 129 MMOL/L (ref 136–145)
TROPONIN T SERPL HS-MCNC: 17 NG/L
WBC NRBC COR # BLD AUTO: 19.24 10*3/MM3 (ref 3.4–10.8)
WBC NRBC COR # BLD AUTO: 21.55 10*3/MM3 (ref 3.4–10.8)

## 2023-12-31 PROCEDURE — 80053 COMPREHEN METABOLIC PANEL: CPT | Performed by: INTERNAL MEDICINE

## 2023-12-31 PROCEDURE — 80053 COMPREHEN METABOLIC PANEL: CPT | Performed by: PEDIATRICS

## 2023-12-31 PROCEDURE — 99291 CRITICAL CARE FIRST HOUR: CPT | Performed by: INTERNAL MEDICINE

## 2023-12-31 PROCEDURE — 97161 PT EVAL LOW COMPLEX 20 MIN: CPT

## 2023-12-31 PROCEDURE — 76775 US EXAM ABDO BACK WALL LIM: CPT

## 2023-12-31 PROCEDURE — 83735 ASSAY OF MAGNESIUM: CPT | Performed by: PEDIATRICS

## 2023-12-31 PROCEDURE — C1894 INTRO/SHEATH, NON-LASER: HCPCS | Performed by: INTERNAL MEDICINE

## 2023-12-31 PROCEDURE — 33017 PRCRD DRG 6YR+ W/O CGEN CAR: CPT | Performed by: INTERNAL MEDICINE

## 2023-12-31 PROCEDURE — 93321 DOPPLER ECHO F-UP/LMTD STD: CPT

## 2023-12-31 PROCEDURE — 85025 COMPLETE CBC W/AUTO DIFF WBC: CPT | Performed by: PEDIATRICS

## 2023-12-31 PROCEDURE — 71250 CT THORAX DX C-: CPT

## 2023-12-31 PROCEDURE — 25010000002 PROTHROMBIN COMPLEX CONC HUMAN 1000 UNITS KIT: Performed by: INTERNAL MEDICINE

## 2023-12-31 PROCEDURE — 0W9D3ZZ DRAINAGE OF PERICARDIAL CAVITY, PERCUTANEOUS APPROACH: ICD-10-PCS | Performed by: INTERNAL MEDICINE

## 2023-12-31 PROCEDURE — 93308 TTE F-UP OR LMTD: CPT | Performed by: INTERNAL MEDICINE

## 2023-12-31 PROCEDURE — 85610 PROTHROMBIN TIME: CPT | Performed by: INTERNAL MEDICINE

## 2023-12-31 PROCEDURE — 76705 ECHO EXAM OF ABDOMEN: CPT

## 2023-12-31 PROCEDURE — 99223 1ST HOSP IP/OBS HIGH 75: CPT | Performed by: STUDENT IN AN ORGANIZED HEALTH CARE EDUCATION/TRAINING PROGRAM

## 2023-12-31 PROCEDURE — 25010000002 MIDAZOLAM PER 1 MG: Performed by: INTERNAL MEDICINE

## 2023-12-31 PROCEDURE — 99223 1ST HOSP IP/OBS HIGH 75: CPT | Performed by: INTERNAL MEDICINE

## 2023-12-31 PROCEDURE — 83605 ASSAY OF LACTIC ACID: CPT | Performed by: INTERNAL MEDICINE

## 2023-12-31 PROCEDURE — 93975 VASCULAR STUDY: CPT

## 2023-12-31 PROCEDURE — 93308 TTE F-UP OR LMTD: CPT

## 2023-12-31 PROCEDURE — 25810000003 SODIUM CHLORIDE 0.9 % SOLUTION: Performed by: HOSPITALIST

## 2023-12-31 PROCEDURE — 83615 LACTATE (LD) (LDH) ENZYME: CPT | Performed by: INTERNAL MEDICINE

## 2023-12-31 PROCEDURE — C1769 GUIDE WIRE: HCPCS

## 2023-12-31 PROCEDURE — 83880 ASSAY OF NATRIURETIC PEPTIDE: CPT | Performed by: INTERNAL MEDICINE

## 2023-12-31 PROCEDURE — 85610 PROTHROMBIN TIME: CPT | Performed by: HOSPITALIST

## 2023-12-31 PROCEDURE — 97165 OT EVAL LOW COMPLEX 30 MIN: CPT

## 2023-12-31 PROCEDURE — 85025 COMPLETE CBC W/AUTO DIFF WBC: CPT | Performed by: INTERNAL MEDICINE

## 2023-12-31 PROCEDURE — 84100 ASSAY OF PHOSPHORUS: CPT | Performed by: PEDIATRICS

## 2023-12-31 PROCEDURE — 71045 X-RAY EXAM CHEST 1 VIEW: CPT

## 2023-12-31 PROCEDURE — 84145 PROCALCITONIN (PCT): CPT | Performed by: INTERNAL MEDICINE

## 2023-12-31 PROCEDURE — 84484 ASSAY OF TROPONIN QUANT: CPT | Performed by: INTERNAL MEDICINE

## 2023-12-31 PROCEDURE — 84443 ASSAY THYROID STIM HORMONE: CPT | Performed by: INTERNAL MEDICINE

## 2023-12-31 PROCEDURE — 82533 TOTAL CORTISOL: CPT | Performed by: INTERNAL MEDICINE

## 2023-12-31 PROCEDURE — 83605 ASSAY OF LACTIC ACID: CPT | Performed by: HOSPITALIST

## 2023-12-31 PROCEDURE — 25010000002 PROTHROMBIN COMPLEX CONC HUMAN 500 UNITS KIT: Performed by: INTERNAL MEDICINE

## 2023-12-31 PROCEDURE — 25010000002 CEFTRIAXONE PER 250 MG: Performed by: HOSPITALIST

## 2023-12-31 PROCEDURE — C1729 CATH, DRAINAGE: HCPCS | Performed by: INTERNAL MEDICINE

## 2023-12-31 PROCEDURE — 25010000002 FENTANYL CITRATE (PF) 50 MCG/ML SOLUTION: Performed by: INTERNAL MEDICINE

## 2023-12-31 PROCEDURE — 99233 SBSQ HOSP IP/OBS HIGH 50: CPT | Performed by: HOSPITALIST

## 2023-12-31 RX ORDER — LIDOCAINE HYDROCHLORIDE 10 MG/ML
INJECTION, SOLUTION EPIDURAL; INFILTRATION; INTRACAUDAL; PERINEURAL
Status: DISCONTINUED | OUTPATIENT
Start: 2023-12-31 | End: 2023-12-31 | Stop reason: HOSPADM

## 2023-12-31 RX ORDER — MIDAZOLAM HYDROCHLORIDE 1 MG/ML
INJECTION INTRAMUSCULAR; INTRAVENOUS
Status: DISCONTINUED | OUTPATIENT
Start: 2023-12-31 | End: 2023-12-31 | Stop reason: HOSPADM

## 2023-12-31 RX ORDER — SOTALOL HYDROCHLORIDE 80 MG/1
120 TABLET ORAL
Status: DISCONTINUED | OUTPATIENT
Start: 2024-01-01 | End: 2024-01-04

## 2023-12-31 RX ORDER — FENTANYL CITRATE 50 UG/ML
INJECTION, SOLUTION INTRAMUSCULAR; INTRAVENOUS
Status: DISCONTINUED | OUTPATIENT
Start: 2023-12-31 | End: 2023-12-31 | Stop reason: HOSPADM

## 2023-12-31 RX ORDER — DOXYCYCLINE 100 MG/1
100 CAPSULE ORAL EVERY 12 HOURS SCHEDULED
Status: COMPLETED | OUTPATIENT
Start: 2023-12-31 | End: 2024-01-04

## 2023-12-31 RX ORDER — SODIUM CHLORIDE 9 MG/ML
100 INJECTION, SOLUTION INTRAVENOUS CONTINUOUS
Status: DISCONTINUED | OUTPATIENT
Start: 2023-12-31 | End: 2024-01-01

## 2023-12-31 RX ADMIN — Medication 10 ML: at 08:18

## 2023-12-31 RX ADMIN — SODIUM CHLORIDE 100 ML/HR: 9 INJECTION, SOLUTION INTRAVENOUS at 08:17

## 2023-12-31 RX ADMIN — SENNOSIDES AND DOCUSATE SODIUM 2 TABLET: 8.6; 5 TABLET ORAL at 08:17

## 2023-12-31 RX ADMIN — FINASTERIDE 5 MG: 5 TABLET, FILM COATED ORAL at 08:17

## 2023-12-31 RX ADMIN — AMLODIPINE BESYLATE 10 MG: 10 TABLET ORAL at 08:17

## 2023-12-31 RX ADMIN — MONTELUKAST 10 MG: 10 TABLET, FILM COATED ORAL at 08:18

## 2023-12-31 RX ADMIN — OXYCODONE HYDROCHLORIDE AND ACETAMINOPHEN 1000 MG: 500 TABLET ORAL at 08:17

## 2023-12-31 RX ADMIN — SOTALOL HYDROCHLORIDE 120 MG: 80 TABLET ORAL at 08:19

## 2023-12-31 RX ADMIN — SODIUM CHLORIDE 1000 ML: 9 INJECTION, SOLUTION INTRAVENOUS at 14:27

## 2023-12-31 RX ADMIN — Medication 5 MG: at 00:58

## 2023-12-31 RX ADMIN — CEFTRIAXONE 2000 MG: 2 INJECTION, POWDER, FOR SOLUTION INTRAMUSCULAR; INTRAVENOUS at 08:17

## 2023-12-31 RX ADMIN — DOXYCYCLINE 100 MG: 100 CAPSULE ORAL at 08:17

## 2023-12-31 RX ADMIN — DOXYCYCLINE 100 MG: 100 CAPSULE ORAL at 20:20

## 2023-12-31 RX ADMIN — SODIUM CHLORIDE 500 ML: 9 INJECTION, SOLUTION INTRAVENOUS at 08:19

## 2023-12-31 RX ADMIN — TAMSULOSIN HYDROCHLORIDE 0.4 MG: 0.4 CAPSULE ORAL at 08:17

## 2023-12-31 NOTE — CONSULTS
Cardiology Consult         Reason for consultation:  Pericardial effusion, recent cardioversion    Requesting provider: Basil Encinas MD  Consulting provider: MD Wilner        Active Hospital Problems    Diagnosis  POA    MONIQUE (acute kidney injury) [N17.9]  Yes    Transaminitis [R74.01]  Yes    Lactic acidosis [E87.20]  Yes    Metabolic acidosis, increased anion gap [E87.29]  Yes    Pericardial effusion [I31.39]  Yes     Echo (12/26/2023): LVEF 60%.  Moderate LVH.  Normal valves.  Large circumferential pericardial effusion without tamponade      Obesity, Class III, BMI 40-49.9 (morbid obesity) [E66.01]  Yes    Obstructive sleep apnea - Intolerant CPAP/BiPAP [G47.33]  Yes    Paroxysmal atrial fibrillation [I48.0]  Yes     [id number] 3 (age >75, HTN)  Echo (12/26/2023): LVEF 60%.  Moderate LVH.  Normal valves.  Large circumferential pericardial effusion without tamponade  Successful external cardioversion, 12/26/2023      Hypertension [I10]  Yes              History of present illness: Luis PerezJr. is a 78-year-old male with PAF on sotalol, Eliquis, COPD, RUY not on CPAP, and hyperlipidemia, who is seen today in consultation for pericardial effusion.  Patient was recently hospitalized at [facility] 12/25 - 12/27 with fib RVR, and is status post successful cardioversion 12/26.  During that hospitalization, patient was noted to have large pericardial effusion without evidence of tamponade.  This was not drained due to being on anticoagulation.  Sotalol was increased to 120 mg twice daily, and he was initiated on torsemide 10 mg daily with potassium replacement.  He presented to [facility] ED again 12/30 with plaints of ongoing weakness and shortness of breath that has not improved since prior discharge.  CT of the chest demonstrates ongoing large pericardial effusion with possible component of hemopericardium, without evidence of tamponade.  His Eliquis has been held.  He is noted to have significant MONIQUE, transaminitis,  and lactic acidosis.  Torsemide and HCTZ have been held.  Patient reports that he is at progressively worsening dyspnea since discharge, and significant weakness.  He denies any chest pain, pressure, tightness, but does endorse some occasional dizziness with activity.      Allergies   Allergen Reactions    Torsemide Confusion    Adhesive Tape Rash       Prior to Admission medications    Medication Sig Start Date End Date Taking? Authorizing Provider   albuterol sulfate  (90 Base) MCG/ACT inhaler Inhale 2 puffs Every 4 (Four) Hours As Needed for Wheezing. 10/30/19  Yes Viviane Colon APRN   amLODIPine (NORVASC) 10 MG tablet Take 1 tablet by mouth Daily. 10/7/20  Yes Maggie Schwartz DNP, APRN   ascorbic acid (VITAMIN C) 1000 MG tablet Take 1 tablet by mouth Daily. 1/1/22  Yes ProviderRegina MD   Dupilumab 300 MG/2ML solution prefilled syringe Inject 2 mL under the skin into the appropriate area as directed Every 14 (Fourteen) Days. 3/9/23  Yes Camille Mccord APRN   Eliquis 5 MG tablet tablet TAKE 1 TABLET BY MOUTH  TWICE DAILY 1/9/23  Yes Jaja Mansfield APRN   fexofenadine (ALLEGRA ALLERGY) 180 MG tablet Take 1 tablet by mouth Daily. 10/22/19  Yes Viviane Colon APRN   finasteride (PROSCAR) 5 MG tablet Take 1 tablet by mouth Daily. 4/27/21  Yes ProviderRegina MD   Fluticasone Furoate-Vilanterol (Breo Ellipta) 200-25 MCG/ACT inhaler Inhale 1 puff Daily. 3/9/23  Yes Camille Mccord APRN   icosapent ethyl (VASCEPA) 1 g capsule capsule TAKE 2 CAPSULES BY MOUTH  TWICE DAILY WITH MEALS  Patient taking differently: Take 2 g by mouth 2 (Two) Times a Day With Meals. 3/13/23  Yes Jaja Mansfield APRN   montelukast (SINGULAIR) 10 MG tablet TAKE 1 TABLET BY MOUTH  DAILY 9/18/23  Yes Camille Mccord APRN   Multiple Vitamins-Minerals (VITEYES AREDS FORMULA PO) Take 1 tablet by mouth 2 (Two) Times a Day.   Yes Provider, MD Regina   potassium chloride 10 MEQ CR tablet Take 1  tablet by mouth 2 (Two) Times a Day. 12/27/23  Yes Tammie Nicole APRN   sotalol (BETAPACE AF) 80 MG tablet tablet Take 1.5 tablets by mouth 2 (Two) Times a Day. 12/27/23  Yes Tammie Nicole APRN   tamsulosin (FLOMAX) 0.4 MG capsule 24 hr capsule Take 1 capsule by mouth Daily. 6/28/21  Yes Regina Cid MD   torsemide (DEMADEX) 10 MG tablet Take 1 tablet by mouth Daily. 12/27/23  Yes Tammie Nicole APRN   traMADol (ULTRAM) 50 MG tablet Take 1 tablet by mouth Every 6 (Six) Hours As Needed for Moderate Pain.   Yes Regina Cid MD   hydroCHLOROthiazide (HYDRODIURIL) 50 MG tablet Take 1 tablet by mouth Daily.  Patient not taking: Reported on 12/30/2023    Regina Cid MD       Past Medical History:   Diagnosis Date    Asthma     Bronchitis, chronic     Cancer     Skin     Cellulitis and abscess of right leg     Chronic persistent asthma 02/25/2020    Hyperlipidemia     Hypertension     Nephrolithiasis     Obesity, Class III, BMI 40-49.9 (morbid obesity) 02/25/2020    Paroxysmal atrial fibrillation 07/15/2019    Pneumonia     Sleep apnea     UTI (urinary tract infection)     Vitamin D deficiency 08/20/2020       Past Surgical History:   Procedure Laterality Date    CARDIOVERSION  08/05/2019    COLONOSCOPY      NASAL SEPTUM SURGERY      Deviation Repair    SKIN CANCER EXCISION      TONSILLECTOMY      VASECTOMY         Family History   Problem Relation Age of Onset    Cancer Mother         colon    Diabetes Mother     Alzheimer's disease Father     No Known Problems Maternal Grandmother     No Known Problems Maternal Grandfather     No Known Problems Paternal Grandmother     No Known Problems Paternal Grandfather        Social History     Tobacco Use   Smoking Status Never    Passive exposure: Never   Smokeless Tobacco Former    Types: Chew    Quit date: 4/26/2019       Social History     Substance and Sexual Activity   Alcohol Use Yes    Alcohol/week: 0.0 - 2.0 standard drinks  of alcohol    Comment: 1-2 month          Review of Systems:   Review of Systems   Cardiovascular:  Positive for dyspnea on exertion, leg swelling and near-syncope. Negative for chest pain and syncope.   Respiratory:  Positive for shortness of breath.             Vital Sign Min/Max for last 24 hours  Temp  Min: 97.3 °F (36.3 °C)  Max: 98.7 °F (37.1 °C)   BP  Min: 88/65  Max: 124/95   Pulse  Min: 59  Max: 76   Resp  Min: 18  Max: 22   SpO2  Min: 91 %  Max: 95 %   Flow (L/min)  Min: 2  Max: 2      Intake/Output Summary (Last 24 hours) at 12/31/2023 1043  Last data filed at 12/30/2023 2200  Gross per 24 hour   Intake 500 ml   Output 100 ml   Net 400 ml           Telemetry: Atrial fibrillation    Vitals reviewed.   Constitutional:       General: Awake.   Pulmonary:      Effort: Pulmonary effort is normal.      Breath sounds: Normal breath sounds.   Cardiovascular:      PMI at left midclavicular line. Bradycardia present. Irregular rhythm. Normal S1. Normal S2.       Murmurs: There is no murmur.   Pulses:     Intact distal pulses.   Edema:     Peripheral edema present.  Skin:     General: Skin is warm and dry.      Capillary Refill: Capillary refill takes less than 2 seconds.   Neurological:      Mental Status: Alert.              DATA REVIEW:    EKG (12/30/2023):       Test Reason : ED Triage Standing Order~  Blood Pressure :   */*   mmHG  Vent. Rate :  75 BPM     Atrial Rate :  75 BPM     P-R Int : 196 ms          QRS Dur :  80 ms      QT Int : 398 ms       P-R-T Axes :  62  18 117 degrees     QTc Int : 444 ms     Sinus rhythm with marked sinus arrhythmia  Low voltage QRS  Septal infarct , age undetermined  Abnormal ECG          ECHO (12/26/2023):     Left ventricular systolic function is normal. Estimated left ventricular EF = 60%    Left ventricular wall thickness is consistent with moderate concentric hypertrophy.    The cardiac valves are anatomically and functionally normal.    Estimated right ventricular systolic  pressure from tricuspid regurgitation is normal (<35 mmHg).    There is a large (>2cm) circumferential pericardial effusion. There is no evidence of cardiac tamponade.      Results from last 7 days   Lab Units 12/31/23  0304 12/30/23  1055 12/27/23  0811 12/26/23  2128 12/26/23  0308 12/25/23  1421   SODIUM mmol/L 129* 131*  --   --  137 135*   POTASSIUM mmol/L 5.0 4.7  --  3.9 3.2* 3.5   CHLORIDE mmol/L 91* 91*  --   --  93* 94*   BUN mg/dL 75* 69*  --   --  31* 27*   CREATININE mg/dL 2.73* 2.30*  --   --  1.31* 1.04   MAGNESIUM mg/dL 2.9*  --  2.7*  --   --   --      Lab Results   Lab Value Date/Time    PHOS 7.8 (H) 12/31/2023 0304    MG 2.9 (H) 12/31/2023 0304     Results from last 7 days   Lab Units 12/30/23  1055 12/25/23  1421   HSTROP T ng/L 13 10     Results from last 7 days   Lab Units 12/31/23  0304 12/30/23  1055 12/25/23  1421   WBC 10*3/mm3 21.55* 20.86* 11.99*   HEMOGLOBIN g/dL 14.2 15.3 14.1   HEMATOCRIT % 43.3 46.8 42.4   PLATELETS 10*3/mm3 375 374 319     Lab Results   Component Value Date    HGBA1C 6.40 (H) 12/30/2023     Lab Results   Component Value Date    CHOL 144 03/10/2021    CHLPL 150 08/13/2015    TRIG 309 (H) 03/10/2021    HDL 31 (L) 03/10/2021    LDL 64 03/10/2021              Large pericardial effusion, with possible hemopericardium  Eliquis currently on hold.  No evidence of tamponade, with stable hemodynamics.  Paroxysmal atrial fibrillation, rate controlled  Sotalol 120 mg twice daily.  Recently increased during previous hospitalization. Eliquis currently on hold, secondary to above.  MONIQUE  Creatinine 2.73 this a.m.  Baseline appears to be less than 1.  Torsemide and HCTZ have been held.                 Agree with holding Eliquis, given potential need for pericardial window.  Recommend consulting CT surgery for possible pericardial window.  Will decrease sotalol dose, secondary to MONIQUE.  Agree with holding nephrotoxic medications, including diuretics.  Due to monitor renal  function.  Ongoing evaluation for occult infectious process    ANGELIA Jolley obtained past medical, family history, social history, review of systems, and functioned as a scribe for the remainder of the dictation for Dr. Darby.      Electronically signed by ANGELIA Jolley, 12/31/23, 10:43 AM EST.

## 2023-12-31 NOTE — THERAPY EVALUATION
Patient Name: Luis Perez Jr.  : 1945    MRN: [id number]                              Today's Date: 2023       Admit Date: 2023    Visit Dx:     ICD-10-CM ICD-9-CM   1. Acute kidney injury  N17.9 584.9   2. Generalized weakness  R53.1 780.79   3. Pericardial effusion  I31.39 423.9   4. Leukocytosis, unspecified type  D72.829 288.60   5. Elevated LFTs  R79.89 790.6   6. Hematuria, unspecified type  R31.9 599.70     Patient Active Problem List   Diagnosis    Hypertension    Paroxysmal atrial fibrillation    Snoring    Obstructive sleep apnea - Intolerant CPAP/BiPAP    Chronic persistent asthma    Non-smoker    Obesity, Class III, BMI 40-49.9 (morbid obesity)    Perennial rhinitis    Vitamin D deficiency    Rhinovirus    Pericardial effusion    MONIQUE (acute kidney injury)    Transaminitis    Lactic acidosis    Metabolic acidosis, increased anion gap     Past Medical History:   Diagnosis Date    Asthma     Bronchitis, chronic     Cancer     Skin     Cellulitis and abscess of right leg     Chronic persistent asthma 2020    Hyperlipidemia     Hypertension     Nephrolithiasis     Obesity, Class III, BMI 40-49.9 (morbid obesity) 2020    Paroxysmal atrial fibrillation 07/15/2019    Pneumonia     Sleep apnea     UTI (urinary tract infection)     Vitamin D deficiency 2020     Past Surgical History:   Procedure Laterality Date    CARDIOVERSION  2019    COLONOSCOPY      NASAL SEPTUM SURGERY      Deviation Repair    SKIN CANCER EXCISION      TONSILLECTOMY      VASECTOMY        General Information       Row Name 23 1353          OT Time and Intention    Document Type evaluation  -CS     Mode of Treatment occupational therapy  -CS       Row Name 23 1353          General Information    Patient Profile Reviewed yes  -CS     Prior Level of Function independent:;all household mobility;ADL's  -CS     Existing Precautions/Restrictions fall;oxygen therapy device and  L/min;orthostatic hypotension;other (see comments)  monitor BP  -     Barriers to Rehab medically complex  -       Row Name 12/31/23 [facility]          Living Environment    People in Home spouse  -CS       Row Name 12/31/23 [facility]          Home Main Entrance    Number of Stairs, Main Entrance none  -CS       Row Name 12/31/23 [facility]          Cognition    Orientation Status (Cognition) oriented x 4  -       Row Name 12/31/23 [facility]          Safety Issues, Functional Mobility    Impairments Affecting Function (Mobility) balance;endurance/activity tolerance;strength;shortness of breath  -               User Key  (r) = Recorded By, (t) = Taken By, (c) = Cosigned By      Initials Name Provider Type    CS Mary Ortiz, ROMY Occupational Therapist                     Mobility/ADL's       Row Name 12/31/23 [facility]          Transfers    Transfers sit-stand transfer;stand-sit transfer  -       Row Name 12/31/23 [facility]          Sit-Stand Transfer    Sit-Stand [place] (Transfers) minimum assist (75% patient effort);2 person assist;verbal cues  -     Assistive Device (Sit-Stand Transfers) walker, front-wheeled  -       Row Name 12/31/23 [facility]          Stand-Sit Transfer    Stand-Sit [place] (Transfers) minimum assist (75% patient effort);2 person assist;verbal cues  -     Assistive Device (Stand-Sit Transfers) walker, front-wheeled  -CS       Row Name 12/31/23 [facility]          Activities of Daily Living    BADL Assessment/Intervention upper body dressing;feeding;lower body dressing;grooming  -       Row Name 12/31/23 [facility]          Upper Body Dressing Assessment/Training    [place] Level (Upper Body Dressing) don;pajama/robe;moderate assist (50% patient effort)  -CS     Position (Upper Body Dressing) supported sitting  -       Row Name 12/31/23 [facility]          Lower Body Dressing Assessment/Training    [place] Level (Lower Body Dressing) don;shoes/slippers;maximum assist (25% patient effort)  -CS     Position  (Lower Body Dressing) supported sitting  -CS       Row Name 12/31/23 1353          Grooming Assessment/Training    [place] Level (Grooming) wash face, hands;set up  -CS     Position (Grooming) supported sitting  -CS               User Key  (r) = Recorded By, (t) = Taken By, (c) = Cosigned By      Initials Name Provider Type    CS Mary Ortiz OT Occupational Therapist                   Obj/Interventions       Row Name 12/31/23 1354          Sensory Assessment (Somatosensory)    Sensory Assessment (Somatosensory) UE sensation intact  -       Row Name 12/31/23 1354          Range of Motion Comprehensive    General Range of Motion bilateral upper extremity ROM WFL  -CS       Row Name 12/31/23 1354          Strength Comprehensive (MMT)    Comment, General Manual Muscle Testing (MMT) Assessment BUE grossly 4-/5  -CS       Row Name 12/31/23 1359          Balance    Balance Assessment sitting static balance;sitting dynamic balance;standing static balance;standing dynamic balance  -CS     Static Sitting Balance standby assist  -CS     Dynamic Sitting Balance supervision  -CS     Position, Sitting Balance sitting in chair  -CS     Static Standing Balance contact guard  -CS     Dynamic Standing Balance minimal assist  -CS     Position/Device Used, Standing Balance supported;walker, rolling  -CS     Balance Interventions sitting;standing;static;dynamic;dynamic reaching;occupation based/functional task;weight shifting activity  -CS               User Key  (r) = Recorded By, (t) = Taken By, (c) = Cosigned By      Initials Name Provider Type    Mary Horn OT Occupational Therapist                   Goals/Plan       Row Name 12/31/23 1357          Transfer Goal 1 (OT)    Activity/Assistive Device (Transfer Goal 1, OT) sit-to-stand/stand-to-sit;toilet  -     [place] Level/Cues Needed (Transfer Goal 1, OT) contact guard required  -     Time Frame (Transfer Goal 1, OT) long term goal (LTG);10 days  -      Progress/Outcome (Transfer Goal 1, OT) goal ongoing  -CS       Row Name 12/31/23 2356          Dressing Goal 1 (OT)    Activity/Device (Dressing Goal 1, OT) lower body dressing  -CS     [place]/Cues Needed (Dressing Goal 1, OT) minimum assist (75% or more patient effort)  -CS     Time Frame (Dressing Goal 1, OT) long term goal (LTG);10 days  -CS     Strategies/Barriers (Dressing Goal 1, OT) don/doff slippers w/ AAD  -CS     Progress/Outcome (Dressing Goal 1, OT) goal ongoing  -CS       Row Name 12/31/23 [facility]          Toileting Goal 1 (OT)    Activity/Device (Toileting Goal 1, OT) adjust/manage clothing;perform perineal hygiene  -CS     [place] Level/Cues Needed (Toileting Goal 1, OT) minimum assist (75% or more patient effort)  -CS     Time Frame (Toileting Goal 1, OT) long term goal (LTG);10 days  -CS     Progress/Outcome (Toileting Goal 1, OT) goal ongoing  -CS       Row Name 12/31/23 9815          Therapy Assessment/Plan (OT)    Planned Therapy Interventions (OT) activity tolerance training;adaptive equipment training;BADL retraining;functional balance retraining;occupation/activity based interventions;ROM/therapeutic exercise;strengthening exercise;transfer/mobility retraining  -CS               User Key  (r) = Recorded By, (t) = Taken By, (c) = Cosigned By      Initials Name Provider Type    CS Mary Ortiz, OT Occupational Therapist                   Clinical Impression       Row Name 12/31/23 0105          Pain Assessment    Pain Intervention(s) Repositioned;Ambulation/increased activity  -CS     Additional Documentation Pain Scale: FACES Pre/Post-Treatment (Group)  -CS       Row Name 12/31/23 0019          Pain Scale: FACES Pre/Post-Treatment    Pain: FACES Scale, Pretreatment 2-->hurts little bit  -CS     Posttreatment Pain Rating 2-->hurts little bit  -CS     Pain Location generalized  -CS     Pre/Posttreatment Pain Comment tolerated  -CS       Row Name 12/31/23 3690          Plan of Care  Review    Plan of Care Reviewed With patient  -CS     Outcome Evaluation OT eval complete. Pt presents w/ deficits in balance, strength, and poor functional endurance warranting cont skilled IPOT POC to promote return to baseline. Pt session limited d/t c/o nausea, orthostatic BP, and episode of low HR; RN notified. Recommend pt DC to IP rehab based on current level of performance.  -CS       Row Name 12/31/23 1356          Therapy Assessment/Plan (OT)    Patient/Family Therapy Goal Statement (OT) Return to PLOF  -CS     Rehab Potential (OT) good, to achieve stated therapy goals  -CS     Criteria for Skilled Therapeutic Interventions Met (OT) yes;skilled treatment is necessary  -CS     Therapy Frequency (OT) daily  -CS       Row Name 12/31/23 6538          Therapy Plan Review/Discharge Plan (OT)    Anticipated Discharge Disposition (OT) inpatient rehabilitation facility  -CS       Row Name 12/31/23 0640          Vital Signs    Pre Systolic BP Rehab 126  -CS     Pre Treatment Diastolic BP 87  -CS     Post Systolic BP Rehab 91  -CS     Post Treatment Diastolic BP 51  -CS     Pretreatment Heart Rate (beats/min) 67  -CS     Intratreatment Heart Rate (beats/min) 38   -CS     Posttreatment Heart Rate (beats/min) 75  -CS     Pre SpO2 (%) 93  -CS     O2 Delivery Pre Treatment nasal cannula  -CS     Post SpO2 (%) 94  -CS     O2 Delivery Post Treatment nasal cannula  -CS     Pre Patient Position Sitting  -CS     Intra Patient Position Standing  -CS     Post Patient Position Sitting  -CS       Row Name 12/31/23 6756          Positioning and Restraints    Pre-Treatment Position sitting in chair/recliner  -CS     Post Treatment Position chair  -CS     In Chair notified nsg;sitting;call light within reach;encouraged to call for assist;with PT;waffle cushion  -CS               User Key  (r) = Recorded By, (t) = Taken By, (c) = Cosigned By      Initials Name Provider Type    CS Mary Ortiz, OT Occupational Therapist                    Outcome Measures       Row Name 12/31/23 1357          How much help from another is currently needed...    Putting on and taking off regular lower body clothing? 2  -CS     Bathing (including washing, rinsing, and drying) 2  -CS     Toileting (which includes using toilet bed pan or urinal) 2  -CS     Putting on and taking off regular upper body clothing 2  -CS     Taking care of personal grooming (such as brushing teeth) 3  -CS     Eating meals 4  -CS     AM-PAC 6 Clicks Score (OT) 15  -CS       Row Name 12/31/23 0800          How much help from another person do you currently need...    Turning from your back to your side while in flat bed without using bedrails? 2  -CB     Moving from lying on back to sitting on the side of a flat bed without bedrails? 2  -CB     Moving to and from a bed to a chair (including a wheelchair)? 2  -CB     Standing up from a chair using your arms (e.g., wheelchair, bedside chair)? 2  -CB     Climbing 3-5 steps with a railing? 1  -CB     To walk in hospital room? 2  -CB     AM-PAC 6 Clicks Score (PT) 11  -CB     Highest Level of Mobility Goal 4 --> Transfer to chair/commode  -CB       Row Name 12/31/23 1357          Functional Assessment    Outcome Measure Options AM-PAC 6 Clicks Daily Activity (OT)  -CS               User Key  (r) = Recorded By, (t) = Taken By, (c) = Cosigned By      Initials Name Provider Type    Mary Horn OT Occupational Therapist    CB Aden Fernandes, RN Registered Nurse                    Occupational Therapy Education       Title: PT OT SLP Therapies (In Progress)       Topic: Occupational Therapy (In Progress)       Point: ADL training (In Progress)       Description:   Instruct learner(s) on proper safety adaptation and remediation techniques during self care or transfers.   Instruct in proper use of assistive devices.                  Learning Progress Summary             Patient Acceptance, E, NR by YVON at 12/31/2023 9531                          Point: Home exercise program (Not Started)       Description:   Instruct learner(s) on appropriate technique for monitoring, assisting and/or progressing therapeutic exercises/activities.                  Learner Progress:  Not documented in this visit.              Point: Precautions (In Progress)       Description:   Instruct learner(s) on prescribed precautions during self-care and functional transfers.                  Learning Progress Summary             Patient Acceptance, E, NR by  at 12/31/2023 1357                         Point: Body mechanics (In Progress)       Description:   Instruct learner(s) on proper positioning and spine alignment during self-care, functional mobility activities and/or exercises.                  Learning Progress Summary             Patient Acceptance, E, NR by  at 12/31/2023 1357                                         User Key       Initials Effective Dates Name Provider Type Discipline     09/02/21 -  Mary Ortiz, OT Occupational Therapist OT                  OT Recommendation and Plan  Planned Therapy Interventions (OT): activity tolerance training, adaptive equipment training, BADL retraining, functional balance retraining, occupation/activity based interventions, ROM/therapeutic exercise, strengthening exercise, transfer/mobility retraining  Therapy Frequency (OT): daily  Plan of Care Review  Plan of Care Reviewed With: patient  Outcome Evaluation: OT eval complete. Pt presents w/ deficits in balance, strength, and poor functional endurance warranting cont skilled IPOT POC to promote return to baseline. Pt session limited d/t c/o nausea, orthostatic BP, and episode of low HR; RN notified. Recommend pt DC to IP rehab based on current level of performance.     Time Calculation:   Evaluation Complexity (OT)  Review Occupational Profile/Medical/Therapy History Complexity: brief/low complexity  Assessment, Occupational Performance/Identification of Deficit  Complexity: 3-5 performance deficits  Clinical Decision Making Complexity (OT): problem focused assessment/low complexity  Overall Complexity of Evaluation (OT): low complexity     Time Calculation- OT       Row Name 12/31/23 1357             Time Calculation- OT    OT Start Time 0930  -CS      OT Received On 12/31/23  -CS      OT Goal Re-Cert Due Date 01/10/24  -CS         Untimed Charges    OT Eval/Re-eval Minutes 46  -CS         Total Minutes    Untimed Charges Total Minutes 46  -CS       Total Minutes 46  -CS                User Key  (r) = Recorded By, (t) = Taken By, (c) = Cosigned By      Initials Name Provider Type    CS Mary Ortiz OT Occupational Therapist                  Therapy Charges for Today       Code Description Service Date Service Provider Modifiers Qty    [id number] HC OT EVAL LOW COMPLEXITY 4 12/31/2023 Mary Ortiz OT GO 1                 Mary Ortiz OT  12/31/2023

## 2023-12-31 NOTE — PLAN OF CARE
Goal Outcome Evaluation:  Plan of Care Reviewed With: patient           Outcome Evaluation: OT eval complete. Pt presents w/ deficits in balance, strength, and poor functional endurance warranting cont skilled IPOT POC to promote return to baseline. Pt session limited d/t c/o nausea, orthostatic BP, and episode of low HR; RN notified. Recommend pt DC to IP rehab based on current level of performance.      Anticipated Discharge Disposition (OT): inpatient rehabilitation facility

## 2023-12-31 NOTE — THERAPY EVALUATION
Patient Name: Luis Perez Jr.  : 1945    MRN: [id number]                              Today's Date: 2023       Admit Date: 2023    Visit Dx:     ICD-10-CM ICD-9-CM   1. Acute kidney injury  N17.9 584.9   2. Generalized weakness  R53.1 780.79   3. Pericardial effusion  I31.39 423.9   4. Leukocytosis, unspecified type  D72.829 288.60   5. Elevated LFTs  R79.89 790.6   6. Hematuria, unspecified type  R31.9 599.70     Patient Active Problem List   Diagnosis    Hypertension    Paroxysmal atrial fibrillation    Snoring    Obstructive sleep apnea - Intolerant CPAP/BiPAP    Chronic persistent asthma    Non-smoker    Obesity, Class III, BMI 40-49.9 (morbid obesity)    Perennial rhinitis    Vitamin D deficiency    Rhinovirus    Pericardial effusion    MONIQUE (acute kidney injury)    Transaminitis    Lactic acidosis    Metabolic acidosis, increased anion gap     Past Medical History:   Diagnosis Date    Asthma     Bronchitis, chronic     Cancer     Skin     Cellulitis and abscess of right leg     Chronic persistent asthma 2020    Hyperlipidemia     Hypertension     Nephrolithiasis     Obesity, Class III, BMI 40-49.9 (morbid obesity) 2020    Paroxysmal atrial fibrillation 07/15/2019    Pneumonia     Sleep apnea     UTI (urinary tract infection)     Vitamin D deficiency 2020     Past Surgical History:   Procedure Laterality Date    CARDIOVERSION  2019    COLONOSCOPY      NASAL SEPTUM SURGERY      Deviation Repair    SKIN CANCER EXCISION      TONSILLECTOMY      VASECTOMY        General Information       Row Name 23 1400          Physical Therapy Time and Intention    Document Type evaluation  -KG     Mode of Treatment physical therapy  -KG       Row Name 23 1400          General Information    Patient Profile Reviewed yes  -KG     Prior Level of Function independent:;all household mobility;gait;transfer;ADL's;dressing;bathing  -KG     Existing Precautions/Restrictions  fall;oxygen therapy device and L/min;orthostatic hypotension;other (see comments)  monitor BP; pt with c/o dizziness, lightheadedness, and nausea  -KG     Barriers to Rehab medically complex  -KG       Row Name 12/31/23 1400          Living Environment    People in Home spouse  -KG       Row Name 12/31/23 1400          Home Main Entrance    Number of Stairs, Main Entrance none  -KG       Row Name 12/31/23 1400          Stairs Within Home, Primary    Number of Stairs, Within Home, Primary none  -KG       Row Name 12/31/23 1400          Cognition    Orientation Status (Cognition) oriented x 4  -KG       Row Name 12/31/23 1400          Safety Issues, Functional Mobility    Safety Issues Affecting Function (Mobility) awareness of need for assistance;insight into deficits/self-awareness;safety precaution awareness;safety precautions follow-through/compliance  -KG     Impairments Affecting Function (Mobility) balance;coordination;endurance/activity tolerance;postural/trunk control;shortness of breath;strength  -KG               User Key  (r) = Recorded By, (t) = Taken By, (c) = Cosigned By      Initials Name Provider Type    KG Tiki Parker, PT Physical Therapist                   Mobility       Row Name 12/31/23 1401          Bed Mobility    Comment, (Bed Mobility) UIC  -KG       Row Name 12/31/23 1401          Transfers    Comment, (Transfers) VC's for sequencing and safe hand placement.  -KG       Row Name 12/31/23 1401          Sit-Stand Transfer    Sit-Stand [place] (Transfers) minimum assist (75% patient effort);2 person assist;verbal cues  -KG     Assistive Device (Sit-Stand Transfers) walker, front-wheeled  -KG       Row Name 12/31/23 1401          Gait/Stairs (Locomotion)    [place] Level (Gait) minimum assist (75% patient effort);2 person assist;verbal cues  -KG     Assistive Device (Gait) walker, front-wheeled  -KG     Distance in Feet (Gait) 15  -KG     Deviations/Abnormal Patterns (Gait)  bilateral deviations;base of support, wide;jaime decreased;stride length decreased  -KG     Bilateral Gait Deviations forward flexed posture;heel strike decreased  -KG     Comment, (Gait/Stairs) Pt demonstrated step through gait pattern with slow jaime and decreased step length. Consistent cues for upright posture and to keep RW close with feet inside middle. Pt with adequate stability; no LOB. Sudden c/o increased dizziness. Pt returned to chair. BP monitored and RN aware. Additional mobility deferred.  -KG               User Key  (r) = Recorded By, (t) = Taken By, (c) = Cosigned By      Initials Name Provider Type    KG Tiki Parker, PT Physical Therapist                   Obj/Interventions       Row Name 12/31/23 1404          Range of Motion Comprehensive    General Range of Motion no range of motion deficits identified  -KG     Comment, General Range of Motion B LE WFL  -KG       Row Name 12/31/23 1404          Strength Comprehensive (MMT)    Comment, General Manual Muscle Testing (MMT) Assessment B LE grossly 3+/5  -KG       Row Name 12/31/23 1404          Balance    Balance Assessment sitting static balance;standing static balance;standing dynamic balance  -KG     Static Sitting Balance supervision  -KG     Position, Sitting Balance unsupported;sitting in chair  -KG     Static Standing Balance contact guard  -KG     Dynamic Standing Balance minimal assist  -KG     Position/Device Used, Standing Balance supported;walker, rolling  -KG       Row Name 12/31/23 1404          Sensory Assessment (Somatosensory)    Sensory Assessment (Somatosensory) LE sensation intact  -KG               User Key  (r) = Recorded By, (t) = Taken By, (c) = Cosigned By      Initials Name Provider Type    KG Tiki Parker, PT Physical Therapist                   Goals/Plan       Row Name 12/31/23 1407          Bed Mobility Goal 1 (PT)    Activity/Assistive Device (Bed Mobility Goal 1, PT) sit to supine;supine to sit   -KG     [place] Level/Cues Needed (Bed Mobility Goal 1, PT) standby assist  -KG     Time Frame (Bed Mobility Goal 1, PT) 2 weeks  -KG     Progress/Outcomes (Bed Mobility Goal 1, PT) goal ongoing  -KG       Row Name 12/31/23 1407          Transfer Goal 1 (PT)    Activity/Assistive Device (Transfer Goal 1, PT) sit-to-stand/stand-to-sit;bed-to-chair/chair-to-bed;walker, rolling  -KG     [place] Level/Cues Needed (Transfer Goal 1, PT) standby assist  -KG     Time Frame (Transfer Goal 1, PT) 2 weeks  -KG     Progress/Outcome (Transfer Goal 1, PT) goal ongoing  -KG       Row Name 12/31/23 1407          Gait Training Goal 1 (PT)    Activity/Assistive Device (Gait Training Goal 1, PT) gait (walking locomotion);assistive device use;walker, rolling  -KG     [place] Level (Gait Training Goal 1, PT) contact guard required  -KG     Distance (Gait Training Goal 1, PT) 100 feet  -KG     Time Frame (Gait Training Goal 1, PT) 2 weeks  -KG     Progress/Outcome (Gait Training Goal 1, PT) goal ongoing  -KG       Row Name 12/31/23 1407          Therapy Assessment/Plan (PT)    Planned Therapy Interventions (PT) balance training;bed mobility training;gait training;strengthening;transfer training  -KG               User Key  (r) = Recorded By, (t) = Taken By, (c) = Cosigned By      Initials Name Provider Type    KG Tiki Parker N, PT Physical Therapist                   Clinical Impression       Row Name 12/31/23 1409          Pain    Additional Documentation Pain Scale: FACES Pre/Post-Treatment (Group)  -KG       Row Name 12/31/23 1402          Pain Scale: FACES Pre/Post-Treatment    Pain: FACES Scale, Pretreatment 2-->hurts little bit  -KG     Posttreatment Pain Rating 2-->hurts little bit  -KG     Pain Location generalized  -KG       Row Name 12/31/23 1407          Plan of Care Review    Plan of Care Reviewed With patient  -KG     Outcome Evaluation PT initial evaluation completed for pt presenting with generalized  weakness, impaired balance and coordination, c/o dizziness and lightheadedness, and decreased functional mobility. Pt ambulated 15ft with RW and Kimberley x2. Pt's decreased independence warrants PT skilled care. Recommend D/C to IP rehab facility.  -KG       Row Name 12/31/23 1405          Therapy Assessment/Plan (PT)    Patient/Family Therapy Goals Statement (PT) return to PLOF  -KG     Rehab Potential (PT) good, to achieve stated therapy goals  -KG     Criteria for Skilled Interventions Met (PT) yes;skilled treatment is necessary  -KG     Therapy Frequency (PT) daily  -KG       Row Name 12/31/23 1405          Vital Signs    Pre Systolic BP Rehab 109  -KG     Pre Treatment Diastolic BP 79  -KG     Intra Systolic BP Rehab 125  -KG     Intra Treatment Diastolic BP 67  -KG     Post Systolic BP Rehab 91  -KG     Post Treatment Diastolic BP 51  -KG     Pretreatment Heart Rate (beats/min) 67  -KG     Intratreatment Heart Rate (beats/min) 38  -KG     Posttreatment Heart Rate (beats/min) 75  -KG     Pre SpO2 (%) 93  -KG     O2 Delivery Pre Treatment supplemental O2  -KG     Post SpO2 (%) 95  -KG     O2 Delivery Post Treatment supplemental O2  -KG     Pre Patient Position Sitting  -KG     Intra Patient Position Standing  -KG     Post Patient Position Sitting  -KG       Row Name 12/31/23 1405          Positioning and Restraints    Pre-Treatment Position sitting in chair/recliner  -KG     Post Treatment Position chair  -KG     In Chair notified nsg;sitting;call light within reach;encouraged to call for assist;exit alarm on;RUE elevated;LUE elevated  -KG               User Key  (r) = Recorded By, (t) = Taken By, (c) = Cosigned By      Initials Name Provider Type    KG Tiki Parker, PT Physical Therapist                   Outcome Measures       Row Name 12/31/23 1407 12/31/23 0800       How much help from another person do you currently need...    Turning from your back to your side while in flat bed without using  bedrails? 3  -KG 2  -CB    Moving from lying on back to sitting on the side of a flat bed without bedrails? 3  -KG 2  -CB    Moving to and from a bed to a chair (including a wheelchair)? 3  -KG 2  -CB    Standing up from a chair using your arms (e.g., wheelchair, bedside chair)? 3  -KG 2  -CB    Climbing 3-5 steps with a railing? 2  -KG 1  -CB    To walk in hospital room? 2  -KG 2  -CB    AM-PAC 6 Clicks Score (PT) 16  -KG 11  -CB    Highest Level of Mobility Goal 5 --> Static standing  -KG 4 --> Transfer to chair/commode  -CB      Row Name 12/31/23 1407 12/31/23 1357       Functional Assessment    Outcome Measure Options AM-PAC 6 Clicks Basic Mobility (PT)  -KG AM-PAC 6 Clicks Daily Activity (OT)  -CS              User Key  (r) = Recorded By, (t) = Taken By, (c) = Cosigned By      Initials Name Provider Type    CS Mary Ortiz OT Occupational Therapist    KG Tiki Parker, PT Physical Therapist    CB Aden Fernandes, RN Registered Nurse                                 Physical Therapy Education       Title: PT OT SLP Therapies (In Progress)       Topic: Physical Therapy (In Progress)       Point: Mobility training (In Progress)       Learning Progress Summary             Patient Acceptance, E, NR by KG at 12/31/2023 0945                         Point: Home exercise program (Not Started)       Learner Progress:  Not documented in this visit.              Point: Body mechanics (In Progress)       Learning Progress Summary             Patient Acceptance, E, NR by KG at 12/31/2023 0945                         Point: Precautions (In Progress)       Learning Progress Summary             Patient Acceptance, E, NR by KG at 12/31/2023 0945                                         User Key       Initials Effective Dates Name Provider Type Discipline    KG 05/22/20 -  Tiki Parker, PT Physical Therapist PT                  PT Recommendation and Plan  Planned Therapy Interventions (PT): balance  training, bed mobility training, gait training, strengthening, transfer training  Plan of Care Reviewed With: patient  Outcome Evaluation: PT initial evaluation completed for pt presenting with generalized weakness, impaired balance and coordination, c/o dizziness and lightheadedness, and decreased functional mobility. Pt ambulated 15ft with RW and Kimberley x2. Pt's decreased independence warrants PT skilled care. Recommend D/C to IP rehab facility.     Time Calculation:   PT Evaluation Complexity  History, PT Evaluation Complexity: 3 or more personal factors and/or comorbidities  Examination of Body Systems (PT Eval Complexity): total of 3 or more elements  Clinical Presentation (PT Evaluation Complexity): stable  Clinical Decision Making (PT Evaluation Complexity): low complexity  Overall Complexity (PT Evaluation Complexity): low complexity     PT Charges       Row Name 12/31/23 0945             Time Calculation    Start Time 0945  -KG      PT Received On 12/31/23  -KG      PT Goal Re-Cert Due Date 01/10/24  -KG         Untimed Charges    PT Eval/Re-eval Minutes 46  -KG         Total Minutes    Untimed Charges Total Minutes 46  -KG       Total Minutes 46  -KG                User Key  (r) = Recorded By, (t) = Taken By, (c) = Cosigned By      Initials Name Provider Type    KG Tiki Parker, PT Physical Therapist                  Therapy Charges for Today       Code Description Service Date Service Provider Modifiers Qty    24262586163 HC PT EVAL LOW COMPLEXITY 4 12/31/2023 Tiki Parker, PT GP 1            PT G-Codes  Outcome Measure Options: AM-PAC 6 Clicks Basic Mobility (PT)  AM-PAC 6 Clicks Score (PT): 16  AM-PAC 6 Clicks Score (OT): 15  PT Discharge Summary  Anticipated Discharge Disposition (PT): inpatient rehabilitation facility    Noemí Parker, PT  12/31/2023

## 2023-12-31 NOTE — PLAN OF CARE
Goal Outcome Evaluation:  Plan of Care Reviewed With: patient           Outcome Evaluation: PT initial evaluation completed for pt presenting with generalized weakness, impaired balance and coordination, c/o dizziness and lightheadedness, and decreased functional mobility. Pt ambulated 15ft with RW and Kimberley x2. Pt's decreased independence warrants PT skilled care. Recommend D/C to IP rehab facility.      Anticipated Discharge Disposition (PT): inpatient rehabilitation facility

## 2023-12-31 NOTE — OUTREACH NOTE
Medical Week 1 Survey      Flowsheet Row Responses   [facility] patient discharged from? [facility]   Does the patient have one of the following disease processes/diagnoses(primary or secondary)? Other   Week 1 attempt successful? No   Unsuccessful attempts Attempt 1   Revoke Readmitted            SYLVIA BARTHOLOMEW - Registered Nurse

## 2023-12-31 NOTE — PLAN OF CARE
Goal Outcome Evaluation:           Progress: no change  Outcome Evaluation: VSS on 2L NC. A&Ox4. NPO since midnight. Lactic acid elevated, paged on call provider to notfiy about Lactic acid and LFTs. No new orders placed. No other concerns at this time. Will continue with the plan of care.

## 2023-12-31 NOTE — PROGRESS NOTES
Psychiatric Medicine Services  PROGRESS NOTE    Patient Name: Luis Perez Jr.  : 1945  MRN: [id number]    Date of Admission: 2023  Primary Care Physician: Sierra Kuo MD    Subjective   Subjective     CC: Dyspnea    HPI: Up in chair. Fatigued. Slept poorly. Aches all over. Denies f/c/sweats. Notes cough, intermittently productive. Nausea. Hungry.       Objective   Objective     Vital Signs:   Temp:  [97.3 °F (36.3 °C)-98.7 °F (37.1 °C)] 98.7 °F (37.1 °C)  Heart Rate:  [59-76] 67  Resp:  [18-22] 22  BP: ()/(56-99) 105/78  Flow (L/min):  [2] 2     Physical Exam:  NAD, alert and oriented  OP clear, dry MM  Neck supple  No LAD  RRR  Decreased at bases, otherwise grossly clear  +BS, soft, mild distention, NT  B LE with venous stasis changes, mild edema, no redness/increased warmth  NUNN  Normal affect    Results Reviewed:  LAB RESULTS:      Lab 23  0304 23  2046 23  1615 23  1357 23  1055 23  1421   WBC 21.55*  --   --   --  20.86* 11.99*   HEMOGLOBIN 14.2  --   --   --  15.3 14.1   HEMATOCRIT 43.3  --   --   --  46.8 42.4   PLATELETS 375  --   --   --  374 319   NEUTROS ABS 16.07*  --   --   --  15.30* 8.14*   IMMATURE GRANS (ABS) 0.66*  --   --   --  0.52* 0.18*   LYMPHS ABS 1.90  --   --   --  1.98 1.94   MONOS ABS 2.81*  --   --   --  2.96* 1.67*   EOS ABS 0.01  --   --   --  0.02 0.01   MCV 88.4  --   --   --  90.2 87.2   PROCALCITONIN  --   --   --   --  0.28*  --    LACTATE 7.3* 6.1* 6.1* 5.3* 4.3*  --          Lab 23  0304 23  1055 23  0811 238 23  0308 23  1421   SODIUM 129* 131*  --   --  137 135*   POTASSIUM 5.0 4.7  --  3.9 3.2* 3.5   CHLORIDE 91* 91*  --   --  93* 94*   CO2 17.0* 24.0  --   --  26.0 25.0   ANION GAP 21.0* 16.0*  --   --  18.0* 16.0*   BUN 75* 69*  --   --  31* 27*   CREATININE 2.73* 2.30*  --   --  1.31* 1.04   EGFR 23.1* 28.4*  --   --  55.7* 73.5   GLUCOSE  154* 163*  --   --  176* 178*   CALCIUM 8.5* 9.1  --   --  9.0 9.3   MAGNESIUM 2.9*  --  2.7*  --   --   --    PHOSPHORUS 7.8*  --   --   --   --   --    HEMOGLOBIN A1C  --  6.40*  --   --   --   --    TSH  --  2.460  --   --   --   --          Lab 12/31/23  0304 12/30/23  1055 12/25/23  1421   TOTAL PROTEIN 6.4 6.9 7.0   ALBUMIN 3.5 3.7 4.0   GLOBULIN 2.9 3.2 3.0   ALT (SGPT) 1,426* 453* 73*   AST (SGOT) 3,069* 664* 88*   BILIRUBIN 1.3* 1.2 0.8   ALK PHOS 96 87 67         Lab 12/30/23  1055 12/25/23  1421   PROBNP 306.9 455.3   HSTROP T 13 10                 Brief Urine Lab Results  (Last result in the past 365 days)        Color   Clarity   Blood   Leuk Est   Nitrite   Protein   CREAT   Urine HCG        12/30/23 1239 Dark Yellow   Turbid   Large (3+)   Small (1+)   Negative   100 mg/dL (2+)                   Microbiology Results Abnormal       Procedure Component Value - Date/Time    COVID PRE-OP / PRE-PROCEDURE SCREENING ORDER (NO ISOLATION) - Swab, Nasopharynx [[id number]]  (Normal) Collected: 12/30/23 1141    Lab Status: Final result Specimen: Swab from Nasopharynx Updated: 12/30/23 1257    Narrative:      The following orders were created for panel order COVID PRE-OP / PRE-PROCEDURE SCREENING ORDER (NO ISOLATION) - Swab, Nasopharynx.  Procedure                               Abnormality         Status                     ---------                               -----------         ------                     Respiratory Panel PCR w/...[[id number]]  Normal              Final result                 Please view results for these tests on the individual orders.    Respiratory Panel PCR w/COVID-19(SARS-CoV-2) RIKY/DIEGO/JAVI/PAD/COR/KYLIE In-House, NP Swab in UTM/VTM, 2 HR TAT - Swab, Nasopharynx [[id number]]  (Normal) Collected: 12/30/23 1141    Lab Status: Final result Specimen: Swab from Nasopharynx Updated: 12/30/23 1257     ADENOVIRUS, PCR Not Detected     Coronavirus 229E Not Detected     Coronavirus HKU1 Not Detected      Coronavirus NL63 Not Detected     Coronavirus OC43 Not Detected     COVID19 Not Detected     Human Metapneumovirus Not Detected     Human Rhinovirus/Enterovirus Not Detected     Influenza A PCR Not Detected     Influenza B PCR Not Detected     Parainfluenza Virus 1 Not Detected     Parainfluenza Virus 2 Not Detected     Parainfluenza Virus 3 Not Detected     Parainfluenza Virus 4 Not Detected     RSV, PCR Not Detected     Bordetella pertussis pcr Not Detected     Bordetella parapertussis PCR Not Detected     Chlamydophila pneumoniae PCR Not Detected     Mycoplasma pneumo by PCR Not Detected    Narrative:      In the setting of a positive respiratory panel with a viral infection PLUS a negative procalcitonin without other underlying concern for bacterial infection, consider observing off antibiotics or discontinuation of antibiotics and continue supportive care. If the respiratory panel is positive for atypical bacterial infection (Bordetella pertussis, Chlamydophila pneumoniae, or Mycoplasma pneumoniae), consider antibiotic de-escalation to target atypical bacterial infection.            XR Chest 1 View    Result Date: 12/31/2023  XR CHEST 1 VW Date of Exam: 12/31/2023 6:15 AM EST Indication: hypoxia Comparison: 12/30/2023. Findings: The cardiac silhouette is enlarged. Pulmonary vasculature is within normal limits. Lungs are clear. No pneumothorax or pleural effusion. No acute osseous abnormality.     Impression: Impression: Enlarged cardiac silhouette shown to reflect large pericardial effusion on recent CT. Electronically Signed: Noel West MD  12/31/2023 6:45 AM EST  Workstation ID: [id number]    XR Chest 1 View    Result Date: 12/30/2023  XR CHEST 1 VW Date of Exam: 12/30/2023 10:55 AM EST Indication: SOA triage protocol Comparison: 12/25/2023 Findings: Stable enlargement of the cardiac silhouette. Pulmonary vasculature within normal limits. Lungs grossly clear other than mild atelectasis in the bases.  Costophrenic angles sharp     Impression: Impression: No acute process demonstrated Electronically Signed: Winston Sheldon  12/30/2023 11:09 AM EST  Workstation ID: [id number]     Results for orders placed during the hospital encounter of 12/25/23    Adult Transthoracic Echo Complete W/ Cont if Necessary Per Protocol    Interpretation Summary    Left ventricular systolic function is normal. Estimated left ventricular EF = 60%    Left ventricular wall thickness is consistent with moderate concentric hypertrophy.    The cardiac valves are anatomically and functionally normal.    Estimated right ventricular systolic pressure from tricuspid regurgitation is normal (<35 mmHg).    There is a large (>2cm) circumferential pericardial effusion. There is no evidence of cardiac tamponade.      Current medications:  Scheduled Meds:Fluticasone Furoate-Vilanterol, 1 puff, Inhalation, Daily - RT  amLODIPine, 10 mg, Oral, Daily  ascorbic acid, 1,000 mg, Oral, Daily  cefTRIAXone, 2,000 mg, Intravenous, Q24H  doxycycline, 100 mg, Oral, Q12H  finasteride, 5 mg, Oral, Daily  montelukast, 10 mg, Oral, Daily  senna-docusate sodium, 2 tablet, Oral, BID  sodium chloride, 500 mL, Intravenous, Once  sodium chloride, 10 mL, Intravenous, Q12H  sotalol, 120 mg, Oral, Q12H  tamsulosin, 0.4 mg, Oral, Daily      Continuous Infusions:sodium chloride, 100 mL/hr      PRN Meds:.  albuterol sulfate HFA    senna-docusate sodium **AND** polyethylene glycol **AND** bisacodyl **AND** bisacodyl    melatonin    nitroglycerin    ondansetron **OR** ondansetron    sodium chloride    sodium chloride    sodium chloride    Assessment & Plan   Assessment & Plan     Active Hospital Problems    Diagnosis  POA    MONIQUE (acute kidney injury) [N17.9]  Yes    Transaminitis [R74.01]  Yes    Lactic acidosis [E87.20]  Yes    Metabolic acidosis, increased anion gap [E87.29]  Yes    Pericardial effusion [I31.39]  Yes    Obesity, Class III, BMI 40-49.9 (morbid obesity) [E66.01]  Yes     Obstructive sleep apnea - Intolerant CPAP/BiPAP [G47.33]  Yes    Paroxysmal atrial fibrillation [I48.0]  Yes    Hypertension [I10]  Yes      Resolved Hospital Problems   No resolved problems to display.        Brief Hospital Course to date:  Luis Perez Jr. Is a 78 y.o. M with a history of PAF, COPD, RUY and known pericardial effusion, who presents with persistent weakness and shortness of breath with a recent hospitalization and discharged on 12/27/2023, found to have a new MONIQUE, transaminitis and lactic acidosis.    MONIQUE  Metabolic acidosis  -hold diuretics  -IVF  -consult nephrology  -normal BNP    Cough  Leukocytosis  Recent rhinovirus+  -concerning for superimposed PNA, unremarkable CXR however  -check CT  -start empiric antibiotics  -monitor cultures    Pericardial effusion  Afib  -cardiology consult, consider repeat ECHO    Transaminitis  -Hep panel negative  -US pending  -consider GI input    Hx of COPD/RUY  -followed by pulmonary outpatient      Expected Discharge Location and Transportation: Home  Expected Discharge   Expected Discharge Date: 1/4/2024; Expected Discharge Time:      DVT prophylaxis:  Mechanical DVT prophylaxis orders are present.     AM-PAC 6 Clicks Score (PT): 11 (12/30/23 2000)    CODE STATUS:   Code Status and Medical Interventions:   Ordered at: 12/30/23 1313     Medical Intervention Limits:    NO intubation (DNI)     Code Status (Patient has no pulse and is not breathing):    No CPR (Do Not Attempt to Resuscitate)     Medical Interventions (Patient has pulse or is breathing):    Limited Support       Basil Encinas MD  12/31/23

## 2023-12-31 NOTE — CONSULTS
Patient Care Team:  Sierra Kuo MD as PCP - General (Internal Medicine)  Latasha Poe MD as Consulting Physician (Dermatopathology)  Provider, No Known  Camille Mccord APRN as Nurse Practitioner (Pulmonary Disease)  Jaja Mansfield APRN as Nurse Practitioner (Cardiology)    Chief complaint: Acute kidney injury   Hyponatremia   SOB    History of Present Illness: Luis Perez Jr. is a 78 y.o. male with a past medical history of A-fib, COPD, RUY not on CPAP hyperlipidemia who recently was admitted for A-fib with RVR patient underwent cardioversion on last admission. He was also noted to have large pericardial effusion however it wasn't drained as patient was on AC. He was discharge home on diuretics including HCTZ and Bumex. Patient developed progressive worsening of breathing and PALM. He also noted LE edema and decided to come back to the hospital. On this admission labs were remarkable for elevated creatinine and BUN. Other labs remarkable for AG acidosis and hyponatremia.     Review of Systems   Constitutional: Negative.    HENT: Negative.     Respiratory:  Positive for shortness of breath.    Cardiovascular:  Positive for leg swelling.   Gastrointestinal: Negative.    Genitourinary: Negative.    Musculoskeletal: Negative.    Skin: Negative.    Neurological: Negative.    Hematological: Negative.    Psychiatric/Behavioral: Negative.          Past Medical History:   Diagnosis Date    Asthma     Bronchitis, chronic     Cancer     Skin     Cellulitis and abscess of right leg     Chronic persistent asthma 02/25/2020    Hyperlipidemia     Hypertension     Nephrolithiasis     Obesity, Class III, BMI 40-49.9 (morbid obesity) 02/25/2020    Paroxysmal atrial fibrillation 07/15/2019    Pneumonia     Sleep apnea     UTI (urinary tract infection)     Vitamin D deficiency 08/20/2020   ,   Past Surgical History:   Procedure Laterality Date    CARDIOVERSION  08/05/2019    COLONOSCOPY      NASAL SEPTUM SURGERY       Deviation Repair    SKIN CANCER EXCISION      TONSILLECTOMY      VASECTOMY     ,   Family History   Problem Relation Age of Onset    Cancer Mother         colon    Diabetes Mother     Alzheimer's disease Father     No Known Problems Maternal Grandmother     No Known Problems Maternal Grandfather     No Known Problems Paternal Grandmother     No Known Problems Paternal Grandfather    ,   Social History     Socioeconomic History    Marital status:    Tobacco Use    Smoking status: Never     Passive exposure: Never    Smokeless tobacco: Former     Types: Chew     Quit date: 4/26/2019   Vaping Use    Vaping Use: Never used   Substance and Sexual Activity    Alcohol use: Yes     Alcohol/week: 0.0 - 2.0 standard drinks of alcohol     Comment: 1-2 month     Drug use: No    Sexual activity: Defer     E-cigarette/Vaping    E-cigarette/Vaping Use Never User      E-cigarette/Vaping Substances     E-cigarette/Vaping Devices         ,   Medications Prior to Admission   Medication Sig Dispense Refill Last Dose    albuterol sulfate  (90 Base) MCG/ACT inhaler Inhale 2 puffs Every 4 (Four) Hours As Needed for Wheezing. 1 inhaler 0 Past Week    amLODIPine (NORVASC) 10 MG tablet Take 1 tablet by mouth Daily. 90 tablet 3 12/29/2023    ascorbic acid (VITAMIN C) 1000 MG tablet Take 1 tablet by mouth Daily.   12/29/2023    Dupilumab 300 MG/2ML solution prefilled syringe Inject 2 mL under the skin into the appropriate area as directed Every 14 (Fourteen) Days. 2 mL 11 12/29/2023    Eliquis 5 MG tablet tablet TAKE 1 TABLET BY MOUTH  TWICE DAILY 180 tablet 3 12/29/2023    fexofenadine (ALLEGRA ALLERGY) 180 MG tablet Take 1 tablet by mouth Daily. 30 tablet 2 12/29/2023    finasteride (PROSCAR) 5 MG tablet Take 1 tablet by mouth Daily.   12/29/2023    Fluticasone Furoate-Vilanterol (Breo Ellipta) 200-25 MCG/ACT inhaler Inhale 1 puff Daily. 180 each 3 12/29/2023    icosapent ethyl (VASCEPA) 1 g capsule capsule TAKE 2 CAPSULES  BY MOUTH  TWICE DAILY WITH MEALS (Patient taking differently: Take 2 g by mouth 2 (Two) Times a Day With Meals.) 360 capsule 3 12/29/2023    montelukast (SINGULAIR) 10 MG tablet TAKE 1 TABLET BY MOUTH  DAILY 90 tablet 3 12/29/2023    Multiple Vitamins-Minerals (VITEYES AREDS FORMULA PO) Take 1 tablet by mouth 2 (Two) Times a Day.   12/29/2023    potassium chloride 10 MEQ CR tablet Take 1 tablet by mouth 2 (Two) Times a Day. 30 tablet 0 12/29/2023    sotalol (BETAPACE AF) 80 MG tablet tablet Take 1.5 tablets by mouth 2 (Two) Times a Day. 90 tablet 0 12/29/2023    tamsulosin (FLOMAX) 0.4 MG capsule 24 hr capsule Take 1 capsule by mouth Daily.   12/29/2023    torsemide (DEMADEX) 10 MG tablet Take 1 tablet by mouth Daily. 30 tablet 0 12/29/2023    traMADol (ULTRAM) 50 MG tablet Take 1 tablet by mouth Every 6 (Six) Hours As Needed for Moderate Pain.   Past Week    hydroCHLOROthiazide (HYDRODIURIL) 50 MG tablet Take 1 tablet by mouth Daily. (Patient not taking: Reported on 12/30/2023)   Not Taking   , Scheduled Meds:  Fluticasone Furoate-Vilanterol, 1 puff, Inhalation, Daily - RT  amLODIPine, 10 mg, Oral, Daily  ascorbic acid, 1,000 mg, Oral, Daily  cefTRIAXone, 2,000 mg, Intravenous, Q24H  doxycycline, 100 mg, Oral, Q12H  finasteride, 5 mg, Oral, Daily  montelukast, 10 mg, Oral, Daily  senna-docusate sodium, 2 tablet, Oral, BID  sodium chloride, 1,000 mL, Intravenous, Once  sodium chloride, 1,000 mL, Intravenous, Once  sodium chloride, 10 mL, Intravenous, Q12H  [START ON 1/1/2024] sotalol, 120 mg, Oral, Q24H  tamsulosin, 0.4 mg, Oral, Daily   , and Continuous Infusions:  sodium chloride, 100 mL/hr, Last Rate: 100 mL/hr (12/31/23 0817)       Objective     Vital Signs  Temp:  [97.4 °F (36.3 °C)-98.7 °F (37.1 °C)] 97.4 °F (36.3 °C)  Heart Rate:  [61-76] 61  Resp:  [16-22] 16  BP: ()/(56-99) 91/73    No intake/output data recorded.  I/O last 3 completed shifts:  In: 500 [IV Piggyback:500]  Out: 100  "[Urine:100]    Physical Exam  Constitutional:       General: He is not in acute distress.     Appearance: He is obese. He is not ill-appearing.   HENT:      Head: Normocephalic.      Nose: Nose normal.   Eyes:      Pupils: Pupils are equal, round, and reactive to light.   Cardiovascular:      Rate and Rhythm: Normal rate and regular rhythm.      Comments: Muffled heart sounds  Pulmonary:      Effort: Pulmonary effort is normal. No respiratory distress.      Breath sounds: No stridor.   Chest:      Chest wall: No tenderness.   Abdominal:      General: Abdomen is flat.   Musculoskeletal:         General: Swelling present.      Right lower leg: Edema present.      Left lower leg: Edema present.   Skin:     General: Skin is warm.   Neurological:      General: No focal deficit present.      Mental Status: He is oriented to person, place, and time. Mental status is at baseline.   Psychiatric:         Mood and Affect: Mood normal.         Results Review:    I reviewed the patient's new clinical results.    WBC WBC   Date Value Ref Range Status   12/31/2023 21.55 (H) 3.40 - 10.80 10*3/mm3 Final   12/30/2023 20.86 (H) 3.40 - 10.80 10*3/mm3 Final      HGB Hemoglobin   Date Value Ref Range Status   12/31/2023 14.2 13.0 - 17.7 g/dL Final   12/30/2023 15.3 13.0 - 17.7 g/dL Final      HCT Hematocrit   Date Value Ref Range Status   12/31/2023 43.3 37.5 - 51.0 % Final   12/30/2023 46.8 37.5 - 51.0 % Final      Platlets No results found for: \"LABPLAT\"   MCV MCV   Date Value Ref Range Status   12/31/2023 88.4 79.0 - 97.0 fL Final   12/30/2023 90.2 79.0 - 97.0 fL Final          Sodium Sodium   Date Value Ref Range Status   12/31/2023 129 (L) 136 - 145 mmol/L Final   12/30/2023 131 (L) 136 - 145 mmol/L Final      Potassium Potassium   Date Value Ref Range Status   12/31/2023 5.0 3.5 - 5.2 mmol/L Final   12/30/2023 4.7 3.5 - 5.2 mmol/L Final     Comment:     Slight hemolysis detected by analyzer. Result may be falsely elevated.    " "  Chloride Chloride   Date Value Ref Range Status   12/31/2023 91 (L) 98 - 107 mmol/L Final   12/30/2023 91 (L) 98 - 107 mmol/L Final      CO2 CO2   Date Value Ref Range Status   12/31/2023 17.0 (L) 22.0 - 29.0 mmol/L Final   12/30/2023 24.0 22.0 - 29.0 mmol/L Final      BUN BUN   Date Value Ref Range Status   12/31/2023 75 (H) 8 - 23 mg/dL Final   12/30/2023 69 (H) 8 - 23 mg/dL Final      Creatinine Creatinine   Date Value Ref Range Status   12/31/2023 2.73 (H) 0.76 - 1.27 mg/dL Final   12/30/2023 2.30 (H) 0.76 - 1.27 mg/dL Final      Calcium Calcium   Date Value Ref Range Status   12/31/2023 8.5 (L) 8.6 - 10.5 mg/dL Final   12/30/2023 9.1 8.6 - 10.5 mg/dL Final      PO4 No results found for: \"CAPO4\"   Albumin Albumin   Date Value Ref Range Status   12/31/2023 3.5 3.5 - 5.2 g/dL Final   12/30/2023 3.7 3.5 - 5.2 g/dL Final      Magnesium Magnesium   Date Value Ref Range Status   12/31/2023 2.9 (H) 1.6 - 2.4 mg/dL Final      Uric Acid No results found for: \"URICACID\"         Assessment & Plan       Hypertension    Paroxysmal atrial fibrillation    Obstructive sleep apnea - Intolerant CPAP/BiPAP    Obesity, Class III, BMI 40-49.9 (morbid obesity)    Pericardial effusion    MONIQUE (acute kidney injury)    Transaminitis    Lactic acidosis    Metabolic acidosis, increased anion gap      Assessment & Plan    Acute kidney injury:  Baseline cr ~ 0.5 mg/dl Cr 1.3>2.3>2.7  on this admission.Risk factor for MONIQUE: Pericardial effusion, recent initiation of diuretics, possible sepsis. UA large blood noted.     Pericardial effusion: Large circumferential pericardial effusion noted on last visit 12/26. Presenting back with worsening PALM and SOB. Repeat ECHO ??     Transaminitis: Elevated LFT, INR 2.1. Congestive hepatopathy??    Lactic acidosis: Unclear etiology. Sepsis workup on going. On abx. Blood cx pending     Hyponatremia: Due to recent HCTZ use and MONIQUE    Hyperphosphatemia: phos 7.8. Monitor for now.     Recs  MONIQUE likely " hemodynamic stress in the setting of above mentioned risk factor. Patient is currently getting IV fluids NS @ 100cc/hr.   Diuretics on hold.   Definitely needs pericardial fluid drained at some point to improve renal hemodynamics  Limit fluid intake to 1.2 liter/day  Abx per primary service.   Strict I/O.     High risk for decompensation.         I discussed the patients findings and my recommendations with patient    Checo Cardozo MD  12/31/23  15:14 EST

## 2023-12-31 NOTE — CONSULTS
Consult Note  Luis Perez Jr.  [id number]  1945    Referring Provider:  Reason for Consultation: 12/31 /2023    Patient Care Team:  Sierra Kuo MD as PCP - General (Internal Medicine)  Latasha Poe MD as Consulting Physician (Dermatopathology)  Provider, No Known  Camille Mccord APRN as Nurse Practitioner (Pulmonary Disease)  Jaja Mansfield APRN as Nurse Practitioner (Cardiology)    Chief complaint: Pericardial effusion      History of present illness: 78-year-old male who recently had undergone a cardioversion who is anticoagulated on Eliquis  Recently hospitalized 12 /25-12 /27 and A-fib with RVR and underwent cardioversion during that hospitalization.  He was also found to have a large pericardial effusion without tamponade.  They elected not to drain it secondary to his anticoagulation status   He had progressive weakness and came back to the emergency room yesterday on 12/30/2023 with shortness of breath and weakness  not improved since his prior discharge.  He has a persistent cough with leukocytosis and recent rhinovirus.  Started on antibiotics his cultures are being monitored   CT of the chest showed a large pericardial effusion and no evidence of tamponade.  His Eliquis has been put on hold.  \  Recent history significant at this time i are MONIQUE, transaminitis and lactic acidosis at present  Concern for possible cardiogenic shock and ATN            review of Systems    The following systems were reviewed and negative;  Neuro: No CVA or TIA symptoms or seizure disorder.  Constitutional: No fever, chills, or sweats.  Hematologic:  No bleeding disorders or DVT.  Endocrine:   No tremor, fatigue, weight loss or weight gain.  GI: No indigestion or difficulty swallowing; normal bowel movements.  : Acute kidney injury with present illness  Pulmonary: He has a cough with associated shortness of breath.  Cardiovascular: Shortness of breath cough atrial fibs with RVR  and status post  cardioversion  HEENT: No blurred vision, glaucoma, hearing loss or nosebleed.  Musculoskeletal: No joint pain or back pain.    All other review of systems is negative    History  Past Medical History:   Diagnosis Date    Asthma     Bronchitis, chronic     Cancer     Skin     Cellulitis and abscess of right leg     Chronic persistent asthma 02/25/2020    Hyperlipidemia     Hypertension     Nephrolithiasis     Obesity, Class III, BMI 40-49.9 (morbid obesity) 02/25/2020    Paroxysmal atrial fibrillation 07/15/2019    Pneumonia     Sleep apnea     UTI (urinary tract infection)     Vitamin D deficiency 08/20/2020   ,   Past Surgical History:   Procedure Laterality Date    CARDIOVERSION  08/05/2019    COLONOSCOPY      NASAL SEPTUM SURGERY      Deviation Repair    SKIN CANCER EXCISION      TONSILLECTOMY      VASECTOMY     ,   Family History   Problem Relation Age of Onset    Cancer Mother         colon    Diabetes Mother     Alzheimer's disease Father     No Known Problems Maternal Grandmother     No Known Problems Maternal Grandfather     No Known Problems Paternal Grandmother     No Known Problems Paternal Grandfather    ,   Social History     Tobacco Use    Smoking status: Never     Passive exposure: Never    Smokeless tobacco: Former     Types: Chew     Quit date: 4/26/2019   Vaping Use    Vaping Use: Never used   Substance Use Topics    Alcohol use: Yes     Alcohol/week: 0.0 - 2.0 standard drinks of alcohol     Comment: 1-2 month     Drug use: No   ,   Medications Prior to Admission   Medication Sig Dispense Refill Last Dose    albuterol sulfate  (90 Base) MCG/ACT inhaler Inhale 2 puffs Every 4 (Four) Hours As Needed for Wheezing. 1 inhaler 0 Past Week    amLODIPine (NORVASC) 10 MG tablet Take 1 tablet by mouth Daily. 90 tablet 3 12/29/2023    ascorbic acid (VITAMIN C) 1000 MG tablet Take 1 tablet by mouth Daily.   12/29/2023    Dupilumab 300 MG/2ML solution prefilled syringe Inject 2 mL under the skin into the  appropriate area as directed Every 14 (Fourteen) Days. 2 mL 11 12/29/2023    Eliquis 5 MG tablet tablet TAKE 1 TABLET BY MOUTH  TWICE DAILY 180 tablet 3 12/29/2023    fexofenadine (ALLEGRA ALLERGY) 180 MG tablet Take 1 tablet by mouth Daily. 30 tablet 2 12/29/2023    finasteride (PROSCAR) 5 MG tablet Take 1 tablet by mouth Daily.   12/29/2023    Fluticasone Furoate-Vilanterol (Breo Ellipta) 200-25 MCG/ACT inhaler Inhale 1 puff Daily. 180 each 3 12/29/2023    icosapent ethyl (VASCEPA) 1 g capsule capsule TAKE 2 CAPSULES BY MOUTH  TWICE DAILY WITH MEALS (Patient taking differently: Take 2 g by mouth 2 (Two) Times a Day With Meals.) 360 capsule 3 12/29/2023    montelukast (SINGULAIR) 10 MG tablet TAKE 1 TABLET BY MOUTH  DAILY 90 tablet 3 12/29/2023    Multiple Vitamins-Minerals (VITEYES AREDS FORMULA PO) Take 1 tablet by mouth 2 (Two) Times a Day.   12/29/2023    potassium chloride 10 MEQ CR tablet Take 1 tablet by mouth 2 (Two) Times a Day. 30 tablet 0 12/29/2023    sotalol (BETAPACE AF) 80 MG tablet tablet Take 1.5 tablets by mouth 2 (Two) Times a Day. 90 tablet 0 12/29/2023    tamsulosin (FLOMAX) 0.4 MG capsule 24 hr capsule Take 1 capsule by mouth Daily.   12/29/2023    torsemide (DEMADEX) 10 MG tablet Take 1 tablet by mouth Daily. 30 tablet 0 12/29/2023    traMADol (ULTRAM) 50 MG tablet Take 1 tablet by mouth Every 6 (Six) Hours As Needed for Moderate Pain.   Past Week    hydroCHLOROthiazide (HYDRODIURIL) 50 MG tablet Take 1 tablet by mouth Daily. (Patient not taking: Reported on 12/30/2023)   Not Taking      Scheduled Meds:  Fluticasone Furoate-Vilanterol, 1 puff, Inhalation, Daily - RT  amLODIPine, 10 mg, Oral, Daily  ascorbic acid, 1,000 mg, Oral, Daily  cefTRIAXone, 2,000 mg, Intravenous, Q24H  doxycycline, 100 mg, Oral, Q12H  finasteride, 5 mg, Oral, Daily  montelukast, 10 mg, Oral, Daily  senna-docusate sodium, 2 tablet, Oral, BID  sodium chloride, 1,000 mL, Intravenous, Once  sodium chloride, 10 mL,  "Intravenous, Q12H  [START ON 1/1/2024] sotalol, 120 mg, Oral, Q24H  tamsulosin, 0.4 mg, Oral, Daily       Continuous Infusions:  sodium chloride, 100 mL/hr, Last Rate: 100 mL/hr (12/31/23 0817)       PRN Meds:    albuterol sulfate HFA    senna-docusate sodium **AND** polyethylene glycol **AND** bisacodyl **AND** bisacodyl    melatonin    nitroglycerin    ondansetron **OR** ondansetron    sodium chloride    sodium chloride    sodium chloride and Allergies:  Torsemide and Adhesive tape    Objective     Vital Sign Min/Max for last 24 hours  Temp  Min: 97.4 °F (36.3 °C)  Max: 98.7 °F (37.1 °C)   BP  Min: 88/65  Max: 112/99   Pulse  Min: 64  Max: 76   Resp  Min: 16  Max: 22   SpO2  Min: 91 %  Max: 95 %   No data recorded   Weight  Min: 162 kg (356 lb 7.7 oz)  Max: 162 kg (356 lb 7.7 oz)     Flowsheet Rows      Flowsheet Row First Filed Value   Admission Height 185.4 cm (73\") Documented at 12/30/2023 1056   Admission Weight 147 kg (324 lb) Documented at 12/30/2023 1056            Physical Exam:     General Appearance:  Morbidly obese alert, cooperative, in no acute distress   Head:    Normocephalic, without obvious abnormality, atraumatic   Eyes:            Lids and lashes normal, conjunctivae and sclerae normal,   no icterus, no pallor, corneas clear, PERRLA   Ears:    Ears appear intact with no abnormalities noted   Throat:   No oral lesions, no thrush, oral mucosa moist   Neck: No JVD   Back:   No kyphosis present, no scoliosis present, no skin lesions, erythema or scars, no tenderness to percussion or                palpation, range of motion normal   Lungs:     Clear to auscultation,respirations regular, even and                unlabored    Heart:  S 1 S2 present irregular irregular rate and rhythm   Chest Wall:    No abnormalities observed   Abdomen:     Normal bowel sounds, no masses, no organomegaly, soft     nontender, nondistended, no guarding, no rebound               tenderness   Rectal:     Deferred "   Extremities:   Moves all extremities well, no edema, no cyanosis, no           redness   Pulses:   Pulses palpable and equal bilaterally   Skin:   No bleeding, bruising or rash   Lymph nodes:   No palpable adenopathy   Neurologic:   Cranial nerves 2 - 12 grossly intact, sensation intact, DTR     present and equal bilaterally             Assessment & Plan       Hypertension    Paroxysmal atrial fibrillation    Obstructive sleep apnea - Intolerant CPAP/BiPAP    Obesity, Class III, BMI 40-49.9 (morbid obesity)    Pericardial effusion    MONIQUE (acute kidney injury)    Transaminitis    Lactic acidosis    Metabolic acidosis, increased anion gap  Still in atrial fibs        I discussed the patient's findings and my recommendations with patient      Please note that portions of this note were completed with a voice recognition program. Efforts were made to edit the dictations, but words may be mistranscribed    Felipe Manzo PA-C  12/31/23  14:13 EST      --    I reviewed this documentation. I interviewed and examined this patient this afternoon.  Briefly the patient is a 78-year-old male with history of morbid obesity, hypertension, obstructive sleep apnea intolerant of CPAP and BiPAP, with recent diagnosis of atrial fibrillation on Xarelto status post cardioversion 1 week ago who presented to [facility] with large pericardial effusion and concern for possible cardiogenic shock.  The patient reports experiencing an upper respiratory infection possibly RSV or other similar viral infection recently, and was diagnosed with atrial fibrillation and underwent cardioversion.  He does endorse worsening fatigue and generalized symptoms of lethargy and lack of energy for several months, acutely worsened within the last week or 2.  On exam, he has cool extremities and distant heart sounds, he is requiring supplemental oxygen by nose, and is relatively hypotensive with systolic blood pressure in the 100 mmHg range.   He is in acute renal failure with dark brown appearing sediment in his oliguric urine.  He has had worsening liver function tests and elevated INR (which may be related to his Xarelto).  He has persistent lactic acidemia. I had a pleasant and thorough conversation with the patient and his wife regarding my concerns for possible cardiogenic shock due to tamponade.  I explained the risks of various management strategies including continued medical therapy, percutaneous pericardiocentesis, and pericardial window creation via subxiphoid or left thoracotomy.  Given the patient's multiple medical comorbidities, active anticoagulation, and heart failure, I believe it is most appropriate to proceed with percutaneous pericardiocentesis.  I discussed this with the patient's hospitalist Dr. Tilley directly, and we will plan to consult with Dr. Clarence Chaudhari pending repeat echocardiogram to confirm our suspicion for the diagnosis.      Eligio Amaya M.D., R.P.V.I.  Cardiothoracic and Vascular Surgeon  [facility]

## 2023-12-31 NOTE — PLAN OF CARE
Goal Outcome Evaluation:           Progress: no change  Outcome Evaluation: Patient has had a decent shift, awake for most of today. VSS, and patient is alert and oriented times four. No complaints of pain today. Patient will have a sweeney catheter anchored for urinary retention. Wife is at bedside. Nursing staff will continue to monitor and assess the patient.

## 2024-01-01 LAB
ALBUMIN SERPL-MCNC: 3.1 G/DL (ref 3.5–5.2)
ALBUMIN/GLOB SERPL: 1.3 G/DL
ALP SERPL-CCNC: 100 U/L (ref 39–117)
ALT SERPL W P-5'-P-CCNC: 1111 U/L (ref 1–41)
ANION GAP SERPL CALCULATED.3IONS-SCNC: 20 MMOL/L (ref 5–15)
APPEARANCE FLD: ABNORMAL
AST SERPL-CCNC: 825 U/L (ref 1–40)
BH CV VAS HEPATIC PORTAL SPLEEN LENGTH: 13.78 CM
BH CV VAS HEPATIC PORTAL VEIN DIAMETER: 1.5 CM
BH CV VAS HEPATOPORTAL HEPATIC V LT DIRECTION: NORMAL
BH CV VAS HEPATOPORTAL HEPATIC V MID DIRECTION: NORMAL
BH CV VAS HEPATOPORTAL HEPATIC V RT DIRECTION: NORMAL
BILIRUB SERPL-MCNC: 0.7 MG/DL (ref 0–1.2)
BUN SERPL-MCNC: 95 MG/DL (ref 8–23)
BUN/CREAT SERPL: 30.5 (ref 7–25)
CALCIUM SPEC-SCNC: 8 MG/DL (ref 8.6–10.5)
CHLORIDE SERPL-SCNC: 92 MMOL/L (ref 98–107)
CO2 SERPL-SCNC: 17 MMOL/L (ref 22–29)
COLOR FLD: ABNORMAL
CORTIS SERPL-MCNC: 32.41 MCG/DL
CREAT SERPL-MCNC: 3.11 MG/DL (ref 0.76–1.27)
DEPRECATED RDW RBC AUTO: 46.2 FL (ref 37–54)
EGFRCR SERPLBLD CKD-EPI 2021: 19.7 ML/MIN/1.73
ERYTHROCYTE [DISTWIDTH] IN BLOOD BY AUTOMATED COUNT: 14.4 % (ref 12.3–15.4)
GEN 5 2HR TROPONIN T REFLEX: 34 NG/L
GLOBULIN UR ELPH-MCNC: 2.3 GM/DL
GLUCOSE BLDC GLUCOMTR-MCNC: 119 MG/DL (ref 70–130)
GLUCOSE SERPL-MCNC: 117 MG/DL (ref 65–99)
HCT VFR BLD AUTO: 43.6 % (ref 37.5–51)
HGB BLD-MCNC: 14.3 G/DL (ref 13–17.7)
LYMPHOCYTES NFR FLD MANUAL: 56 %
MACROPHAGE FLUID: 2 %
MAGNESIUM SERPL-MCNC: 2.8 MG/DL (ref 1.6–2.4)
MCH RBC QN AUTO: 29.5 PG (ref 26.6–33)
MCHC RBC AUTO-ENTMCNC: 32.8 G/DL (ref 31.5–35.7)
MCV RBC AUTO: 90.1 FL (ref 79–97)
MESOTHL CELL NFR FLD MANUAL: 19 %
MONOCYTES NFR FLD: 9 %
NEUTROPHILS NFR FLD MANUAL: 14 %
PLATELET # BLD AUTO: 312 10*3/MM3 (ref 140–450)
PMV BLD AUTO: 10.9 FL (ref 6–12)
POTASSIUM SERPL-SCNC: 4.9 MMOL/L (ref 3.5–5.2)
PROT SERPL-MCNC: 5.4 G/DL (ref 6–8.5)
RBC # BLD AUTO: 4.84 10*6/MM3 (ref 4.14–5.8)
RBC # FLD AUTO: ABNORMAL /MM3
SODIUM SERPL-SCNC: 129 MMOL/L (ref 136–145)
TROPONIN T DELTA: 17 NG/L
TSH SERPL DL<=0.05 MIU/L-ACNC: 1.26 UIU/ML (ref 0.27–4.2)
WBC # FLD AUTO: ABNORMAL /MM3
WBC NRBC COR # BLD AUTO: 16.93 10*3/MM3 (ref 3.4–10.8)

## 2024-01-01 PROCEDURE — 99231 SBSQ HOSP IP/OBS SF/LOW 25: CPT | Performed by: PHYSICIAN ASSISTANT

## 2024-01-01 PROCEDURE — 82945 GLUCOSE OTHER FLUID: CPT | Performed by: INTERNAL MEDICINE

## 2024-01-01 PROCEDURE — 85027 COMPLETE CBC AUTOMATED: CPT | Performed by: HOSPITALIST

## 2024-01-01 PROCEDURE — 25010000002 CEFTRIAXONE PER 250 MG: Performed by: HOSPITALIST

## 2024-01-01 PROCEDURE — 82948 REAGENT STRIP/BLOOD GLUCOSE: CPT

## 2024-01-01 PROCEDURE — 89051 BODY FLUID CELL COUNT: CPT | Performed by: INTERNAL MEDICINE

## 2024-01-01 PROCEDURE — 83735 ASSAY OF MAGNESIUM: CPT

## 2024-01-01 PROCEDURE — 99233 SBSQ HOSP IP/OBS HIGH 50: CPT | Performed by: INTERNAL MEDICINE

## 2024-01-01 PROCEDURE — 80053 COMPREHEN METABOLIC PANEL: CPT | Performed by: HOSPITALIST

## 2024-01-01 PROCEDURE — 83615 LACTATE (LD) (LDH) ENZYME: CPT | Performed by: INTERNAL MEDICINE

## 2024-01-01 PROCEDURE — 87206 SMEAR FLUORESCENT/ACID STAI: CPT | Performed by: INTERNAL MEDICINE

## 2024-01-01 PROCEDURE — 87116 MYCOBACTERIA CULTURE: CPT | Performed by: INTERNAL MEDICINE

## 2024-01-01 PROCEDURE — 84484 ASSAY OF TROPONIN QUANT: CPT | Performed by: INTERNAL MEDICINE

## 2024-01-01 PROCEDURE — 84157 ASSAY OF PROTEIN OTHER: CPT | Performed by: INTERNAL MEDICINE

## 2024-01-01 PROCEDURE — 97530 THERAPEUTIC ACTIVITIES: CPT

## 2024-01-01 PROCEDURE — 87205 SMEAR GRAM STAIN: CPT | Performed by: INTERNAL MEDICINE

## 2024-01-01 PROCEDURE — 82042 OTHER SOURCE ALBUMIN QUAN EA: CPT | Performed by: INTERNAL MEDICINE

## 2024-01-01 PROCEDURE — 25810000003 SODIUM CHLORIDE 0.9 % SOLUTION: Performed by: HOSPITALIST

## 2024-01-01 PROCEDURE — 87070 CULTURE OTHR SPECIMN AEROBIC: CPT | Performed by: INTERNAL MEDICINE

## 2024-01-01 RX ORDER — NITROGLYCERIN 0.4 MG/1
0.4 TABLET SUBLINGUAL
Status: DISCONTINUED | OUTPATIENT
Start: 2024-01-01 | End: 2024-01-06 | Stop reason: HOSPADM

## 2024-01-01 RX ORDER — FINASTERIDE 5 MG/1
5 TABLET, FILM COATED ORAL DAILY
Status: DISCONTINUED | OUTPATIENT
Start: 2024-01-01 | End: 2024-01-06 | Stop reason: HOSPADM

## 2024-01-01 RX ORDER — BISACODYL 5 MG/1
5 TABLET, DELAYED RELEASE ORAL DAILY PRN
Status: DISCONTINUED | OUTPATIENT
Start: 2024-01-01 | End: 2024-01-06 | Stop reason: HOSPADM

## 2024-01-01 RX ORDER — FLUTICASONE FUROATE AND VILANTEROL 200; 25 UG/1; UG/1
1 POWDER RESPIRATORY (INHALATION)
Status: DISCONTINUED | OUTPATIENT
Start: 2024-01-01 | End: 2024-01-01

## 2024-01-01 RX ORDER — ONDANSETRON 2 MG/ML
4 INJECTION INTRAMUSCULAR; INTRAVENOUS EVERY 6 HOURS PRN
Status: DISCONTINUED | OUTPATIENT
Start: 2024-01-01 | End: 2024-01-06 | Stop reason: HOSPADM

## 2024-01-01 RX ORDER — SODIUM CHLORIDE 9 MG/ML
40 INJECTION, SOLUTION INTRAVENOUS AS NEEDED
Status: DISCONTINUED | OUTPATIENT
Start: 2024-01-01 | End: 2024-01-06 | Stop reason: HOSPADM

## 2024-01-01 RX ORDER — AMOXICILLIN 250 MG
2 CAPSULE ORAL 2 TIMES DAILY
Status: DISCONTINUED | OUTPATIENT
Start: 2024-01-01 | End: 2024-01-06 | Stop reason: HOSPADM

## 2024-01-01 RX ORDER — SODIUM CHLORIDE 0.9 % (FLUSH) 0.9 %
10 SYRINGE (ML) INJECTION EVERY 12 HOURS SCHEDULED
Status: DISCONTINUED | OUTPATIENT
Start: 2024-01-01 | End: 2024-01-06 | Stop reason: HOSPADM

## 2024-01-01 RX ORDER — BISACODYL 10 MG
10 SUPPOSITORY, RECTAL RECTAL DAILY PRN
Status: DISCONTINUED | OUTPATIENT
Start: 2024-01-01 | End: 2024-01-06 | Stop reason: HOSPADM

## 2024-01-01 RX ORDER — POLYETHYLENE GLYCOL 3350 17 G/17G
17 POWDER, FOR SOLUTION ORAL DAILY PRN
Status: DISCONTINUED | OUTPATIENT
Start: 2024-01-01 | End: 2024-01-06 | Stop reason: HOSPADM

## 2024-01-01 RX ORDER — ASCORBIC ACID 500 MG
1000 TABLET ORAL DAILY
Status: DISCONTINUED | OUTPATIENT
Start: 2024-01-01 | End: 2024-01-06 | Stop reason: HOSPADM

## 2024-01-01 RX ORDER — MONTELUKAST SODIUM 10 MG/1
10 TABLET ORAL NIGHTLY
Status: DISCONTINUED | OUTPATIENT
Start: 2024-01-01 | End: 2024-01-06 | Stop reason: HOSPADM

## 2024-01-01 RX ORDER — SODIUM CHLORIDE 0.9 % (FLUSH) 0.9 %
10 SYRINGE (ML) INJECTION AS NEEDED
Status: DISCONTINUED | OUTPATIENT
Start: 2024-01-01 | End: 2024-01-06 | Stop reason: HOSPADM

## 2024-01-01 RX ORDER — BUDESONIDE AND FORMOTEROL FUMARATE DIHYDRATE 160; 4.5 UG/1; UG/1
2 AEROSOL RESPIRATORY (INHALATION)
Status: DISCONTINUED | OUTPATIENT
Start: 2024-01-01 | End: 2024-01-06 | Stop reason: HOSPADM

## 2024-01-01 RX ORDER — CHOLECALCIFEROL (VITAMIN D3) 125 MCG
5 CAPSULE ORAL NIGHTLY PRN
Status: DISCONTINUED | OUTPATIENT
Start: 2024-01-01 | End: 2024-01-06 | Stop reason: HOSPADM

## 2024-01-01 RX ORDER — TAMSULOSIN HYDROCHLORIDE 0.4 MG/1
0.4 CAPSULE ORAL DAILY
Status: DISCONTINUED | OUTPATIENT
Start: 2024-01-01 | End: 2024-01-06 | Stop reason: HOSPADM

## 2024-01-01 RX ORDER — ALBUTEROL SULFATE 2.5 MG/3ML
2.5 SOLUTION RESPIRATORY (INHALATION) EVERY 4 HOURS PRN
Status: DISCONTINUED | OUTPATIENT
Start: 2024-01-01 | End: 2024-01-06 | Stop reason: HOSPADM

## 2024-01-01 RX ADMIN — BUDESONIDE AND FORMOTEROL FUMARATE DIHYDRATE 2 PUFF: 160; 4.5 AEROSOL RESPIRATORY (INHALATION) at 19:25

## 2024-01-01 RX ADMIN — OXYCODONE HYDROCHLORIDE AND ACETAMINOPHEN 1000 MG: 500 TABLET ORAL at 10:33

## 2024-01-01 RX ADMIN — MONTELUKAST 10 MG: 10 TABLET, FILM COATED ORAL at 21:45

## 2024-01-01 RX ADMIN — SENNOSIDES AND DOCUSATE SODIUM 2 TABLET: 8.6; 5 TABLET ORAL at 10:34

## 2024-01-01 RX ADMIN — DOXYCYCLINE 100 MG: 100 CAPSULE ORAL at 21:45

## 2024-01-01 RX ADMIN — FINASTERIDE 5 MG: 5 TABLET, FILM COATED ORAL at 10:34

## 2024-01-01 RX ADMIN — SOTALOL HYDROCHLORIDE 120 MG: 80 TABLET ORAL at 17:39

## 2024-01-01 RX ADMIN — Medication 10 ML: at 21:49

## 2024-01-01 RX ADMIN — CEFTRIAXONE 2000 MG: 2 INJECTION, POWDER, FOR SOLUTION INTRAMUSCULAR; INTRAVENOUS at 08:27

## 2024-01-01 RX ADMIN — TAMSULOSIN HYDROCHLORIDE 0.4 MG: 0.4 CAPSULE ORAL at 10:34

## 2024-01-01 RX ADMIN — SENNOSIDES AND DOCUSATE SODIUM 2 TABLET: 8.6; 5 TABLET ORAL at 21:45

## 2024-01-01 RX ADMIN — Medication 10 ML: at 10:34

## 2024-01-01 RX ADMIN — DOXYCYCLINE 100 MG: 100 CAPSULE ORAL at 10:32

## 2024-01-01 RX ADMIN — SODIUM CHLORIDE 100 ML/HR: 9 INJECTION, SOLUTION INTRAVENOUS at 01:17

## 2024-01-01 NOTE — PROGRESS NOTES
I was called by Dr. Amaya to evaluate this patient for pericardiocentesis.  He had been contacted by the hospitalist urgently this evening due to oliguria, increasing LFTs and worsening lactic acidosis.  Patient appears to have multiorgan failure due to shock.  His blood pressure is 90 systolic, which is decreasing over the course of the today.  Brief history, patient presented last week for A-fib RVR, he underwent cardioversion and was continued on sotalol and Eliquis.  Presented with worsening weakness and shortness of breath.  I reviewed the imaging including CT and stat echocardiogram with Dr. Amaya, we both agree he has a very large pericardial effusion measuring at maximum diameter 5.45 cm.  With early signs of possible tamponade given easy collapsibility of the RA and RV, certainly appear compromised.  I evaluated the patient at the bedside, discussed the options of pericardial window versus pericardiocentesis versus no treatment at all, he understands the risk benefits and alternatives in detail.  Specifically there is high risk for bleeding due to anticoagulated status although he is receiving prothrombin complex ordered by hospitalist team.  He is morbidly obese with a BMI of 47 which complicates any surgical procedure.  Will complicate any surgical procedure and makes him high risk for morbidity mortality.  Despite this, I feel at this time he is becoming increasingly hypotensive and showing signs of endorgan failure, therefore it is reasonable to proceed with pericardiocentesis.  I discussed the case with Dr. Bradley in the ICU will receive the patient after the procedure and assist with further workup and management.    Addendum:  [id number]  Successful pericardiocentesis via subxiphoid approach, with 950 mL of serosanguineous fluid removed, 60 mL sent for routine diagnostics.  Patient tolerated procedure well, drain was sewn in place and will be left in place until less than 50 mL per 24 hrs. limited echo  1/1/2024 to reassess effusion.    Critical Care time spent in direct patient care:    40 minutes (excluding procedure time, if applicable) including high complexity decision making to assess and treat vital organ system failure in this individual who has impairment of one or more vital organ systems such that there is a high probability of imminent or life threatening deterioration in the patient’s condition. Failure to initiate the above interventions on an urgent basis would likely result in sudden, clinically significant or life threatening deterioration in the patient's condition.      Clarence Chaudhari MD, [facility], [facility]  Interventional Cardiology  12/31/23  22:06 EST

## 2024-01-01 NOTE — PROGRESS NOTES
"Critical Care Note     LOS: 2 days   Patient Care Team:  Sierra Kuo MD as PCP - General (Internal Medicine)  Latasha Poe MD as Consulting Physician (Dermatopathology)  Provider, No Known  Camille Mccord APRN as Nurse Practitioner (Pulmonary Disease)  Jaja Mansfield APRN as Nurse Practitioner (Cardiology)    Chief Complaint/Reason for visit:    Chief Complaint   Patient presents with    Shortness of Breath   Pericardial effusion  Acute kidney injury  Atrial fibrillation  Moderate chronic obstructive asthma      Subjective     Interval History:     Patient underwent a pericardiocentesis with initial 950 mL removed.  Systolic blood pressure improved and this morning is 109.  On 2 L a saturation of 96%.  He has had an additional 175 mL since midnight.  Urine output 875 mL overnight.  Serum creatinine remains elevated at 3.11.  Currently denies chest pain, nausea or vomiting.  He is in sinus bradycardia.  He is still feeling short of air.    Review of Systems:    All systems were reviewed and negative except as noted in subjective.    Medical history, surgical history, social history, family history reviewed    Objective     Intake/Output:    Intake/Output Summary (Last 24 hours) at 1/1/2024 0941  Last data filed at 1/1/2024 0600  Gross per 24 hour   Intake 470 ml   Output 1050 ml   Net -580 ml       Nutrition:  Diet: Regular/House Diet, Cardiac Diets, Renal Diets; Healthy Heart (2-3 Na+); Low Potassium, Low Phosphorus, Low Sodium (2-3g); Texture: Regular Texture (IDDSI 7); Fluid Consistency: Thin (IDDSI 0)    Infusions:  sodium chloride, 100 mL/hr, Last Rate: 100 mL/hr (01/01/24 0117)          Telemetry: Sinus bradycardia             Vital Signs  Blood pressure 109/63, pulse 60, temperature 97.6 °F (36.4 °C), temperature source Axillary, resp. rate 18, height 185.4 cm (73\"), weight (!) 161 kg (356 lb), SpO2 96%.    Physical Exam:  General Appearance:  Overweight older gentleman supine in bed   Head: " " Atraumatic   Eyes:          No jaundice   Ears:     Throat: Oral mucosa moist   Neck: Large neck, trachea midline, no crepitus   Back:      Lungs:   Breath sounds are bilateral, shallow, with scattered expiratory rhonchi    Heart:  Slow rate, regular, S1, S2 auscultated, pericardial drain in place   Abdomen:   Large abdomen, bowel sounds present, soft   Rectal:   Deferred   Extremities: 2+ edema bilateral pretibial   Pulses:    Skin: Warm and dry   Lymph nodes: No cervical adenopathy   Neurologic: Oriented x 3, speech fluent,  equal      Results Review:     I reviewed the patient's new clinical results.   Results from last 7 days   Lab Units 01/01/24  0522 12/31/23 2052 12/31/23  0304   SODIUM mmol/L 129* 129* 129*   POTASSIUM mmol/L 4.9 5.2 5.0   CHLORIDE mmol/L 92* 89* 91*   CO2 mmol/L 17.0* 13.0* 17.0*   BUN mg/dL 95* 91* 75*   CREATININE mg/dL 3.11* 3.37* 2.73*   CALCIUM mg/dL 8.0* 8.3* 8.5*   BILIRUBIN mg/dL 0.7 1.0 1.3*   ALK PHOS U/L 100 109 96   ALT (SGPT) U/L 1,111* 1,358* 1,426*   AST (SGOT) U/L 825* 1,488* 3,069*   GLUCOSE mg/dL 117* 141* 154*     Results from last 7 days   Lab Units 01/01/24  0522 12/31/23 2052 12/31/23  0304   WBC 10*3/mm3 16.93* 19.24* 21.55*   HEMOGLOBIN g/dL 14.3 14.8 14.2   HEMATOCRIT % 43.6 44.6 43.3   PLATELETS 10*3/mm3 312 374 375         Lab Results   Component Value Date    BLOODCX No growth at 24 hours 12/30/2023     No results found for: \"URINECX\"    I reviewed the patient's new imaging including images and reports.    Renal ultrasound, normal-sized kidneys renal cyst present.  No hydronephrosis      Interpretation Summary         Left ventricular systolic function is normal. Left ventricular ejection fraction appears to be 61 - 65%.    Preintervention, large pericardial effusion with early signs of tamponade    Post intervention (950 mL serosanguineous fluid removal) trivial pericardial effusion remains, with normal expansion of the RA and RV.    All medications " reviewed.   amLODIPine, 10 mg, Oral, Daily  ascorbic acid, 1,000 mg, Oral, Daily  cefTRIAXone, 2,000 mg, Intravenous, Q24H  doxycycline, 100 mg, Oral, Q12H  finasteride, 5 mg, Oral, Daily  Fluticasone Furoate-Vilanterol, 1 puff, Inhalation, Daily - RT  montelukast, 10 mg, Oral, Nightly  senna-docusate sodium, 2 tablet, Oral, BID  sodium chloride, 10 mL, Intravenous, Q12H  sotalol, 120 mg, Oral, Q24H  tamsulosin, 0.4 mg, Oral, Daily          Assessment & Plan       Hypertension    Paroxysmal atrial fibrillation    Obstructive sleep apnea - Intolerant CPAP/BiPAP    Chronic persistent asthma    Obesity, Class III, BMI 40-49.9 (morbid obesity)    Pericardial effusion    MONIQUE (acute kidney injury)    Transaminitis    Lactic acidosis    78-year-old gentleman with a history of hypertension, paroxysmal atrial fibrillation, sleep apnea intolerant of CPAP, chronic persistent asthma, discharged December 27 after electrical cardioversion and readmitted with acute kidney injury productive cough with recent rhinovirus infection and a new pericardial effusion.  Repeat echocardiogram revealed a large pericardial effusion and on December 31 he underwent pericardiocentesis with 950 mL removed.    #1 pericardial effusion status post pericardiocentesis December 31.  Fluid was bloody with over 5 million red cells, white cells 13,000, 56% lymphocytes.  Gram stain had no organisms.  AFB and fungal assessment is pending.  Follow-up echocardiogram revealed no significant residual effusion.    #2 paroxysmal atrial fibrillation, recently discharged on sotalol and Eliquis, after electrical cardioversion on December 26, 2023.  He is currently in sinus bradycardia    #3 acute kidney injury, creatinine today is 3.11, compared to 3.37 last night.  Baseline creatinine is 1.04.  Urine output yesterday 875 mL.    #4 recent viral respiratory illness, swab positive for rhinovirus on December 25.  Follow-up swab on December 30 is negative.  He does have  a known history of chronic persistent asthma.  He is on Dupixent every 2 weeks, Breo as a maintenance inhaler and has an albuterol rescue inhaler.      PLAN:    Monitor pericardial drainage  Follow-up pericardial cultures and cytology  Currently on Rocephin and doxycycline for possible respiratory illness  Continue Breo 1 puff daily 200 mcg  Nebulized bronchodilators as needed    Amlodipine, hold for now   Flomax  Sotalol  Hold anticoagulation    Initiate diet  Lock IV fluids      VTE Prophylaxis:SCDS    Stress Ulcer Prophylaxis: none    Gabriela Block MD  01/01/24  09:41 EST      Time: Critical care 30 min  I personally provided care to this critically ill patient as documented above.  Critical care time does not include time spent on separately billed procedures.  None of my critical care time was concurrent with other critical care providers.

## 2024-01-01 NOTE — SIGNIFICANT NOTE
Called to see patient for ongoing oliguria, shortness of breath and increasing LFTs and lactic acid.  Temp:  [97.4 °F (36.3 °C)-98.7 °F (37.1 °C)] 98.4 °F (36.9 °C)  Heart Rate:  [61-73] 65  Resp:  [16-22] 18  BP: ()/(56-79) 104/72  Flow (L/min):  [2] 2  No significant urine output all day.  Patient awake, short of breath at rest.    Discussed case with Dr Amaya of CT surgery who will evaluate him for emergent pericardiocentesis.  Discussed with pharmacy and ordered KCentra for anticoagulant reversal.  ICU and [facility] aware of possible transfer.    45 minutes of critical care provided.  Melinda Tilley MD 12/31/23 21:39 EST

## 2024-01-01 NOTE — PROGRESS NOTES
" LOS: 2 days   Patient Care Team:  Sierra Kuo MD as PCP - General (Internal Medicine)  Latasha Poe MD as Consulting Physician (Dermatopathology)  Provider, No Known  Camille Mccord APRN as Nurse Practitioner (Pulmonary Disease)  Jaja Mansfield APRN as Nurse Practitioner (Cardiology)    Chief Complaint: MONIQUE    Subjective         Subjective:  Symptoms:  Improved.  No shortness of breath, chest pain or chest pressure.        History taken from: patient    Objective     Vital Sign Min/Max for last 24 hours  Temp  Min: 97.6 °F (36.4 °C)  Max: 98.4 °F (36.9 °C)   BP  Min: 88/65  Max: 132/78   Pulse  Min: 58  Max: 91   Resp  Min: 15  Max: 18   SpO2  Min: 90 %  Max: 99 %   Flow (L/min)  Min: 2  Max: 2   Weight  Min: 161 kg (356 lb)  Max: 161 kg (356 lb)     Flowsheet Rows      Flowsheet Row First Filed Value   Admission Height 185.4 cm (73\") Documented at 12/30/2023 1056   Admission Weight 147 kg (324 lb) Documented at 12/30/2023 1056            I/O this shift:  In: 370 [P.O.:370]  Out: -   I/O last 3 completed shifts:  In: 470 [I.V.:470]  Out: 1150 [Urine:975; Drains:175]    Objective:  General Appearance:  Comfortable.    Vital signs: (most recent): Blood pressure 109/63, pulse 60, temperature 97.6 °F (36.4 °C), temperature source Axillary, resp. rate 18, height 185.4 cm (73\"), weight (!) 161 kg (356 lb), SpO2 96%.  Vital signs are normal.    Output: Producing urine.    HEENT: Normal HEENT exam.    Lungs:  Normal effort, normal respiratory rate and increased effort.  Breath sounds clear to auscultation.    Heart: Normal rate.  Regular rhythm.  S1 normal and S2 normal.    Abdomen: Abdomen is soft.    Extremities: There is dependent edema.    Neurological: Patient is alert and oriented to person, place and time.  Normal strength.    Pupils:  Pupils are equal, round, and reactive to light.    Skin:  Warm.                Results Review:     I reviewed the patient's new clinical results.    WBC WBC   Date " "Value Ref Range Status   01/01/2024 16.93 (H) 3.40 - 10.80 10*3/mm3 Final   12/31/2023 19.24 (H) 3.40 - 10.80 10*3/mm3 Final   12/31/2023 21.55 (H) 3.40 - 10.80 10*3/mm3 Final   12/30/2023 20.86 (H) 3.40 - 10.80 10*3/mm3 Final      HGB Hemoglobin   Date Value Ref Range Status   01/01/2024 14.3 13.0 - 17.7 g/dL Final   12/31/2023 14.8 13.0 - 17.7 g/dL Final   12/31/2023 14.2 13.0 - 17.7 g/dL Final   12/30/2023 15.3 13.0 - 17.7 g/dL Final      HCT Hematocrit   Date Value Ref Range Status   01/01/2024 43.6 37.5 - 51.0 % Final   12/31/2023 44.6 37.5 - 51.0 % Final   12/31/2023 43.3 37.5 - 51.0 % Final   12/30/2023 46.8 37.5 - 51.0 % Final      Platlets No results found for: \"LABPLAT\"   MCV MCV   Date Value Ref Range Status   01/01/2024 90.1 79.0 - 97.0 fL Final   12/31/2023 90.3 79.0 - 97.0 fL Final   12/31/2023 88.4 79.0 - 97.0 fL Final   12/30/2023 90.2 79.0 - 97.0 fL Final          Sodium Sodium   Date Value Ref Range Status   01/01/2024 129 (L) 136 - 145 mmol/L Final   12/31/2023 129 (L) 136 - 145 mmol/L Final   12/31/2023 129 (L) 136 - 145 mmol/L Final   12/30/2023 131 (L) 136 - 145 mmol/L Final      Potassium Potassium   Date Value Ref Range Status   01/01/2024 4.9 3.5 - 5.2 mmol/L Final     Comment:     Slight hemolysis detected by analyzer. Result may be falsely elevated.   12/31/2023 5.2 3.5 - 5.2 mmol/L Final   12/31/2023 5.0 3.5 - 5.2 mmol/L Final   12/30/2023 4.7 3.5 - 5.2 mmol/L Final     Comment:     Slight hemolysis detected by analyzer. Result may be falsely elevated.      Chloride Chloride   Date Value Ref Range Status   01/01/2024 92 (L) 98 - 107 mmol/L Final   12/31/2023 89 (L) 98 - 107 mmol/L Final   12/31/2023 91 (L) 98 - 107 mmol/L Final   12/30/2023 91 (L) 98 - 107 mmol/L Final      CO2 CO2   Date Value Ref Range Status   01/01/2024 17.0 (L) 22.0 - 29.0 mmol/L Final   12/31/2023 13.0 (L) 22.0 - 29.0 mmol/L Final   12/31/2023 17.0 (L) 22.0 - 29.0 mmol/L Final   12/30/2023 24.0 22.0 - 29.0 mmol/L " "Final      BUN BUN   Date Value Ref Range Status   01/01/2024 95 (H) 8 - 23 mg/dL Final   12/31/2023 91 (H) 8 - 23 mg/dL Final   12/31/2023 75 (H) 8 - 23 mg/dL Final   12/30/2023 69 (H) 8 - 23 mg/dL Final      Creatinine Creatinine   Date Value Ref Range Status   01/01/2024 3.11 (H) 0.76 - 1.27 mg/dL Final   12/31/2023 3.37 (H) 0.76 - 1.27 mg/dL Final   12/31/2023 2.73 (H) 0.76 - 1.27 mg/dL Final   12/30/2023 2.30 (H) 0.76 - 1.27 mg/dL Final      Calcium Calcium   Date Value Ref Range Status   01/01/2024 8.0 (L) 8.6 - 10.5 mg/dL Final   12/31/2023 8.3 (L) 8.6 - 10.5 mg/dL Final   12/31/2023 8.5 (L) 8.6 - 10.5 mg/dL Final   12/30/2023 9.1 8.6 - 10.5 mg/dL Final      PO4 No results found for: \"CAPO4\"   Albumin Albumin   Date Value Ref Range Status   01/01/2024 3.1 (L) 3.5 - 5.2 g/dL Final   12/31/2023 3.5 3.5 - 5.2 g/dL Final   12/31/2023 3.5 3.5 - 5.2 g/dL Final   12/30/2023 3.7 3.5 - 5.2 g/dL Final      Magnesium Magnesium   Date Value Ref Range Status   01/01/2024 2.8 (H) 1.6 - 2.4 mg/dL Final   12/31/2023 2.9 (H) 1.6 - 2.4 mg/dL Final      Uric Acid No results found for: \"URICACID\"     Medication Review: yes    Assessment & Plan       Hypertension    Paroxysmal atrial fibrillation    Obstructive sleep apnea - Intolerant CPAP/BiPAP    Chronic persistent asthma    Obesity, Class III, BMI 40-49.9 (morbid obesity)    Pericardial effusion    MONIQUE (acute kidney injury)    Transaminitis    Lactic acidosis      Assessment & Plan    Acute kidney injury:  Baseline cr ~ 0.5 mg/dl Cr 1.3>2.3>2.7  on this admission.Risk factor for MONIQUE: Pericardial effusion, recent initiation of diuretics, possible sepsis. UA large blood noted.      Pericardial effusion: Large circumferential pericardial effusion noted on last visit 12/26. Presenting back with worsening PALM and SOB. Underwent pericardiocentesis w 1.2 liter fluid removed. Sent for analysis      Transaminitis: Elevated LFT, INR 2.1. Congestive hepatopathy??     Lactic acidosis: " Unclear etiology. Sepsis workup on going. On abx. Blood cx pending. Repeat lactate      Hyponatremia: Due to recent HCTZ use and MONIQUE. Continue to monitor     Hyperphosphatemia: phos 7.8. Monitor for now.      Recs  Expect improvement in renal function now that pericardial effusion is drained. Patient has dependent edema need diuretic soon. Wife believes torsemide made hematuria worse ( Based her on research). It is listed as allergy in the chart. I tried to reassure and clarify this is not an allergy   Limit fluid intake to 1.2 liter/day  Abx per primary service.   Strict I/O.      High risk for decompensation.           Checo Cardozo MD  01/01/24  13:18 EST

## 2024-01-01 NOTE — THERAPY TREATMENT NOTE
Patient Name: Luis Perez Jr.  : 1945    MRN: [id number]                              Today's Date: 2024       Admit Date: 2023    Visit Dx:     ICD-10-CM ICD-9-CM   1. Acute kidney injury  N17.9 584.9   2. Generalized weakness  R53.1 780.79   3. Pericardial effusion  I31.39 423.9   4. Leukocytosis, unspecified type  D72.829 288.60   5. Elevated LFTs  R79.89 790.6   6. Hematuria, unspecified type  R31.9 599.70     Patient Active Problem List   Diagnosis    Hypertension    Paroxysmal atrial fibrillation    Snoring    Obstructive sleep apnea - Intolerant CPAP/BiPAP    Chronic persistent asthma    Non-smoker    Obesity, Class III, BMI 40-49.9 (morbid obesity)    Perennial rhinitis    Vitamin D deficiency    Rhinovirus    Pericardial effusion    MONIQUE (acute kidney injury)    Transaminitis    Lactic acidosis     Past Medical History:   Diagnosis Date    Asthma     Bronchitis, chronic     Cancer     Skin     Cellulitis and abscess of right leg     Chronic persistent asthma 2020    Hyperlipidemia     Hypertension     Nephrolithiasis     Obesity, Class III, BMI 40-49.9 (morbid obesity) 2020    Paroxysmal atrial fibrillation 07/15/2019    Pneumonia     Sleep apnea     UTI (urinary tract infection)     Vitamin D deficiency 2020     Past Surgical History:   Procedure Laterality Date    CARDIOVERSION  2019    COLONOSCOPY      NASAL SEPTUM SURGERY      Deviation Repair    SKIN CANCER EXCISION      TONSILLECTOMY      VASECTOMY        General Information       Row Name 24 1135          Physical Therapy Time and Intention    Document Type therapy note (daily note)  -SANTA     Mode of Treatment physical therapy  -SANTA       Row Name 24 1135          General Information    Patient Profile Reviewed yes  -SANTA     Existing Precautions/Restrictions fall;oxygen therapy device and L/min;orthostatic hypotension  -SANTA     Barriers to Rehab medically complex  -SANTA       Row Name 24 1138           Living Environment    People in Home spouse  -SANTA       Row Name 01/01/24 1135          Cognition    Orientation Status (Cognition) oriented x 4  -SANTA       Row Name 01/01/24 1135          Safety Issues, Functional Mobility    Safety Issues Affecting Function (Mobility) safety precautions follow-through/compliance;safety precaution awareness  -SANTA     Impairments Affecting Function (Mobility) balance;endurance/activity tolerance;shortness of breath;strength  -SANTA               User Key  (r) = Recorded By, (t) = Taken By, (c) = Cosigned By      Initials Name Provider Type    SANTA Zo Zendejas PT Physical Therapist                   Mobility       Row Name 01/01/24 1136          Bed Mobility    Bed Mobility rolling left;rolling right;scooting/bridging;supine-sit  -SANTA     Rolling Left [place] (Bed Mobility) moderate assist (50% patient effort)  -SANTA     Rolling Right [place] (Bed Mobility) moderate assist (50% patient effort)  -SANTA     Scooting/Bridging [place] (Bed Mobility) moderate assist (50% patient effort)  -SANTA     Supine-Sit [place] (Bed Mobility) moderate assist (50% patient effort)  -SANTA     Comment, (Bed Mobility) cues for sequencing  -SANTA       Row Name 01/01/24 1136          Bed-Chair Transfer    Bed-Chair [place] (Transfers) minimum assist (75% patient effort)  -SANTA     Assistive Device (Bed-Chair Transfers) walker, front-wheeled  -SANTA       Row Name 01/01/24 1136          Sit-Stand Transfer    Sit-Stand [place] (Transfers) minimum assist (75% patient effort)  -SANTA     Assistive Device (Sit-Stand Transfers) walker, front-wheeled  -SANTA       Row Name 01/01/24 1136          Gait/Stairs (Locomotion)    [place] Level (Gait) minimum assist (75% patient effort)  -SANTA     Assistive Device (Gait) walker, front-wheeled  -SANTA     Distance in Feet (Gait) 120  -SANTA     Deviations/Abnormal Patterns (Gait) base of support, wide;stride length decreased;jaime decreased  -SANTA     Bilateral  Gait Deviations forward flexed posture  -SANTA     Comment, (Gait/Stairs) patient needs cues for posture and to step closer to the walker chair behind for safety patient wears a mask while in the hallway   -SANTA               User Key  (r) = Recorded By, (t) = Taken By, (c) = Cosigned By      Initials Name Provider Type    Zo Deal, PT Physical Therapist                   Obj/Interventions       Row Name 01/01/24 1137          Balance    Balance Assessment sitting static balance;sitting dynamic balance;standing static balance;standing dynamic balance  -SANTA     Static Sitting Balance independent  -SANTA     Dynamic Sitting Balance independent  -SANTA     Position, Sitting Balance unsupported;sitting edge of bed  -SANTA     Static Standing Balance minimal assist  -SANTA     Dynamic Standing Balance minimal assist  -SANTA     Position/Device Used, Standing Balance supported;walker, front-wheeled  -SANTA               User Key  (r) = Recorded By, (t) = Taken By, (c) = Cosigned By      Initials Name Provider Type    Zo Deal, ISABELL Physical Therapist                   Goals/Plan       Row Name 01/01/24 1140          Therapy Assessment/Plan (PT)    Planned Therapy Interventions (PT) balance training;bed mobility training;gait training;home exercise program;strengthening;transfer training  -SANTA               User Key  (r) = Recorded By, (t) = Taken By, (c) = Cosigned By      Initials Name Provider Type    Zo Deal PT Physical Therapist                   Clinical Impression       Row Name 01/01/24 1138          Pain    Pretreatment Pain Rating 4/10  -SANTA     Posttreatment Pain Rating 4/10  -SANTA     Pain Location generalized  -SANTA     Pain Intervention(s) Repositioned;Ambulation/increased activity  -SANTA       Row Name 01/01/24 1138          Plan of Care Review    Plan of Care Reviewed With patient;spouse  -SANTA     Progress improving  -SANTA     Outcome Evaluation patient was able to increase ambulation to 120 ft with  chair brought behind him for safety. patient has stable vitals with activity and is making progress toward all mobility goals we will continue to progress activity as tolerated  -SANTA       Row Name 01/01/24 1138          Therapy Assessment/Plan (PT)    Patient/Family Therapy Goals Statement (PT) return to PLOF  -SANTA     Rehab Potential (PT) good, to achieve stated therapy goals  -SANTA     Criteria for Skilled Interventions Met (PT) yes;skilled treatment is necessary  -SANTA     Therapy Frequency (PT) daily  -SANTA       Row Name 01/01/24 1138          Vital Signs    Pre Systolic BP Rehab 104  -SANTA     Pre Treatment Diastolic BP 60  -SANTA     Post Systolic BP Rehab 114  -SANTA     Post Treatment Diastolic BP 64  -SANTA     Pretreatment Heart Rate (beats/min) 62  -SANTA     Posttreatment Heart Rate (beats/min) 74  -SANTA     Pre SpO2 (%) 95  -SANTA     O2 Delivery Pre Treatment nasal cannula  -SANTA     Post SpO2 (%) 94  -SANTA     O2 Delivery Post Treatment nasal cannula  -SANTA     Pre Patient Position Supine  -SANTA     Intra Patient Position Standing  -SANTA     Post Patient Position Sitting  -SANTA       Row Name 01/01/24 1138          Positioning and Restraints    Pre-Treatment Position in bed  -SANTA     Post Treatment Position chair  -SANTA     In Chair notified nsg;reclined;sitting;call light within reach;encouraged to call for assist;with nsg;with family/caregiver  -SANTA               User Key  (r) = Recorded By, (t) = Taken By, (c) = Cosigned By      Initials Name Provider Type    Zo Deal, PT Physical Therapist                   Outcome Measures       Row Name 01/01/24 1141          How much help from another person do you currently need...    Turning from your back to your side while in flat bed without using bedrails? 3  -SANTA     Moving from lying on back to sitting on the side of a flat bed without bedrails? 3  -SANTA     Moving to and from a bed to a chair (including a wheelchair)? 3  -SANTA     Standing up from a chair using your arms (e.g.,  wheelchair, bedside chair)? 3  -SANTA     Climbing 3-5 steps with a railing? 2  -SANTA     To walk in hospital room? 3  -SANTA     AM-PAC 6 Clicks Score (PT) 17  -SANTA     Highest Level of Mobility Goal 5 --> Static standing  -SANTA               User Key  (r) = Recorded By, (t) = Taken By, (c) = Cosigned By      Initials Name Provider Type    SANTA Zo Zendejas, PT Physical Therapist                                 Physical Therapy Education       Title: PT OT SLP Therapies (In Progress)       Topic: Physical Therapy (In Progress)       Point: Mobility training (In Progress)       Learning Progress Summary             Patient Acceptance, E, NR by SANTA at 1/1/2024 1055    Acceptance, E, NR by KG at 12/31/2023 0945                         Point: Home exercise program (In Progress)       Learning Progress Summary             Patient Acceptance, E, NR by SANTA at 1/1/2024 1055                         Point: Body mechanics (In Progress)       Learning Progress Summary             Patient Acceptance, E, NR by SANTA at 1/1/2024 1055    Acceptance, E, NR by KG at 12/31/2023 0945                         Point: Precautions (In Progress)       Learning Progress Summary             Patient Acceptance, E, NR by SANTA at 1/1/2024 1055    Acceptance, E, NR by KG at 12/31/2023 0945                                         User Key       Initials Effective Dates Name Provider Type Discipline    SANTA 02/03/23 -  Zo Zendejas, PT Physical Therapist PT    KG 05/22/20 -  Tiki Parker PT Physical Therapist PT                  PT Recommendation and Plan  Planned Therapy Interventions (PT): balance training, bed mobility training, gait training, home exercise program, strengthening, transfer training  Plan of Care Reviewed With: patient, spouse  Progress: improving  Outcome Evaluation: patient was able to increase ambulation to 120 ft with chair brought behind him for safety. patient has stable vitals with activity and is making progress toward  all mobility goals we will continue to progress activity as tolerated     Time Calculation:         PT Charges       Row Name 01/01/24 1141             Time Calculation    Start Time 1055  -SANTA      PT Received On 01/01/24  -SANTA      PT Goal Re-Cert Due Date 01/10/24  -SANTA         Time Calculation- PT    Total Timed Code Minutes- PT 24 minute(s)  -SANTA         Timed Charges    87043 - PT Therapeutic Activity Minutes 24  -SANTA         Total Minutes    Timed Charges Total Minutes 24  -SANTA       Total Minutes 24  -SANTA                User Key  (r) = Recorded By, (t) = Taken By, (c) = Cosigned By      Initials Name Provider Type    Zo Deal, PT Physical Therapist                  Therapy Charges for Today       Code Description Service Date Service Provider Modifiers Qty    04219152995 HC PT THERAPEUTIC ACT EA 15 MIN 1/1/2024 Zo Zendejas PT GP 2            PT G-Codes  Outcome Measure Options: AM-PAC 6 Clicks Basic Mobility (PT)  AM-PAC 6 Clicks Score (PT): 17  AM-PAC 6 Clicks Score (OT): 15  PT Discharge Summary  Anticipated Discharge Disposition (PT): inpatient rehabilitation facility    Zo Zendejas, ISABELL  1/1/2024

## 2024-01-01 NOTE — PROGRESS NOTES
Cardiothoracic Surgery Progress Note      CC: pericardial effusion     LOS: 2 days      Subjective:  Dr. Chaudhari drained 950mL yesterday, drain in place.  Cough continues    Objective:  Vital Signs  Temp:  [97.4 °F (36.3 °C)-98.4 °F (36.9 °C)] 97.6 °F (36.4 °C)  Heart Rate:  [58-91] 60  Resp:  [15-18] 18  BP: ()/(59-80) 109/63    Physical Exam:   General Appearance: alert, appears stated age and cooperative   Lungs: decreased lung sounds bilateral bases, left scattered rhonchi    Heart: regular rate and rhythm    Skin: Incision c/d/I      Output by Drain (mL) 12/31/23 0701 - 12/31/23 1900 12/31/23 1901 - 01/01/24 0700 01/01/24 0701 - 01/01/24 0712 Range Total   Closed/Suction Drain Inferior Pericardial  175  175         Results:    Results from last 7 days   Lab Units 01/01/24  0522   WBC 10*3/mm3 16.93*   HEMOGLOBIN g/dL 14.3   HEMATOCRIT % 43.6   PLATELETS 10*3/mm3 312     Results from last 7 days   Lab Units 12/31/23  2052   SODIUM mmol/L 129*   POTASSIUM mmol/L 5.2   CHLORIDE mmol/L 89*   CO2 mmol/L 13.0*   BUN mg/dL 91*   CREATININE mg/dL 3.37*   GLUCOSE mg/dL 141*   CALCIUM mg/dL 8.3*       Assessment:  Pericardial effusion  MONIQUE  Transaminitis  Afib    Plan:  Leave drain in place until 50ml output, per Cardiology  Continue supportive care in ICU       Ayana Nguyen PA-C  01/01/24  07:09 EST

## 2024-01-01 NOTE — PROGRESS NOTES
Intensive Care Follow-up      LOS: 2 days     Mr. Luis Perez Jr., 78 y.o. male is followed for: Glycemic, Electrolyte, Respiratory, and Medical management     Subjective - Interval History     78-year-old male admitted to the intensive care unit on 12/31/2023 after having undergone a pericardiocentesis for asymptomatic pericardial effusion.    Patient underwent cardioversion last week for atrial fibrillation with RVR.  He has been treated with sotalol and Eliquis.  He developed worsening shortness of breath and CT scan of the chest and echocardiogram revealed a large pericardial effusion.  Initially pericardiocentesis was deferred due to anticoagulation and body habitus.  However the patient developed oliguria, worsening renal function, elevated LFTs and lactic acidosis so he underwent a pericardiocentesis today by Dr. Chaudhari with an initial 950 mL of serosanguineous fluid removed followed by an additional 200 mL since being admitted to the intensive care unit    Patient is seen in the intensive care unit and is awake and alert  Indicates he feels somewhat better  Oxygenating adequately on 2 L/min via nasal cannula  Has a history of moderate obstructive airways disease presumably due to chronic persistent asthma  Also, history of obstructive sleep apnea, intolerant of CPAP/BiPAP in the past    The patient's relevant past medical, surgical and social history were reviewed and updated in Epic as appropriate.     Objective     Infusions:  sodium chloride, 100 mL/hr, Last Rate: 100 mL/hr (12/31/23 0817)      Medications:  [MAR Hold] Fluticasone Furoate-Vilanterol, 1 puff, Inhalation, Daily - RT  amLODIPine, 10 mg, Oral, Daily  [MAR Hold] ascorbic acid, 1,000 mg, Oral, Daily  cefTRIAXone, 2,000 mg, Intravenous, Q24H  doxycycline, 100 mg, Oral, Q12H  [MAR Hold] finasteride, 5 mg, Oral, Daily  [MAR Hold] montelukast, 10 mg, Oral, Daily  [MAR Hold] senna-docusate sodium, 2 tablet, Oral, BID  [MAR Hold] sodium chloride,  1,000 mL, Intravenous, Once  [MAR Hold] sodium chloride, 10 mL, Intravenous, Q12H  sotalol, 120 mg, Oral, Q24H  [MAR Hold] tamsulosin, 0.4 mg, Oral, Daily      Intake/Output         12/31/23 0700 - 01/01/24 0659    Intake (ml) --    Output (ml) 325    Net (ml) -325    Last Weight 161 kg (356 lb)          Vital Sign Min/Max for last 24 hours  Temp  Min: 97.4 °F (36.3 °C)  Max: 98.7 °F (37.1 °C)   BP  Min: 91/73  Max: 132/78   Pulse  Min: 61  Max: 91   Resp  Min: 15  Max: 22   SpO2  Min: 90 %  Max: 96 %   No data recorded        Physical Exam:   GENERAL: Awake, no distress   HEENT: No adenopathy or thyromegaly   LUNGS: Decreased breath sounds bilaterally without wheezes   HEART: Regular rate and rhythm.  Distant heart sounds.  Pericardial drain in good position and subxiphoid region   GI: Obese, soft, nontender   EXTREMITIES: Trace lower extremity edema.  No clubbing or cyanosis   NEURO/PSYCH: Awake, alert, appropriate    Results from last 7 days   Lab Units 12/31/23 2052 12/31/23  0304 12/30/23  1055   WBC 10*3/mm3 19.24* 21.55* 20.86*   HEMOGLOBIN g/dL 14.8 14.2 15.3   PLATELETS 10*3/mm3 374 375 374     Results from last 7 days   Lab Units 12/31/23 2052 12/31/23  0304 12/30/23  1055 12/27/23  0811   SODIUM mmol/L 129* 129* 131*  --    POTASSIUM mmol/L 5.2 5.0 4.7  --    CO2 mmol/L 13.0* 17.0* 24.0  --    BUN mg/dL 91* 75* 69*  --    CREATININE mg/dL 3.37* 2.73* 2.30*  --    MAGNESIUM mg/dL  --  2.9*  --  2.7*   PHOSPHORUS mg/dL  --  7.8*  --   --    GLUCOSE mg/dL 141* 154* 163*  --      Estimated Creatinine Clearance: 28.6 mL/min (A) (by C-G formula based on SCr of 3.37 mg/dL (H)).    Results from last 7 days   Lab Units 12/30/23  1055   HEMOGLOBIN A1C % 6.40*           Lab Results   Component Value Date    LACTATE 7.3 (C) 12/31/2023          Images: CT scan of the chest revealed a moderate to large pericardial effusion, no consolidation or mediastinal adenopathy/mass    I reviewed the patient's results and  images.     Impression      Active Hospital Problems    Diagnosis     MONIQUE (acute kidney injury)     Transaminitis     Lactic acidosis     Pericardial effusion     Obesity, Class III, BMI 40-49.9 (morbid obesity)     Chronic persistent asthma     Obstructive sleep apnea - Intolerant CPAP/BiPAP     Paroxysmal atrial fibrillation     Hypertension          Plan        Nearly 1100 mL out pericardial drain.  Hopefully this will result in improved cardiac output and improved hepatic, renal, and peripheral perfusion.  Expectation is that renal function and elevated LFTs will improve.    No evidence of a systemic infection based on CT scan of the chest, urinalysis, and appearance of pericardial fluid     I discussed the patient's findings and my recommendations with patient and nursing staff      High level of risk due to:  drug(s) requiring intensive monitoring for toxicity and decision regarding escalation of level of hospital care. and 1 acute illness with threat to life or bodily function     AHMET Bradley MD  Pulmonary and Critical Care Medicine

## 2024-01-01 NOTE — PLAN OF CARE
Goal Outcome Evaluation:  Plan of Care Reviewed With: patient, spouse        Progress: improving  Outcome Evaluation: patient was able to increase ambulation to 120 ft with chair brought behind him for safety. patient has stable vitals with activity and is making progress toward all mobility goals we will continue to progress activity as tolerated      Anticipated Discharge Disposition (PT): inpatient rehabilitation facility

## 2024-01-02 ENCOUNTER — APPOINTMENT (OUTPATIENT)
Dept: CARDIOLOGY | Facility: HOSPITAL | Age: 79
DRG: 314 | End: 2024-01-02
Payer: MEDICARE

## 2024-01-02 ENCOUNTER — APPOINTMENT (OUTPATIENT)
Dept: GENERAL RADIOLOGY | Facility: HOSPITAL | Age: 79
DRG: 314 | End: 2024-01-02
Payer: MEDICARE

## 2024-01-02 LAB
ALBUMIN SERPL-MCNC: 2.8 G/DL (ref 3.5–5.2)
ANION GAP SERPL CALCULATED.3IONS-SCNC: 16 MMOL/L (ref 5–15)
ARTERIAL PATENCY WRIST A: ABNORMAL
ATMOSPHERIC PRESS: ABNORMAL MM[HG]
BASE EXCESS BLDA CALC-SCNC: -1.4 MMOL/L (ref 0–2)
BASOPHILS # BLD AUTO: 0.02 10*3/MM3 (ref 0–0.2)
BASOPHILS NFR BLD AUTO: 0.2 % (ref 0–1.5)
BDY SITE: ABNORMAL
BH CV ECHO MEAS - EDV(MOD-SP2): 91.7 ML
BH CV ECHO MEAS - EDV(MOD-SP4): 79 ML
BH CV ECHO MEAS - EF(MOD-BP): 62.1 %
BH CV ECHO MEAS - EF(MOD-SP2): 71.1 %
BH CV ECHO MEAS - EF(MOD-SP4): 57.1 %
BH CV ECHO MEAS - ESV(MOD-SP2): 26.5 ML
BH CV ECHO MEAS - ESV(MOD-SP4): 33.9 ML
BH CV ECHO MEAS - SV(MOD-SP2): 65.2 ML
BH CV ECHO MEAS - SV(MOD-SP4): 45.1 ML
BH CV VAS BP LEFT ARM: NORMAL MMHG
BODY TEMPERATURE: 37
BUN SERPL-MCNC: 88 MG/DL (ref 8–23)
BUN/CREAT SERPL: 41.5 (ref 7–25)
CALCIUM SPEC-SCNC: 7.9 MG/DL (ref 8.6–10.5)
CHLORIDE SERPL-SCNC: 97 MMOL/L (ref 98–107)
CO2 BLDA-SCNC: 25.1 MMOL/L (ref 22–33)
CO2 SERPL-SCNC: 20 MMOL/L (ref 22–29)
COHGB MFR BLD: 1.5 % (ref 0–2)
CREAT SERPL-MCNC: 2.12 MG/DL (ref 0.76–1.27)
D-LACTATE SERPL-SCNC: 3.5 MMOL/L (ref 0.5–2)
DEPRECATED RDW RBC AUTO: 46.7 FL (ref 37–54)
EGFRCR SERPLBLD CKD-EPI 2021: 31.3 ML/MIN/1.73
EOSINOPHIL # BLD AUTO: 0.07 10*3/MM3 (ref 0–0.4)
EOSINOPHIL NFR BLD AUTO: 0.6 % (ref 0.3–6.2)
EPAP: 0
ERYTHROCYTE [DISTWIDTH] IN BLOOD BY AUTOMATED COUNT: 14.8 % (ref 12.3–15.4)
GLUCOSE SERPL-MCNC: 173 MG/DL (ref 65–99)
HCO3 BLDA-SCNC: 23.8 MMOL/L (ref 20–26)
HCT VFR BLD AUTO: 43.6 % (ref 37.5–51)
HCT VFR BLD CALC: 43.9 % (ref 38–51)
HGB BLD-MCNC: 14.3 G/DL (ref 13–17.7)
HGB BLDA-MCNC: 14.3 G/DL (ref 13.5–17.5)
IMM GRANULOCYTES # BLD AUTO: 0.18 10*3/MM3 (ref 0–0.05)
IMM GRANULOCYTES NFR BLD AUTO: 1.5 % (ref 0–0.5)
INHALED O2 CONCENTRATION: 40 %
IPAP: 0
LYMPHOCYTES # BLD AUTO: 0.9 10*3/MM3 (ref 0.7–3.1)
LYMPHOCYTES NFR BLD AUTO: 7.3 % (ref 19.6–45.3)
MAGNESIUM SERPL-MCNC: 2.9 MG/DL (ref 1.6–2.4)
MCH RBC QN AUTO: 29.4 PG (ref 26.6–33)
MCHC RBC AUTO-ENTMCNC: 32.8 G/DL (ref 31.5–35.7)
MCV RBC AUTO: 89.5 FL (ref 79–97)
METHGB BLD QL: 0.3 % (ref 0–1.5)
MODALITY: ABNORMAL
MONOCYTES # BLD AUTO: 1.54 10*3/MM3 (ref 0.1–0.9)
MONOCYTES NFR BLD AUTO: 12.5 % (ref 5–12)
NEUTROPHILS NFR BLD AUTO: 77.9 % (ref 42.7–76)
NEUTROPHILS NFR BLD AUTO: 9.63 10*3/MM3 (ref 1.7–7)
NRBC BLD AUTO-RTO: 0.2 /100 WBC (ref 0–0.2)
OXYHGB MFR BLDV: 96.7 % (ref 94–99)
PAW @ PEAK INSP FLOW SETTING VENT: 0 CMH2O
PCO2 BLDA: 41.1 MM HG (ref 35–45)
PCO2 TEMP ADJ BLD: 41.1 MM HG (ref 35–48)
PH BLDA: 7.37 PH UNITS (ref 7.35–7.45)
PH, TEMP CORRECTED: 7.37 PH UNITS
PHOSPHATE SERPL-MCNC: 5.3 MG/DL (ref 2.5–4.5)
PLATELET # BLD AUTO: 296 10*3/MM3 (ref 140–450)
PMV BLD AUTO: 10.3 FL (ref 6–12)
PO2 BLDA: 105 MM HG (ref 83–108)
PO2 TEMP ADJ BLD: 105 MM HG (ref 83–108)
POTASSIUM SERPL-SCNC: 3.9 MMOL/L (ref 3.5–5.2)
RBC # BLD AUTO: 4.87 10*6/MM3 (ref 4.14–5.8)
SODIUM SERPL-SCNC: 133 MMOL/L (ref 136–145)
TOTAL RATE: 0 BREATHS/MINUTE
WBC NRBC COR # BLD AUTO: 12.34 10*3/MM3 (ref 3.4–10.8)

## 2024-01-02 PROCEDURE — 94799 UNLISTED PULMONARY SVC/PX: CPT

## 2024-01-02 PROCEDURE — 82805 BLOOD GASES W/O2 SATURATION: CPT

## 2024-01-02 PROCEDURE — 93321 DOPPLER ECHO F-UP/LMTD STD: CPT

## 2024-01-02 PROCEDURE — 80069 RENAL FUNCTION PANEL: CPT | Performed by: INTERNAL MEDICINE

## 2024-01-02 PROCEDURE — 83605 ASSAY OF LACTIC ACID: CPT | Performed by: INTERNAL MEDICINE

## 2024-01-02 PROCEDURE — 93308 TTE F-UP OR LMTD: CPT | Performed by: INTERNAL MEDICINE

## 2024-01-02 PROCEDURE — 93308 TTE F-UP OR LMTD: CPT

## 2024-01-02 PROCEDURE — 85025 COMPLETE CBC W/AUTO DIFF WBC: CPT | Performed by: INTERNAL MEDICINE

## 2024-01-02 PROCEDURE — 36600 WITHDRAWAL OF ARTERIAL BLOOD: CPT

## 2024-01-02 PROCEDURE — 25010000002 CEFTRIAXONE PER 250 MG: Performed by: HOSPITALIST

## 2024-01-02 PROCEDURE — 99232 SBSQ HOSP IP/OBS MODERATE 35: CPT | Performed by: INTERNAL MEDICINE

## 2024-01-02 PROCEDURE — 83735 ASSAY OF MAGNESIUM: CPT | Performed by: INTERNAL MEDICINE

## 2024-01-02 PROCEDURE — 83050 HGB METHEMOGLOBIN QUAN: CPT

## 2024-01-02 PROCEDURE — 99231 SBSQ HOSP IP/OBS SF/LOW 25: CPT | Performed by: STUDENT IN AN ORGANIZED HEALTH CARE EDUCATION/TRAINING PROGRAM

## 2024-01-02 PROCEDURE — 71045 X-RAY EXAM CHEST 1 VIEW: CPT

## 2024-01-02 PROCEDURE — 82375 ASSAY CARBOXYHB QUANT: CPT

## 2024-01-02 PROCEDURE — 93321 DOPPLER ECHO F-UP/LMTD STD: CPT | Performed by: INTERNAL MEDICINE

## 2024-01-02 PROCEDURE — 25010000002 FUROSEMIDE PER 20 MG: Performed by: INTERNAL MEDICINE

## 2024-01-02 RX ORDER — BUMETANIDE 0.25 MG/ML
2 INJECTION INTRAMUSCULAR; INTRAVENOUS EVERY 12 HOURS
Status: DISCONTINUED | OUTPATIENT
Start: 2024-01-03 | End: 2024-01-05

## 2024-01-02 RX ORDER — APIXABAN 5 MG/1
TABLET, FILM COATED ORAL
Qty: 180 TABLET | Refills: 3 | Status: SHIPPED | OUTPATIENT
Start: 2024-01-02

## 2024-01-02 RX ORDER — FUROSEMIDE 10 MG/ML
40 INJECTION INTRAMUSCULAR; INTRAVENOUS ONCE
Status: COMPLETED | OUTPATIENT
Start: 2024-01-02 | End: 2024-01-02

## 2024-01-02 RX ADMIN — SENNOSIDES AND DOCUSATE SODIUM 2 TABLET: 8.6; 5 TABLET ORAL at 09:32

## 2024-01-02 RX ADMIN — BUDESONIDE AND FORMOTEROL FUMARATE DIHYDRATE 2 PUFF: 160; 4.5 AEROSOL RESPIRATORY (INHALATION) at 10:01

## 2024-01-02 RX ADMIN — Medication 10 ML: at 09:33

## 2024-01-02 RX ADMIN — TAMSULOSIN HYDROCHLORIDE 0.4 MG: 0.4 CAPSULE ORAL at 09:32

## 2024-01-02 RX ADMIN — SOTALOL HYDROCHLORIDE 120 MG: 80 TABLET ORAL at 09:32

## 2024-01-02 RX ADMIN — FUROSEMIDE 40 MG: 10 INJECTION, SOLUTION INTRAMUSCULAR; INTRAVENOUS at 09:42

## 2024-01-02 RX ADMIN — SENNOSIDES AND DOCUSATE SODIUM 2 TABLET: 8.6; 5 TABLET ORAL at 21:13

## 2024-01-02 RX ADMIN — Medication 10 ML: at 21:36

## 2024-01-02 RX ADMIN — MONTELUKAST 10 MG: 10 TABLET, FILM COATED ORAL at 21:13

## 2024-01-02 RX ADMIN — FINASTERIDE 5 MG: 5 TABLET, FILM COATED ORAL at 09:32

## 2024-01-02 RX ADMIN — OXYCODONE HYDROCHLORIDE AND ACETAMINOPHEN 1000 MG: 500 TABLET ORAL at 09:33

## 2024-01-02 RX ADMIN — DOXYCYCLINE 100 MG: 100 CAPSULE ORAL at 09:32

## 2024-01-02 RX ADMIN — DOXYCYCLINE 100 MG: 100 CAPSULE ORAL at 21:13

## 2024-01-02 RX ADMIN — BUDESONIDE AND FORMOTEROL FUMARATE DIHYDRATE 2 PUFF: 160; 4.5 AEROSOL RESPIRATORY (INHALATION) at 19:43

## 2024-01-02 RX ADMIN — CEFTRIAXONE 2000 MG: 2 INJECTION, POWDER, FOR SOLUTION INTRAMUSCULAR; INTRAVENOUS at 09:39

## 2024-01-02 NOTE — CASE MANAGEMENT/SOCIAL WORK
Discharge Planning Assessment  [facility]     Patient Name: Luis Perez Jr.  MRN: [id number]  Today's Date: 1/2/2024    Admit Date: 12/30/2023    Plan: TBD   Discharge Needs Assessment       Row Name 01/02/24 1419       Living Environment    People in Home spouse    Current Living Arrangements home    Potentially Unsafe Housing Conditions none    In the past 12 months has the electric, gas, oil, or water company threatened to shut off services in your home? No    Primary Care Provided by self    Provides Primary Care For no one    Family Caregiver if Needed spouse    Quality of Family Relationships unable to assess    Able to Return to Prior Arrangements yes       Resource/Environmental Concerns    Resource/Environmental Concerns none    Transportation Concerns none       Food Insecurity    Within the past 12 months, you worried that your food would run out before you got the money to buy more. Never true    Within the past 12 months, the food you bought just didn't last and you didn't have money to get more. Never true       Transition Planning    Patient/Family Anticipates Transition to home    Patient/Family Anticipated Services at Transition none    Transportation Anticipated family or friend will provide       Discharge Needs Assessment    Readmission Within the Last 30 Days no previous admission in last 30 days    Equipment Currently Used at Home walker, standard    Concerns to be Addressed no discharge needs identified    Anticipated Changes Related to Illness none    Equipment Needed After Discharge none                   Discharge Plan       Row Name 01/02/24 1420       Plan    Plan TBD    Patient/Family in Agreement with Plan yes    Plan Comments Spoke with patient at bedside for IDP. [place] No . PCP Sierra Kuo. Medicare and [employer] with scripts filled at [facility]. Will await therapy recommedations for proper placement. CM will follow.    Final Discharge Disposition  Code 01 - home or self-care                  Continued Care and Services - Admitted Since 12/30/2023    Coordination has not been started for this encounter.       Selected Continued Care - Prior Encounters Includes continued care and service providers with selected services from prior encounters from 10/1/2023 to 1/2/2024      Discharged on 12/27/2023 Admission date: 12/25/2023 - Discharge disposition: Home-Health Care [facility]      Home Medical Care       Service Provider Selected Services Address Phone Fax Patient Preferred    [facility] ,  Home Rehabilitation [address], [address] [address] [phone] --                          Expected Discharge Date and Time       Expected Discharge Date Expected Discharge Time    Jan 4, 2024            Demographic Summary       Row Name 01/02/24 1419       General Information    Admission Type inpatient    Arrived From emergency department    Referral Source admission list    Reason for Consult discharge planning    Preferred Language English                   Functional Status       Row Name 01/02/24 1419       Functional Status    Usual Activity Tolerance moderate    Current Activity Tolerance moderate       Functional Status, IADL    Medications independent    Meal Preparation independent    Housekeeping independent    Laundry independent    Shopping independent                   Psychosocial    No documentation.                  Abuse/Neglect    No documentation.                  Legal    No documentation.                  Substance Abuse    No documentation.                  Patient Forms    No documentation.                     April Davis RN

## 2024-01-02 NOTE — PROGRESS NOTES
"Critical Care Note     LOS: 3 days   Patient Care Team:  Sierra Kou MD as PCP - General (Internal Medicine)  Latasha Poe MD as Consulting Physician (Dermatopathology)  Provider, No Known  Camille Mccord APRN as Nurse Practitioner (Pulmonary Disease)  Jaja Mansfield APRN as Nurse Practitioner (Cardiology)    Chief Complaint/Reason for visit:    Chief Complaint   Patient presents with    Shortness of Breath   Pericardial effusion  Acute kidney injury  Atrial fibrillation  Moderate chronic obstructive asthma      Subjective     Interval History:     Patient underwent a pericardiocentesis with initial 950 mL removed, December 31, 2023.  No output from his pericardial drain for the last 24 hours.  He remains afebrile.  On 6 L his saturation is 89%.  Urine output yesterday was 3.8 L without diuretics.  He did ambulate 120 feet with therapy yesterday.  He remains in atrial fibrillation with rate less than 100.  He is complaining of a cough that is occasionally productive.  He is tolerating a p.o. diet.    Review of Systems:    All systems were reviewed and negative except as noted in subjective.    Medical history, surgical history, social history, family history reviewed    Objective     Intake/Output:    Intake/Output Summary (Last 24 hours) at 1/2/2024 1117  Last data filed at 1/2/2024 0837  Gross per 24 hour   Intake 1570 ml   Output 4300 ml   Net -2730 ml       Nutrition:  Diet: Regular/House Diet, Cardiac Diets, Renal Diets; Healthy Heart (2-3 Na+); Low Potassium, Low Phosphorus, Low Sodium (2-3g); Texture: Regular Texture (IDDSI 7); Fluid Consistency: Thin (IDDSI 0)    Infusions:           Telemetry: Sinus bradycardia             Vital Signs  Blood pressure 128/76, pulse 82, temperature 97.5 °F (36.4 °C), temperature source Axillary, resp. rate 18, height 185.4 cm (73\"), weight (!) 161 kg (356 lb), SpO2 (!) 89%.    Physical Exam:  General Appearance:  Overweight older gentleman supine in bed " "  Head:  Atraumatic   Eyes:          No jaundice   Ears:     Throat: Oral mucosa moist   Neck: Large neck, trachea midline, no crepitus   Back:      Lungs:   Breath sounds are bilateral, shallow, with scattered expiratory rhonchi    Heart:  Slow rate, regular, S1, S2 auscultated, pericardial drain in place, no output   Abdomen:   Large abdomen, bowel sounds present, soft   Rectal:   Deferred   Extremities: 1+ edema bilateral pretibial   Pulses:    Skin: Warm and dry   Lymph nodes: No cervical adenopathy   Neurologic: Oriented x 3, speech fluent,  equal      Results Review:     I reviewed the patient's new clinical results.   Results from last 7 days   Lab Units 01/02/24  0353 01/01/24 0522 12/31/23 2052 12/31/23  0304   SODIUM mmol/L 133* 129* 129* 129*   POTASSIUM mmol/L 3.9 4.9 5.2 5.0   CHLORIDE mmol/L 97* 92* 89* 91*   CO2 mmol/L 20.0* 17.0* 13.0* 17.0*   BUN mg/dL 88* 95* 91* 75*   CREATININE mg/dL 2.12* 3.11* 3.37* 2.73*   CALCIUM mg/dL 7.9* 8.0* 8.3* 8.5*   BILIRUBIN mg/dL  --  0.7 1.0 1.3*   ALK PHOS U/L  --  100 109 96   ALT (SGPT) U/L  --  1,111* 1,358* 1,426*   AST (SGOT) U/L  --  825* 1,488* 3,069*   GLUCOSE mg/dL 173* 117* 141* 154*     Results from last 7 days   Lab Units 01/02/24  0353 01/01/24 0522 12/31/23 2052   WBC 10*3/mm3 12.34* 16.93* 19.24*   HEMOGLOBIN g/dL 14.3 14.3 14.8   HEMATOCRIT % 43.6 43.6 44.6   PLATELETS 10*3/mm3 296 312 374     Results from last 7 days   Lab Units 01/02/24  0432   PH, ARTERIAL pH units 7.371   PO2 ART mm Hg 105.0   PCO2, ARTERIAL mm Hg 41.1   HCO3 ART mmol/L 23.8     Lab Results   Component Value Date    BLOODCX No growth at 2 days 12/30/2023     No results found for: \"URINECX\"    I reviewed the patient's new imaging including images and reports.    Chest x-ray today shows persistent cardiomegaly no effusion or edema    Renal ultrasound, normal-sized kidneys renal cyst present.  No hydronephrosis      Interpretation Summary         Left ventricular systolic " function is normal. Left ventricular ejection fraction appears to be 61 - 65%.    Preintervention, large pericardial effusion with early signs of tamponade    Post intervention (950 mL serosanguineous fluid removal) trivial pericardial effusion remains, with normal expansion of the RA and RV.    All medications reviewed.   ascorbic acid, 1,000 mg, Oral, Daily  budesonide-formoterol, 2 puff, Inhalation, BID - RT  cefTRIAXone, 2,000 mg, Intravenous, Q24H  doxycycline, 100 mg, Oral, Q12H  finasteride, 5 mg, Oral, Daily  montelukast, 10 mg, Oral, Nightly  senna-docusate sodium, 2 tablet, Oral, BID  sodium chloride, 10 mL, Intravenous, Q12H  sotalol, 120 mg, Oral, Q24H  tamsulosin, 0.4 mg, Oral, Daily          Assessment & Plan       Hypertension    Paroxysmal atrial fibrillation    Obstructive sleep apnea - Intolerant CPAP/BiPAP    Chronic persistent asthma    Obesity, Class III, BMI 40-49.9 (morbid obesity)    Pericardial effusion    MONIQUE (acute kidney injury)    Transaminitis    Lactic acidosis    78-year-old gentleman with a history of hypertension, paroxysmal atrial fibrillation, sleep apnea intolerant of CPAP, chronic persistent asthma, discharged December 27 after electrical cardioversion and readmitted with acute kidney injury productive cough with recent rhinovirus infection and a new pericardial effusion.  Repeat echocardiogram revealed a large pericardial effusion and on December 31 he underwent pericardiocentesis with 950 mL removed.    #1 pericardial effusion status post pericardiocentesis December 31.  Fluid was bloody with over 5 million red cells, white cells 13,000, 56% lymphocytes.  Gram stain had no organisms, and cultures are negative at 48 hours.  AFB and fungal assessment is pending.  Follow-up echocardiogram revealed no significant residual effusion.  Cytology is pending as well.    #2 paroxysmal atrial fibrillation, recently discharged on sotalol and Eliquis, after electrical cardioversion on  December 26, 2023.  He developed recurrent atrial fibrillation with a controlled rate.    #3 acute kidney injury, creatinine today is 2.12, improving compared to 3.37.  Baseline creatinine is 1.04.  Urine output yesterday 3825 mL.    #4 recent viral respiratory illness, swab positive for rhinovirus on December 25.  Follow-up swab on December 30 is negative.  He does have a known history of chronic persistent asthma.  He is on Dupixent every 2 weeks, Breo as a maintenance inhaler and has an albuterol rescue inhaler.  He is congested on examination but does not have wheezing      PLAN:      Follow-up pericardial cultures and cytology  Currently on Rocephin and doxycycline for possible respiratory illness  Substitute Symbicort for Breo secondary to formulary issues  Continue Singulair 10 mg daily  Nebulized bronchodilators will add scheduled treatments    Amlodipine, hold for now   Flomax, finasteride for BPH  Sotalol  Hold anticoagulation  Gentle diuresis    Monitor H&H, BUN, creatinine, electrolytes  He would benefit from treatment of his sleep apnea but apparently has been intolerant of CPAP at home    VTE Prophylaxis:SCDS    Stress Ulcer Prophylaxis: none    Gabriela Block MD  01/02/24  11:17 EST      Time: Critical care 30 min  I personally provided care to this critically ill patient as documented above.  Critical care time does not include time spent on separately billed procedures.  None of my critical care time was concurrent with other critical care providers.

## 2024-01-02 NOTE — PROGRESS NOTES
Luis Perez Jr.  [id number]  1945   LOS: 3 days   Patient Care Team:  Sierra Kuo MD as PCP - General (Internal Medicine)  Latasha Poe MD as Consulting Physician (Dermatopathology)  Provider, No Known  Camille Mccord APRN as Nurse Practitioner (Pulmonary Disease)  Jaja Mansfield APRN as Nurse Practitioner (Cardiology)    Chief Complaint:  LARGE PE / TAMPONADE / SHOCK / PAF / MONIQUE / T2 DM / PERICARDIOCENTESIS    Subjective     Comfortable semirecumbent in bed on 2 L/min nasal cannula (oximetry 97%).  He denies tachypalpitation, anginal type chest discomfort, increased tachypnea/dyspnea, nausea, emesis, headache, focal motor-sensory changes, or abdominal pain.  He does note frequent coarse nonproductive cough as well as nasal congestion.  Acceptable appetite.  He walked in room and subsequently in ICU yesterday.    Objective     Vital Sign Min/Max for last 24 hours  Temp  Min: 97.5 °F (36.4 °C)  Max: 97.9 °F (36.6 °C)   BP  Min: 95/48  Max: 137/63   Pulse  Min: 53  Max: 86   Resp  Min: 16  Max: 20   SpO2  Min: 88 %  Max: 97 %               12/31/23  2109 12/31/23  2258   Weight: (!) 161 kg (356 lb) (!) 161 kg (356 lb)         Intake/Output Summary (Last 24 hours) at 1/2/2024 0709  Last data filed at 1/2/2024 0600  Gross per 24 hour   Intake 2020 ml   Output 3970 ml   Net -1950 ml       Physical Exam:     General Appearance:    Alert, cooperative, in no acute distress   Lungs:     Clear to auscultation,respirations regular, even and                unlabored    Heart:    Irregular and normal rate, variable S1 and S2, grade 1/6 systolic murmur, no gallop, no rub, no click   Abdomen:  Extremities:   Soft, nontender, bowel sounds audible x4  Trace-1+ bipedal edema, normal range of motion   Pulses:   Pulses palpable and equal bilaterally      Results Review:   Results from last 7 days   Lab Units 01/02/24  0353 01/01/24  0522 12/31/23 2052   SODIUM mmol/L 133* 129* 129*   POTASSIUM mmol/L 3.9 4.9  5.2   CHLORIDE mmol/L 97* 92* 89*   CO2 mmol/L 20.0* 17.0* 13.0*   BUN mg/dL 88* 95* 91*   CREATININE mg/dL 2.12* 3.11* 3.37*   GLUCOSE mg/dL 173* 117* 141*   CALCIUM mg/dL 7.9* 8.0* 8.3*     Results from last 7 days   Lab Units 01/02/24  0353 01/01/24  0522 12/31/23 2052   WBC 10*3/mm3 12.34* 16.93* 19.24*   HEMOGLOBIN g/dL 14.3 14.3 14.8   HEMATOCRIT % 43.6 43.6 44.6   PLATELETS 10*3/mm3 296 312 374     Results from last 7 days   Lab Units 12/30/23  1055   HEMOGLOBIN A1C % 6.40*     Results from last 7 days   Lab Units 01/01/24  0115 12/31/23 2052 12/30/23  1055   HSTROP T ng/L 34* 17 13     ABGs (FiO2 0.40): pH 7.37, pCO2 41, pO2 105 (97% saturation)  MAGNESIUM: 2.9  PHOSPHORUS: 5.3  ALBUMIN: 2.8  LACTATE: 3.5  CXR:    Findings:    Stable enlargement of the cardiac silhouette shown to be due to a large pericardial fluid collection on recent CT. There is no pneumothorax or visible pleural effusion. The left lateral sulcus is excluded from the exam. Pulmonary vasculature is within normal limits. No pneumothorax. No new lung opacity. No acute osseous abnormality.     IMPRESSION:    Stable enlargement of the cardiac silhouette shown to reflect pericardial fluid on recent CT.    NO EKG.    ECHO 12/31/2023):      Left ventricular systolic function is normal. Left ventricular ejection fraction appears to be 61 - 65%.    Preintervention, large pericardial effusion with early signs of tamponade    Post intervention (950 mL serosanguineous fluid removal) trivial pericardial effusion remains, with normal expansion of the RA and RV.    Medication Review: REVIEWED; NO DRIPS.    Assessment & Plan     Acceptable hemodynamics with recurrent persistent atrial fibrillation.  Sotalol dose has been adjusted for reduced GFR.  Will continue to avoid anticoagulants at this time long-term he will need EP consultation for consideration of sooner than later Watchman device as well as additional directions for treatment and control of  atrial fibrillation.  Will discuss with Dr. Chaudhari timing of removal of pericardial drain; he drained 75 cc up until 1800 hrs. last night subsequently had no drainage.  He has a total of 1.2 L of pericardial drainage since urgent pericardiocentesis on the evening of 31 December 2023.      Hypertension    Paroxysmal atrial fibrillation    Obstructive sleep apnea - Intolerant CPAP/BiPAP    Chronic persistent asthma    Obesity, Class III, BMI 40-49.9 (morbid obesity)    Pericardial effusion    MONIQUE (acute kidney injury)    Transaminitis    Lactic acidosis    01/02/24  07:09 EST

## 2024-01-02 NOTE — PLAN OF CARE
Goal Outcome Evaluation:      Patient had refused Physical therapy today. Once he got back into bed this morning around 10am he did not want to get up or ambulate. However, later this afternoon I convinced him to sit up in the chair for a few hours. We will attempt to walk him here shortly. Spouse stated that prior to this admission he was having moments of slurring his speech. I completed an NIHSS. Noticed slight slurring. Orientation was fine and he was able to move all extremities. Will mention in my note so that if family continues with this request we can possibly consult neurology to see if they have any further recommendations.

## 2024-01-02 NOTE — PROGRESS NOTES
I was asked by Dr. Perez to evaluate the patient for possible pericardial drain removal.  Patient has not had output from the drain in over 12 hours.  I tried to manually aspirate the drain with a 60 cc syringe but only got scant drainage back.  Therefore limited echo was performed which showed trivial pericardial effusion with normal EF.  I remove the pericardial drain at the bedside using sterile technique and a sterile bandage was placed.  Patient tolerated procedure without complication.

## 2024-01-02 NOTE — PROGRESS NOTES
Cardiothoracic Surgery Progress Note      CC: pericardial effusion     LOS: 3 days      Subjective:  No acute events    Objective:  Vital Signs  Temp:  [97.5 °F (36.4 °C)-97.9 °F (36.6 °C)] 97.9 °F (36.6 °C)  Heart Rate:  [53-86] 86  Resp:  [16-20] 16  BP: ()/(48-78) 111/78    Physical Exam:   General Appearance: alert, appears stated age and cooperative   Lungs: decreased lung sounds bilateral bases, left scattered rhonchi    Heart: regular rate and rhythm    Skin: Incision c/d/I      Output by Drain (mL) 01/01/24 0701 - 01/01/24 1900 01/01/24 1901 - 01/02/24 0700 01/02/24 0701 - 01/02/24 0704 Range Total   Closed/Suction Drain Inferior Pericardial 145 0  145         Results:    Results from last 7 days   Lab Units 01/02/24  0353   WBC 10*3/mm3 12.34*   HEMOGLOBIN g/dL 14.3   HEMATOCRIT % 43.6   PLATELETS 10*3/mm3 296     Results from last 7 days   Lab Units 01/02/24  0353   SODIUM mmol/L 133*   POTASSIUM mmol/L 3.9   CHLORIDE mmol/L 97*   CO2 mmol/L 20.0*   BUN mg/dL 88*   CREATININE mg/dL 2.12*   GLUCOSE mg/dL 173*   CALCIUM mg/dL 7.9*       Assessment:  Pericardial effusion  MONIQUE  Transaminitis  Afib    Plan:  Leave drain in place   Continue supportive care in ICU   Nephrology following for MONIQUE      Naya Issa PA-C  01/02/24  07:04 EST    --    I reviewed this documentation. I interviewed and examined this patient. Plan as documented.     Eligio Amaya M.D., R.P.V.I.  Cardiothoracic and Vascular Surgeon  [facility]

## 2024-01-02 NOTE — PROGRESS NOTES
" LOS: 3 days   Patient Care Team:  Sierra Kuo MD as PCP - General (Internal Medicine)  Latasha Poe MD as Consulting Physician (Dermatopathology)  Provider, No Known  Camille Mccord APRN as Nurse Practitioner (Pulmonary Disease)  Jaja Mansfield APRN as Nurse Practitioner (Cardiology)    Chief Complaint: MONIQUE    Subjective     Renal function improving. Dependent edema noted .    Subjective:  Symptoms:  Improved.  No shortness of breath, chest pain or chest pressure.        History taken from: patient    Objective     Vital Sign Min/Max for last 24 hours  Temp  Min: 97.5 °F (36.4 °C)  Max: 97.9 °F (36.6 °C)   BP  Min: 100/67  Max: 137/63   Pulse  Min: 53  Max: 96   Resp  Min: 16  Max: 20   SpO2  Min: 88 %  Max: 97 %   Flow (L/min)  Min: 2  Max: 6   No data recorded     Flowsheet Rows      Flowsheet Row First Filed Value   Admission Height 185.4 cm (73\") Documented at 12/30/2023 1056   Admission Weight 147 kg (324 lb) Documented at 12/30/2023 1056            I/O this shift:  In: 370 [P.O.:370]  Out: 2450 [Urine:2450]  I/O last 3 completed shifts:  In: 2490 [P.O.:1570; I.V.:820; IV Piggyback:100]  Out: 4870 [Urine:4550; Drains:320]    Objective:  General Appearance:  Comfortable.    Vital signs: (most recent): Blood pressure 121/73, pulse 83, temperature 97.5 °F (36.4 °C), temperature source Axillary, resp. rate 18, height 185.4 cm (73\"), weight (!) 161 kg (356 lb), SpO2 (!) 89%.  Vital signs are normal.    Output: Producing urine.    HEENT: Normal HEENT exam.    Lungs:  Normal effort, normal respiratory rate and increased effort.  Breath sounds clear to auscultation.    Heart: Normal rate.  Regular rhythm.  S1 normal and S2 normal.    Abdomen: Abdomen is soft.    Extremities: There is dependent edema.    Neurological: Patient is alert and oriented to person, place and time.  Normal strength.    Pupils:  Pupils are equal, round, and reactive to light.    Skin:  Warm.                Results Review:  " "   I reviewed the patient's new clinical results.    WBC WBC   Date Value Ref Range Status   01/02/2024 12.34 (H) 3.40 - 10.80 10*3/mm3 Final   01/01/2024 16.93 (H) 3.40 - 10.80 10*3/mm3 Final   12/31/2023 19.24 (H) 3.40 - 10.80 10*3/mm3 Final   12/31/2023 21.55 (H) 3.40 - 10.80 10*3/mm3 Final      HGB Hemoglobin   Date Value Ref Range Status   01/02/2024 14.3 13.0 - 17.7 g/dL Final   01/01/2024 14.3 13.0 - 17.7 g/dL Final   12/31/2023 14.8 13.0 - 17.7 g/dL Final   12/31/2023 14.2 13.0 - 17.7 g/dL Final      HCT Hematocrit   Date Value Ref Range Status   01/02/2024 43.6 37.5 - 51.0 % Final   01/01/2024 43.6 37.5 - 51.0 % Final   12/31/2023 44.6 37.5 - 51.0 % Final   12/31/2023 43.3 37.5 - 51.0 % Final      Platlets No results found for: \"LABPLAT\"   MCV MCV   Date Value Ref Range Status   01/02/2024 89.5 79.0 - 97.0 fL Final   01/01/2024 90.1 79.0 - 97.0 fL Final   12/31/2023 90.3 79.0 - 97.0 fL Final   12/31/2023 88.4 79.0 - 97.0 fL Final          Sodium Sodium   Date Value Ref Range Status   01/02/2024 133 (L) 136 - 145 mmol/L Final   01/01/2024 129 (L) 136 - 145 mmol/L Final   12/31/2023 129 (L) 136 - 145 mmol/L Final   12/31/2023 129 (L) 136 - 145 mmol/L Final      Potassium Potassium   Date Value Ref Range Status   01/02/2024 3.9 3.5 - 5.2 mmol/L Final     Comment:     Slight hemolysis detected by analyzer. Result may be falsely elevated.   01/01/2024 4.9 3.5 - 5.2 mmol/L Final     Comment:     Slight hemolysis detected by analyzer. Result may be falsely elevated.   12/31/2023 5.2 3.5 - 5.2 mmol/L Final   12/31/2023 5.0 3.5 - 5.2 mmol/L Final      Chloride Chloride   Date Value Ref Range Status   01/02/2024 97 (L) 98 - 107 mmol/L Final   01/01/2024 92 (L) 98 - 107 mmol/L Final   12/31/2023 89 (L) 98 - 107 mmol/L Final   12/31/2023 91 (L) 98 - 107 mmol/L Final      CO2 CO2   Date Value Ref Range Status   01/02/2024 20.0 (L) 22.0 - 29.0 mmol/L Final   01/01/2024 17.0 (L) 22.0 - 29.0 mmol/L Final   12/31/2023 13.0 " "(L) 22.0 - 29.0 mmol/L Final   12/31/2023 17.0 (L) 22.0 - 29.0 mmol/L Final      BUN BUN   Date Value Ref Range Status   01/02/2024 88 (H) 8 - 23 mg/dL Final   01/01/2024 95 (H) 8 - 23 mg/dL Final   12/31/2023 91 (H) 8 - 23 mg/dL Final   12/31/2023 75 (H) 8 - 23 mg/dL Final      Creatinine Creatinine   Date Value Ref Range Status   01/02/2024 2.12 (H) 0.76 - 1.27 mg/dL Final   01/01/2024 3.11 (H) 0.76 - 1.27 mg/dL Final   12/31/2023 3.37 (H) 0.76 - 1.27 mg/dL Final   12/31/2023 2.73 (H) 0.76 - 1.27 mg/dL Final      Calcium Calcium   Date Value Ref Range Status   01/02/2024 7.9 (L) 8.6 - 10.5 mg/dL Final   01/01/2024 8.0 (L) 8.6 - 10.5 mg/dL Final   12/31/2023 8.3 (L) 8.6 - 10.5 mg/dL Final   12/31/2023 8.5 (L) 8.6 - 10.5 mg/dL Final      PO4 No results found for: \"CAPO4\"   Albumin Albumin   Date Value Ref Range Status   01/02/2024 2.8 (L) 3.5 - 5.2 g/dL Final   01/01/2024 3.1 (L) 3.5 - 5.2 g/dL Final   12/31/2023 3.5 3.5 - 5.2 g/dL Final   12/31/2023 3.5 3.5 - 5.2 g/dL Final      Magnesium Magnesium   Date Value Ref Range Status   01/02/2024 2.9 (H) 1.6 - 2.4 mg/dL Final   01/01/2024 2.8 (H) 1.6 - 2.4 mg/dL Final   12/31/2023 2.9 (H) 1.6 - 2.4 mg/dL Final      Uric Acid No results found for: \"URICACID\"     Medication Review: yes    Assessment & Plan       Hypertension    Paroxysmal atrial fibrillation    Obstructive sleep apnea - Intolerant CPAP/BiPAP    Chronic persistent asthma    Obesity, Class III, BMI 40-49.9 (morbid obesity)    Pericardial effusion    MONIQUE (acute kidney injury)    Transaminitis    Lactic acidosis      Assessment & Plan    Acute kidney injury:  Baseline cr ~ 0.5 mg/dl Cr 1.3>2.3>2.7  on this admission.Risk factor for MONIQUE: Pericardial effusion, recent initiation of diuretics, possible sepsis. UA large blood noted.      Pericardial effusion: Large circumferential pericardial effusion noted on last visit 12/26. Presenting back with worsening PALM and SOB. Underwent pericardiocentesis w 1.2 liter " fluid removed. Sent for analysis      Transaminitis: Elevated LFT, INR 2.1. Congestive hepatopathy??     Lactic acidosis: Unclear etiology. Sepsis workup on going. On abx. Blood cx pending. Repeat lactate      Hyponatremia: Due to recent HCTZ use and MONIQUE. Continue to monitor     Hyperphosphatemia: phos 7.8. Monitor for now.      Recs  Expect improvement in renal function now that pericardial effusion is drained. Patient has dependent edema. Wife believes torsemide made hematuria worse ( Based her on research). It is listed as allergy in the chart. I tried to reassure and clarify this is not an allergy   Start Bumex 2 mg daily  Limit fluid intake to 1.2 liter/day  Abx per primary service.   Strict I/O.      High risk for decompensation.           Checo Cardozo MD  01/02/24  16:24 EST

## 2024-01-03 LAB
ALBUMIN FLD-MCNC: 2.5 G/DL
ALBUMIN SERPL-MCNC: 2.9 G/DL (ref 3.5–5.2)
ANION GAP SERPL CALCULATED.3IONS-SCNC: 12 MMOL/L (ref 5–15)
BUN SERPL-MCNC: 67 MG/DL (ref 8–23)
BUN/CREAT SERPL: 45.9 (ref 7–25)
CALCIUM SPEC-SCNC: 8.3 MG/DL (ref 8.6–10.5)
CHLORIDE SERPL-SCNC: 101 MMOL/L (ref 98–107)
CO2 SERPL-SCNC: 27 MMOL/L (ref 22–29)
CREAT SERPL-MCNC: 1.46 MG/DL (ref 0.76–1.27)
EGFRCR SERPLBLD CKD-EPI 2021: 48.9 ML/MIN/1.73
GLUCOSE FLD-MCNC: <2 MG/DL
GLUCOSE SERPL-MCNC: 160 MG/DL (ref 65–99)
LDH FLD L TO P-CCNC: NORMAL IU/L
PHOSPHATE SERPL-MCNC: 3.7 MG/DL (ref 2.5–4.5)
POTASSIUM SERPL-SCNC: 3.5 MMOL/L (ref 3.5–5.2)
PROT FLD-MCNC: 5.6 G/DL
QT INTERVAL: 338 MS
QTC INTERVAL: 431 MS
SODIUM SERPL-SCNC: 140 MMOL/L (ref 136–145)

## 2024-01-03 PROCEDURE — 93005 ELECTROCARDIOGRAM TRACING: CPT | Performed by: INTERNAL MEDICINE

## 2024-01-03 PROCEDURE — 97164 PT RE-EVAL EST PLAN CARE: CPT

## 2024-01-03 PROCEDURE — 97530 THERAPEUTIC ACTIVITIES: CPT

## 2024-01-03 PROCEDURE — 99232 SBSQ HOSP IP/OBS MODERATE 35: CPT | Performed by: INTERNAL MEDICINE

## 2024-01-03 PROCEDURE — 25010000002 BUMETANIDE PER 0.5 MG: Performed by: INTERNAL MEDICINE

## 2024-01-03 PROCEDURE — 94799 UNLISTED PULMONARY SVC/PX: CPT

## 2024-01-03 PROCEDURE — 25010000002 CEFTRIAXONE PER 250 MG: Performed by: HOSPITALIST

## 2024-01-03 PROCEDURE — 80069 RENAL FUNCTION PANEL: CPT | Performed by: INTERNAL MEDICINE

## 2024-01-03 PROCEDURE — 93010 ELECTROCARDIOGRAM REPORT: CPT | Performed by: INTERNAL MEDICINE

## 2024-01-03 PROCEDURE — 99231 SBSQ HOSP IP/OBS SF/LOW 25: CPT | Performed by: STUDENT IN AN ORGANIZED HEALTH CARE EDUCATION/TRAINING PROGRAM

## 2024-01-03 RX ORDER — ASPIRIN 81 MG/1
162 TABLET ORAL DAILY
Status: DISCONTINUED | OUTPATIENT
Start: 2024-01-03 | End: 2024-01-06 | Stop reason: HOSPADM

## 2024-01-03 RX ORDER — POTASSIUM CHLORIDE 750 MG/1
40 CAPSULE, EXTENDED RELEASE ORAL ONCE
Status: COMPLETED | OUTPATIENT
Start: 2024-01-03 | End: 2024-01-03

## 2024-01-03 RX ADMIN — SOTALOL HYDROCHLORIDE 120 MG: 80 TABLET ORAL at 09:35

## 2024-01-03 RX ADMIN — BUMETANIDE 2 MG: 0.25 INJECTION INTRAMUSCULAR; INTRAVENOUS at 23:13

## 2024-01-03 RX ADMIN — POTASSIUM CHLORIDE 40 MEQ: 750 CAPSULE, EXTENDED RELEASE ORAL at 09:36

## 2024-01-03 RX ADMIN — BUDESONIDE AND FORMOTEROL FUMARATE DIHYDRATE 2 PUFF: 160; 4.5 AEROSOL RESPIRATORY (INHALATION) at 08:03

## 2024-01-03 RX ADMIN — DOXYCYCLINE 100 MG: 100 CAPSULE ORAL at 20:39

## 2024-01-03 RX ADMIN — BUMETANIDE 2 MG: 0.25 INJECTION INTRAMUSCULAR; INTRAVENOUS at 00:01

## 2024-01-03 RX ADMIN — Medication 5 MG: at 02:39

## 2024-01-03 RX ADMIN — Medication 5 MG: at 20:08

## 2024-01-03 RX ADMIN — MONTELUKAST 10 MG: 10 TABLET, FILM COATED ORAL at 20:07

## 2024-01-03 RX ADMIN — BUDESONIDE AND FORMOTEROL FUMARATE DIHYDRATE 2 PUFF: 160; 4.5 AEROSOL RESPIRATORY (INHALATION) at 19:53

## 2024-01-03 RX ADMIN — OXYCODONE HYDROCHLORIDE AND ACETAMINOPHEN 1000 MG: 500 TABLET ORAL at 09:36

## 2024-01-03 RX ADMIN — BUMETANIDE 2 MG: 0.25 INJECTION INTRAMUSCULAR; INTRAVENOUS at 12:02

## 2024-01-03 RX ADMIN — ASPIRIN 162 MG: 81 TABLET, COATED ORAL at 09:36

## 2024-01-03 RX ADMIN — TAMSULOSIN HYDROCHLORIDE 0.4 MG: 0.4 CAPSULE ORAL at 09:36

## 2024-01-03 RX ADMIN — CEFTRIAXONE 2000 MG: 2 INJECTION, POWDER, FOR SOLUTION INTRAMUSCULAR; INTRAVENOUS at 09:34

## 2024-01-03 RX ADMIN — Medication 10 ML: at 09:36

## 2024-01-03 RX ADMIN — DOXYCYCLINE 100 MG: 100 CAPSULE ORAL at 09:35

## 2024-01-03 RX ADMIN — Medication 10 ML: at 20:08

## 2024-01-03 RX ADMIN — FINASTERIDE 5 MG: 5 TABLET, FILM COATED ORAL at 09:35

## 2024-01-03 RX ADMIN — SENNOSIDES AND DOCUSATE SODIUM 2 TABLET: 8.6; 5 TABLET ORAL at 09:38

## 2024-01-03 NOTE — THERAPY RE-EVALUATION
Patient Name: Luis Perez Jr.  : 1945    MRN: [id number]                              Today's Date: 1/3/2024       Admit Date: 2023    Visit Dx:     ICD-10-CM ICD-9-CM   1. Acute kidney injury  N17.9 584.9   2. Generalized weakness  R53.1 780.79   3. Pericardial effusion  I31.39 423.9   4. Leukocytosis, unspecified type  D72.829 288.60   5. Elevated LFTs  R79.89 790.6   6. Hematuria, unspecified type  R31.9 599.70     Patient Active Problem List   Diagnosis    Hypertension    Paroxysmal atrial fibrillation    Snoring    Obstructive sleep apnea - Intolerant CPAP/BiPAP    Chronic persistent asthma    Non-smoker    Obesity, Class III, BMI 40-49.9 (morbid obesity)    Perennial rhinitis    Vitamin D deficiency    Rhinovirus    Pericardial effusion    MONIQUE (acute kidney injury)    Transaminitis    Lactic acidosis     Past Medical History:   Diagnosis Date    Asthma     Bronchitis, chronic     Cancer     Skin     Cellulitis and abscess of right leg     Chronic persistent asthma 2020    Hyperlipidemia     Hypertension     Nephrolithiasis     Obesity, Class III, BMI 40-49.9 (morbid obesity) 2020    Paroxysmal atrial fibrillation 07/15/2019    Pneumonia     Sleep apnea     UTI (urinary tract infection)     Vitamin D deficiency 2020     Past Surgical History:   Procedure Laterality Date    CARDIAC CATHETERIZATION N/A 2023    Procedure: Pericardiocentesis;  Surgeon: Clarence Chaudhari MD;  Location: [facility] INVASIVE LOCATION;  Service: Cardiovascular;  Laterality: N/A;    CARDIOVERSION  2019    COLONOSCOPY      NASAL SEPTUM SURGERY      Deviation Repair    SKIN CANCER EXCISION      TONSILLECTOMY      VASECTOMY        General Information       Row Name 24 1421          Physical Therapy Time and Intention    Document Type re-evaluation  -KG     Mode of Treatment physical therapy  -KG       Row Name 24 1421          General Information    Patient Profile Reviewed yes   -KG     Prior Level of Function --  please see IE  -KG     Existing Precautions/Restrictions fall;oxygen therapy device and L/min  -KG     Barriers to Rehab medically complex  -KG       Row Name 01/03/24 1421          Cognition    Orientation Status (Cognition) oriented x 4  -KG       Row Name 01/03/24 1421          Safety Issues, Functional Mobility    Safety Issues Affecting Function (Mobility) awareness of need for assistance;insight into deficits/self-awareness;safety precaution awareness;safety precautions follow-through/compliance  -KG     Impairments Affecting Function (Mobility) balance;coordination;endurance/activity tolerance;postural/trunk control;shortness of breath;strength  -KG               User Key  (r) = Recorded By, (t) = Taken By, (c) = Cosigned By      Initials Name Provider Type    KG Tiki Parker, PT Physical Therapist                   Mobility       Row Name 01/03/24 1421          Bed Mobility    Comment, (Bed Mobility) UIC  -KG       Row Name 01/03/24 1421          Transfers    Comment, (Transfers) VC's for sequencing and safe hand placement. Pt with mild instability upon initial stand from [facility].  -KG       Row Name 01/03/24 1421          Sit-Stand Transfer    Sit-Stand [place] (Transfers) minimum assist (75% patient effort);verbal cues  -KG       Row Name 01/03/24 1421          Gait/Stairs (Locomotion)    [place] Level (Gait) minimum assist (75% patient effort);verbal cues  -KG     Assistive Device (Gait) other (see comments)  B UE support on tripod monitor  -KG     Distance in Feet (Gait) 200  -KG     Deviations/Abnormal Patterns (Gait) base of support, wide;jaime decreased;stride length decreased  -KG     Bilateral Gait Deviations forward flexed posture  -KG     Comment, (Gait/Stairs) Pt demonstrated step through gait pattern with slow jaime and wide HANS. Cues for upright posture and to keep monitor closer with feet inside frame. Pt unsteady throughout ambulation.  Distance limited by fatigue.  -KG               User Key  (r) = Recorded By, (t) = Taken By, (c) = Cosigned By      Initials Name Provider Type    Tiki Abrams PT Physical Therapist                   Obj/Interventions       Row Name 01/03/24 1424          Balance    Balance Assessment sitting static balance;standing static balance;standing dynamic balance  -KG     Static Sitting Balance standby assist  -KG     Position, Sitting Balance unsupported;sitting in chair  -KG     Static Standing Balance minimal assist  -KG     Dynamic Standing Balance minimal assist  -KG     Position/Device Used, Standing Balance supported  -KG       Row Name 01/03/24 1424          Sensory Assessment (Somatosensory)    Sensory Assessment (Somatosensory) LE sensation intact  -KG               User Key  (r) = Recorded By, (t) = Taken By, (c) = Cosigned By      Initials Name Provider Type    Tiki Abrams PT Physical Therapist                   Goals/Plan       Row Name 01/03/24 1426          Bed Mobility Goal 1 (PT)    Activity/Assistive Device (Bed Mobility Goal 1, PT) sit to supine;supine to sit  -KG     [place] Level/Cues Needed (Bed Mobility Goal 1, PT) contact guard required  -KG     Time Frame (Bed Mobility Goal 1, PT) 2 weeks  -KG     Progress/Outcomes (Bed Mobility Goal 1, PT) goal ongoing;goal revised this date  -KG       Row Name 01/03/24 1426          Transfer Goal 1 (PT)    Activity/Assistive Device (Transfer Goal 1, PT) sit-to-stand/stand-to-sit;bed-to-chair/chair-to-bed;walker, rolling  -KG     [place] Level/Cues Needed (Transfer Goal 1, PT) standby assist  -KG     Time Frame (Transfer Goal 1, PT) 2 weeks  -KG     Progress/Outcome (Transfer Goal 1, PT) goal ongoing  -KG       Row Name 01/03/24 1426          Gait Training Goal 1 (PT)    Activity/Assistive Device (Gait Training Goal 1, PT) gait (walking locomotion);assistive device use;walker, rolling  -KG     [place] Level (Gait Training  Goal 1, PT) contact guard required  -KG     Distance (Gait Training Goal 1, PT) 200 feet  -KG     Time Frame (Gait Training Goal 1, PT) 2 weeks  -KG     Progress/Outcome (Gait Training Goal 1, PT) goal ongoing;goal revised this date  -KG               User Key  (r) = Recorded By, (t) = Taken By, (c) = Cosigned By      Initials Name Provider Type    KG Tiki Parker, PT Physical Therapist                   Clinical Impression       Row Name 01/03/24 1424          Pain    Additional Documentation Pain Scale: FACES Pre/Post-Treatment (Group)  -KG       Row Name 01/03/24 1424          Pain Scale: FACES Pre/Post-Treatment    Pain: FACES Scale, Pretreatment 2-->hurts little bit  -KG     Posttreatment Pain Rating 2-->hurts little bit  -KG     Pain Location generalized  -KG       Row Name 01/03/24 1424          Plan of Care Review    Plan of Care Reviewed With patient  -KG     Outcome Evaluation PT re-evaluation completed for pt s/p transfer to ICU presenting with generalized weakness, impaired balance and coordination, SOA, and decreased functional mobility. Pt ambulated 200ft with Kimberley and B UE support on tripod monitor. Pt's goals have been revised and pt would continue to benefit from PT skilled care. Recommend D/C to IP rehab facility.  -KG       Row Name 01/03/24 1424          Therapy Assessment/Plan (PT)    Rehab Potential (PT) good, to achieve stated therapy goals  -KG     Criteria for Skilled Interventions Met (PT) yes;skilled treatment is necessary  -KG     Therapy Frequency (PT) daily  -KG       Row Name 01/03/24 1424          Vital Signs    Pre Systolic BP Rehab 100  -KG     Pre Treatment Diastolic BP 83  -KG     Post Systolic BP Rehab 141  -KG     Post Treatment Diastolic BP 80  -KG     Pretreatment Heart Rate (beats/min) 103  -KG     Posttreatment Heart Rate (beats/min) 102  -KG     Pre SpO2 (%) 94  -KG     O2 Delivery Pre Treatment supplemental O2  -KG     Post SpO2 (%) 97  -KG     O2 Delivery Post  Treatment supplemental O2  -KG     Pre Patient Position Sitting  -KG     Intra Patient Position Standing  -KG     Post Patient Position Sitting  -KG       Row Name 01/03/24 1424          Positioning and Restraints    Pre-Treatment Position bedside commode  -KG     Post Treatment Position chair  -KG     In Chair reclined;call light within reach;encouraged to call for assist;with family/caregiver;with nsg;RUE elevated;LUE elevated;legs elevated  -KG               User Key  (r) = Recorded By, (t) = Taken By, (c) = Cosigned By      Initials Name Provider Type    Tiki Abrams, PT Physical Therapist                   Outcome Measures       Row Name 01/03/24 1426          How much help from another person do you currently need...    Turning from your back to your side while in flat bed without using bedrails? 3  -KG     Moving from lying on back to sitting on the side of a flat bed without bedrails? 2  -KG     Moving to and from a bed to a chair (including a wheelchair)? 3  -KG     Standing up from a chair using your arms (e.g., wheelchair, bedside chair)? 3  -KG     Climbing 3-5 steps with a railing? 2  -KG     To walk in hospital room? 3  -KG     AM-PAC 6 Clicks Score (PT) 16  -KG     Highest Level of Mobility Goal 5 --> Static standing  -KG       Row Name 01/03/24 1426          Functional Assessment    Outcome Measure Options AM-PAC 6 Clicks Basic Mobility (PT)  -KG               User Key  (r) = Recorded By, (t) = Taken By, (c) = Cosigned By      Initials Name Provider Type    Tiki Abrams PT Physical Therapist                                 Physical Therapy Education       Title: PT OT SLP Therapies (In Progress)       Topic: Physical Therapy (In Progress)       Point: Mobility training (In Progress)       Learning Progress Summary             Patient Acceptance, E, NR by RYAN at 1/3/2024 1013    Acceptance, E, NR by SANTA at 1/1/2024 1055    Acceptance, E, NR by KG at 12/31/2023 0924                          Point: Home exercise program (In Progress)       Learning Progress Summary             Patient Acceptance, E, NR by KG at 1/3/2024 1013    Acceptance, E, NR by SANTA at 1/1/2024 1055                         Point: Body mechanics (In Progress)       Learning Progress Summary             Patient Acceptance, E, NR by KG at 1/3/2024 1013    Acceptance, E, NR by SANTA at 1/1/2024 1055    Acceptance, E, NR by KG at 12/31/2023 0945                         Point: Precautions (In Progress)       Learning Progress Summary             Patient Acceptance, E, NR by KG at 1/3/2024 1013    Acceptance, E, NR by SANTA at 1/1/2024 1055    Acceptance, E, NR by KG at 12/31/2023 0945                                         User Key       Initials Effective Dates Name Provider Type Discipline    SANTA 02/03/23 -  Zo Zendejas, PT Physical Therapist PT    KG 05/22/20 -  Tiki Parker, PT Physical Therapist PT                  PT Recommendation and Plan  Planned Therapy Interventions (PT): balance training, bed mobility training, gait training, strengthening, transfer training  Plan of Care Reviewed With: patient  Outcome Evaluation: PT re-evaluation completed for pt s/p transfer to ICU presenting with generalized weakness, impaired balance and coordination, SOA, and decreased functional mobility. Pt ambulated 200ft with Kimberley and B UE support on tripod monitor. Pt's goals have been revised and pt would continue to benefit from PT skilled care. Recommend D/C to IP rehab facility.     Time Calculation:   PT Evaluation Complexity  History, PT Evaluation Complexity: 3 or more personal factors and/or comorbidities  Examination of Body Systems (PT Eval Complexity): total of 3 or more elements  Clinical Presentation (PT Evaluation Complexity): stable  Clinical Decision Making (PT Evaluation Complexity): low complexity  Overall Complexity (PT Evaluation Complexity): low complexity     PT Charges       Row Name 01/03/24 1013              Time Calculation    Start Time 1013  -KG      PT Received On 01/03/24  -KG      PT Goal Re-Cert Due Date 01/13/24  -KG         Time Calculation- PT    Total Timed Code Minutes- PT 23 minute(s)  -KG         Timed Charges    96651 - PT Therapeutic Activity Minutes 23  -KG         Untimed Charges    PT Eval/Re-eval Minutes 23  -KG         Total Minutes    Timed Charges Total Minutes 23  -KG      Untimed Charges Total Minutes 23  -KG       Total Minutes 46  -KG                User Key  (r) = Recorded By, (t) = Taken By, (c) = Cosigned By      Initials Name Provider Type    KG Tiki Parker, PT Physical Therapist                  Therapy Charges for Today       Code Description Service Date Service Provider Modifiers Qty    57151246895 HC PT THERAPEUTIC ACT EA 15 MIN 1/3/2024 Tiki Parker, PT GP 2    20564832593 HC PT RE-EVAL ESTABLISHED PLAN 2 1/3/2024 Tiki Parker, PT GP 1            PT G-Codes  Outcome Measure Options: AM-PAC 6 Clicks Basic Mobility (PT)  AM-PAC 6 Clicks Score (PT): 16  AM-PAC 6 Clicks Score (OT): 15  PT Discharge Summary  Anticipated Discharge Disposition (PT): inpatient rehabilitation facility    Noemí Parker PT  1/3/2024

## 2024-01-03 NOTE — PROGRESS NOTES
Luis Perez Jr.  [id number]  1945   LOS: 4 days   Patient Care Team:  Sierra Kuo MD as PCP - General (Internal Medicine)  Latasha Poe MD as Consulting Physician (Dermatopathology)  Provider, No Known  Camille Mccord APRN as Nurse Practitioner (Pulmonary Disease)  Jaja Mansfield APRN as Nurse Practitioner (Cardiology)    Chief Complaint:  LARGE PE / TAMPONADE / SHOCK / PAF / MONIQUE / T2 DM / PERICARDIOCENTESIS     Subjective     Generally comfortable semirecumbent in bed on 6 L/min nasal oxygen (oximetry 96%).  He denies anginal type chest discomfort, tachypalpitations, increased tachypnea/dyspnea, nausea, emesis, or abdominal pain.  Family was concerned about slurred speech yesterday but daughter in room feels that this has improved.  He denies additional focal motor-sensory changes or headache.  He did not wish to participate in physical therapy and walked only once yesterday in the evening.  He states his appetite is good.  Continues to have coarse nonproductive cough but denies pleurisy, hemoptysis, or increased tachypnea/dyspnea.  He has persistent pedal edema.    Objective     Vital Sign Min/Max for last 24 hours  Temp  Min: 97.3 °F (36.3 °C)  Max: 97.8 °F (36.6 °C)   BP  Min: 108/74  Max: 139/104   Pulse  Min: 78  Max: 103   Resp  Min: 16  Max: 20   SpO2  Min: 88 %  Max: 98 %               12/31/23  2109 12/31/23  2888   Weight: (!) 161 kg (356 lb) (!) 161 kg (356 lb)         Intake/Output Summary (Last 24 hours) at 1/3/2024 0707  Last data filed at 1/3/2024 0600  Gross per 24 hour   Intake 1090 ml   Output 6700 ml   Net -5610 ml       Physical Exam:     General Appearance:    Alert, cooperative, in no acute distress   Lungs:     C scattered rhonchi and wheezes with overall diminished breath sounds,respirations regular, even and                unlabored    Heart:    Irregular and normal rate, variable S1 and S2, grade 1/6 systolic murmur, no gallop, no rub, no click    Abdomen:  Extremities:   Soft, nontender, bowel sounds audible x4  1+ edema, normal range of motion   Pulses:   Pulses palpable and equal bilaterally      Results Review:   Results from last 7 days   Lab Units 01/03/24  0253 01/02/24  0353 01/01/24 0522   SODIUM mmol/L 140 133* 129*   POTASSIUM mmol/L 3.5 3.9 4.9   CHLORIDE mmol/L 101 97* 92*   CO2 mmol/L 27.0 20.0* 17.0*   BUN mg/dL 67* 88* 95*   CREATININE mg/dL 1.46* 2.12* 3.11*   GLUCOSE mg/dL 160* 173* 117*   CALCIUM mg/dL 8.3* 7.9* 8.0*     Results from last 7 days   Lab Units 01/02/24  0353 01/01/24  0522 12/31/23 2052   WBC 10*3/mm3 12.34* 16.93* 19.24*   HEMOGLOBIN g/dL 14.3 14.3 14.8   HEMATOCRIT % 43.6 43.6 44.6   PLATELETS 10*3/mm3 296 312 374     Results from last 7 days   Lab Units 12/30/23  1055   HEMOGLOBIN A1C % 6.40*     Results from last 7 days   Lab Units 01/01/24  0115 12/31/23 2052 12/30/23  1055   HSTROP T ng/L 34* 17 13     PHOSPHORUS: 3.7    NO NEW CXR / EKG.    ECHO (1/2/24):      Left ventricular systolic function is normal. Calculated left ventricular EF = 62.1%    There is a trivial pericardial effusion.    Medication Review: REVIEWED; NO DRIPS.    Assessment & Plan     Excellent diuresis with improving GFR.  Appreciate assistance of Dr. Chaudhari; moderate drainage on pericardiocentesis site dressing.  No discernible fever/chills or progressive respiratory dysfunction but continues to have significant oxygen requirement.  Chest x-ray yesterday not markedly abnormal in my opinion.  Acute kidney injury related to probable ATN secondary to shock from pericardial tamponade appears to be resolving.    RECOMMENDATIONS:    1.  Add low-dose aspirin 81 mg daily; defer formal anticoagulation at this time  2.  Tentatively adjust dose of sotalol in a.m. if GFR continues to improve  3.  Defer PIETER/ECV at this time  4.  The patient definitively will need further evaluation of obstructive sleep apnea and repeat trial of treatment as outpatient  postdischarge in my opinion  5.  Eventual outpatient EP assessment for possible PVA plus or minus Watchman device plus or minus alternative antiarrhythmic therapy such as dofetilide  6.  Tentative consideration of restarting apixaban in 3-4 days if GFR continues to improve and hemodynamics remained stable  7.  The patient is encouraged to participate in physical therapy, ambulate, and use pulmonary hygiene with incentive spirometer.  8.  Diuretic therapy per nephrology    Discussed with patient, daughter, and RN in room.      Hypertension    Paroxysmal atrial fibrillation    Obstructive sleep apnea - Intolerant CPAP/BiPAP    Chronic persistent asthma    Obesity, Class III, BMI 40-49.9 (morbid obesity)    Pericardial effusion    MONIQUE (acute kidney injury)    Transaminitis    Lactic acidosis    01/03/24  07:07 EST

## 2024-01-03 NOTE — PROGRESS NOTES
Cardiothoracic Surgery Progress Note      CC: pericardial effusion     LOS: 4 days      Subjective:  No acute events, pericardial drain removed yesterday    Objective:  Vital Signs  Temp:  [97.3 °F (36.3 °C)-97.8 °F (36.6 °C)] 97.5 °F (36.4 °C)  Heart Rate:  [] 97  Resp:  [16-20] 18  BP: (108-139)/() 125/76    Physical Exam:   General Appearance: alert, appears stated age and cooperative   Lungs: decreased lung sounds bilateral bases, left scattered rhonchi    Heart: regular rate and rhythm    Skin: Incision c/d/I      Results:    Results from last 7 days   Lab Units 01/02/24  0353   WBC 10*3/mm3 12.34*   HEMOGLOBIN g/dL 14.3   HEMATOCRIT % 43.6   PLATELETS 10*3/mm3 296     Results from last 7 days   Lab Units 01/03/24  0253   SODIUM mmol/L 140   POTASSIUM mmol/L 3.5   CHLORIDE mmol/L 101   CO2 mmol/L 27.0   BUN mg/dL 67*   CREATININE mg/dL 1.46*   GLUCOSE mg/dL 160*   CALCIUM mg/dL 8.3*       Assessment:  Pericardial effusion  MONIQUE  Transaminitis  Afib    Plan:  Will sign off at this time  Continue supportive care in ICU   Nephrology following for MONIQUE      Naya Issa PA-C  01/03/24  07:22 EST    --    I reviewed this documentation. I interviewed and examined this patient. Plan as documented.     Eligio Amaya M.D., R.P.V.I.  Cardiothoracic and Vascular Surgeon  [facility]

## 2024-01-03 NOTE — PLAN OF CARE
Goal Outcome Evaluation:  Plan of Care Reviewed With: patient           Outcome Evaluation: PT re-evaluation completed for pt s/p transfer to ICU presenting with generalized weakness, impaired balance and coordination, SOA, and decreased functional mobility. Pt ambulated 200ft with Kimberley and B UE support on tripod monitor. Pt's goals have been revised and pt would continue to benefit from PT skilled care. Recommend D/C to IP rehab facility.      Anticipated Discharge Disposition (PT): inpatient rehabilitation facility

## 2024-01-03 NOTE — PROGRESS NOTES
"Critical Care Note     LOS: 4 days   Patient Care Team:  Sierra Kuo MD as PCP - General (Internal Medicine)  Latasha Poe MD as Consulting Physician (Dermatopathology)  Provider, No Known  Camille Mccord APRN as Nurse Practitioner (Pulmonary Disease)  Jaja Mansfield APRN as Nurse Practitioner (Cardiology)    Chief Complaint/Reason for visit:    Chief Complaint   Patient presents with    Shortness of Breath   Pericardial effusion  Acute kidney injury  Atrial fibrillation  Moderate chronic obstructive asthma      Subjective     Interval History:     Pericardial drain was removed yesterday.  On 3 L saturation is 95%.  6.7 L urine output with Bumex yesterday.  Creatinine 1.46, improving    Review of Systems:    All systems were reviewed and negative except as noted in subjective.    Medical history, surgical history, social history, family history reviewed    Objective     Intake/Output:    Intake/Output Summary (Last 24 hours) at 1/3/2024 1235  Last data filed at 1/3/2024 1200  Gross per 24 hour   Intake 720 ml   Output 5600 ml   Net -4880 ml       Nutrition:  Diet: Regular/House Diet, Cardiac Diets, Renal Diets; Healthy Heart (2-3 Na+); Low Potassium, Low Phosphorus, Low Sodium (2-3g); Texture: Regular Texture (IDDSI 7); Fluid Consistency: Thin (IDDSI 0)    Infusions:           Telemetry: Sinus bradycardia             Vital Signs  Blood pressure 100/73, pulse 96, temperature 97.4 °F (36.3 °C), temperature source Oral, resp. rate 16, height 185.4 cm (73\"), weight (!) 161 kg (356 lb), SpO2 95%.    Physical Exam:  General Appearance:  Overweight older gentleman supine in bed   Head:  Atraumatic   Eyes:          No jaundice   Ears:     Throat: Oral mucosa moist   Neck: Large neck, trachea midline, no crepitus   Back:      Lungs:   Breath sounds are bilateral, shallow, with scattered expiratory rhonchi    Heart:  Slow rate, regular, S1, S2 auscultated   Abdomen:   Large abdomen, bowel sounds present, " "soft   Rectal:   Deferred   Extremities: 2+ edema bilateral pretibial, some upper extremity edema as well   Pulses:    Skin: Warm and dry   Lymph nodes: No cervical adenopathy   Neurologic: Oriented x 3, speech fluent,  equal      Results Review:     I reviewed the patient's new clinical results.   Results from last 7 days   Lab Units 01/03/24  0253 01/02/24  0353 01/01/24 0522 12/31/23 2052 12/31/23  0304   SODIUM mmol/L 140 133* 129* 129* 129*   POTASSIUM mmol/L 3.5 3.9 4.9 5.2 5.0   CHLORIDE mmol/L 101 97* 92* 89* 91*   CO2 mmol/L 27.0 20.0* 17.0* 13.0* 17.0*   BUN mg/dL 67* 88* 95* 91* 75*   CREATININE mg/dL 1.46* 2.12* 3.11* 3.37* 2.73*   CALCIUM mg/dL 8.3* 7.9* 8.0* 8.3* 8.5*   BILIRUBIN mg/dL  --   --  0.7 1.0 1.3*   ALK PHOS U/L  --   --  100 109 96   ALT (SGPT) U/L  --   --  1,111* 1,358* 1,426*   AST (SGOT) U/L  --   --  825* 1,488* 3,069*   GLUCOSE mg/dL 160* 173* 117* 141* 154*     Results from last 7 days   Lab Units 01/02/24  0353 01/01/24 0522 12/31/23 2052   WBC 10*3/mm3 12.34* 16.93* 19.24*   HEMOGLOBIN g/dL 14.3 14.3 14.8   HEMATOCRIT % 43.6 43.6 44.6   PLATELETS 10*3/mm3 296 312 374     Results from last 7 days   Lab Units 01/02/24  0432   PH, ARTERIAL pH units 7.371   PO2 ART mm Hg 105.0   PCO2, ARTERIAL mm Hg 41.1   HCO3 ART mmol/L 23.8     Lab Results   Component Value Date    BLOODCX No growth at 3 days 12/30/2023     No results found for: \"URINECX\"    I reviewed the patient's new imaging including images and reports.    Chest x-ray 1/2 shows persistent cardiomegaly no effusion or edema    Renal ultrasound, normal-sized kidneys renal cyst present.  No hydronephrosis      Interpretation Summary         Left ventricular systolic function is normal. Left ventricular ejection fraction appears to be 61 - 65%.    Preintervention, large pericardial effusion with early signs of tamponade    Post intervention (950 mL serosanguineous fluid removal) trivial pericardial effusion remains, with " normal expansion of the RA and RV.    All medications reviewed.   ascorbic acid, 1,000 mg, Oral, Daily  aspirin, 162 mg, Oral, Daily  budesonide-formoterol, 2 puff, Inhalation, BID - RT  bumetanide, 2 mg, Intravenous, Q12H  cefTRIAXone, 2,000 mg, Intravenous, Q24H  doxycycline, 100 mg, Oral, Q12H  finasteride, 5 mg, Oral, Daily  montelukast, 10 mg, Oral, Nightly  senna-docusate sodium, 2 tablet, Oral, BID  sodium chloride, 10 mL, Intravenous, Q12H  sotalol, 120 mg, Oral, Q24H  tamsulosin, 0.4 mg, Oral, Daily          Assessment & Plan       Hypertension    Paroxysmal atrial fibrillation    Obstructive sleep apnea - Intolerant CPAP/BiPAP    Chronic persistent asthma    Obesity, Class III, BMI 40-49.9 (morbid obesity)    Pericardial effusion    MONIQUE (acute kidney injury)    Transaminitis    Lactic acidosis    78-year-old gentleman with a history of hypertension, paroxysmal atrial fibrillation, sleep apnea intolerant of CPAP, chronic persistent asthma, discharged December 27 after electrical cardioversion and readmitted with acute kidney injury productive cough with recent rhinovirus infection and a new pericardial effusion.  Repeat echocardiogram revealed a large pericardial effusion and on December 31 he underwent pericardial drain with 950 mL removed.    #1 pericardial effusion status post pericardiocentesis December 31.  Fluid was bloody with over 5 million red cells, white cells 13,000, 56% lymphocytes.  Gram stain had no organisms, and cultures are negative at 48 hours.  AFB smear is negative.  No fungal culture sent.  Follow-up echocardiogram revealed no significant residual effusion.  Drain was removed January 2.  Cytology is pending as well.    #2 paroxysmal atrial fibrillation, recently discharged on sotalol and Eliquis, after electrical cardioversion on December 26, 2023.  He developed recurrent atrial fibrillation with a controlled rate.    #3 acute kidney injury, creatinine today is 1.43, after peaking at  3.37.  Baseline creatinine is 1.04.  Urine output yesterday 6700 mL.    #4 recent viral respiratory illness, swab positive for rhinovirus on December 25.  Follow-up swab on December 30 is negative.  He does have a known history of chronic persistent asthma.  He is on Dupixent every 2 weeks, Breo as a maintenance inhaler and has an albuterol rescue inhaler.  He is congested on examination but does not have wheezing.  White count remains 12.3      PLAN:      Follow-up pericardial  cytology  Currently on Rocephin and doxycycline for possible respiratory illness  Substitute Symbicort for Breo secondary to formulary issues  Continue Singulair 10 mg daily  Nebulized bronchodilators will add scheduled treatments    Amlodipine, hold for now   Flomax, finasteride for BPH  Sotalol for atrial fibrillation  Hold anticoagulation  Continue diuresis    Monitor H&H, BUN, creatinine, electrolytes  He would benefit from treatment of his sleep apnea but apparently has been intolerant of CPAP at home    VTE Prophylaxis:SCDS    Stress Ulcer Prophylaxis: none    Gabriela Block MD  01/03/24  12:35 EST      Time: Critical care 25 min  I personally provided care to this critically ill patient as documented above.  Critical care time does not include time spent on separately billed procedures.  None of my critical care time was concurrent with other critical care providers.

## 2024-01-03 NOTE — CASE MANAGEMENT/SOCIAL WORK
Continued Stay Note  [facility]     Patient Name: Luis Perez Jr.  MRN: [id number]  Today's Date: 1/3/2024    Admit Date: 12/30/2023    Plan: TBD   Discharge Plan       Row Name 01/03/24 1338       Plan    Plan TBD    Patient/Family in Agreement with Plan yes    Plan Comments Spoke with pt and daughter at bedside. Will await further therapy recs for proper discharge placement and will discuss with patient and family. CM will cont to follow.                   Discharge Codes    No documentation.                 Expected Discharge Date and Time       Expected Discharge Date Expected Discharge Time    Jan 5, 2024               April Davis RN

## 2024-01-03 NOTE — PROGRESS NOTES
" LOS: 4 days   Patient Care Team:  Sierra Kuo MD as PCP - General (Internal Medicine)  Latasha Poe MD as Consulting Physician (Dermatopathology)  Provider, No Known  Camille Mccord APRN as Nurse Practitioner (Pulmonary Disease)  Jaja Mansfield APRN as Nurse Practitioner (Cardiology)    Chief Complaint: MONIQUE    Subjective     Renal function improving. Dependent edema noted but improving. Excellent UOP.    Subjective:  Symptoms:  Improved.  No shortness of breath, chest pain or chest pressure.        History taken from: patient    Objective     Vital Sign Min/Max for last 24 hours  Temp  Min: 97.3 °F (36.3 °C)  Max: 97.8 °F (36.6 °C)   BP  Min: 96/81  Max: 139/104   Pulse  Min: 78  Max: 103   Resp  Min: 15  Max: 20   SpO2  Min: 89 %  Max: 98 %   Flow (L/min)  Min: 3  Max: 6   No data recorded     Flowsheet Rows      Flowsheet Row First Filed Value   Admission Height 185.4 cm (73\") Documented at 12/30/2023 1056   Admission Weight 147 kg (324 lb) Documented at 12/30/2023 1056            I/O this shift:  In: -   Out: 2100 [Urine:2100]  I/O last 3 completed shifts:  In: 1330 [P.O.:1330]  Out: 8825 [Urine:8825]    Objective:  General Appearance:  Comfortable.    Vital signs: (most recent): Blood pressure 125/88, pulse 96, temperature 97.4 °F (36.3 °C), temperature source Oral, resp. rate 15, height 185.4 cm (73\"), weight (!) 161 kg (356 lb), SpO2 91%.  Vital signs are normal.    Output: Producing urine.    HEENT: Normal HEENT exam.    Lungs:  Normal effort, normal respiratory rate and increased effort.  Breath sounds clear to auscultation.    Heart: Normal rate.  Regular rhythm.  S1 normal and S2 normal.    Abdomen: Abdomen is soft.    Extremities: There is dependent edema.    Neurological: Patient is alert and oriented to person, place and time.  Normal strength.    Pupils:  Pupils are equal, round, and reactive to light.    Skin:  Warm.                Results Review:     I reviewed the patient's new " "clinical results.    WBC WBC   Date Value Ref Range Status   01/02/2024 12.34 (H) 3.40 - 10.80 10*3/mm3 Final   01/01/2024 16.93 (H) 3.40 - 10.80 10*3/mm3 Final   12/31/2023 19.24 (H) 3.40 - 10.80 10*3/mm3 Final      HGB Hemoglobin   Date Value Ref Range Status   01/02/2024 14.3 13.0 - 17.7 g/dL Final   01/01/2024 14.3 13.0 - 17.7 g/dL Final   12/31/2023 14.8 13.0 - 17.7 g/dL Final      HCT Hematocrit   Date Value Ref Range Status   01/02/2024 43.6 37.5 - 51.0 % Final   01/01/2024 43.6 37.5 - 51.0 % Final   12/31/2023 44.6 37.5 - 51.0 % Final      Platlets No results found for: \"LABPLAT\"   MCV MCV   Date Value Ref Range Status   01/02/2024 89.5 79.0 - 97.0 fL Final   01/01/2024 90.1 79.0 - 97.0 fL Final   12/31/2023 90.3 79.0 - 97.0 fL Final          Sodium Sodium   Date Value Ref Range Status   01/03/2024 140 136 - 145 mmol/L Final   01/02/2024 133 (L) 136 - 145 mmol/L Final   01/01/2024 129 (L) 136 - 145 mmol/L Final   12/31/2023 129 (L) 136 - 145 mmol/L Final      Potassium Potassium   Date Value Ref Range Status   01/03/2024 3.5 3.5 - 5.2 mmol/L Final     Comment:     Slight hemolysis detected by analyzer. Result may be falsely elevated.   01/02/2024 3.9 3.5 - 5.2 mmol/L Final     Comment:     Slight hemolysis detected by analyzer. Result may be falsely elevated.   01/01/2024 4.9 3.5 - 5.2 mmol/L Final     Comment:     Slight hemolysis detected by analyzer. Result may be falsely elevated.   12/31/2023 5.2 3.5 - 5.2 mmol/L Final      Chloride Chloride   Date Value Ref Range Status   01/03/2024 101 98 - 107 mmol/L Final   01/02/2024 97 (L) 98 - 107 mmol/L Final   01/01/2024 92 (L) 98 - 107 mmol/L Final   12/31/2023 89 (L) 98 - 107 mmol/L Final      CO2 CO2   Date Value Ref Range Status   01/03/2024 27.0 22.0 - 29.0 mmol/L Final   01/02/2024 20.0 (L) 22.0 - 29.0 mmol/L Final   01/01/2024 17.0 (L) 22.0 - 29.0 mmol/L Final   12/31/2023 13.0 (L) 22.0 - 29.0 mmol/L Final      BUN BUN   Date Value Ref Range Status " "  01/03/2024 67 (H) 8 - 23 mg/dL Final   01/02/2024 88 (H) 8 - 23 mg/dL Final   01/01/2024 95 (H) 8 - 23 mg/dL Final   12/31/2023 91 (H) 8 - 23 mg/dL Final      Creatinine Creatinine   Date Value Ref Range Status   01/03/2024 1.46 (H) 0.76 - 1.27 mg/dL Final   01/02/2024 2.12 (H) 0.76 - 1.27 mg/dL Final   01/01/2024 3.11 (H) 0.76 - 1.27 mg/dL Final   12/31/2023 3.37 (H) 0.76 - 1.27 mg/dL Final      Calcium Calcium   Date Value Ref Range Status   01/03/2024 8.3 (L) 8.6 - 10.5 mg/dL Final   01/02/2024 7.9 (L) 8.6 - 10.5 mg/dL Final   01/01/2024 8.0 (L) 8.6 - 10.5 mg/dL Final   12/31/2023 8.3 (L) 8.6 - 10.5 mg/dL Final      PO4 No results found for: \"CAPO4\"   Albumin Albumin   Date Value Ref Range Status   01/03/2024 2.9 (L) 3.5 - 5.2 g/dL Final   01/02/2024 2.8 (L) 3.5 - 5.2 g/dL Final   01/01/2024 3.1 (L) 3.5 - 5.2 g/dL Final   12/31/2023 3.5 3.5 - 5.2 g/dL Final      Magnesium Magnesium   Date Value Ref Range Status   01/02/2024 2.9 (H) 1.6 - 2.4 mg/dL Final   01/01/2024 2.8 (H) 1.6 - 2.4 mg/dL Final      Uric Acid No results found for: \"URICACID\"     Medication Review: yes    Assessment & Plan       Hypertension    Paroxysmal atrial fibrillation    Obstructive sleep apnea - Intolerant CPAP/BiPAP    Chronic persistent asthma    Obesity, Class III, BMI 40-49.9 (morbid obesity)    Pericardial effusion    MONIQUE (acute kidney injury)    Transaminitis    Lactic acidosis      Assessment & Plan    Acute kidney injury:  Baseline cr ~ 0.5 mg/dl Cr 1.3>2.3>2.7  on this admission.Risk factor for MONIQUE: Pericardial effusion, recent initiation of diuretics, possible sepsis. UA large blood noted.      Pericardial effusion: Large circumferential pericardial effusion noted on last visit 12/26. Presenting back with worsening PALM and SOB. Underwent pericardiocentesis w 1.2 liter fluid removed. Sent for analysis      Transaminitis: Elevated LFT, INR 2.1. Congestive hepatopathy??     Lactic acidosis: Unclear etiology. Sepsis workup on " going. On abx. Blood cx pending. Repeat lactate      Hyponatremia: Due to recent HCTZ use and MONIQUE. Continue to monitor     Hyperphosphatemia: phos 7.8. Monitor for now.      Recs  Expect improvement in renal function now that pericardial effusion is drained. Patient has dependent edema. Wife believes torsemide made hematuria worse ( Based her on research). It is listed as allergy in the chart. I tried to reassure and clarify this is not an allergy   Continue w Bumex 2 mg daily  Limit fluid intake to 1.2 liter/day  Abx per primary service.   Strict I/O.      High risk for decompensation.           Checo Cardozo MD  01/03/24  15:35 EST

## 2024-01-04 LAB
ALBUMIN SERPL-MCNC: 3.2 G/DL (ref 3.5–5.2)
ANION GAP SERPL CALCULATED.3IONS-SCNC: 14 MMOL/L (ref 5–15)
BACTERIA FLD CULT: NORMAL
BACTERIA SPEC AEROBE CULT: NORMAL
BACTERIA SPEC AEROBE CULT: NORMAL
BUN SERPL-MCNC: 45 MG/DL (ref 8–23)
BUN/CREAT SERPL: 40.9 (ref 7–25)
CALCIUM SPEC-SCNC: 8.5 MG/DL (ref 8.6–10.5)
CHLORIDE SERPL-SCNC: 104 MMOL/L (ref 98–107)
CO2 SERPL-SCNC: 29 MMOL/L (ref 22–29)
CREAT SERPL-MCNC: 1.1 MG/DL (ref 0.76–1.27)
EGFRCR SERPLBLD CKD-EPI 2021: 68.7 ML/MIN/1.73
GLUCOSE SERPL-MCNC: 152 MG/DL (ref 65–99)
GRAM STN SPEC: NORMAL
GRAM STN SPEC: NORMAL
MAGNESIUM SERPL-MCNC: 1.7 MG/DL (ref 1.6–2.4)
MAGNESIUM SERPL-MCNC: 1.9 MG/DL (ref 1.6–2.4)
PHOSPHATE SERPL-MCNC: 2.6 MG/DL (ref 2.5–4.5)
POTASSIUM SERPL-SCNC: 3.3 MMOL/L (ref 3.5–5.2)
POTASSIUM SERPL-SCNC: 3.6 MMOL/L (ref 3.5–5.2)
REF LAB TEST METHOD: NORMAL
SODIUM SERPL-SCNC: 147 MMOL/L (ref 136–145)

## 2024-01-04 PROCEDURE — 93005 ELECTROCARDIOGRAM TRACING: CPT | Performed by: INTERNAL MEDICINE

## 2024-01-04 PROCEDURE — 97168 OT RE-EVAL EST PLAN CARE: CPT

## 2024-01-04 PROCEDURE — 25010000002 BUMETANIDE PER 0.5 MG: Performed by: INTERNAL MEDICINE

## 2024-01-04 PROCEDURE — 99232 SBSQ HOSP IP/OBS MODERATE 35: CPT | Performed by: INTERNAL MEDICINE

## 2024-01-04 PROCEDURE — 84132 ASSAY OF SERUM POTASSIUM: CPT | Performed by: INTERNAL MEDICINE

## 2024-01-04 PROCEDURE — 83735 ASSAY OF MAGNESIUM: CPT | Performed by: INTERNAL MEDICINE

## 2024-01-04 PROCEDURE — 93010 ELECTROCARDIOGRAM REPORT: CPT | Performed by: INTERNAL MEDICINE

## 2024-01-04 PROCEDURE — 94799 UNLISTED PULMONARY SVC/PX: CPT

## 2024-01-04 PROCEDURE — 80069 RENAL FUNCTION PANEL: CPT | Performed by: INTERNAL MEDICINE

## 2024-01-04 PROCEDURE — 97535 SELF CARE MNGMENT TRAINING: CPT

## 2024-01-04 PROCEDURE — 94664 DEMO&/EVAL PT USE INHALER: CPT

## 2024-01-04 PROCEDURE — 25010000002 ACETAZOLAMIDE PER 500 MG: Performed by: INTERNAL MEDICINE

## 2024-01-04 PROCEDURE — 25010000002 CEFTRIAXONE PER 250 MG: Performed by: HOSPITALIST

## 2024-01-04 PROCEDURE — 97530 THERAPEUTIC ACTIVITIES: CPT

## 2024-01-04 RX ORDER — POTASSIUM CHLORIDE 1.5 G/1.58G
40 POWDER, FOR SOLUTION ORAL EVERY 4 HOURS
Status: DISCONTINUED | OUTPATIENT
Start: 2024-01-05 | End: 2024-01-05

## 2024-01-04 RX ORDER — ACETAZOLAMIDE SODIUM 500 MG/5ML
500 INJECTION, POWDER, LYOPHILIZED, FOR SOLUTION INTRAVENOUS EVERY 12 HOURS
Status: COMPLETED | OUTPATIENT
Start: 2024-01-04 | End: 2024-01-05

## 2024-01-04 RX ORDER — POTASSIUM CHLORIDE 1.5 G/1.58G
40 POWDER, FOR SOLUTION ORAL 2 TIMES DAILY
Status: COMPLETED | OUTPATIENT
Start: 2024-01-04 | End: 2024-01-04

## 2024-01-04 RX ORDER — SOTALOL HYDROCHLORIDE 80 MG/1
80 TABLET ORAL EVERY 12 HOURS SCHEDULED
Status: DISCONTINUED | OUTPATIENT
Start: 2024-01-04 | End: 2024-01-04

## 2024-01-04 RX ORDER — POTASSIUM CHLORIDE 1.5 G/1.58G
40 POWDER, FOR SOLUTION ORAL 2 TIMES DAILY
Status: DISCONTINUED | OUTPATIENT
Start: 2024-01-04 | End: 2024-01-04

## 2024-01-04 RX ORDER — BISOPROLOL FUMARATE 5 MG/1
5 TABLET, FILM COATED ORAL
Status: DISCONTINUED | OUTPATIENT
Start: 2024-01-04 | End: 2024-01-06 | Stop reason: HOSPADM

## 2024-01-04 RX ADMIN — DOXYCYCLINE 100 MG: 100 CAPSULE ORAL at 21:19

## 2024-01-04 RX ADMIN — FINASTERIDE 5 MG: 5 TABLET, FILM COATED ORAL at 08:08

## 2024-01-04 RX ADMIN — ASPIRIN 162 MG: 81 TABLET, COATED ORAL at 08:08

## 2024-01-04 RX ADMIN — BUDESONIDE AND FORMOTEROL FUMARATE DIHYDRATE 2 PUFF: 160; 4.5 AEROSOL RESPIRATORY (INHALATION) at 08:01

## 2024-01-04 RX ADMIN — POTASSIUM CHLORIDE 40 MEQ: 1.5 POWDER, FOR SOLUTION ORAL at 11:49

## 2024-01-04 RX ADMIN — POTASSIUM CHLORIDE 40 MEQ: 1.5 POWDER, FOR SOLUTION ORAL at 21:17

## 2024-01-04 RX ADMIN — CEFTRIAXONE 2000 MG: 2 INJECTION, POWDER, FOR SOLUTION INTRAMUSCULAR; INTRAVENOUS at 08:07

## 2024-01-04 RX ADMIN — ACETAZOLAMIDE 500 MG: 500 INJECTION, POWDER, LYOPHILIZED, FOR SOLUTION INTRAVENOUS at 16:04

## 2024-01-04 RX ADMIN — TAMSULOSIN HYDROCHLORIDE 0.4 MG: 0.4 CAPSULE ORAL at 08:08

## 2024-01-04 RX ADMIN — MONTELUKAST 10 MG: 10 TABLET, FILM COATED ORAL at 21:17

## 2024-01-04 RX ADMIN — BUMETANIDE 2 MG: 0.25 INJECTION INTRAMUSCULAR; INTRAVENOUS at 11:48

## 2024-01-04 RX ADMIN — BISOPROLOL FUMARATE 5 MG: 5 TABLET, FILM COATED ORAL at 08:08

## 2024-01-04 RX ADMIN — DOXYCYCLINE 100 MG: 100 CAPSULE ORAL at 08:08

## 2024-01-04 RX ADMIN — Medication 10 ML: at 21:20

## 2024-01-04 RX ADMIN — OXYCODONE HYDROCHLORIDE AND ACETAMINOPHEN 1000 MG: 500 TABLET ORAL at 08:08

## 2024-01-04 RX ADMIN — BUDESONIDE AND FORMOTEROL FUMARATE DIHYDRATE 2 PUFF: 160; 4.5 AEROSOL RESPIRATORY (INHALATION) at 20:29

## 2024-01-04 RX ADMIN — SOTALOL HYDROCHLORIDE 80 MG: 80 TABLET ORAL at 08:08

## 2024-01-04 RX ADMIN — Medication 5 MG: at 21:17

## 2024-01-04 NOTE — DISCHARGE PLACEMENT REQUEST
"Luis Dumont Jr. (78 y.o. Male)     April Davis [phone]      Date of Birth   1945    Social Security Number       Address   [address] [place] [address]    Home Phone   [phone]    MRN   [id number]       [facility]    Marital Status                               Admission Date   12/30/23    Admission Type   Emergency    Admitting Provider   Clarence Chaudhari MD    Attending Provider   Clarence Chaudhari MD    Department, Room/Bed   [facility]       Discharge Date       Discharge Disposition       Discharge Destination                                 Attending Provider: Clarence Chaudhari MD    Allergies: Torsemide, Adhesive Tape    Isolation: Droplet   Infection: Rhinovirus  (12/25/23)   Code Status: No CPR    Ht: 185.4 cm (73\")   Wt: 161 kg (356 lb)    Admission Cmt: None   Principal Problem: Pericardial effusion [I31.39]                   Active Insurance as of 12/30/2023       Primary Coverage       Payor Plan Insurance Group Employer/Plan Group    MEDICARE MEDICARE A & B        Payor Plan Address Payor Plan Phone Number Payor Plan Fax Number Effective Dates    [address] [phone]  9/1/2010 - None Entered    Formerly [facility] [id number]         Subscriber Name Subscriber Birth Date Member ID       LUIS DUMONT JR. 1945 [id number]               Secondary Coverage       Payor Plan Insurance Group Employer/Plan Group    AARP MC SUP AARP HEALTH CARE OPTIONS        Payor Plan Address Payor Plan Phone Number Payor Plan Fax Number Effective Dates    [facility] [phone]  1/1/2016 - None Entered    [address]       [facility] [address]         Subscriber Name Subscriber Birth Date Member ID       LUIS DUMONT JR. 1945 [id number]                     Emergency Contacts        (Rel.) Home Phone Work Phone Mobile Phone    Gudelia Dumont (Spouse) [phone] -- [phone]    Janice Nava (Daughter) " -- -- [phone]                 History & Physical        Amanda Arredondo MD at 23 [phone]              [facility] Medicine Services  HISTORY AND PHYSICAL    Patient Name: Luis Perez Jr.  : 1945  MRN: [id number]  Primary Care Physician: Sierra Kuo MD  Date of admission: 2023      Subjective  Subjective     Chief Complaint:  weakness    HPI:  Luis Perez Jr. is a 78 y.o. male with a past medical history of A-fib, COPD, RUY not on CPAP hyperlipidemia who recently was admitted for A-fib with RVR.  During that admission he was cardioverted on  and discharged home the following day.  He was discharged home on torsemide and sotalol.  These medications were continued after discharge.  Patient and wife provide the history.  They state that he really has not improved since discharge.  Continues to have significant weakness and shortness of breath.  Describes the shortness of breath briefly improved after the cardioversion but has now been pretty stable.  He has had intermittent productive cough but no chest pain.  No fevers.  No vomiting or diarrhea but has had some nausea.  States that he has had a fairly poor appetite but has been able to continue to drink liquids.  Noted some bloody urine after discharge and this has continued with a decrease in urine output subjectively.    In the ED, noted to have MONIQUE with lactic acidosis and AGMA.  Given 500ml bolus NS.        Personal History     Past Medical History:   Diagnosis Date    Asthma     Bronchitis, chronic     Cancer     Skin     Cellulitis and abscess of right leg     Chronic persistent asthma 2020    Hyperlipidemia     Hypertension     Nephrolithiasis     Obesity, Class III, BMI 40-49.9 (morbid obesity) 2020    Paroxysmal atrial fibrillation 07/15/2019    Pneumonia     Sleep apnea     UTI (urinary tract infection)     Vitamin D deficiency 2020             Past Surgical History:    Procedure Laterality Date    CARDIOVERSION  08/05/2019    COLONOSCOPY      NASAL SEPTUM SURGERY      Deviation Repair    SKIN CANCER EXCISION      TONSILLECTOMY      VASECTOMY         Family History: family history includes Alzheimer's disease in his father; Cancer in his mother; Diabetes in his mother; No Known Problems in his maternal grandfather, maternal grandmother, paternal grandfather, and paternal grandmother.     Social History:  reports that he has never smoked. He has never been exposed to tobacco smoke. He quit smokeless tobacco use about 4 years ago.  His smokeless tobacco use included chew. He reports current alcohol use. He reports that he does not use drugs.  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  (Not in a hospital admission)      Allergies   Allergen Reactions    Adhesive Tape Rash       Objective  Objective     Vital Signs:   Temp:  [97.3 °F (36.3 °C)] 97.3 °F (36.3 °C)  Heart Rate:  [59-73] 69  Resp:  [22] 22  BP: ()/(60-95) 88/65       Physical Exam   Constitutional: Awake, alert, NAD  Eyes: PERRLA,   HENT: NCAT, mucous membranes moist  Neck: Supple, trachea midline  Respiratory: Diminished BS due to body habitus bilaterally, nonlabored respirations   Cardiovascular: Irreg rate and rhythm, no murmurs, rubs, or gallops, palpable pedal pulses bilaterally  Gastrointestinal: Positive bowel sounds, soft, nontender, nondistended, protuberant  Musculoskeletal: 3+ bilateral ankle edema, with chronic venous stasis changes with hyperpigmentation to LE.    Psychiatric: Appropriate affect, cooperative  Neurologic: Oriented x 3, strength symmetric in all extremities, Cranial Nerves grossly intact to confrontation, speech clear  Skin: No  chichi      Result Review:  I have personally reviewed the results from the time of this admission to 12/30/2023 15:43 EST and agree with these findings:  [x]  Laboratory list / accordion  []  Microbiology  [x]  Radiology  [x]  EKG/Telemetry   [x]  Cardiology/Vascular   []  Pathology  [x]  Old records  []  Other:          LAB RESULTS:      Lab 12/30/23  1357 12/30/23  1055 12/25/23  1421   WBC  --  20.86* 11.99*   HEMOGLOBIN  --  15.3 14.1   HEMATOCRIT  --  46.8 42.4   PLATELETS  --  374 319   NEUTROS ABS  --  15.30* 8.14*   IMMATURE GRANS (ABS)  --  0.52* 0.18*   LYMPHS ABS  --  1.98 1.94   MONOS ABS  --  2.96* 1.67*   EOS ABS  --  0.02 0.01   MCV  --  90.2 87.2   LACTATE 5.3* 4.3*  --          Lab 12/30/23  1055 12/27/23  0811 12/26/23  2128 12/26/23  0308 12/25/23  1421   SODIUM 131*  --   --  137 135*   POTASSIUM 4.7  --  3.9 3.2* 3.5   CHLORIDE 91*  --   --  93* 94*   CO2 24.0  --   --  26.0 25.0   ANION GAP 16.0*  --   --  18.0* 16.0*   BUN 69*  --   --  31* 27*   CREATININE 2.30*  --   --  1.31* 1.04   EGFR 28.4*  --   --  55.7* 73.5   GLUCOSE 163*  --   --  176* 178*   CALCIUM 9.1  --   --  9.0 9.3   MAGNESIUM  --  2.7*  --   --   --          Lab 12/30/23  1055 12/25/23  1421   TOTAL PROTEIN 6.9 7.0   ALBUMIN 3.7 4.0   GLOBULIN 3.2 3.0   ALT (SGPT) 453* 73*   AST (SGOT) 664* 88*   BILIRUBIN 1.2 0.8   ALK PHOS 87 67         Lab 12/30/23  1055 12/25/23  1421   PROBNP 306.9 455.3   HSTROP T 13 10                 UA          12/25/2023    17:47 12/30/2023    12:39   Urinalysis   Squamous Epithelial Cells, UA 0-2  None Seen    Specific [place], UA 1.014  1.019    Ketones, UA Negative  Trace    Blood, UA Large (3+)  Large (3+)    Leukocytes, UA Trace  Small (1+)    Nitrite, UA Negative  Negative    RBC, UA Too Numerous to Count  Too Numerous to Count    WBC, UA 6-10  0-2    Bacteria, UA 1+  None Seen        Microbiology Results (last 10 days)       Procedure Component Value - Date/Time    COVID PRE-OP /  PRE-PROCEDURE SCREENING ORDER (NO ISOLATION) - Swab, Nasopharynx [[id number]]  (Normal) Collected: 12/30/23 1141    Lab Status: Final result Specimen: Swab from Nasopharynx Updated: 12/30/23 1257    Narrative:      The following orders were created for panel order COVID PRE-OP / PRE-PROCEDURE SCREENING ORDER (NO ISOLATION) - Swab, Nasopharynx.  Procedure                               Abnormality         Status                     ---------                               -----------         ------                     Respiratory Panel PCR w/...[[id number]]  Normal              Final result                 Please view results for these tests on the individual orders.    Respiratory Panel PCR w/COVID-19(SARS-CoV-2) RIKY/DIEGO/JAVI/PAD/COR/KYLIE In-House, NP Swab in UTM/VTM, 2 HR TAT - Swab, Nasopharynx [[id number]]  (Normal) Collected: 12/30/23 1141    Lab Status: Final result Specimen: Swab from Nasopharynx Updated: 12/30/23 1257     ADENOVIRUS, PCR Not Detected     Coronavirus 229E Not Detected     Coronavirus HKU1 Not Detected     Coronavirus NL63 Not Detected     Coronavirus OC43 Not Detected     COVID19 Not Detected     Human Metapneumovirus Not Detected     Human Rhinovirus/Enterovirus Not Detected     Influenza A PCR Not Detected     Influenza B PCR Not Detected     Parainfluenza Virus 1 Not Detected     Parainfluenza Virus 2 Not Detected     Parainfluenza Virus 3 Not Detected     Parainfluenza Virus 4 Not Detected     RSV, PCR Not Detected     Bordetella pertussis pcr Not Detected     Bordetella parapertussis PCR Not Detected     Chlamydophila pneumoniae PCR Not Detected     Mycoplasma pneumo by PCR Not Detected    Narrative:      In the setting of a positive respiratory panel with a viral infection PLUS a negative procalcitonin without other underlying concern for bacterial infection, consider observing off antibiotics or discontinuation of antibiotics and continue supportive care. If the respiratory panel is positive  for atypical bacterial infection (Bordetella pertussis, Chlamydophila pneumoniae, or Mycoplasma pneumoniae), consider antibiotic de-escalation to target atypical bacterial infection.    COVID PRE-OP / PRE-PROCEDURE SCREENING ORDER (NO ISOLATION) - Swab, Nasopharynx [[id number]]  (Abnormal) Collected: 12/25/23 1512    Lab Status: Final result Specimen: Swab from Nasopharynx Updated: 12/25/23 1639    Narrative:      The following orders were created for panel order COVID PRE-OP / PRE-PROCEDURE SCREENING ORDER (NO ISOLATION) - Swab, Nasopharynx.  Procedure                               Abnormality         Status                     ---------                               -----------         ------                     Respiratory Panel PCR w/...[[id number]]  Abnormal            Final result                 Please view results for these tests on the individual orders.    Respiratory Panel PCR w/COVID-19(SARS-CoV-2) RIKY/DIEGO/JAVI/PAD/COR/KYLIE In-House, NP Swab in UTM/VTM, 2 HR TAT - Swab, Nasopharynx [[id number]]  (Abnormal) Collected: 12/25/23 1512    Lab Status: Final result Specimen: Swab from Nasopharynx Updated: 12/25/23 1639     ADENOVIRUS, PCR Not Detected     Coronavirus 229E Not Detected     Coronavirus HKU1 Not Detected     Coronavirus NL63 Not Detected     Coronavirus OC43 Not Detected     COVID19 Not Detected     Human Metapneumovirus Not Detected     Human Rhinovirus/Enterovirus Detected     Influenza A PCR Not Detected     Influenza B PCR Not Detected     Parainfluenza Virus 1 Not Detected     Parainfluenza Virus 2 Not Detected     Parainfluenza Virus 3 Not Detected     Parainfluenza Virus 4 Not Detected     RSV, PCR Not Detected     Bordetella pertussis pcr Not Detected     Bordetella parapertussis PCR Not Detected     Chlamydophila pneumoniae PCR Not Detected     Mycoplasma pneumo by PCR Not Detected    Narrative:      In the setting of a positive respiratory panel with a viral infection PLUS a negative  procalcitonin without other underlying concern for bacterial infection, consider observing off antibiotics or discontinuation of antibiotics and continue supportive care. If the respiratory panel is positive for atypical bacterial infection (Bordetella pertussis, Chlamydophila pneumoniae, or Mycoplasma pneumoniae), consider antibiotic de-escalation to target atypical bacterial infection.            XR Chest 1 View    Result Date: 12/30/2023  XR CHEST 1 VW Date of Exam: 12/30/2023 10:55 AM EST Indication: SOA triage protocol Comparison: 12/25/2023 Findings: Stable enlargement of the cardiac silhouette. Pulmonary vasculature within normal limits. Lungs grossly clear other than mild atelectasis in the bases. Costophrenic angles sharp     Impression: Impression: No acute process demonstrated Electronically Signed: Winston Sheldon  12/30/2023 11:09 AM EST  Workstation ID: [id number]     Results for orders placed during the hospital encounter of 12/25/23    Adult Transthoracic Echo Complete W/ Cont if Necessary Per Protocol    Interpretation Summary    Left ventricular systolic function is normal. Estimated left ventricular EF = 60%    Left ventricular wall thickness is consistent with moderate concentric hypertrophy.    The cardiac valves are anatomically and functionally normal.    Estimated right ventricular systolic pressure from tricuspid regurgitation is normal (<35 mmHg).    There is a large (>2cm) circumferential pericardial effusion. There is no evidence of cardiac tamponade.      Assessment & Plan  Assessment & Plan     Active Hospital Problems    Diagnosis  POA    MONIQUE (acute kidney injury) [N17.9]  Yes    Transaminitis [R74.01]  Yes    Lactic acidosis [E87.20]  Yes    Metabolic acidosis, increased anion gap [E87.29]  Yes    Pericardial effusion [I31.39]  Yes     Echo (12/26/2023): LVEF 60%.  Moderate LVH.  Normal valves.  Large circumferential pericardial effusion without tamponade      Obesity, Class III, BMI  40-49.9 (morbid obesity) [E66.01]  Yes    Obstructive sleep apnea - Intolerant CPAP/BiPAP [G47.33]  Yes    Paroxysmal atrial fibrillation [I48.0]  Yes     [id number] 3 (age >75, HTN)  Echo (12/26/2023): LVEF 60%.  Moderate LVH.  Normal valves.  Large circumferential pericardial effusion without tamponade  Successful external cardioversion, 12/26/2023      Hypertension [I10]  Yes       Luis Perez Jr. Is a 78 y.o. M with a history of PAF, COPD, RUY and known pericardial effusion, who presents with persistent weakness and shortness of breath with a recent hospitalization and discharged on 12/27/2023, found to have a new MONIQUE, transaminitis and lactic acidosis..    MONIQUE  Anion gap metabolic acidosis  Lactic acidosis  Hematuria  Leukocytosis  -- Unclear etiology, per history seems to have been able to keep up hydration wise.  --Was discharged on torsemide, of which pt continued to take, this could be adding to underlying cause.  Will hold all nephrotoxic medications.--HCTZ and torsemide  --UA trace ketones  --No clear infectious sx.  UA with hematuria.  --Obtain Blood cx  --Hold off on empiric abx for now, but if sx worsen, then will start broad spectrum abx.  --Order procal  --Lactate is trending up after getting fluid in ED.  Will give another bolus with repeat lactate.  --Will need to confirm that pt is not retaining urine.--order post-void residuals and monitor for urinary retention.  As this could be etiology of the MONIQUE as well.  --Cont pt flomax and finasteride.  --Other etiology of MONIQUE could be related to pericardial effusion and poor perfusion  --Repeat labs in AM    Pericardial effusion  Afib  --SOB not improved.    --No O2 requirement and BP is stable.--no clear signs of tamponade.  --Last ECHO less than 1 week ago.  Will hold off on repeat for now.  --Cards consult to help with pericardial effusion management.  Last admission, not drained due to pt on anticoagulation  --hold eliquis for now  --Cont  sotalol    Transaminitis  --This is new--AST/ALT worse from last hospitalization  --Repeat in AM.  --Add GGT  --Will order liver u/s for in AM.  If no improvement, may need further imaging.  No evidence of gallbladder etiology.    COPD  RUY  --pt to bring home Breo inhaler  --Pt intolerant of CPAP--followed by pulm      DVT prophylaxis:  scds      CODE STATUS:    Code Status and Medical Interventions:   Ordered at: 12/30/23 1313     Medical Intervention Limits:    NO intubation (DNI)     Code Status (Patient has no pulse and is not breathing):    No CPR (Do Not Attempt to Resuscitate)     Medical Interventions (Patient has pulse or is breathing):    Limited Support       Expected Discharge   Expected Discharge Date: 1/4/2024; Expected Discharge Time:      Amanda Arredondo MD  12/30/23    Total time spent: 90 minutes  Time spent includes time reviewing chart, face-to-face time, counseling patient/family/caregiver, ordering medications/tests/procedures, communicating with other health care professionals, documenting clinical information in the electronic health record, and coordination of care.       Electronically signed by Amanda Arredondo MD at 12/30/23 1545          Physician Progress Notes (most recent note)        Gabriela Block MD at 01/04/24 0948          Critical Care Note     LOS: 5 days   Patient Care Team:  Sierra Kuo MD as PCP - General (Internal Medicine)  Latasha Poe MD as Consulting Physician (Dermatopathology)  Provider, No Known  Camille Mccord APRN as Nurse Practitioner (Pulmonary Disease)  Jaja Mansfield APRN as Nurse Practitioner (Cardiology)    Chief Complaint/Reason for visit:    Chief Complaint   Patient presents with    Shortness of Breath   Pericardial effusion  Acute kidney injury  Atrial fibrillation  Moderate chronic obstructive asthma      Subjective     Interval History:     Impressive diuresis, 7.5 L yesterday, 6.7 L the day before.  Pericardial  "drain removed January 2.  He was able to ambulate 200 feet supporting his upper arms on a tripod monitor.  He remains in atrial fibrillation with a rate in the low 100s.  He is on room air with a saturation of 92%.  He denies chest pain or shortness of air but does have fatigue.    Review of Systems:    All systems were reviewed and negative except as noted in subjective.    Medical history, surgical history, social history, family history reviewed    Objective     Intake/Output:    Intake/Output Summary (Last 24 hours) at 1/4/2024 0948  Last data filed at 1/4/2024 0600  Gross per 24 hour   Intake 400 ml   Output 6770 ml   Net -6370 ml       Nutrition:  Diet: Regular/House Diet, Cardiac Diets, Renal Diets; Healthy Heart (2-3 Na+); Low Potassium, Low Phosphorus, Low Sodium (2-3g); Texture: Regular Texture (IDDSI 7); Fluid Consistency: Thin (IDDSI 0)    Infusions:           Telemetry: Sinus bradycardia             Vital Signs  Blood pressure 132/95, pulse 112, temperature 98.5 °F (36.9 °C), temperature source Oral, resp. rate 20, height 185.4 cm (73\"), weight (!) 161 kg (356 lb), SpO2 92%.    Physical Exam:  General Appearance:  Overweight older gentleman upright in bed   Head:  Atraumatic   Eyes:          No jaundice   Ears:     Throat: Oral mucosa moist   Neck: Large neck, trachea midline, no crepitus   Back:      Lungs:   Breath sounds are bilateral, shallow, with scattered expiratory rhonchi    Heart:  Slow rate, regular, S1, S2 auscultated, pericardial drain in place, no output   Abdomen:   Large abdomen, bowel sounds present, soft   Rectal:   Deferred   Extremities: 2+ edema bilateral pretibial, some upper extremity edema as well   Pulses:    Skin: Warm and dry   Lymph nodes: No cervical adenopathy   Neurologic: Oriented x 3, speech fluent,  equal      Results Review:     I reviewed the patient's new clinical results.   Results from last 7 days   Lab Units 01/04/24  0337 01/03/24  0253 01/02/24  0353 " "01/01/24  0522 12/31/23 2052 12/31/23  0304   SODIUM mmol/L 147* 140 133* 129* 129* 129*   POTASSIUM mmol/L 3.3* 3.5 3.9 4.9 5.2 5.0   CHLORIDE mmol/L 104 101 97* 92* 89* 91*   CO2 mmol/L 29.0 27.0 20.0* 17.0* 13.0* 17.0*   BUN mg/dL 45* 67* 88* 95* 91* 75*   CREATININE mg/dL 1.10 1.46* 2.12* 3.11* 3.37* 2.73*   CALCIUM mg/dL 8.5* 8.3* 7.9* 8.0* 8.3* 8.5*   BILIRUBIN mg/dL  --   --   --  0.7 1.0 1.3*   ALK PHOS U/L  --   --   --  100 109 96   ALT (SGPT) U/L  --   --   --  1,111* 1,358* 1,426*   AST (SGOT) U/L  --   --   --  825* 1,488* 3,069*   GLUCOSE mg/dL 152* 160* 173* 117* 141* 154*     Results from last 7 days   Lab Units 01/02/24  0353 01/01/24 0522 12/31/23 2052   WBC 10*3/mm3 12.34* 16.93* 19.24*   HEMOGLOBIN g/dL 14.3 14.3 14.8   HEMATOCRIT % 43.6 43.6 44.6   PLATELETS 10*3/mm3 296 312 374     Results from last 7 days   Lab Units 01/02/24  0432   PH, ARTERIAL pH units 7.371   PO2 ART mm Hg 105.0   PCO2, ARTERIAL mm Hg 41.1   HCO3 ART mmol/L 23.8     Lab Results   Component Value Date    BLOODCX No growth at 4 days 12/30/2023     No results found for: \"URINECX\"    I reviewed the patient's new imaging including images and reports.    No chest x-ray today    Renal ultrasound, normal-sized kidneys renal cyst present.  No hydronephrosis      Interpretation Summary         Left ventricular systolic function is normal. Left ventricular ejection fraction appears to be 61 - 65%.    Preintervention, large pericardial effusion with early signs of tamponade    Post intervention (950 mL serosanguineous fluid removal) trivial pericardial effusion remains, with normal expansion of the RA and RV.    All medications reviewed.   ascorbic acid, 1,000 mg, Oral, Daily  aspirin, 162 mg, Oral, Daily  bisoprolol, 5 mg, Oral, Q24H  budesonide-formoterol, 2 puff, Inhalation, BID - RT  bumetanide, 2 mg, Intravenous, Q12H  doxycycline, 100 mg, Oral, Q12H  finasteride, 5 mg, Oral, Daily  montelukast, 10 mg, Oral, Nightly  potassium " chloride, 40 mEq, Oral, BID  senna-docusate sodium, 2 tablet, Oral, BID  sodium chloride, 10 mL, Intravenous, Q12H  tamsulosin, 0.4 mg, Oral, Daily          Assessment & Plan       Pericardial effusion    Hypertension    Paroxysmal atrial fibrillation    Obstructive sleep apnea - Intolerant CPAP/BiPAP    Chronic persistent asthma    Obesity, Class III, BMI 40-49.9 (morbid obesity)    MONIQUE (acute kidney injury)    Transaminitis    Lactic acidosis    78-year-old gentleman with a history of hypertension, persistent atrial fibrillation, sleep apnea intolerant of CPAP, chronic persistent asthma, discharged December 27 after electrical cardioversion and readmitted with acute kidney injury productive cough with recent rhinovirus infection and a new pericardial effusion.  Repeat echocardiogram revealed a large pericardial effusion and on December 31 he underwent pericardial drain with 950 mL removed.    #1 pericardial effusion status post pericardiocentesis December 31.  Fluid was bloody with over 5 million red cells, white cells 13,000, 56% lymphocytes.  Gram stain had no organisms, and cultures are negative at 48 hours.  AFB smear is negative.  No fungal culture sent.  Cytology was negative for malignancy.  Follow-up echocardiogram revealed no significant residual effusion.  Drain was removed January 2.      #2  Persistent atrial fibrillation, recently discharged on sotalol and Eliquis, after electrical cardioversion on December 26, 2023.  He developed recurrent atrial fibrillation with a controlled rate.  Today cardiology changed his sotalol to Zetia    #3 acute kidney injury, creatinine today is 1.10, after peaking at 3.37.  Baseline creatinine is 1.04.  Urine output yesterday 7500 mL.  Potassium 3.3 and being replaced.  Sodium did increase slightly to 147.    #4 recent viral respiratory illness, swab positive for rhinovirus on December 25.  Follow-up swab on December 30 is negative.  He does have a known history of  chronic persistent asthma.  He is on Dupixent every 2 weeks, last dose was .  Breo as a maintenance inhaler and has an albuterol rescue inhaler.  He is congested on examination but does not have wheezing.  He has completed Rocephin and doxycycline will complete today.      PLAN:    Rocephin is completed, last dose of doxycycline will be tonight  Substitute Symbicort for Breo secondary to formulary issues  Continue Singulair 10 mg daily  Nebulized bronchodilators as needed for wheezing     Flomax, finasteride for BPH  Sotalol changed to Zetia for atrial fibrillation rate control  Hold anticoagulation  Continue diuresis, continues to have significant third space edema    Relax fluid restriction so that he can correct his sodium  Replace potassium  Check magnesium    He would benefit from treatment of his sleep apnea but apparently has been intolerant of CPAP at home.  His apnea-hypopnea index showed only mild obstructive sleep apnea but he had significant desaturation.  Auto CPAP 8-18 recommended, or an oral appliance.    VTE Prophylaxis:SCDS    Stress Ulcer Prophylaxis: none    Gabriela Block MD  24  09:48 EST      Time: Critical care 25 min  I personally provided care to this critically ill patient as documented above.  Critical care time does not include time spent on separately billed procedures.  None of my critical care time was concurrent with other critical care providers.     Electronically signed by Gabriela Block MD at 24 1000          Physical Therapy Notes (most recent note)        Tiki Parker, PT at 24 0915  Version 1 of 1         Patient Name: Luis Perez Jr.  : 1945    MRN: [id number]                              Today's Date: 2024       Admit Date: 2023    Visit Dx:     ICD-10-CM ICD-9-CM   1. Acute kidney injury  N17.9 584.9   2. Generalized weakness  R53.1 780.79   3. Pericardial effusion  I31.39 423.9   4. Leukocytosis,  unspecified type  D72.829 288.60   5. Elevated LFTs  R79.89 790.6   6. Hematuria, unspecified type  R31.9 599.70     Patient Active Problem List   Diagnosis    Hypertension    Paroxysmal atrial fibrillation    Snoring    Obstructive sleep apnea - Intolerant CPAP/BiPAP    Chronic persistent asthma    Non-smoker    Obesity, Class III, BMI 40-49.9 (morbid obesity)    Perennial rhinitis    Vitamin D deficiency    Rhinovirus    Pericardial effusion    MONIQUE (acute kidney injury)    Transaminitis    Lactic acidosis     Past Medical History:   Diagnosis Date    Asthma     Bronchitis, chronic     Cancer     Skin     Cellulitis and abscess of right leg     Chronic persistent asthma 02/25/2020    Hyperlipidemia     Hypertension     Nephrolithiasis     Obesity, Class III, BMI 40-49.9 (morbid obesity) 02/25/2020    Paroxysmal atrial fibrillation 07/15/2019    Pneumonia     Sleep apnea     UTI (urinary tract infection)     Vitamin D deficiency 08/20/2020     Past Surgical History:   Procedure Laterality Date    CARDIAC CATHETERIZATION N/A 12/31/2023    Procedure: Pericardiocentesis;  Surgeon: Clarence Chaudhari MD;  Location: [facility] INVASIVE LOCATION;  Service: Cardiovascular;  Laterality: N/A;    CARDIOVERSION  08/05/2019    COLONOSCOPY      NASAL SEPTUM SURGERY      Deviation Repair    SKIN CANCER EXCISION      TONSILLECTOMY      VASECTOMY        General Information       Row Name 01/04/24 1329          Physical Therapy Time and Intention    Document Type therapy note (daily note)  -KG     Mode of Treatment physical therapy  -KG       Row Name 01/04/24 1329          General Information    Existing Precautions/Restrictions fall;oxygen therapy device and L/min  -KG       Row Name 01/04/24 1329          Cognition    Orientation Status (Cognition) oriented x 3  -KG       Row Name 01/04/24 1329          Safety Issues, Functional Mobility    Safety Issues Affecting Function (Mobility) awareness of need for assistance;insight into  deficits/self-awareness;safety precaution awareness;safety precautions follow-through/compliance  -KG     Impairments Affecting Function (Mobility) balance;coordination;endurance/activity tolerance;postural/trunk control;shortness of breath;strength;pain  -KG               User Key  (r) = Recorded By, (t) = Taken By, (c) = Cosigned By      Initials Name Provider Type    KG Tiki Parker N, PT Physical Therapist                   Mobility       Row Name 01/04/24 1329          Bed Mobility    Bed Mobility sit-supine  -KG     Sit-Supine [place] (Bed Mobility) moderate assist (50% patient effort);2 person assist;verbal cues  -KG     Assistive Device (Bed Mobility) head of bed elevated  -KG     Comment, (Bed Mobility) VC's for sequencing and technique. Pt required assistance at trunk and BLEs.  -KG       Row Name 01/04/24 1329          Transfers    Comment, (Transfers) VC's for sequencing and safe hand placement. Mild instability upon initial stand; required cues for upright posture.  -KG       Row Name 01/04/24 1329          Sit-Stand Transfer    Sit-Stand [place] (Transfers) contact guard;verbal cues  -KG       Row Name 01/04/24 1329          Gait/Stairs (Locomotion)    [place] Level (Gait) minimum assist (75% patient effort);verbal cues  -KG     Assistive Device (Gait) other (see comments)  B UE support on tripod monitor  -KG     Distance in Feet (Gait) 250  -KG     Deviations/Abnormal Patterns (Gait) base of support, wide;jaime decreased;stride length decreased  -KG     Bilateral Gait Deviations forward flexed posture;heel strike decreased  -KG     Comment, (Gait/Stairs) Pt demonstrated step through gait pattern with slow jaime and wide HANS. Required consistent cues for upright posture and to keep monitor closer with feet inside frame. Demonstrated mild instability throughout ambulation. One standing rest break required due to SOA. Distance limited by fatigue.  -KG               User Key  (r)  = Recorded By, (t) = Taken By, (c) = Cosigned By      Initials Name Provider Type    KG Tiki Parker, PT Physical Therapist                   Obj/Interventions       Row Name 01/04/24 1332          Balance    Balance Assessment sitting static balance;standing static balance;standing dynamic balance  -KG     Static Sitting Balance standby assist  -KG     Position, Sitting Balance unsupported;sitting in chair  -KG     Static Standing Balance contact guard  -KG     Dynamic Standing Balance minimal assist  -KG     Position/Device Used, Standing Balance supported  -KG               User Key  (r) = Recorded By, (t) = Taken By, (c) = Cosigned By      Initials Name Provider Type    KG Tiki Parker, PT Physical Therapist                   Goals/Plan    No documentation.                  Clinical Impression       Row Name 01/04/24 1332          Pain    Pretreatment Pain Rating 2/10  -KG     Posttreatment Pain Rating 2/10  -KG     Pain Location generalized  -KG     Pain Intervention(s) Repositioned;Ambulation/increased activity  -KG       Row Name 01/04/24 1332          Plan of Care Review    Plan of Care Reviewed With patient  -KG     Progress improving  -KG     Outcome Evaluation Pt increased ambulation distance to 250ft with [place] and B UE support on tripod monitor. Pt required frequent cues for upright posture with more narrow HANS. Verbal and tactile cues required to keep monitor close with feet inside middle. Pt required one standing rest break. Limited by fatigue and SOA. Continue to recommend PT skilled care and D/C to IP rehab facility.  -KG       Row Name 01/04/24 1332          Vital Signs    Pre Systolic BP Rehab 126  -KG     Pre Treatment Diastolic BP 96  -KG     Post Systolic BP Rehab 145  -KG     Post Treatment Diastolic   -KG     Pretreatment Heart Rate (beats/min) 106  -KG     Posttreatment Heart Rate (beats/min) 98  -KG     Pre SpO2 (%) 95  -KG     O2 Delivery Pre Treatment supplemental  O2  -KG     Post SpO2 (%) 95  -KG     O2 Delivery Post Treatment supplemental O2  -KG     Pre Patient Position Sitting  -KG     Intra Patient Position Standing  -KG     Post Patient Position Supine  -KG       Row Name 01/04/24 1332          Positioning and Restraints    Pre-Treatment Position sitting in chair/recliner  -KG     Post Treatment Position bed  -KG     In Bed notified nsg;supine;call light within reach;encouraged to call for assist;with family/caregiver;side rails up x3;RUE elevated;LUE elevated  -KG               User Key  (r) = Recorded By, (t) = Taken By, (c) = Cosigned By      Initials Name Provider Type    KG Tiki Parker, PT Physical Therapist                   Outcome Measures       Row Name 01/04/24 1334          How much help from another person do you currently need...    Turning from your back to your side while in flat bed without using bedrails? 3  -KG     Moving from lying on back to sitting on the side of a flat bed without bedrails? 2  -KG     Moving to and from a bed to a chair (including a wheelchair)? 3  -KG     Standing up from a chair using your arms (e.g., wheelchair, bedside chair)? 3  -KG     Climbing 3-5 steps with a railing? 2  -KG     To walk in hospital room? 3  -KG     AM-PAC 6 Clicks Score (PT) 16  -KG     Highest Level of Mobility Goal 5 --> Static standing  -KG       Row Name 01/04/24 1334 01/04/24 1331       Functional Assessment    Outcome Measure Options AM-PAC 6 Clicks Basic Mobility (PT)  -KG AM-PAC 6 Clicks Daily Activity (OT)  -AN              User Key  (r) = Recorded By, (t) = Taken By, (c) = Cosigned By      Initials Name Provider Type    KG Tiki Parker, PT Physical Therapist    Zena Villalobos OT Occupational Therapist                                 Physical Therapy Education       Title: PT OT SLP Therapies (In Progress)       Topic: Physical Therapy (In Progress)       Point: Mobility training (In Progress)       Learning Progress  Summary             Patient Acceptance, E, NR by KG at 1/4/2024 0915    Acceptance, E, NR by KG at 1/3/2024 1013    Acceptance, E, NR by SANTA at 1/1/2024 1055    Acceptance, E, NR by KG at 12/31/2023 0945                         Point: Home exercise program (In Progress)       Learning Progress Summary             Patient Acceptance, E, NR by KG at 1/4/2024 0915    Acceptance, E, NR by KG at 1/3/2024 1013    Acceptance, E, NR by SANTA at 1/1/2024 1055                         Point: Body mechanics (In Progress)       Learning Progress Summary             Patient Acceptance, E, NR by KG at 1/4/2024 0915    Acceptance, E, NR by KG at 1/3/2024 1013    Acceptance, E, NR by SANTA at 1/1/2024 1055    Acceptance, E, NR by KG at 12/31/2023 0945                         Point: Precautions (In Progress)       Learning Progress Summary             Patient Acceptance, E, NR by KG at 1/4/2024 0915    Acceptance, E, NR by KG at 1/3/2024 1013    Acceptance, E, NR by SANTA at 1/1/2024 1055    Acceptance, E, NR by KG at 12/31/2023 0945                                         User Key       Initials Effective Dates Name Provider Type Discipline     02/03/23 -  Zo Zendejas, PT Physical Therapist PT     05/22/20 -  Tiki Parker PT Physical Therapist PT                  PT Recommendation and Plan  Planned Therapy Interventions (PT): balance training, bed mobility training, gait training, strengthening, transfer training  Plan of Care Reviewed With: patient  Progress: improving  Outcome Evaluation: Pt increased ambulation distance to 250ft with [place] and B UE support on tripod monitor. Pt required frequent cues for upright posture with more narrow HANS. Verbal and tactile cues required to keep monitor close with feet inside middle. Pt required one standing rest break. Limited by fatigue and SOA. Continue to recommend PT skilled care and D/C to  rehab facility.     Time Calculation:   PT Evaluation Complexity  History, PT  Evaluation Complexity: 3 or more personal factors and/or comorbidities  Examination of Body Systems (PT Eval Complexity): total of 3 or more elements  Clinical Presentation (PT Evaluation Complexity): stable  Clinical Decision Making (PT Evaluation Complexity): low complexity  Overall Complexity (PT Evaluation Complexity): low complexity     PT Charges       Row Name 24 0915             Time Calculation    Start Time 0915  -KG      PT Received On 24  -KG      PT Goal Re-Cert Due Date 24  -KG         Time Calculation- PT    Total Timed Code Minutes- PT 24 minute(s)  -KG         Timed Charges    64413 - PT Therapeutic Activity Minutes 24  -KG         Total Minutes    Timed Charges Total Minutes 24  -KG       Total Minutes 24  -KG                User Key  (r) = Recorded By, (t) = Taken By, (c) = Cosigned By      Initials Name Provider Type    KG Tiki Parker, PT Physical Therapist                  Therapy Charges for Today       Code Description Service Date Service Provider Modifiers Qty    63688904506 HC PT THERAPEUTIC ACT EA 15 MIN 1/3/2024 Tiki Parker, PT GP 2    07171988235 HC PT RE-EVAL ESTABLISHED PLAN 2 1/3/2024 Tiki Parker, PT GP 1    99572678017 HC PT THERAPEUTIC ACT EA 15 MIN 2024 Tiki Parker, PT GP 2            PT G-Codes  Outcome Measure Options: AM-PAC 6 Clicks Basic Mobility (PT)  AM-PAC 6 Clicks Score (PT): 16  AM-PAC 6 Clicks Score (OT): 15  PT Discharge Summary  Anticipated Discharge Disposition (PT): inpatient rehabilitation facility    Noemí Parker PT  2024      Electronically signed by Tiki Parker PT at 24 1335          Occupational Therapy Notes (most recent note)        Zena Cooper, OT at 24 1050          Patient Name: Luis Perez JrWinifred  : 1945    MRN: [id number]                              Today's Date: 2024       Admit Date: 2023    Visit Dx:     ICD-10-CM ICD-9-CM   1.  Acute kidney injury  N17.9 584.9   2. Generalized weakness  R53.1 780.79   3. Pericardial effusion  I31.39 423.9   4. Leukocytosis, unspecified type  D72.829 288.60   5. Elevated LFTs  R79.89 790.6   6. Hematuria, unspecified type  R31.9 599.70     Patient Active Problem List   Diagnosis    Hypertension    Paroxysmal atrial fibrillation    Snoring    Obstructive sleep apnea - Intolerant CPAP/BiPAP    Chronic persistent asthma    Non-smoker    Obesity, Class III, BMI 40-49.9 (morbid obesity)    Perennial rhinitis    Vitamin D deficiency    Rhinovirus    Pericardial effusion    MONIQUE (acute kidney injury)    Transaminitis    Lactic acidosis     Past Medical History:   Diagnosis Date    Asthma     Bronchitis, chronic     Cancer     Skin     Cellulitis and abscess of right leg     Chronic persistent asthma 02/25/2020    Hyperlipidemia     Hypertension     Nephrolithiasis     Obesity, Class III, BMI 40-49.9 (morbid obesity) 02/25/2020    Paroxysmal atrial fibrillation 07/15/2019    Pneumonia     Sleep apnea     UTI (urinary tract infection)     Vitamin D deficiency 08/20/2020     Past Surgical History:   Procedure Laterality Date    CARDIAC CATHETERIZATION N/A 12/31/2023    Procedure: Pericardiocentesis;  Surgeon: Clarence Chaudhari MD;  Location: [facility] INVASIVE LOCATION;  Service: Cardiovascular;  Laterality: N/A;    CARDIOVERSION  08/05/2019    COLONOSCOPY      NASAL SEPTUM SURGERY      Deviation Repair    SKIN CANCER EXCISION      TONSILLECTOMY      VASECTOMY        General Information       Row Name 01/04/24 1133          OT Time and Intention    Document Type re-evaluation  -AN     Mode of Treatment occupational therapy  -AN       Row Name 01/04/24 1133          General Information    Patient Profile Reviewed yes  -AN     Prior Level of Function independent:;all household mobility;community mobility;gait;ADL's  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, recent falls reported  -AN     Existing Precautions/Restrictions  fall;oxygen therapy device and L/min  -AN     Barriers to Rehab medically complex  -AN       Row Name 01/04/24 1133          Living Environment    People in Home spouse  -AN       Row Name 01/04/24 1133          Home Main Entrance    Number of Stairs, Main Entrance none  -AN       Row Name 01/04/24 1133          Stairs Within Home, Primary    Number of Stairs, Within Home, Primary none  -AN       Row Name 01/04/24 1133          Cognition    Orientation Status (Cognition) oriented x 3  -AN       Row Name 01/04/24 1133          Safety Issues, Functional Mobility    Safety Issues Affecting Function (Mobility) safety precautions follow-through/compliance;safety precaution awareness;insight into deficits/self-awareness;awareness of need for assistance;judgment;sequencing abilities;problem-solving  -AN     Impairments Affecting Function (Mobility) balance;endurance/activity tolerance;postural/trunk control;shortness of breath;strength;pain  -AN               User Key  (r) = Recorded By, (t) = Taken By, (c) = Cosigned By      Initials Name Provider Type    AN Zena Cooper OT Occupational Therapist                     Mobility/ADL's       Row Name 01/04/24 1310          Bed Mobility    Bed Mobility supine-sit  -AN     Supine-Sit [place] (Bed Mobility) verbal cues;contact guard  -AN     Assistive Device (Bed Mobility) head of bed elevated;bed rails  -AN     Comment, (Bed Mobility) Increased time required to sit EOB due to LE edema and scrotal pain  -AN       Row Name 01/04/24 1310          Transfers    Transfers sit-stand transfer;stand-sit transfer  -AN       [facility] Name 01/04/24 1310          Bed-Chair Transfer    Bed-Chair [place] (Transfers) contact guard  -AN     Assistive Device (Bed-Chair Transfers) other (see comments)  UE support  -AN       Row Name 01/04/24 1310          Sit-Stand Transfer    Sit-Stand [place] (Transfers) contact guard  -AN     Assistive Device (Sit-Stand Transfers) other (see  comments)  UE support  -AN       Row Name 01/04/24 1310          Stand-Sit Transfer    Stand-Sit [place] (Transfers) verbal cues;contact guard  -AN     Assistive Device (Stand-Sit Transfers) other (see comments)  UE support  -AN       Row Name 01/04/24 1310          Functional Mobility    Functional Mobility- Ind. Level contact guard assist  -AN     Functional Mobility-Distance (Feet) --  >household distance  -AN     Functional Mobility- Safety Issues balance decreased during turns;supplemental O2  -AN     Functional Mobility- Comment Pt ambulated >household distance with CGA for balance and safety. No LOB throughout. Reported fatigue after completion.  -AN       Row Name 01/04/24 1310          Activities of Daily Living    BADL Assessment/Intervention upper body dressing;lower body dressing  -AN       [facility] Name 01/04/24 1310          Upper Body Dressing Assessment/Training    [place] Level (Upper Body Dressing) don;pajama/robe;set up  -AN     Position (Upper Body Dressing) edge of bed sitting  -AN       [facility] Name 01/04/24 1310          Lower Body Dressing Assessment/Training    [place] Level (Lower Body Dressing) don;doff;socks;dependent (less than 25% patient effort)  -AN     Position (Lower Body Dressing) edge of bed sitting  -AN               User Key  (r) = Recorded By, (t) = Taken By, (c) = Cosigned By      Initials Name Provider Type    Zena Villalobos OT Occupational Therapist                   Obj/Interventions       [facility] Name 01/04/24 1313          Sensory Assessment (Somatosensory)    Sensory Assessment (Somatosensory) sensation intact  -AN       [facility] Name 01/04/24 1313          Vision Assessment/Intervention    Visual Impairment/Limitations WFL  -AN       [facility] Name 01/04/24 1313          Range of Motion Comprehensive    General Range of Motion bilateral upper extremity ROM WFL  -AN       [facility] Name 01/04/24 1313          Strength Comprehensive (MMT)    General Manual Muscle Testing (MMT)  Assessment upper extremity strength deficits identified  -AN     Comment, General Manual Muscle Testing (MMT) Assessment BAY [place] 4/5  -AN       Row Name 01/04/24 1313          Motor Skills    Motor Skills functional endurance  -AN     Functional Endurance decreased functional endurance  -AN       Row Name 01/04/24 1313          Balance    Balance Assessment sitting static balance;sitting dynamic balance;sit to stand dynamic balance;standing dynamic balance;standing static balance  -AN     Static Sitting Balance standby assist  -AN     Dynamic Sitting Balance standby assist  -AN     Position, Sitting Balance sitting edge of bed  -AN     Sit to Stand Dynamic Balance verbal cues;contact guard  -AN     Static Standing Balance contact guard  -AN     Dynamic Standing Balance contact guard  -AN     Position/Device Used, Standing Balance supported  -AN     Balance Interventions standing;sit to stand;supported;static;dynamic;minimal challenge;occupation based/functional task  -AN               User Key  (r) = Recorded By, (t) = Taken By, (c) = Cosigned By      Initials Name Provider Type    AN Zena Cooper OT Occupational Therapist                   Goals/Plan       Row Name 01/04/24 1330          Bed Mobility Goal 1 (OT)    Activity/Assistive Device (Bed Mobility Goal 1, OT) sit to supine/supine to sit  -AN     [place] Level/Cues Needed (Bed Mobility Goal 1, OT) supervision required  -AN     Time Frame (Bed Mobility Goal 1, OT) long term goal (LTG);10 days  -AN       Row Name 01/04/24 1330          Transfer Goal 1 (OT)    Activity/Assistive Device (Transfer Goal 1, OT) sit-to-stand/stand-to-sit;toilet  -AN     [place] Level/Cues Needed (Transfer Goal 1, OT) contact guard required  -AN     Time Frame (Transfer Goal 1, OT) long term goal (LTG);10 days  -AN     Progress/Outcome (Transfer Goal 1, OT) goal ongoing  -AN       Row Name 01/04/24 1330          Dressing Goal 1 (OT)    Activity/Device (Dressing  Goal 1, OT) lower body dressing  -AN     [place]/Cues Needed (Dressing Goal 1, OT) minimum assist (75% or more patient effort)  -AN     Time Frame (Dressing Goal 1, OT) long term goal (LTG);10 days  -AN     Strategies/Barriers (Dressing Goal 1, OT) don/doff slippers w/ AAD  -AN     Progress/Outcome (Dressing Goal 1, OT) goal ongoing  -AN       Row Name 01/04/24 1330          Toileting Goal 1 (OT)    Activity/Device (Toileting Goal 1, OT) adjust/manage clothing;perform perineal hygiene  -AN     [place] Level/Cues Needed (Toileting Goal 1, OT) minimum assist (75% or more patient effort)  -AN     Time Frame (Toileting Goal 1, OT) long term goal (LTG);10 days  -AN     Progress/Outcome (Toileting Goal 1, OT) goal ongoing  -AN       Row Name 01/04/24 1330          Therapy Assessment/Plan (OT)    Planned Therapy Interventions (OT) activity tolerance training;BADL retraining;adaptive equipment training;patient/caregiver education/training;transfer/mobility retraining;functional balance retraining;occupation/activity based interventions;strengthening exercise  -AN               User Key  (r) = Recorded By, (t) = Taken By, (c) = Cosigned By      Initials Name Provider Type    Zena Villalobos OT Occupational Therapist                   Clinical Impression       Row Name 01/04/24 1328          Pain Assessment    Additional Documentation Pain Scale: FACES Pre/Post-Treatment (Group)  -AN       Row Name 01/04/24 1328          Pain Scale: FACES Pre/Post-Treatment    Pain: FACES Scale, Pretreatment 2-->hurts little bit  -AN     Posttreatment Pain Rating 2-->hurts little bit  -AN     Pain Location generalized  -AN     Pre/Posttreatment Pain Comment tolerated  -AN       Row Name 01/04/24 0184          Plan of Care Review    Plan of Care Reviewed With patient  -AN     Progress no change  -AN     Outcome Evaluation OT re-eval completed s/p ICU transfer. Pt continues to present with generalized weakness, impaired  mobility, SOA, and impaired ADLs warranting skilled OT services. Pt required CGA for bed mobility and for functional mobility with UE support. Limited by fatigue and SOA. Rec IPR at [place] for best functional outcomes.  -AN       Row Name 01/04/24 1328          Therapy Plan Review/Discharge Plan (OT)    Anticipated Discharge Disposition (OT) inpatient rehabilitation facility  -AN       [facility] Name 01/04/24 1328          Vital Signs    Pre SpO2 (%) 94  -AN     O2 Delivery Pre Treatment nasal cannula  -AN     O2 Delivery Intra Treatment nasal cannula  -AN     Post SpO2 (%) 95  -AN     O2 Delivery Post Treatment nasal cannula  -AN     Pre Patient Position Supine  -AN     Intra Patient Position Standing  -AN     Post Patient Position Sitting  -AN       [facility] Name 01/04/24 1328          Positioning and Restraints    Pre-Treatment Position in bed  -AN     Post Treatment Position chair  -AN     In Chair notified nsg;reclined;call light within reach;encouraged to call for assist;exit alarm on;with family/caregiver;legs elevated  -AN               User Key  (r) = Recorded By, (t) = Taken By, (c) = Cosigned By      Initials Name Provider Type    Zena Villalobos, ROMY Occupational Therapist                   Outcome Measures       Row Name 01/04/24 1331          How much help from another is currently needed...    Putting on and taking off regular lower body clothing? 1  -AN     Bathing (including washing, rinsing, and drying) 2  -AN     Toileting (which includes using toilet bed pan or urinal) 2  -AN     Putting on and taking off regular upper body clothing 3  -AN     Taking care of personal grooming (such as brushing teeth) 3  -AN     Eating meals 4  -AN     AM-PAC 6 Clicks Score (OT) 15  -AN       Row Name 01/04/24 1331          Functional Assessment    Outcome Measure Options AM-PAC 6 Clicks Daily Activity (OT)  -AN               User Key  (r) = Recorded By, (t) = Taken By, (c) = Cosigned By      Initials Name Provider Type     Zena Villalobos OT Occupational Therapist                    Occupational Therapy Education       Title: PT OT SLP Therapies (In Progress)       Topic: Occupational Therapy (In Progress)       Point: ADL training (Done)       Description:   Instruct learner(s) on proper safety adaptation and remediation techniques during self care or transfers.   Instruct in proper use of assistive devices.                  Learning Progress Summary             Patient Acceptance, E, VU by AN at 1/4/2024 1331    Acceptance, E, NR by CS at 12/31/2023 1357   Family Acceptance, E, VU by AN at 1/4/2024 1331                         Point: Home exercise program (Not Started)       Description:   Instruct learner(s) on appropriate technique for monitoring, assisting and/or progressing therapeutic exercises/activities.                  Learner Progress:  Not documented in this visit.              Point: Precautions (Done)       Description:   Instruct learner(s) on prescribed precautions during self-care and functional transfers.                  Learning Progress Summary             Patient Acceptance, E, VU by AN at 1/4/2024 1331    Acceptance, E, NR by CS at 12/31/2023 1357   Family Acceptance, E, VU by AN at 1/4/2024 1331                         Point: Body mechanics (Done)       Description:   Instruct learner(s) on proper positioning and spine alignment during self-care, functional mobility activities and/or exercises.                  Learning Progress Summary             Patient Acceptance, E, VU by AN at 1/4/2024 1331    Acceptance, E, NR by  at 12/31/2023 1357   Family Acceptance, E, VU by AN at 1/4/2024 1331                                         User Key       Initials Effective Dates Name Provider Type Discipline     09/02/21 -  Mary Ortiz OT Occupational Therapist OT    RAJENDRA 09/21/21 -  Zena Cooper OT Occupational Therapist OT                  OT Recommendation and Plan  Planned Therapy Interventions (OT):  activity tolerance training, BADL retraining, adaptive equipment training, patient/caregiver education/training, transfer/mobility retraining, functional balance retraining, occupation/activity based interventions, strengthening exercise  Plan of Care Review  Plan of Care Reviewed With: patient  Progress: no change  Outcome Evaluation: OT re-eval completed s/p ICU transfer. Pt continues to present with generalized weakness, impaired mobility, SOA, and impaired ADLs warranting skilled OT services. Pt required CGA for bed mobility and for functional mobility with UE support. Limited by fatigue and SOA. Rec IPR at [facility] for best functional outcomes.     Time Calculation:         Time Calculation- OT       Row Name 01/04/24 1332             Time Calculation- OT    OT Start Time 1050  -AN      OT Received On 01/04/24  -AN      OT Goal Re-Cert Due Date 01/14/24  -AN         Timed Charges    15629 - OT Self Care/Mgmt Minutes 25  -AN         Untimed Charges    OT Eval/Re-eval Minutes 20  -AN         Total Minutes    Timed Charges Total Minutes 25  -AN      Untimed Charges Total Minutes 20  -AN       Total Minutes 45  -AN                User Key  (r) = Recorded By, (t) = Taken By, (c) = Cosigned By      Initials Name Provider Type    AN Zena Cooper OT Occupational Therapist                  Therapy Charges for Today       Code Description Service Date Service Provider Modifiers Qty    18033371747 HC OT SELF CARE/MGMT/TRAIN EA 15 MIN 1/4/2024 Zena Cooper OT GO 2    04922975877 HC OT RE-EVAL 2 1/4/2024 Zena Cooper OT GO 1                 Zena Cooper OT  1/4/2024    Electronically signed by Zena Cooper OT at 01/04/24 1334

## 2024-01-04 NOTE — THERAPY TREATMENT NOTE
Patient Name: Luis Perez Jr.  : 1945    MRN: [id number]                              Today's Date: 2024       Admit Date: 2023    Visit Dx:     ICD-10-CM ICD-9-CM   1. Acute kidney injury  N17.9 584.9   2. Generalized weakness  R53.1 780.79   3. Pericardial effusion  I31.39 423.9   4. Leukocytosis, unspecified type  D72.829 288.60   5. Elevated LFTs  R79.89 790.6   6. Hematuria, unspecified type  R31.9 599.70     Patient Active Problem List   Diagnosis    Hypertension    Paroxysmal atrial fibrillation    Snoring    Obstructive sleep apnea - Intolerant CPAP/BiPAP    Chronic persistent asthma    Non-smoker    Obesity, Class III, BMI 40-49.9 (morbid obesity)    Perennial rhinitis    Vitamin D deficiency    Rhinovirus    Pericardial effusion    MONIQUE (acute kidney injury)    Transaminitis    Lactic acidosis     Past Medical History:   Diagnosis Date    Asthma     Bronchitis, chronic     Cancer     Skin     Cellulitis and abscess of right leg     Chronic persistent asthma 2020    Hyperlipidemia     Hypertension     Nephrolithiasis     Obesity, Class III, BMI 40-49.9 (morbid obesity) 2020    Paroxysmal atrial fibrillation 07/15/2019    Pneumonia     Sleep apnea     UTI (urinary tract infection)     Vitamin D deficiency 2020     Past Surgical History:   Procedure Laterality Date    CARDIAC CATHETERIZATION N/A 2023    Procedure: Pericardiocentesis;  Surgeon: Clarence Chaudhari MD;  Location: [facility] INVASIVE LOCATION;  Service: Cardiovascular;  Laterality: N/A;    CARDIOVERSION  2019    COLONOSCOPY      NASAL SEPTUM SURGERY      Deviation Repair    SKIN CANCER EXCISION      TONSILLECTOMY      VASECTOMY        General Information       Row Name 24 1329          Physical Therapy Time and Intention    Document Type therapy note (daily note)  -KG     Mode of Treatment physical therapy  -KG       Row Name 24 1329          General Information    Existing  Precautions/Restrictions fall;oxygen therapy device and L/min  -KG       Row Name 01/04/24 1329          Cognition    Orientation Status (Cognition) oriented x 3  -KG       Row Name 01/04/24 1329          Safety Issues, Functional Mobility    Safety Issues Affecting Function (Mobility) awareness of need for assistance;insight into deficits/self-awareness;safety precaution awareness;safety precautions follow-through/compliance  -KG     Impairments Affecting Function (Mobility) balance;coordination;endurance/activity tolerance;postural/trunk control;shortness of breath;strength;pain  -KG               User Key  (r) = Recorded By, (t) = Taken By, (c) = Cosigned By      Initials Name Provider Type    KG Tiki Parker, PT Physical Therapist                   Mobility       Row Name 01/04/24 1329          Bed Mobility    Bed Mobility sit-supine  -KG     Sit-Supine Lea (Bed Mobility) moderate assist (50% patient effort);2 person assist;verbal cues  -KG     Assistive Device (Bed Mobility) head of bed elevated  -KG     Comment, (Bed Mobility) VC's for sequencing and technique. Pt required assistance at trunk and BLEs.  -KG       Row Name 01/04/24 1329          Transfers    Comment, (Transfers) VC's for sequencing and safe hand placement. Mild instability upon initial stand; required cues for upright posture.  -KG       Row Name 01/04/24 1329          Sit-Stand Transfer    Sit-Stand Lea (Transfers) contact guard;verbal cues  -KG       Row Name 01/04/24 1329          Gait/Stairs (Locomotion)    Lea Level (Gait) minimum assist (75% patient effort);verbal cues  -KG     Assistive Device (Gait) other (see comments)  B UE support on tripod monitor  -KG     Distance in Feet (Gait) 250  -KG     Deviations/Abnormal Patterns (Gait) base of support, wide;jaime decreased;stride length decreased  -KG     Bilateral Gait Deviations forward flexed posture;heel strike decreased  -KG     Comment,  (Gait/Stairs) Pt demonstrated step through gait pattern with slow jaime and wide HANS. Required consistent cues for upright posture and to keep monitor closer with feet inside frame. Demonstrated mild instability throughout ambulation. One standing rest break required due to SOA. Distance limited by fatigue.  -KG               User Key  (r) = Recorded By, (t) = Taken By, (c) = Cosigned By      Initials Name Provider Type    KG Tiki Parker, PT Physical Therapist                   Obj/Interventions       Row Name 01/04/24 1332          Balance    Balance Assessment sitting static balance;standing static balance;standing dynamic balance  -KG     Static Sitting Balance standby assist  -KG     Position, Sitting Balance unsupported;sitting in chair  -KG     Static Standing Balance contact guard  -KG     Dynamic Standing Balance minimal assist  -KG     Position/Device Used, Standing Balance supported  -KG               User Key  (r) = Recorded By, (t) = Taken By, (c) = Cosigned By      Initials Name Provider Type    KG Tiki Parker, PT Physical Therapist                   Goals/Plan    No documentation.                  Clinical Impression       Row Name 01/04/24 1332          Pain    Pretreatment Pain Rating 2/10  -KG     Posttreatment Pain Rating 2/10  -KG     Pain Location generalized  -KG     Pain Intervention(s) Repositioned;Ambulation/increased activity  -KG       Row Name 01/04/24 1332          Plan of Care Review    Plan of Care Reviewed With patient  -KG     Progress improving  -KG     Outcome Evaluation Pt increased ambulation distance to 250ft with Kimberley and B UE support on tripod monitor. Pt required frequent cues for upright posture with more narrow HANS. Verbal and tactile cues required to keep monitor close with feet inside middle. Pt required one standing rest break. Limited by fatigue and SOA. Continue to recommend PT skilled care and D/C to  rehab facility.  -KG       Row Name 01/04/24  1332          Vital Signs    Pre Systolic BP Rehab 126  -KG     Pre Treatment Diastolic BP 96  -KG     Post Systolic BP Rehab 145  -KG     Post Treatment Diastolic   -KG     Pretreatment Heart Rate (beats/min) 106  -KG     Posttreatment Heart Rate (beats/min) 98  -KG     Pre SpO2 (%) 95  -KG     O2 Delivery Pre Treatment supplemental O2  -KG     Post SpO2 (%) 95  -KG     O2 Delivery Post Treatment supplemental O2  -KG     Pre Patient Position Sitting  -KG     Intra Patient Position Standing  -KG     Post Patient Position Supine  -KG       Row Name 01/04/24 1332          Positioning and Restraints    Pre-Treatment Position sitting in chair/recliner  -KG     Post Treatment Position bed  -KG     In Bed notified nsg;supine;call light within reach;encouraged to call for assist;with family/caregiver;side rails up x3;RUE elevated;LUE elevated  -KG               User Key  (r) = Recorded By, (t) = Taken By, (c) = Cosigned By      Initials Name Provider Type    KG Tiki Parker, PT Physical Therapist                   Outcome Measures       Row Name 01/04/24 1334          How much help from another person do you currently need...    Turning from your back to your side while in flat bed without using bedrails? 3  -KG     Moving from lying on back to sitting on the side of a flat bed without bedrails? 2  -KG     Moving to and from a bed to a chair (including a wheelchair)? 3  -KG     Standing up from a chair using your arms (e.g., wheelchair, bedside chair)? 3  -KG     Climbing 3-5 steps with a railing? 2  -KG     To walk in hospital room? 3  -KG     AM-PAC 6 Clicks Score (PT) 16  -KG     Highest Level of Mobility Goal 5 --> Static standing  -KG       Row Name 01/04/24 1334 01/04/24 1331       Functional Assessment    Outcome Measure Options AM-PAC 6 Clicks Basic Mobility (PT)  -KG AM-PAC 6 Clicks Daily Activity (OT)  -AN              User Key  (r) = Recorded By, (t) = Taken By, (c) = Cosigned By      Initials  Name Provider Type    KG Tiki Parker, PT Physical Therapist    Zena Villalobos OT Occupational Therapist                                 Physical Therapy Education       Title: PT OT SLP Therapies (In Progress)       Topic: Physical Therapy (In Progress)       Point: Mobility training (In Progress)       Learning Progress Summary             Patient Acceptance, E, NR by KG at 1/4/2024 0915    Acceptance, E, NR by KG at 1/3/2024 1013    Acceptance, E, NR by SANTA at 1/1/2024 1055    Acceptance, E, NR by KG at 12/31/2023 0945                         Point: Home exercise program (In Progress)       Learning Progress Summary             Patient Acceptance, E, NR by KG at 1/4/2024 0915    Acceptance, E, NR by KG at 1/3/2024 1013    Acceptance, E, NR by SANTA at 1/1/2024 1055                         Point: Body mechanics (In Progress)       Learning Progress Summary             Patient Acceptance, E, NR by KG at 1/4/2024 0915    Acceptance, E, NR by KG at 1/3/2024 1013    Acceptance, E, NR by SANTA at 1/1/2024 1055    Acceptance, E, NR by KG at 12/31/2023 0945                         Point: Precautions (In Progress)       Learning Progress Summary             Patient Acceptance, E, NR by KG at 1/4/2024 0915    Acceptance, E, NR by KG at 1/3/2024 1013    Acceptance, E, NR by SANTA at 1/1/2024 1055    Acceptance, E, NR by KG at 12/31/2023 0945                                         User Key       Initials Effective Dates Name Provider Type Discipline    SANTA 02/03/23 -  Zo Zendejas, PT Physical Therapist PT     05/22/20 -  Tiki Parker PT Physical Therapist PT                  PT Recommendation and Plan  Planned Therapy Interventions (PT): balance training, bed mobility training, gait training, strengthening, transfer training  Plan of Care Reviewed With: patient  Progress: improving  Outcome Evaluation: Pt increased ambulation distance to 250ft with Kimberley and B UE support on tripod monitor. Pt required  frequent cues for upright posture with more narrow HANS. Verbal and tactile cues required to keep monitor close with feet inside middle. Pt required one standing rest break. Limited by fatigue and SOA. Continue to recommend PT skilled care and D/C to IP rehab facility.     Time Calculation:   PT Evaluation Complexity  History, PT Evaluation Complexity: 3 or more personal factors and/or comorbidities  Examination of Body Systems (PT Eval Complexity): total of 3 or more elements  Clinical Presentation (PT Evaluation Complexity): stable  Clinical Decision Making (PT Evaluation Complexity): low complexity  Overall Complexity (PT Evaluation Complexity): low complexity     PT Charges       Row Name 01/04/24 0915             Time Calculation    Start Time 0915  -KG      PT Received On 01/04/24  -KG      PT Goal Re-Cert Due Date 01/13/24  -KG         Time Calculation- PT    Total Timed Code Minutes- PT 24 minute(s)  -KG         Timed Charges    82355 - PT Therapeutic Activity Minutes 24  -KG         Total Minutes    Timed Charges Total Minutes 24  -KG       Total Minutes 24  -KG                User Key  (r) = Recorded By, (t) = Taken By, (c) = Cosigned By      Initials Name Provider Type    KG Tiki Parker, PT Physical Therapist                  Therapy Charges for Today       Code Description Service Date Service Provider Modifiers Qty    24976203406 HC PT THERAPEUTIC ACT EA 15 MIN 1/3/2024 Tkii Parker, PT GP 2    17549524359 HC PT RE-EVAL ESTABLISHED PLAN 2 1/3/2024 Tiki Parker, PT GP 1    85748601767 HC PT THERAPEUTIC ACT EA 15 MIN 1/4/2024 Tiki Parker, PT GP 2            PT G-Codes  Outcome Measure Options: AM-PAC 6 Clicks Basic Mobility (PT)  AM-PAC 6 Clicks Score (PT): 16  AM-PAC 6 Clicks Score (OT): 15  PT Discharge Summary  Anticipated Discharge Disposition (PT): inpatient rehabilitation facility    Noemí Parker PT  1/4/2024

## 2024-01-04 NOTE — PLAN OF CARE
Goal Outcome Evaluation:  Plan of Care Reviewed With: patient        Progress: improving  Outcome Evaluation: Pt increased ambulation distance to 250ft with Kimberley and B UE support on tripod monitor. Pt required frequent cues for upright posture with more narrow HANS. Verbal and tactile cues required to keep monitor close with feet inside middle. Pt required one standing rest break. Limited by fatigue and SOA. Continue to recommend PT skilled care and D/C to IP rehab facility.      Anticipated Discharge Disposition (PT): inpatient rehabilitation facility

## 2024-01-04 NOTE — PROGRESS NOTES
"Critical Care Note     LOS: 5 days   Patient Care Team:  Sierra Kuo MD as PCP - General (Internal Medicine)  Latasha Poe MD as Consulting Physician (Dermatopathology)  Provider, No Known  Camille Mccord APRN as Nurse Practitioner (Pulmonary Disease)  Jaja Mansfield APRN as Nurse Practitioner (Cardiology)    Chief Complaint/Reason for visit:    Chief Complaint   Patient presents with    Shortness of Breath   Pericardial effusion  Acute kidney injury  Atrial fibrillation  Moderate chronic obstructive asthma      Subjective     Interval History:     Impressive diuresis, 7.5 L yesterday, 6.7 L the day before.  Pericardial drain removed January 2.  He was able to ambulate 200 feet supporting his upper arms on a tripod monitor.  He remains in atrial fibrillation with a rate in the low 100s.  He is on room air with a saturation of 92%.  He denies chest pain or shortness of air but does have fatigue.    Review of Systems:    All systems were reviewed and negative except as noted in subjective.    Medical history, surgical history, social history, family history reviewed    Objective     Intake/Output:    Intake/Output Summary (Last 24 hours) at 1/4/2024 0948  Last data filed at 1/4/2024 0600  Gross per 24 hour   Intake 400 ml   Output 6770 ml   Net -6370 ml       Nutrition:  Diet: Regular/House Diet, Cardiac Diets, Renal Diets; Healthy Heart (2-3 Na+); Low Potassium, Low Phosphorus, Low Sodium (2-3g); Texture: Regular Texture (IDDSI 7); Fluid Consistency: Thin (IDDSI 0)    Infusions:           Telemetry: Sinus bradycardia             Vital Signs  Blood pressure 132/95, pulse 112, temperature 98.5 °F (36.9 °C), temperature source Oral, resp. rate 20, height 185.4 cm (73\"), weight (!) 161 kg (356 lb), SpO2 92%.    Physical Exam:  General Appearance:  Overweight older gentleman upright in bed   Head:  Atraumatic   Eyes:          No jaundice   Ears:     Throat: Oral mucosa moist   Neck: Large neck, trachea " "midline, no crepitus   Back:      Lungs:   Breath sounds are bilateral, shallow, with scattered expiratory rhonchi    Heart:  Slow rate, regular, S1, S2 auscultated   Abdomen:   Large abdomen, bowel sounds present, soft   Rectal:   Deferred   Extremities: 2+ edema bilateral pretibial, some upper extremity edema as well   Pulses:    Skin: Warm and dry   Lymph nodes: No cervical adenopathy   Neurologic: Oriented x 3, speech fluent,  equal      Results Review:     I reviewed the patient's new clinical results.   Results from last 7 days   Lab Units 01/04/24  0337 01/03/24  0253 01/02/24  0353 01/01/24 0522 12/31/23 2052 12/31/23 0304   SODIUM mmol/L 147* 140 133* 129* 129* 129*   POTASSIUM mmol/L 3.3* 3.5 3.9 4.9 5.2 5.0   CHLORIDE mmol/L 104 101 97* 92* 89* 91*   CO2 mmol/L 29.0 27.0 20.0* 17.0* 13.0* 17.0*   BUN mg/dL 45* 67* 88* 95* 91* 75*   CREATININE mg/dL 1.10 1.46* 2.12* 3.11* 3.37* 2.73*   CALCIUM mg/dL 8.5* 8.3* 7.9* 8.0* 8.3* 8.5*   BILIRUBIN mg/dL  --   --   --  0.7 1.0 1.3*   ALK PHOS U/L  --   --   --  100 109 96   ALT (SGPT) U/L  --   --   --  1,111* 1,358* 1,426*   AST (SGOT) U/L  --   --   --  825* 1,488* 3,069*   GLUCOSE mg/dL 152* 160* 173* 117* 141* 154*     Results from last 7 days   Lab Units 01/02/24  0353 01/01/24 0522 12/31/23 2052   WBC 10*3/mm3 12.34* 16.93* 19.24*   HEMOGLOBIN g/dL 14.3 14.3 14.8   HEMATOCRIT % 43.6 43.6 44.6   PLATELETS 10*3/mm3 296 312 374     Results from last 7 days   Lab Units 01/02/24  0432   PH, ARTERIAL pH units 7.371   PO2 ART mm Hg 105.0   PCO2, ARTERIAL mm Hg 41.1   HCO3 ART mmol/L 23.8     Lab Results   Component Value Date    BLOODCX No growth at 4 days 12/30/2023     No results found for: \"URINECX\"    I reviewed the patient's new imaging including images and reports.    No chest x-ray today    Renal ultrasound, normal-sized kidneys renal cyst present.  No hydronephrosis      Interpretation Summary         Left ventricular systolic function is normal. " Left ventricular ejection fraction appears to be 61 - 65%.    Preintervention, large pericardial effusion with early signs of tamponade    Post intervention (950 mL serosanguineous fluid removal) trivial pericardial effusion remains, with normal expansion of the RA and RV.    All medications reviewed.   ascorbic acid, 1,000 mg, Oral, Daily  aspirin, 162 mg, Oral, Daily  bisoprolol, 5 mg, Oral, Q24H  budesonide-formoterol, 2 puff, Inhalation, BID - RT  bumetanide, 2 mg, Intravenous, Q12H  doxycycline, 100 mg, Oral, Q12H  finasteride, 5 mg, Oral, Daily  montelukast, 10 mg, Oral, Nightly  potassium chloride, 40 mEq, Oral, BID  senna-docusate sodium, 2 tablet, Oral, BID  sodium chloride, 10 mL, Intravenous, Q12H  tamsulosin, 0.4 mg, Oral, Daily          Assessment & Plan       Pericardial effusion    Hypertension    Paroxysmal atrial fibrillation    Obstructive sleep apnea - Intolerant CPAP/BiPAP    Chronic persistent asthma    Obesity, Class III, BMI 40-49.9 (morbid obesity)    MONIQUE (acute kidney injury)    Transaminitis    Lactic acidosis    78-year-old gentleman with a history of hypertension, persistent atrial fibrillation, sleep apnea intolerant of CPAP, chronic persistent asthma, discharged December 27 after electrical cardioversion and readmitted with acute kidney injury productive cough with recent rhinovirus infection and a new pericardial effusion.  Repeat echocardiogram revealed a large pericardial effusion and on December 31 he underwent pericardial drain with 950 mL removed.    #1 pericardial effusion status post pericardiocentesis December 31.  Fluid was bloody with over 5 million red cells, white cells 13,000, 56% lymphocytes.  Gram stain had no organisms, and cultures are negative at 48 hours.  AFB smear is negative.  No fungal culture sent.  Cytology was negative for malignancy.  Follow-up echocardiogram revealed no significant residual effusion.  Drain was removed January 2.      #2  Persistent atrial  fibrillation, recently discharged on sotalol and Eliquis, after electrical cardioversion on December 26, 2023.  He developed recurrent atrial fibrillation with a controlled rate.  Today cardiology changed his sotalol to Zetia    #3 acute kidney injury, creatinine today is 1.10, after peaking at 3.37.  Baseline creatinine is 1.04.  Urine output yesterday 7500 mL.  Potassium 3.3 and being replaced.  Sodium did increase slightly to 147.    #4 recent viral respiratory illness, swab positive for rhinovirus on December 25.  Follow-up swab on December 30 is negative.  He does have a known history of chronic persistent asthma.  He is on Dupixent every 2 weeks, last dose was December 26.  Breo as a maintenance inhaler and has an albuterol rescue inhaler.  He is congested on examination but does not have wheezing.  He has completed Rocephin and doxycycline will complete today.      PLAN:    Rocephin is completed, last dose of doxycycline will be tonight  Substitute Symbicort for Breo secondary to formulary issues  Continue Singulair 10 mg daily  Nebulized bronchodilators as needed for wheezing     Flomax, finasteride for BPH  Sotalol changed to Zetia for atrial fibrillation rate control  Hold anticoagulation  Continue diuresis, continues to have significant third space edema    Relax fluid restriction so that he can correct his sodium  Replace potassium  Check magnesium    He would benefit from treatment of his sleep apnea but apparently has been intolerant of CPAP at home.  His apnea-hypopnea index showed only mild obstructive sleep apnea but he had significant desaturation.  Auto CPAP 8-18 recommended, or an oral appliance.    VTE Prophylaxis:SCDS    Stress Ulcer Prophylaxis: none    Gabriela Block MD  01/04/24  09:48 EST      Time: Critical care 25 min  I personally provided care to this critically ill patient as documented above.  Critical care time does not include time spent on separately billed procedures.   None of my critical care time was concurrent with other critical care providers.

## 2024-01-04 NOTE — CASE MANAGEMENT/SOCIAL WORK
Continued Stay Note  [facility]     Patient Name: Luis Perez Jr.  MRN: [id number]  Today's Date: 1/4/2024    Admit Date: 12/30/2023    Plan: IRF   Discharge Plan       Row Name 01/04/24 1443       Plan    Plan IRF    Patient/Family in Agreement with Plan yes    Plan Comments Spoke with daughter and patient at bedside with wife on phone. Therapy recommeds rehab with patient agreeable. List provided with referrals sent per pt choice. CM will cont to follow.    Final Discharge Disposition Code 62 - inpatient rehab facility                   Discharge Codes    No documentation.                 Expected Discharge Date and Time       Expected Discharge Date Expected Discharge Time    Jan 5, 2024               April Davis RN

## 2024-01-04 NOTE — PROGRESS NOTES
[facility]  IP Progress Note      Chief Complaint/Reason for service: #1 cardiogenic shock secondary to large pericardial effusion and tamponade-resolved #2 atrial fibrillation #3 MONIQUE improving    Subjective   Subjective: The patient is awake and alert.  States his breathing is improved.  His daughter notes episode yesterday where he was really struggling to find words but this is improved now.  He states he walked around the nurses station twice yesterday.  He notes that he has chronic lower extremity edema but is markedly worse than what he experiences at home.  He denies chest pain.    Past medical, surgical, social and family history reviewed in the patient's electronic medical record.    Objective     Vital Sign Min/Max for last 24 hours  Temp  Min: 97 °F (36.1 °C)  Max: 98.5 °F (36.9 °C)   BP  Min: 96/81  Max: 134/96   Pulse  Min: 86  Max: 112   Resp  Min: 15  Max: 20   SpO2  Min: 90 %  Max: 96 %   Flow (L/min)  Min: 2  Max: 5      Intake/Output Summary (Last 24 hours) at 1/4/2024 0707  Last data filed at 1/4/2024 0600  Gross per 24 hour   Intake 400 ml   Output 7520 ml   Net -7120 ml             Current Facility-Administered Medications:     albuterol (PROVENTIL) nebulizer solution 0.083% 2.5 mg/3mL, 2.5 mg, Nebulization, Q4H PRN, Clarence Chaudhari MD    ascorbic acid (VITAMIN C) tablet 1,000 mg, 1,000 mg, Oral, Daily, Clarence Chaudhari MD, 1,000 mg at 01/03/24 0936    aspirin EC tablet 162 mg, 162 mg, Oral, Daily, Norberto Perez MD, 162 mg at 01/03/24 0936    sennosides-docusate (PERICOLACE) 8.6-50 MG per tablet 2 tablet, 2 tablet, Oral, BID, 2 tablet at 01/03/24 0938 **AND** polyethylene glycol (MIRALAX) packet 17 g, 17 g, Oral, Daily PRN **AND** bisacodyl (DULCOLAX) EC tablet 5 mg, 5 mg, Oral, Daily PRN **AND** bisacodyl (DULCOLAX) suppository 10 mg, 10 mg, Rectal, Daily PRN, Clarence Chaudhari MD    budesonide-formoterol (SYMBICORT) 160-4.5 MCG/ACT inhaler 2 puff, 2  puff, Inhalation, BID - RT, Winston Galvan, PharmD, 2 puff at 01/03/24 1953    bumetanide (BUMEX) injection 2 mg, 2 mg, Intravenous, Q12H, Checo Cardozo MD, 2 mg at 01/03/24 2313    cefTRIAXone (ROCEPHIN) 2,000 mg in sodium chloride 0.9 % 100 mL IVPB, 2,000 mg, Intravenous, Q24H, Basil Encinas MD, Last Rate: 200 mL/hr at 01/03/24 0934, 2,000 mg at 01/03/24 0934    doxycycline (MONODOX) capsule 100 mg, 100 mg, Oral, Q12H, Basil Encinas MD, 100 mg at 01/03/24 2039    finasteride (PROSCAR) tablet 5 mg, 5 mg, Oral, Daily, Clarence Chaudhari MD, 5 mg at 01/03/24 0935    melatonin tablet 5 mg, 5 mg, Oral, Nightly PRN, Clarence Chaudhari MD, 5 mg at 01/03/24 2008    montelukast (SINGULAIR) tablet 10 mg, 10 mg, Oral, Nightly, Clarence Chaudhari MD, 10 mg at 01/03/24 2007    nitroglycerin (NITROSTAT) SL tablet 0.4 mg, 0.4 mg, Sublingual, Q5 Min PRN, Clarence Chaudhari MD    ondansetron (ZOFRAN) injection 4 mg, 4 mg, Intravenous, Q6H PRN, Clarence Chaudhari MD    sodium chloride 0.9 % flush 10 mL, 10 mL, Intravenous, PRN, Clarence Chaudhari MD    sodium chloride 0.9 % flush 10 mL, 10 mL, Intravenous, Q12H, Clarence Chaudhari MD, 10 mL at 01/03/24 2008    sodium chloride 0.9 % infusion 40 mL, 40 mL, Intravenous, PRN, Clarence Chaudhari MD    sotalol (BETAPACE) tablet 120 mg, 120 mg, Oral, Q24H, Nicolas Darby III, MD, 120 mg at 01/03/24 0935    tamsulosin (FLOMAX) 24 hr capsule 0.4 mg, 0.4 mg, Oral, Daily, Clarence Chaudhari MD, 0.4 mg at 01/03/24 0915    Physical Exam: General pleasant overweight male in bed resting heart rate 112 not dyspneic not tachypneic        HEENT: No JVP.  Nasal O2 present.  No icterus       Respiratory: Equal bilateral symmetrical expansion with faint wheeze crackles bilaterally       Cardiovascular: Tachycardic and irregular without any obvious murmur and diffuse pitting edema even of the abdominal skin       GI: Obese and soft       Lower Extremities: Stasis abnormalities       Neuro: Facial  expressions are symmetrical moves all 4 extremities       Skin: Warm and dry with pitting edema palpation       Psych: Pleasant affect    Results Review: Patient is a net -14 L since admission.  Heart rates 90s to 112.  Blood pressures 1 11-1 32 systolic.  Creatinine is down to 1.1 GFR is up to 68.7 which is improved from 48.9 yesterday.  Sodium is 147 potassium is 3.  3    Radiology Results:  Imaging Results (Last 72 Hours)       Procedure Component Value Units Date/Time    XR Chest 1 View [[id number]] Collected: 01/02/24 0519     Updated: 01/02/24 0523    Narrative:      XR CHEST 1 VW    Date of Exam: 1/2/2024 4:42 AM EST    Indication: SOB    Comparison: CT chest 12/31/2023 and chest radiograph same date.    Findings:  Stable enlargement of the cardiac silhouette shown to be due to a large pericardial fluid collection on recent CT. There is no pneumothorax or visible pleural effusion. The left lateral sulcus is excluded from the exam. Pulmonary vasculature is within   normal limits. No pneumothorax. No new lung opacity. No acute osseous abnormality.      Impression:      Impression:  Stable enlargement of the cardiac silhouette shown to reflect pericardial fluid on recent CT.        Electronically Signed: Noel West MD    1/2/2024 5:20 AM EST    Workstation ID: [id number]            EKG: A-fib-very poor R wave progression.  No ischemia    ECHO: EF in the 60s    Tele: Rhythm is atrial fibrillation with occasional RVR    Assessment   Assessment/Plan: #1 cardiogenic shock secondary to pericardial tamponade-resolved  2 atrial fibrillation-the patient is on once a day sotalol.  Per Dr. Perez's note wanted to adjust the sotalol once GFR is improved significantly.  The patient's resting heart rate is in the 110 range this morning.  Will start Zebeta 5 mg daily.    Plan is to restart Eliquis in 3 to 4 days.  Since the patient is not anticoagulated and had some neurologic symptoms yesterday I am reluctant to increase  sotalol.  Therefore I will discontinue sotalol and use the above-mentioned Zebeta.  Can consider PIETER cardioversion prior to discharge or rate control with anticoagulation 2 patient still has severe volume overload but he is improving daily.  Nephrology service is managing the diuretics  Possible transfer to floor this afternoon    Clarence Castellanos MD  01/04/24  07:07 EST

## 2024-01-04 NOTE — THERAPY RE-EVALUATION
Patient Name: Luis Perez Jr.  : 1945    MRN: [id number]                              Today's Date: 2024       Admit Date: 2023    Visit Dx:     ICD-10-CM ICD-9-CM   1. Acute kidney injury  N17.9 584.9   2. Generalized weakness  R53.1 780.79   3. Pericardial effusion  I31.39 423.9   4. Leukocytosis, unspecified type  D72.829 288.60   5. Elevated LFTs  R79.89 790.6   6. Hematuria, unspecified type  R31.9 599.70     Patient Active Problem List   Diagnosis    Hypertension    Paroxysmal atrial fibrillation    Snoring    Obstructive sleep apnea - Intolerant CPAP/BiPAP    Chronic persistent asthma    Non-smoker    Obesity, Class III, BMI 40-49.9 (morbid obesity)    Perennial rhinitis    Vitamin D deficiency    Rhinovirus    Pericardial effusion    MONIQUE (acute kidney injury)    Transaminitis    Lactic acidosis     Past Medical History:   Diagnosis Date    Asthma     Bronchitis, chronic     Cancer     Skin     Cellulitis and abscess of right leg     Chronic persistent asthma 2020    Hyperlipidemia     Hypertension     Nephrolithiasis     Obesity, Class III, BMI 40-49.9 (morbid obesity) 2020    Paroxysmal atrial fibrillation 07/15/2019    Pneumonia     Sleep apnea     UTI (urinary tract infection)     Vitamin D deficiency 2020     Past Surgical History:   Procedure Laterality Date    CARDIAC CATHETERIZATION N/A 2023    Procedure: Pericardiocentesis;  Surgeon: Clarence Chaudhari MD;  [facility];  Service: Cardiovascular;  Laterality: N/A;    CARDIOVERSION  2019    COLONOSCOPY      NASAL SEPTUM SURGERY      Deviation Repair    SKIN CANCER EXCISION      TONSILLECTOMY      VASECTOMY        General Information       Row Name 24 1133          OT Time and Intention    Document Type re-evaluation  -AN     Mode of Treatment occupational therapy  -AN       Row Name 24 1133          General Information    Patient Profile Reviewed yes  -AN      Prior Level of Function independent:;all household mobility;community mobility;gait;ADL's  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, recent falls reported  -AN     Existing Precautions/Restrictions fall;oxygen therapy device and L/min  -AN     Barriers to Rehab medically complex  -AN       Row Name 01/04/24 1133          Living Environment    People in Home spouse  -AN       Row Name 01/04/24 1133          Home Main Entrance    Number of Stairs, Main Entrance none  -AN       Row Name 01/04/24 1133          Stairs Within Home, Primary    Number of Stairs, Within Home, Primary none  -AN       Row Name 01/04/24 1133          Cognition    Orientation Status (Cognition) oriented x 3  -AN       Row Name 01/04/24 1133          Safety Issues, Functional Mobility    Safety Issues Affecting Function (Mobility) safety precautions follow-through/compliance;safety precaution awareness;insight into deficits/self-awareness;awareness of need for assistance;judgment;sequencing abilities;problem-solving  -AN     Impairments Affecting Function (Mobility) balance;endurance/activity tolerance;postural/trunk control;shortness of breath;strength;pain  -AN               User Key  (r) = Recorded By, (t) = Taken By, (c) = Cosigned By      Initials Name Provider Type    AN Zena Cooper OT Occupational Therapist                     Mobility/ADL's       Row Name 01/04/24 1310          Bed Mobility    Bed Mobility supine-sit  -AN     Supine-Sit [place] (Bed Mobility) verbal cues;contact guard  -AN     Assistive Device (Bed Mobility) head of bed elevated;bed rails  -AN     Comment, (Bed Mobility) Increased time required to sit EOB due to LE edema and scrotal pain  -AN       Row Name 01/04/24 1310          Transfers    Transfers sit-stand transfer;stand-sit transfer  -AN       Row Name 01/04/24 1310          Bed-Chair Transfer    Bed-Chair [place] (Transfers) contact guard  -AN     Assistive Device (Bed-Chair Transfers) other (see comments)  UE  support  -AN       Row Name 01/04/24 1310          Sit-Stand Transfer    Sit-Stand [place] (Transfers) contact guard  -AN     Assistive Device (Sit-Stand Transfers) other (see comments)  UE support  -AN       Row Name 01/04/24 1310          Stand-Sit Transfer    Stand-Sit [place] (Transfers) verbal cues;contact guard  -AN     Assistive Device (Stand-Sit Transfers) other (see comments)  UE support  -AN       Row Name 01/04/24 1310          Functional Mobility    Functional Mobility- Ind. Level contact guard assist  -AN     Functional Mobility-Distance (Feet) --  >household distance  -AN     Functional Mobility- Safety Issues balance decreased during turns;supplemental O2  -AN     Functional Mobility- Comment Pt ambulated >household distance with CGA for balance and safety. No LOB throughout. Reported fatigue after completion.  -AN       Row Name 01/04/24 1310          Activities of Daily Living    BADL Assessment/Intervention upper body dressing;lower body dressing  -AN       [facility] Name 01/04/24 1310          Upper Body Dressing Assessment/Training    [place] Level (Upper Body Dressing) don;pajama/robe;set up  -AN     Position (Upper Body Dressing) edge of bed sitting  -AN       Row Name 01/04/24 1310          Lower Body Dressing Assessment/Training    [place] Level (Lower Body Dressing) don;doff;socks;dependent (less than 25% patient effort)  -AN     Position (Lower Body Dressing) edge of bed sitting  -AN               User Key  (r) = Recorded By, (t) = Taken By, (c) = Cosigned By      Initials Name Provider Type    AN Zena Cooper OT Occupational Therapist                   Obj/Interventions       Row Name 01/04/24 1313          Sensory Assessment (Somatosensory)    Sensory Assessment (Somatosensory) sensation intact  -AN       [facility] Name 01/04/24 1313          Vision Assessment/Intervention    Visual Impairment/Limitations WFL  -AN       [facility] Name 01/04/24 1313          Range of Motion  Comprehensive    General Range of Motion bilateral upper extremity ROM WFL  -AN       Row Name 01/04/24 1313          Strength Comprehensive (MMT)    General Manual Muscle Testing (MMT) Assessment upper extremity strength deficits identified  -AN     Comment, General Manual Muscle Testing (MMT) Assessment BUE grossly 4/5  -AN       Row Name 01/04/24 1313          Motor Skills    Motor Skills functional endurance  -AN     Functional Endurance decreased functional endurance  -AN       Row Name 01/04/24 1313          Balance    Balance Assessment sitting static balance;sitting dynamic balance;sit to stand dynamic balance;standing dynamic balance;standing static balance  -AN     Static Sitting Balance standby assist  -AN     Dynamic Sitting Balance standby assist  -AN     Position, Sitting Balance sitting edge of bed  -AN     Sit to Stand Dynamic Balance verbal cues;contact guard  -AN     Static Standing Balance contact guard  -AN     Dynamic Standing Balance contact guard  -AN     Position/Device Used, Standing Balance supported  -AN     Balance Interventions standing;sit to stand;supported;static;dynamic;minimal challenge;occupation based/functional task  -AN               User Key  (r) = Recorded By, (t) = Taken By, (c) = Cosigned By      Initials Name Provider Type    AN Zena Cooper, ROMY Occupational Therapist                   Goals/Plan       Row Name 01/04/24 1330          Bed Mobility Goal 1 (OT)    Activity/Assistive Device (Bed Mobility Goal 1, OT) sit to supine/supine to sit  -AN     [place] Level/Cues Needed (Bed Mobility Goal 1, OT) supervision required  -AN     Time Frame (Bed Mobility Goal 1, OT) long term goal (LTG);10 days  -AN       Row Name 01/04/24 1330          Transfer Goal 1 (OT)    Activity/Assistive Device (Transfer Goal 1, OT) sit-to-stand/stand-to-sit;toilet  -AN     [place] Level/Cues Needed (Transfer Goal 1, OT) contact guard required  -AN     Time Frame (Transfer Goal 1, OT)  long term goal (LTG);10 days  -AN     Progress/Outcome (Transfer Goal 1, OT) goal ongoing  -AN       [facility] Name 01/04/24 1330          Dressing Goal 1 (OT)    Activity/Device (Dressing Goal 1, OT) lower body dressing  -AN     [place]/Cues Needed (Dressing Goal 1, OT) minimum assist (75% or more patient effort)  -AN     Time Frame (Dressing Goal 1, OT) long term goal (LTG);10 days  -AN     Strategies/Barriers (Dressing Goal 1, OT) don/doff slippers w/ AAD  -AN     Progress/Outcome (Dressing Goal 1, OT) goal ongoing  -AN       [facility] Name 01/04/24 1330          Toileting Goal 1 (OT)    Activity/Device (Toileting Goal 1, OT) adjust/manage clothing;perform perineal hygiene  -AN     [place] Level/Cues Needed (Toileting Goal 1, OT) minimum assist (75% or more patient effort)  -AN     Time Frame (Toileting Goal 1, OT) long term goal (LTG);10 days  -AN     Progress/Outcome (Toileting Goal 1, OT) goal ongoing  -AN       Row Name 01/04/24 1330          Therapy Assessment/Plan (OT)    Planned Therapy Interventions (OT) activity tolerance training;BADL retraining;adaptive equipment training;patient/caregiver education/training;transfer/mobility retraining;functional balance retraining;occupation/activity based interventions;strengthening exercise  -AN               User Key  (r) = Recorded By, (t) = Taken By, (c) = Cosigned By      Initials Name Provider Type    Zena Villalobos OT Occupational Therapist                   Clinical Impression       Row Name 01/04/24 1328          Pain Assessment    Additional Documentation Pain Scale: FACES Pre/Post-Treatment (Group)  -AN       Row Name 01/04/24 1328          Pain Scale: FACES Pre/Post-Treatment    Pain: FACES Scale, Pretreatment 2-->hurts little bit  -AN     Posttreatment Pain Rating 2-->hurts little bit  -AN     Pain Location generalized  -AN     Pre/Posttreatment Pain Comment tolerated  -AN       Row Name 01/04/24 1323          Plan of Care Review    Plan of Care  Reviewed With patient  -AN     Progress no change  -AN     Outcome Evaluation OT re-eval completed s/p ICU transfer. Pt continues to present with generalized weakness, impaired mobility, SOA, and impaired ADLs warranting skilled OT services. Pt required CGA for bed mobility and for functional mobility with UE support. Limited by fatigue and SOA. Rec IPR at dc for best functional outcomes.  -AN       Row Name 01/04/24 1328          Therapy Plan Review/Discharge Plan (OT)    Anticipated Discharge Disposition (OT) inpatient rehabilitation facility  -AN       Row Name 01/04/24 1328          Vital Signs    Pre SpO2 (%) 94  -AN     O2 Delivery Pre Treatment nasal cannula  -AN     O2 Delivery Intra Treatment nasal cannula  -AN     Post SpO2 (%) 95  -AN     O2 Delivery Post Treatment nasal cannula  -AN     Pre Patient Position Supine  -AN     Intra Patient Position Standing  -AN     Post Patient Position Sitting  -AN       Row Name 01/04/24 1328          Positioning and Restraints    Pre-Treatment Position in bed  -AN     Post Treatment Position chair  -AN     In Chair notified nsg;reclined;call light within reach;encouraged to call for assist;exit alarm on;with family/caregiver;legs elevated  -AN               User Key  (r) = Recorded By, (t) = Taken By, (c) = Cosigned By      Initials Name Provider Type    AN Zena Cooper, ROMY Occupational Therapist                   Outcome Measures       Row Name 01/04/24 7931          How much help from another is currently needed...    Putting on and taking off regular lower body clothing? 1  -AN     Bathing (including washing, rinsing, and drying) 2  -AN     Toileting (which includes using toilet bed pan or urinal) 2  -AN     Putting on and taking off regular upper body clothing 3  -AN     Taking care of personal grooming (such as brushing teeth) 3  -AN     Eating meals 4  -AN     AM-PAC 6 Clicks Score (OT) 15  -AN       Row Name 01/04/24 1331          Functional Assessment     Outcome Measure Options AM-PAC 6 Clicks Daily Activity (OT)  -AN               User Key  (r) = Recorded By, (t) = Taken By, (c) = Cosigned By      Initials Name Provider Type    Zena Villalobos OT Occupational Therapist                    Occupational Therapy Education       Title: PT OT SLP Therapies (In Progress)       Topic: Occupational Therapy (In Progress)       Point: ADL training (Done)       Description:   Instruct learner(s) on proper safety adaptation and remediation techniques during self care or transfers.   Instruct in proper use of assistive devices.                  Learning Progress Summary             Patient Acceptance, E, VU by AN at 1/4/2024 1331    Acceptance, E, NR by YVON at 12/31/2023 1357   Family Acceptance, E, VU by AN at 1/4/2024 1331                         Point: Home exercise program (Not Started)       Description:   Instruct learner(s) on appropriate technique for monitoring, assisting and/or progressing therapeutic exercises/activities.                  Learner Progress:  Not documented in this visit.              Point: Precautions (Done)       Description:   Instruct learner(s) on prescribed precautions during self-care and functional transfers.                  Learning Progress Summary             Patient Acceptance, E, VU by AN at 1/4/2024 1331    Acceptance, E, NR by YVON at 12/31/2023 1357   Family Acceptance, E, VU by AN at 1/4/2024 1331                         Point: Body mechanics (Done)       Description:   Instruct learner(s) on proper positioning and spine alignment during self-care, functional mobility activities and/or exercises.                  Learning Progress Summary             Patient Acceptance, E, VU by AN at 1/4/2024 1331    Acceptance, E, NR by YVON at 12/31/2023 1357   Family Acceptance, E, VU by AN at 1/4/2024 1331                                         User Key       Initials Effective Dates Name Provider Type Thaddeus SANZ 09/02/21 -  Angel  ROMY Hernandez Occupational Therapist OT    RAJENDRA 09/21/21 -  Zena Cooper OT Occupational Therapist OT                  OT Recommendation and Plan  Planned Therapy Interventions (OT): activity tolerance training, BADL retraining, adaptive equipment training, patient/caregiver education/training, transfer/mobility retraining, functional balance retraining, occupation/activity based interventions, strengthening exercise  Plan of Care Review  Plan of Care Reviewed With: patient  Progress: no change  Outcome Evaluation: OT re-eval completed s/p ICU transfer. Pt continues to present with generalized weakness, impaired mobility, SOA, and impaired ADLs warranting skilled OT services. Pt required CGA for bed mobility and for functional mobility with UE support. Limited by fatigue and SOA. Rec IPR at dc for best functional outcomes.     Time Calculation:         Time Calculation- OT       Row Name 01/04/24 1332             Time Calculation- OT    OT Start Time 1050  -AN      OT Received On 01/04/24  -AN      OT Goal Re-Cert Due Date 01/14/24  -AN         Timed Charges    96565 - OT Self Care/Mgmt Minutes 25  -AN         Untimed Charges    OT Eval/Re-eval Minutes 20  -AN         Total Minutes    Timed Charges Total Minutes 25  -AN      Untimed Charges Total Minutes 20  -AN       Total Minutes 45  -AN                User Key  (r) = Recorded By, (t) = Taken By, (c) = Cosigned By      Initials Name Provider Type    AN Zena Cooper OT Occupational Therapist                  Therapy Charges for Today       Code Description Service Date Service Provider Modifiers Qty    66652564426 HC OT SELF CARE/MGMT/TRAIN EA 15 MIN 1/4/2024 Zena Cooper OT GO 2    80184274350 HC OT RE-EVAL 2 1/4/2024 Zena Cooper OT GO 1                 Zena Cooper OT  1/4/2024

## 2024-01-04 NOTE — PLAN OF CARE
Goal Outcome Evaluation:  Plan of Care Reviewed With: patient        Progress: no change  Outcome Evaluation: OT re-eval completed s/p ICU transfer. Pt continues to present with generalized weakness, impaired mobility, SOA, and impaired ADLs warranting skilled OT services. Pt required CGA for bed mobility and for functional mobility with UE support. Limited by fatigue and SOA. Rec IPR at dc for best functional outcomes.      Anticipated Discharge Disposition (OT): inpatient rehabilitation facility

## 2024-01-05 LAB
ALBUMIN SERPL-MCNC: 3 G/DL (ref 3.5–5.2)
ANION GAP SERPL CALCULATED.3IONS-SCNC: 12 MMOL/L (ref 5–15)
BUN SERPL-MCNC: 32 MG/DL (ref 8–23)
BUN/CREAT SERPL: 30.8 (ref 7–25)
CALCIUM SPEC-SCNC: 8.7 MG/DL (ref 8.6–10.5)
CHLORIDE SERPL-SCNC: 100 MMOL/L (ref 98–107)
CO2 SERPL-SCNC: 31 MMOL/L (ref 22–29)
CREAT SERPL-MCNC: 1.04 MG/DL (ref 0.76–1.27)
DEPRECATED RDW RBC AUTO: 50.5 FL (ref 37–54)
EGFRCR SERPLBLD CKD-EPI 2021: 73.5 ML/MIN/1.73
ERYTHROCYTE [DISTWIDTH] IN BLOOD BY AUTOMATED COUNT: 15.5 % (ref 12.3–15.4)
GLUCOSE SERPL-MCNC: 124 MG/DL (ref 65–99)
HCT VFR BLD AUTO: 50 % (ref 37.5–51)
HGB BLD-MCNC: 16.1 G/DL (ref 13–17.7)
MCH RBC QN AUTO: 29.2 PG (ref 26.6–33)
MCHC RBC AUTO-ENTMCNC: 32.2 G/DL (ref 31.5–35.7)
MCV RBC AUTO: 90.6 FL (ref 79–97)
PHOSPHATE SERPL-MCNC: 2.6 MG/DL (ref 2.5–4.5)
PLATELET # BLD AUTO: 281 10*3/MM3 (ref 140–450)
PMV BLD AUTO: 9.8 FL (ref 6–12)
POTASSIUM SERPL-SCNC: 3.4 MMOL/L (ref 3.5–5.2)
POTASSIUM SERPL-SCNC: 3.8 MMOL/L (ref 3.5–5.2)
QT INTERVAL: 320 MS
QTC INTERVAL: 435 MS
RBC # BLD AUTO: 5.52 10*6/MM3 (ref 4.14–5.8)
SODIUM SERPL-SCNC: 143 MMOL/L (ref 136–145)
WBC NRBC COR # BLD AUTO: 8.62 10*3/MM3 (ref 3.4–10.8)

## 2024-01-05 PROCEDURE — 84132 ASSAY OF SERUM POTASSIUM: CPT | Performed by: INTERNAL MEDICINE

## 2024-01-05 PROCEDURE — 94799 UNLISTED PULMONARY SVC/PX: CPT

## 2024-01-05 PROCEDURE — 99232 SBSQ HOSP IP/OBS MODERATE 35: CPT | Performed by: INTERNAL MEDICINE

## 2024-01-05 PROCEDURE — 85027 COMPLETE CBC AUTOMATED: CPT | Performed by: INTERNAL MEDICINE

## 2024-01-05 PROCEDURE — 97530 THERAPEUTIC ACTIVITIES: CPT

## 2024-01-05 PROCEDURE — 25010000002 ACETAZOLAMIDE PER 500 MG: Performed by: INTERNAL MEDICINE

## 2024-01-05 PROCEDURE — 80069 RENAL FUNCTION PANEL: CPT | Performed by: INTERNAL MEDICINE

## 2024-01-05 PROCEDURE — 25010000002 BUMETANIDE PER 0.5 MG: Performed by: INTERNAL MEDICINE

## 2024-01-05 PROCEDURE — 99233 SBSQ HOSP IP/OBS HIGH 50: CPT | Performed by: INTERNAL MEDICINE

## 2024-01-05 RX ORDER — BUMETANIDE 1 MG/1
1 TABLET ORAL DAILY
Status: DISCONTINUED | OUTPATIENT
Start: 2024-01-05 | End: 2024-01-06 | Stop reason: HOSPADM

## 2024-01-05 RX ORDER — POTASSIUM CHLORIDE 750 MG/1
20 CAPSULE, EXTENDED RELEASE ORAL DAILY
Status: DISCONTINUED | OUTPATIENT
Start: 2024-01-05 | End: 2024-01-05

## 2024-01-05 RX ORDER — ACETAZOLAMIDE SODIUM 500 MG/5ML
500 INJECTION, POWDER, LYOPHILIZED, FOR SOLUTION INTRAVENOUS ONCE
Status: COMPLETED | OUTPATIENT
Start: 2024-01-05 | End: 2024-01-05

## 2024-01-05 RX ORDER — POTASSIUM CHLORIDE 20 MEQ/1
40 TABLET, EXTENDED RELEASE ORAL ONCE
Status: COMPLETED | OUTPATIENT
Start: 2024-01-05 | End: 2024-01-05

## 2024-01-05 RX ORDER — TEMAZEPAM 15 MG/1
15 CAPSULE ORAL NIGHTLY PRN
Status: DISCONTINUED | OUTPATIENT
Start: 2024-01-05 | End: 2024-01-06 | Stop reason: HOSPADM

## 2024-01-05 RX ORDER — POTASSIUM CHLORIDE 750 MG/1
20 CAPSULE, EXTENDED RELEASE ORAL 2 TIMES DAILY WITH MEALS
Status: DISCONTINUED | OUTPATIENT
Start: 2024-01-05 | End: 2024-01-06 | Stop reason: HOSPADM

## 2024-01-05 RX ADMIN — Medication 10 ML: at 09:24

## 2024-01-05 RX ADMIN — BISOPROLOL FUMARATE 5 MG: 5 TABLET, FILM COATED ORAL at 08:46

## 2024-01-05 RX ADMIN — MONTELUKAST 10 MG: 10 TABLET, FILM COATED ORAL at 20:21

## 2024-01-05 RX ADMIN — POTASSIUM CHLORIDE 40 MEQ: 1.5 POWDER, FOR SOLUTION ORAL at 00:28

## 2024-01-05 RX ADMIN — OXYCODONE HYDROCHLORIDE AND ACETAMINOPHEN 1000 MG: 500 TABLET ORAL at 08:46

## 2024-01-05 RX ADMIN — ACETAZOLAMIDE 500 MG: 500 INJECTION, POWDER, LYOPHILIZED, FOR SOLUTION INTRAVENOUS at 09:23

## 2024-01-05 RX ADMIN — Medication 5 MG: at 20:21

## 2024-01-05 RX ADMIN — POTASSIUM CHLORIDE 20 MEQ: 750 CAPSULE, EXTENDED RELEASE ORAL at 09:23

## 2024-01-05 RX ADMIN — Medication 10 ML: at 20:22

## 2024-01-05 RX ADMIN — POTASSIUM CHLORIDE 40 MEQ: 1500 TABLET, EXTENDED RELEASE ORAL at 05:04

## 2024-01-05 RX ADMIN — POTASSIUM CHLORIDE 20 MEQ: 750 CAPSULE, EXTENDED RELEASE ORAL at 18:44

## 2024-01-05 RX ADMIN — FINASTERIDE 5 MG: 5 TABLET, FILM COATED ORAL at 08:46

## 2024-01-05 RX ADMIN — BUDESONIDE AND FORMOTEROL FUMARATE DIHYDRATE 2 PUFF: 160; 4.5 AEROSOL RESPIRATORY (INHALATION) at 19:48

## 2024-01-05 RX ADMIN — ACETAZOLAMIDE 500 MG: 500 INJECTION, POWDER, LYOPHILIZED, FOR SOLUTION INTRAVENOUS at 04:56

## 2024-01-05 RX ADMIN — TEMAZEPAM 15 MG: 15 CAPSULE ORAL at 20:21

## 2024-01-05 RX ADMIN — BUDESONIDE AND FORMOTEROL FUMARATE DIHYDRATE 2 PUFF: 160; 4.5 AEROSOL RESPIRATORY (INHALATION) at 07:31

## 2024-01-05 RX ADMIN — BUMETANIDE 2 MG: 0.25 INJECTION INTRAMUSCULAR; INTRAVENOUS at 00:28

## 2024-01-05 RX ADMIN — ASPIRIN 162 MG: 81 TABLET, COATED ORAL at 08:46

## 2024-01-05 RX ADMIN — TAMSULOSIN HYDROCHLORIDE 0.4 MG: 0.4 CAPSULE ORAL at 08:46

## 2024-01-05 NOTE — CASE MANAGEMENT/SOCIAL WORK
Continued Stay Note  [facility]     Patient Name: Luis Perez Jr.  MRN: [id number]  Today's Date: 1/5/2024    Admit Date: 12/30/2023    Plan: IRF   Discharge Plan       Row Name 01/05/24 1434       Plan    Plan IRF    Patient/Family in Agreement with Plan yes    Plan Comments Spoke with daughter and patient at bedside. Awaiting follow-up from referrals sent. CM will assist with transport once facility established. CM to cont to follow for discharge needs.    Final Discharge Disposition Code 62 - inpatient rehab facility                   Discharge Codes    No documentation.                 Expected Discharge Date and Time       Expected Discharge Date Expected Discharge Time    Jan 7, 2024               April Davis RN

## 2024-01-05 NOTE — PROGRESS NOTES
[facility]  IP Progress Note      Chief Complaint/Reason for service: A-fib #2 large pericardial effusion status post pericardiocentesis #3 acute kidney injury resolved    Subjective   Subjective: The patient states he is getting stronger every day.  His breathing is improved and his edema has decreased.  He is considering going to rehab.  He denies chest pain or shortness of breath    Past medical, surgical, social and family history reviewed in the patient's electronic medical record.    Objective     Vital Sign Min/Max for last 24 hours  Temp  Min: 97.5 °F (36.4 °C)  Max: 98 °F (36.7 °C)   BP  Min: 92/73  Max: 132/95   Pulse  Min: 80  Max: 106   Resp  Min: 16  Max: 18   SpO2  Min: 88 %  Max: 94 %   Flow (L/min)  Min: 2  Max: 2      Intake/Output Summary (Last 24 hours) at 1/5/2024 0706  Last data filed at 1/5/2024 0600  Gross per 24 hour   Intake 600 ml   Output 6575 ml   Net -5975 ml             Current Facility-Administered Medications:     albuterol (PROVENTIL) nebulizer solution 0.083% 2.5 mg/3mL, 2.5 mg, Nebulization, Q4H PRN, Clarence Chaudhari MD    ascorbic acid (VITAMIN C) tablet 1,000 mg, 1,000 mg, Oral, Daily, Clarence Chaudhari MD, 1,000 mg at 01/04/24 0808    aspirin EC tablet 162 mg, 162 mg, Oral, Daily, Norberto Perez MD, 162 mg at 01/04/24 0808    sennosides-docusate (PERICOLACE) 8.6-50 MG per tablet 2 tablet, 2 tablet, Oral, BID, 2 tablet at 01/03/24 0938 **AND** polyethylene glycol (MIRALAX) packet 17 g, 17 g, Oral, Daily PRN **AND** bisacodyl (DULCOLAX) EC tablet 5 mg, 5 mg, Oral, Daily PRN **AND** bisacodyl (DULCOLAX) suppository 10 mg, 10 mg, Rectal, Daily PRN, Clarence Chaudhari MD    bisoprolol (ZEBeta) tablet 5 mg, 5 mg, Oral, Q24H, Clarence Castellanos MD, 5 mg at 01/04/24 0808    budesonide-formoterol (SYMBICORT) 160-4.5 MCG/ACT inhaler 2 puff, 2 puff, Inhalation, BID - RT, Winston Galvan, PharmD, 2 puff at 01/04/24 2029    bumetanide (BUMEX)  injection 2 mg, 2 mg, Intravenous, Q12H, Checo Cardozo MD, 2 mg at 01/05/24 0028    Calcium Replacement - Follow Nurse / BPA Driven Protocol, , Does not apply, Mckayla MARIE Donna C, MD    finasteride (PROSCAR) tablet 5 mg, 5 mg, Oral, Daily, Clarence Chaudhari MD, 5 mg at 01/04/24 0808    Magnesium Standard Dose Replacement - Follow Nurse / BPA Driven Protocol, , Does not apply, Mckayla MARIE Donna C, MD    melatonin tablet 5 mg, 5 mg, Oral, Nightly PRN, Clarence Chaudhari MD, 5 mg at 01/04/24 2117    montelukast (SINGULAIR) tablet 10 mg, 10 mg, Oral, Nightly, Clarence Chaudhari MD, 10 mg at 01/04/24 2117    nitroglycerin (NITROSTAT) SL tablet 0.4 mg, 0.4 mg, Sublingual, Q5 Min PRN, Clarence Chaudhari MD    ondansetron (ZOFRAN) injection 4 mg, 4 mg, Intravenous, Q6H PRN, Clarence Chaudhari MD    Phosphorus Replacement - Follow Nurse / BPA Driven Protocol, , Does not apply, Mckayla MARIE Donna C, MD    Potassium Replacement - Follow Nurse / BPA Driven Protocol, , Does not apply, Mckayla MARIE Donna C, MD    sodium chloride 0.9 % flush 10 mL, 10 mL, Intravenous, PRN, Clarence Chaudhari MD    sodium chloride 0.9 % flush 10 mL, 10 mL, Intravenous, Q12H, Clarence Chaudhair MD, 10 mL at 01/04/24 2120    sodium chloride 0.9 % infusion 40 mL, 40 mL, Intravenous, PRN, Clarence Chaudhari MD    tamsulosin (FLOMAX) 24 hr capsule 0.4 mg, 0.4 mg, Oral, Daily, Clarence Chaudhari MD, 0.4 mg at 01/04/24 0808    Physical Exam: General pleasant obese male sitting in recliner current systolic blood pressure 92.  Not dyspneic tachypneic heart rate 90s        HEENT: No JVP.  No icterus       Respiratory: Equal bilateral symmetrical expansion and overall clear       Cardiovascular: Irregular rate and rhythm with pitting edema all the way up into the abdominal skin       GI: Obese and soft       Lower Extremities: No lesions       Neuro: Facial expressions are symmetrical moves all 4 extremities       Skin: Warm and dry with  diffuse pitting edema       Psych: Pleasant affect    Results Review: Patient is a net -20 L since admission.  Urine output is nearly 7 L.  Heart rate is 87-98.  Blood pressures 95 129 systolic.  GFR is improved to 73.5 potassium is low 3.4 sodium is improved to 143    Radiology Results:  Imaging Results (Last 72 Hours)       ** No results found for the last 72 hours. **            EKG: A-fib RVR diffuse nonspecific ST abnormalities    ECHO: Preserved EF    Tele: Baseline rhythm is A-fib with overall acceptable heart rate control    Assessment   Assessment/Plan: #1 A-fib-the patient initially underwent PIETER cardioversion.  He then went back into atrial fibrillation.  Because of his mental status changes he had had the day before yesterday and since he could not be anticoagulated I discontinued sotalol.  He is on Zebeta and has good rate control at this time we will plan on resuming Eliquis in the next couple of days.  If heart rates increase blood pressure is tenuous can add low-dose digoxin.  Can also consider repeat PIETER cardioversion  Volume overload is slowly improving.  2 acute kidney injury with volume overload-the renal function has improved significantly/continue diuretics    Clarence Castellanos MD  01/05/24  07:06 EST

## 2024-01-05 NOTE — THERAPY TREATMENT NOTE
Patient Name: Luis Perez Jr.  : 1945    MRN: [id number]                              Today's Date: 2024       Admit Date: 2023    Visit Dx:     ICD-10-CM ICD-9-CM   1. Acute kidney injury  N17.9 584.9   2. Generalized weakness  R53.1 780.79   3. Pericardial effusion  I31.39 423.9   4. Leukocytosis, unspecified type  D72.829 288.60   5. Elevated LFTs  R79.89 790.6   6. Hematuria, unspecified type  R31.9 599.70     Patient Active Problem List   Diagnosis    Hypertension    Paroxysmal atrial fibrillation    Snoring    Obstructive sleep apnea - Intolerant CPAP/BiPAP    Chronic persistent asthma    Non-smoker    Obesity, Class III, BMI 40-49.9 (morbid obesity)    Perennial rhinitis    Vitamin D deficiency    Rhinovirus    Pericardial effusion    MONIQUE (acute kidney injury)    Transaminitis    Lactic acidosis     Past Medical History:   Diagnosis Date    Asthma     Bronchitis, chronic     Cancer     Skin     Cellulitis and abscess of right leg     Chronic persistent asthma 2020    Hyperlipidemia     Hypertension     Nephrolithiasis     Obesity, Class III, BMI 40-49.9 (morbid obesity) 2020    Paroxysmal atrial fibrillation 07/15/2019    Pneumonia     Sleep apnea     UTI (urinary tract infection)     Vitamin D deficiency 2020     Past Surgical History:   Procedure Laterality Date    CARDIAC CATHETERIZATION N/A 2023    Procedure: Pericardiocentesis;  Surgeon: Clarence Chaudhari MD;  Location: [facility] INVASIVE LOCATION;  Service: Cardiovascular;  Laterality: N/A;    CARDIOVERSION  2019    COLONOSCOPY      NASAL SEPTUM SURGERY      Deviation Repair    SKIN CANCER EXCISION      TONSILLECTOMY      VASECTOMY        General Information       Row Name 24 1129          Physical Therapy Time and Intention    Document Type therapy note (daily note)  -KG     Mode of Treatment physical therapy  -KG       Row Name 24 1129          General Information    Existing  Precautions/Restrictions fall  -KG       Row Name 01/05/24 1129          Cognition    Orientation Status (Cognition) oriented x 3  -KG       Row Name 01/05/24 1129          Safety Issues, Functional Mobility    Safety Issues Affecting Function (Mobility) awareness of need for assistance;insight into deficits/self-awareness;safety precaution awareness;safety precautions follow-through/compliance  -KG     Impairments Affecting Function (Mobility) balance;coordination;endurance/activity tolerance;postural/trunk control;strength;shortness of breath  -KG               User Key  (r) = Recorded By, (t) = Taken By, (c) = Cosigned By      Initials Name Provider Type    KG Tiki Parker, PT Physical Therapist                   Mobility       Row Name 01/05/24 1129          Bed Mobility    Bed Mobility sit-supine  -KG     Sit-Supine [place] (Bed Mobility) moderate assist (50% patient effort);2 person assist;verbal cues  -KG     Assistive Device (Bed Mobility) head of bed elevated  -KG     Comment, (Bed Mobility) VC's for sequencing. Pt required assistance at trunk and BLEs.  -KG       Row Name 01/05/24 1129          Transfers    Comment, (Transfers) VC's for sequencing and foot placement.  -KG       Row Name 01/05/24 1129          Sit-Stand Transfer    Sit-Stand [place] (Transfers) contact guard;verbal cues  -KG       Row Name 01/05/24 1129          Gait/Stairs (Locomotion)    [place] Level (Gait) contact guard;verbal cues  -KG     Assistive Device (Gait) other (see comments)  B UE support on tripod monitor  -KG     Distance in Feet (Gait) 210  -KG     Deviations/Abnormal Patterns (Gait) base of support, wide;jaime decreased;stride length decreased  -KG     Bilateral Gait Deviations forward flexed posture;heel strike decreased  -KG     Comment, (Gait/Stairs) Pt demonstrated step through gait pattern with slow jaime and wide HANS. Frequent cues for upright posture with forward gaze and to keep monitor  close. Improved jaime with continued forward ambulation. Pt with mild instability, but able to correct with cueing. Pt's O2 sats remained above 90% on RA during ambulation. Limited by fatigue. Required encouragement for participation.  -KG               User Key  (r) = Recorded By, (t) = Taken By, (c) = Cosigned By      Initials Name Provider Type    Tiki Abrams, PT Physical Therapist                   Obj/Interventions       Row Name 01/05/24 1131          Balance    Balance Assessment sitting static balance;standing static balance;standing dynamic balance  -KG     Static Sitting Balance independent  -KG     Position, Sitting Balance unsupported;sitting in chair  -KG     Static Standing Balance contact guard  -KG     Dynamic Standing Balance contact guard  -KG     Position/Device Used, Standing Balance supported  -KG               User Key  (r) = Recorded By, (t) = Taken By, (c) = Cosigned By      Initials Name Provider Type    Tiki Abrams, PT Physical Therapist                   Goals/Plan    No documentation.                  Clinical Impression       [facility] Name 01/05/24 1131          Pain    Additional Documentation Pain Scale: FACES Pre/Post-Treatment (Group)  -KG       [facility] Name 01/05/24 1131          Pain Scale: FACES Pre/Post-Treatment    Pain: FACES Scale, Pretreatment 2-->hurts little bit  -KG     Posttreatment Pain Rating 2-->hurts little bit  -KG     Pain Location generalized  -KG       Row Name 01/05/24 1131          Plan of Care Review    Plan of Care Reviewed With patient  -KG     Progress improving  -KG     Outcome Evaluation Pt ambulated 210ft with CGA and B UE support on tripod monitor. Frequent cues for upright posture with forward gaze and more narrow HANS. Cues to keep monitor close with feet inside middle. Pt continues to be slightly unsteady, but able to improve with cueing. Pt required encouragement for participation. Limited by c/o fatigue. Continue to recommend PT  skilled care and D/C to  rehab facility.  -KG       Row Name 01/05/24 1131          Vital Signs    Pre Systolic BP Rehab 97  -KG     Pre Treatment Diastolic BP 80  -KG     Pretreatment Heart Rate (beats/min) 110  -KG     Posttreatment Heart Rate (beats/min) 96  -KG     Pre SpO2 (%) 94  -KG     O2 Delivery Pre Treatment room air  -KG     Post SpO2 (%) 94  -KG     O2 Delivery Post Treatment room air  -KG     Pre Patient Position Sitting  -KG     Intra Patient Position Standing  -KG     Post Patient Position Supine  -KG       Row Name 01/05/24 1131          Positioning and Restraints    Pre-Treatment Position sitting in chair/recliner  -KG     Post Treatment Position bed  -KG     In Bed supine;call light within reach;encouraged to call for assist;with family/caregiver;with nsg;side rails up x3;RUE elevated;LUE elevated  -KG               User Key  (r) = Recorded By, (t) = Taken By, (c) = Cosigned By      Initials Name Provider Type    Tiki Abrams, PT Physical Therapist                   Outcome Measures       Row Name 01/05/24 1133          How much help from another person do you currently need...    Turning from your back to your side while in flat bed without using bedrails? 3  -KG     Moving from lying on back to sitting on the side of a flat bed without bedrails? 2  -KG     Moving to and from a bed to a chair (including a wheelchair)? 3  -KG     Standing up from a chair using your arms (e.g., wheelchair, bedside chair)? 3  -KG     Climbing 3-5 steps with a railing? 2  -KG     To walk in hospital room? 3  -KG     AM-PAC 6 Clicks Score (PT) 16  -KG     Highest Level of Mobility Goal 5 --> Static standing  -KG       Row Name 01/05/24 1133          Functional Assessment    Outcome Measure Options AM-PAC 6 Clicks Basic Mobility (PT)  -KG               User Key  (r) = Recorded By, (t) = Taken By, (c) = Cosigned By      Initials Name Provider Type    Tiki Abrams, PT Physical Therapist                                  Physical Therapy Education       Title: PT OT SLP Therapies (In Progress)       Topic: Physical Therapy (Done)       Point: Mobility training (Done)       Learning Progress Summary             Patient Acceptance, E, VU,NR by KG at 1/5/2024 0913    Acceptance, E, NR by KG at 1/4/2024 0915    Acceptance, E, NR by KG at 1/3/2024 1013    Acceptance, E, NR by SANTA at 1/1/2024 1055    Acceptance, E, NR by KG at 12/31/2023 0945                         Point: Home exercise program (Done)       Learning Progress Summary             Patient Acceptance, E, VU,NR by KG at 1/5/2024 0913    Acceptance, E, NR by KG at 1/4/2024 0915    Acceptance, E, NR by KG at 1/3/2024 1013    Acceptance, E, NR by SANTA at 1/1/2024 1055                         Point: Body mechanics (Done)       Learning Progress Summary             Patient Acceptance, E, VU,NR by KG at 1/5/2024 0913    Acceptance, E, NR by KG at 1/4/2024 0915    Acceptance, E, NR by KG at 1/3/2024 1013    Acceptance, E, NR by SANTA at 1/1/2024 1055    Acceptance, E, NR by KG at 12/31/2023 0945                         Point: Precautions (Done)       Learning Progress Summary             Patient Acceptance, E, VU,NR by KG at 1/5/2024 0913    Acceptance, E, NR by KG at 1/4/2024 0915    Acceptance, E, NR by KG at 1/3/2024 1013    Acceptance, E, NR by SANTA at 1/1/2024 1055    Acceptance, E, NR by KG at 12/31/2023 0945                                         User Key       Initials Effective Dates Name Provider Type Discipline    SANTA 02/03/23 -  Zo Zendejas, PT Physical Therapist PT    KG 05/22/20 -  Tiki Parker PT Physical Therapist PT                  PT Recommendation and Plan  Planned Therapy Interventions (PT): balance training, bed mobility training, gait training, strengthening, transfer training  Plan of Care Reviewed With: patient  Progress: improving  Outcome Evaluation: Pt ambulated 210ft with CGA and B UE support on tripod monitor. Frequent  cues for upright posture with forward gaze and more narrow HANS. Cues to keep monitor close with feet inside middle. Pt continues to be slightly unsteady, but able to improve with cueing. Pt required encouragement for participation. Limited by c/o fatigue. Continue to recommend PT skilled care and D/C to IP rehab facility.     Time Calculation:   PT Evaluation Complexity  History, PT Evaluation Complexity: 3 or more personal factors and/or comorbidities  Examination of Body Systems (PT Eval Complexity): total of 3 or more elements  Clinical Presentation (PT Evaluation Complexity): stable  Clinical Decision Making (PT Evaluation Complexity): low complexity  Overall Complexity (PT Evaluation Complexity): low complexity     PT Charges       Row Name 01/05/24 0913             Time Calculation    Start Time 0913  -KG      PT Received On 01/05/24  -KG      PT Goal Re-Cert Due Date 01/13/24  -KG         Time Calculation- PT    Total Timed Code Minutes- PT 24 minute(s)  -KG         Timed Charges    61782 - PT Therapeutic Activity Minutes 24  -KG         Total Minutes    Timed Charges Total Minutes 24  -KG       Total Minutes 24  -KG                User Key  (r) = Recorded By, (t) = Taken By, (c) = Cosigned By      Initials Name Provider Type    KG Tiki Parker, PT Physical Therapist                  Therapy Charges for Today       Code Description Service Date Service Provider Modifiers Qty    07156155514 HC PT THERAPEUTIC ACT EA 15 MIN 1/4/2024 Tiki Parker, PT GP 2    83458013086 HC PT THERAPEUTIC ACT EA 15 MIN 1/5/2024 Tiki Parker, PT GP 2            PT G-Codes  Outcome Measure Options: AM-PAC 6 Clicks Basic Mobility (PT)  AM-PAC 6 Clicks Score (PT): 16  AM-PAC 6 Clicks Score (OT): 15  PT Discharge Summary  Anticipated Discharge Disposition (PT): inpatient rehabilitation facility    Noemí Parker PT  1/5/2024

## 2024-01-05 NOTE — PROGRESS NOTES
"Critical Care Note     LOS: 6 days   Patient Care Team:  Sierra Kuo MD as PCP - General (Internal Medicine)  Latasha Poe MD as Consulting Physician (Dermatopathology)  Provider, No Known  Camille Mccord APRN as Nurse Practitioner (Pulmonary Disease)  Jaja Mansfield APRN as Nurse Practitioner (Cardiology)    Chief Complaint/Reason for visit:    Chief Complaint   Patient presents with    Shortness of Breath   Pericardial effusion  Acute kidney injury  Atrial fibrillation  Moderate chronic obstructive asthma      Subjective     Interval History:     Slept poorly and feels fatigued today.  Melatonin not effective.  Atrial fibrillation with a rate of .  Room air saturation 93%.  Urine output 6.6 L; he is now 20.8 L negative since admission.  Tolerating a p.o. diet    Review of Systems:    All systems were reviewed and negative except as noted in subjective.    Medical history, surgical history, social history, family history reviewed    Objective     Intake/Output:    Intake/Output Summary (Last 24 hours) at 1/5/2024 0831  Last data filed at 1/5/2024 0600  Gross per 24 hour   Intake 600 ml   Output 6025 ml   Net -5425 ml       Nutrition:  Diet: Regular/House Diet, Cardiac Diets, Renal Diets; Healthy Heart (2-3 Na+); Low Potassium, Low Phosphorus, Low Sodium (2-3g); Texture: Regular Texture (IDDSI 7); Fluid Consistency: Thin (IDDSI 0)    Infusions:           Telemetry: Sinus bradycardia             Vital Signs  Blood pressure 95/72, pulse 103, temperature 98 °F (36.7 °C), temperature source Oral, resp. rate 19, height 185.4 cm (73\"), weight (!) 161 kg (356 lb), SpO2 93%.    Physical Exam:  General Appearance:  Overweight older gentleman sitting in the chair   Head:  Atraumatic   Eyes:          No jaundice   Ears:     Throat: Oral mucosa moist   Neck: Large neck, trachea midline, no crepitus   Back:      Lungs:   Breath sounds are bilateral, clear anteriorly    Heart:  Slow rate, regular, S1, S2 " "auscultated, no rub   Abdomen:   Large abdomen, bowel sounds present, soft   Rectal:   Deferred   Extremities: 2+ edema bilateral pretibial, some upper extremity edema left arm.  Brawny changes lower extremities pretibial above the ankle    Pulses:    Skin: Warm and dry   Lymph nodes: No cervical adenopathy   Neurologic: Oriented x 3, speech fluent,  equal      Results Review:     I reviewed the patient's new clinical results.   Results from last 7 days   Lab Units 01/05/24 0455 01/04/24  2306 01/04/24  0337 01/03/24  0253 01/02/24  0353 01/01/24 0522 12/31/23 2052 12/31/23  0304   SODIUM mmol/L 143  --  147* 140   < > 129* 129* 129*   POTASSIUM mmol/L 3.4* 3.6 3.3* 3.5   < > 4.9 5.2 5.0   CHLORIDE mmol/L 100  --  104 101   < > 92* 89* 91*   CO2 mmol/L 31.0*  --  29.0 27.0   < > 17.0* 13.0* 17.0*   BUN mg/dL 32*  --  45* 67*   < > 95* 91* 75*   CREATININE mg/dL 1.04  --  1.10 1.46*   < > 3.11* 3.37* 2.73*   CALCIUM mg/dL 8.7  --  8.5* 8.3*   < > 8.0* 8.3* 8.5*   BILIRUBIN mg/dL  --   --   --   --   --  0.7 1.0 1.3*   ALK PHOS U/L  --   --   --   --   --  100 109 96   ALT (SGPT) U/L  --   --   --   --   --  1,111* 1,358* 1,426*   AST (SGOT) U/L  --   --   --   --   --  825* 1,488* 3,069*   GLUCOSE mg/dL 124*  --  152* 160*   < > 117* 141* 154*    < > = values in this interval not displayed.     Results from last 7 days   Lab Units 01/05/24  0455 01/02/24 0353 01/01/24 0522   WBC 10*3/mm3 8.62 12.34* 16.93*   HEMOGLOBIN g/dL 16.1 14.3 14.3   HEMATOCRIT % 50.0 43.6 43.6   PLATELETS 10*3/mm3 281 296 312     Results from last 7 days   Lab Units 01/02/24  0432   PH, ARTERIAL pH units 7.371   PO2 ART mm Hg 105.0   PCO2, ARTERIAL mm Hg 41.1   HCO3 ART mmol/L 23.8     Lab Results   Component Value Date    BLOODCX No growth at 5 days 12/30/2023     No results found for: \"URINECX\"    I reviewed the patient's new imaging including images and reports.    No chest x-ray today    Renal ultrasound, normal-sized kidneys " renal cyst present.  No hydronephrosis      Interpretation Summary         Left ventricular systolic function is normal. Left ventricular ejection fraction appears to be 61 - 65%.    Preintervention, large pericardial effusion with early signs of tamponade    Post intervention (950 mL serosanguineous fluid removal) trivial pericardial effusion remains, with normal expansion of the RA and RV.    All medications reviewed.   ascorbic acid, 1,000 mg, Oral, Daily  aspirin, 162 mg, Oral, Daily  bisoprolol, 5 mg, Oral, Q24H  budesonide-formoterol, 2 puff, Inhalation, BID - RT  bumetanide, 2 mg, Intravenous, Q12H  finasteride, 5 mg, Oral, Daily  montelukast, 10 mg, Oral, Nightly  senna-docusate sodium, 2 tablet, Oral, BID  sodium chloride, 10 mL, Intravenous, Q12H  tamsulosin, 0.4 mg, Oral, Daily          Assessment & Plan       Pericardial effusion    Hypertension    Paroxysmal atrial fibrillation    Obstructive sleep apnea - Intolerant CPAP/BiPAP    Chronic persistent asthma    Obesity, Class III, BMI 40-49.9 (morbid obesity)    MONIQUE (acute kidney injury)    Transaminitis    Lactic acidosis    78-year-old gentleman with a history of hypertension, persistent atrial fibrillation, sleep apnea intolerant of CPAP, chronic persistent asthma, discharged December 27 after electrical cardioversion and readmitted with acute kidney injury productive cough with recent rhinovirus infection and a new pericardial effusion.  Repeat echocardiogram revealed a large pericardial effusion and on December 31 he underwent pericardial drain with 950 mL removed.    #1 pericardial effusion status post pericardiocentesis December 31.  Fluid was bloody with over 5 million red cells, white cells 13,000, 56% lymphocytes.  Gram stain had no organisms, and cultures are negative.  AFB smear is negative.  No fungal culture sent.  Cytology was negative for malignancy.  Follow-up echocardiogram revealed no significant residual effusion.  Drain was removed  January 2.  He has had significant third spaced edema, multifactorial.  He is now undergone diuresis for the last 3 days and is -20.8 L    #2  Persistent atrial fibrillation, recently discharged on sotalol and Eliquis, after electrical cardioversion on December 26, 2023.  He developed recurrent atrial fibrillation with a controlled rate.  Radiology changed him from sotalol to Zetia.    #3 acute kidney injury, creatinine today is 1.04, after peaking at 3.37.  Baseline creatinine is 1.04.  Urine output yesterday 6575 mL.  with Bumex yesterday.  Potassium 3.4 and being replaced.  Sodium is now normal at 143    #4 recent viral respiratory illness, swab positive for rhinovirus on December 25.  Follow-up swab on December 30 is negative.  He does have a known history of chronic persistent asthma.  He is on Dupixent every 2 weeks, last dose was December 26.  Breo as a maintenance inhaler and has an albuterol rescue inhaler.  Completed Rocephin and doxycycline January 4.  Exam is clear today    #5 insomnia, he reports sleeping poorly.  Previous sleep study revealed mild sleep apnea but significant desaturation.  He was intolerant of CPAP.  But he does need oxygen at night      PLAN:    Symbicort 160-4.5 mcg, 2 puffs twice daily  Continue Singulair 10 mg daily  Nebulized bronchodilators as needed for wheezing  Resume oxygen 2 L nightly     Flomax, finasteride for BPH  Remove [place]  Daily oral diuretics    Zetia for atrial fibrillation  Anticoagulation per cardiology    Trial of Restoril    Telemetry    VTE Prophylaxis:SCDS    Stress Ulcer Prophylaxis: none    Gabriela Block MD  01/05/24  08:31 EST      Time: Critical care 25 min  I personally provided care to this critically ill patient as documented above.  Critical care time does not include time spent on separately billed procedures.  None of my critical care time was concurrent with other critical care providers.

## 2024-01-05 NOTE — PROGRESS NOTES
" LOS: 5 days   Patient Care Team:  Sierra Kuo MD as PCP - General (Internal Medicine)  Latasha Poe MD as Consulting Physician (Dermatopathology)  Provider, No Known  Camille Mccord APRN as Nurse Practitioner (Pulmonary Disease)  Jaja Mansfield APRN as Nurse Practitioner (Cardiology)    Chief Complaint: MONIQUE    Subjective     Renal function improving. Dependent edema noted but improving. Excellent UOP.    Subjective:  Symptoms:  Improved.  No shortness of breath, chest pain or chest pressure.        History taken from: patient    Objective     Vital Sign Min/Max for last 24 hours  Temp  Min: 97.4 °F (36.3 °C)  Max: 98.5 °F (36.9 °C)   BP  Min: 93/76  Max: 134/96   Pulse  Min: 80  Max: 112   Resp  Min: 16  Max: 20   SpO2  Min: 90 %  Max: 94 %   Flow (L/min)  Min: 2  Max: 2   No data recorded     Flowsheet Rows      Flowsheet Row First Filed Value   Admission Height 185.4 cm (73\") Documented at 12/30/2023 1056   Admission Weight 147 kg (324 lb) Documented at 12/30/2023 1056            No intake/output data recorded.  I/O last 3 completed shifts:  In: 400 [P.O.:400]  Out: 37299 [Urine:34704]    Objective:  General Appearance:  Comfortable.    Vital signs: (most recent): Blood pressure 113/71, pulse 101, temperature 97.5 °F (36.4 °C), temperature source Axillary, resp. rate 16, height 185.4 cm (73\"), weight (!) 161 kg (356 lb), SpO2 93%.  Vital signs are normal.    Output: Producing urine.    HEENT: Normal HEENT exam.    Lungs:  Normal effort, normal respiratory rate and increased effort.  Breath sounds clear to auscultation.    Heart: Normal rate.  Regular rhythm.  S1 normal and S2 normal.    Abdomen: Abdomen is soft.    Extremities: There is dependent edema.    Neurological: Patient is alert and oriented to person, place and time.  Normal strength.    Pupils:  Pupils are equal, round, and reactive to light.    Skin:  Warm.                Results Review:     I reviewed the patient's new clinical " "results.    WBC WBC   Date Value Ref Range Status   01/02/2024 12.34 (H) 3.40 - 10.80 10*3/mm3 Final      HGB Hemoglobin   Date Value Ref Range Status   01/02/2024 14.3 13.0 - 17.7 g/dL Final      HCT Hematocrit   Date Value Ref Range Status   01/02/2024 43.6 37.5 - 51.0 % Final      Platlets No results found for: \"LABPLAT\"   MCV MCV   Date Value Ref Range Status   01/02/2024 89.5 79.0 - 97.0 fL Final          Sodium Sodium   Date Value Ref Range Status   01/04/2024 147 (H) 136 - 145 mmol/L Final   01/03/2024 140 136 - 145 mmol/L Final   01/02/2024 133 (L) 136 - 145 mmol/L Final      Potassium Potassium   Date Value Ref Range Status   01/04/2024 3.3 (L) 3.5 - 5.2 mmol/L Final     Comment:     Slight hemolysis detected by analyzer. Result may be falsely elevated.   01/03/2024 3.5 3.5 - 5.2 mmol/L Final     Comment:     Slight hemolysis detected by analyzer. Result may be falsely elevated.   01/02/2024 3.9 3.5 - 5.2 mmol/L Final     Comment:     Slight hemolysis detected by analyzer. Result may be falsely elevated.      Chloride Chloride   Date Value Ref Range Status   01/04/2024 104 98 - 107 mmol/L Final   01/03/2024 101 98 - 107 mmol/L Final   01/02/2024 97 (L) 98 - 107 mmol/L Final      CO2 CO2   Date Value Ref Range Status   01/04/2024 29.0 22.0 - 29.0 mmol/L Final   01/03/2024 27.0 22.0 - 29.0 mmol/L Final   01/02/2024 20.0 (L) 22.0 - 29.0 mmol/L Final      BUN BUN   Date Value Ref Range Status   01/04/2024 45 (H) 8 - 23 mg/dL Final   01/03/2024 67 (H) 8 - 23 mg/dL Final   01/02/2024 88 (H) 8 - 23 mg/dL Final      Creatinine Creatinine   Date Value Ref Range Status   01/04/2024 1.10 0.76 - 1.27 mg/dL Final   01/03/2024 1.46 (H) 0.76 - 1.27 mg/dL Final   01/02/2024 2.12 (H) 0.76 - 1.27 mg/dL Final      Calcium Calcium   Date Value Ref Range Status   01/04/2024 8.5 (L) 8.6 - 10.5 mg/dL Final   01/03/2024 8.3 (L) 8.6 - 10.5 mg/dL Final   01/02/2024 7.9 (L) 8.6 - 10.5 mg/dL Final      PO4 No results found for: " "\"CAPO4\"   Albumin Albumin   Date Value Ref Range Status   01/04/2024 3.2 (L) 3.5 - 5.2 g/dL Final   01/03/2024 2.9 (L) 3.5 - 5.2 g/dL Final   01/02/2024 2.8 (L) 3.5 - 5.2 g/dL Final      Magnesium Magnesium   Date Value Ref Range Status   01/04/2024 1.9 1.6 - 2.4 mg/dL Final   01/02/2024 2.9 (H) 1.6 - 2.4 mg/dL Final      Uric Acid No results found for: \"URICACID\"     Medication Review: yes    Assessment & Plan       Pericardial effusion    Hypertension    Paroxysmal atrial fibrillation    Obstructive sleep apnea - Intolerant CPAP/BiPAP    Chronic persistent asthma    Obesity, Class III, BMI 40-49.9 (morbid obesity)    MONIQUE (acute kidney injury)    Transaminitis    Lactic acidosis      Assessment & Plan    Acute kidney injury:  Baseline cr ~ 0.5 mg/dl Cr 1.3>2.3>2.7  on this admission.Risk factor for MONIQUE: Pericardial effusion, recent initiation of diuretics, possible sepsis. UA large blood noted.      Pericardial effusion: Large circumferential pericardial effusion noted on last visit 12/26. Presenting back with worsening PALM and SOB. Underwent pericardiocentesis w 1.2 liter fluid removed. Sent for analysis      Transaminitis: Elevated LFT, INR 2.1. Congestive hepatopathy??. On admission.     Lactic acidosis: Resolved.      Hyponatremia: Due to recent HCTZ use and MONIQUE. Resolved. Sodium high now. Encourage po intake.      Hyperphosphatemia: phos 7.8. Expect improvement after resolution of MONIQUE.     Recs  Shira function close to baseline. Tolerating diuretics. LE edema improving.   Long term diuretics per cardiology service  D/C Fluid restriction. Liberate water intake  Limit Na intake to 2 g/day   Abx per primary service.   Strict I/O.       Will sign off at this stage.           Checo Cardozo MD  01/04/24  19:39 EST            "

## 2024-01-05 NOTE — PLAN OF CARE
Goal Outcome Evaluation:  Plan of Care Reviewed With: patient        Progress: improving  Outcome Evaluation: Pt ambulated 210ft with CGA and B UE support on tripod monitor. Frequent cues for upright posture with forward gaze and more narrow HANS. Cues to keep monitor close with feet inside middle. Pt continues to be slightly unsteady, but able to improve with cueing. Pt required encouragement for participation. Limited by c/o fatigue. Continue to recommend PT skilled care and D/C to IP rehab facility.      Anticipated Discharge Disposition (PT): inpatient rehabilitation facility

## 2024-01-06 VITALS
OXYGEN SATURATION: 94 % | RESPIRATION RATE: 18 BRPM | DIASTOLIC BLOOD PRESSURE: 85 MMHG | HEIGHT: 73 IN | TEMPERATURE: 98.3 F | WEIGHT: 315 LBS | SYSTOLIC BLOOD PRESSURE: 112 MMHG | BODY MASS INDEX: 41.75 KG/M2 | HEART RATE: 102 BPM

## 2024-01-06 LAB
ALBUMIN SERPL-MCNC: 3 G/DL (ref 3.5–5.2)
ANION GAP SERPL CALCULATED.3IONS-SCNC: 12 MMOL/L (ref 5–15)
BUN SERPL-MCNC: 31 MG/DL (ref 8–23)
BUN/CREAT SERPL: 29.2 (ref 7–25)
CALCIUM SPEC-SCNC: 8.6 MG/DL (ref 8.6–10.5)
CHLORIDE SERPL-SCNC: 104 MMOL/L (ref 98–107)
CO2 SERPL-SCNC: 27 MMOL/L (ref 22–29)
CREAT SERPL-MCNC: 1.06 MG/DL (ref 0.76–1.27)
EGFRCR SERPLBLD CKD-EPI 2021: 71.8 ML/MIN/1.73
GLUCOSE SERPL-MCNC: 128 MG/DL (ref 65–99)
PHOSPHATE SERPL-MCNC: 3.5 MG/DL (ref 2.5–4.5)
POTASSIUM SERPL-SCNC: 3.6 MMOL/L (ref 3.5–5.2)
SODIUM SERPL-SCNC: 143 MMOL/L (ref 136–145)

## 2024-01-06 PROCEDURE — 99232 SBSQ HOSP IP/OBS MODERATE 35: CPT | Performed by: INTERNAL MEDICINE

## 2024-01-06 PROCEDURE — 94799 UNLISTED PULMONARY SVC/PX: CPT

## 2024-01-06 PROCEDURE — 80069 RENAL FUNCTION PANEL: CPT | Performed by: INTERNAL MEDICINE

## 2024-01-06 RX ORDER — POTASSIUM CHLORIDE 20 MEQ/1
40 TABLET, EXTENDED RELEASE ORAL EVERY 4 HOURS
Status: COMPLETED | OUTPATIENT
Start: 2024-01-06 | End: 2024-01-06

## 2024-01-06 RX ORDER — BISOPROLOL FUMARATE 5 MG/1
5 TABLET, FILM COATED ORAL
Qty: 90 TABLET | Refills: 3 | Status: SHIPPED | OUTPATIENT
Start: 2024-01-06

## 2024-01-06 RX ADMIN — BISOPROLOL FUMARATE 5 MG: 5 TABLET, FILM COATED ORAL at 10:11

## 2024-01-06 RX ADMIN — OXYCODONE HYDROCHLORIDE AND ACETAMINOPHEN 1000 MG: 500 TABLET ORAL at 10:11

## 2024-01-06 RX ADMIN — BUMETANIDE 1 MG: 1 TABLET ORAL at 10:12

## 2024-01-06 RX ADMIN — ASPIRIN 162 MG: 81 TABLET, COATED ORAL at 10:12

## 2024-01-06 RX ADMIN — BUDESONIDE AND FORMOTEROL FUMARATE DIHYDRATE 2 PUFF: 160; 4.5 AEROSOL RESPIRATORY (INHALATION) at 09:44

## 2024-01-06 RX ADMIN — TAMSULOSIN HYDROCHLORIDE 0.4 MG: 0.4 CAPSULE ORAL at 10:12

## 2024-01-06 RX ADMIN — POTASSIUM CHLORIDE 40 MEQ: 1500 TABLET, EXTENDED RELEASE ORAL at 10:11

## 2024-01-06 RX ADMIN — POTASSIUM CHLORIDE 40 MEQ: 1500 TABLET, EXTENDED RELEASE ORAL at 06:25

## 2024-01-06 RX ADMIN — POTASSIUM CHLORIDE 20 MEQ: 750 CAPSULE, EXTENDED RELEASE ORAL at 10:11

## 2024-01-06 RX ADMIN — FINASTERIDE 5 MG: 5 TABLET, FILM COATED ORAL at 10:12

## 2024-01-06 NOTE — DISCHARGE SUMMARY
Cardiology Discharge Note    Patient Name:  Luis Perez Jr.  Date of Admission:  12/30/2023  Date of Discharge:  1/6/2024    Discharge Diagnosis: #1 cardiac tamponade #2 A-fib RVR #3 acute renal failure secondary to shock #4 volume overload    Problem List:    Pericardial effusion    Hypertension    Paroxysmal atrial fibrillation    Obstructive sleep apnea - Intolerant CPAP/BiPAP    Chronic persistent asthma    Obesity, Class III, BMI 40-49.9 (morbid obesity)    MONIQUE (acute kidney injury)    Transaminitis    Lactic acidosis      Presenting Problem/History of Present Illness:  MONIQUE (acute kidney injury) [N17.9]  MONIQUE (acute kidney injury) [N17.9]  Dyspnea [R06.00]    Shortness of breath and hypotension    Hospital Course:  Patient is a 78 y.o. male presented with shortness of breath hypotension and shock.  The patient had been previously admitted to the hospital with shortness of breath and had A-fib RVR.  Underwent a synchronized cardioversion to sinus rhythm.  At that time he was noted to have a pericardial effusion but there is no evidence of tamponade physiology.  The patient was sent home on sotalol and anticoagulation.  He presented a few days later with worsening shortness of breath shock and significant edema.  He underwent emergent pericardiocentesis by Dr. Chaudhari.  He also had severe acute renal dysfunction which slowly improved.  The patient subsequently went back into atrial fibrillation and because he was on anticoagulated and had some TIA symptoms I decided not to continue sotalol and put him on rate control agent bisoprolol.  We will resume his Eliquis tomorrow..        Physical Exam:  Temp:  [96.8 °F (36 °C)-98.3 °F (36.8 °C)] 98.3 °F (36.8 °C)  Heart Rate:  [] 89  Resp:  [16-20] 20  BP: (112-124)/() 112/85    Neck: No JVP    Cardiovascular: Irregular rate and rhythm with pitting edema        Respiratory: Equal bilateral symmetrical expansion mostly clear    Neurologic: Facial expressions  are symmetrical      Procedures Performed: #1 pericardiocentesis #2 echocardiography      Consults:   Consults       Date and Time Order Name Status Description    12/31/2023  1:03 PM Inpatient Cardiothoracic Surgery Consult Completed     12/30/2023  3:45 PM Inpatient Cardiology Consult Completed     12/25/2023  7:15 PM Inpatient Cardiology Consult Completed               Condition on Discharge: Improved      Discharge Disposition: Rehab    Discharge Medications:     Discharge Medications        New Medications        Instructions Start Date   bisoprolol 5 MG tablet  Commonly known as: ZEBeta   5 mg, Oral, Every 24 Hours Scheduled             Continue These Medications        Instructions Start Date   albuterol sulfate  (90 Base) MCG/ACT inhaler  Commonly known as: PROVENTIL HFA;VENTOLIN HFA;PROAIR HFA   2 puffs, Inhalation, Every 4 Hours PRN      ascorbic acid 1000 MG tablet  Commonly known as: VITAMIN C   1,000 mg, Oral, Daily      Dupilumab 300 MG/2ML solution prefilled syringe   300 mg, Subcutaneous, Every 14 Days      Eliquis 5 MG tablet tablet  Generic drug: apixaban   TAKE 1 TABLET BY MOUTH TWICE  DAILY      fexofenadine 180 MG tablet  Commonly known as: Allegra Allergy   180 mg, Oral, Daily      finasteride 5 MG tablet  Commonly known as: PROSCAR   5 mg, Oral, Daily      Fluticasone Furoate-Vilanterol 200-25 MCG/ACT inhaler  Commonly known as: Breo Ellipta   1 puff, Inhalation, Daily - RT      icosapent ethyl 1 g capsule capsule  Commonly known as: VASCEPA   TAKE 2 CAPSULES BY MOUTH  TWICE DAILY WITH MEALS      montelukast 10 MG tablet  Commonly known as: SINGULAIR   10 mg, Oral, Daily      potassium chloride 10 MEQ CR tablet   10 mEq, Oral, 2 Times Daily      tamsulosin 0.4 MG capsule 24 hr capsule  Commonly known as: FLOMAX   1 capsule, Oral, Daily      torsemide 10 MG tablet  Commonly known as: DEMADEX   10 mg, Oral, Daily      traMADol 50 MG tablet  Commonly known as: ULTRAM   50 mg, Oral, Every 6  Hours PRN      VITEYES AREDS FORMULA PO   1 tablet, Oral, 2 Times Daily             Stop These Medications      amLODIPine 10 MG tablet  Commonly known as: NORVASC     hydroCHLOROthiazide 50 MG tablet  Commonly known as: HYDRODIURIL     sotalol 80 MG tablet tablet  Commonly known as: BETAPACE AF              Discharge Diet: Low-sodium    Activity at Discharge: No restrictions    Follow-up Appointments: Dr. Chaudhari 4 weeks      Time: 20 minutes    Clarence Castellanos MD,  1/6/2024 - 09:21 EST

## 2024-01-06 NOTE — CASE MANAGEMENT/SOCIAL WORK
Case Management Discharge Note      Final Note: Mr. Perez will be discharged to [facility] today. He will go to the Med Unit. He will be transported to [facility] via [facility] at 1330. He will need to be at the [address] at 1315. Nurse to call report to [phone]. [facility] will pull the discharge summary from [facility].         Selected Continued Care - Admitted Since 12/30/2023       Destination Coordination complete.      Service Provider Selected Services Address Phone Fax Patient Preferred    [facility] [address] [address] [phone] --              Durable Medical Equipment    No services have been selected for the patient.                Dialysis/Infusion    No services have been selected for the patient.                Home Medical Care    No services have been selected for the patient.                Therapy    No services have been selected for the patient.                Community Resources    No services have been selected for the patient.                Community & DME    No services have been selected for the patient.                    Transportation Services  Ambulance: [facility]    Final Discharge Disposition Code: 62 - inpatient rehab facility

## 2024-01-06 NOTE — PROGRESS NOTES
[facility]  IP Progress Note      Chief Complaint/Reason for service: #1 status post pericardiocentesis for tamponade physiology and hypotension #2 A-fib RVR #3 volume overload    Subjective   Subjective: Patient sleeping but awakens easily.  States he feels better.  He slept well last night.  His breathing is improved.    Past medical, surgical, social and family history reviewed in the patient's electronic medical record.    Objective     Vital Sign Min/Max for last 24 hours  Temp  Min: 96.8 °F (36 °C)  Max: 98.3 °F (36.8 °C)   BP  Min: 95/72  Max: 124/101   Pulse  Min: 79  Max: 103   Resp  Min: 16  Max: 20   SpO2  Min: 90 %  Max: 94 %   No data recorded      Intake/Output Summary (Last 24 hours) at 1/6/2024 0529  Last data filed at 1/6/2024 0230  Gross per 24 hour   Intake 1560 ml   Output 1300 ml   Net 260 ml             Current Facility-Administered Medications:     albuterol (PROVENTIL) nebulizer solution 0.083% 2.5 mg/3mL, 2.5 mg, Nebulization, Q4H PRN, Clarence Chaudhari MD    ascorbic acid (VITAMIN C) tablet 1,000 mg, 1,000 mg, Oral, Daily, Clarence Chaudhari MD, 1,000 mg at 01/05/24 0846    aspirin EC tablet 162 mg, 162 mg, Oral, Daily, Norberto Perez MD, 162 mg at 01/05/24 0846    sennosides-docusate (PERICOLACE) 8.6-50 MG per tablet 2 tablet, 2 tablet, Oral, BID, 2 tablet at 01/03/24 0938 **AND** polyethylene glycol (MIRALAX) packet 17 g, 17 g, Oral, Daily PRN **AND** bisacodyl (DULCOLAX) EC tablet 5 mg, 5 mg, Oral, Daily PRN **AND** bisacodyl (DULCOLAX) suppository 10 mg, 10 mg, Rectal, Daily PRN, Clarence Chaudhari MD    bisoprolol (ZEBeta) tablet 5 mg, 5 mg, Oral, Q24H, Clarence Castellanos MD, 5 mg at 01/05/24 0846    budesonide-formoterol (SYMBICORT) 160-4.5 MCG/ACT inhaler 2 puff, 2 puff, Inhalation, BID - RT, Winston Galvan, PharmD, 2 puff at 01/05/24 1948    bumetanide (BUMEX) tablet 1 mg, 1 mg, Oral, Daily, Gabriela Block MD    Calcium Replacement  - Follow Nurse / BPA Driven Protocol, , Does not apply, PRN, Gabriela Block MD    finasteride (PROSCAR) tablet 5 mg, 5 mg, Oral, Daily, Clarence Chaudhari MD, 5 mg at 01/05/24 0846    Magnesium Standard Dose Replacement - Follow Nurse / BPA Driven Protocol, , Does not apply, PRN, Gabriela Block MD    melatonin tablet 5 mg, 5 mg, Oral, Nightly PRN, Clarence Chaudhari MD, 5 mg at 01/05/24 2021    montelukast (SINGULAIR) tablet 10 mg, 10 mg, Oral, Nightly, Clarence Chaudhari MD, 10 mg at 01/05/24 2021    nitroglycerin (NITROSTAT) SL tablet 0.4 mg, 0.4 mg, Sublingual, Q5 Min PRN, Clarence Chaudhari MD    ondansetron (ZOFRAN) injection 4 mg, 4 mg, Intravenous, Q6H PRN, Clarence Chaudhari MD    Phosphorus Replacement - Follow Nurse / BPA Driven Protocol, , Does not apply, PRN, Gabriela Block MD    potassium chloride (MICRO-K/KLOR-CON) CR capsule, 20 mEq, Oral, BID With Meals, Gabriela Block MD, 20 mEq at 01/05/24 1844    Potassium Replacement - Follow Nurse / BPA Driven Protocol, , Does not apply, PRN, Gabriela Block MD    sodium chloride 0.9 % flush 10 mL, 10 mL, Intravenous, PRN, Clarence Chaudhari MD    sodium chloride 0.9 % flush 10 mL, 10 mL, Intravenous, Q12H, Clarence Chaudhari MD, 10 mL at 01/05/24 2022    sodium chloride 0.9 % infusion 40 mL, 40 mL, Intravenous, PRN, Clarence Chaudhari MD    tamsulosin (FLOMAX) 24 hr capsule 0.4 mg, 0.4 mg, Oral, Daily, Clarence Chaudhari MD, 0.4 mg at 01/05/24 0846    temazepam (RESTORIL) capsule 15 mg, 15 mg, Oral, Nightly PRN, Gabriela Block MD, 15 mg at 01/05/24 2021    Physical Exam: General pleasant obese male in bed at a 60 degree angle not dyspneic not tachypneic sat 92%        HEENT: No JVP.  No icterus       Respiratory: Equal bilateral symmetrical expansion and clear to auscultation on the right with basilar crackles on the left       Cardiovascular: Irregular rate and rhythm without any obvious murmur with persistent pitting  edema of the lateral thigh       GI: Obese and soft       Lower Extremities: No lesions       Neuro: Facial expressions symmetrical moves all 4 extremities       Skin: Warm and dry with pitting edema to palpation       Psych: Pleasant affect    Results Review: Patient is a net -21 L since admission.  Blood work this morning is pending.  Systolic blood pressure is 95-1 24.  Heart rate     Radiology Results:  Imaging Results (Last 72 Hours)       ** No results found for the last 72 hours. **            EKG: Atrial fibrillation    ECHO: Preserved EF with trivial pericardial effusion    Tele: A-fib    Assessment   Assessment/Plan: Status post pericardiocentesis for tamponade physiology-the patient is doing well  2 A-fib-the patient has pretty good rate control on bisoprolol 5 mg daily.  Will resume Eliquis.  Consider PIETER cardioversion prior to discharge or rate control with beta-blockers and Eliquis  3 volume overload-patient is diuresing nicely.  If his renal function is the same today or improved further can be more aggressive with diuresis    Clarence Castellanos MD  01/06/24  05:29 EST

## 2024-01-06 NOTE — PROGRESS NOTES
Intensive Care Follow-up     Hospital:  LOS: 7 days   Mr. Luis Perez Jr., 78 y.o. male is followed for:   Pericardial effusion            History of present illness:   78-year-old male admitted to the intensive care unit on 12/31/2023 after having undergone a pericardiocentesis for asymptomatic pericardial effusion.     Patient underwent cardioversion last week for atrial fibrillation with RVR.  He has been treated with sotalol and Eliquis.  He developed worsening shortness of breath and CT scan of the chest and echocardiogram revealed a large pericardial effusion.  Initially pericardiocentesis was deferred due to anticoagulation and body habitus.  However the patient developed oliguria, worsening renal function, elevated LFTs and lactic acidosis so he underwent a pericardiocentesis today by Dr. Chaudhari with an initial 950 mL of serosanguineous fluid removed followed by an additional 200 mL since being admitted to the intensive care unit     Patient is seen in the intensive care unit and is awake and alert  Indicates he feels somewhat better  Oxygenating adequately on 2 L/min via nasal cannula  Has a history of moderate obstructive airways disease presumably due to chronic persistent asthma  Also, history of obstructive sleep apnea, intolerant of CPAP/BiPAP in the past      Subjective   Interval History:  Patient doing well this morning.  On room air tolerating p.o. diet.  No acute complaints.             The patient's past medical, surgical and social history were reviewed and updated in Epic as appropriate.       Objective     Infusions:     Medications:  ascorbic acid, 1,000 mg, Oral, Daily  aspirin, 162 mg, Oral, Daily  bisoprolol, 5 mg, Oral, Q24H  budesonide-formoterol, 2 puff, Inhalation, BID - RT  bumetanide, 1 mg, Oral, Daily  finasteride, 5 mg, Oral, Daily  montelukast, 10 mg, Oral, Nightly  potassium chloride, 20 mEq, Oral, BID With Meals  senna-docusate sodium, 2 tablet, Oral, BID  sodium chloride, 10  mL, Intravenous, Q12H  tamsulosin, 0.4 mg, Oral, Daily      I reviewed the patient's medications.    Vital Sign Min/Max for last 24 hours  Temp  Min: 96.8 °F (36 °C)  Max: 98.3 °F (36.8 °C)   BP  Min: 112/85  Max: 124/101   Pulse  Min: 79  Max: 96   Resp  Min: 16  Max: 20   SpO2  Min: 90 %  Max: 95 %   No data recorded       Input/Output for last 24 hour shift  01/05 0701 - 01/06 0700  In: 960 [P.O.:960]  Out: 1300 [Urine:1300]      GENERAL : NAD, conversant  RESPIRATORY/THORAX : normal respiratory effort and no intercostal retractions, CTAB  CARDIOVASCULAR : Normal S1/S2, RRR. no lower ext edema.  GASTROINTESTINAL : Soft, NT/ND. BS x 4 normoactive. No hepatosplenomegaly.  MUSCULOSKELETAL : No cyanosis, clubbing, or ischemia  NEUROLOGICAL: alert and oriented to person, place and time  PSYCHOLOGICAL : Appropriate affect     Results from last 7 days   Lab Units 01/05/24  0455 01/02/24  0353 01/01/24  0522   WBC 10*3/mm3 8.62 12.34* 16.93*   HEMOGLOBIN g/dL 16.1 14.3 14.3   PLATELETS 10*3/mm3 281 296 312     Results from last 7 days   Lab Units 01/06/24  0434 01/05/24  1005 01/05/24  0455 01/04/24  2306 01/04/24  0337 01/03/24  0253 01/02/24  0353   SODIUM mmol/L 143  --  143  --  147*   < > 133*   POTASSIUM mmol/L 3.6 3.8 3.4* 3.6 3.3*   < > 3.9   CO2 mmol/L 27.0  --  31.0*  --  29.0   < > 20.0*   BUN mg/dL 31*  --  32*  --  45*   < > 88*   CREATININE mg/dL 1.06  --  1.04  --  1.10   < > 2.12*   MAGNESIUM mg/dL  --   --   --  1.7 1.9  --  2.9*   PHOSPHORUS mg/dL 3.5  --  2.6  --  2.6   < > 5.3*   GLUCOSE mg/dL 128*  --  124*  --  152*   < > 173*    < > = values in this interval not displayed.     Estimated Creatinine Clearance: 91 mL/min (by C-G formula based on SCr of 1.06 mg/dL).    Results from last 7 days   Lab Units 01/02/24  0432   PH, ARTERIAL pH units 7.371   PCO2, ARTERIAL mm Hg 41.1   PO2 ART mm Hg 105.0       I reviewed the patient's new clinical results.  I reviewed the patient's new imaging  results/reports including actual images and agree with reports.         Assessment & Plan   Impression        Pericardial effusion    Hypertension    Paroxysmal atrial fibrillation    Obstructive sleep apnea - Intolerant CPAP/BiPAP    Chronic persistent asthma    Obesity, Class III, BMI 40-49.9 (morbid obesity)    MONIQUE (acute kidney injury)    Transaminitis    Lactic acidosis       Plan        78-year-old gentleman with a history of hypertension, persistent atrial fibrillation, sleep apnea intolerant of CPAP, chronic persistent asthma, discharged December 27 after electrical cardioversion and readmitted with acute kidney injury productive cough with recent rhinovirus infection and a new pericardial effusion.  Repeat echocardiogram revealed a large pericardial effusion and on December 31 he underwent pericardial drain with 950 mL removed.     -Continue to follow-up on pericardial effusion labs, elevated white blood cell count with 5 million red blood cells, cytology negative for malignancy, cultures so far remain negative.  -Atrial fibrillation per cardiology  -Anticoagulation per cardiology  -Continue beta-blocker for atrial fibrillation  -Continue Bumex  -Continue patient's home Symbicort for likely asthma  -Medically okay to be discharged    I conducted multidisciplinary rounds in the plan of care was discussed with the multidisciplinary team at that time. In attendance at multidisciplinary rounds was clinical pharmacist, dietitian, nursing staff, and case management.    I discussed the patient's findings and my recommendations with patient and nursing staff       Tyson Cotton,   Pulmonary, Critical care and Sleep Medicine

## 2024-01-09 DIAGNOSIS — I48.0 PAF (PAROXYSMAL ATRIAL FIBRILLATION): Primary | ICD-10-CM

## 2024-01-12 ENCOUNTER — TELEPHONE (OUTPATIENT)
Dept: CARDIOLOGY | Facility: CLINIC | Age: 79
End: 2024-01-12
Payer: MEDICARE

## 2024-01-12 NOTE — TELEPHONE ENCOUNTER
Pts wife called stating that Mr Perez was currently in [facility] and she was trying to figure out if he was still taking his sotalol because it had not been given to him at [facility] and when she inquired about it  they told her it was not on his active medication list.    I advised Mrs Perez that according to the discharge summary that his sotalol was discontinued and bisoprolol was initiated.    PT was agreeable and verbalized understanding

## 2024-01-23 ENCOUNTER — TRANSCRIBE ORDERS (OUTPATIENT)
Dept: PHYSICAL THERAPY | Facility: HOSPITAL | Age: 79
End: 2024-01-23
Payer: MEDICARE

## 2024-01-23 DIAGNOSIS — R60.0 BILATERAL LEG EDEMA: Primary | ICD-10-CM

## 2024-01-31 ENCOUNTER — OFFICE VISIT (OUTPATIENT)
Dept: CARDIOLOGY | Facility: HOSPITAL | Age: 79
End: 2024-01-31
Payer: MEDICARE

## 2024-01-31 VITALS
OXYGEN SATURATION: 94 % | HEIGHT: 73 IN | RESPIRATION RATE: 18 BRPM | BODY MASS INDEX: 41.75 KG/M2 | SYSTOLIC BLOOD PRESSURE: 118 MMHG | DIASTOLIC BLOOD PRESSURE: 68 MMHG | WEIGHT: 315 LBS | TEMPERATURE: 96.8 F | HEART RATE: 92 BPM

## 2024-01-31 DIAGNOSIS — I10 PRIMARY HYPERTENSION: ICD-10-CM

## 2024-01-31 DIAGNOSIS — I48.0 PAF (PAROXYSMAL ATRIAL FIBRILLATION): Primary | ICD-10-CM

## 2024-01-31 DIAGNOSIS — R60.0 EDEMA, LOWER EXTREMITY: ICD-10-CM

## 2024-01-31 DIAGNOSIS — I31.39 PERICARDIAL EFFUSION: ICD-10-CM

## 2024-01-31 RX ORDER — BUMETANIDE 1 MG/1
1 TABLET ORAL DAILY
COMMUNITY

## 2024-01-31 RX ORDER — SPIRONOLACTONE 25 MG/1
25 TABLET ORAL DAILY
COMMUNITY
Start: 2024-01-25

## 2024-01-31 NOTE — PROGRESS NOTES
"[facility], [facility]    Chief Complaint  Atrial Fibrillation and Hospital Follow Up Visit    Subjective    History of Present Illness {CC  Problem List  Visit  Diagnosis   Encounters  Notes  Medications  Labs  Result Review Imaging  Media :23}     Luis Perez Jr. presents to [facility] CARDIOLOGY for   History of Present Illness     78-year-old male Patient with history of paroxysmal atrial fibrillation, RUY (intolerant to CPAP), chronic persistent asthma, morbid obesity, hypertension lower extremity edema/lymphedema..    Patient presented to [facility] ED on 12/30/2023 with pericardial effusion, cardiac tamponade, A-fib with RVR, acute renal failure secondary to shock and volume overload.    Patient underwent emergency pericardiocentesis.  Patient developed A-fib with RVR.  Patient was having TIA symptoms.  Sotalol was discontinued and was placed on rate control agent bisoprolol.  Eliquis was resumed.    Discharged to [facility].    Patient reports he is doing well.  His fatigue has improved.  He is resting well.  Denies chest pain, pressure, dyspnea, feelings of palpitations or racing heart rate, dizziness, near-syncope, syncope.  Continues with lower extremity edema followed by the lymphedema clinic.  Compliant with compression stockings.        Objective     Vital Signs:   Vitals:    01/31/24 1008 01/31/24 1009 01/31/24 1010 01/31/24 1038   BP: 133/99 129/83 138/88 118/68   BP Location: Right arm Left arm Left arm    Patient Position: Sitting Standing Sitting    Cuff Size: Large Adult Large Adult Large Adult    Pulse: 99 111 93 92   Resp:   18    Temp: 96.8 °F (36 °C) 96.8 °F (36 °C) 96.8 °F (36 °C)    TempSrc: Temporal Temporal Temporal    SpO2: 94% 93% 94%    Weight:   (!) 145 kg (319 lb 6.4 oz)    Height:   185.4 cm (73\")      Body mass index is 42.14 kg/m².  Physical Exam  Vitals reviewed.   Constitutional:       General: " He is not in acute distress.  Neck:      Vascular: No carotid bruit.   Cardiovascular:      Rate and Rhythm: Rhythm irregular.      Heart sounds: No murmur heard.  Pulmonary:      Effort: Pulmonary effort is normal.   Musculoskeletal:      Right lower leg: Edema (compression stockings intact) present.      Left lower leg: Edema present.   Skin:     Coloration: Skin is not pale.   Neurological:      Mental Status: He is alert.   Psychiatric:         Mood and Affect: Mood normal.         Behavior: Behavior normal. Behavior is cooperative.              Result Review  Data Reviewed:{ Labs  Result Review  Imaging  Med Tab  Media :23}   Echocardiogram 1/2/2024: EF 62%, trivial pericardial effusion    Pericardiocentesis 12/31/2023 with 950 mL of fluid removed.    EKG 1/5/2024: Atrial fibrillation with  bpm    Lab Results   Component Value Date    GLUCOSE 128 (H) 01/06/2024    CALCIUM 8.6 01/06/2024     01/06/2024    K 3.6 01/06/2024    CO2 27.0 01/06/2024     01/06/2024    BUN 31 (H) 01/06/2024    CREATININE 1.06 01/06/2024    EGFR 71.8 01/06/2024    BCR 29.2 (H) 01/06/2024    ANIONGAP 12.0 01/06/2024     Lab Results   Component Value Date    WBC 8.62 01/05/2024    HGB 16.1 01/05/2024    HCT 50.0 01/05/2024    MCV 90.6 01/05/2024     01/05/2024     Magnesium 1/4/2024: 1.7      proBNP 12/31/2023: 354              Assessment and Plan {CC Problem List  Visit Diagnosis  ROS  Review (Popup)  Health Maintenance  Quality  BestPractice  Medications  SmartSets  SnapShot Encounters  Media :23}   1. PAF (paroxysmal atrial fibrillation)  HR elevated initially.  Repeat better controlled  Continue bisoprolol.  Eliquis for stroke prevention    Keep home heart rate and blood pressure log.    Denies feelings of palpitations, chest pain, dyspnea.  Unaware of A-fib.    2. Primary hypertension  Blood pressure improved with recheck.  Continue bisoprolol  3. Pericardial effusion  Status post pericardial  centesis    4.  Lower extremity edema  Followed by lymphedema clinic.  Compression stockings in place.  Patient currently on Bumex, spironolactone    Patient to follow-up with [facility] cardiology as scheduled.  Follow-up with primary care provider routinely.  Follow-up in the heart valve center as needed or as determined by cardiology.        Follow Up {Instructions Charge Capture  Follow-up Communications :23}   Return if symptoms worsen or fail to improve.    Patient was given instructions and counseling regarding his condition or for health maintenance advice. Please see specific information pulled into the AVS if appropriate.  Patient was instructed to call the Heart and Valve Center with any questions, concerns, or worsening symptoms.

## 2024-02-08 ENCOUNTER — HOSPITAL ENCOUNTER (OUTPATIENT)
Dept: PHYSICAL THERAPY | Facility: HOSPITAL | Age: 79
Setting detail: THERAPIES SERIES
Discharge: HOME | End: 2024-02-08
Payer: MEDICARE

## 2024-02-09 ENCOUNTER — OFFICE VISIT (OUTPATIENT)
Dept: CARDIOLOGY | Facility: CLINIC | Age: 79
End: 2024-02-09
Payer: MEDICARE

## 2024-02-09 VITALS
WEIGHT: 315 LBS | HEIGHT: 73 IN | SYSTOLIC BLOOD PRESSURE: 118 MMHG | DIASTOLIC BLOOD PRESSURE: 68 MMHG | OXYGEN SATURATION: 98 % | HEART RATE: 73 BPM | BODY MASS INDEX: 41.75 KG/M2

## 2024-02-09 DIAGNOSIS — I31.39 PERICARDIAL EFFUSION: ICD-10-CM

## 2024-02-09 DIAGNOSIS — I48.0 PAROXYSMAL ATRIAL FIBRILLATION: Primary | ICD-10-CM

## 2024-02-09 DIAGNOSIS — I10 PRIMARY HYPERTENSION: ICD-10-CM

## 2024-02-09 DIAGNOSIS — R06.09 DYSPNEA ON EXERTION: ICD-10-CM

## 2024-02-09 NOTE — PROGRESS NOTES
"    Subjective:     Encounter Date:02/09/2024      Patient ID: Luis Perez Jr. is a 78 y.o.  white male from [place], [place], retired from [employer].     REFERRING PROVIDER: ANGELIA Rios  FORMER HEALTHCARE PROVIDER:ANGELIA Matias  FORMER HEALTHCARE PROVIDER: Maggie Schwartz DNP, APRN  CURRENT PHYSICIAN: Sierra Kuo MD  SLEEP PHYSICIAN: Nicolas Lopez MD, [facility]  GASTROENTEROLOGIST: Tay Torres MD  INFECTIOUS DISEASE: Eriberto Weiner MD  ORTHOPEDIC SURGEON:  Abhijit Ambriz MD.  PULMONOLOGIST: AHMET Bradley MD, [facility]  UROLOGIST: Tyrel Wolf MD  OPHTHALMOLOGIST: Harsha Rosales MD.    Chief Complaint:   Chief Complaint   Patient presents with    Paroxysmal atrial fibrillation     Problem List:  Persistent atrial fibrillation:  CHADsVasc 2, anticoagulated with Eliquis  Echocardiogram, 7/1/2019: LVEF 0.65, mild to moderate TR, RVSP 35 mmHg, right atrial mass is present measuring 2.6 x 1.6 cm; differential includes thrombus versus atypical right atrial myxoma versus atrial intraseptal tumor with clinical correlation and consideration of transesophageal echocardiogram recommended, LA moderately dilated, normal RV wall thickness, systolic function and motion noted with RV cavity mildly dilated, aortic valve exhibits mild sclerosis, mild pulmonary hypertension, no pericardial effusion  PIETER, 7/8/2019: Mild MR, LVEF 0.60, LV wall thickness consistent with moderate concentric hypertrophy, LA mild to moderately dilated, 3D echo LVEF was 0.51, normal RV cavity size, wall thickness, systolic function septal motion noted, marked lipomatous hypertrophy of the interatrial septum present.  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 (normal variant)  Initiation of sotalol, 7/13/2019, with " persistent atrial fibrillation on EKG, 7/26/2019  Successful external cardioversion, 08/05/2019, with frequent atrial ectopy on EKG, 08/09/2019, now in normal sinus rhythm on EKG, 10/16/2019  Normal sinus rhythm on ECG in office with acceptable QTC on sotalol therapy, May 2020, December 2020, July 2021, February 2022, August 2022, February 2023, August 2023  Successful ECV 12/26/2023  Atrial fibrillation January, February 2024  Incidental asymptomatic echocardiographic right atrial mass subsequently 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 on PIETER study (2.6 x 1.6 cm), July 2019  Mild dyspnea on exertion/chronic persistent asthma  Remote stress test 30-40 years ago; negative per patient-data deficit, patient reports that this was performed for a baseline and not because of any symptoms  PFTs 2/25/2020: Moderate airway obstruction, improvement in FEV1 with bronchodilator, no restriction, air-trapping present, no diffusion  Echocardiogram 12/26/2023: LVEF 60%, LV wall thickness consistent with moderate concentric hypertrophy, cardiac valves anatomically and functionally normal, RVSP less than 35 mmHg, large greater than 2 cm circumferential pericardial effusion with no evidence of tamponade  Limited echocardiogram 12/31/2023: LVEF 60.7%, large greater than 2 cm circumferential pericardial effusion, maximum diameter 5.45 cm, circumferential with largest volume posterior.  Right atrium and right ventricle appear compensated with easy collapsibility, status post successful pericardiocentesis via subxiphoid approach with 950 mL of serosanguineous fluid removed, and drain sutured in place  Limited echocardiogram 12/31/2023: LVEF 61-65%, preintervention large pericardial effusion with early signs of tamponade, postintervention with 950 mL serosanguineous fluid removal trivial pericardial effusion remains with normal expansion of the RA and RV  Echocardiogram 1/2/2024: LVEF 62.1%, trivial pericardial effusion  CCS class 0  chest discomfort/NYHA class I-II PALM, August 2022, August 2023, February 2024  At risk for sleep apnea with sleep consult, 8/21/2019, with polysomnogram January 2020 that showed mild obstructive sleep apnea and nocturnal hypoxemia with initiation of CPAP therapy, intolerant CPAP, on 2 L O2 NC hs  Chronic lower extremity edema  Hypertension  Dyslipidemia; ASCVD 10 year risk is 45.8%, 17.4% if treated, initiate rosuvastatin 10 mg nightly May 2020 with consideration of vascepa 2g bid after repeat FLP, initiation of Vascepa July 2021  Type 2 diabetes mellitus;HgbA1C 6.39% October 2019; 5.7%, August 2020, 6.2% December 2022, 6.4% December 2023  Chronic persistent asthma  10. History of lower extremity cellulitis with MRSA  11.History of melanoma  12. Morbid obesity: BMI 42.22  13. Mechanical fall with subsequent development on cellulitis with IV antibiotic administration, October 2020  14. BPH  15.  Mechanical fall with severe bruising/mild lacerations to his left knee, right wrist, forehead with negative work-up at [facility] January 2022-data deficit  16.  [facility] 2-day hospitalization December 2023 for atrial fibrillation with RVR, now status post successful ECV, patient also had enterovirus/rhinovirus and large pericardial effusion  17.  [facility] 7-day hospitalization December 2023-January 2024 for pericardial effusion with tamponade, MONIQUE, hypotension, shock, now status post emergent pericardiocentesis with 950 mL serosanguineous fluid removed  18. Surgical history  Vasectomy  Tonsillectomy  Deviated septum surgery  Multiple skin cancer excisions  Partial right knee replacement, November 2019-data deficit  Pericardiocentesis December 2023    Allergies   Allergen Reactions    Torsemide Confusion    Adhesive Tape Rash       Current Outpatient Medications   Medication Instructions    albuterol sulfate  (90 Base) MCG/ACT inhaler 2 puffs, Inhalation, Every 4 Hours PRN    ascorbic acid (VITAMIN C) 1,000 mg,  Oral, Daily    bisoprolol (ZEBETA) 5 mg, Oral, Every 24 Hours Scheduled    bumetanide (BUMEX) 1 mg, Oral, Daily    Cholecalciferol 125 MCG (5000 UT) tablet Oral, Daily    Dupilumab 300 mg, Subcutaneous, Every 14 Days    Eliquis 5 MG tablet tablet TAKE 1 TABLET BY MOUTH TWICE  DAILY    fexofenadine (ALLEGRA ALLERGY) 180 mg, Oral, Daily    finasteride (PROSCAR) 5 mg, Oral, Daily    Fluticasone Furoate-Vilanterol (Breo Ellipta) 200-25 MCG/ACT inhaler 1 puff, Inhalation, Daily - RT    icosapent ethyl (VASCEPA) 1 g capsule capsule TAKE 2 CAPSULES BY MOUTH  TWICE DAILY WITH MEALS    montelukast (SINGULAIR) 10 mg, Oral, Daily    Multiple Vitamins-Minerals (VITEYES AREDS FORMULA PO) 1 tablet, Oral, 2 Times Daily    potassium chloride 10 MEQ CR tablet 10 mEq, Oral, 2 Times Daily    spironolactone (ALDACTONE) 25 mg, Oral, Daily    tamsulosin (FLOMAX) 0.4 MG capsule 24 hr capsule 1 capsule, Oral, Daily    traMADol (ULTRAM) 50 mg, Oral, Every 6 Hours PRN         HISTORY OF PRESENT ILLNESS:  The patient is here for a 1 month follow up.  Early December 2023 the patient had a [facility] 2-day hospitalization for rhinovirus and atrial fib with RVR and was status post successful ECV.  He then developed worsening shortness of breath and had a [facility] 7-day hospitalization December 2023-January 2024 for pericardial effusion with tamponade.  Patient had an emergent pericardiocentesis 12/31/2023 with 950 mL of serosanguineous fluid removed. After his most recent hospitalization his sotalol was discontinued and he was placed on rate control with bisoprolol instead and Eliquis was resumed.  He is taking 5 mg of bisoprolol in the morning and 2.5 mg at night.  He denies feeling any palpitations, chest pain, dizziness, presyncope, or syncope.  The patient also denies having any increased shortness of breath since hospitalization.  His lower extremity edema is followed by lymphedema clinic.  The patient feels like he has done well since his  "hospitalization and is doing more activity each day.  He has an upcoming appointment with Dr. Hampton on 2/22/2024.  He uses 2 L O2 NC nightly and does not have to use any during the day.  He has been intolerant to CPAP remotely.  He has not noticed any increased edema.  He had home health briefly, for PT/OT.  He has upcoming appointment with pulmonology as well.  The patient has repeat blood work with his PCP soon.  Apparently his last blood work was acceptable that he just had recently-data deficit.    ROS   All other systems reviewed and otherwise negative.    Procedures       Objective:       Vitals:    02/09/24 1416 02/09/24 1432   BP: 120/78 118/68   BP Location: Left arm Left arm   Patient Position: Sitting Standing   Cuff Size: Adult Adult   Pulse: 73    SpO2: 98% 98%   Weight: (!) 145 kg (320 lb)    Height: 185.4 cm (73\")      Body mass index is 42.22 kg/m².  Last weight August 2023 was 333 pounds  Constitutional:       Appearance: Healthy appearance. Not in distress.   Neck:      Vascular: No JVR. JVD normal.   Pulmonary:      Effort: Pulmonary effort is normal.      Breath sounds: Decreased breath sounds present. No wheezing. No rhonchi. No rales.   Chest:      Chest wall: Not tender to palpatation.   Cardiovascular:      PMI at left midclavicular line. Normal rate. Irregularly irregular rhythm. Normal S1. Normal S2.       Murmurs: There is a grade 1/6 systolic murmur at the LLSB.      No gallop.  No click. No rub.      Comments: Compression stockings in place  Pulses:     Intact distal pulses.   Edema:     Peripheral edema absent.   Abdominal:      General: Bowel sounds are normal.      Palpations: Abdomen is soft.      Tenderness: There is no abdominal tenderness.   Musculoskeletal: Normal range of motion.         General: No tenderness. Skin:     General: Skin is warm and dry.   Neurological:      General: No focal deficit present.      Mental Status: Alert and oriented to person, place and time. "           Lab Review:   Lab Results   Component Value Date    GLUCOSE 128 (H) 01/06/2024    BUN 31 (H) 01/06/2024    CREATININE 1.06 01/06/2024    EGFRIFNONA 100 03/16/2021    BCR 29.2 (H) 01/06/2024    CO2 27.0 01/06/2024    CALCIUM 8.6 01/06/2024    ALBUMIN 3.0 (L) 01/06/2024     (H) 01/01/2024    ALT 1,111 (H) 01/01/2024       Lab Results   Component Value Date    WBC 8.62 01/05/2024    HGB 16.1 01/05/2024    HCT 50.0 01/05/2024    MCV 90.6 01/05/2024     01/05/2024       Lab Results   Component Value Date    HGBA1C 6.40 (H) 12/30/2023       Lab Results   Component Value Date    TSH 1.260 12/31/2023       Lab Results   Component Value Date    CHOL 144 03/10/2021    CHOL 122 08/19/2020     Lab Results   Component Value Date    TRIG 309 (H) 03/10/2021    TRIG 179 (H) 08/19/2020     Lab Results   Component Value Date    HDL 31 (L) 03/10/2021    HDL 35 (L) 08/19/2020     Lab Results   Component Value Date    LDL 64 03/10/2021    LDL 51 08/19/2020             Assessment:    Patient had a [facility] 7-day hospitalization December 2023-January 2024 for pericardial effusion with tamponade.  Patient had an emergent pericardiocentesis 12/31/2023 with 950 mL of serosanguineous fluid removed.  Repeat echocardiogram January 2024 showed LVEF 62.1%, trivial pericardial effusion.  I will plan to get a repeat limited echo in a couple months and order cardiac PET same day as next appointment in July.  I provided the patient with lab slips for proBNP, BMP, and mag when he sees his primary care physician again soon.  Patient currently rate controlled for persistent atrial fibrillation.     Diagnosis Plan   1. Paroxysmal atrial fibrillation  Continue bisoprolol, Eliquis      2. Primary hypertension  Controlled, continue current cardiac medications      3. Pericardial effusion  Echocardiogram January 2024 showed LVEF 62.1%, trivial pericardial effusion.  Continue spironolactone and Bumex.  Will plan on repeat limited echo in  April 2024.             Plan:         Patient to continue current medications and close follow up with the above providers.  Tentative cardiology follow up in July 2024 or patient may return sooner PRN.  Limited echocardiogram April 2024  Cardiac PET same day as next appointment  BMP, magnesium, proBNP lab slips provided to patient in office today when he gets his next labs with his PCP  Encouraged patient to follow-up with Dr. Hampton 2/22/2024    Electronically signed by ANGELIA Liu, 02/09/24, 3:37 PM EST.

## 2024-02-12 LAB
MYCOBACTERIUM SPEC CULT: NORMAL
NIGHT BLUE STAIN TISS: NORMAL

## 2024-02-21 ENCOUNTER — HOSPITAL ENCOUNTER (OUTPATIENT)
Dept: CARDIOLOGY | Facility: HOSPITAL | Age: 79
Discharge: HOME | End: 2024-02-21
Payer: MEDICARE

## 2024-02-21 VITALS
WEIGHT: 315 LBS | BODY MASS INDEX: 41.75 KG/M2 | HEIGHT: 73 IN | SYSTOLIC BLOOD PRESSURE: 143 MMHG | DIASTOLIC BLOOD PRESSURE: 99 MMHG

## 2024-02-21 DIAGNOSIS — I31.39 PERICARDIAL EFFUSION: ICD-10-CM

## 2024-02-21 DIAGNOSIS — I48.0 PAROXYSMAL ATRIAL FIBRILLATION: ICD-10-CM

## 2024-02-21 DIAGNOSIS — R06.09 DYSPNEA ON EXERTION: ICD-10-CM

## 2024-02-21 LAB
BH CV ECHO MEAS - EDV(CUBED): 176.5 ML
BH CV ECHO MEAS - EDV(MOD-SP2): 53.4 ML
BH CV ECHO MEAS - EDV(MOD-SP4): 119.7 ML
BH CV ECHO MEAS - EF(MOD-BP): 60 %
BH CV ECHO MEAS - EF(MOD-SP2): 61.6 %
BH CV ECHO MEAS - EF(MOD-SP4): 59 %
BH CV ECHO MEAS - ESV(CUBED): 48.8 ML
BH CV ECHO MEAS - ESV(MOD-SP2): 20.5 ML
BH CV ECHO MEAS - ESV(MOD-SP4): 49.1 ML
BH CV ECHO MEAS - FS: 34.8 %
BH CV ECHO MEAS - IVS/LVPW: 0.91 CM
BH CV ECHO MEAS - IVSD: 1.01 CM
BH CV ECHO MEAS - LA DIMENSION: 4 CM
BH CV ECHO MEAS - LV DIASTOLIC VOL/BSA (35-75): 45.6 CM2
BH CV ECHO MEAS - LV MASS(C)D: 236.7 GRAMS
BH CV ECHO MEAS - LV SYSTOLIC VOL/BSA (12-30): 18.7 CM2
BH CV ECHO MEAS - LVIDD: 5.6 CM
BH CV ECHO MEAS - LVIDS: 3.7 CM
BH CV ECHO MEAS - LVPWD: 1.1 CM
BH CV ECHO MEAS - SI(MOD-SP2): 12.6 ML/M2
BH CV ECHO MEAS - SI(MOD-SP4): 26.9 ML/M2
BH CV ECHO MEAS - SV(MOD-SP2): 32.9 ML
BH CV ECHO MEAS - SV(MOD-SP4): 70.6 ML
BH CV REST NUCLEAR ISOTOPE DOSE: 34.8 MCI
BH CV STRESS BP STAGE 1: NORMAL
BH CV STRESS BP STAGE 3: NORMAL
BH CV STRESS COMMENTS STAGE 1: NORMAL
BH CV STRESS DOSE REGADENOSON STAGE 1: 0.4
BH CV STRESS DURATION MIN STAGE 1: 1
BH CV STRESS DURATION MIN STAGE 2: 1
BH CV STRESS DURATION MIN STAGE 3: 1
BH CV STRESS DURATION MIN STAGE 4: 1
BH CV STRESS DURATION SEC STAGE 1: 10
BH CV STRESS DURATION SEC STAGE 2: 0
BH CV STRESS DURATION SEC STAGE 3: 0
BH CV STRESS DURATION SEC STAGE 4: 0
BH CV STRESS HR STAGE 1: 95
BH CV STRESS HR STAGE 2: 99
BH CV STRESS HR STAGE 3: 101
BH CV STRESS HR STAGE 4: 100
BH CV STRESS NUCLEAR ISOTOPE DOSE: 35 MCI
BH CV STRESS O2 STAGE 1: 94
BH CV STRESS O2 STAGE 2: 96
BH CV STRESS O2 STAGE 3: 96
BH CV STRESS O2 STAGE 4: 96
BH CV STRESS PROTOCOL 1: NORMAL
BH CV STRESS RECOVERY BP: NORMAL MMHG
BH CV STRESS RECOVERY HR: 91 BPM
BH CV STRESS RECOVERY O2: 96 %
BH CV STRESS STAGE 1: 1
BH CV STRESS STAGE 2: 2
BH CV STRESS STAGE 3: 3
BH CV STRESS STAGE 4: 4
LEFT ATRIUM VOLUME INDEX: 18.2 ML/M2
LV EF NUC BP: 59 %
MAXIMAL PREDICTED HEART RATE: 142 BPM
PERCENT MAX PREDICTED HR: 78.17 %
STRESS BASELINE BP: NORMAL MMHG
STRESS BASELINE HR: 101 BPM
STRESS O2 SAT REST: 94 %
STRESS PERCENT HR: 92 %
STRESS POST ESTIMATED WORKLOAD: 1 METS
STRESS POST EXERCISE DUR MIN: 4 MIN
STRESS POST EXERCISE DUR SEC: 0 SEC
STRESS POST O2 SAT PEAK: 94 %
STRESS POST PEAK BP: NORMAL MMHG
STRESS POST PEAK HR: 111 BPM
STRESS TARGET HR: 121 BPM

## 2024-02-21 PROCEDURE — 93018 CV STRESS TEST I&R ONLY: CPT | Performed by: INTERNAL MEDICINE

## 2024-02-21 PROCEDURE — 93308 TTE F-UP OR LMTD: CPT

## 2024-02-21 PROCEDURE — 93017 CV STRESS TEST TRACING ONLY: CPT

## 2024-02-21 PROCEDURE — 93308 TTE F-UP OR LMTD: CPT | Performed by: INTERNAL MEDICINE

## 2024-02-21 PROCEDURE — 78431 MYOCRD IMG PET RST&STRS CT: CPT

## 2024-02-21 PROCEDURE — 25010000002 REGADENOSON 0.4 MG/5ML SOLUTION: Performed by: NURSE PRACTITIONER

## 2024-02-21 PROCEDURE — 0 RUBIDIUM CHLORIDE: Performed by: NURSE PRACTITIONER

## 2024-02-21 PROCEDURE — A9555 RB82 RUBIDIUM: HCPCS | Performed by: NURSE PRACTITIONER

## 2024-02-21 PROCEDURE — 78431 MYOCRD IMG PET RST&STRS CT: CPT | Performed by: INTERNAL MEDICINE

## 2024-02-21 RX ORDER — REGADENOSON 0.08 MG/ML
0.4 INJECTION, SOLUTION INTRAVENOUS ONCE
Status: COMPLETED | OUTPATIENT
Start: 2024-02-21 | End: 2024-02-21

## 2024-02-21 RX ADMIN — RUBIDIUM CHLORIDE RB-82 1 DOSE: 150 INJECTION, SOLUTION INTRAVENOUS at 13:49

## 2024-02-21 RX ADMIN — RUBIDIUM CHLORIDE RB-82 1 DOSE: 150 INJECTION, SOLUTION INTRAVENOUS at 13:37

## 2024-02-21 RX ADMIN — REGADENOSON 0.4 MG: 0.08 INJECTION, SOLUTION INTRAVENOUS at 13:47

## 2024-02-22 ENCOUNTER — OFFICE VISIT (OUTPATIENT)
Dept: CARDIOLOGY | Facility: CLINIC | Age: 79
End: 2024-02-22
Payer: MEDICARE

## 2024-02-22 VITALS
SYSTOLIC BLOOD PRESSURE: 130 MMHG | DIASTOLIC BLOOD PRESSURE: 90 MMHG | WEIGHT: 315 LBS | HEIGHT: 73 IN | BODY MASS INDEX: 41.75 KG/M2 | OXYGEN SATURATION: 97 % | HEART RATE: 95 BPM

## 2024-02-22 DIAGNOSIS — I48.0 PAROXYSMAL ATRIAL FIBRILLATION: Primary | Chronic | ICD-10-CM

## 2024-02-22 DIAGNOSIS — I10 PRIMARY HYPERTENSION: Chronic | ICD-10-CM

## 2024-02-22 RX ORDER — DICLOFENAC SODIUM 30 MG/G
GEL TOPICAL
COMMUNITY
Start: 2024-02-19

## 2024-02-22 NOTE — PROGRESS NOTES
"Electrophysiology Clinic Consult     Luis Perez Jr.  [id number]  1945    Referring Provider: Jaja Mansfield APRN   PCP: Sierra Kuo MD  [id number] [address] / [facility]ly [facility] [id number]    Date of Service: 02/24/24    Chief Complaint   Patient presents with    Paroxysmal atrial fibrillation     Problem List  Persistent atrial fibrillation:  CHADsVasc 2, anticoagulated with Eliquis  Echocardiogram, 7/1/2019: LVEF 0.65, mild to moderate TR, RVSP 35 mmHg, right atrial mass is present measuring 2.6 x 1.6 cm; differential includes thrombus versus atypical right atrial myxoma versus atrial intraseptal tumor with clinical correlation and consideration of transesophageal echocardiogram recommended, LA moderately dilated, normal RV wall thickness, systolic function and motion noted with RV cavity mildly dilated, aortic valve exhibits mild sclerosis, mild pulmonary hypertension, no pericardial effusion  PIETER, 7/8/2019: Mild MR, LVEF 0.60, LV wall thickness consistent with moderate concentric hypertrophy, LA mild to moderately dilated, 3D echo LVEF was 0.51, normal RV cavity size, wall thickness, systolic function septal motion noted, marked lipomatous hypertrophy of the interatrial septum present.  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 (normal variant)  Initiation of sotalol, 7/13/2019, with persistent atrial fibrillation on EKG, 7/26/2019  Successful external cardioversion, 08/05/2019, with frequent atrial ectopy on EKG, 08/09/2019, now in normal sinus rhythm on EKG, 10/16/2019  Normal sinus rhythm on ECG in office with acceptable QTC on sotalol therapy, May 2020, December 2020, July 2021, February 2022, August 2022, February 2023, August 2023  Successful ECV 12/26/2023  Atrial fibrillation January, February " 2024  Incidental asymptomatic echocardiographic right atrial mass subsequently 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 on PIETER study (2.6 x 1.6 cm), July 2019  Mild dyspnea on exertion/chronic persistent asthma  Remote stress test 30-40 years ago; negative per patient-data deficit, patient reports that this was performed for a baseline and not because of any symptoms  PFTs 2/25/2020: Moderate airway obstruction, improvement in FEV1 with bronchodilator, no restriction, air-trapping present, no diffusion  Echocardiogram 12/26/2023: LVEF 60%, LV wall thickness consistent with moderate concentric hypertrophy, cardiac valves anatomically and functionally normal, RVSP less than 35 mmHg, large greater than 2 cm circumferential pericardial effusion with no evidence of tamponade  Limited echocardiogram 12/31/2023: LVEF 60.7%, large greater than 2 cm circumferential pericardial effusion, maximum diameter 5.45 cm, circumferential with largest volume posterior.  Right atrium and right ventricle appear compensated with easy collapsibility, status post successful pericardiocentesis via subxiphoid approach with 950 mL of serosanguineous fluid removed, and drain sutured in place  Limited echocardiogram 12/31/2023: LVEF 61-65%, preintervention large pericardial effusion with early signs of tamponade, postintervention with 950 mL serosanguineous fluid removal trivial pericardial effusion remains with normal expansion of the RA and RV  Echocardiogram 1/2/2024: LVEF 62.1%, trivial pericardial effusion  CCS class 0 chest discomfort/NYHA class I-II PALM, August 2022, August 2023, February 2024  At risk for sleep apnea with sleep consult, 8/21/2019, with polysomnogram January 2020 that showed mild obstructive sleep apnea and nocturnal hypoxemia with initiation of CPAP therapy, intolerant CPAP, on 2 L O2 NC hs  Chronic lower extremity edema  Hypertension  Dyslipidemia; ASCVD 10 year risk is 45.8%, 17.4% if treated, initiate rosuvastatin 10  mg nightly May 2020 with consideration of vascepa 2g bid after repeat FLP, initiation of Vascepa July 2021  Type 2 diabetes mellitus;HgbA1C 6.39% October 2019; 5.7%, August 2020, 6.2% December 2022, 6.4% December 2023  Chronic persistent asthma  10. History of lower extremity cellulitis with MRSA  11.History of melanoma  12. Morbid obesity: BMI 42.22  13. Mechanical fall with subsequent development on cellulitis with IV antibiotic administration, October 2020  14. BPH  15.  Mechanical fall with severe bruising/mild lacerations to his left knee, right wrist, forehead with negative work-up at [facility] January 2022-data deficit  16.  [facility] 2-day hospitalization December 2023 for atrial fibrillation with RVR, now status post successful ECV, patient also had enterovirus/rhinovirus and large pericardial effusion  17.  [facility] 7-day hospitalization December 2023-January 2024 for pericardial effusion with tamponade, MONIQUE, hypotension, shock, now status post emergent pericardiocentesis with 950 mL serosanguineous fluid removed  18. Surgical history  Vasectomy  Tonsillectomy  Deviated septum surgery  Multiple skin cancer excisions  Partial right knee replacement, November 2019-data deficit  Pericardiocentesis December 2023    History of Present Illness  Luis Perez JrWinifred is a 78 y.o. male who presents to my electrophysiology clinic for evaluation of AF.  Patient has a history of persistent atrial fibrillation which was initially diagnosed in 2019.  At that time started on sotalol however had breakthrough episodes and attempted multiple cardioversion after that but still having episodes.  It appears that patient is largely asymptomatic from atrial fibrillation-he denies any palpitation or shortness of breath or fatigue on exertion.  Patient did develop spontaneous pericardial effusion and thought that it anticoagulation was contributing to his pericardial effusion at that time.  Patient referred to my office to be  considered for left atrial appendage occlusion device implantation.    Review of Systems   Constitutional:  Negative for activity change, fatigue and fever.   Respiratory:  Negative for chest tightness and shortness of breath.    Cardiovascular:  Negative for chest pain, palpitations and leg swelling.   Gastrointestinal:  Negative for constipation and diarrhea.   Genitourinary:  Negative for decreased urine volume and difficulty urinating.   Skin:  Negative for wound.   Neurological:  Negative for dizziness, syncope, weakness and light-headedness.   Psychiatric/Behavioral:  Negative for suicidal ideas.        Outpatient Medications Marked as Taking for the 2/22/24 encounter (Office Visit) with Nicolas Hampton MD   Medication Sig Dispense Refill    albuterol sulfate  (90 Base) MCG/ACT inhaler Inhale 2 puffs Every 4 (Four) Hours As Needed for Wheezing. 1 inhaler 0    ascorbic acid (VITAMIN C) 1000 MG tablet Take 1 tablet by mouth Daily.      bisoprolol (ZEBeta) 5 MG tablet Take 1 tablet by mouth Daily. (Patient taking differently: Take  by mouth Daily. 5mg in the AM and 2.5mg in the PM) 90 tablet 3    bumetanide (BUMEX) 1 MG tablet Take 1 tablet by mouth Daily.      Diclofenac Sodium 3 % gel gel       Dupilumab 300 MG/2ML solution prefilled syringe Inject 2 mL under the skin into the appropriate area as directed Every 14 (Fourteen) Days. 2 mL 11    Eliquis 5 MG tablet tablet TAKE 1 TABLET BY MOUTH TWICE  DAILY 180 tablet 3    fexofenadine (ALLEGRA ALLERGY) 180 MG tablet Take 1 tablet by mouth Daily. 30 tablet 2    finasteride (PROSCAR) 5 MG tablet Take 1 tablet by mouth Daily.      Fluticasone Furoate-Vilanterol (Breo Ellipta) 200-25 MCG/ACT inhaler Inhale 1 puff Daily. 180 each 3    icosapent ethyl (VASCEPA) 1 g capsule capsule TAKE 2 CAPSULES BY MOUTH  TWICE DAILY WITH MEALS (Patient taking differently: Take 2 g by mouth 2 (Two) Times a Day With Meals.) 360 capsule 3    montelukast (SINGULAIR) 10 MG tablet TAKE  "1 TABLET BY MOUTH  DAILY 90 tablet 3    Multiple Vitamins-Minerals (VITEYES AREDS FORMULA PO) Take 1 tablet by mouth 2 (Two) Times a Day.      potassium chloride 10 MEQ CR tablet Take 1 tablet by mouth 2 (Two) Times a Day. 30 tablet 0    spironolactone (ALDACTONE) 25 MG tablet Take 1 tablet by mouth Daily.      tamsulosin (FLOMAX) 0.4 MG capsule 24 hr capsule Take 1 capsule by mouth Daily.      traMADol (ULTRAM) 50 MG tablet Take 1 tablet by mouth Every 6 (Six) Hours As Needed for Moderate Pain.         Physical Exam  Vitals:    02/22/24 1543   BP: 130/90   BP Location: Left arm   Patient Position: Sitting   Cuff Size: Large Adult   Pulse: 95   SpO2: 97%   Weight: (!) 149 kg (329 lb 3.2 oz)   Height: 185.4 cm (73\")     GENERAL: Well-developed, well-nourished patient in no acute distress.  HEENT: NC, AC, PERRLA. MMM  NECK: No JVD. No carotid bruits auscultated.  LUNGS: Clear to auscultation bilaterally.  CARDIOVASCULAR: IRIR No murmurs, gallops or rubs noted.   ABDOMEN: Soft, nontender. Positive bowel sounds.  MUSCULOSKELETAL: No gross deformities. No clubbing, cyanosis  EXT: pulses intact, No edema  SKIN: Pink, warm  Neuro: Nonfocal exam. Gait intact    Diagnostic Data    ECG 12 Lead    Date/Time: 2/24/2024 9:00 AM  Performed by: Nicolas Hampton MD    Authorized by: Nicolas Hampton MD  Rhythm: atrial fibrillation  Rate: normal  QRS axis: left  Other findings: non-specific ST-T wave changes    Clinical impression: abnormal EKG          Lab Results   Component Value Date    GLUCOSE 128 (H) 01/06/2024    CALCIUM 8.6 01/06/2024     01/06/2024    K 3.6 01/06/2024    CO2 27.0 01/06/2024     01/06/2024    BUN 31 (H) 01/06/2024    CREATININE 1.06 01/06/2024    EGFRIFNONA 100 03/16/2021    BCR 29.2 (H) 01/06/2024    ANIONGAP 12.0 01/06/2024     Lab Results   Component Value Date    WBC 8.62 01/05/2024    HGB 16.1 01/05/2024    HCT 50.0 01/05/2024    MCV 90.6 01/05/2024     01/05/2024     Lab Results   Component " Value Date    INR 2.18 (H) 12/31/2023    INR 2.23 (H) 12/31/2023    INR 1.42 (H) 10/22/2019    PROTIME 24.5 (H) 12/31/2023    PROTIME 24.9 (H) 12/31/2023    PROTIME 16.7 (H) 10/22/2019     Lab Results   Component Value Date    TSH 1.260 12/31/2023       I personally viewed and interpreted the patient's EKG/Telemetry/lab data      Assessment and Plan   There are no diagnoses linked to this encounter.    Atrial fibrillation  -Appears to be asymptomatic at this time; okay to continue rate control  -Discuss indications and alternatives to both anticoagulation and left atrial appendage occlusion devices.  At this time patient would like to research more about this procedure and contact my office if he does want to proceed with left atrial appendage occlusion device implantation  -Continue anticoagulation with apixaban    Hypertension  -Well-controlled.  Continue current regimen    Body mass index is 43.43 kg/m².    Follow Up  No follow-ups on file.  ACP discussion was declined by the patient. Patient has an advance directive (not in EMR), copy requested.    Thank you for allowing me to participate in the care of your patient. Please to not hesitate to contact me with additional questions or concerns.        Nicolas Hampton MD  Cardiac Electrophysiologist  [facility] / [facility]

## 2024-02-26 ENCOUNTER — OFFICE VISIT (OUTPATIENT)
Dept: PULMONOLOGY | Facility: CLINIC | Age: 79
End: 2024-02-26
Payer: MEDICARE

## 2024-02-26 VITALS
WEIGHT: 315 LBS | SYSTOLIC BLOOD PRESSURE: 136 MMHG | DIASTOLIC BLOOD PRESSURE: 102 MMHG | OXYGEN SATURATION: 97 % | RESPIRATION RATE: 18 BRPM | TEMPERATURE: 97 F | HEIGHT: 73 IN | BODY MASS INDEX: 41.75 KG/M2 | HEART RATE: 96 BPM

## 2024-02-26 DIAGNOSIS — J30.2 SEASONAL AND PERENNIAL ALLERGIC RHINITIS: Primary | ICD-10-CM

## 2024-02-26 DIAGNOSIS — J30.89 SEASONAL AND PERENNIAL ALLERGIC RHINITIS: Primary | ICD-10-CM

## 2024-02-26 DIAGNOSIS — G47.33 OBSTRUCTIVE SLEEP APNEA, ADULT: ICD-10-CM

## 2024-02-26 DIAGNOSIS — J45.909 IDIOPATHIC ASTHMA: ICD-10-CM

## 2024-02-26 DIAGNOSIS — I48.0 PAROXYSMAL ATRIAL FIBRILLATION: ICD-10-CM

## 2024-02-26 DIAGNOSIS — J45.50 SEVERE PERSISTENT ASTHMA WITHOUT COMPLICATION: ICD-10-CM

## 2024-02-26 PROCEDURE — 94726 PLETHYSMOGRAPHY LUNG VOLUMES: CPT | Performed by: NURSE PRACTITIONER

## 2024-02-26 PROCEDURE — 1160F RVW MEDS BY RX/DR IN RCRD: CPT | Performed by: NURSE PRACTITIONER

## 2024-02-26 PROCEDURE — 99214 OFFICE O/P EST MOD 30 MIN: CPT | Performed by: NURSE PRACTITIONER

## 2024-02-26 PROCEDURE — 94729 DIFFUSING CAPACITY: CPT | Performed by: NURSE PRACTITIONER

## 2024-02-26 PROCEDURE — 1159F MED LIST DOCD IN RCRD: CPT | Performed by: NURSE PRACTITIONER

## 2024-02-26 PROCEDURE — 3075F SYST BP GE 130 - 139MM HG: CPT | Performed by: NURSE PRACTITIONER

## 2024-02-26 PROCEDURE — 94375 RESPIRATORY FLOW VOLUME LOOP: CPT | Performed by: NURSE PRACTITIONER

## 2024-02-26 PROCEDURE — 3080F DIAST BP >= 90 MM HG: CPT | Performed by: NURSE PRACTITIONER

## 2024-02-26 NOTE — PROGRESS NOTES
[facility] Pulmonary Follow up    CHIEF COMPLAINT    Dyspnea with exertion    HISTORY OF PRESENT ILLNESS    Luis Perez Jr. is a 78 y.o.male here today for follow-up.  He was last seen 1 year ago by me.  He was admitted to [facility] on 12/25-12/27 for shortness of breath.  He was found to be in A-fib with RVR.  He required cardioversion while hospitalized.  It was felt related to a virus and untreated RUY.  His CT scan also showed a moderate to large pericardial effusion.  He was diuresed and his medications were adjusted and he was discharged home.  He was then readmitted on 12/30 to 1/6 for cardiac tamponade, he had an emergent pericardiocentesis per Dr. Chaudhari.  His symptoms slowly improved and he was discharged home.    He states he is slowly regaining his strength from the hospitalization.  He has not noticed any difference in his breathing recently.      He continues to use his Breo daily.  He also has albuterol to use but does not have to use it that often.  He is taking his Dupixent every 2 weeks.  While he was in the hospital he was late on a couple of his Dupixent injections but is going to get back on track now that he has been home.    He has noticed since being on the Dupixent his asthma has been better controlled.    He denies any reflux symptoms.  He has occasional sinus and allergy symptoms but takes Singulair nightly.    He denies any sputum production or hemoptysis.  Denies any chest pain or palpitations.  Denies any lower extremity edema or calf tenderness.    He is a lifetime non-smoker.    He is accompanied today by his wife.    Patient Active Problem List   Diagnosis    Hypertension    Paroxysmal atrial fibrillation    Snoring    Obstructive sleep apnea - Intolerant CPAP/BiPAP    Chronic persistent asthma    Non-smoker    Obesity, Class III, BMI 40-49.9 (morbid obesity)    Perennial rhinitis    Vitamin D deficiency    Rhinovirus    Pericardial effusion    MONIQUE (acute kidney  injury)    Transaminitis    Lactic acidosis       Allergies   Allergen Reactions    Torsemide Confusion    Adhesive Tape Rash       Current Outpatient Medications:     albuterol sulfate  (90 Base) MCG/ACT inhaler, Inhale 2 puffs Every 4 (Four) Hours As Needed for Wheezing., Disp: 1 inhaler, Rfl: 0    ascorbic acid (VITAMIN C) 1000 MG tablet, Take 1 tablet by mouth Daily., Disp: , Rfl:     bisoprolol (ZEBeta) 5 MG tablet, Take 1 tablet by mouth Daily. (Patient taking differently: Take  by mouth Daily. 5mg in the AM and 2.5mg in the PM), Disp: 90 tablet, Rfl: 3    bumetanide (BUMEX) 1 MG tablet, Take 1 tablet by mouth Daily., Disp: , Rfl:     Diclofenac Sodium 3 % gel gel, , Disp: , Rfl:     Dupilumab 300 MG/2ML solution prefilled syringe, Inject 2 mL under the skin into the appropriate area as directed Every 14 (Fourteen) Days., Disp: 2 mL, Rfl: 11    Eliquis 5 MG tablet tablet, TAKE 1 TABLET BY MOUTH TWICE  DAILY, Disp: 180 tablet, Rfl: 3    fexofenadine (ALLEGRA ALLERGY) 180 MG tablet, Take 1 tablet by mouth Daily., Disp: 30 tablet, Rfl: 2    finasteride (PROSCAR) 5 MG tablet, Take 1 tablet by mouth Daily., Disp: , Rfl:     Fluticasone Furoate-Vilanterol (Breo Ellipta) 200-25 MCG/ACT inhaler, Inhale 1 puff Daily., Disp: 180 each, Rfl: 3    icosapent ethyl (VASCEPA) 1 g capsule capsule, TAKE 2 CAPSULES BY MOUTH  TWICE DAILY WITH MEALS (Patient taking differently: Take 2 g by mouth 2 (Two) Times a Day With Meals.), Disp: 360 capsule, Rfl: 3    montelukast (SINGULAIR) 10 MG tablet, TAKE 1 TABLET BY MOUTH  DAILY, Disp: 90 tablet, Rfl: 3    Multiple Vitamins-Minerals (VITEYES AREDS FORMULA PO), Take 1 tablet by mouth 2 (Two) Times a Day., Disp: , Rfl:     potassium chloride 10 MEQ CR tablet, Take 1 tablet by mouth 2 (Two) Times a Day., Disp: 30 tablet, Rfl: 0    spironolactone (ALDACTONE) 25 MG tablet, Take 1 tablet by mouth Daily., Disp: , Rfl:     tamsulosin (FLOMAX) 0.4 MG capsule 24 hr capsule, Take 1 capsule  "by mouth Daily., Disp: , Rfl:     traMADol (ULTRAM) 50 MG tablet, Take 1 tablet by mouth Every 6 (Six) Hours As Needed for Moderate Pain., Disp: , Rfl:   MEDICATION LIST AND ALLERGIES REVIEWED.    Social History     Tobacco Use    Smoking status: Never     Passive exposure: Never    Smokeless tobacco: Former     Types: Chew     Quit date: 4/26/2019   Vaping Use    Vaping Use: Never used   Substance Use Topics    Alcohol use: Yes     Alcohol/week: 0.0 - 2.0 standard drinks of alcohol     Comment: 1-2 month     Drug use: No       FAMILY AND SOCIAL HISTORY REVIEWED.    Review of Systems   Constitutional:  Positive for fatigue. Negative for activity change, appetite change, fever and unexpected weight change.   HENT:  Negative for congestion, postnasal drip, rhinorrhea, sinus pressure, sore throat and voice change.    Eyes:  Negative for visual disturbance.   Respiratory:  Positive for shortness of breath. Negative for cough, chest tightness and wheezing.    Cardiovascular:  Negative for chest pain, palpitations and leg swelling.   Gastrointestinal:  Negative for abdominal distention, abdominal pain, nausea and vomiting.   Endocrine: Negative for cold intolerance and heat intolerance.   Genitourinary:  Negative for difficulty urinating and urgency.   Musculoskeletal:  Positive for arthralgias and joint swelling. Negative for back pain and neck pain.   Skin:  Negative for color change and pallor.   Allergic/Immunologic: Negative for environmental allergies and food allergies.   Neurological:  Negative for dizziness, syncope, weakness and light-headedness.   Hematological:  Negative for adenopathy. Does not bruise/bleed easily.   Psychiatric/Behavioral:  Negative for agitation and behavioral problems.    .    BP (!) 136/102   Pulse 96   Temp 97 °F (36.1 °C)   Resp 18   Ht 185.4 cm (72.99\")   Wt (!) 148 kg (327 lb)   SpO2 97% Comment: room air at rest  BMI 43.15 kg/m²     Immunization History   Administered Date(s) " Administered    ABRYSVO (RSV, 60+ or pregnant women 32-36 wks) 12/01/2023    COVID-19 (MODERNA) 1st,2nd,3rd Dose Monovalent 01/27/2021, 02/24/2021, 09/03/2021, 04/11/2022    COVID-19 (MODERNA) BIVALENT 12+YRS 09/29/2022    COVID-19 F23 (PFIZER) 12YRS+ (COMIRNATY) 11/16/2023    Flu Vaccine Intradermal Quad 18-64YR 10/01/2014    Flu Vaccine Quad PF >36MO 10/01/2017    Flu Vaccine Split Quad 10/01/2017    Fluad Quad 65+ 10/01/2020, 11/16/2023    Fluzone High Dose =>65 Years ([employer] ONLY) 09/28/2010, 10/29/2015, 11/09/2016, 10/04/2017, 09/28/2018, 10/19/2019, 10/10/2022    Fluzone High-Dose 65+yrs 09/10/2021    Fluzone Quad >6mos (Multi-dose) 10/01/2017, 10/01/2018, 10/16/2019    Hepatitis A 11/20/2018, 07/19/2019    INFLUENZA A MONOVALENT (H5N1), ADJUVANTED-2013 10/01/2018, 10/16/2019    Influenza Quad Vaccine (Inpatient) 10/01/2012    Pneumococcal Conjugate 13-Valent (PCV13) 01/15/2015    Pneumococcal Conjugate 20-Valent (PCV20) 08/30/2023    Pneumococcal Polysaccharide (PPSV23) 01/31/2019    Shingrix 09/13/2018, 11/20/2018       Physical Exam  Vitals and nursing note reviewed.   Constitutional:       Appearance: He is well-developed. He is not diaphoretic.   HENT:      Head: Normocephalic and atraumatic.   Eyes:      Pupils: Pupils are equal, round, and reactive to light.   Neck:      Thyroid: No thyromegaly.   Cardiovascular:      Rate and Rhythm: Normal rate and regular rhythm.      Heart sounds: Normal heart sounds. No murmur heard.     No friction rub. No gallop.   Pulmonary:      Effort: Pulmonary effort is normal. No respiratory distress.      Breath sounds: Normal breath sounds. No wheezing or rales.   Chest:      Chest wall: No tenderness.   Abdominal:      General: Bowel sounds are normal.      Palpations: Abdomen is soft.      Tenderness: There is no abdominal tenderness.   Musculoskeletal:         General: No swelling. Normal range of motion.      Cervical back: Normal range of motion and neck supple.    Lymphadenopathy:      Cervical: No cervical adenopathy.   Skin:     General: Skin is warm and dry.      Capillary Refill: Capillary refill takes less than 2 seconds.   Neurological:      Mental Status: He is alert and oriented to person, place, and time.   Psychiatric:         Mood and Affect: Mood normal.         Behavior: Behavior normal.         RESULTS    Spirometry Interpretation: FVC 3.41 77% predicted, FEV1 1.60 50% predicted, FEV1/FVC 47% predicted, TLC 6.68 97% predicted, DLCO 93% predicted, moderate to severe obstruction, no restriction and normal diffusion.    XR Chest 1 View    Result Date: 1/2/2024  Impression: Stable enlargement of the cardiac silhouette shown to reflect pericardial fluid on recent CT. Electronically Signed: Noel West MD  1/2/2024 5:20 AM EST  Workstation ID: [id number]    PROBLEM LIST    Problem List Items Addressed This Visit          Allergies and Adverse Reactions    Perennial rhinitis - Primary       Cardiac and Vasculature    Paroxysmal atrial fibrillation    Overview     [id number]-GNLl 3 (age >75, HTN)  Echo (12/26/2023): LVEF 60%.  Moderate LVH.  Normal valves.  Large circumferential pericardial effusion without tamponade  Successful external cardioversion, 12/26/2023            Pulmonary and Pneumonias    Chronic persistent asthma       Sleep    Obstructive sleep apnea - Intolerant CPAP/BiPAP     Other Visit Diagnoses       Severe persistent asthma without complication        Relevant Medications    Dupilumab 300 MG/2ML solution prefilled syringe    Other Relevant Orders    Spirometry with Diffusion Capacity & Lung Volumes (Completed)              DISCUSSION    Mr. Perez was here for follow-up.  He seems be doing fairly well from a pulmonary standpoint.  We did review his PFTs in the office today he continues to have a moderate to severe obstruction.  He is doing well with the Breo daily and uses the albuterol rarely.    He will continue the Dupixent every 2 weeks.  I did  encourage him to resume this and keep up with it every 2 weeks.  I did send in refills today.    I did encourage him to continue wearing his oxygen at 2 L nasal cannula at nighttime.  Unfortunately he is unable to tolerate CPAP and did have an episode of atrial fibrillation with RVR.  He will continue to follow closely with cardiology.    I did encourage him to be as active as possible to help with his shortness of breath.    We will have him follow-up in 1 year.    I personally spent a total of 32 minutes on patient visit today including chart review, face to face with the patient obtaining the history and physical exam, review of pertinent images and tests, counseling and discussion and/or coordination of care as described above, and documentation.  Total time excludes time spent on other separate services such as performing procedures or test interpretation, if applicable.        Camille Mccord, APRN  02/26/202412:40 EST  Electronically signed     Please note that portions of this note were completed with a voice recognition program.        CC: Sierra Kuo MD

## 2024-03-25 DIAGNOSIS — J45.50 SEVERE PERSISTENT ASTHMA WITHOUT COMPLICATION: ICD-10-CM

## 2024-05-06 ENCOUNTER — TRANSCRIBE ORDERS (OUTPATIENT)
Dept: PHYSICAL THERAPY | Facility: HOSPITAL | Age: 79
End: 2024-05-06
Payer: MEDICARE

## 2024-05-07 ENCOUNTER — HOSPITAL ENCOUNTER (OUTPATIENT)
Dept: PHYSICAL THERAPY | Facility: HOSPITAL | Age: 79
Setting detail: THERAPIES SERIES
Discharge: HOME | End: 2024-05-07
Payer: MEDICARE

## 2024-05-07 ENCOUNTER — TRANSCRIBE ORDERS (OUTPATIENT)
Dept: PHYSICAL THERAPY | Facility: HOSPITAL | Age: 79
End: 2024-05-07
Payer: MEDICARE

## 2024-05-07 DIAGNOSIS — I89.0 LYMPHEDEMA: ICD-10-CM

## 2024-05-07 DIAGNOSIS — R60.0 BILATERAL LEG EDEMA: Primary | ICD-10-CM

## 2024-05-07 PROCEDURE — 97161 PT EVAL LOW COMPLEX 20 MIN: CPT

## 2024-07-06 NOTE — PROGRESS NOTES
"    Subjective:     Encounter Date:07/10/2024      Patient ID: Luis Perez Jr. is a 78 y.o.   white male from [place], [place], retired from [employer].     REFERRING PROVIDER: ANGELIA Rios  FORMER HEALTHCARE PROVIDER:ANGELIA Matias  FORMER HEALTHCARE PROVIDER: Maggie Schwartz DNP, APRN  CURRENT PHYSICIAN: Sierra Kuo MD  SLEEP PHYSICIAN: Nicolas Lopez MD, [facility]  GASTROENTEROLOGIST: Tay Torres MD  INFECTIOUS DISEASE: Eriberto Weiner MD  ORTHOPEDIC SURGEON:  Abhijit Ambriz MD.  PULMONOLOGIST: AHMET Bradley MD, [facility]  UROLOGIST: Tyrel Wolf MD  OPHTHALMOLOGIST: Harsha Rosales MD.  ELECTROPHYSIOLOGIST: Nicolas Hampton MD    Chief Complaint:   Chief Complaint   Patient presents with    Paroxysmal atrial fibrillation     Problem List:  Persistent atrial fibrillation:  CHADsVasc 2, anticoagulated with Eliquis  Echocardiogram, 7/1/2019: LVEF 0.65, mild to moderate TR, RVSP 35 mmHg, right atrial mass is present measuring 2.6 x 1.6 cm; differential includes thrombus versus atypical right atrial myxoma versus atrial intraseptal tumor with clinical correlation and consideration of transesophageal echocardiogram recommended, LA moderately dilated, normal RV wall thickness, systolic function and motion noted with RV cavity mildly dilated, aortic valve exhibits mild sclerosis, mild pulmonary hypertension, no pericardial effusion  PIETER, 7/8/2019: Mild MR, LVEF 0.60, LV wall thickness consistent with moderate concentric hypertrophy, LA mild to moderately dilated, 3D echo LVEF was 0.51, normal RV cavity size, wall thickness, systolic function septal motion noted, marked lipomatous hypertrophy of the interatrial septum present.  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 (normal variant)  Initiation " of sotalol, 7/13/2019, with persistent atrial fibrillation on EKG, 7/26/2019  Successful external cardioversion, 08/05/2019, with frequent atrial ectopy on EKG, 08/09/2019, now in normal sinus rhythm on EKG, 10/16/2019  Normal sinus rhythm on ECG in office with acceptable QTC on sotalol therapy, May 2020, December 2020, July 2021, February 2022, August 2022, February 2023, August 2023  Successful ECV 12/26/2023  Atrial fibrillation January, February 2024, July 2024  Incidental asymptomatic echocardiographic right atrial mass subsequently 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 on PIETER study (2.6 x 1.6 cm), July 2019  Mild dyspnea on exertion/chronic persistent asthma  Remote stress test 30-40 years ago; negative per patient-data deficit, patient reports that this was performed for a baseline and not because of any symptoms  PFTs 2/25/2020: Moderate airway obstruction, improvement in FEV1 with bronchodilator, no restriction, air-trapping present, no diffusion  Echocardiogram 12/26/2023: LVEF 60%, LV wall thickness consistent with moderate concentric hypertrophy, cardiac valves anatomically and functionally normal, RVSP less than 35 mmHg, large greater than 2 cm circumferential pericardial effusion with no evidence of tamponade  Limited echocardiogram 12/31/2023: LVEF 60.7%, large greater than 2 cm circumferential pericardial effusion, maximum diameter 5.45 cm, circumferential with largest volume posterior.  Right atrium and right ventricle appear compensated with easy collapsibility, status post successful pericardiocentesis via subxiphoid approach with 950 mL of serosanguineous fluid removed, and drain sutured in place  Limited echocardiogram 12/31/2023: LVEF 61-65%, preintervention large pericardial effusion with early signs of tamponade, postintervention with 950 mL serosanguineous fluid removal trivial pericardial effusion remains with normal expansion of the RA and RV  Echocardiogram 1/2/2024: LVEF 62.1%,  trivial pericardial effusion   Cardiac PET 2/21/2024: Myocardial perfusion imaging indicates a normal myocardial perfusion study with no evidence of ischemia. Calculated EF = 59%.  Echocardiogram 2/21//2024: EF 56-60%, no pericardial effusion  CCS class 0 chest discomfort/NYHA class I-II PALM, August 2022, August 2023, February 2024  At risk for sleep apnea with sleep consult, 8/21/2019, with polysomnogram January 2020 that showed mild obstructive sleep apnea and nocturnal hypoxemia with initiation of CPAP therapy, intolerant CPAP, on 2 L O2 NC hs  Chronic lower extremity edema  Hypertension  Dyslipidemia; ASCVD 10 year risk is 45.8%, 17.4% if treated, initiate rosuvastatin 10 mg nightly May 2020 with consideration of vascepa 2g bid after repeat FLP, initiation of Vascepa July 2021  Type 2 diabetes mellitus;HgbA1C 6.39% October 2019; 5.7%, August 2020, 6.2% December 2022, 6.4% December 2023, 6.1% March 2024  Chronic persistent asthma  10. History of lower extremity cellulitis with MRSA  11.History of melanoma  12. Morbid obesity: BMI 43.94  13. Mechanical fall with subsequent development on cellulitis with IV antibiotic administration, October 2020  14. BPH  15.  Mechanical fall with severe bruising/mild lacerations to his left knee, right wrist, forehead with negative work-up at [facility] January 2022-data deficit  16.  [facility] 2-day hospitalization December 2023 for atrial fibrillation with RVR, now status post successful ECV, patient also had enterovirus/rhinovirus and large pericardial effusion  17.  [facility] 7-day hospitalization December 2023-January 2024 for pericardial effusion with tamponade, MONIQUE, hypotension, shock, now status post emergent pericardiocentesis with 950 mL serosanguineous fluid removed  18.  Basal cell carcinoma on right ear June 2024  19. Surgical history  Vasectomy  Tonsillectomy  Deviated septum surgery  Multiple skin cancer excisions  Partial right knee replacement, November  2019-data deficit  Pericardiocentesis December 2023    Allergies   Allergen Reactions    Torsemide Confusion    Adhesive Tape Rash       Current Outpatient Medications   Medication Instructions    albuterol sulfate  (90 Base) MCG/ACT inhaler 2 puffs, Inhalation, Every 4 Hours PRN    ascorbic acid (VITAMIN C) 1,000 mg, Oral, Daily    bisoprolol (ZEBETA) 5 mg, Oral, Every 24 Hours Scheduled    bumetanide (BUMEX) 1 mg, Oral, Daily    Dupilumab 300 mg, Subcutaneous, Every 14 Days    Eliquis 5 MG tablet tablet TAKE 1 TABLET BY MOUTH TWICE  DAILY    fexofenadine (ALLEGRA ALLERGY) 180 mg, Oral, Daily    finasteride (PROSCAR) 5 mg, Oral, Daily    Fluticasone Furoate-Vilanterol (Breo Ellipta) 200-25 MCG/ACT inhaler 1 puff, Inhalation, Daily - RT    icosapent ethyl (VASCEPA) 1 g capsule capsule TAKE 2 CAPSULES BY MOUTH  TWICE DAILY WITH MEALS    montelukast (SINGULAIR) 10 mg, Oral, Daily    Multiple Vitamins-Minerals (VITEYES AREDS FORMULA PO) 1 tablet, Oral, 2 Times Daily    potassium chloride 10 MEQ CR tablet 10 mEq, Oral, 2 Times Daily    spironolactone (ALDACTONE) 25 mg, Oral, Daily    tamsulosin (FLOMAX) 0.4 MG capsule 24 hr capsule 1 capsule, Oral, Daily    traMADol (ULTRAM) 50 mg, Oral, Every 6 Hours PRN         HISTORY OF PRESENT ILLNESS:  The patient is here for a 6 month follow up. Patient had a [facility] 7-day hospitalization December 2023-January 2024 for pericardial effusion with tamponade.  Patient had an emergent pericardiocentesis 12/31/2023 with 950 mL of serosanguineous fluid removed.  Repeat echocardiogram January 2024 showed LVEF 62.1%, trivial pericardial effusion. Patient had a Cardiac PET 2/21/2024 showing  normal myocardial perfusion study with no evidence of ischemia. Calculated EF = 59%. Echocardiogram 2/21/2024 demonstrated EF 56-60%, no pericardial effusion. He saw Dr Hampton February 2024 for consideration of Watchman and patient preferred to research it more before deciding and decided he wants to  "defer this for now.  Insurance is not covering his Vascepa anymore.  Patient denies any chest pain, lower extremity edema, dizziness, or increased shortness of breath.  He uses 2 L O2 NC at night.  He was diagnosed with basal cell carcinoma on his right ear around 2 weeks ago.  Patient sometimes has some fatigue and says he has a lot of knee pain which hinders him from doing a lot of activity.  His blood pressures when he checks it at home is usually between 120-140 systolic.  He is unsure when his next appointment with his PCP is and he is going to call today and get one set up and have his next labs sent to me for review.  The patient has been working with the lymphedema clinic and is supposed to get new compression stockings soon.  Recheck blood pressure manually right arm sitting was 118/70.    ROS   All other systems reviewed and otherwise negative.      ECG 12 Lead    Date/Time: 7/10/2024 12:02 PM  Performed by: Jaja Mansfield APRN    Authorized by: Jaja Mansfield APRN  Rhythm comments: Atrial fibrillation, right superior axis deviation, anteroseptal infarct, age undetermined, abnormal ECG, 88 bpm, QRS 78 ms, QTc 421 ms, no significant changes from last ECG February 2024             Objective:       Vitals:    07/10/24 1140 07/10/24 1152 07/10/24 1222   BP: 147/100 (!) 162/104 118/70   BP Location: Right arm Right arm Right arm   Patient Position: Sitting Standing Sitting   Cuff Size: Adult Adult    Pulse: 89 108    SpO2: 95%     Weight: (!) 151 kg (333 lb)     Height: 185.4 cm (72.99\")       Body mass index is 43.94 kg/m².  Wt Readings from Last 2 Encounters:   07/10/24 (!) 151 kg (333 lb)   02/26/24 (!) 148 kg (327 lb)        Constitutional:       Appearance: Healthy appearance. Not in distress.   Neck:      Vascular: No JVR. JVD normal.   Pulmonary:      Effort: Pulmonary effort is normal.      Breath sounds: Normal breath sounds. No wheezing. No rhonchi. No rales.   Chest:      Chest wall: Not tender " to palpatation.   Cardiovascular:      PMI at left midclavicular line. Normal rate. Irregularly irregular rhythm. Normal S1. Normal S2.       Murmurs: There is a grade 1/6 systolic murmur at the LLSB.      No gallop.  No click. No rub.      Comments: Compression stockings in place  Pulses:     Intact distal pulses.   Edema:     Pretibial: bilateral 1+ edema of the pretibial area.     Ankle: bilateral 1+ edema of the ankle.     Feet: bilateral 1+ edema of the feet.  Abdominal:      General: Bowel sounds are normal.      Palpations: Abdomen is soft.      Tenderness: There is no abdominal tenderness.   Musculoskeletal: Normal range of motion.         General: No tenderness. Skin:     General: Skin is warm and dry.   Neurological:      General: No focal deficit present.      Mental Status: Alert and oriented to person, place and time.           Lab Review:   Lab Results   Component Value Date    GLUCOSE 128 (H) 01/06/2024    BUN 31 (H) 01/06/2024    CREATININE 1.06 01/06/2024    EGFRIFNONA 100 03/16/2021    BCR 29.2 (H) 01/06/2024    CO2 27.0 01/06/2024    CALCIUM 8.6 01/06/2024    ALBUMIN 3.0 (L) 01/06/2024     (H) 01/01/2024    ALT 1,111 (H) 01/01/2024       Lab Results   Component Value Date    WBC 8.62 01/05/2024    HGB 16.1 01/05/2024    HCT 50.0 01/05/2024    MCV 90.6 01/05/2024     01/05/2024       Lab Results   Component Value Date    HGBA1C 6.40 (H) 12/30/2023       Lab Results   Component Value Date    TSH 1.260 12/31/2023       Lab Results   Component Value Date    CHOL 144 03/10/2021    CHOL 122 08/19/2020     Lab Results   Component Value Date    TRIG 309 (H) 03/10/2021    TRIG 179 (H) 08/19/2020     Lab Results   Component Value Date    HDL 31 (L) 03/10/2021    HDL 35 (L) 08/19/2020     Lab Results   Component Value Date    LDL 64 03/10/2021    LDL 51 08/19/2020     3/15/2024:  NT proBNP 512  Magnesium 2  CBC: WBC 7, RBCs 5.57, hemoglobin 15.6, hematocrit 49.4, MCV 89, MCH 28, MCHC 31.6,  platelets 192, MPV 10.5, RDW 14.4, neutrophils 52.5, lymphocytes 33.8, monocytes 10.7, eos 2.1, basos 0.6  TSH 2.73  BMP: Sodium 142, potassium 5, chloride 104, carbon dioxide 29, glucose 128, BUN 19, creatinine 1.03, calcium 9.4, GFR 74  Hepatic panel: Protein 6.7, albumin 3.9, alkaline phosphatase 58, ALT 12, AST 17, bilirubin 0.5  Hemoglobin A1c 6.1%  Vitamin D-41.7  Lipid panel: Cholesterol 130, triglycerides 209, HDL 34, LDL 54        Results for orders placed during the hospital encounter of 02/21/24    Adult Transthoracic Echo Limited W/ Cont if Necessary Per Protocol    Interpretation Summary    Left ventricular systolic function is normal. Left ventricular ejection fraction appears to be 56 - 60%.    No pericardial effusion.    Improved from previous study.       Results for orders placed during the hospital encounter of 12/30/23    Duplex Portal Hepatic Complete CAR    Interpretation Summary  DUPLEX PORTAL HEPATIC COMPLETE CAR    Date of Exam: 12/31/2023 1:10 PM EST    Indication: other  elevated transaminases, possible congestive.    Comparison: No comparisons available.    Technique: Grayscale, spectral and color Doppler flow of the upper abdomen with particular attention to the portal and hepatic veins.      Findings:  Study is effectively nondiagnostic. The sonographer notes the patient has a very bad, persistent cough, with the study also limited by bowel gas, patient body habitus, and patient positioning, with the patient seated in a chair. Color flow and  directional flow cannot be evaluated for the portal vein. Hepatofugal flow can be established in the right main and left hepatic veins. Spleen can be measured and is considered borderline enlarged at 14 cm. No ascites is identified, but there is a  pericardial effusion as seen on concurrent chest CT scan.    Impression  Impression:    1. Nondiagnostic portal vein ultrasound, due to multiple technical factors, and in particular the patient's  persistent cough. It is difficult to reliably identify color flow or flow direction in the portal veins.    2. Expected hepatofugal flow visible in the hepatic veins.    3. Borderline splenomegaly. Pericardial effusion again noted.        Electronically Signed: Basil Su MD  1/1/2024 12:20 PM EST  Workstation ID: [id number]       Advance Care Planning   ACP discussion was held with the patient during this visit. Patient has an advance directive (not in EMR), copy requested.      Assessment:       Overall continued acceptable course with no new interim cardiopulmonary complaints with acceptable functional status on limited activity. We will defer additional diagnostic or therapeutic intervention from a cardiac perspective at this time. Patient had a Cardiac PET 2/21/2024 showing  normal myocardial perfusion study with no evidence of ischemia. Calculated EF = 59%. Echocardiogram 2/21/2024 demonstrated EF 56-60%, no pericardial effusion.  Hopefully I will get to review the patient's next laboratory testing results.       Diagnosis Plan   1. Paroxysmal atrial fibrillation  Continue bisoprolol, Eliquis      2. Pericardial effusion  Resolved on echocardiogram February 2024      3. Primary hypertension  Controlled, continue current cardiac medications      4. Obesity, Class III, BMI 40-49.9 (morbid obesity)  Physical activity as tolerated, heart healthy diet             Plan:         Patient to continue current medications and close follow up with the above providers.  Tentative cardiology follow up in January 2025 or patient may return sooner PRN.  Prior authorization for Vascepa    Electronically signed by ANGELIA Liu, 07/10/24, 12:28 PM EDT.

## 2024-07-10 ENCOUNTER — OFFICE VISIT (OUTPATIENT)
Dept: CARDIOLOGY | Facility: CLINIC | Age: 79
End: 2024-07-10
Payer: MEDICARE

## 2024-07-10 VITALS
BODY MASS INDEX: 41.75 KG/M2 | HEART RATE: 108 BPM | SYSTOLIC BLOOD PRESSURE: 118 MMHG | DIASTOLIC BLOOD PRESSURE: 70 MMHG | OXYGEN SATURATION: 95 % | HEIGHT: 73 IN | WEIGHT: 315 LBS

## 2024-07-10 DIAGNOSIS — I48.0 PAROXYSMAL ATRIAL FIBRILLATION: Primary | ICD-10-CM

## 2024-07-10 DIAGNOSIS — I10 PRIMARY HYPERTENSION: ICD-10-CM

## 2024-07-10 DIAGNOSIS — I31.39 PERICARDIAL EFFUSION: ICD-10-CM

## 2024-07-10 DIAGNOSIS — E66.01 OBESITY, CLASS III, BMI 40-49.9 (MORBID OBESITY): ICD-10-CM

## 2024-08-02 RX ORDER — ICOSAPENT ETHYL 1 G/1
2 CAPSULE ORAL 2 TIMES DAILY WITH MEALS
Qty: 360 CAPSULE | Refills: 3 | Status: SHIPPED | OUTPATIENT
Start: 2024-08-02

## 2024-08-30 ENCOUNTER — DOCUMENTATION (OUTPATIENT)
Dept: PHYSICAL THERAPY | Facility: HOSPITAL | Age: 79
End: 2024-08-30
Payer: MEDICARE

## 2024-08-30 DIAGNOSIS — R60.0 BILATERAL LEG EDEMA: Primary | ICD-10-CM

## 2024-08-30 DIAGNOSIS — I89.0 LYMPHEDEMA: ICD-10-CM

## 2024-08-30 NOTE — THERAPY DISCHARGE NOTE
Outpatient Physical Therapy Discharge Summary         Patient Name: Luis Perez Jr.  : 1945  MRN: [id number]    Today's Date: 2024    Visit Dx:    ICD-10-CM ICD-9-CM   1. Bilateral leg edema  R60.0 782.3   2. Lymphedema  I89.0 457.1        PT OP Goals       Row Name 24 1300          Long Term Goals    LTG Date to Achieve 24  -LF     LTG 1 Pt to be measured and fit with compression garment/system for long term self management of lymphedema  -LF     LTG 1 Progress Partially Met  -LF     LTG 1 Progress Comments was not seen for follow-up to confirm fit of new compression  -LF     LTG 2 Patient independent with self-management of BLE lymphedema to include skin care, self-MLD/compression pump, compression, and exercise  -LF     LTG 2 Progress Met  -LF               User Key  (r) = Recorded By, (t) = Taken By, (c) = Cosigned By      Initials Name Provider Type    Latasha Bowles PT Physical Therapist                    OP PT Discharge Summary  Date of Discharge: 24  Reason for Discharge: Patient/Caregiver request (no follow-up appointments needed)  Discharge Destination: Home with home program  Discharge Instructions/Additional Comments: Patient was seen for evaluation only.   Patient to continue with his current compression socks.  Assisted in acquiring new compression.  Delay in discharge while awaiting and confirming receipt of new compression.              Latasha Box PT  2024        Patient notified and states understanding

## 2024-09-12 RX ORDER — MONTELUKAST SODIUM 10 MG/1
10 TABLET ORAL DAILY
Qty: 90 TABLET | Refills: 3 | Status: SHIPPED | OUTPATIENT
Start: 2024-09-12

## 2024-11-01 ENCOUNTER — SPECIALTY PHARMACY (OUTPATIENT)
Dept: GENERAL RADIOLOGY | Facility: HOSPITAL | Age: 79
End: 2024-11-01
Payer: MEDICARE

## 2024-12-13 RX ORDER — APIXABAN 5 MG/1
5 TABLET, FILM COATED ORAL EVERY 12 HOURS SCHEDULED
Qty: 180 TABLET | Refills: 3 | Status: SHIPPED | OUTPATIENT
Start: 2024-12-13

## 2024-12-20 ENCOUNTER — OFFICE VISIT (OUTPATIENT)
Age: 79
End: 2024-12-20
Payer: MEDICARE

## 2024-12-20 VITALS
TEMPERATURE: 98.2 F | HEART RATE: 92 BPM | WEIGHT: 315 LBS | DIASTOLIC BLOOD PRESSURE: 72 MMHG | HEIGHT: 73 IN | BODY MASS INDEX: 41.75 KG/M2 | OXYGEN SATURATION: 95 % | SYSTOLIC BLOOD PRESSURE: 132 MMHG

## 2024-12-20 DIAGNOSIS — J45.50 SEVERE PERSISTENT ASTHMA WITHOUT COMPLICATION: ICD-10-CM

## 2024-12-20 DIAGNOSIS — J30.2 SEASONAL AND PERENNIAL ALLERGIC RHINITIS: ICD-10-CM

## 2024-12-20 DIAGNOSIS — G47.33 OBSTRUCTIVE SLEEP APNEA, ADULT: ICD-10-CM

## 2024-12-20 DIAGNOSIS — J45.32 CHRONIC ASTHMA, MILD PERSISTENT, WITH STATUS ASTHMATICUS: Primary | ICD-10-CM

## 2024-12-20 DIAGNOSIS — J45.909 IDIOPATHIC ASTHMA: ICD-10-CM

## 2024-12-20 DIAGNOSIS — J30.89 SEASONAL AND PERENNIAL ALLERGIC RHINITIS: ICD-10-CM

## 2024-12-20 DIAGNOSIS — E66.01 OBESITY, CLASS III, BMI 40-49.9 (MORBID OBESITY): ICD-10-CM

## 2024-12-20 RX ORDER — HYDROCHLOROTHIAZIDE 50 MG/1
TABLET ORAL
COMMUNITY
End: 2024-12-20

## 2024-12-20 RX ORDER — POLYETHYLENE GLYCOL-3350 AND ELECTROLYTES 236; 6.74; 5.86; 2.97; 22.74 G/274.31G; G/274.31G; G/274.31G; G/274.31G; G/274.31G
POWDER, FOR SOLUTION ORAL
COMMUNITY
End: 2024-12-20

## 2024-12-20 RX ORDER — DICLOFENAC SODIUM 30 MG/G
GEL TOPICAL
COMMUNITY

## 2024-12-20 RX ORDER — FAMOTIDINE 20 MG/1
TABLET, FILM COATED ORAL
COMMUNITY
End: 2024-12-20

## 2024-12-20 RX ORDER — KETOCONAZOLE 20 MG/G
CREAM TOPICAL
COMMUNITY
Start: 2024-09-10

## 2024-12-20 RX ORDER — FLUTICASONE FUROATE, UMECLIDINIUM BROMIDE AND VILANTEROL TRIFENATATE 100; 62.5; 25 UG/1; UG/1; UG/1
1 POWDER RESPIRATORY (INHALATION)
Qty: 1 EACH | Refills: 0 | COMMUNITY
Start: 2024-12-20

## 2024-12-20 RX ORDER — DOXYCYCLINE HYCLATE 20 MG
TABLET ORAL
COMMUNITY
Start: 2024-09-10

## 2024-12-20 RX ORDER — TORSEMIDE 10 MG/1
TABLET ORAL
COMMUNITY
End: 2024-12-20

## 2024-12-20 NOTE — PROGRESS NOTES
[facility] Pulmonary Follow up    CHIEF COMPLAINT    Dyspnea with exertion    HISTORY OF PRESENT ILLNESS    Luis Perez Jr. is a 79 y.o.male here today for follow-up.  He was last seen in the office by me in February.    He was hospitalized in December 2023 for shortness of breath, he was found to be in A-fib with RVR.  He required cardioversion.  It was felt to be related to a virus and untreated RUY.  His CT scan also showed a moderate to large pericardial effusion and he was diuresed.  His medications were adjusted and he was discharged home.  He was readmitted 3 days later for cardiac tamponade and had an emergent pericardiocentesis per Dr. Chaudhari.  His symptoms improved and he was discharged home.    He has done well since his last appointment.  He denies any respiratory illnesses since his last appointment.    He does notice shortness of breath with heavy exertion and long distance walking.  He states he is not able to do any regular exercise due to knee pain.    He has been receiving Dupixent injections every 2 weeks and has done well with these injections.    He is currently not on any inhaler therapy.  He does not use the albuterol.    He denies any sputum production or hemoptysis.  Denies any fever, chills or night sweats.    He denies any chest pain or palpitations.  Denies any lower extremity edema or calf tenderness.    He denies any reflux symptoms.    He continues to wear his oxygen at 2 L nasal cannula at nighttime.  He denies any sleeping difficulties.    Patient Active Problem List   Diagnosis    Hypertension    Paroxysmal atrial fibrillation    Snoring    Obstructive sleep apnea - Intolerant CPAP/BiPAP, on O2 @[facility]    Chronic persistent asthma    Non-smoker    Obesity, Class III, BMI 40-49.9 (morbid obesity)    Perennial rhinitis    Vitamin D deficiency    Rhinovirus    Pericardial effusion    MONIQUE (acute kidney injury)    Transaminitis    Lactic acidosis       Allergies   Allergen Reactions     Torsemide Confusion    Adhesive Tape Rash       Current Outpatient Medications:     albuterol sulfate  (90 Base) MCG/ACT inhaler, Inhale 2 puffs Every 4 (Four) Hours As Needed for Wheezing., Disp: 1 inhaler, Rfl: 0    ascorbic acid (VITAMIN C) 1000 MG tablet, Take 1 tablet by mouth Daily., Disp: , Rfl:     bisoprolol (ZEBeta) 5 MG tablet, Take 1 tablet by mouth Daily. (Patient taking differently: Take  by mouth Daily. 5mg in the AM and 2.5mg in the PM), Disp: 90 tablet, Rfl: 3    bumetanide (BUMEX) 1 MG tablet, Take 1 tablet by mouth Daily., Disp: , Rfl:     Diclofenac Sodium 3 % gel gel, , Disp: , Rfl:     doxycycline (PERIOSTAT) 20 MG tablet, 1 tablet 2x daily with food, Disp: , Rfl:     Dupilumab 300 MG/2ML solution prefilled syringe, Inject 2 mL under the skin into the appropriate area as directed Every 14 (Fourteen) Days., Disp: 12 mL, Rfl: 3    Eliquis 5 MG tablet tablet, TAKE 1 TABLET BY MOUTH TWICE  DAILY, Disp: 180 tablet, Rfl: 3    fexofenadine (ALLEGRA ALLERGY) 180 MG tablet, Take 1 tablet by mouth Daily., Disp: 30 tablet, Rfl: 2    finasteride (PROSCAR) 5 MG tablet, Take 1 tablet by mouth Daily., Disp: , Rfl:     icosapent ethyl (VASCEPA) 1 g capsule capsule, Take 2 g by mouth 2 (Two) Times a Day With Meals., Disp: 360 capsule, Rfl: 3    ketoconazole (NIZORAL) 2 % cream, Apply 2x daily to right armpit, Disp: , Rfl:     montelukast (SINGULAIR) 10 MG tablet, TAKE 1 TABLET BY MOUTH DAILY, Disp: 90 tablet, Rfl: 3    Multiple Vitamins-Minerals (VITEYES AREDS FORMULA PO), Take 1 tablet by mouth 2 (Two) Times a Day., Disp: , Rfl:     potassium chloride 10 MEQ CR tablet, Take 1 tablet by mouth 2 (Two) Times a Day., Disp: 30 tablet, Rfl: 0    spironolactone (ALDACTONE) 25 MG tablet, Take 1 tablet by mouth Daily., Disp: , Rfl:     traMADol (ULTRAM) 50 MG tablet, Take 1 tablet by mouth Every 6 (Six) Hours As Needed for Moderate Pain., Disp: , Rfl:     Fluticasone-Umeclidin-Vilant (Trelegy Ellipta)  "100-62.5-25 MCG/ACT inhaler, Inhale 1 puff Daily., Disp: 1 each, Rfl: 0  MEDICATION LIST AND ALLERGIES REVIEWED.    Social History     Tobacco Use    Smoking status: Never     Passive exposure: Never    Smokeless tobacco: Former     Types: Chew     Quit date: 4/26/2019   Vaping Use    Vaping status: Never Used   Substance Use Topics    Alcohol use: Yes     Alcohol/week: 0.0 - 2.0 standard drinks of alcohol     Comment: 1-2 cans of beer/month    Drug use: No       FAMILY AND SOCIAL HISTORY REVIEWED.    Review of Systems   Constitutional:  Positive for fatigue. Negative for activity change, appetite change, fever and unexpected weight change.   HENT:  Negative for congestion, postnasal drip, rhinorrhea, sinus pressure, sore throat and voice change.    Eyes:  Negative for visual disturbance.   Respiratory:  Positive for shortness of breath. Negative for cough, chest tightness and wheezing.    Cardiovascular:  Negative for chest pain, palpitations and leg swelling.   Gastrointestinal:  Negative for abdominal distention, abdominal pain, nausea and vomiting.   Endocrine: Negative for cold intolerance and heat intolerance.   Genitourinary:  Negative for difficulty urinating and urgency.   Musculoskeletal:  Negative for arthralgias, back pain and neck pain.   Skin:  Negative for color change and pallor.   Allergic/Immunologic: Negative for environmental allergies and food allergies.   Neurological:  Negative for dizziness, syncope, weakness and light-headedness.   Hematological:  Negative for adenopathy. Does not bruise/bleed easily.   Psychiatric/Behavioral:  Negative for agitation and behavioral problems.    .    /72   Pulse 92   Temp 98.2 °F (36.8 °C)   Ht 185.4 cm (72.99\")   Wt (!) 155 kg (341 lb 4.8 oz)   SpO2 95% Comment: Room air at rest  BMI 45.04 kg/m²     Immunization History   Administered Date(s) Administered    ABRYSVO (RSV, 60+ or pregnant women 32-36 wks) 12/01/2023    COVID-19 (MODERNA) " 1st,2nd,3rd Dose Monovalent 01/27/2021, 02/24/2021, 09/03/2021, 04/11/2022    COVID-19 (MODERNA) BIVALENT 12+YRS 09/29/2022    COVID-19 (PFIZER) 12YRS+ (COMIRNATY) 11/16/2023, 10/23/2024    FLUAD TRI 65YR+ 10/23/2024    Flu Vaccine Intradermal Quad 18-64YR 10/01/2014    Flu Vaccine Quad PF >36MO 10/01/2017    Flu Vaccine Split Quad 10/01/2017    Fluad Quad 65+ 10/01/2020, 11/16/2023    Fluzone  >6mos 10/01/2012    Fluzone High-Dose 65+YRS 09/28/2010, 10/29/2015, 11/09/2016, 10/04/2017, 09/28/2018, 10/19/2019, 10/10/2022    Fluzone High-Dose 65+yrs 09/10/2021    Fluzone Quad >6mos (Multi-dose) 10/01/2017, 10/01/2018, 10/16/2019    Hepatitis A 11/20/2018, 07/19/2019    INFLUENZA A MONOVALENT (H5N1), ADJUVANTED-2013 10/01/2018, 10/16/2019    Pneumococcal Conjugate 13-Valent (PCV13) 01/15/2015    Pneumococcal Conjugate 20-Valent (PCV20) 08/30/2023    Pneumococcal Polysaccharide (PPSV23) 01/31/2019    Shingrix 09/13/2018, 11/20/2018       Physical Exam  Vitals and nursing note reviewed.   Constitutional:       Appearance: He is well-developed. He is not diaphoretic.   HENT:      Head: Normocephalic and atraumatic.   Eyes:      Pupils: Pupils are equal, round, and reactive to light.   Neck:      Thyroid: No thyromegaly.   Cardiovascular:      Rate and Rhythm: Normal rate and regular rhythm.      Heart sounds: Normal heart sounds. No murmur heard.     No friction rub. No gallop.   Pulmonary:      Effort: Pulmonary effort is normal. No respiratory distress.      Breath sounds: Normal breath sounds. No wheezing or rales.   Chest:      Chest wall: No tenderness.   Abdominal:      General: Bowel sounds are normal.      Palpations: Abdomen is soft.      Tenderness: There is no abdominal tenderness.   Musculoskeletal:         General: No swelling. Normal range of motion.      Cervical back: Normal range of motion and neck supple.   Lymphadenopathy:      Cervical: No cervical adenopathy.   Skin:     General: Skin is warm and dry.       Capillary Refill: Capillary refill takes less than 2 seconds.   Neurological:      Mental Status: He is alert and oriented to person, place, and time.   Psychiatric:         Mood and Affect: Mood normal.         Behavior: Behavior normal.           RESULTS    Spirometry Interpretation: FVC 2.97 71% predicted, FEV1 1.26 41% predicted, FEV1/FVC 42% predicted, TLC 6.86 91% predicted, DLCO 93% predicted, severe obstruction, no restriction and normal diffusion.    Chest PA/lateral: Official report pending    PROBLEM LIST    Problem List Items Addressed This Visit          Allergies and Adverse Reactions    Perennial rhinitis       Endocrine and Metabolic    Obesity, Class III, BMI 40-49.9 (morbid obesity)       Pulmonary and Pneumonias    Chronic persistent asthma    Relevant Medications    Fluticasone-Umeclidin-Vilant (Trelegy Ellipta) 100-62.5-25 MCG/ACT inhaler    Other Relevant Orders    Spirometry with Diffusion Capacity & Lung Volumes (Completed)       Sleep    Obstructive sleep apnea - Intolerant CPAP/BiPAP, on O2 @[facility]     Other Visit Diagnoses       Chronic asthma, mild persistent, with status asthmaticus    -  Primary    Relevant Medications    Fluticasone-Umeclidin-Vilant (Trelegy Ellipta) 100-62.5-25 MCG/ACT inhaler    Other Relevant Orders    XR Chest PA & Lateral    Severe persistent asthma without complication        Relevant Medications    Fluticasone-Umeclidin-Vilant (Trelegy Ellipta) 100-62.5-25 MCG/ACT inhaler              DISCUSSION    Mr. Austin was here for follow-up.  She had been doing okay from a pulmonary standpoint.  We did review his PFTs in the office today and continues to have a severe obstruction.  He is currently not using any inhalers.  I did give him a sample of Trelegy 100 to try for 2 weeks.  He is going to check with his insurance to see if this is covered by his insurance and if he needs a prescription he will contact me.  I did encourage him to use the albuterol prior to heavy  exertion and shortness of breath.    We reviewed his chest x-ray in the office today and official report is pending.  I will notify him of any abnormalities.    He will continue to wear 2 L nasal cannula at nighttime for his RUY.  Unfortunately he is unable to tolerate CPAP.    He will continue to take his allergy medicine as needed.    We did discuss regular exercise and weight loss in the office today for his obesity.  I did encourage him to try to get at least 15 minutes of regular exercise daily.  He is going to look into purchasing a step machine.    We will have him follow-up in 6 months.    I personally spent a total of 34 minutes on patient visit today including chart review, face to face with the patient obtaining the history and physical exam, review of pertinent images and tests, counseling and discussion and/or coordination of care as described above, and documentation.  Total time excludes time spent on other separate services such as performing procedures or test interpretation, if applicable.        Camille Mccord, APRN  12/20/202411:59 EST  Electronically signed     Please note that portions of this note were completed with a voice recognition program.        CC: Sierra Kuo MD

## 2025-01-15 ENCOUNTER — TELEPHONE (OUTPATIENT)
Dept: PULMONOLOGY | Facility: CLINIC | Age: 80
End: 2025-01-15
Payer: MEDICARE

## 2025-01-22 ENCOUNTER — TELEPHONE (OUTPATIENT)
Dept: PULMONOLOGY | Facility: CLINIC | Age: 80
End: 2025-01-22
Payer: MEDICARE

## 2025-01-22 DIAGNOSIS — J45.50 SEVERE PERSISTENT ASTHMA WITHOUT COMPLICATION: ICD-10-CM

## 2025-01-22 DIAGNOSIS — J45.32 CHRONIC ASTHMA, MILD PERSISTENT, WITH STATUS ASTHMATICUS: ICD-10-CM

## 2025-01-22 DIAGNOSIS — J45.909 IDIOPATHIC ASTHMA: ICD-10-CM

## 2025-01-22 RX ORDER — FLUTICASONE FUROATE, UMECLIDINIUM BROMIDE AND VILANTEROL TRIFENATATE 100; 62.5; 25 UG/1; UG/1; UG/1
1 POWDER RESPIRATORY (INHALATION)
Qty: 180 EACH | Refills: 3 | Status: SHIPPED | OUTPATIENT
Start: 2025-01-22

## 2025-01-22 NOTE — TELEPHONE ENCOUNTER
Pt called stating that he felt like the Trelegy 100 sample helped with his breathing, though he did mention he felt a little dizzy upon taking it at first. Pt requested this be sent to [facility]. This has been sent in.

## 2025-03-21 NOTE — PROGRESS NOTES
"    Subjective:     Encounter Date:03/26/2025      Patient ID: Luis Perez Jr. is a 79 y.o.  white male from [place], [place], retired from [employer].     REFERRING PROVIDER: ANGELIA Rios  FORMER HEALTHCARE PROVIDER:ANGELIA Matias  FORMER HEALTHCARE PROVIDER: Maggie Schwartz DNP, APRN  CURRENT PHYSICIAN: Sierra Kuo MD  SLEEP PHYSICIAN: Nicolas Lopez MD, [facility]  GASTROENTEROLOGIST: Tay Torres MD  INFECTIOUS DISEASE: Eriberto Weiner MD  ORTHOPEDIC SURGEON:  Abhijit Ambriz MD.  PULMONOLOGIST: AHMET Bradley MD, [facility]  UROLOGIST: Tyrel Wolf MD  OPHTHALMOLOGIST: Harsha Rosales MD.  ELECTROPHYSIOLOGIST: Nicolas Hampton MD.    Chief Complaint:   Chief Complaint   Patient presents with    Paroxysmal atrial fibrillation     6-Mo F/U     Problem List:  Persistent atrial fibrillation:  CHADsVasc 2, anticoagulated with Eliquis  Echocardiogram, 7/1/2019: LVEF 0.65, mild to moderate TR, RVSP 35 mmHg, right atrial mass is present measuring 2.6 x 1.6 cm; differential includes thrombus versus atypical right atrial myxoma versus atrial intraseptal tumor with clinical correlation and consideration of transesophageal echocardiogram recommended, LA moderately dilated, normal RV wall thickness, systolic function and motion noted with RV cavity mildly dilated, aortic valve exhibits mild sclerosis, mild pulmonary hypertension, no pericardial effusion  PIETER, 7/8/2019: Mild MR, LVEF 0.60, LV wall thickness consistent with moderate concentric hypertrophy, LA mild to moderately dilated, 3D echo LVEF was 0.51, normal RV cavity size, wall thickness, systolic function septal motion noted, marked lipomatous hypertrophy of the interatrial septum present.  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 (normal " variant)  Initiation of sotalol, 7/13/2019, with persistent atrial fibrillation on EKG, 7/26/2019  Successful external cardioversion, 08/05/2019, with frequent atrial ectopy on EKG, 08/09/2019, now in normal sinus rhythm on EKG, 10/16/2019  Normal sinus rhythm on ECG in office with acceptable QTC on sotalol therapy, May 2020, December 2020, July 2021, February 2022, August 2022, February 2023, August 2023  Successful ECV 12/26/2023  Atrial fibrillation January, February 2024, July 2024, March 2025  Incidental asymptomatic echocardiographic right atrial mass subsequently 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 on PIETER study (2.6 x 1.6 cm), July 2019  Mild dyspnea on exertion/chronic persistent asthma  Remote stress test 30-40 years ago; negative per patient-data deficit, patient reports that this was performed for a baseline and not because of any symptoms  PFTs 2/25/2020: Moderate airway obstruction, improvement in FEV1 with bronchodilator, no restriction, air-trapping present, no diffusion  Echocardiogram 12/26/2023: LVEF 60%, LV wall thickness consistent with moderate concentric hypertrophy, cardiac valves anatomically and functionally normal, RVSP less than 35 mmHg, large greater than 2 cm circumferential pericardial effusion with no evidence of tamponade  Limited echocardiogram 12/31/2023: LVEF 60.7%, large greater than 2 cm circumferential pericardial effusion, maximum diameter 5.45 cm, circumferential with largest volume posterior.  Right atrium and right ventricle appear compensated with easy collapsibility, status post successful pericardiocentesis via subxiphoid approach with 950 mL of serosanguineous fluid removed, and drain sutured in place  Limited echocardiogram 12/31/2023: LVEF 61-65%, preintervention large pericardial effusion with early signs of tamponade, postintervention with 950 mL serosanguineous fluid removal trivial pericardial effusion remains with normal expansion of the RA and  RV  Echocardiogram 1/2/2024: LVEF 62.1%, trivial pericardial effusion   Cardiac PET 2/21/2024: Myocardial perfusion imaging indicates a normal myocardial perfusion study with no evidence of ischemia. Calculated EF = 59%.  Echocardiogram 2/21/2024: EF 56-60%, no pericardial effusion  CCS class 0 chest discomfort/NYHA class I-II PALM, August 2022, August 2023, February 2024  At risk for sleep apnea with sleep consult, 8/21/2019, with polysomnogram January 2020 that showed mild obstructive sleep apnea and nocturnal hypoxemia with initiation of CPAP therapy, intolerant CPAP, on 2 L O2 NC hs  Chronic lower extremity edema  Hypertension  Dyslipidemia; ASCVD 10 year risk is 45.8%, 17.4% if treated, initiate rosuvastatin 10 mg nightly May 2020 with consideration of vascepa 2g bid after repeat FLP, initiation of Vascepa July 2021  Type 2 diabetes mellitus;HgbA1C 6.39% October 2019; 5.7%, August 2020, 6.2% December 2022, 6.4% December 2023, 6.1% March 2024  Chronic persistent asthma  10. History of lower extremity cellulitis with MRSA  11.History of melanoma  12. Morbid obesity: BMI 45.65  13. Mechanical fall with subsequent development on cellulitis with IV antibiotic administration, October 2020  14. BPH  15.  Mechanical fall with severe bruising/mild lacerations to his left knee, right wrist, forehead with negative work-up at [facility] January 2022-data deficit  16.  [facility] 2-day hospitalization December 2023 for atrial fibrillation with RVR, now status post successful ECV, patient also had enterovirus/rhinovirus and large pericardial effusion  17.  [facility] 7-day hospitalization December 2023-January 2024 for pericardial effusion with tamponade, MONIQUE, hypotension, shock, now status post emergent pericardiocentesis with 950 mL serosanguineous fluid removed  18.  Basal cell carcinoma on right ear June 2024  19. Surgical history  Vasectomy  Tonsillectomy  Deviated septum surgery  Multiple skin cancer excisions  Partial  right knee replacement, November 2019-data deficit  Pericardiocentesis December 2023    Allergies   Allergen Reactions    Torsemide Confusion    Adhesive Tape Rash       Current Outpatient Medications   Medication Instructions    albuterol sulfate  (90 Base) MCG/ACT inhaler 2 puffs, Inhalation, Every 4 Hours PRN    ascorbic acid (VITAMIN C) 1,000 mg, Daily    bisoprolol (ZEBETA) 5 mg, Oral, Every 24 Hours Scheduled    bumetanide (BUMEX) 1 mg, Daily    Diclofenac Sodium 3 % gel gel     Eliquis 5 mg, Oral, Every 12 Hours Scheduled    fexofenadine (ALLEGRA ALLERGY) 180 mg, Oral, Daily    finasteride (PROSCAR) 5 mg, Daily    Fluticasone-Umeclidin-Vilant (Trelegy Ellipta) 100-62.5-25 MCG/ACT inhaler 1 puff, Inhalation, Daily - RT    icosapent ethyl (VASCEPA) 2 g, Oral, 2 Times Daily With Meals    ketoconazole (NIZORAL) 2 % cream Apply 2x daily to right armpit    montelukast (SINGULAIR) 10 mg, Oral, Daily    Multiple Vitamins-Minerals (VITEYES AREDS FORMULA PO) 1 tablet, 2 Times Daily    potassium chloride 10 MEQ CR tablet 10 mEq, Oral, 2 Times Daily    spironolactone (ALDACTONE) 25 mg, Daily    tamsulosin (FLOMAX) 0.4 MG capsule 24 hr capsule     traMADol (ULTRAM) 50 mg, Every 6 Hours PRN         HISTORY OF PRESENT ILLNESS:  The patient is here after a 9-month hiatus. Patient had a [facility] 7-day hospitalization December 2023-January 2024 for pericardial effusion with tamponade.  Patient had an emergent pericardiocentesis 12/31/2023 with 950 mL of serosanguineous fluid removed.  Repeat echocardiogram January 2024 showed LVEF 62.1%, trivial pericardial effusion. Patient had a Cardiac PET 2/21/2024 showing  normal myocardial perfusion study with no evidence of ischemia. Calculated EF = 59%. Echocardiogram 2/21/2024 demonstrated EF 56-60%, no pericardial effusion. He saw Dr Hampton February 2024 for consideration of Watchman and patient preferred to research it more before deciding and decided he wants to defer this for now.   "Patient denies any chest pain, palpitations, dizziness, presyncope, or syncope.  He has had a couple episodes where he did not feel well.  He took his blood pressure and it was normal.  Patient is trying to adjust his diet because he recently had labs and was told that his triglyceride levels and glucose levels were elevated.  He admits to drinking more soda over the winter.  The patient is on limited activity due to knee pain.  He says he always has mild shortness of breath but does not feel like it is any more than 6 months ago and no increased edema.  He does not routinely weigh himself but will start doing this daily and let me know if he is getting more than 2-3 pounds in a day or more than 5 pounds in a week.    ROS   All other systems reviewed and otherwise negative.    Procedures       Objective:       Vitals:    03/26/25 1145 03/26/25 1146   BP: 130/90 124/86   BP Location: Right arm Right arm   Patient Position: Sitting Standing   Cuff Size: Adult Adult   Pulse: 92 102   SpO2: 95% 95%   Weight: (!) 157 kg (346 lb)    Height: 185.4 cm (73\")      Body mass index is 45.65 kg/m².  Wt Readings from Last 2 Encounters:   03/26/25 (!) 157 kg (346 lb)   12/20/24 (!) 155 kg (341 lb 4.8 oz)        Constitutional:       Appearance: Healthy appearance. Not in distress.   Neck:      Vascular: No JVR. JVD normal.   Pulmonary:      Effort: Pulmonary effort is normal.      Breath sounds: Normal breath sounds. No wheezing. No rhonchi. No rales.   Chest:      Chest wall: Not tender to palpatation.   Cardiovascular:      PMI at left midclavicular line. Normal rate. Irregularly irregular rhythm. Normal S1. Normal S2.       Murmurs: There is a grade 1/6 systolic murmur at the LLSB.      No gallop.  No click. No rub.   Pulses:     Intact distal pulses.   Edema:     Peripheral edema absent.   Abdominal:      General: Bowel sounds are normal.      Palpations: Abdomen is soft.      Tenderness: There is no abdominal tenderness. "   Musculoskeletal: Normal range of motion.         General: No tenderness. Skin:     General: Skin is warm and dry.   Neurological:      General: No focal deficit present.      Mental Status: Alert and oriented to person, place and time.           Lab Review:   Lab Results   Component Value Date    GLUCOSE 128 (H) 01/06/2024    BUN 31 (H) 01/06/2024    CREATININE 1.06 01/06/2024    EGFRIFNONA 100 03/16/2021    BCR 29.2 (H) 01/06/2024    CO2 27.0 01/06/2024    CALCIUM 8.6 01/06/2024    ALBUMIN 3.0 (L) 01/06/2024     (H) 01/01/2024    ALT 1,111 (H) 01/01/2024       Lab Results   Component Value Date    WBC 8.62 01/05/2024    HGB 16.1 01/05/2024    HCT 50.0 01/05/2024    MCV 90.6 01/05/2024     01/05/2024       Lab Results   Component Value Date    HGBA1C 6.40 (H) 12/30/2023       Lab Results   Component Value Date    TSH 1.260 12/31/2023       Lab Results   Component Value Date    CHOL 144 03/10/2021    CHOL 122 08/19/2020     Lab Results   Component Value Date    TRIG 309 (H) 03/10/2021    TRIG 179 (H) 08/19/2020     Lab Results   Component Value Date    HDL 31 (L) 03/10/2021    HDL 35 (L) 08/19/2020     Lab Results   Component Value Date    LDL 64 03/10/2021    LDL 51 08/19/2020     3/15/2024:  NT proBNP 512  Magnesium 2  CBC: WBC 7, RBCs 5.57, hemoglobin 15.6, hematocrit 49.4, MCV 89, MCH 28, MCHC 31.6, platelets 192, MPV 10.5, RDW 14.4, neutrophils 52.5, lymphocytes 33.8, monocytes 10.7, eos 2.1, basos 0.6  TSH 2.73  BMP: Sodium 142, potassium 5, chloride 104, carbon dioxide 29, glucose 128, BUN 19, creatinine 1.03, calcium 9.4, GFR 74  Hepatic panel: Protein 6.7, albumin 3.9, alkaline phosphatase 58, ALT 12, AST 17, bilirubin 0.5  Hemoglobin A1c 6.1%  Vitamin D-41.7  Lipid panel: Cholesterol 130, triglycerides 209, HDL 34, LDL 54        Results for orders placed during the hospital encounter of 02/21/24    Adult Transthoracic Echo Limited W/ Cont if Necessary Per Protocol    Interpretation Summary     Left ventricular systolic function is normal. Left ventricular ejection fraction appears to be 56 - 60%.    No pericardial effusion.    Improved from previous study.       Results for orders placed during the hospital encounter of 12/30/23    Duplex Portal Hepatic Complete CAR    Interpretation Summary  DUPLEX PORTAL HEPATIC COMPLETE CAR    Date of Exam: 12/31/2023 1:10 PM EST    Indication: other  elevated transaminases, possible congestive.    Comparison: No comparisons available.    Technique: Grayscale, spectral and color Doppler flow of the upper abdomen with particular attention to the portal and hepatic veins.      Findings:  Study is effectively nondiagnostic. The sonographer notes the patient has a very bad, persistent cough, with the study also limited by bowel gas, patient body habitus, and patient positioning, with the patient seated in a chair. Color flow and  directional flow cannot be evaluated for the portal vein. Hepatofugal flow can be established in the right main and left hepatic veins. Spleen can be measured and is considered borderline enlarged at 14 cm. No ascites is identified, but there is a  pericardial effusion as seen on concurrent chest CT scan.    Impression  Impression:    1. Nondiagnostic portal vein ultrasound, due to multiple technical factors, and in particular the patient's persistent cough. It is difficult to reliably identify color flow or flow direction in the portal veins.    2. Expected hepatofugal flow visible in the hepatic veins.    3. Borderline splenomegaly. Pericardial effusion again noted.        Electronically Signed: Basil Su MD  1/1/2024 12:20 PM EST  Workstation ID: [id number]       Advance Care Planning   ACP discussion was held with the patient during this visit. Patient does not have an advance directive, declines further assistance.      Assessment:     Overall continued acceptable course with no new interim cardiopulmonary complaints with acceptable  functional status on limited activity. We will defer additional diagnostic or therapeutic intervention from a cardiac perspective at this time. Patient had a Cardiac PET 2/21/2024 showing  normal myocardial perfusion study with no evidence of ischemia. Calculated EF = 59%. Echocardiogram 2/21/2024 demonstrated EF 56-60%, no pericardial effusion.  I asked the patient to weigh himself every day and let me know if he is gaining more than 2-3 pounds in a day or more than 5 pounds in a week.  If his shortness of breath symptoms do not improve, may consider CTA coronaries and repeat echo. Hopefully I will get to review the patient's next laboratory testing results.       Diagnosis Plan   1. Permanent atrial fibrillation  Continue Eliquis, bisoprolol      2. Primary hypertension  Controlled, continue current cardiac medications      3. Hyperlipidemia LDL goal <100  No new labs to review, continue Vascepa             Plan:         Patient to continue current medications and close follow up with the above providers.  Tentative cardiology follow up in October 2025 or patient may return sooner PRN.    Electronically signed by ANGELIA Liu, 03/26/25, 12:17 PM EDT.

## 2025-03-26 ENCOUNTER — OFFICE VISIT (OUTPATIENT)
Dept: CARDIOLOGY | Facility: CLINIC | Age: 80
End: 2025-03-26
Payer: MEDICARE

## 2025-03-26 VITALS
HEART RATE: 102 BPM | OXYGEN SATURATION: 95 % | DIASTOLIC BLOOD PRESSURE: 86 MMHG | SYSTOLIC BLOOD PRESSURE: 124 MMHG | HEIGHT: 73 IN | BODY MASS INDEX: 41.75 KG/M2 | WEIGHT: 315 LBS

## 2025-03-26 DIAGNOSIS — I10 PRIMARY HYPERTENSION: ICD-10-CM

## 2025-03-26 DIAGNOSIS — E78.5 HYPERLIPIDEMIA LDL GOAL <100: ICD-10-CM

## 2025-03-26 DIAGNOSIS — I48.21 PERMANENT ATRIAL FIBRILLATION: Primary | ICD-10-CM

## 2025-03-26 RX ORDER — TAMSULOSIN HYDROCHLORIDE 0.4 MG/1
CAPSULE ORAL
COMMUNITY
Start: 2025-03-11

## 2025-05-27 ENCOUNTER — HOSPITAL ENCOUNTER (EMERGENCY)
Facility: HOSPITAL | Age: 80
Discharge: HOME | End: 2025-05-27
Attending: EMERGENCY MEDICINE | Admitting: EMERGENCY MEDICINE
Payer: MEDICARE

## 2025-05-27 ENCOUNTER — APPOINTMENT (OUTPATIENT)
Dept: CARDIOLOGY | Facility: HOSPITAL | Age: 80
End: 2025-05-27
Payer: MEDICARE

## 2025-05-27 VITALS
SYSTOLIC BLOOD PRESSURE: 130 MMHG | WEIGHT: 315 LBS | OXYGEN SATURATION: 98 % | BODY MASS INDEX: 41.75 KG/M2 | RESPIRATION RATE: 18 BRPM | HEIGHT: 73 IN | DIASTOLIC BLOOD PRESSURE: 85 MMHG | TEMPERATURE: 97.8 F | HEART RATE: 103 BPM

## 2025-05-27 DIAGNOSIS — L03.115 CELLULITIS OF RIGHT LOWER LEG: Primary | ICD-10-CM

## 2025-05-27 DIAGNOSIS — S80.811A ABRASION OF RIGHT LOWER LEG, INITIAL ENCOUNTER: ICD-10-CM

## 2025-05-27 LAB
ALBUMIN SERPL-MCNC: 3.7 G/DL (ref 3.5–5.2)
ALBUMIN/GLOB SERPL: 1.1 G/DL
ALP SERPL-CCNC: 73 U/L (ref 39–117)
ALT SERPL W P-5'-P-CCNC: 20 U/L (ref 1–41)
ANION GAP SERPL CALCULATED.3IONS-SCNC: 13 MMOL/L (ref 5–15)
AST SERPL-CCNC: 25 U/L (ref 1–40)
BASOPHILS # BLD AUTO: 0.06 10*3/MM3 (ref 0–0.2)
BASOPHILS NFR BLD AUTO: 0.6 % (ref 0–1.5)
BH CV LOWER VASCULAR LEFT COMMON FEMORAL PHASIC: NORMAL
BH CV LOWER VASCULAR LEFT COMMON FEMORAL SPONT: NORMAL
BH CV LOWER VASCULAR RIGHT COMMON FEMORAL AUGMENT: NORMAL
BH CV LOWER VASCULAR RIGHT COMMON FEMORAL COMPRESS: NORMAL
BH CV LOWER VASCULAR RIGHT COMMON FEMORAL PHASIC: NORMAL
BH CV LOWER VASCULAR RIGHT COMMON FEMORAL SPONT: NORMAL
BH CV LOWER VASCULAR RIGHT DISTAL FEMORAL AUGMENT: NORMAL
BH CV LOWER VASCULAR RIGHT DISTAL FEMORAL COMPRESS: NORMAL
BH CV LOWER VASCULAR RIGHT DISTAL FEMORAL PHASIC: NORMAL
BH CV LOWER VASCULAR RIGHT DISTAL FEMORAL SPONT: NORMAL
BH CV LOWER VASCULAR RIGHT GASTRONEMIUS COMPRESS: NORMAL
BH CV LOWER VASCULAR RIGHT GREATER SAPH AK COMPRESS: NORMAL
BH CV LOWER VASCULAR RIGHT GREATER SAPH BK COMPRESS: NORMAL
BH CV LOWER VASCULAR RIGHT LESSER SAPH COMPRESS: NORMAL
BH CV LOWER VASCULAR RIGHT MID FEMORAL AUGMENT: NORMAL
BH CV LOWER VASCULAR RIGHT MID FEMORAL COMPRESS: NORMAL
BH CV LOWER VASCULAR RIGHT MID FEMORAL PHASIC: NORMAL
BH CV LOWER VASCULAR RIGHT MID FEMORAL SPONT: NORMAL
BH CV LOWER VASCULAR RIGHT PERONEAL COMPRESS: NORMAL
BH CV LOWER VASCULAR RIGHT POPLITEAL AUGMENT: NORMAL
BH CV LOWER VASCULAR RIGHT POPLITEAL COMPRESS: NORMAL
BH CV LOWER VASCULAR RIGHT POPLITEAL PHASIC: NORMAL
BH CV LOWER VASCULAR RIGHT POPLITEAL SPONT: NORMAL
BH CV LOWER VASCULAR RIGHT POSTERIOR TIBIAL COMPRESS: NORMAL
BH CV LOWER VASCULAR RIGHT PROFUNDA FEMORAL PHASIC: NORMAL
BH CV LOWER VASCULAR RIGHT PROFUNDA FEMORAL SPONT: NORMAL
BH CV LOWER VASCULAR RIGHT PROXIMAL FEMORAL AUGMENT: NORMAL
BH CV LOWER VASCULAR RIGHT PROXIMAL FEMORAL COMPRESS: NORMAL
BH CV LOWER VASCULAR RIGHT PROXIMAL FEMORAL PHASIC: NORMAL
BH CV LOWER VASCULAR RIGHT PROXIMAL FEMORAL SPONT: NORMAL
BH CV LOWER VASCULAR RIGHT SAPHENOFEMORAL JUNCTION AUGMENT: NORMAL
BH CV LOWER VASCULAR RIGHT SAPHENOFEMORAL JUNCTION COMPRESS: NORMAL
BH CV LOWER VASCULAR RIGHT SAPHENOFEMORAL JUNCTION PHASIC: NORMAL
BH CV LOWER VASCULAR RIGHT SAPHENOFEMORAL JUNCTION SPONT: NORMAL
BH CV VAS PRELIMINARY FINDINGS SCRIPTING: 1
BILIRUB SERPL-MCNC: 0.5 MG/DL (ref 0–1.2)
BUN SERPL-MCNC: 19.9 MG/DL (ref 8–23)
BUN/CREAT SERPL: 19 (ref 7–25)
CALCIUM SPEC-SCNC: 9.7 MG/DL (ref 8.6–10.5)
CHLORIDE SERPL-SCNC: 102 MMOL/L (ref 98–107)
CO2 SERPL-SCNC: 21 MMOL/L (ref 22–29)
CREAT SERPL-MCNC: 1.05 MG/DL (ref 0.76–1.27)
DEPRECATED RDW RBC AUTO: 48.1 FL (ref 37–54)
EGFRCR SERPLBLD CKD-EPI 2021: 72.2 ML/MIN/1.73
EOSINOPHIL # BLD AUTO: 0.09 10*3/MM3 (ref 0–0.4)
EOSINOPHIL NFR BLD AUTO: 0.8 % (ref 0.3–6.2)
ERYTHROCYTE [DISTWIDTH] IN BLOOD BY AUTOMATED COUNT: 14.3 % (ref 12.3–15.4)
GLOBULIN UR ELPH-MCNC: 3.5 GM/DL
GLUCOSE SERPL-MCNC: 94 MG/DL (ref 65–99)
HCT VFR BLD AUTO: 56.3 % (ref 37.5–51)
HGB BLD-MCNC: 18.2 G/DL (ref 13–17.7)
IMM GRANULOCYTES # BLD AUTO: 0.11 10*3/MM3 (ref 0–0.05)
IMM GRANULOCYTES NFR BLD AUTO: 1 % (ref 0–0.5)
LYMPHOCYTES # BLD AUTO: 1.96 10*3/MM3 (ref 0.7–3.1)
LYMPHOCYTES NFR BLD AUTO: 18.2 % (ref 19.6–45.3)
MCH RBC QN AUTO: 29.4 PG (ref 26.6–33)
MCHC RBC AUTO-ENTMCNC: 32.3 G/DL (ref 31.5–35.7)
MCV RBC AUTO: 91.1 FL (ref 79–97)
MONOCYTES # BLD AUTO: 1.42 10*3/MM3 (ref 0.1–0.9)
MONOCYTES NFR BLD AUTO: 13.2 % (ref 5–12)
NEUTROPHILS NFR BLD AUTO: 66.2 % (ref 42.7–76)
NEUTROPHILS NFR BLD AUTO: 7.14 10*3/MM3 (ref 1.7–7)
NRBC BLD AUTO-RTO: 0 /100 WBC (ref 0–0.2)
PLATELET # BLD AUTO: 204 10*3/MM3 (ref 140–450)
PMV BLD AUTO: 10.7 FL (ref 6–12)
POTASSIUM SERPL-SCNC: 4.8 MMOL/L (ref 3.5–5.2)
PROT SERPL-MCNC: 7.2 G/DL (ref 6–8.5)
RBC # BLD AUTO: 6.18 10*6/MM3 (ref 4.14–5.8)
SODIUM SERPL-SCNC: 136 MMOL/L (ref 136–145)
WBC NRBC COR # BLD AUTO: 10.78 10*3/MM3 (ref 3.4–10.8)

## 2025-05-27 PROCEDURE — 36415 COLL VENOUS BLD VENIPUNCTURE: CPT

## 2025-05-27 PROCEDURE — 80053 COMPREHEN METABOLIC PANEL: CPT | Performed by: EMERGENCY MEDICINE

## 2025-05-27 PROCEDURE — 99284 EMERGENCY DEPT VISIT MOD MDM: CPT

## 2025-05-27 PROCEDURE — 93971 EXTREMITY STUDY: CPT

## 2025-05-27 PROCEDURE — 85025 COMPLETE CBC W/AUTO DIFF WBC: CPT | Performed by: EMERGENCY MEDICINE

## 2025-05-27 PROCEDURE — 93971 EXTREMITY STUDY: CPT | Performed by: INTERNAL MEDICINE

## 2025-05-27 RX ORDER — CEPHALEXIN 500 MG/1
500 CAPSULE ORAL 3 TIMES DAILY
Qty: 21 CAPSULE | Refills: 0 | Status: SHIPPED | OUTPATIENT
Start: 2025-05-27 | End: 2025-05-27

## 2025-05-27 RX ORDER — CEPHALEXIN 500 MG/1
500 CAPSULE ORAL 3 TIMES DAILY
Qty: 21 CAPSULE | Refills: 0 | Status: SHIPPED | OUTPATIENT
Start: 2025-05-27 | End: 2025-06-03

## 2025-05-27 NOTE — ED PROVIDER NOTES
"Subjective   History of Present Illness  79-year-old male presents for evaluation of \"possible leg infection.\"  The patient is up-to-date on his tetanus.  He tells me that about a week ago he accidentally cut the lateral aspect of his mid right lower leg on a car door, resulting in a small open wound.  Since that time the patient has had pain and mild soft tissue swelling to his right lower leg when compared to the contralateral side.  He went to urgent care regarding his symptoms yesterday and was started on Bactrim for presumptive cellulitis.  However, he continues to be symptomatic and was worried about his worsening and ongoing symptoms, prompting his visit to the emergency department here today.  He denies any fevers.  He denies any drainage from the leg.  He has no other complaints at this time.      Review of Systems   Cardiovascular:  Positive for leg swelling.   Skin:  Positive for wound.   All other systems reviewed and are negative.      Past Medical History:   Diagnosis Date    Asthma     Bronchitis, chronic     Cancer     Skin     Cellulitis and abscess of right leg     Chronic persistent asthma 02/25/2020    Clotting disorder     Hyperlipidemia     Hypertension     Nephrolithiasis     Obesity, Class III, BMI 40-49.9 (morbid obesity) 02/25/2020    Paroxysmal atrial fibrillation 07/15/2019    Pneumonia     Sleep apnea     UTI (urinary tract infection)     Vitamin D deficiency 08/20/2020       Allergies   Allergen Reactions    Torsemide Confusion    Adhesive Tape Rash       Past Surgical History:   Procedure Laterality Date    CARDIAC CATHETERIZATION N/A 12/31/2023    Procedure: Pericardiocentesis;  Surgeon: Clarence Chaudhari MD;  Location: [facility] INVASIVE LOCATION;  Service: Cardiovascular;  Laterality: N/A;    CARDIOVERSION  08/05/2019    COLONOSCOPY      NASAL SEPTUM SURGERY      Deviation Repair    PARACENTESIS      SKIN CANCER EXCISION      TONSILLECTOMY      VASECTOMY         Family History "   Problem Relation Age of Onset    Cancer Mother         colon    Diabetes Mother     Alzheimer's disease Father     No Known Problems Maternal Grandmother     No Known Problems Maternal Grandfather     No Known Problems Paternal Grandmother     No Known Problems Paternal Grandfather     Other (GI Issues) Sister        Social History     Socioeconomic History    Marital status:    Tobacco Use    Smoking status: Never     Passive exposure: Never    Smokeless tobacco: Former     Types: Chew     Quit date: 4/26/2019   Vaping Use    Vaping status: Never Used   Substance and Sexual Activity    Alcohol use: Yes     Alcohol/week: 0.0 - 2.0 standard drinks of alcohol     Comment: 1-2 cans of beer/month    Drug use: No    Sexual activity: Not Currently           Objective   Physical Exam  Vitals and nursing note reviewed.   Constitutional:       General: He is not in acute distress.     Appearance: He is well-developed. He is obese. He is not diaphoretic.      Comments: Nontoxic-appearing male   HENT:      Head: Normocephalic and atraumatic.      Comments: No mucous membrane lesions noted  Neck:      Vascular: No JVD.   Cardiovascular:      Rate and Rhythm: Normal rate and regular rhythm.      Heart sounds: Normal heart sounds. No murmur heard.     No friction rub. No gallop.   Pulmonary:      Effort: Pulmonary effort is normal. No respiratory distress.      Breath sounds: Normal breath sounds. No wheezing or rales.   Musculoskeletal:         General: Normal range of motion.   Skin:     Comments: Small open wound noted to lateral aspect of right mid lower leg with scabbing present, mild soft tissue swelling noted circumferentially to right lower leg when compared to contralateral side, mild erythema noted, no fluctuance or induration, no purulent drainage, no palpable crepitus, no overlying warmth   Neurological:      Mental Status: He is alert and oriented to person, place, and time.      Comments: Ambulatory, right  "lower extremity is neurovascularly intact distally with bounding distal pulses normal sensation noted   Psychiatric:         Mood and Affect: Mood normal.         Thought Content: Thought content normal.         Judgment: Judgment normal.         Procedures           ED Course  ED Course as of 05/27/25 1623   Tue May 27, 2025   1406 79-year-old male presents for evaluation of \"possible leg infection.\"  The patient is up-to-date on his tetanus.  He tells me that a week ago he accidentally cut the lateral aspect of his mid right lower leg on a car door, resulting in a small open wound.  Since that time he has had pain and mild soft tissue swelling to his right lower leg when compared to the contralateral side.  He went to urgent care regarding his symptoms yesterday and was started on Bactrim for cellulitis.  However, he continues to be symptomatic and as result came here to the ED to be evaluated today.  On arrival, the patient is nontoxic-appearing.  He is afebrile.  Right lower extremity is neurovascularly intact distally with bounding distal pulses and normal sensation noted.  Tetanus is up-to-date.  On exam, the patient has a small open wound noted to the lateral aspect of his right mid lower leg.  There is mild soft tissue swelling noted circumferentially to his right lower leg when compared to contralateral side.  Mild erythema noted.  No fluctuance or induration present.  No purulent drainage.  No palpable crepitus. [DD]   1408 Broad differential diagnosis.  We will obtain labs and a Doppler ultrasound, and we will reassess following initial interventions. [DD]   1504 Labs interpreted independently by me are bland/unrevealing. [DD]   1504 I personally and independently reviewed the patient's US images and findings, and I am in agreement with the radiologist regarding US interpretation--particularly there is no evidence of underlying DVT noted. [DD]   1504 Patient reassured and counseled regarding symptomatic " management.  I advised him to continue his DOAC.  He will continue his Bactrim and we will add Keflex to his antibiotic regimen.  He will follow-up with his primary care physician for a recheck of his wound within the next 72 hours.  Agreeable with plan and given appropriate strict return precautions. [DD]      ED Course User Index  [DD] Harsha Espinosa MD                                           Recent Results (from the past 24 hours)   Comprehensive Metabolic Panel    Collection Time: 05/27/25  2:04 PM    Specimen: Blood   Result Value Ref Range    Glucose 94 65 - 99 mg/dL    BUN 19.9 8.0 - 23.0 mg/dL    Creatinine 1.05 0.76 - 1.27 mg/dL    Sodium 136 136 - 145 mmol/L    Potassium 4.8 3.5 - 5.2 mmol/L    Chloride 102 98 - 107 mmol/L    CO2 21.0 (L) 22.0 - 29.0 mmol/L    Calcium 9.7 8.6 - 10.5 mg/dL    Total Protein 7.2 6.0 - 8.5 g/dL    Albumin 3.7 3.5 - 5.2 g/dL    ALT (SGPT) 20 1 - 41 U/L    AST (SGOT) 25 1 - 40 U/L    Alkaline Phosphatase 73 39 - 117 U/L    Total Bilirubin 0.5 0.0 - 1.2 mg/dL    Globulin 3.5 gm/dL    A/G Ratio 1.1 g/dL    BUN/Creatinine Ratio 19.0 7.0 - 25.0    Anion Gap 13.0 5.0 - 15.0 mmol/L    eGFR 72.2 >60.0 mL/min/1.73   CBC Auto Differential    Collection Time: 05/27/25  2:04 PM    Specimen: Blood   Result Value Ref Range    WBC 10.78 3.40 - 10.80 10*3/mm3    RBC 6.18 (H) 4.14 - 5.80 10*6/mm3    Hemoglobin 18.2 (H) 13.0 - 17.7 g/dL    Hematocrit 56.3 (H) 37.5 - 51.0 %    MCV 91.1 79.0 - 97.0 fL    MCH 29.4 26.6 - 33.0 pg    MCHC 32.3 31.5 - 35.7 g/dL    RDW 14.3 12.3 - 15.4 %    RDW-SD 48.1 37.0 - 54.0 fl    MPV 10.7 6.0 - 12.0 fL    Platelets 204 140 - 450 10*3/mm3    Neutrophil % 66.2 42.7 - 76.0 %    Lymphocyte % 18.2 (L) 19.6 - 45.3 %    Monocyte % 13.2 (H) 5.0 - 12.0 %    Eosinophil % 0.8 0.3 - 6.2 %    Basophil % 0.6 0.0 - 1.5 %    Immature Grans % 1.0 (H) 0.0 - 0.5 %    Neutrophils, Absolute 7.14 (H) 1.70 - 7.00 10*3/mm3    Lymphocytes, Absolute 1.96 0.70 - 3.10 10*3/mm3     Monocytes, Absolute 1.42 (H) 0.10 - 0.90 10*3/mm3    Eosinophils, Absolute 0.09 0.00 - 0.40 10*3/mm3    Basophils, Absolute 0.06 0.00 - 0.20 10*3/mm3    Immature Grans, Absolute 0.11 (H) 0.00 - 0.05 10*3/mm3    nRBC 0.0 0.0 - 0.2 /100 WBC   Duplex Venous Lower Extremity - RIGHT    Collection Time: 05/27/25  2:59 PM   Result Value Ref Range    Right Common Femoral Spont Y     Right Common Femoral Phasic Y     Right Common Femoral Compress C     Right Common Femoral Augment Y     Right Saphenofemoral Junction Spont Y     Right Saphenofemoral Junction Phasic Y     Right Saphenofemoral Junction Compress C     Right Saphenofemoral Junction Augment Y     Right Profunda Femoral Spont Y     Right Profunda Femoral Phasic Y     Right Proximal Femoral Spont Y     Right Proximal Femoral Phasic Y     Right Proximal Femoral Compress C     Right Proximal Femoral Augment Y     Right Mid Femoral Spont Y     Right Mid Femoral Phasic Y     Right Mid Femoral Compress C     Right Mid Femoral Augment Y     Right Distal Femoral Spont Y     Right Distal Femoral Phasic Y     Right Distal Femoral Compress C     Right Distal Femoral Augment Y     Right Popliteal Spont Y     Right Popliteal Phasic Y     Right Popliteal Compress C     Right Popliteal Augment Y     Right Posterior Tibial Compress C     Right Peroneal Compress C     Right Gastronemius Compress C     Right Greater Saph AK Compress C     Right Greater Saph BK Compress C     Right Lesser Saph Compress C     Left Common Femoral Spont Y     Left Common Femoral Phasic Y     BH CV VAS PRELIMINARY FINDINGS SCRIPTING 1.0      Note: In addition to lab results from this visit, the labs listed above may include labs taken at another facility or during a different encounter within the last 24 hours. Please correlate lab times with ED admission and discharge times for further clarification of the services performed during this visit.    No orders to display     Vitals:    05/27/25 1341  "05/27/25 1541   BP: (!) 137/110 130/85   BP Location: Right arm Left arm   Patient Position: Sitting Sitting   Pulse: 103    Resp: 18    Temp: 97.8 °F (36.6 °C)    TempSrc: Oral    SpO2: 98%    Weight: (!) 154 kg (340 lb)    Height: 185.4 cm (73\")      Medications - No data to display  ECG/EMG Results (last 24 hours)       ** No results found for the last 24 hours. **          No orders to display                   Medical Decision Making  Problems Addressed:  Cellulitis of right lower leg: complicated acute illness or injury    Amount and/or Complexity of Data Reviewed  Labs: ordered.    Risk  Prescription drug management.        Final diagnoses:   Cellulitis of right lower leg   Abrasion of right lower leg, initial encounter       ED Disposition  ED Disposition       ED Disposition   Discharge    Condition   Stable    Comment   --               Sierra Kuo MD  [address] [address]  [phone]    In 1 week           Medication List        New Prescriptions      cephalexin 500 MG capsule  Commonly known as: KEFLEX  Take 1 capsule by mouth 3 (Three) Times a Day for 7 days.            Changed      * bisoprolol 5 MG tablet  Commonly known as: ZEBeta  Take 1 tablet by mouth Daily.  What changed:   how much to take  additional instructions     * bisoprolol 5 MG tablet  Commonly known as: ZEBeta  What changed: Another medication with the same name was changed. Make sure you understand how and when to take each.     ketoconazole 2 % cream  Commonly known as: NIZORAL  What changed: See the new instructions.           * This list has 2 medication(s) that are the same as other medications prescribed for you. Read the directions carefully, and ask your doctor or other care provider to review them with you.                   Where to Get Your Medications        These medications were sent to [facility] [id number] [facility] [address] [facility] [phone] [facility] [phone] FX  " [address] [address]      Phone: [phone]   cephalexin 500 MG capsule            Harsha Espinosa MD  05/27/25 2045

## 2025-07-02 RX ORDER — ICOSAPENT ETHYL 1000 MG/1
2 CAPSULE ORAL 2 TIMES DAILY WITH MEALS
Qty: 360 CAPSULE | Refills: 3 | Status: SHIPPED | OUTPATIENT
Start: 2025-07-02

## 2025-07-17 RX ORDER — MONTELUKAST SODIUM 10 MG/1
10 TABLET ORAL DAILY
Qty: 90 TABLET | Refills: 3 | Status: SHIPPED | OUTPATIENT
Start: 2025-07-17

## (undated) DEVICE — PK CATH CARD 10

## (undated) DEVICE — KT PERICARDIOCENT .035 STR 8.3F

## (undated) DEVICE — ST EXT IV SMRTSTE 2VLV FIX M LL 6ML 41

## (undated) DEVICE — ST INF PRI SMRTSTE 20DRP 2VLV 24ML 117

## (undated) DEVICE — ADULT, W/LG. BACK PAD, RADIOTRANSPARENT ELEMENT AND LEAD WIRE COMPATIBLE W/: Brand: DEFIBRILLATION ELECTRODES

## (undated) DEVICE — Device

## (undated) DEVICE — MODEL BT2000 P/N 700287-012KIT CONTENTS: MANIFOLD WITH SALINE AND CONTRAST PORTS, SALINE TUBING WITH SPIKE AND HAND SYRINGE, TRANSDUCER: Brand: BT2000 AUTOMATED MANIFOLD KIT

## (undated) DEVICE — ST ACC MICROPUNCTURE .018 TRANSLSS/SS/TP 5F/10CM 21G/7CM

## (undated) DEVICE — PINNACLE INTRODUCER SHEATH: Brand: PINNACLE